# Patient Record
Sex: MALE | Race: WHITE | NOT HISPANIC OR LATINO | Employment: OTHER | URBAN - METROPOLITAN AREA
[De-identification: names, ages, dates, MRNs, and addresses within clinical notes are randomized per-mention and may not be internally consistent; named-entity substitution may affect disease eponyms.]

---

## 2017-01-13 RX ORDER — METOPROLOL TARTRATE 50 MG/1
TABLET ORAL
Qty: 180 TABLET | Refills: 1 | Status: SHIPPED | OUTPATIENT
Start: 2017-01-13 | End: 2017-07-10 | Stop reason: SDUPTHER

## 2017-01-18 NOTE — TELEPHONE ENCOUNTER
----- Message from Kathleen Pickett sent at 1/18/2017  9:45 AM CST -----  Contact: self [phone]  Type: Rx    Name of medication(s):  atorvastatin (LIPITOR) 20 MG tablet    Is this a refill? New rx? Refill     Who prescribed medication?    Pharmacy Name, Phone, & Location: [facility] on file 90 day supplies     Comments: please call and advise, Thanks !

## 2017-01-19 RX ORDER — ATORVASTATIN CALCIUM 20 MG/1
20 TABLET, FILM COATED ORAL DAILY
Qty: 90 TABLET | Refills: 3 | Status: SHIPPED | OUTPATIENT
Start: 2017-01-19 | End: 2017-12-07 | Stop reason: SDUPTHER

## 2017-01-30 RX ORDER — INSULIN DETEMIR 100 [IU]/ML
INJECTION, SOLUTION SUBCUTANEOUS
Qty: 60 ML | Refills: 1 | Status: SHIPPED | OUTPATIENT
Start: 2017-01-30 | End: 2017-08-04 | Stop reason: SDUPTHER

## 2017-01-30 RX ORDER — BLOOD SUGAR DIAGNOSTIC
STRIP MISCELLANEOUS
Qty: 600 STRIP | Refills: 3 | Status: SHIPPED | OUTPATIENT
Start: 2017-01-30 | End: 2017-04-24

## 2017-01-31 ENCOUNTER — TELEPHONE (OUTPATIENT)
Dept: ENDOCRINOLOGY | Facility: CLINIC | Age: 77
End: 2017-01-31

## 2017-01-31 NOTE — TELEPHONE ENCOUNTER
Spoke with the insurance company for PA for Levemir flex touch. They says that pt dont need PA until 90days cause pt just picked up Rx yesterday and they will notified before 90 days.

## 2017-02-20 RX ORDER — PEN NEEDLE, DIABETIC 31 GX5/16"
NEEDLE, DISPOSABLE MISCELLANEOUS
Qty: 200 EACH | Refills: 11 | Status: SHIPPED | OUTPATIENT
Start: 2017-02-20 | End: 2018-02-23 | Stop reason: SDUPTHER

## 2017-02-27 RX ORDER — TAMSULOSIN HYDROCHLORIDE 0.4 MG/1
CAPSULE ORAL
Qty: 90 CAPSULE | Refills: 0 | Status: SHIPPED | OUTPATIENT
Start: 2017-02-27 | End: 2017-05-26 | Stop reason: SDUPTHER

## 2017-03-02 ENCOUNTER — OFFICE VISIT (OUTPATIENT)
Dept: OPHTHALMOLOGY | Facility: CLINIC | Age: 77
End: 2017-03-02
Payer: MEDICARE

## 2017-03-02 ENCOUNTER — LAB VISIT (OUTPATIENT)
Dept: LAB | Facility: HOSPITAL | Age: 77
End: 2017-03-02
Attending: INTERNAL MEDICINE
Payer: MEDICARE

## 2017-03-02 DIAGNOSIS — N18.1 TYPE 2 DIABETES MELLITUS WITH STAGE 1 CHRONIC KIDNEY DISEASE, WITH LONG-TERM CURRENT USE OF INSULIN: Chronic | ICD-10-CM

## 2017-03-02 DIAGNOSIS — E11.40 TYPE 2 DIABETES MELLITUS WITH DIABETIC NEUROPATHY, WITHOUT LONG-TERM CURRENT USE OF INSULIN: ICD-10-CM

## 2017-03-02 DIAGNOSIS — E11.22 TYPE 2 DIABETES MELLITUS WITH STAGE 1 CHRONIC KIDNEY DISEASE, WITH LONG-TERM CURRENT USE OF INSULIN: Chronic | ICD-10-CM

## 2017-03-02 DIAGNOSIS — H35.033 HYPERTENSIVE RETINOPATHY OF BOTH EYES: ICD-10-CM

## 2017-03-02 DIAGNOSIS — I25.10 CORONARY ARTERY DISEASE INVOLVING NATIVE CORONARY ARTERY WITHOUT ANGINA PECTORIS, UNSPECIFIED WHETHER NATIVE OR TRANSPLANTED HEART: Chronic | ICD-10-CM

## 2017-03-02 DIAGNOSIS — E11.3212 DIABETIC MACULAR EDEMA OF LEFT EYE WITH MILD NONPROLIFERATIVE RETINOPATHY ASSOCIATED WITH TYPE 2 DIABETES MELLITUS: Primary | ICD-10-CM

## 2017-03-02 DIAGNOSIS — Z85.51 HISTORY OF BLADDER CANCER: Primary | ICD-10-CM

## 2017-03-02 DIAGNOSIS — Z79.4 TYPE 2 DIABETES MELLITUS WITH STAGE 1 CHRONIC KIDNEY DISEASE, WITH LONG-TERM CURRENT USE OF INSULIN: Chronic | ICD-10-CM

## 2017-03-02 DIAGNOSIS — I10 ESSENTIAL HYPERTENSION: ICD-10-CM

## 2017-03-02 LAB
ALBUMIN SERPL BCP-MCNC: 3.4 G/DL
ALP SERPL-CCNC: 34 U/L
ALT SERPL W/O P-5'-P-CCNC: 22 U/L
ANION GAP SERPL CALC-SCNC: 6 MMOL/L
AST SERPL-CCNC: 22 U/L
BASOPHILS # BLD AUTO: 0.06 K/UL
BASOPHILS NFR BLD: 0.9 %
BILIRUB SERPL-MCNC: 1.2 MG/DL
BUN SERPL-MCNC: 34 MG/DL
CALCIUM SERPL-MCNC: 9.7 MG/DL
CHLORIDE SERPL-SCNC: 107 MMOL/L
CHOLEST/HDLC SERPL: 3.1 {RATIO}
CO2 SERPL-SCNC: 26 MMOL/L
CREAT SERPL-MCNC: 2.1 MG/DL
DIFFERENTIAL METHOD: ABNORMAL
EOSINOPHIL # BLD AUTO: 0.3 K/UL
EOSINOPHIL NFR BLD: 4.9 %
ERYTHROCYTE [DISTWIDTH] IN BLOOD BY AUTOMATED COUNT: 13.1 %
EST. GFR  (AFRICAN AMERICAN): 34.1 ML/MIN/1.73 M^2
EST. GFR  (NON AFRICAN AMERICAN): 29.5 ML/MIN/1.73 M^2
GLUCOSE SERPL-MCNC: 182 MG/DL
HCT VFR BLD AUTO: 40.5 %
HDL/CHOLESTEROL RATIO: 32 %
HDLC SERPL-MCNC: 103 MG/DL
HDLC SERPL-MCNC: 33 MG/DL
HGB BLD-MCNC: 13.3 G/DL
LDLC SERPL CALC-MCNC: 49.6 MG/DL
LYMPHOCYTES # BLD AUTO: 1.4 K/UL
LYMPHOCYTES NFR BLD: 22.2 %
MCH RBC QN AUTO: 27.8 PG
MCHC RBC AUTO-ENTMCNC: 32.8 %
MCV RBC AUTO: 85 FL
MONOCYTES # BLD AUTO: 0.6 K/UL
MONOCYTES NFR BLD: 8.8 %
NEUTROPHILS # BLD AUTO: 4.1 K/UL
NEUTROPHILS NFR BLD: 63 %
NONHDLC SERPL-MCNC: 70 MG/DL
PLATELET # BLD AUTO: 140 K/UL
PMV BLD AUTO: 10.9 FL
POTASSIUM SERPL-SCNC: 5 MMOL/L
PROT SERPL-MCNC: 6.6 G/DL
RBC # BLD AUTO: 4.78 M/UL
SODIUM SERPL-SCNC: 139 MMOL/L
TRIGL SERPL-MCNC: 102 MG/DL
WBC # BLD AUTO: 6.5 K/UL

## 2017-03-02 PROCEDURE — 99499 UNLISTED E&M SERVICE: CPT | Mod: S$GLB,,, | Performed by: OPHTHALMOLOGY

## 2017-03-02 PROCEDURE — 92226 PR SPECIAL EYE EXAM, SUBSEQUENT: CPT | Mod: LT,S$GLB,, | Performed by: OPHTHALMOLOGY

## 2017-03-02 PROCEDURE — 99999 PR PBB SHADOW E&M-EST. PATIENT-LVL III: CPT | Mod: PBBFAC,,, | Performed by: OPHTHALMOLOGY

## 2017-03-02 PROCEDURE — 92014 COMPRE OPH EXAM EST PT 1/>: CPT | Mod: S$GLB,,, | Performed by: OPHTHALMOLOGY

## 2017-03-02 PROCEDURE — 92134 CPTRZ OPH DX IMG PST SGM RTA: CPT | Mod: S$GLB,,, | Performed by: OPHTHALMOLOGY

## 2017-03-02 NOTE — PROGRESS NOTES
OCT - ERM OU - no significant distortion  ME OS - central ME improved    Prior FA - Minimal late leakage of fovea MA OS  No leakage OD      A/P    1. Mild NPDR OU    2. Trace DME OS  Increased again  Resolved with observation in the past, will observe today    If worsens, then injection    3. PCIOL OU    4. Floaters OU    5. HTN Ret OU    6. CAD - s/p stents on Plavix    BS/BP/chol control      6 months OCT

## 2017-03-02 NOTE — MR AVS SNAPSHOT
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    -  Primary     Hypertensive retinopathy of both eyes                To Do List           Future Appointments        Provider Department Dept Phone    3/7/2017 1:30 PM Keith Teixeira MD [facility] - Cardiology [phone]    2017 2:00 PM HRA, NOM 3 [facility] - Internal Medicine [phone]    2017 3:30 PM Dolores Sutherland APRN,ANP-C [facility] - Endocrinology [phone]      Goals (5 Years of Data)              16    HEMOGLOBIN A1C < 7.5   7.8  7.7  7.2      Follow-Up and Disposition     Return in about 6 months (around 2017).      [facility]     [facility] On Call Nurse [facility] -  Assistance  Registered nurses in the [facility] On Call Center provide clinical advisement, health education, appointment booking, and other advisory services.  Call for this free service at [phone].             Medications           Message regarding Medications     Verify the changes and/or additions to your medication regime listed below are the same as discussed with your clinician today.  If any of these changes or additions are incorrect, please notify your healthcare provider.             Verify that the below list of medications is an accurate representation of the medications you are currently taking.  If none reported, the list may be blank. If incorrect, please contact your healthcare provider. Carry this list with you in case of emergency.           Current Medications  "    aspirin (ECOTRIN) 81 MG EC tablet Take 81 mg by mouth every evening.     atorvastatin (LIPITOR) 20 MG tablet Take 1 tablet (20 mg total) by mouth once daily.    BD INSULIN PEN NEEDLE UF SHORT 31 gauge x 5/16" Ndle USE UP TO 6 TIMES DAILY WITH MULTIPLE INSULIN INJECTIONS    calcitRIOL (ROCALTROL) 0.25 MCG Cap TAKE 1 CAPSULE BY MOUTH EVERY DAY    clopidogrel (PLAVIX) 75 mg tablet TAKE 1 TABLET BY MOUTH EVERY DAY    cyanocobalamin (VITAMIN B-12) 1,000 mcg/mL injection Inject 1 mL (1,000 mcg total) into the muscle every 14 (fourteen) days.    finasteride (PROSCAR) 5 mg tablet TAKE 1 TABLET BY MOUTH EVERY DAY    folic acid (FOLVITE) 1 MG tablet Take 1 mg by mouth once daily.     insulin aspart (NOVOLOG FLEXPEN) 100 unit/mL InPn pen INJECT 25 UNITS INTO THE SKIN THREE TIMES DAILY WITH MEALS. MAY USE 25 UNITS WITH NIGHTTIME SNACK.    LEVEMIR FLEXTOUCH 100 unit/mL (3 mL) InPn pen INJECT 28 UNITS EVERY MORNING AND 30 UNITS EVERY EVENING    metoprolol tartrate (LOPRESSOR) 50 MG tablet TAKE 1 TABLET BY MOUTH TWICE DAILY    nitroGLYCERIN (NITROSTAT) 0.4 MG SL tablet Place 1 tablet (0.4 mg total) under the tongue every 5 (five) minutes as needed.    omega-3 fatty acids-vitamin E (FISH OIL) 1,000 mg Cap Take by mouth 2 (two) times daily. 2 caps AM and 1 cap PM    ONE TOUCH ULTRA TEST Strp Pt test 3-4 times a day    ONETOUCH ULTRA TEST Strp TEST 4 TIMES DAILY    pantoprazole (PROTONIX) 40 MG tablet TAKE 1 TABLET BY MOUTH EVERY DAY    tamsulosin (FLOMAX) 0.4 mg Cp24 TAKE 1 CAPSULE BY MOUTH EVERY EVENING    valsartan-hydrochlorothiazide (DIOVAN-HCT) 320-25 mg per tablet Take 1 tablet by mouth once daily.    VITAMIN D2 50,000 unit capsule TAKE 1 CAPSULE BY MOUTH ONCE A WEEK           Clinical Reference Information           Allergies as of 3/2/2017     Penicillins    Coreg [Carvedilol]    Bactrim [Sulfamethoxazole-trimethoprim]      Immunizations Administered on Date of Encounter - 3/2/2017     None      Orders Placed During " Today's Visit      Normal Orders This Visit    Posterior Segment OCT Retina-Both eyes     Future Labs/Procedures Expected by Expires    Posterior Segment OCT Retina-Both eyes  As directed 3/2/2018      Language Assistance Services     ATTENTION: Language assistance services are available, free of charge. Please call [phone].      ATENCIÓN: Si daronla rosa m, tiene a harvey disposición servicios gratuitos de asistencia lingüística. Llame al [phone].     CHÚ Ý: N?u b?n nói Ti?ng Vi?t, có các d?ch v? h? tr? ngôn ng? mi?n phí dành cho b?n. G?i s? [phone].         [facility] complies with applicable Federal civil rights laws and does not discriminate on the basis of race, color, national origin, age, disability, or sex.

## 2017-03-03 LAB
ESTIMATED AVG GLUCOSE: 169 MG/DL
HBA1C MFR BLD HPLC: 7.5 %

## 2017-03-07 ENCOUNTER — OFFICE VISIT (OUTPATIENT)
Dept: CARDIOLOGY | Facility: CLINIC | Age: 77
End: 2017-03-07
Payer: MEDICARE

## 2017-03-07 VITALS
WEIGHT: 247.13 LBS | HEART RATE: 52 BPM | HEIGHT: 71 IN | BODY MASS INDEX: 34.6 KG/M2 | DIASTOLIC BLOOD PRESSURE: 71 MMHG | SYSTOLIC BLOOD PRESSURE: 164 MMHG

## 2017-03-07 DIAGNOSIS — G47.33 OSA ON CPAP: ICD-10-CM

## 2017-03-07 DIAGNOSIS — E66.09 NON MORBID OBESITY DUE TO EXCESS CALORIES: ICD-10-CM

## 2017-03-07 DIAGNOSIS — G47.33 OSA (OBSTRUCTIVE SLEEP APNEA): Primary | ICD-10-CM

## 2017-03-07 DIAGNOSIS — N18.30 CKD (CHRONIC KIDNEY DISEASE) STAGE 3, GFR 30-59 ML/MIN: Chronic | ICD-10-CM

## 2017-03-07 DIAGNOSIS — I25.10 CORONARY ARTERY DISEASE INVOLVING NATIVE CORONARY ARTERY WITHOUT ANGINA PECTORIS, UNSPECIFIED WHETHER NATIVE OR TRANSPLANTED HEART: Primary | Chronic | ICD-10-CM

## 2017-03-07 DIAGNOSIS — E78.5 HYPERLIPIDEMIA, UNSPECIFIED HYPERLIPIDEMIA TYPE: Chronic | ICD-10-CM

## 2017-03-07 DIAGNOSIS — E11.40 TYPE 2 DIABETES MELLITUS WITH DIABETIC NEUROPATHY, WITHOUT LONG-TERM CURRENT USE OF INSULIN: ICD-10-CM

## 2017-03-07 DIAGNOSIS — I10 ESSENTIAL HYPERTENSION: ICD-10-CM

## 2017-03-07 PROCEDURE — 3078F DIAST BP <80 MM HG: CPT | Mod: S$GLB,,, | Performed by: INTERNAL MEDICINE

## 2017-03-07 PROCEDURE — 1157F ADVNC CARE PLAN IN RCRD: CPT | Mod: S$GLB,,, | Performed by: INTERNAL MEDICINE

## 2017-03-07 PROCEDURE — 1160F RVW MEDS BY RX/DR IN RCRD: CPT | Mod: S$GLB,,, | Performed by: INTERNAL MEDICINE

## 2017-03-07 PROCEDURE — 3077F SYST BP >= 140 MM HG: CPT | Mod: S$GLB,,, | Performed by: INTERNAL MEDICINE

## 2017-03-07 PROCEDURE — 1159F MED LIST DOCD IN RCRD: CPT | Mod: S$GLB,,, | Performed by: INTERNAL MEDICINE

## 2017-03-07 PROCEDURE — 1126F AMNT PAIN NOTED NONE PRSNT: CPT | Mod: S$GLB,,, | Performed by: INTERNAL MEDICINE

## 2017-03-07 PROCEDURE — 99999 PR PBB SHADOW E&M-EST. PATIENT-LVL III: CPT | Mod: PBBFAC,,, | Performed by: INTERNAL MEDICINE

## 2017-03-07 PROCEDURE — 99499 UNLISTED E&M SERVICE: CPT | Mod: S$GLB,,, | Performed by: NURSE PRACTITIONER

## 2017-03-07 PROCEDURE — 99214 OFFICE O/P EST MOD 30 MIN: CPT | Mod: S$GLB,,, | Performed by: INTERNAL MEDICINE

## 2017-03-07 RX ORDER — VALSARTAN 160 MG/1
160 TABLET ORAL DAILY
Qty: 30 TABLET | Refills: 11 | Status: SHIPPED | OUTPATIENT
Start: 2017-03-07 | End: 2018-05-12 | Stop reason: SDUPTHER

## 2017-03-07 RX ORDER — AMLODIPINE BESYLATE 5 MG/1
5 TABLET ORAL DAILY
Qty: 30 TABLET | Refills: 11 | Status: SHIPPED | OUTPATIENT
Start: 2017-03-07 | End: 2017-10-16

## 2017-03-07 NOTE — MR AVS SNAPSHOT
[facility]  [address]  [facility] [address]  Phone: [phone]                  Kevon Perez   3/7/2017 1:30 PM   Office Visit    Description:  Male : 1940   Provider:  Keith Teixeira MD   Department:  [facility]           Reason for Visit     Coronary Artery Disease           Diagnoses this Visit        Comments    Coronary artery disease involving native coronary artery without angina pectoris, unspecified whether native or transplanted heart    -  Primary     Hyperlipidemia, unspecified hyperlipidemia type         Essential hypertension         Type 2 diabetes mellitus with diabetic neuropathy, without long-term current use of insulin         Non morbid obesity due to excess calories         GINGER on CPAP                To Do List           Future Appointments        Provider Department Dept Phone    2017 2:00 PM [facility], NOM 3 [facility] [phone]    2017 3:30 PM HERBER Womack,ANP-C [facility] [phone]      Goals (5 Years of Data)              3/2/17    9/2/16    4/4/16    HEMOGLOBIN A1C < 7.5   7.5  7.8  7.7      Follow-Up and Disposition     Return in about 6 months (around 2017).       These Medications        Disp Refills Start End    valsartan (DIOVAN) 160 MG tablet 30 tablet 11 3/7/2017     Take 1 tablet (160 mg total) by mouth once daily. - Oral    [facility] [employer] [facility] [address] [place] [address] [address] AT [facility] Ph #: [phone]       amlodipine (NORVASC) 5 MG tablet 30 tablet 11 3/7/2017     Take 1 tablet (5 mg total) by mouth once daily. - Oral    Pharm[facility]: [facility] [employer] [id number]  [facility] [place] [facility] [address] AT [facility] Ph #: [phone]         [facility]     [facility] -  Assistance  Registered nurses in the [facility] Center provide clinical advisement, health  "education, appointment booking, and other advisory services.  Call for this free service at [phone].             Medications           Message regarding Medications     Verify the changes and/or additions to your medication regime listed below are the same as discussed with your clinician today.  If any of these changes or additions are incorrect, please notify your healthcare provider.        START taking these NEW medications        Refills    valsartan (DIOVAN) 160 MG tablet 11    Sig: Take 1 tablet (160 mg total) by mouth once daily.    Class: Normal    Route: Oral    amlodipine (NORVASC) 5 MG tablet 11    Sig: Take 1 tablet (5 mg total) by mouth once daily.    Class: Normal    Route: Oral      STOP taking these medications     cyanocobalamin (VITAMIN B-12) 1,000 mcg/mL injection Inject 1 mL (1,000 mcg total) into the muscle every 14 (fourteen) days.    omega-3 fatty acids-vitamin E (FISH OIL) 1,000 mg Cap Take by mouth 2 (two) times daily. 2 caps AM and 1 cap PM    valsartan-hydrochlorothiazide (DIOVAN-HCT) 320-25 mg per tablet Take 1 tablet by mouth once daily.           Verify that the below list of medications is an accurate representation of the medications you are currently taking.  If none reported, the list may be blank. If incorrect, please contact your healthcare provider. Carry this list with you in case of emergency.           Current Medications     aspirin (ECOTRIN) 81 MG EC tablet Take 81 mg by mouth every evening.     atorvastatin (LIPITOR) 20 MG tablet Take 1 tablet (20 mg total) by mouth once daily.    BD INSULIN PEN NEEDLE UF SHORT 31 gauge x 5/16" Ndle USE UP TO 6 TIMES DAILY WITH MULTIPLE INSULIN INJECTIONS    calcitRIOL (ROCALTROL) 0.25 MCG Cap TAKE 1 CAPSULE BY MOUTH EVERY DAY    clopidogrel (PLAVIX) 75 mg tablet TAKE 1 TABLET BY MOUTH EVERY DAY    finasteride (PROSCAR) 5 mg tablet TAKE 1 TABLET BY MOUTH EVERY DAY    folic acid (FOLVITE) 1 MG tablet Take 1 mg by mouth once daily.  " "   insulin aspart (NOVOLOG FLEXPEN) 100 unit/mL InPn pen INJECT 25 UNITS INTO THE SKIN THREE TIMES DAILY WITH MEALS. MAY USE 25 UNITS WITH NIGHTTIME SNACK.    LEVEMIR FLEXTOUCH 100 unit/mL (3 mL) InPn pen INJECT 28 UNITS EVERY MORNING AND 30 UNITS EVERY EVENING    metoprolol tartrate (LOPRESSOR) 50 MG tablet TAKE 1 TABLET BY MOUTH TWICE DAILY    nitroGLYCERIN (NITROSTAT) 0.4 MG SL tablet Place 1 tablet (0.4 mg total) under the tongue every 5 (five) minutes as needed.    ONE TOUCH ULTRA TEST Strp Pt test 3-4 times a day    ONETOUCH ULTRA TEST Strp TEST 4 TIMES DAILY    pantoprazole (PROTONIX) 40 MG tablet TAKE 1 TABLET BY MOUTH EVERY DAY    tamsulosin (FLOMAX) 0.4 mg Cp24 TAKE 1 CAPSULE BY MOUTH EVERY EVENING    VITAMIN D2 50,000 unit capsule TAKE 1 CAPSULE BY MOUTH ONCE A WEEK    amlodipine (NORVASC) 5 MG tablet Take 1 tablet (5 mg total) by mouth once daily.    valsartan (DIOVAN) 160 MG tablet Take 1 tablet (160 mg total) by mouth once daily.           Clinical Reference Information           Your Vitals Were     BP Pulse Height Weight BMI    164/71 (BP Location: Left arm, Patient Position: Sitting, BP Method: Automatic) 52 5' 10.5" (1.791 m) 112.1 kg (247 lb 2.2 oz) 34.96 kg/m2      Blood Pressure          Most Recent Value    Right Arm BP - Sitting  163/70    Left Arm BP - Sitting  164/71    BP  (!)  164/71      Allergies as of 3/7/2017     Penicillins    Coreg [Carvedilol]    Bactrim [Sulfamethoxazole-trimethoprim]      Immunizations Administered on Date of Encounter - 3/7/2017     None      Orders Placed During Today's Visit     Future Labs/Procedures Expected by Expires    Basic metabolic panel  3/7/2017 5/6/2018    CPAP titration (Must have diagnosis of GINGER from previous sleep study.)  As directed 3/7/2018      Instructions    1. Stop Diovan  2. Start Valsartan 160mg by mouth once a day.  3. Start Amlodipine 5mg by mouth once a day  4. Keep a log of your blood pressure 2-3 times a week and email Dr. Teixeira " with results in 1 month  5. Please get blood drawn in 1 month after starting new medications.   6. Walk or swim for 30 minutes 5 times a week and follow a low salt heart healthy diet such as the Mediterranean diet.   7. Follow up in 6 months       Language Assistance Services     ATTENTION: Language assistance services are available, free of charge. Please call [phone].      ATENCIÓN: Si habla español, tiene a harvey disposición servicios gratuitos de asistencia lingüística. Llame al [phone].     CHÚ Ý: N?u b?n nói Ti?ng Vi?t, có các d?ch v? h? tr? ngôn ng? mi?n phí dành cho b?n. G?i s? [phone].         [facility] complies with applicable Federal civil rights laws and does not discriminate on the basis of race, color, national origin, age, disability, or sex.

## 2017-03-07 NOTE — PATIENT INSTRUCTIONS
1. Stop Diovan  2. Start Valsartan 160mg by mouth once a day.  3. Start Amlodipine 5mg by mouth once a day  4. Keep a log of your blood pressure 2-3 times a week and email Dr. Teixeira with results in 1 month  5. Please get blood drawn in 1 month after starting new medications.   6. Walk or swim for 30 minutes 5 times a week and follow a low salt heart healthy diet such as the Mediterranean diet.   7. Follow up in 6 months

## 2017-03-07 NOTE — PROGRESS NOTES
Mr. Perez is a patient of Dr. Teixeira and was last seen in [facility] Cardiology Visit 9/6/16.      Subjective:   Patient ID:  Kevon Perez is a 76 y.o. male who presents for follow-up of Coronary Artery Disease (6 month f/u )    HPI  76 year old caucasion male followed with CAD post STEMI/PCI 2009, hypertension, hyperlipidemia, type 2 diabetes and CRI III. He has increased MATTHEWS and more fatigue since his last visit. He has not been exercising for the past 4 months. HTN is not well controlled. BP goal <130/80. Pt reports a high salt diet that includes a significant amount of lunch meat and sausage. States that he eats sandwhiches regularly. Denies chest discomfort, SOB, MATTHEWS, syncope, pre-syncope, headache, and visual changes. He has a h/o GINGER and reports use of his CPAP waking rested. States that he has daytime sleepiness and naps regularly. He has not had his cPAP titrated since his initial study. HLD is stable on low intensity statin therapy. HDL is a little low at 33. LDL 49.6 in 3/217. Cardiac care is complicated by DM II. Last A1C 7.5. Not following a heart healthy diabetic diet. He is followed by Endocrinology for DM II. CKD stage 3 has worsened with a CRT 2.1 and eGFR 29.5. Currently taking a thiazide diuretic.     Echo 4/22/16  CONCLUSIONS     1 - Normal left ventricular systolic function (EF 55-60%).     2 - Normal right ventricular systolic function .     3 - Biatrial enlargement.     4 - Mildly elevated central venous pressure.     Past Medical History:   Diagnosis Date    Acute coronary syndrome 9/10/09    STEMI    Anticoagulant long-term use     plavix    Basal cell cancer     BCC (basal cell carcinoma of skin)     nose    Cancer of bladder January 2013    Cataract     Chronic kidney disease     Coronary artery disease     CPAP (continuous positive airway pressure) dependence     Diabetes mellitus     Diabetic retinopathy     GERD (gastroesophageal reflux disease)     High cholesterol      Hyperlipidemia     Hypertension     Non-proliferative diabetic retinopathy, mild, left eye 11/2013    Dr. Dougherty    GINGER (obstructive sleep apnea)     CPAP      Past Surgical History:   Procedure Laterality Date    BASAL CELL CARCINOMA EXCISION      nose     BLADDER SURGERY      CARDIAC CATHETERIZATION      CATARACT EXTRACTION      bilateral     COLONOSCOPY  3/26/15    CORONARY ANGIOPLASTY  9/10/09    CFX    CORONARY ANGIOPLASTY WITH STENT PLACEMENT      CYSTOSCOPY      hydrocel       Family History   Problem Relation Age of Onset    Hypertension Father     Heart disease Father     Diabetes Father     Diabetes Sister     Diabetes Brother     Cataracts Mother     Goiter Mother     Diabetes Paternal Uncle     Cancer Maternal Aunt     Kidney disease Neg Hx     Amblyopia Neg Hx     Blindness Neg Hx     Glaucoma Neg Hx     Macular degeneration Neg Hx     Retinal detachment Neg Hx     Strabismus Neg Hx     Stroke Neg Hx     Thyroid disease Neg Hx      Social History     Social History    Marital status:      Spouse name: N/A    Number of children: N/A    Years of education: N/A     Occupational History    retired      Social History Main Topics    Smoking status: Former Smoker     Packs/day: 1.00     Years: 40.00     Types: Cigarettes     Quit date: 7/2/1970    Smokeless tobacco: Former User    Alcohol use 0.6 oz/week     1 Standard drinks or equivalent per week      Comment: occ    Drug use: No    Sexual activity: Not Currently     Other Topics Concern    None     Social History Narrative       Review of Systems   Constitution: Positive for malaise/fatigue. Negative for decreased appetite, diaphoresis, weakness, weight gain and weight loss.   HENT: Negative for headaches.    Eyes: Negative for visual disturbance.   Cardiovascular: Positive for dyspnea on exertion. Negative for chest pain, claudication, irregular heartbeat, leg swelling, near-syncope, orthopnea,  "palpitations, paroxysmal nocturnal dyspnea and syncope.        Denies chest pressure   Respiratory: Negative for cough, hemoptysis, shortness of breath, sleep disturbances due to breathing and snoring.    Musculoskeletal: Negative for myalgias.   Gastrointestinal: Negative for bloating, abdominal pain, anorexia, change in bowel habit, constipation, diarrhea, nausea and vomiting.   Neurological: Negative for difficulty with concentration, disturbances in coordination, excessive daytime sleepiness, dizziness, light-headedness, loss of balance and numbness.   Psychiatric/Behavioral: The patient does not have insomnia.        Allergies and current medications updated and reviewed:  Review of patient's allergies indicates:   Allergen Reactions    Penicillins Other (See Comments)    Coreg [carvedilol]      Hypotension      Bactrim [sulfamethoxazole-trimethoprim] Rash     Current Outpatient Prescriptions   Medication Sig    aspirin (ECOTRIN) 81 MG EC tablet Take 81 mg by mouth every evening.     atorvastatin (LIPITOR) 20 MG tablet Take 1 tablet (20 mg total) by mouth once daily.    BD INSULIN PEN NEEDLE UF SHORT 31 gauge x 5/16" Ndle USE UP TO 6 TIMES DAILY WITH MULTIPLE INSULIN INJECTIONS    calcitRIOL (ROCALTROL) 0.25 MCG Cap TAKE 1 CAPSULE BY MOUTH EVERY DAY    clopidogrel (PLAVIX) 75 mg tablet TAKE 1 TABLET BY MOUTH EVERY DAY    finasteride (PROSCAR) 5 mg tablet TAKE 1 TABLET BY MOUTH EVERY DAY    folic acid (FOLVITE) 1 MG tablet Take 1 mg by mouth once daily.     insulin aspart (NOVOLOG FLEXPEN) 100 unit/mL InPn pen INJECT 25 UNITS INTO THE SKIN THREE TIMES DAILY WITH MEALS. MAY USE 25 UNITS WITH NIGHTTIME SNACK.    LEVEMIR FLEXTOUCH 100 unit/mL (3 mL) InPn pen INJECT 28 UNITS EVERY MORNING AND 30 UNITS EVERY EVENING (Patient taking differently: INJECT 35 UNITS EVERY MORNING AND 35 UNITS EVERY EVENING)    metoprolol tartrate (LOPRESSOR) 50 MG tablet TAKE 1 TABLET BY MOUTH TWICE DAILY    nitroGLYCERIN " "(NITROSTAT) 0.4 MG SL tablet Place 1 tablet (0.4 mg total) under the tongue every 5 (five) minutes as needed.    ONE TOUCH ULTRA TEST Strp Pt test 3-4 times a day    ONETOUCH ULTRA TEST Strp TEST 4 TIMES DAILY    pantoprazole (PROTONIX) 40 MG tablet TAKE 1 TABLET BY MOUTH EVERY DAY    tamsulosin (FLOMAX) 0.4 mg Cp24 TAKE 1 CAPSULE BY MOUTH EVERY EVENING    VITAMIN D2 50,000 unit capsule TAKE 1 CAPSULE BY MOUTH ONCE A WEEK    amlodipine (NORVASC) 5 MG tablet Take 1 tablet (5 mg total) by mouth once daily.    valsartan (DIOVAN) 160 MG tablet Take 1 tablet (160 mg total) by mouth once daily.     No current facility-administered medications for this visit.        Objective:     Right Arm BP - Sittin/70 (17 1312)  Left Arm BP - Sittin/71 (17 1312)    BP (!) 164/71 (BP Location: Left arm, Patient Position: Sitting, BP Method: Automatic)  Pulse (!) 52  Ht 5' 10.5" (1.791 m)  Wt 112.1 kg (247 lb 2.2 oz)  BMI 34.96 kg/m2    Physical Exam   Constitutional: He is oriented to person, place, and time. He appears well-developed and well-nourished. He is active and cooperative. No distress.   HENT:   Head: Normocephalic and atraumatic.   Eyes: Conjunctivae, EOM and lids are normal. No scleral icterus. Pupils are equal.   Neck: Neck supple. Normal carotid pulses, no hepatojugular reflux and no JVD present. Carotid bruit is not present.   Cardiovascular: Normal rate, regular rhythm, S1 normal, S2 normal and intact distal pulses.  PMI is not displaced.  Exam reveals no gallop and no friction rub.    No murmur heard.  Pulses:       Carotid pulses are 2+ on the right side, and 2+ on the left side.       Radial pulses are 2+ on the right side, and 2+ on the left side.        Dorsalis pedis pulses are 1+ on the right side, and 1+ on the left side.        Posterior tibial pulses are 1+ on the right side, and 1+ on the left side.   Pulmonary/Chest: Effort normal and breath sounds normal. No respiratory " distress. He has no decreased breath sounds. He has no wheezes. He has no rhonchi. He has no rales. He exhibits no tenderness.   Abdominal: Soft. Bowel sounds are normal. He exhibits distension (Large abdomen with normal bowel sounds, no fluid noted on percussion; likely adipose tissue). He exhibits no fluid wave, no abdominal bruit, no ascites and no pulsatile midline mass. There is no hepatosplenomegaly. There is no tenderness.   Musculoskeletal: He exhibits no edema.   Neurological: He is alert and oriented to person, place, and time. He displays a negative Romberg sign. Gait normal.   Skin: Skin is warm, dry and intact. No rash noted. He is not diaphoretic. Nails show no clubbing.   Psychiatric: He has a normal mood and affect. His speech is normal and behavior is normal. Judgment and thought content normal. Cognition and memory are normal.   Nursing note and vitals reviewed.      Chemistry        Component Value Date/Time     03/02/2017 0751    K 5.0 03/02/2017 0751     03/02/2017 0751    CO2 26 03/02/2017 0751    BUN 34 (H) 03/02/2017 0751    CREATININE 2.1 (H) 03/02/2017 0751     (H) 03/02/2017 0751        Component Value Date/Time    CALCIUM 9.7 03/02/2017 0751    ALKPHOS 34 (L) 03/02/2017 0751    AST 22 03/02/2017 0751    ALT 22 03/02/2017 0751    BILITOT 1.2 (H) 03/02/2017 0751              Recent Labs  Lab 12/09/14  0630 01/09/15  0440  04/04/16  1045  03/02/17  0751   WHITE BLOOD CELL COUNT 7.16 8.80  < > 6.98  < > 6.50   HEMOGLOBIN 13.6 L 14.8  < > 14.2  < > 13.3 L   HEMATOCRIT 40.6 42.7  < > 43.2  < > 40.5   MCV 82 82  < > 84  < > 85   PLATELETS 143 L 142 L  < > 177  < > 140 L    H 118 H  --  92  --   --    TSH  --   --   < > 3.253  --   --    CHOLESTEROL  --   --   < >  --   < > 103 L   HDL  --   --   < >  --   < > 33 L   LDL CHOLESTEROL  --   --   < >  --   < > 49.6 L   TRIGLYCERIDES  --   --   < >  --   < > 102   HDL/CHOLESTEROL RATIO  --   --   < >  --   < > 32.0   < > =  values in this interval not displayed.      Recent Labs  Lab 12/09/14  0630 01/09/15  0440   INR 1.0 1.0        Test(s) Reviewed  I have reviewed the following in detail:  [] Stress test   [] Angiography   [] Echocardiogram   [] Labs   [] Other:         Assessment/Plan:     Coronary artery disease involving native coronary artery without angina pectoris, unspecified whether native or transplanted heart  Comments:  Continue statin and ASA. No changes.     Hyperlipidemia, unspecified hyperlipidemia type  Comments:  LDL goal <70. LDL 49.6 on low intensity statin therapy. No changes needed.     Essential hypertension  Comments:  BP goal <130/80. BP running 140-160s systolic.   Orders:  -     amlodipine (NORVASC) 5 MG tablet; Take 1 tablet (5 mg total) by mouth once daily.  Dispense: 30 tablet; Refill: 11  -     Basic metabolic panel; Future; Expected date: 3/7/17    Type 2 diabetes mellitus with diabetic neuropathy, without long-term current use of insulin  Comments:  A1C 7.5 eating a high carbohydrate diet. Managed by Endocrinology.     Non morbid obesity due to excess calories  Comments:  Large amount of central adiposity increasing cardiovascular risk. Encouraged following low salt heart healthy diet. Given Mediterranean diet.     GINGER on CPAP  Comments:  Endorses daytime sleepiness and afternoon napping. CPAP not adjusted since initial setup.   Orders:  -     CPAP titration (Must have diagnosis of GINGER from previous sleep study.); Future    CKD (chronic kidney disease) stage 3, GFR 30-59 ml/min  Comments:  Worsening kidney function. Stop HCTZ. Decrease valsartan. Start Amlodipine. Recheck BMP in 1 month.     Other orders  -     valsartan (DIOVAN) 160 MG tablet; Take 1 tablet (160 mg total) by mouth once daily.  Dispense: 30 tablet; Refill: 11      Return in about 6 months (around 9/7/2017).

## 2017-03-13 RX ORDER — FINASTERIDE 5 MG/1
TABLET, FILM COATED ORAL
Qty: 90 TABLET | Refills: 0 | Status: SHIPPED | OUTPATIENT
Start: 2017-03-13 | End: 2017-06-12 | Stop reason: SDUPTHER

## 2017-03-28 ENCOUNTER — LAB VISIT (OUTPATIENT)
Dept: LAB | Facility: HOSPITAL | Age: 77
End: 2017-03-28
Attending: INTERNAL MEDICINE
Payer: MEDICARE

## 2017-03-28 DIAGNOSIS — I10 ESSENTIAL HYPERTENSION: ICD-10-CM

## 2017-03-28 LAB
ANION GAP SERPL CALC-SCNC: 13 MMOL/L
BUN SERPL-MCNC: 27 MG/DL
CALCIUM SERPL-MCNC: 9.5 MG/DL
CHLORIDE SERPL-SCNC: 109 MMOL/L
CO2 SERPL-SCNC: 20 MMOL/L
CREAT SERPL-MCNC: 2 MG/DL
EST. GFR  (AFRICAN AMERICAN): 36.1 ML/MIN/1.73 M^2
EST. GFR  (NON AFRICAN AMERICAN): 31.3 ML/MIN/1.73 M^2
GLUCOSE SERPL-MCNC: 143 MG/DL
POTASSIUM SERPL-SCNC: 4.5 MMOL/L
SODIUM SERPL-SCNC: 142 MMOL/L

## 2017-03-28 PROCEDURE — 36415 COLL VENOUS BLD VENIPUNCTURE: CPT | Mod: PO

## 2017-03-28 PROCEDURE — 80048 BASIC METABOLIC PNL TOTAL CA: CPT

## 2017-03-29 ENCOUNTER — PATIENT MESSAGE (OUTPATIENT)
Dept: CARDIOLOGY | Facility: CLINIC | Age: 77
End: 2017-03-29

## 2017-03-29 ENCOUNTER — HOSPITAL ENCOUNTER (OUTPATIENT)
Dept: SLEEP MEDICINE | Facility: HOSPITAL | Age: 77
Discharge: HOME | End: 2017-03-29
Attending: INTERNAL MEDICINE
Payer: MEDICARE

## 2017-03-29 DIAGNOSIS — G47.33 OSA ON CPAP: ICD-10-CM

## 2017-03-29 PROCEDURE — 95811 POLYSOM 6/>YRS CPAP 4/> PARM: CPT

## 2017-03-29 PROCEDURE — 95811 PR POLYSOMNOGRAPHY W/CPAP: ICD-10-PCS | Mod: 26,52,, | Performed by: INTERNAL MEDICINE

## 2017-03-29 PROCEDURE — 95811 POLYSOM 6/>YRS CPAP 4/> PARM: CPT | Mod: 26,52,, | Performed by: INTERNAL MEDICINE

## 2017-04-02 ENCOUNTER — PATIENT MESSAGE (OUTPATIENT)
Dept: CARDIOLOGY | Facility: CLINIC | Age: 77
End: 2017-04-02

## 2017-04-24 ENCOUNTER — OFFICE VISIT (OUTPATIENT)
Dept: ENDOCRINOLOGY | Facility: CLINIC | Age: 77
End: 2017-04-24
Payer: MEDICARE

## 2017-04-24 ENCOUNTER — OFFICE VISIT (OUTPATIENT)
Dept: INTERNAL MEDICINE | Facility: CLINIC | Age: 77
End: 2017-04-24
Payer: MEDICARE

## 2017-04-24 VITALS
WEIGHT: 248 LBS | HEART RATE: 68 BPM | DIASTOLIC BLOOD PRESSURE: 72 MMHG | BODY MASS INDEX: 34.72 KG/M2 | HEIGHT: 71 IN | SYSTOLIC BLOOD PRESSURE: 128 MMHG

## 2017-04-24 VITALS
SYSTOLIC BLOOD PRESSURE: 152 MMHG | DIASTOLIC BLOOD PRESSURE: 62 MMHG | HEART RATE: 69 BPM | HEIGHT: 71 IN | WEIGHT: 247 LBS | BODY MASS INDEX: 34.58 KG/M2

## 2017-04-24 DIAGNOSIS — I70.0 AORTIC ATHEROSCLEROSIS: ICD-10-CM

## 2017-04-24 DIAGNOSIS — E11.40 TYPE 2 DIABETES MELLITUS WITH DIABETIC NEUROPATHY, WITH LONG-TERM CURRENT USE OF INSULIN: ICD-10-CM

## 2017-04-24 DIAGNOSIS — E55.9 VITAMIN D DEFICIENCY DISEASE: ICD-10-CM

## 2017-04-24 DIAGNOSIS — E11.3213 TYPE 2 DIABETES MELLITUS WITH BOTH EYES AFFECTED BY MILD NONPROLIFERATIVE RETINOPATHY AND MACULAR EDEMA, WITH LONG-TERM CURRENT USE OF INSULIN: ICD-10-CM

## 2017-04-24 DIAGNOSIS — E11.3212 DIABETIC MACULAR EDEMA OF LEFT EYE WITH MILD NONPROLIFERATIVE RETINOPATHY ASSOCIATED WITH TYPE 2 DIABETES MELLITUS: ICD-10-CM

## 2017-04-24 DIAGNOSIS — N18.30 CKD (CHRONIC KIDNEY DISEASE) STAGE 3, GFR 30-59 ML/MIN: Chronic | ICD-10-CM

## 2017-04-24 DIAGNOSIS — Z85.51 PERSONAL HISTORY OF BLADDER CANCER: ICD-10-CM

## 2017-04-24 DIAGNOSIS — E78.2 MIXED HYPERLIPIDEMIA: Chronic | ICD-10-CM

## 2017-04-24 DIAGNOSIS — Z79.4 TYPE 2 DIABETES MELLITUS WITH STAGE 3 CHRONIC KIDNEY DISEASE, WITH LONG-TERM CURRENT USE OF INSULIN: Primary | Chronic | ICD-10-CM

## 2017-04-24 DIAGNOSIS — E11.42 TYPE 2 DIABETES MELLITUS WITH DIABETIC POLYNEUROPATHY, WITH LONG-TERM CURRENT USE OF INSULIN: ICD-10-CM

## 2017-04-24 DIAGNOSIS — N18.30 TYPE 2 DIABETES MELLITUS WITH STAGE 3 CHRONIC KIDNEY DISEASE, WITH LONG-TERM CURRENT USE OF INSULIN: Primary | Chronic | ICD-10-CM

## 2017-04-24 DIAGNOSIS — E78.5 HYPERLIPIDEMIA, UNSPECIFIED HYPERLIPIDEMIA TYPE: Chronic | ICD-10-CM

## 2017-04-24 DIAGNOSIS — E08.3213 MILD NONPROLIFERATIVE DIABETIC RETINOPATHY OF BOTH EYES WITH MACULAR EDEMA ASSOCIATED WITH DIABETES MELLITUS DUE TO UNDERLYING CONDITION: ICD-10-CM

## 2017-04-24 DIAGNOSIS — Z79.4 TYPE 2 DIABETES MELLITUS WITH BOTH EYES AFFECTED BY MILD NONPROLIFERATIVE RETINOPATHY AND MACULAR EDEMA, WITH LONG-TERM CURRENT USE OF INSULIN: ICD-10-CM

## 2017-04-24 DIAGNOSIS — H35.033 HYPERTENSIVE RETINOPATHY OF BOTH EYES: ICD-10-CM

## 2017-04-24 DIAGNOSIS — I25.10 CORONARY ARTERY DISEASE INVOLVING NATIVE CORONARY ARTERY OF NATIVE HEART WITHOUT ANGINA PECTORIS: Chronic | ICD-10-CM

## 2017-04-24 DIAGNOSIS — E66.9 OBESITY (BMI 30.0-34.9): ICD-10-CM

## 2017-04-24 DIAGNOSIS — G47.33 OSA ON CPAP: ICD-10-CM

## 2017-04-24 DIAGNOSIS — E11.22 TYPE 2 DIABETES MELLITUS WITH STAGE 3 CHRONIC KIDNEY DISEASE, WITH LONG-TERM CURRENT USE OF INSULIN: Primary | Chronic | ICD-10-CM

## 2017-04-24 DIAGNOSIS — E66.01 SEVERE OBESITY (BMI 35.0-35.9 WITH COMORBIDITY): ICD-10-CM

## 2017-04-24 DIAGNOSIS — E11.22 TYPE 2 DIABETES MELLITUS WITH DIABETIC CHRONIC KIDNEY DISEASE, UNSPECIFIED CKD STAGE, UNSPECIFIED LONG TERM INSULIN USE STATUS: Chronic | ICD-10-CM

## 2017-04-24 DIAGNOSIS — I10 ESSENTIAL HYPERTENSION: ICD-10-CM

## 2017-04-24 DIAGNOSIS — Z98.61 POST PTCA: ICD-10-CM

## 2017-04-24 DIAGNOSIS — Z79.4 TYPE 2 DIABETES MELLITUS WITH DIABETIC POLYNEUROPATHY, WITH LONG-TERM CURRENT USE OF INSULIN: ICD-10-CM

## 2017-04-24 DIAGNOSIS — I25.2 HISTORY OF MI (MYOCARDIAL INFARCTION): ICD-10-CM

## 2017-04-24 DIAGNOSIS — E11.42 DIABETIC POLYNEUROPATHY ASSOCIATED WITH TYPE 2 DIABETES MELLITUS: ICD-10-CM

## 2017-04-24 DIAGNOSIS — N25.81 HYPERPARATHYROIDISM, SECONDARY RENAL: ICD-10-CM

## 2017-04-24 DIAGNOSIS — E11.3213 BOTH EYES AFFECTED BY MILD NONPROLIFERATIVE DIABETIC RETINOPATHY WITH MACULAR EDEMA, ASSOCIATED WITH TYPE 2 DIABETES MELLITUS: ICD-10-CM

## 2017-04-24 DIAGNOSIS — N40.0 BENIGN NON-NODULAR PROSTATIC HYPERPLASIA, PRESENCE OF LOWER URINARY TRACT SYMPTOMS UNSPECIFIED: Chronic | ICD-10-CM

## 2017-04-24 DIAGNOSIS — D69.6 THROMBOCYTOPENIA: ICD-10-CM

## 2017-04-24 DIAGNOSIS — Z79.4 TYPE 2 DIABETES MELLITUS WITH DIABETIC NEUROPATHY, WITH LONG-TERM CURRENT USE OF INSULIN: ICD-10-CM

## 2017-04-24 DIAGNOSIS — Z00.00 ENCOUNTER FOR PREVENTIVE HEALTH EXAMINATION: Primary | ICD-10-CM

## 2017-04-24 DIAGNOSIS — Z23 IMMUNIZATION DUE: ICD-10-CM

## 2017-04-24 PROCEDURE — 99999 PR PBB SHADOW E&M-EST. PATIENT-LVL IV: CPT | Mod: PBBFAC,,, | Performed by: NURSE PRACTITIONER

## 2017-04-24 PROCEDURE — 3078F DIAST BP <80 MM HG: CPT | Mod: S$GLB,,, | Performed by: NURSE PRACTITIONER

## 2017-04-24 PROCEDURE — 3074F SYST BP LT 130 MM HG: CPT | Mod: S$GLB,,, | Performed by: NURSE PRACTITIONER

## 2017-04-24 PROCEDURE — G0439 PPPS, SUBSEQ VISIT: HCPCS | Mod: 25,S$GLB,, | Performed by: NURSE PRACTITIONER

## 2017-04-24 PROCEDURE — 99499 UNLISTED E&M SERVICE: CPT | Mod: S$GLB,,, | Performed by: NURSE PRACTITIONER

## 2017-04-24 PROCEDURE — 1160F RVW MEDS BY RX/DR IN RCRD: CPT | Mod: S$GLB,,, | Performed by: NURSE PRACTITIONER

## 2017-04-24 PROCEDURE — 90732 PPSV23 VACC 2 YRS+ SUBQ/IM: CPT | Mod: S$GLB,,, | Performed by: NURSE PRACTITIONER

## 2017-04-24 PROCEDURE — G0009 ADMIN PNEUMOCOCCAL VACCINE: HCPCS | Mod: S$GLB,,, | Performed by: NURSE PRACTITIONER

## 2017-04-24 PROCEDURE — 1159F MED LIST DOCD IN RCRD: CPT | Mod: S$GLB,,, | Performed by: NURSE PRACTITIONER

## 2017-04-24 PROCEDURE — 99214 OFFICE O/P EST MOD 30 MIN: CPT | Mod: S$GLB,,, | Performed by: NURSE PRACTITIONER

## 2017-04-24 PROCEDURE — 1126F AMNT PAIN NOTED NONE PRSNT: CPT | Mod: S$GLB,,, | Performed by: NURSE PRACTITIONER

## 2017-04-24 NOTE — MR AVS SNAPSHOT
[facility]  [address]  [facility] [address]  Phone: [phone]                  Kevon CUEVA Chris   2017 3:30 PM   Office Visit    Description:  Male : 1940   Provider:  HERBER Cedillo,ANP-C   Department:  [facility]           Reason for Visit     Diabetes Mellitus           Diagnoses this Visit        Comments    Type 2 diabetes mellitus with stage 3 chronic kidney disease, with long-term current use of insulin    -  Primary     CKD (chronic kidney disease) stage 3, GFR 30-59 ml/min         Type 2 diabetes mellitus with diabetic polyneuropathy, with long-term current use of insulin         Type 2 diabetes mellitus with both eyes affected by mild nonproliferative retinopathy and macular edema, with long-term current use of insulin         Both eyes affected by mild nonproliferative diabetic retinopathy with macular edema, associated with type 2 diabetes mellitus         Diabetic macular edema of left eye with mild nonproliferative retinopathy associated with type 2 diabetes mellitus         Coronary artery disease involving native coronary artery of native heart without angina pectoris         History of MI (myocardial infarction)         Post PTCA         Diabetic polyneuropathy associated with type 2 diabetes mellitus         GINGER on CPAP         Mixed hyperlipidemia         Obesity (BMI 30.0-34.9)                To Do List           Future Appointments        Provider Department Dept Phone    2017 10:00 AM [facility] [phone]    2017 9:40 AM Teodoro Hu MD [facility] - Nephrology [phone]    2017 1:30 PM Yumiko Haro MD [place] [facility] [phone]      Goals (5 Years of Data)              3/2/17    9/2/16    4/4/16    HEMOGLOBIN A1C < 7.5   7.5  7.8  7.7      Follow-Up and Disposition     Return in about 6 months (around 10/24/2017).      [facility]     [facility] Nurse Care Line - 24/7 Assistance  Unless otherwise directed by your provider, please contact [facility], our nurse care line that is available for 24/7 assistance.     Registered nurses in the [facility] Center provide: appointment scheduling, clinical advisement, health education, and other advisory services.  Call: [phone] (toll free)               Medications           Message regarding Medications     Verify the changes and/or additions to your medication regime listed below are the same as discussed with your clinician today.  If any of these changes or additions are incorrect, please notify your healthcare provider.             Verify that the below list of medications is an accurate representation of the medications you are currently taking.  If none reported, the list may be blank. If incorrect, please contact your healthcare provider. Carry this list with you in case of emergency.           Current Medications     amlodipine (NORVASC) 5 MG tablet Take 1 tablet (5 mg total) by mouth once daily.    aspirin (ECOTRIN) 81 MG EC tablet Take 81 mg by mouth every evening.     atorvastatin (LIPITOR) 20 MG tablet Take 1 tablet (20 mg total) by mouth once daily.    BD INSULIN PEN NEEDLE UF SHORT 31 gauge x 5/16" Ndle USE UP TO 6 TIMES DAILY WITH MULTIPLE INSULIN INJECTIONS    calcitRIOL (ROCALTROL) 0.25 MCG Cap TAKE 1 CAPSULE BY MOUTH EVERY DAY    clopidogrel (PLAVIX) 75 mg tablet TAKE 1 TABLET BY MOUTH EVERY DAY    finasteride (PROSCAR) 5 mg tablet TAKE 1 TABLET BY MOUTH EVERY DAY    folic acid (FOLVITE) 1 MG tablet Take 1 mg by mouth once daily.     insulin aspart (NOVOLOG FLEXPEN) 100 unit/mL InPn pen INJECT 25 UNITS INTO THE SKIN THREE TIMES DAILY WITH MEALS. MAY USE 25 UNITS WITH NIGHTTIME SNACK.    LEVEMIR FLEXTOUCH 100 unit/mL (3 mL) InPn pen INJECT 28 UNITS EVERY MORNING AND 30 UNITS EVERY EVENING    metoprolol tartrate (LOPRESSOR) 50 MG tablet TAKE 1 TABLET BY MOUTH TWICE DAILY    ONE " "TOUCH ULTRA TEST Strp Pt test 3-4 times a day    pantoprazole (PROTONIX) 40 MG tablet TAKE 1 TABLET BY MOUTH EVERY DAY    tamsulosin (FLOMAX) 0.4 mg Cp24 TAKE 1 CAPSULE BY MOUTH EVERY EVENING    valsartan (DIOVAN) 160 MG tablet Take 1 tablet (160 mg total) by mouth once daily.    VITAMIN D2 50,000 unit capsule TAKE 1 CAPSULE BY MOUTH ONCE A WEEK    nitroGLYCERIN (NITROSTAT) 0.4 MG SL tablet Place 1 tablet (0.4 mg total) under the tongue every 5 (five) minutes as needed.           Clinical Reference Information           Your Vitals Were     BP Pulse Height Weight BMI    152/62 69 5' 11" (1.803 m) 112 kg (247 lb) 34.45 kg/m2      Blood Pressure          Most Recent Value    BP  (!)  152/62      Allergies as of 4/24/2017     Penicillins    Coreg [Carvedilol]    Bactrim [Sulfamethoxazole-trimethoprim]      Immunizations Administered on Date of Encounter - 4/24/2017     Name Date Dose VIS Date Route    Pneumococcal Polysaccharide - 23 Valent 4/24/2017 0.5 mL 4/24/2015 Intramuscular      Orders Placed During Today's Visit     Future Labs/Procedures Expected by Expires    Hemoglobin A1c  4/24/2017 4/24/2018      Instructions     Tresiba once daily would replace Levemir twice daily       Language Assistance Services     ATTENTION: Language assistance services are available, free of charge. Please call [phone].      ATENCIÓN: Si habla español, tiene a harvey disposición servicios gratuitos de asistencia lingüística. Llame al [phone].     [facility] Ý: N?u b?n nói Ti?ng Vi?t, có các d?ch v? h? tr? ngôn ng? mi?n phí dành cho b?n. G?i s? [phone].         [facility] complies with applicable Federal civil rights laws and does not discriminate on the basis of race, color, national origin, age, disability, or sex.        "

## 2017-04-24 NOTE — PROGRESS NOTES
"CC: This 76 y.o. male presents for management of Diabetes Mellitus   complicated by CAD, neuropathy, retinopathy in L eye, and nephropathy along with the current chronic medical conditions including:  Patient Active Problem List   Diagnosis    CAD (coronary artery disease)    MI (myocardial infarction)    Hyperlipidemia    Type 2 diabetes mellitus with mild nonproliferative retinopathy and macular edema    Type 2 diabetes mellitus with neurologic complication    BPH (benign prostatic hypertrophy)    Post PTCA    Type 2 diabetes mellitus with diabetic chronic kidney disease    Nephritis and nephropathy, 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    Mild nonproliferative diabetic retinopathy of both eyes    Hypertensive retinopathy of both eyes    Lumbar radiculopathy    Personal history of bladder cancer    CKD (chronic kidney disease) stage 3, GFR 30-59 ml/min    GINGER on CPAP    Diabetic macular edema of left eye with mild nonproliferative retinopathy associated with type 2 diabetes mellitus    Essential hypertension    Aortic atherosclerosis    History of MI (myocardial infarction)    Hyperparathyroidism, secondary renal    Polyneuropathy, diabetic    Obesity (BMI 30.0-34.9)    Thrombocytopenia      Status of these conditions is pending review. He arrives alone today.     HPI: First seen as a referral from Dr. Sol for uncontrolled diabetes mellitus. He was diagnosed with T2DM in 1994 on routine bloodwork without symptoms. Previous oral meds include metformin and glyburide, Glucovance. Metformin was d/c in April 2006 r/t renal insufficiency and basal insulin started 4/06. Converted to MDI 6/07. DM education: DTN 4/28/06; Dietitian 5/17/06. Split Levemir to BID in 2011. He was seen in [facility] appt in June 2012. He underwent CGMS testing. In August 2015 changed diet dramatically, "approaching Vegan diet" but has since removed previous diet.   Last seen by me in 2/2016. He has resumed old dietary habits. " "    CURRENT DM MEDS: Levemir 30 units AM, 30 units PM  Flexpen; Novolog 16-20 units AC + correction scale.     Denies missing doses of insulin medication.     He is monitoring BG at home 3-4 x daily with One Touch Ultra  + hypoglycemia at home; occurs during day pre lunch, once a week. He feels s/s when BG <80, feels "weak, woozy, a little unsteady", tx with glucose tablets or mint; admits to overtreating. Carries peppermint on him at all times.     Kevon Perez did not bring meter or logs to clinic today.   FBS " a little better" 170-195, 201; pre lunch 90- 160; evening 170s    DIET/ MEAL PATTERN: 2 meals daily; skips breakfast; + occ snacking during day afternoon before dinner (fruit, peanut butter crackers, nuts); drinks diet coke, water; eats half out and half at home; he does not cook often  EXERCISE:  Walking in neighborhood 2 x week    STANDARDS OF CARE:  Eye exam: 3/17 Mild NPDR OU Dr. Tillman  Podiatry: 9/16 Dr. Reynolds                     ROS:   Gen: Appetite good, + weight gain since last appt; denies weakness. C/o easily fatigued, "no stamina"  Skin: Skin is intact and heals well, no rashes, pruritis, easy bruising, no hair changes, no intolerance to heat/cold.  Eyes: wears glasses, denies visual disturbances; cataracts removed 2014  Resp: wears CPAP usually at night - recently did sleep study; no SOB; c/o occ MATTHEWS ck working in yard, climb stairs; no cough  Cardiac: No palpitations, chest pain, denies syncope, weakness, + ankle edema x 2 months.  GI: No nausea or vomiting, diarrhea, constipation, or abdominal pain.  /GYN: c/o nocturia 1 x nightly, no urinary frequency, burning or pain.   PVD: denies cyanosis, pallor, or cold extremities.  MS/Neuro: + numbness/ tingling in BLE toes; FROM of joints without swelling or pain. Gait steady, speech clear, no tremor, coordination problem.  Psych: Denies drug/ETOH abuse, no hx. of eating disorders.  Other systems: negative.    Hemoglobin A1C   Date Value " Ref Range Status   03/02/2017 7.5 (H) 4.5 - 6.2 % Final     Comment:     According to ADA guidelines, hemoglobin A1C <7.0% represents  optimal control in non-pregnant diabetic patients.  Different  metrics may apply to specific populations.   Standards of Medical Care in Diabetes - 2016.  For the purpose of screening for the presence of diabetes:  <5.7%     Consistent with the absence of diabetes  5.7-6.4%  Consistent with increasing risk for diabetes   (prediabetes)  >or=6.5%  Consistent with diabetes  Currently no consensus exists for use of hemoglobin A1C  for diagnosis of diabetes for children.     09/02/2016 7.8 (H) 4.5 - 6.2 % Final     Comment:     According to ADA guidelines, hemoglobin A1C <7.0% represents  optimal control in non-pregnant diabetic patients.  Different  metrics may apply to specific populations.   Standards of Medical Care in Diabetes - 2016.  For the purpose of screening for the presence of diabetes:  <5.7%     Consistent with the absence of diabetes  5.7-6.4%  Consistent with increasing risk for diabetes   (prediabetes)  >or=6.5%  Consistent with diabetes  Currently no consensus exists for use of hemoglobin A1C  for diagnosis of diabetes for children.     04/04/2016 7.7 (H) 4.5 - 6.2 % Final     Lab Results   Component Value Date    TSH 3.253 04/04/2016     Lab Results   Component Value Date    MICALBCREAT 9.9 01/12/2015       Chemistry        Component Value Date/Time     03/28/2017 0956    K 4.5 03/28/2017 0956     03/28/2017 0956    CO2 20 (L) 03/28/2017 0956    BUN 27 (H) 03/28/2017 0956    CREATININE 2.0 (H) 03/28/2017 0956     (H) 03/28/2017 0956        Component Value Date/Time    CALCIUM 9.5 03/28/2017 0956    ALKPHOS 34 (L) 03/02/2017 0751    AST 22 03/02/2017 0751    ALT 22 03/02/2017 0751    BILITOT 1.2 (H) 03/02/2017 0751      GFR 31    Lab Results   Component Value Date    LDLCALC 49.6 (L) 03/02/2017     PE:  GENERAL: Well developed, well nourished.  PSYCH:  AAOx3, appropriate mood and affect, pleasant expression, conversant, appears relaxed, well groomed.   EYES: Conjunctiva, corneas clear, no lid lag, EOM intact.  NECK: Supple, trachea midline, FROM.  CHEST: Resp even and unlabored  CARDIAC: + BLE edema  VASCULAR: Same temperature peripherally to centrally, brisk capillary refill BUE.  NEURO: Gait steady  SKIN: Normal skin turgor. Skin warm and dry. No areas of breakdown, no acanthosis nigracans.  FEET: foot ware appropriate    ASSESSMENT and PLAN:  Kevon was seen today for diabetes mellitus.    Diagnoses and associated orders for this visit:    Type 2 diabetes mellitus with neurological, eye, CV, and renal manifestations - Discussed DM, progression of disease, long term complications. Reviewed A1c and BG goals for his age and PMH.    -Consider convert Levemir to Tresiba - printed Rx provided at last appt, he will evaluate cost.    - Continue Levemir every 12 hours and Novolog AC.  Reviewed insulins' onset, peak, duration, dosing, administration, site rotation.   -May have better prandial coverage if add GLP1 RA.   -May consider VGo in future if cost effective.    - Discussed lifestyle modifications- weight loss, diet changes and increase exercise to 3-4 x week.   -Reviewed hypoglycemia, s/s and appropriate tx options.  - Instructed to monitor BG ac/hs, bring logs/ meter to clinic visits.    - takes ASA, ARB, statin    Polyneuropathy in diabetes - stable, no issues today - f/u with Podiatry, Dr. Reynolds 9/16    Chronic renal failure, stage III -  GFR 31 followed by nephrology, avoiding metformin and SGLT2i, avoid hypoglycemia; caution with insulin stacking    CAD (coronary artery disease); MI (myocardial infarction); s/p PTCA - followed by cardiology; on Plavix, avoid hypoglycemia    Mild NPDR L eye with DM macular edema - followed by ophth -  Dr. Dougherty ; Dr. Tillman    Obesity Body mass index is 34.45 kg/(m^2). discussed DM diet, snacking, and continuing  exercise regularly.     Hypertension suboptimal reading today, PCP started Norvasc 3/7/2017, He will f/u with PCP regarding new BP Rx and ankle edema; reports BP at home 140-150s/ 55; continue meds as previously prescribed and monitor    Hyperlipidemia controlled, on statin and fish oil, LFTs WNL   Lab Results   Component Value Date    LDLCALC 49.6 (L) 03/02/2017     GINGER - wear CPAP nightly;  if not refer to sleep medicine for assistance.     Vitamin D deficiency - on supplement, need repeat level  Vit D, 25-Hydroxy   Date Value Ref Range Status   04/20/2016 11 (L) 30 - 96 ng/mL Final     Comment:     Vitamin D deficiency.........<10 ng/mL                              Vitamin D insufficiency......10-29 ng/mL       Vitamin D sufficiency........> or equal to 30 ng/mL  Vitamin D toxicity............>100 ng/mL             Orders Placed This Encounter   Procedures    Hemoglobin A1c     Standing Status:   Future     Standing Expiration Date:   4/24/2018        Return in about 6 months (around 10/24/2017).  Labs prior to appt at [facility] lab      5/1/17 - received SMBG flowsheet for review.

## 2017-04-24 NOTE — PROGRESS NOTES
"Kevon Perez presented for a  Medicare AWV and comprehensive Health Risk Assessment today. The following components were reviewed and updated:    · Medical history  · Family History  · Social history  · Allergies and Current Medications  · Health Risk Assessment  · Health Maintenance  · Care Team     ** See Completed Assessments for Annual Wellness Visit within the encounter summary.**       The following assessments were completed:  · Living Situation  · CAGE  · Depression Screening  · Timed Get Up and Go  · Whisper Test  · Cognitive Function Screening  ·   ·   ·   · Nutrition Screening  · ADL Screening  · PAQ Screening    Vitals:    04/24/17 1354   BP: 128/72   BP Location: Left arm   Patient Position: Sitting   BP Method: Manual   Pulse: 68   Weight: 112.5 kg (248 lb 0.3 oz)   Height: 5' 10.5" (1.791 m)     Body mass index is 35.08 kg/(m^2).  Physical Exam   Constitutional: He is oriented to person, place, and time.   Obese   HENT:   Head: Normocephalic.   Cardiovascular: Normal rate, regular rhythm and normal heart sounds.    No murmur heard.  Pulmonary/Chest: Effort normal and breath sounds normal. He has no wheezes. He has no rales.   Abdominal: Soft. Bowel sounds are normal. He exhibits mass.   Obese   Musculoskeletal: Normal range of motion. He exhibits edema.   1+ edema bilateral lower legs   Neurological: He is alert and oriented to person, place, and time. He exhibits normal muscle tone.   Skin: Skin is warm.   Psychiatric: He has a normal mood and affect.   Nursing note and vitals reviewed.        Diagnoses and health risks identified today and associated recommendations/orders:    1. Encounter for preventive health examination  Here for Health Risk Assessment. Follow up in one year.    2. Immunization due  - Pneumococcal Polysaccharide Vaccine (23 Valent) (SQ/IM)    3. Essential hypertension  Chronic, stable on current medications. Followed by PCP.    4. Coronary artery disease involving native " coronary artery of native heart without angina pectoris  Chronic, stable on current medications. Followed by Cardiology    5. History of MI (myocardial infarction)  Stable on current medications. Followed by Cardiology.    6. Aortic atherosclerosis  Chronic, stable on current medications. Noted on CT of abdomen/pelvis 1/09/15. Followed by PCP, Cardiology.    7. Type 2 diabetes mellitus with diabetic chronic kidney disease, unspecified CKD stage, unspecified long term insulin use status  Chronic, stable on current medications. Followed by Endocrinology.    8. CKD (chronic kidney disease) stage 3, GFR 30-59 ml/min  Chronic, stable. Followed by Nephrology.    9. Hyperparathyroidism, secondary renal  Chronic, stable on current medication. Followed by Nephrology.    10. Type 2 diabetes mellitus with diabetic neuropathy, with long-term current use of insulin  Chronic, stable on current medications. Followed by Endocrinology.    11. Diabetic polyneuropathy associated with type 2 diabetes mellitus  Chronic, stable on current medications. Followed by Endocrinology.    12. Type 2 diabetes mellitus with both eyes affected by mild nonproliferative retinopathy and macular edema, with long-term current use of insulin  Chronic, stable on current medications. Followed by Ophthalmology.    13. Mild nonproliferative diabetic retinopathy of both eyes with macular edema associated with diabetes mellitus due to underlying condition  Chronic, stable. Followed by Ophthalmology.    14. Hypertensive retinopathy of both eyes  Chronic, stable. Followed by Ophthalmology.    15. Hyperlipidemia, unspecified hyperlipidemia type  Chronic, stable on current medications. Followed by Cardiology, PCP.    16. Personal history of bladder cancer  Stable. Followed by Urology.    17. Severe obesity (BMI 35.0-35.9 with comorbidity)  Chronic, stable with no recent weight loss. Reinforced diet/exercise per PCP recommendations. Followed by PCP.    18. Benign  non-nodular prostatic hyperplasia, presence of lower urinary tract symptoms unspecified  Chronic, stable on current medications. Followed by Urology.    19. GINGER on CPAP  Chronic, stable with CPAP. Followed by Sleep Medicine.    20. Thrombocytopenia  Chronic, mild, stable. Followed by PCP.      Provided Kevon with a 5-10 year written screening schedule and personal prevention plan. Recommendations were developed using the USPSTF age appropriate recommendations. Education, counseling, and referrals were provided as needed. After Visit Summary printed and given to patient which includes a list of additional screenings\tests needed.    Return in about 3 months (around 7/24/2017).with PCP.    Taina Mason NP

## 2017-04-24 NOTE — MR AVS SNAPSHOT
[facility]  [address]  [facility] [address]  Phone: [phone]  Fax: [phone]                  Kevon Perez   2017 2:00 PM   Office Visit    Description:  Male : 1940   Provider:  RIO RAMSAY 3   Department:  [facility]           Reason for Visit     HRA 2           Diagnoses this Visit        Comments    Encounter for preventive health examination    -  Primary     Immunization due                To Do List           Future Appointments        Provider Department Dept Phone    2017 3:30 PM Dolores Arnold APRN,ANP-C [facility] - Endocrinology [phone]    2017 10:00 AM [facility] [phone]    2017 9:40 AM Teodoro Hu MD [facility] - Nephrology [phone]    2017 1:30 PM Yumiko Haro MD [facility] [phone]      Goals (5 Years of Data)              3/2/17    9/2/16    4/4/16    HEMOGLOBIN A1C < 7.5   7.5  7.8  7.7      [facility]     [facility] Nurse Care Line -  Assistance  Unless otherwise directed by your provider, please contact [facility], our nurse care line that is available for  assistance.     Registered nurses in the [facility] provide: appointment scheduling, clinical advisement, health education, and other advisory services.  Call: [phone] (toll free)               Medications           Message regarding Medications     Verify the changes and/or additions to your medication regime listed below are the same as discussed with your clinician today.  If any of these changes or additions are incorrect, please notify your healthcare provider.             Verify that the below list of medications is an accurate representation of the medications you are currently taking.  If none reported, the list may be blank. If incorrect, please contact your healthcare provider. Carry this list with you in case of  "emergency.           Current Medications     amlodipine (NORVASC) 5 MG tablet Take 1 tablet (5 mg total) by mouth once daily.    aspirin (ECOTRIN) 81 MG EC tablet Take 81 mg by mouth every evening.     atorvastatin (LIPITOR) 20 MG tablet Take 1 tablet (20 mg total) by mouth once daily.    BD INSULIN PEN NEEDLE UF SHORT 31 gauge x 5/16" Ndle USE UP TO 6 TIMES DAILY WITH MULTIPLE INSULIN INJECTIONS    calcitRIOL (ROCALTROL) 0.25 MCG Cap TAKE 1 CAPSULE BY MOUTH EVERY DAY    clopidogrel (PLAVIX) 75 mg tablet TAKE 1 TABLET BY MOUTH EVERY DAY    finasteride (PROSCAR) 5 mg tablet TAKE 1 TABLET BY MOUTH EVERY DAY    folic acid (FOLVITE) 1 MG tablet Take 1 mg by mouth once daily.     insulin aspart (NOVOLOG FLEXPEN) 100 unit/mL InPn pen INJECT 25 UNITS INTO THE SKIN THREE TIMES DAILY WITH MEALS. MAY USE 25 UNITS WITH NIGHTTIME SNACK.    LEVEMIR FLEXTOUCH 100 unit/mL (3 mL) InPn pen INJECT 28 UNITS EVERY MORNING AND 30 UNITS EVERY EVENING    metoprolol tartrate (LOPRESSOR) 50 MG tablet TAKE 1 TABLET BY MOUTH TWICE DAILY    nitroGLYCERIN (NITROSTAT) 0.4 MG SL tablet Place 1 tablet (0.4 mg total) under the tongue every 5 (five) minutes as needed.    ONE TOUCH ULTRA TEST Strp Pt test 3-4 times a day    ONETOUCH ULTRA TEST Strp TEST 4 TIMES DAILY    pantoprazole (PROTONIX) 40 MG tablet TAKE 1 TABLET BY MOUTH EVERY DAY    tamsulosin (FLOMAX) 0.4 mg Cp24 TAKE 1 CAPSULE BY MOUTH EVERY EVENING    valsartan (DIOVAN) 160 MG tablet Take 1 tablet (160 mg total) by mouth once daily.    VITAMIN D2 50,000 unit capsule TAKE 1 CAPSULE BY MOUTH ONCE A WEEK           Clinical Reference Information           Your Vitals Were     BP Pulse Height Weight BMI    128/72 (BP Location: Left arm, Patient Position: Sitting, BP Method: Manual) 68 5' 10.5" (1.791 m) 112.5 kg (248 lb 0.3 oz) 35.08 kg/m2      Blood Pressure          Most Recent Value    BP  128/72      Allergies as of 4/24/2017     Penicillins    Coreg [Carvedilol]    Bactrim " [Sulfamethoxazole-trimethoprim]      Immunizations Administered on Date of Encounter - 4/24/2017     Name Date Dose VIS Date Route    Pneumococcal Polysaccharide - 23 Valent  Incomplete 0.5 mL 4/24/2015 Intramuscular      Orders Placed During Today's Visit      Normal Orders This Visit    Pneumococcal Polysaccharide Vaccine (23 Valent) (SQ/IM)       Instructions      Counseling and Referral of Other Preventative  (Italic type indicates deductible and co-insurance are waived)    Patient Name: Kevon Perez  Today's Date: 4/24/2017      SERVICE LIMITATIONS RECOMMENDATION    Vaccines    · Pneumococcal (once after 65)    · Influenza (annually)    · Hepatitis B (if medium/high risk)    · Prevnar 13      Hepatitis B medium/high risk factors:       - End-stage renal disease       - Hemophiliacs who received Factor VII or         IX concentrates       - Clients of institutions for the mentally             retarded       - Persons who live in the same house as          a HepB carrier       - Homosexual men       - Illicit injectable drug abusers     Pneumococcal: Done, no repeat necessary     Influenza: Due fall 2017     Hepatitis B: N/A     Prevnar 13: Done, no repeat necessary    Prostate cancer screening (annually to age 75)     Prostate specific antigen (PSA) Shared decision making with Provider. Sometimes a co-pay may be required if the patient decides to have this test. The USPSTF no longer recommends prostate cancer screening routinely in medicine: per Urology    Colorectal cancer screening (to age 75)    · Fecal occult blood test (annual)  · Flexible sigmoidoscopy (5y)  · Screening colonoscopy (10y)  · Barium enema   Last done 2015, recommend to repeat every 5-10  years    Diabetes self-management training (no USPSTF recommendations)  Requires referral by treating physician for patient with diabetes or renal disease. 10 hours of initial DSMT sessions of no less than 30 minutes each in a continuous 12-month period.  2 hours of follow-up DSMT in subsequent years.  Followed by [facility]    Glaucoma screening (no USPSTF recommendation)  Diabetes mellitus, family history   , age 50 or over    American, age 65 or over  Done this year, repeat every year    Medical nutrition therapy for diabetes or renal disease (no recommended schedule)  Requires referral by treating physician for patient with diabetes or renal disease or kidney transplant within the past 3 years.  Can be provided in same year as diabetes self-management training (DSMT), and CMS recommends medical nutrition therapy take place after DSMT. Up to 3 hours for initial year and 2 hours in subsequent years.  N/A    Cardiovascular screening blood tests (every 5 years)  · Fasting lipid panel  Order as a panel if possible  Done this year, repeat every year    Diabetes screening tests (at least every 3 years, Medicare covers annually or at 6-month intervals for prediabetic patients)  · Fasting blood sugar (FBS) or glucose tolerance test (GTT)  Patient must be diagnosed with one of the following:       - Hypertension       - Dyslipidemia       - Obesity (BMI 30kg/m2)       - Previous elevated impaired FBS or GTT       ... or any two of the following:       - Overweight (BMI 25 but <30)       - Family history of diabetes       - Age 65 or older       - History of gestational diabetes or birth of baby weighing more than 9 pounds  Every 304 months    Abdominal aortic aneurysm screening (once)  · Sonogram   Limited to patients who meet one of the following criteria:       - Men who are 65-75 years old and have smoked more than 100 cigarette in their lifetime       - Anyone with a family history of abdominal aortic aneurysm       - Anyone recommended for screening by the USPSTF  N/A    HIV screening (annually for increased risk patients)  · HIV-1 and HIV-2 by EIA, or VLADISLAV, rapid antibody test or oral mucosa transudate  Patients must be at increased  risk for HIV infection per USPSTF guidelines or pregnant. Tests covered annually for patient at increased risk or as requested by the patient. Pregnant patients may receive up to 3 tests during pregnancy.  Risks discussed, screening is not recommended    Smoking cessation counseling (up to 8 sessions per year)  Patients must be asymptomatic of tobacco-related conditions to receive as a preventative service.  Non-smoker    Subsequent annual wellness visit  At least 12 months since last AWV  Return in one year     The following information is provided to all patients.  This information is to help you find resources for any of the problems found today that may be affecting your health:                Living healthy guide: www.[facility].[place].[facility]      Understanding Diabetes: www.diabetes.org      Eating healthy: www.cdc.gov/healthyweight      CDC home safety checklist: www.cdc.gov/steadi/patient.html      Agency on Aging: www.goea.[place].[facility]      Alcoholics anonymous (AA): www.aa.org      Physical Activity: www.sergo.nih.gov/rp7kesb      Tobacco use: www.quitwithusla.org          Language Assistance Services     ATTENTION: Language assistance services are available, free of charge. Please call [phone].      ATENCIÓN: Si habla rosa m, tiene a harvey disposición servicios gratuitos de asistencia lingüística. Llame al [phone].     CHÚ Ý: N?u b?n nói Ti?ng Vi?t, có các d?ch v? h? tr? ngôn ng? mi?n phí dành cho b?n. G?i s? [phone].         [facility] complies with applicable Federal civil rights laws and does not discriminate on the basis of race, color, national origin, age, disability, or sex.

## 2017-04-24 NOTE — PATIENT INSTRUCTIONS
Counseling and Referral of Other Preventative  (Italic type indicates deductible and co-insurance are waived)    Patient Name: Kevon Perez  Today's Date: 4/24/2017      SERVICE LIMITATIONS RECOMMENDATION    Vaccines    · Pneumococcal (once after 65)    · Influenza (annually)    · Hepatitis B (if medium/high risk)    · Prevnar 13      Hepatitis B medium/high risk factors:       - End-stage renal disease       - Hemophiliacs who received Factor VII or         IX concentrates       - Clients of institutions for the mentally             retarded       - Persons who live in the same house as          a HepB carrier       - Homosexual men       - Illicit injectable drug abusers     Pneumococcal: Done, no repeat necessary     Influenza: Due fall 2017     Hepatitis B: N/A     Prevnar 13: Done, no repeat necessary    Prostate cancer screening (annually to age 75)     Prostate specific antigen (PSA) Shared decision making with Provider. Sometimes a co-pay may be required if the patient decides to have this test. The USPSTF no longer recommends prostate cancer screening routinely in medicine: per Urology    Colorectal cancer screening (to age 75)    · Fecal occult blood test (annual)  · Flexible sigmoidoscopy (5y)  · Screening colonoscopy (10y)  · Barium enema   Last done 2015, recommend to repeat every 5-10  years    Diabetes self-management training (no USPSTF recommendations)  Requires referral by treating physician for patient with diabetes or renal disease. 10 hours of initial DSMT sessions of no less than 30 minutes each in a continuous 12-month period. 2 hours of follow-up DSMT in subsequent years.  Followed by [facility]    Glaucoma screening (no USPSTF recommendation)  Diabetes mellitus, family history   , age 50 or over    American, age 65 or over  Done this year, repeat every year    Medical nutrition therapy for diabetes or renal disease (no recommended schedule)  Requires  referral by treating physician for patient with diabetes or renal disease or kidney transplant within the past 3 years.  Can be provided in same year as diabetes self-management training (DSMT), and CMS recommends medical nutrition therapy take place after DSMT. Up to 3 hours for initial year and 2 hours in subsequent years.  N/A    Cardiovascular screening blood tests (every 5 years)  · Fasting lipid panel  Order as a panel if possible  Done this year, repeat every year    Diabetes screening tests (at least every 3 years, Medicare covers annually or at 6-month intervals for prediabetic patients)  · Fasting blood sugar (FBS) or glucose tolerance test (GTT)  Patient must be diagnosed with one of the following:       - Hypertension       - Dyslipidemia       - Obesity (BMI 30kg/m2)       - Previous elevated impaired FBS or GTT       ... or any two of the following:       - Overweight (BMI 25 but <30)       - Family history of diabetes       - Age 65 or older       - History of gestational diabetes or birth of baby weighing more than 9 pounds  Every 304 months    Abdominal aortic aneurysm screening (once)  · Sonogram   Limited to patients who meet one of the following criteria:       - Men who are 65-75 years old and have smoked more than 100 cigarette in their lifetime       - Anyone with a family history of abdominal aortic aneurysm       - Anyone recommended for screening by the USPSTF  N/A    HIV screening (annually for increased risk patients)  · HIV-1 and HIV-2 by EIA, or VLADISLAV, rapid antibody test or oral mucosa transudate  Patients must be at increased risk for HIV infection per USPSTF guidelines or pregnant. Tests covered annually for patient at increased risk or as requested by the patient. Pregnant patients may receive up to 3 tests during pregnancy.  Risks discussed, screening is not recommended    Smoking cessation counseling (up to 8 sessions per year)  Patients must be asymptomatic of tobacco-related  conditions to receive as a preventative service.  Non-smoker    Subsequent annual wellness visit  At least 12 months since last AWV  Return in one year     The following information is provided to all patients.  This information is to help you find resources for any of the problems found today that may be affecting your health:                Living healthy guide: www.[facility].[place].[facility]      Understanding Diabetes: www.diabetes.org      Eating healthy: www.cdc.gov/healthyweight      CDC home safety checklist: www.cdc.gov/steadi/patient.html      Agency on Aging: www.goea.[place].[facility]      Alcoholics anonymous (AA): www.aa.org      Physical Activity: www.sergo.nih.gov/wg6dbue      Tobacco use: www.quitwithusla.org

## 2017-04-25 PROBLEM — E55.9 VITAMIN D DEFICIENCY DISEASE: Status: ACTIVE | Noted: 2017-04-25

## 2017-04-27 ENCOUNTER — HOSPITAL ENCOUNTER (OUTPATIENT)
Dept: UROLOGY | Facility: HOSPITAL | Age: 77
Discharge: HOME | End: 2017-04-27
Attending: UROLOGY
Payer: MEDICARE

## 2017-04-27 VITALS
BODY MASS INDEX: 35.09 KG/M2 | SYSTOLIC BLOOD PRESSURE: 158 MMHG | HEIGHT: 70 IN | TEMPERATURE: 99 F | HEART RATE: 56 BPM | DIASTOLIC BLOOD PRESSURE: 69 MMHG | RESPIRATION RATE: 18 BRPM | WEIGHT: 245.13 LBS

## 2017-04-27 DIAGNOSIS — Z85.51 HISTORY OF BLADDER CANCER: ICD-10-CM

## 2017-04-27 PROCEDURE — 52000 CYSTOURETHROSCOPY: CPT

## 2017-04-27 PROCEDURE — 88112 CYTOPATH CELL ENHANCE TECH: CPT | Mod: 26,,, | Performed by: PATHOLOGY

## 2017-04-27 PROCEDURE — 88112 CYTOPATH CELL ENHANCE TECH: CPT | Performed by: PATHOLOGY

## 2017-04-27 PROCEDURE — 52000 CYSTOURETHROSCOPY: CPT | Mod: ,,, | Performed by: UROLOGY

## 2017-04-27 PROCEDURE — 25000003 PHARM REV CODE 250: Performed by: UROLOGY

## 2017-04-27 RX ORDER — CIPROFLOXACIN 500 MG/1
500 TABLET ORAL ONCE
Status: COMPLETED | OUTPATIENT
Start: 2017-04-27 | End: 2017-04-27

## 2017-04-27 RX ORDER — LIDOCAINE HYDROCHLORIDE 20 MG/ML
JELLY TOPICAL ONCE
Status: COMPLETED | OUTPATIENT
Start: 2017-04-27 | End: 2017-04-27

## 2017-04-27 RX ADMIN — LIDOCAINE HYDROCHLORIDE: 20 JELLY TOPICAL at 07:04

## 2017-04-27 RX ADMIN — CIPROFLOXACIN HYDROCHLORIDE 500 MG: 250 TABLET, FILM COATED ORAL at 08:04

## 2017-04-27 NOTE — H&P
Kevon Perez is a 73 y.o. man who is here for the evaluation of bladder cancer.  S/p TURBT for superficial bladder cancer in 1/2013 here for surveillance cysto.   Last cysto was 3 months ago.  No voiding problems reported.   Will proceed with cysto today.          Past Medical History    Diagnosis  Date      Hyperlipidemia        Hypertension        Coronary artery disease        Diabetes mellitus        High cholesterol        Basal cell cancer        Chronic kidney disease        Acute coronary syndrome  9/10/09        STEMI      GINGER (obstructive sleep apnea)        GERD (gastroesophageal reflux disease)        BCC (basal cell carcinoma of skin)        Cataract        Diabetic retinopathy        Cancer of bladder  January 2013      Non-proliferative diabetic retinopathy, mild, left eye  11/2013        Dr. Dougherty      CPAP (continuous positive airway pressure) dependence        Anticoagulant long-term use          plavix              Past Surgical History    Procedure  Laterality  Date      Cardiac catheterization          Coronary angioplasty    9/10/09        CFX      Basal cell carcinoma excision          Bladder surgery          Cystoscopy          Coronary angioplasty with stent placement                  History    Substance Use Topics      Smoking status:  Former Smoker -- 1.00 packs/day for 40 years        Types:  Cigarettes        Quit date:  07/02/1970      Smokeless tobacco:  Former User      Alcohol Use:  0.5 oz/week        1 drink(s) per week          Comment: occ             Family History    Problem  Relation  Age of Onset      Hypertension  Father        Heart disease  Father        Diabetes  Father        Diabetes  Sister        Diabetes  Brother        Kidney disease  Neg Hx        Amblyopia  Neg Hx        Blindness  Neg Hx        Glaucoma  Neg Hx        Macular degeneration  Neg Hx        Retinal detachment  Neg Hx        Strabismus  Neg Hx         Cancer  Neg Hx        Stroke  Neg Hx        Thyroid disease  Neg Hx        Cataracts  Mother        Goiter  Mother        Diabetes  Paternal Uncle         Allergy:        Allergies    Allergen  Reactions      Penicillins  Other (See Comments)      Coreg [Carvedilol]          Hypotension     Bactrim [Sulfamethoxazole-Trimethoprim]  Rash       [unfilled]  Review of Systems   General ROS: GENERAL: No weight gain or loss  SKIN: No rashes or lacerations  HEAD: No headaches  EYES: No exophthalmos, jaundice or blindness  EARS: No dizziness, tinnitus or hearing loss  NOSE: No changes in smell  MOUTH & THROAT: No dyskinetic movements or obvious goiter  CHEST: No shortness of breath, hyperventilation or cough  CARDIOVASCULAR: No tachycardia or chest pain  ABDOMEN: No nausea, vomiting, pain, constipation or diarrhea  URINARY: No frequency, dysuria or sexual dysfunction  ENDOCRINE: No polydipsia, polyuria  MUSCULOSKELETAL: No pain or stiffness of the joints  NEUROLOGIC: No weakness, sensory changes, seizures, confusion, memory loss, tremor or other abnormal movements  Physical Exam  There were no vitals filed for this visit.  General Appearance:  Alert, cooperative, no distress, appears stated age    Head:  Normocephalic, without obvious abnormality, atraumatic    Eyes:  PERRL, conjunctiva/corneas clear, EOM's intact, fundi benign, both eyes    Ears:  Normal TM's and external ear canals, both ears    Nose:  Nares normal, septum midline, mucosa normal, no drainage or sinus tenderness    Throat:  Lips, mucosa, and tongue normal; teeth and gums normal    Neck:  Supple, symmetrical, trachea midline, no adenopathy, thyroid: not enlarged, symmetric, no tenderness/mass/nodules, no carotid bruit or JVD    Back:  Symmetric, no curvature, ROM normal, no CVA tenderness    Lungs:  Clear to auscultation bilaterally, respirations unlabored    Chest Wall:  No tenderness or deformity    Heart:  Regular rate and rhythm, S1, S2 normal, no  murmur, rub or gallop    Abdomen:  Soft, non-tender, bowel sounds active all four quadrants, no masses, no organomegaly    Genitalia:  Normal male, normal testicles, normal epididymis, normal vas palpated.          Extremities:  Extremities normal, atraumatic, no cyanosis or edema    Pulses:  2+ and symmetric    Skin:  Skin color, texture, turgor normal, no rashes or lesions    Lymph nodes:  Cervical, supraclavicular, and axillary nodes normal    Neurologic:  Normal          LABS:        Lab Results    Component  Value  Date      PSA  1.42  6/13/2013      PSA  1.31  6/25/2012      PSA  1.5  4/28/2011                Results for orders placed in visit on 06/13/13    PROSTATE SPECIFIC ANTIGEN, DIAGNOSTIC    Result  Value  Range      PSA, SCREEN  1.42  0 - 4 ng/ml    Lab Results     Component  Value  Date       CREATININE  1.8*  5/20/2014       CREATININE  1.6*  3/26/2014       CREATININE  2.2*  3/18/2014        No results found for this or any previous visit.        Urine Culture, Routine    Date  Value  Range  Status    3/26/2014  No growth    Final       Assessment and Plan:  Bladder cancer  - Cystoscopy; Standing  - Cystoscopy

## 2017-04-27 NOTE — IP AVS SNAPSHOT
[facility]  [address] [address]  Phone: [phone]  Fax: [phone]                  Kevon Perez   2017 10:00 AM   Cystoscopy    Description:  Male : 1940   Provider:  MP ONTIVEROS   Department:  [facility]           Visit Information     Date & Time Provider Department    2017 10:00 AM HERMILA ONTIVEROSY [facility]      Recent Lab Values        2014                  9:30 AM  7:51 AM  3:41 PM  2:13 PM        Urine Culture - - No significant growth -        Color yellow Yellow Yellow Yellow        Specific Gravity 1.020 1.015 1.010 1.015        pH 5 6.0 5.0 6.0        Leukocytes trace - - -        Blood neg - - -        Nitrite neg Negative Negative Negative        Ketones neg 1+ (A) Negative Negative        Bilirubin neg - - -        Urobilinogen neg Negative Negative Negative        Protein trace - - -        Glucose 50 - - -                 Reason for Visit     Other           Diagnoses this Visit        Comments    History of bladder cancer                To Do List           Your Scheduled Appointments     2017 10:00 AM CDT   Cystoscopy with MP ONTIVEROS   [facility] ([facility])    [address] [place] [address] [phone]            May 05, 2017  9:40 AM CDT   Established Patient Visit with Teodoro Hu MD   [facility] - Nephrology ([facility] )    [address]  [place] [address] [phone]            May 17, 2017  1:30 PM CDT   New Patient with MD Jake Jones - [facility] ([facility])     [facility] [address] [phone]              Goals (5 Years of Data)              3/2/17    9/2/16    4/4/16    HEMOGLOBIN A1C < 7.5   7.5  7.8  7.7           Medications                ** Verify the list of medication(s)  "below is accurate and up to date. Carry this with you in case of emergency. If your medications have changed, please notify your healthcare provider.             Medication List      TAKE these medications        Additional Info                      amlodipine 5 MG tablet   Commonly known as:  NORVASC   Quantity:  30 tablet   Refills:  11   Dose:  5 mg    Instructions:  Take 1 tablet (5 mg total) by mouth once daily.     Begin Date    AM    Noon    PM    Bedtime       aspirin 81 MG EC tablet   Commonly known as:  ECOTRIN   Refills:  0   Dose:  81 mg    Instructions:  Take 81 mg by mouth every evening.     Begin Date    AM    Noon    PM    Bedtime       atorvastatin 20 MG tablet   Commonly known as:  LIPITOR   Quantity:  90 tablet   Refills:  3   Dose:  20 mg   Comments:  **Patient requests 90 days supply**    Instructions:  Take 1 tablet (20 mg total) by mouth once daily.     Begin Date    AM    Noon    PM    Bedtime       BD INSULIN PEN NEEDLE UF SHORT 31 gauge x 5/16" Ndle   Quantity:  200 each   Refills:  11   Generic drug:  pen needle, diabetic    Instructions:  USE UP TO 6 TIMES DAILY WITH MULTIPLE INSULIN INJECTIONS     Begin Date    AM    Noon    PM    Bedtime       calcitRIOL 0.25 MCG Cap   Commonly known as:  ROCALTROL   Quantity:  30 capsule   Refills:  5    Instructions:  TAKE 1 CAPSULE BY MOUTH EVERY DAY     Begin Date    AM    Noon    PM    Bedtime       clopidogrel 75 mg tablet   Commonly known as:  PLAVIX   Quantity:  90 tablet   Refills:  3    Instructions:  TAKE 1 TABLET BY MOUTH EVERY DAY     Begin Date    AM    Noon    PM    Bedtime       finasteride 5 mg tablet   Commonly known as:  PROSCAR   Quantity:  90 tablet   Refills:  0    Instructions:  TAKE 1 TABLET BY MOUTH EVERY DAY     Begin Date    AM    Noon    PM    Bedtime       folic acid 1 MG tablet   Commonly known as:  FOLVITE   Refills:  0   Dose:  1 mg    Instructions:  Take 1 mg by mouth once daily.     Begin Date    AM    Noon    PM    " Bedtime       insulin aspart 100 unit/mL Inpn pen   Commonly known as:  NOVOLOG FLEXPEN   Quantity:  30 mL   Refills:  6    Instructions:  INJECT 25 UNITS INTO THE SKIN THREE TIMES DAILY WITH MEALS. MAY USE 25 UNITS WITH NIGHTTIME SNACK.     Begin Date    AM    Noon    PM    Bedtime       LEVEMIR FLEXTOUCH 100 unit/mL (3 mL) Inpn pen   Quantity:  60 mL   Refills:  1   Generic drug:  insulin detemir    Instructions:  INJECT 28 UNITS EVERY MORNING AND 30 UNITS EVERY EVENING     Begin Date    AM    Noon    PM    Bedtime       metoprolol tartrate 50 MG tablet   Commonly known as:  LOPRESSOR   Quantity:  180 tablet   Refills:  1    Instructions:  TAKE 1 TABLET BY MOUTH TWICE DAILY     Begin Date    AM    Noon    PM    Bedtime       nitroGLYCERIN 0.4 MG SL tablet   Commonly known as:  NITROSTAT   Quantity:  25 tablet   Refills:  3   Dose:  0.4 mg    Instructions:  Place 1 tablet (0.4 mg total) under the tongue every 5 (five) minutes as needed.     Begin Date    AM    Noon    PM    Bedtime       ONETOUCH ULTRA TEST Strp   Refills:  0   Generic drug:  blood sugar diagnostic    Instructions:  Pt test 3-4 times a day     Begin Date    AM    Noon    PM    Bedtime       pantoprazole 40 MG tablet   Commonly known as:  PROTONIX   Quantity:  90 tablet   Refills:  3    Instructions:  TAKE 1 TABLET BY MOUTH EVERY DAY     Begin Date    AM    Noon    PM    Bedtime       tamsulosin 0.4 mg Cp24   Commonly known as:  FLOMAX   Quantity:  90 capsule   Refills:  0    Instructions:  TAKE 1 CAPSULE BY MOUTH EVERY EVENING     Begin Date    AM    Noon    PM    Bedtime       valsartan 160 MG tablet   Commonly known as:  DIOVAN   Quantity:  30 tablet   Refills:  11   Dose:  160 mg    Instructions:  Take 1 tablet (160 mg total) by mouth once daily.     Begin Date    AM    Noon    PM    Bedtime       VITAMIN D2 50,000 unit Cap   Quantity:  12 capsule   Refills:  11   Comments:  **Patient requests 90 days supply**   Generic drug:  ergocalciferol     "Instructions:  TAKE 1 CAPSULE BY MOUTH ONCE A WEEK     Begin Date    AM    Noon    PM    Bedtime               Your Vitals Were     BP Pulse Temp Resp Height Weight    179/70 62 99 °F (37.2 °C) (Oral) 18 5' 10" (1.778 m) 111.2 kg (245 lb 2.4 oz)    BMI                35.18 kg/m2          Allergies as of 4/27/2017     Penicillins    Coreg [Carvedilol]    Bactrim [Sulfamethoxazole-trimethoprim]      Immunizations Administered on Date of Encounter - 4/27/2017     None      Instructions    What to Expect After a Cystoscopy  For the next 24-48 hours, you may feel a mild burning when you urinate. This burning is normal and expected. Drink plenty of water to dilute the urine to help relieve the burning sensation. You may also see a small amount of blood in your urine after the procedure.    Unless you are already taking antibiotics, you may be given an antibiotic after the test to prevent infection.    Signs and Symptoms to Report  Call the [facility] at [phone] if you develop any of the following:  · Fever of 101 degrees or higher  · Chills or persistent bleeding  · Inability to urinate       Advance Directives     An advance directive is a document which, in the event you are no longer able to make decisions for yourself, tells your healthcare team what kind of treatment you do or do not want to receive, or who you would like to make those decisions for you.  If you do not currently have an advance directive, [facility] encourages you to create one.  For more information call:  [phone]), [phone] ([phone]),  or log on to [email or web address].        [facility]     [facility] Nurse Care Line - 24/7 Assistance  Unless otherwise directed by your provider, please contact [facility], our nurse care line that is available for 24/7 assistance.     Registered nurses in the [facility] provide: appointment scheduling, clinical advisement, health education, " and other advisory services.  Call: [phone] (toll free)          Language Assistance Services     ATTENTION: Language assistance services are available, free of charge. Please call [phone].      ATENCIÓN: Si mj mederos, tiene a harvey disposición servicios gratuitos de asistencia lingüística. Llame al [phone].     [facility] Ý: N?u b?n nói Ti?ng Vi?t, có các d?ch v? h? tr? ngôn ng? mi?n phí dành cho b?n. G?i s? [phone].         [facility] complies with applicable Federal civil rights laws and does not discriminate on the basis of race, color, national origin, age, disability, or sex.

## 2017-04-27 NOTE — PROCEDURES
Procedure Date:  04/27/2017      Procedure:  Male Diagnostic Cystourethroscopy    Pre-op diagnosis: personal hx of bladder cancer  Post-op diagnosis: normal cysto  Anesthesia: Local  Surgeon:  Martin Eisenberg MD    Findings:  Urethra:  Normal urethra.   Sphincter: competent.  Prostate: Estimated Length Prostatic Urethra: 3.5 cm with mild obstruction  Bladder neck: patent with no stricture  Bladder:  Normal bladder with previous TURBT site nicely healed on the left lateral wall near the left UO.  Normal ureteral orifices bilaterally.   Moderate trabeculation.     Description of Procedure:                                                         Informed Consent:                                                            - Risks, benefits and alternatives of procedure discussed with               patient and informed consent obtained.       Patient Position:   - Supine. --- Bladder ---   Prep and Drape:   - Patient prepped and draped in usual sterile fashion using povidone     iodine (Betadine).   Instruments:   - 16 Fr flexible cystoscope with 0 degree lens.   Procedure Details:   - Cystoscope passed under vision into bladder.   - Bladder and urethra examined in their entirety with findings as     above.     Conclusion:  1. Normal cysto  Urine cytology  RTC 1 year with surveillance cysto    Plan:  Patient was discharged home in a stable condition.  Medications: cipro  Follow up:  1 year

## 2017-04-27 NOTE — PATIENT INSTRUCTIONS
What to Expect After a Cystoscopy  For the next 24-48 hours, you may feel a mild burning when you urinate. This burning is normal and expected. Drink plenty of water to dilute the urine to help relieve the burning sensation. You may also see a small amount of blood in your urine after the procedure.    Unless you are already taking antibiotics, you may be given an antibiotic after the test to prevent infection.    Signs and Symptoms to Report  Call the [facility] at [phone] if you develop any of the following:  · Fever of 101 degrees or higher  · Chills or persistent bleeding  · Inability to urinate

## 2017-05-02 RX ORDER — PANTOPRAZOLE SODIUM 40 MG/1
TABLET, DELAYED RELEASE ORAL
Qty: 90 TABLET | Refills: 1 | Status: SHIPPED | OUTPATIENT
Start: 2017-05-02 | End: 2017-10-16

## 2017-05-05 ENCOUNTER — OFFICE VISIT (OUTPATIENT)
Dept: NEPHROLOGY | Facility: CLINIC | Age: 77
End: 2017-05-05
Payer: MEDICARE

## 2017-05-05 VITALS
BODY MASS INDEX: 35.15 KG/M2 | OXYGEN SATURATION: 95 % | HEART RATE: 80 BPM | DIASTOLIC BLOOD PRESSURE: 62 MMHG | WEIGHT: 245.56 LBS | HEIGHT: 70 IN | SYSTOLIC BLOOD PRESSURE: 160 MMHG

## 2017-05-05 DIAGNOSIS — Z79.4 CONTROLLED TYPE 2 DIABETES MELLITUS WITH STAGE 3 CHRONIC KIDNEY DISEASE, WITH LONG-TERM CURRENT USE OF INSULIN: Primary | ICD-10-CM

## 2017-05-05 DIAGNOSIS — N25.81 HYPERPARATHYROIDISM DUE TO RENAL INSUFFICIENCY: ICD-10-CM

## 2017-05-05 DIAGNOSIS — E11.22 CONTROLLED TYPE 2 DIABETES MELLITUS WITH STAGE 3 CHRONIC KIDNEY DISEASE, WITH LONG-TERM CURRENT USE OF INSULIN: Primary | ICD-10-CM

## 2017-05-05 DIAGNOSIS — I12.9 HYPERTENSIVE KIDNEY DISEASE WITH CHRONIC KIDNEY DISEASE, STAGE 1-4 OR UNSPECIFIED CHRONIC KIDNEY DISEASE: ICD-10-CM

## 2017-05-05 DIAGNOSIS — N18.30 CONTROLLED TYPE 2 DIABETES MELLITUS WITH STAGE 3 CHRONIC KIDNEY DISEASE, WITH LONG-TERM CURRENT USE OF INSULIN: Primary | ICD-10-CM

## 2017-05-05 PROCEDURE — 1160F RVW MEDS BY RX/DR IN RCRD: CPT | Mod: S$GLB,,, | Performed by: INTERNAL MEDICINE

## 2017-05-05 PROCEDURE — 99499 UNLISTED E&M SERVICE: CPT | Mod: S$GLB,,, | Performed by: INTERNAL MEDICINE

## 2017-05-05 PROCEDURE — 3078F DIAST BP <80 MM HG: CPT | Mod: S$GLB,,, | Performed by: INTERNAL MEDICINE

## 2017-05-05 PROCEDURE — 1126F AMNT PAIN NOTED NONE PRSNT: CPT | Mod: S$GLB,,, | Performed by: INTERNAL MEDICINE

## 2017-05-05 PROCEDURE — 99213 OFFICE O/P EST LOW 20 MIN: CPT | Mod: S$GLB,,, | Performed by: INTERNAL MEDICINE

## 2017-05-05 PROCEDURE — 1159F MED LIST DOCD IN RCRD: CPT | Mod: S$GLB,,, | Performed by: INTERNAL MEDICINE

## 2017-05-05 PROCEDURE — 99999 PR PBB SHADOW E&M-EST. PATIENT-LVL III: CPT | Mod: PBBFAC,,, | Performed by: INTERNAL MEDICINE

## 2017-05-05 PROCEDURE — 3077F SYST BP >= 140 MM HG: CPT | Mod: S$GLB,,, | Performed by: INTERNAL MEDICINE

## 2017-05-08 NOTE — PROGRESS NOTES
Subjective:       Patient ID: Kevon Perez is a 76 y.o. White male who presents for follow-up evaluation of Chronic Kidney Disease    HPI      Mr. Perez is a 76 year old man with past medical history of diabetes, hypertension presenting for follow up of chronic kidney disease. Patient reports mild lower extremity swelling (admits to sodium intake), othewise denies any complaints, denies any fever, chest pain, shortness of breath, abdominal pain, diarrhea, dysuria/hematuria. He reports blood pressure up to 140 systolic, blood sugars better controlled with dietary changes.  He reports more adequate daily fluid intake.      Review of Systems   Constitutional: Negative for appetite change, fatigue and fever.   Respiratory: Negative for cough and shortness of breath.    Cardiovascular: Negative for chest pain and leg swelling.   Gastrointestinal: Negative for abdominal pain, constipation, diarrhea, nausea and vomiting.   Genitourinary: Negative for dysuria, flank pain, frequency, hematuria and urgency.   Musculoskeletal: Negative for arthralgias, back pain and joint swelling.   Skin: Negative for rash.   Neurological: Negative for dizziness and light-headedness.   All other systems reviewed and are negative.      Objective:      Physical Exam   Constitutional: He appears well-developed and well-nourished.   Cardiovascular: Normal rate and regular rhythm.  Exam reveals no gallop and no friction rub.    No murmur heard.  Pulmonary/Chest: Effort normal and breath sounds normal. No respiratory distress. He has no wheezes. He has no rales.   Musculoskeletal: He exhibits edema (trace bilateral lower extremity).   Neurological: He is alert.   Skin: Skin is warm and dry. No rash noted.   Vitals reviewed.      Assessment:       1. Controlled type 2 diabetes mellitus with stage 3 chronic kidney disease, with long-term current use of insulin    2. Hypertensive kidney disease with chronic kidney disease, stage 1-4 or  unspecified chronic kidney disease    3. Hyperparathyroidism due to renal insufficiency        Plan:     Mr. Perez is a 76 year old man with past medical history of diabetes, hypertension presenting for follow up of chronic kidney disease stage III (hypertensive nephrosclerosis v. diabetic nephropathy). Creatinine elevated though within baseline range, will continue to trend.  Encouraged fluid intake, again stressed importance of glycemic/blood pressure control, along with weight loss/exercise, to prevent progression of kidney disease, patient voiced understanding.   - Hypertension: blood pressure at goal at home, will phone review BP diary   - Anemia: Hgb at goal, no indication for erythropoetin therapy, encouraged dietary iron  - Bone/mineral metabolism: patient with secondary hyperparathyroidism, will continue to monitor PTH, patient on calcitriol, encouraged daily VitD supplementation     Return to clinic 6-12 months pending renal panel in 8 weeks, then with renal panel, urinalysis/culture, urine protein/creatinine ratio, PTH/VitD prior to next visit

## 2017-05-10 PROBLEM — E66.01 SEVERE OBESITY (BMI 35.0-35.9 WITH COMORBIDITY): Status: ACTIVE | Noted: 2017-05-10

## 2017-05-17 ENCOUNTER — OFFICE VISIT (OUTPATIENT)
Dept: SLEEP MEDICINE | Facility: CLINIC | Age: 77
End: 2017-05-17
Payer: MEDICARE

## 2017-05-17 VITALS
DIASTOLIC BLOOD PRESSURE: 54 MMHG | SYSTOLIC BLOOD PRESSURE: 114 MMHG | HEIGHT: 70 IN | WEIGHT: 246.94 LBS | BODY MASS INDEX: 35.35 KG/M2 | HEART RATE: 58 BPM

## 2017-05-17 DIAGNOSIS — G47.33 OSA ON CPAP: Primary | ICD-10-CM

## 2017-05-17 PROCEDURE — 1160F RVW MEDS BY RX/DR IN RCRD: CPT | Mod: S$GLB,,, | Performed by: PSYCHIATRY & NEUROLOGY

## 2017-05-17 PROCEDURE — 1159F MED LIST DOCD IN RCRD: CPT | Mod: S$GLB,,, | Performed by: PSYCHIATRY & NEUROLOGY

## 2017-05-17 PROCEDURE — 3074F SYST BP LT 130 MM HG: CPT | Mod: S$GLB,,, | Performed by: PSYCHIATRY & NEUROLOGY

## 2017-05-17 PROCEDURE — 99499 UNLISTED E&M SERVICE: CPT | Mod: S$GLB,,, | Performed by: PSYCHIATRY & NEUROLOGY

## 2017-05-17 PROCEDURE — 99204 OFFICE O/P NEW MOD 45 MIN: CPT | Mod: S$GLB,,, | Performed by: PSYCHIATRY & NEUROLOGY

## 2017-05-17 PROCEDURE — 99999 PR PBB SHADOW E&M-EST. PATIENT-LVL III: CPT | Mod: PBBFAC,,, | Performed by: PSYCHIATRY & NEUROLOGY

## 2017-05-17 PROCEDURE — 1126F AMNT PAIN NOTED NONE PRSNT: CPT | Mod: S$GLB,,, | Performed by: PSYCHIATRY & NEUROLOGY

## 2017-05-17 PROCEDURE — 3078F DIAST BP <80 MM HG: CPT | Mod: S$GLB,,, | Performed by: PSYCHIATRY & NEUROLOGY

## 2017-05-17 PROCEDURE — 1157F ADVNC CARE PLAN IN RCRD: CPT | Mod: S$GLB,,, | Performed by: PSYCHIATRY & NEUROLOGY

## 2017-05-17 NOTE — PROGRESS NOTES
Kevon Perez  was seen at the request of  Keith Teixeira MD for sleep evaluation.    05/17/2017:  INITIAL HISTORY OF PRESENT ILLNESS:  Kevon Perez is a 76 y.o. male is here to be evaluated for a sleep disorder.       CHIEF COMPLAINT:      The patient's complaints include excessive daytime sleepiness, excessive daytime fatigue, snoring,  witnessed breathing pauses,  gasping for air in sleep and interrupted sleep since  .    On CPAP 10 cm since study in 2009 - recent re-titration - 11 cm  Still restless sleep - has been benefiting from CPAP use and be very compliant.  Lately feeling much more tired.   Can not work in the yard - SOB after 20 min of work    Seeing Dr. Morales - cardiology - and pulmonary work was done.  This was going on for the last 1-1.5 yrs    B12 shots, vit D, iron infusion were recently tried with some improvement in fatigue.     Gained 6 ls since titration.     His CPAP is set at 11 - showing 12 cm on download - but ramp is set 35 min  Old, needs replacement.    Bedroom is dark and quiet  Watching TV before bedtime may be affecting sleep continuity    Reports  dry mouth and sore throat  Denies nasal congestion   Denies  morning headaches  Reports  interrupted sleep  Reports frequent leg movements - h/o DM neuropathy - tingling; urge to move  Reports symptoms concerning for parasomnia    The ESS ([place] Sleepiness Score) taken on initial visit is 8 /24    The patient never had tonsillectomy, adenoidectomy or UPPP      SLEEP ROUTINE AND LIFESTYLE 05/17/2017 :    Occupation:    Bed partner:      Time to bed - wake up time on a workday : 12:30 to 7:30  Time to bed - wake up time on a day off: 12 to  7  Sleep onset latency: 5 MIN  Disruptions or awakenings: 2-3 times to shift  Time to fall back into sleep: right back    Perceived sleep quality: 3.5-4  Perceived total sleep time:  7  hours.  Daytime naps: dozing watching TV    Exercise routine: yes  Caffeine:       PREVIOUS SLEEP  STUDIES:     PSG/ SPLIT night study  In 2009 showed significant GINGER with the AHI of 114/hour and SaO2 minimum of 88 %. Effective control of respiratory events was achieved at   10 cm H2O.     CPAP titration in 2016 showed effective control of respiratory events at  11 cm H2O including supine REM sleep (best SaO2 still in mid-90s).  ECO 2D 4/16: CONCLUSIONS     1 - Normal left ventricular systolic function (EF 55-60%).     2 - Normal right ventricular systolic function .     3 - Biatrial enlargement.     4 - Mildly elevated central venous pressure.     PT: 4/16: Normal airflow. Airflow is improved after bronchodilator. Moderate restriction.    DME:       PAST MEDICAL HISTORY:    Active Ambulatory Problems     Diagnosis Date Noted    CAD (coronary artery disease) 07/02/2012    Hyperlipidemia 07/02/2012    Type 2 diabetes mellitus with mild nonproliferative retinopathy and macular edema 08/07/2012    Type 2 diabetes mellitus with neurologic complication 11/27/2012    BPH (benign prostatic hypertrophy) 12/17/2012    Post PTCA     Type 2 diabetes mellitus with diabetic chronic kidney disease 04/05/2013    Nephritis and nephropathy, 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 04/05/2013    Mild nonproliferative diabetic retinopathy of both eyes 05/01/2014    Hypertensive retinopathy of both eyes 05/01/2014    Lumbar radiculopathy 05/01/2014    Personal history of bladder cancer 06/19/2014    CKD (chronic kidney disease) stage 3, GFR 30-59 ml/min 01/09/2015    GINGER on CPAP 05/11/2015    Diabetic macular edema of left eye with mild nonproliferative retinopathy associated with type 2 diabetes mellitus 01/07/2016    Essential hypertension 04/24/2017    Aortic atherosclerosis 04/24/2017    History of MI (myocardial infarction) 04/24/2017    Hyperparathyroidism, secondary renal 04/24/2017    Polyneuropathy, diabetic 04/24/2017    Thrombocytopenia 04/24/2017    Vitamin D deficiency disease 04/25/2017    Severe  obesity (BMI 35.0-35.9 with comorbidity) 05/10/2017     Resolved Ambulatory Problems     Diagnosis Date Noted    Nuclear sclerosis 08/07/2012    Prostatitis, acute 11/05/2012    Gross hematuria 11/05/2012    Angina pectoris, preinfarctional 11/27/2012    Acute coronary syndrome 09/10/2009    Bladder cancer 01/31/2013    Muscular chest pain 02/11/2013    Acute renal failure 04/05/2013    Senile nuclear sclerosis 06/10/2014    Post-operative state 06/11/2014    Refractive error 07/30/2014    Cystoid macular edema, left eye 09/11/2014    Abdominal pain 01/09/2015    Epigastric pain 01/09/2015    Screening for colon cancer 03/26/2015     Past Medical History:   Diagnosis Date    Acute coronary syndrome 9/10/09    Anticoagulant long-term use     Basal cell cancer     BCC (basal cell carcinoma of skin)     Cancer of bladder January 2013    Cataract     Chronic kidney disease     Coronary artery disease     CPAP (continuous positive airway pressure) dependence     Diabetes mellitus     Diabetic retinopathy     GERD (gastroesophageal reflux disease)     High cholesterol     Hyperlipidemia     Hypertension     Non-proliferative diabetic retinopathy, mild, left eye 11/2013    GINGER (obstructive sleep apnea)     Renal manifestation of secondary diabetes mellitus                 PAST SURGICAL HISTORY:    Past Surgical History:   Procedure Laterality Date    BASAL CELL CARCINOMA EXCISION      nose     BLADDER SURGERY      bladder cancer    CARDIAC CATHETERIZATION      CATARACT EXTRACTION      bilateral     COLONOSCOPY  3/26/15    CORONARY ANGIOPLASTY  9/10/09    CFX    CORONARY ANGIOPLASTY WITH STENT PLACEMENT      CYSTOSCOPY      EYE SURGERY      hydrocel           FAMILY HISTORY:                Family History   Problem Relation Age of Onset    Hypertension Father     Heart disease Father      CHF    Diabetes Father     Diabetes Sister     Diabetes Brother     Cataracts Mother      "Goiter Mother     Heart disease Mother      CHF    Diabetes Paternal Uncle     Alcohol abuse Brother     No Known Problems Daughter     No Known Problems Son     No Known Problems Son     Cancer Maternal Aunt     Kidney disease Neg Hx     Amblyopia Neg Hx     Blindness Neg Hx     Glaucoma Neg Hx     Macular degeneration Neg Hx     Retinal detachment Neg Hx     Strabismus Neg Hx     Stroke Neg Hx     Thyroid disease Neg Hx        SOCIAL HISTORY:          Tobacco:   History   Smoking Status    Former Smoker    Packs/day: 1.00    Years: 40.00    Types: Cigarettes    Quit date: 7/2/1970   Smokeless Tobacco    Former User       alcohol use:    History   Alcohol Use    1.8 oz/week    1 Standard drinks or equivalent, 1 Cans of beer, 1 Shots of liquor per week                   ALLERGIES:    Review of patient's allergies indicates:   Allergen Reactions    Penicillins Other (See Comments)    Coreg [carvedilol]      Hypotension      Bactrim [sulfamethoxazole-trimethoprim] Rash       CURRENT MEDICATIONS:    Current Outpatient Prescriptions   Medication Sig Dispense Refill    amlodipine (NORVASC) 5 MG tablet Take 1 tablet (5 mg total) by mouth once daily. 30 tablet 11    aspirin (ECOTRIN) 81 MG EC tablet Take 81 mg by mouth every evening.       atorvastatin (LIPITOR) 20 MG tablet Take 1 tablet (20 mg total) by mouth once daily. 90 tablet 3    BD INSULIN PEN NEEDLE UF SHORT 31 gauge x 5/16" Ndle USE UP TO 6 TIMES DAILY WITH MULTIPLE INSULIN INJECTIONS 200 each 11    calcitRIOL (ROCALTROL) 0.25 MCG Cap TAKE 1 CAPSULE BY MOUTH EVERY DAY 30 capsule 5    clopidogrel (PLAVIX) 75 mg tablet TAKE 1 TABLET BY MOUTH EVERY DAY 90 tablet 3    finasteride (PROSCAR) 5 mg tablet TAKE 1 TABLET BY MOUTH EVERY DAY 90 tablet 0    folic acid (FOLVITE) 1 MG tablet Take 1 mg by mouth once daily.       insulin aspart (NOVOLOG FLEXPEN) 100 unit/mL InPn pen INJECT 25 UNITS INTO THE SKIN THREE TIMES DAILY WITH MEALS. " "MAY USE 25 UNITS WITH NIGHTTIME SNACK. 30 mL 6    LEVEMIR FLEXTOUCH 100 unit/mL (3 mL) InPn pen INJECT 28 UNITS EVERY MORNING AND 30 UNITS EVERY EVENING (Patient taking differently: INJECT 35 UNITS EVERY MORNING AND 35 UNITS EVERY EVENING) 60 mL 1    metoprolol tartrate (LOPRESSOR) 50 MG tablet TAKE 1 TABLET BY MOUTH TWICE DAILY 180 tablet 1    nitroGLYCERIN (NITROSTAT) 0.4 MG SL tablet Place 1 tablet (0.4 mg total) under the tongue every 5 (five) minutes as needed. 25 tablet 3    ONE TOUCH ULTRA TEST Strp Pt test 3-4 times a day      pantoprazole (PROTONIX) 40 MG tablet TAKE 1 TABLET BY MOUTH EVERY DAY 90 tablet 1    tamsulosin (FLOMAX) 0.4 mg Cp24 TAKE 1 CAPSULE BY MOUTH EVERY EVENING 90 capsule 0    valsartan (DIOVAN) 160 MG tablet Take 1 tablet (160 mg total) by mouth once daily. 30 tablet 11    VITAMIN D2 50,000 unit capsule TAKE 1 CAPSULE BY MOUTH ONCE A WEEK 12 capsule 11     No current facility-administered medications for this visit.                       REVIEW OF SYSTEMS:   Sleep related symptoms as per HPI    reports weight gain 10 lbs from 2009 to 2016.  Reports dyspnea  Reports palpitations  Denies acid reflux   Reports polyuria  Denies  mood diturbance  Denies  anemia  Denies  muscle pain  Denies  Gait imbalance    Otherwise, a balance of 10 systems reviewed is negative.    PHYSICAL EXAM:  BP (!) 114/54 (BP Location: Left arm, Patient Position: Sitting, BP Method: Automatic)  Pulse (!) 58  Ht 5' 10" (1.778 m)  Wt 112 kg (246 lb 14.6 oz)  BMI 35.43 kg/m2  GENERAL: Overweight body habitus, well groomed.  HEENT:   HEENT:  Conjunctivae are non-erythematous; Pupils equal, round, and reactive to light; Nose is symmetrical; Nasal mucosa is pink and moist; Septum is midline; Inferior turbinates are hypertrophied; Nasal airflow is diminished on the right; Posterior pharynx is pink; Modified Mallampati:III-IV; Posterior palate is low; Tonsils not visualized; Uvula is wide and elongated;Tongue is " "enlarged; Dentition is fair; No TMJ tenderness; Jaw opening and protrusion without click and without discomfort.  NECK: Supple. Neck circumference is  inches. No thyromegaly. No palpable nodes.     SKIN: On face and neck: No abrasions, no rashes, no lesions.  No subcutaneous nodules are palpable.  RESPIRATORY: Chest is clear to auscultation.  Normal chest expansion and non-labored breathing at rest.  CARDIOVASCULAR: Normal S1, S2.  No murmurs, gallops or rubs. No carotid bruits bilaterally.  No edema. No clubbing. No cyanosis.    NEURO: Oriented to time, place and person. Normal attention span and concentration. Gait normal.    PSYCH: Affect is full. Mood is normal  MUSCULOSKELETAL: Moves 4 extremities. Gait normal.         Using [facility]:       ASSESSMENT:    1. GINGER The patient symptomatically has  excessive daytime sleepiness, snoring,  witnessed breathing pauses, excessive daytime fatigue, gasping for air in sleep and interrupted sleep  with exam findings of "a crowded oral airway and elevated body mass index. The patient has medical co-morbidities of CAD, diabetes and hypertension,  which can be worsened by GINGER. This warrants treatment. CPAP 11 cm appeared sufficient on recent re-titration, yet SaO2 remains mid-low 90's and the patient is still SOB will small exertion. EF was NL on 2 D Echo a year ago. Mild reaction to bronchodilator on PFT in 4/2016. Very slow ramp on his current CPAP machine may play a role in residual fatigue - and SOB during the day.     2. Residual fatigue and SOB on exertion - residual GINGER/suboptimal SaO2/deconditioning/nutritional deficiencies may be contributing.          PLAN:    Will order a new APAP 13-18 cm H2O (in attempt to increase SaO2 to higher 90%'s) with 6 cm ramp for 15 min.  Given still low -mid 90% SaO2 on his recent study, if he still feels tired on APAP or can not tolerate higher CPAP settings- may bneed to upgrade to BPAP.    Weight loss - exercise, diet to increase " overall conditioning,    More than 25 minutes of this 45 minutes visit was spent in counseling: during our discussion today, we talked about the etiology of  GINGER as well as the potential ramifications of untreated sleep apnea, which could include daytime sleepiness, hypertension, heart disease and/or stroke.  We discussed potential treatment options, which could include weight loss, body positioning, continuous positive airway pressure (CPAP), or referral for surgical consideration. Meanwhile, he  is urged to avoid supine sleep, weight gain and alcoholic beverages since all of these can worsen GINGER.     Precautions: The patient was advised to abstain from driving should he feel sleepy or drowsy.    Follow up: MD/NP  6 weeks    Thank you for allowing me the opportunity to participate in the care of your patient.    This visit summary will be sent to referring provider via inbasket

## 2017-05-17 NOTE — LETTER
May 17, 2017      Keith Teixeira MD  [address]  [facility] [address]           [facility]  [address] [address]  Phone: [phone]  Fax: [phone]          Patient: Kevon Perez   MR Number: [id number]   YOB: 1940   Date of Visit: 5/17/2017       Dear Dr. Keith Teixeira:    Thank you for referring Kevon Perez to me for evaluation. Attached you will find relevant portions of my assessment and plan of care.    If you have questions, please do not hesitate to call me. I look forward to following Kevon Perez along with you.    Sincerely,    Yumiko Haro MD    Enclosure  CC:  No Recipients    If you would like to receive this communication electronically, please contact [email or web address] or [phone] to request more information on [facility] access.    For providers and/or their staff who would like to refer a patient to [facility], please contact us through our one-stop-shop provider referral line, [facility], at [phone].    If you feel you have received this communication in error or would no longer like to receive these types of communications, please e-mail [email or web address]

## 2017-05-24 RX ORDER — CALCITRIOL 0.25 UG/1
CAPSULE ORAL
Qty: 30 CAPSULE | Refills: 11 | Status: SHIPPED | OUTPATIENT
Start: 2017-05-24 | End: 2018-05-22 | Stop reason: SDUPTHER

## 2017-05-26 RX ORDER — TAMSULOSIN HYDROCHLORIDE 0.4 MG/1
CAPSULE ORAL
Qty: 90 CAPSULE | Refills: 0 | Status: SHIPPED | OUTPATIENT
Start: 2017-05-26 | End: 2017-08-29 | Stop reason: SDUPTHER

## 2017-06-09 RX ORDER — CLOPIDOGREL BISULFATE 75 MG/1
TABLET ORAL
Qty: 90 TABLET | Refills: 1 | Status: SHIPPED | OUTPATIENT
Start: 2017-06-09 | End: 2017-10-16 | Stop reason: SDUPTHER

## 2017-06-12 RX ORDER — FINASTERIDE 5 MG/1
TABLET, FILM COATED ORAL
Qty: 90 TABLET | Refills: 0 | Status: SHIPPED | OUTPATIENT
Start: 2017-06-12 | End: 2017-09-13 | Stop reason: SDUPTHER

## 2017-06-30 ENCOUNTER — LAB VISIT (OUTPATIENT)
Dept: LAB | Facility: HOSPITAL | Age: 77
End: 2017-06-30
Attending: INTERNAL MEDICINE
Payer: MEDICARE

## 2017-06-30 DIAGNOSIS — N18.30 CONTROLLED TYPE 2 DIABETES MELLITUS WITH STAGE 3 CHRONIC KIDNEY DISEASE, WITH LONG-TERM CURRENT USE OF INSULIN: ICD-10-CM

## 2017-06-30 DIAGNOSIS — Z79.4 CONTROLLED TYPE 2 DIABETES MELLITUS WITH STAGE 3 CHRONIC KIDNEY DISEASE, WITH LONG-TERM CURRENT USE OF INSULIN: ICD-10-CM

## 2017-06-30 DIAGNOSIS — E11.22 CONTROLLED TYPE 2 DIABETES MELLITUS WITH STAGE 3 CHRONIC KIDNEY DISEASE, WITH LONG-TERM CURRENT USE OF INSULIN: ICD-10-CM

## 2017-06-30 LAB
25(OH)D3+25(OH)D2 SERPL-MCNC: 17 NG/ML
ALBUMIN SERPL BCP-MCNC: 3.3 G/DL
ANION GAP SERPL CALC-SCNC: 6 MMOL/L
BUN SERPL-MCNC: 23 MG/DL
CALCIUM SERPL-MCNC: 9 MG/DL
CHLORIDE SERPL-SCNC: 110 MMOL/L
CO2 SERPL-SCNC: 25 MMOL/L
CREAT SERPL-MCNC: 1.9 MG/DL
EST. GFR  (AFRICAN AMERICAN): 38.5 ML/MIN/1.73 M^2
EST. GFR  (NON AFRICAN AMERICAN): 33.3 ML/MIN/1.73 M^2
GLUCOSE SERPL-MCNC: 139 MG/DL
PHOSPHATE SERPL-MCNC: 2.6 MG/DL
POTASSIUM SERPL-SCNC: 4.4 MMOL/L
PTH-INTACT SERPL-MCNC: 99 PG/ML
SODIUM SERPL-SCNC: 141 MMOL/L

## 2017-06-30 PROCEDURE — 36415 COLL VENOUS BLD VENIPUNCTURE: CPT | Mod: PO

## 2017-06-30 PROCEDURE — 82306 VITAMIN D 25 HYDROXY: CPT

## 2017-06-30 PROCEDURE — 83970 ASSAY OF PARATHORMONE: CPT

## 2017-06-30 PROCEDURE — 80069 RENAL FUNCTION PANEL: CPT

## 2017-07-12 ENCOUNTER — OFFICE VISIT (OUTPATIENT)
Dept: SLEEP MEDICINE | Facility: CLINIC | Age: 77
End: 2017-07-12
Payer: MEDICARE

## 2017-07-12 VITALS
SYSTOLIC BLOOD PRESSURE: 152 MMHG | DIASTOLIC BLOOD PRESSURE: 57 MMHG | BODY MASS INDEX: 35.56 KG/M2 | WEIGHT: 247.81 LBS | HEART RATE: 48 BPM

## 2017-07-12 DIAGNOSIS — G47.33 OSA (OBSTRUCTIVE SLEEP APNEA): Primary | ICD-10-CM

## 2017-07-12 PROCEDURE — 99999 PR PBB SHADOW E&M-EST. PATIENT-LVL III: CPT | Mod: PBBFAC,,, | Performed by: PSYCHIATRY & NEUROLOGY

## 2017-07-12 PROCEDURE — 1159F MED LIST DOCD IN RCRD: CPT | Mod: S$GLB,,, | Performed by: PSYCHIATRY & NEUROLOGY

## 2017-07-12 PROCEDURE — 99499 UNLISTED E&M SERVICE: CPT | Mod: S$GLB,,, | Performed by: PSYCHIATRY & NEUROLOGY

## 2017-07-12 PROCEDURE — 99214 OFFICE O/P EST MOD 30 MIN: CPT | Mod: S$GLB,,, | Performed by: PSYCHIATRY & NEUROLOGY

## 2017-07-12 RX ORDER — METOPROLOL TARTRATE 50 MG/1
TABLET ORAL
Qty: 180 TABLET | Refills: 0 | Status: SHIPPED | OUTPATIENT
Start: 2017-07-12 | End: 2017-10-11 | Stop reason: SDUPTHER

## 2017-07-12 NOTE — PATIENT INSTRUCTIONS
For dry mouth      Increase humidity to 4-5  I'll get a heated hose and a chin strap for you  ------------------------------  Biotene products - gum gel, mouth spray, mouth wash, toothpaste  --------------------------------------------------    Nasal mask +chin strap

## 2017-07-12 NOTE — PROGRESS NOTES
Kevon Perez  was seen at the request of  No ref. provider found for sleep evaluation.    05/17/2017:  INITIAL HISTORY OF PRESENT ILLNESS:  Kevon Perez is a 76 y.o. male is here to be evaluated for a sleep disorder.       CHIEF COMPLAINT:      The patient's complaints include excessive daytime sleepiness, excessive daytime fatigue, snoring,  witnessed breathing pauses,  gasping for air in sleep and interrupted sleep since  .    On CPAP 10 cm since study in 2009 - recent re-titration - 11 cm  Still restless sleep - has been benefiting from CPAP use and be very compliant.  Lately feeling much more tired.   Can not work in the yard - SOB after 20 min of work    Seeing Dr. Morales - cardiology - and pulmonary work was done.  This was going on for the last 1-1.5 yrs    B12 shots, vit D, iron infusion were recently tried with some improvement in fatigue.     Gained 6 ls since titration.     His CPAP is set at 11 - showing 12 cm on download - but ramp is set 35 min  Old, needs replacement.    Bedroom is dark and quiet  Watching TV before bedtime may be affecting sleep continuity    Reports  dry mouth and sore throat  Denies nasal congestion   Denies  morning headaches  Reports  interrupted sleep  Reports frequent leg movements - h/o DM neuropathy - tingling; urge to move  Reports symptoms concerning for parasomnia    The ESS ([place] Sleepiness Score) taken on initial visit is 8 /24    The patient never had tonsillectomy, adenoidectomy or UPPP     INTERVAL HISTORY:    07/12/2017:  The patient has not presented any new complaints since the previous visit. The patient reports improved sleep continuity and daytime sleepiness on PAP. ESS today is 7/24.  Denies break through snoring. + dry mouth at 3/5.   Also rep[orts dry mouth during the day.     Device DreamStation Auto CPAP Setup date 5/24/2017 Primary Care Physician N/A Therapy mode AutoCPAP Home phone N/A Sleep doctor Yumiko Haro Pressure 13.0 - 18.0  Address N/A Clinician Crystal Paris Rx status Downloaded From Device Sleep lab [facility] Mask N/A. 90% pressure - 13 cm H2O.    Using FFM - would like to switch back to a nasal mask.    Therapy Event Summary (Last 14 Days)     Hide      Compliance Summary  Apnea Indices  Ventilator Statistics    Days with Device Usage:  14 days  Average AHI:  1.4  Average Breath Rate:  15.3 bpm    Percentage of Days >=4 Hours:  100.0%  Average OA Index:  0.1  Average % Patient Triggered Breaths:  N/A    Average Usage (Days Used):  6 hrs. 58 mins. 27 secs.  Average CA Index:  0.1  Average Tidal Volume:  429.6 ml    Average Usage (All Days):  6 hrs. 58 mins. 27 secs.    Average Minute Vent:  N/A        Large Leak  Periodic Breathing     Average Time in Large Leak:  8 mins. 17 secs.  Average % of Night in PB:  0.8%     Average % of Night in Large Leak:  2.0%                    SLEEP ROUTINE AND LIFESTYLE 07/12/2017 :    Occupation:    Bed partner:      Time to bed - wake up time on a workday : 12:30 to 7:30  Time to bed - wake up time on a day off: 12 to  7  Sleep onset latency: 5 MIN  Disruptions or awakenings: 2-3 times to shift  Time to fall back into sleep: right back    Perceived sleep quality: 3.5-4  Perceived total sleep time:  7  hours.  Daytime naps: dozing watching TV    Exercise routine: yes  Caffeine:       PREVIOUS SLEEP STUDIES:     PSG/ SPLIT night study  In 2009 showed significant GINGER with the AHI of 114/hour and SaO2 minimum of 88 %. Effective control of respiratory events was achieved at   10 cm H2O.     CPAP titration in 2016 showed effective control of respiratory events at  11 cm H2O including supine REM sleep (best SaO2 still in mid-90s).  ECO 2D 4/16: CONCLUSIONS     1 - Normal left ventricular systolic function (EF 55-60%).     2 - Normal right ventricular systolic function .     3 - Biatrial enlargement.     4 - Mildly elevated central venous pressure.     PT: 4/16: Normal airflow.  Airflow is improved after bronchodilator. Moderate restriction.    DME:       PAST MEDICAL HISTORY:    Active Ambulatory Problems     Diagnosis Date Noted    CAD (coronary artery disease) 07/02/2012    Hyperlipidemia 07/02/2012    Type 2 diabetes mellitus with mild nonproliferative retinopathy and macular edema 08/07/2012    Type 2 diabetes mellitus with neurologic complication 11/27/2012    BPH (benign prostatic hypertrophy) 12/17/2012    Post PTCA     Type 2 diabetes mellitus with diabetic chronic kidney disease 04/05/2013    Nephritis and nephropathy, 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 04/05/2013    Mild nonproliferative diabetic retinopathy of both eyes 05/01/2014    Hypertensive retinopathy of both eyes 05/01/2014    Lumbar radiculopathy 05/01/2014    Personal history of bladder cancer 06/19/2014    CKD (chronic kidney disease) stage 3, GFR 30-59 ml/min 01/09/2015    GINGER on CPAP 05/11/2015    Diabetic macular edema of left eye with mild nonproliferative retinopathy associated with type 2 diabetes mellitus 01/07/2016    Essential hypertension 04/24/2017    Aortic atherosclerosis 04/24/2017    History of MI (myocardial infarction) 04/24/2017    Hyperparathyroidism, secondary renal 04/24/2017    Polyneuropathy, diabetic 04/24/2017    Thrombocytopenia 04/24/2017    Vitamin D deficiency disease 04/25/2017    Severe obesity (BMI 35.0-35.9 with comorbidity) 05/10/2017     Resolved Ambulatory Problems     Diagnosis Date Noted    Nuclear sclerosis 08/07/2012    Prostatitis, acute 11/05/2012    Gross hematuria 11/05/2012    Angina pectoris, preinfarctional 11/27/2012    Acute coronary syndrome 09/10/2009    Bladder cancer 01/31/2013    Muscular chest pain 02/11/2013    Acute renal failure 04/05/2013    Senile nuclear sclerosis 06/10/2014    Post-operative state 06/11/2014    Refractive error 07/30/2014    Cystoid macular edema, left eye 09/11/2014    Abdominal pain 01/09/2015     Epigastric pain 01/09/2015    Screening for colon cancer 03/26/2015     Past Medical History:   Diagnosis Date    Acute coronary syndrome 9/10/09    Anticoagulant long-term use     Basal cell cancer     BCC (basal cell carcinoma of skin)     Cancer of bladder January 2013    Cataract     Chronic kidney disease     Coronary artery disease     CPAP (continuous positive airway pressure) dependence     Diabetes mellitus     Diabetic retinopathy     GERD (gastroesophageal reflux disease)     High cholesterol     Hyperlipidemia     Hypertension     Non-proliferative diabetic retinopathy, mild, left eye 11/2013    GINGER (obstructive sleep apnea)     Renal manifestation of secondary diabetes mellitus                 PAST SURGICAL HISTORY:    Past Surgical History:   Procedure Laterality Date    BASAL CELL CARCINOMA EXCISION      nose     BLADDER SURGERY      bladder cancer    CARDIAC CATHETERIZATION      CATARACT EXTRACTION      bilateral     COLONOSCOPY  3/26/15    CORONARY ANGIOPLASTY  9/10/09    CFX    CORONARY ANGIOPLASTY WITH STENT PLACEMENT      CYSTOSCOPY      EYE SURGERY      hydrocel           FAMILY HISTORY:                Family History   Problem Relation Age of Onset    Hypertension Father     Heart disease Father      CHF    Diabetes Father     Diabetes Sister     Diabetes Brother     Cataracts Mother     Goiter Mother     Heart disease Mother      CHF    Diabetes Paternal Uncle     Alcohol abuse Brother     No Known Problems Daughter     No Known Problems Son     No Known Problems Son     Cancer Maternal Aunt     Kidney disease Neg Hx     Amblyopia Neg Hx     Blindness Neg Hx     Glaucoma Neg Hx     Macular degeneration Neg Hx     Retinal detachment Neg Hx     Strabismus Neg Hx     Stroke Neg Hx     Thyroid disease Neg Hx        SOCIAL HISTORY:          Tobacco:   History   Smoking Status    Former Smoker    Packs/day: 1.00    Years: 40.00    Types:  "Cigarettes    Quit date: 7/2/1970   Smokeless Tobacco    Former User       alcohol use:    History   Alcohol Use    1.8 oz/week    1 Standard drinks or equivalent, 1 Cans of beer, 1 Shots of liquor per week                   ALLERGIES:    Review of patient's allergies indicates:   Allergen Reactions    Penicillins Other (See Comments)    Coreg [carvedilol]      Hypotension      Bactrim [sulfamethoxazole-trimethoprim] Rash       CURRENT MEDICATIONS:    Current Outpatient Prescriptions   Medication Sig Dispense Refill    amlodipine (NORVASC) 5 MG tablet Take 1 tablet (5 mg total) by mouth once daily. 30 tablet 11    aspirin (ECOTRIN) 81 MG EC tablet Take 81 mg by mouth every evening.       atorvastatin (LIPITOR) 20 MG tablet Take 1 tablet (20 mg total) by mouth once daily. 90 tablet 3    BD INSULIN PEN NEEDLE UF SHORT 31 gauge x 5/16" Ndle USE UP TO 6 TIMES DAILY WITH MULTIPLE INSULIN INJECTIONS 200 each 11    calcitRIOL (ROCALTROL) 0.25 MCG Cap TAKE 1 CAPSULE BY MOUTH EVERY DAY 30 capsule 11    clopidogrel (PLAVIX) 75 mg tablet TAKE 1 TABLET BY MOUTH EVERY DAY 90 tablet 3    clopidogrel (PLAVIX) 75 mg tablet TAKE 1 TABLET BY MOUTH EVERY DAY 90 tablet 1    finasteride (PROSCAR) 5 mg tablet TAKE 1 TABLET BY MOUTH EVERY DAY 90 tablet 0    folic acid (FOLVITE) 1 MG tablet Take 1 mg by mouth once daily.       insulin aspart (NOVOLOG FLEXPEN) 100 unit/mL InPn pen INJECT 25 UNITS INTO THE SKIN THREE TIMES DAILY WITH MEALS. MAY USE 25 UNITS WITH NIGHTTIME SNACK. 30 mL 6    LEVEMIR FLEXTOUCH 100 unit/mL (3 mL) InPn pen INJECT 28 UNITS EVERY MORNING AND 30 UNITS EVERY EVENING (Patient taking differently: INJECT 35 UNITS EVERY MORNING AND 35 UNITS EVERY EVENING) 60 mL 1    metoprolol tartrate (LOPRESSOR) 50 MG tablet TAKE 1 TABLET BY MOUTH TWICE DAILY 180 tablet 0    nitroGLYCERIN (NITROSTAT) 0.4 MG SL tablet Place 1 tablet (0.4 mg total) under the tongue every 5 (five) minutes as needed. 25 tablet 3    ONE " TOUCH ULTRA TEST Strp Pt test 3-4 times a day      pantoprazole (PROTONIX) 40 MG tablet TAKE 1 TABLET BY MOUTH EVERY DAY 90 tablet 1    tamsulosin (FLOMAX) 0.4 mg Cp24 TAKE 1 CAPSULE BY MOUTH EVERY EVENING 90 capsule 0    valsartan (DIOVAN) 160 MG tablet Take 1 tablet (160 mg total) by mouth once daily. 30 tablet 11    VITAMIN D2 50,000 unit capsule TAKE 1 CAPSULE BY MOUTH ONCE A WEEK 12 capsule 11     No current facility-administered medications for this visit.                       REVIEW OF SYSTEMS:   Sleep related symptoms as per HPI    reports weight gain 10 lbs from 2009 to 2016.  Reports dyspnea  Reports palpitations  Denies acid reflux   Reports polyuria  Denies  mood diturbance  Denies  anemia  Denies  muscle pain  Denies  Gait imbalance    Otherwise, a balance of 10 systems reviewed is negative.    PHYSICAL EXAM:  BP (!) 152/57 (BP Location: Left arm, Patient Position: Sitting, BP Method: Automatic)   Pulse (!) 48   Wt 112.4 kg (247 lb 12.8 oz)   BMI 35.56 kg/m²   GENERAL: Overweight body habitus, well groomed.  HEENT:   HEENT:  Conjunctivae are non-erythematous; Pupils equal, round, and reactive to light; Nose is symmetrical; Nasal mucosa is pink and moist; Septum is midline; Inferior turbinates are hypertrophied; Nasal airflow is diminished on the right; Posterior pharynx is pink; Modified Mallampati:III-IV; Posterior palate is low; Tonsils not visualized; Uvula is wide and elongated;Tongue is enlarged; Dentition is fair; No TMJ tenderness; Jaw opening and protrusion without click and without discomfort.  NECK: Supple. Neck circumference is  inches. No thyromegaly. No palpable nodes.     SKIN: On face and neck: No abrasions, no rashes, no lesions.  No subcutaneous nodules are palpable.  RESPIRATORY: Chest is clear to auscultation.  Normal chest expansion and non-labored breathing at rest.  CARDIOVASCULAR: Normal S1, S2.  No murmurs, gallops or rubs. No carotid bruits bilaterally.  No edema. No  "clubbing. No cyanosis.    NEURO: Oriented to time, place and person. Normal attention span and concentration. Gait normal.    PSYCH: Affect is full. Mood is normal  MUSCULOSKELETAL: Moves 4 extremities. Gait normal.         Using [facility]:       ASSESSMENT:    1. GINGER The patient symptomatically has  excessive daytime sleepiness, snoring,  witnessed breathing pauses, excessive daytime fatigue, gasping for air in sleep and interrupted sleep  with exam findings of "a crowded oral airway and elevated body mass index. The patient has medical co-morbidities of CAD, diabetes and hypertension,  which can be worsened by GINGER. This warrants treatment. CPAP 11 cm appeared sufficient on recent re-titration, yet SaO2 remains mid-low 90's and the patient is still SOB will small exertion. EF was NL on 2 D Echo a year ago. Mild reaction to bronchodilator on PFT in 4/2016. Very slow ramp on his current CPAP machine may play a role in residual fatigue - and SOB during the day. Doing well with a new APAP 13-18 cm H2O. Met Medicare compliance at 100%. Benefiting from CPAP use in terms of sleep continuity and daytime sleepiness.             PLAN:    Continue APAP 13-18 cm H2O (in attempt to increase SaO2 to higher 90%'s) with 6 cm ramp for 15 min.      For dry mouth      Increase humidity to 4-5  I'll get a heated hose and a chin strap for you  ------------------------------  Biotene products - gum gel, mouth spray, mouth wash, toothpaste  --------------------------------------------------    Nasal mask +chin strap      More than 25 minutes of this 45 minutes visit was spent in counseling: during our discussion today, we talked about the etiology of  GINGER as well as the potential ramifications of untreated sleep apnea, which could include daytime sleepiness, hypertension, heart disease and/or stroke.  We discussed potential treatment options, which could include weight loss, body positioning, continuous positive airway pressure (CPAP), " or referral for surgical consideration. Meanwhile, he  is urged to avoid supine sleep, weight gain and alcoholic beverages since all of these can worsen GINGER.     Precautions: The patient was advised to abstain from driving should he feel sleepy or drowsy.    Follow up: MD/NP  6 weeks    Thank you for allowing me the opportunity to participate in the care of your patient.    This visit summary will be sent to referring provider via inbasket

## 2017-08-21 ENCOUNTER — TELEPHONE (OUTPATIENT)
Dept: INTERNAL MEDICINE | Facility: CLINIC | Age: 77
End: 2017-08-21

## 2017-08-21 DIAGNOSIS — N18.30 TYPE 2 DIABETES MELLITUS WITH STAGE 3 CHRONIC KIDNEY DISEASE, WITH LONG-TERM CURRENT USE OF INSULIN: Chronic | ICD-10-CM

## 2017-08-21 DIAGNOSIS — Z00.00 ANNUAL PHYSICAL EXAM: Primary | ICD-10-CM

## 2017-08-21 DIAGNOSIS — I10 ESSENTIAL HYPERTENSION: ICD-10-CM

## 2017-08-21 DIAGNOSIS — E11.22 TYPE 2 DIABETES MELLITUS WITH STAGE 3 CHRONIC KIDNEY DISEASE, WITH LONG-TERM CURRENT USE OF INSULIN: Chronic | ICD-10-CM

## 2017-08-21 DIAGNOSIS — Z79.4 TYPE 2 DIABETES MELLITUS WITH STAGE 3 CHRONIC KIDNEY DISEASE, WITH LONG-TERM CURRENT USE OF INSULIN: Chronic | ICD-10-CM

## 2017-08-21 DIAGNOSIS — E78.2 MIXED HYPERLIPIDEMIA: Chronic | ICD-10-CM

## 2017-08-21 DIAGNOSIS — E11.42 TYPE 2 DIABETES MELLITUS WITH DIABETIC POLYNEUROPATHY, WITH LONG-TERM CURRENT USE OF INSULIN: ICD-10-CM

## 2017-08-21 DIAGNOSIS — Z79.4 TYPE 2 DIABETES MELLITUS WITH DIABETIC POLYNEUROPATHY, WITH LONG-TERM CURRENT USE OF INSULIN: ICD-10-CM

## 2017-08-21 DIAGNOSIS — I25.10 CORONARY ARTERY DISEASE INVOLVING NATIVE CORONARY ARTERY OF NATIVE HEART WITHOUT ANGINA PECTORIS: Chronic | ICD-10-CM

## 2017-08-21 DIAGNOSIS — G47.33 OSA ON CPAP: ICD-10-CM

## 2017-08-21 NOTE — TELEPHONE ENCOUNTER
----- Message from Miranda Jama sent at 8/21/2017  3:41 PM CDT -----  Lab Orders Needed    I have scheduled the above patients annual physical. Lab orders need to be placed nad scheduled.    Date of Annual Physical: 08/28/2017    Thank You

## 2017-08-22 ENCOUNTER — LAB VISIT (OUTPATIENT)
Dept: LAB | Facility: HOSPITAL | Age: 77
End: 2017-08-22
Attending: INTERNAL MEDICINE
Payer: MEDICARE

## 2017-08-22 DIAGNOSIS — N18.30 TYPE 2 DIABETES MELLITUS WITH STAGE 3 CHRONIC KIDNEY DISEASE, WITH LONG-TERM CURRENT USE OF INSULIN: Chronic | ICD-10-CM

## 2017-08-22 DIAGNOSIS — E11.42 TYPE 2 DIABETES MELLITUS WITH DIABETIC POLYNEUROPATHY, WITH LONG-TERM CURRENT USE OF INSULIN: ICD-10-CM

## 2017-08-22 DIAGNOSIS — E78.2 MIXED HYPERLIPIDEMIA: Chronic | ICD-10-CM

## 2017-08-22 DIAGNOSIS — Z79.4 TYPE 2 DIABETES MELLITUS WITH DIABETIC POLYNEUROPATHY, WITH LONG-TERM CURRENT USE OF INSULIN: ICD-10-CM

## 2017-08-22 DIAGNOSIS — I10 ESSENTIAL HYPERTENSION: ICD-10-CM

## 2017-08-22 DIAGNOSIS — Z79.4 TYPE 2 DIABETES MELLITUS WITH STAGE 3 CHRONIC KIDNEY DISEASE, WITH LONG-TERM CURRENT USE OF INSULIN: Chronic | ICD-10-CM

## 2017-08-22 DIAGNOSIS — G47.33 OSA ON CPAP: ICD-10-CM

## 2017-08-22 DIAGNOSIS — E11.22 TYPE 2 DIABETES MELLITUS WITH STAGE 3 CHRONIC KIDNEY DISEASE, WITH LONG-TERM CURRENT USE OF INSULIN: Chronic | ICD-10-CM

## 2017-08-22 DIAGNOSIS — Z00.00 ANNUAL PHYSICAL EXAM: ICD-10-CM

## 2017-08-22 DIAGNOSIS — I25.10 CORONARY ARTERY DISEASE INVOLVING NATIVE CORONARY ARTERY OF NATIVE HEART WITHOUT ANGINA PECTORIS: Chronic | ICD-10-CM

## 2017-08-22 LAB
ALBUMIN SERPL BCP-MCNC: 3.4 G/DL
ALP SERPL-CCNC: 37 U/L
ALT SERPL W/O P-5'-P-CCNC: 20 U/L
ANION GAP SERPL CALC-SCNC: 10 MMOL/L
AST SERPL-CCNC: 16 U/L
BASOPHILS # BLD AUTO: 0.06 K/UL
BASOPHILS NFR BLD: 0.8 %
BILIRUB SERPL-MCNC: 1.1 MG/DL
BUN SERPL-MCNC: 28 MG/DL
CALCIUM SERPL-MCNC: 9.7 MG/DL
CHLORIDE SERPL-SCNC: 107 MMOL/L
CHOLEST/HDLC SERPL: 2.7 {RATIO}
CO2 SERPL-SCNC: 24 MMOL/L
CREAT SERPL-MCNC: 2.1 MG/DL
DIFFERENTIAL METHOD: ABNORMAL
EOSINOPHIL # BLD AUTO: 0.4 K/UL
EOSINOPHIL NFR BLD: 4.8 %
ERYTHROCYTE [DISTWIDTH] IN BLOOD BY AUTOMATED COUNT: 13.8 %
EST. GFR  (AFRICAN AMERICAN): 33.8 ML/MIN/1.73 M^2
EST. GFR  (NON AFRICAN AMERICAN): 29.3 ML/MIN/1.73 M^2
ESTIMATED AVG GLUCOSE: 157 MG/DL
GLUCOSE SERPL-MCNC: 216 MG/DL
HBA1C MFR BLD HPLC: 7.1 %
HCT VFR BLD AUTO: 39.9 %
HDL/CHOLESTEROL RATIO: 37 %
HDLC SERPL-MCNC: 100 MG/DL
HDLC SERPL-MCNC: 37 MG/DL
HGB BLD-MCNC: 13.4 G/DL
LDLC SERPL CALC-MCNC: 42 MG/DL
LYMPHOCYTES # BLD AUTO: 1.7 K/UL
LYMPHOCYTES NFR BLD: 22.9 %
MCH RBC QN AUTO: 27.5 PG
MCHC RBC AUTO-ENTMCNC: 33.6 G/DL
MCV RBC AUTO: 82 FL
MONOCYTES # BLD AUTO: 0.7 K/UL
MONOCYTES NFR BLD: 9.8 %
NEUTROPHILS # BLD AUTO: 4.4 K/UL
NEUTROPHILS NFR BLD: 61.1 %
NONHDLC SERPL-MCNC: 63 MG/DL
PLATELET # BLD AUTO: 163 K/UL
PMV BLD AUTO: 10.9 FL
POTASSIUM SERPL-SCNC: 4.6 MMOL/L
PROT SERPL-MCNC: 6.8 G/DL
RBC # BLD AUTO: 4.88 M/UL
SODIUM SERPL-SCNC: 141 MMOL/L
T4 FREE SERPL-MCNC: 0.95 NG/DL
TRIGL SERPL-MCNC: 105 MG/DL
TSH SERPL DL<=0.005 MIU/L-ACNC: 4.17 UIU/ML
WBC # BLD AUTO: 7.24 K/UL

## 2017-08-22 PROCEDURE — 84439 ASSAY OF FREE THYROXINE: CPT

## 2017-08-22 PROCEDURE — 83036 HEMOGLOBIN GLYCOSYLATED A1C: CPT

## 2017-08-22 PROCEDURE — 85025 COMPLETE CBC W/AUTO DIFF WBC: CPT

## 2017-08-22 PROCEDURE — 80061 LIPID PANEL: CPT

## 2017-08-22 PROCEDURE — 36415 COLL VENOUS BLD VENIPUNCTURE: CPT | Mod: PO

## 2017-08-22 PROCEDURE — 80053 COMPREHEN METABOLIC PANEL: CPT

## 2017-08-22 PROCEDURE — 84443 ASSAY THYROID STIM HORMONE: CPT

## 2017-08-28 ENCOUNTER — OFFICE VISIT (OUTPATIENT)
Dept: INTERNAL MEDICINE | Facility: CLINIC | Age: 77
End: 2017-08-28
Payer: MEDICARE

## 2017-08-28 ENCOUNTER — PATIENT MESSAGE (OUTPATIENT)
Dept: INTERNAL MEDICINE | Facility: CLINIC | Age: 77
End: 2017-08-28

## 2017-08-28 ENCOUNTER — LAB VISIT (OUTPATIENT)
Dept: LAB | Facility: HOSPITAL | Age: 77
End: 2017-08-28
Attending: INTERNAL MEDICINE
Payer: MEDICARE

## 2017-08-28 VITALS
DIASTOLIC BLOOD PRESSURE: 72 MMHG | SYSTOLIC BLOOD PRESSURE: 150 MMHG | HEIGHT: 70 IN | WEIGHT: 242.31 LBS | HEART RATE: 65 BPM | BODY MASS INDEX: 34.69 KG/M2

## 2017-08-28 DIAGNOSIS — E11.22 TYPE 2 DIABETES MELLITUS WITH STAGE 3 CHRONIC KIDNEY DISEASE, WITH LONG-TERM CURRENT USE OF INSULIN: ICD-10-CM

## 2017-08-28 DIAGNOSIS — G47.33 OSA ON CPAP: ICD-10-CM

## 2017-08-28 DIAGNOSIS — Z79.4 TYPE 2 DIABETES MELLITUS WITH STAGE 3 CHRONIC KIDNEY DISEASE, WITH LONG-TERM CURRENT USE OF INSULIN: ICD-10-CM

## 2017-08-28 DIAGNOSIS — R79.89 TSH ELEVATION: ICD-10-CM

## 2017-08-28 DIAGNOSIS — N18.30 TYPE 2 DIABETES MELLITUS WITH STAGE 3 CHRONIC KIDNEY DISEASE, WITH LONG-TERM CURRENT USE OF INSULIN: ICD-10-CM

## 2017-08-28 DIAGNOSIS — I10 ESSENTIAL HYPERTENSION: ICD-10-CM

## 2017-08-28 DIAGNOSIS — E11.42 TYPE 2 DIABETES MELLITUS WITH DIABETIC POLYNEUROPATHY, WITH LONG-TERM CURRENT USE OF INSULIN: ICD-10-CM

## 2017-08-28 DIAGNOSIS — E53.8 B12 DEFICIENCY: ICD-10-CM

## 2017-08-28 DIAGNOSIS — Z85.51 PERSONAL HISTORY OF BLADDER CANCER: ICD-10-CM

## 2017-08-28 DIAGNOSIS — Z00.00 ANNUAL PHYSICAL EXAM: Primary | ICD-10-CM

## 2017-08-28 DIAGNOSIS — Z79.4 TYPE 2 DIABETES MELLITUS WITH DIABETIC POLYNEUROPATHY, WITH LONG-TERM CURRENT USE OF INSULIN: ICD-10-CM

## 2017-08-28 DIAGNOSIS — E78.5 HYPERLIPIDEMIA, UNSPECIFIED HYPERLIPIDEMIA TYPE: ICD-10-CM

## 2017-08-28 DIAGNOSIS — I25.10 CORONARY ARTERY DISEASE INVOLVING NATIVE CORONARY ARTERY OF NATIVE HEART WITHOUT ANGINA PECTORIS: ICD-10-CM

## 2017-08-28 LAB
TSH SERPL DL<=0.005 MIU/L-ACNC: 3.1 UIU/ML
VIT B12 SERPL-MCNC: 204 PG/ML

## 2017-08-28 PROCEDURE — 99397 PER PM REEVAL EST PAT 65+ YR: CPT | Mod: S$GLB,,, | Performed by: INTERNAL MEDICINE

## 2017-08-28 PROCEDURE — 84443 ASSAY THYROID STIM HORMONE: CPT

## 2017-08-28 PROCEDURE — 83921 ORGANIC ACID SINGLE QUANT: CPT

## 2017-08-28 PROCEDURE — 82607 VITAMIN B-12: CPT

## 2017-08-28 PROCEDURE — 99999 PR PBB SHADOW E&M-EST. PATIENT-LVL III: CPT | Mod: PBBFAC,,, | Performed by: INTERNAL MEDICINE

## 2017-08-28 PROCEDURE — 36415 COLL VENOUS BLD VENIPUNCTURE: CPT | Mod: PO

## 2017-08-28 PROCEDURE — 99499 UNLISTED E&M SERVICE: CPT | Mod: S$GLB,,, | Performed by: INTERNAL MEDICINE

## 2017-08-28 NOTE — PROGRESS NOTES
REASON FOR VISIT:  This is a 77-year-old male who comes in for an annual routine   check.  He continues to follow up with multiple specialists and within the   year, he has seen Nephrology, Endocrinology, Cardiology, Podiatry, and   Ophthalmology.      His main ongoing issue has been dyspnea on exertion, although compared to a year   ago, he feels it has gotten a little bit better in the sense that he is able to   do a bit more activity without getting short of breath.  When he works outside,   he might be able to do yard work for 5 minutes, rest for 10 minutes, and then   continue.  There is no difficulty breathing when resting or sleeping.    In March when he met with Cardiology, the hydrochlorothiazide part of   valsartan/HCT was discontinued and amlodipine 10 mg a day was added.  He has   noticed a slight degree of edema involving his feet.    He recently met with Sleep Apnea, where his CPAP settings were adjusted and he   is in the process of getting a new mask.    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  Hypertension.  Hyperlipidemia.  Coronary artery disease with previous STEMI and angioplasty in .  Anemia of chronic disease.  B12 deficiency.  BPH.  Peripheral neuropathy.  Bladder tumor.    SOCIAL HISTORY:  Tobacco use, none.  Alcohol use, maybe a glass of wine twice a   week.  Exercise has been limited.    FAMILY HISTORY:  Father is , diabetes and heart disease.  Mother is   , cardiovascular disease.  Two sisters alive, one with diabetes.  One   brother is alive with diabetes.  One brother is .    SCREENING:  Colonoscopy in 2015 was normal.  Also, a cystoscopy in 2017 was negative.    MEDICATIONS:  Amlodipine 5 mg.  Ecotrin 81 mg.  Atorvastatin 20 mg.  Calcitriol 0.25 mcg.  Plavix 75 mg.  Finasteride 5 mg.  Folic acid 1 mg.  NovoLog 25 units with each meal.  Levemir 30 to 35 units twice a day.  Metoprolol tartrate 50 mg  twice a day.  Pantoprazole 40 mg a day.  Tamsulosin 0.4 mg a day.  Valsartan 160 mg a day.  Vitamin D 50,000 units once a week, which he needs to get back on.    REVIEW OF SYMPTOMS:  No chest pain, palpitations, or abdominal pain.  Bowel   function tends to be loose.  As far as urination, he does have urgency.    Currently, no regurgitation, heartburn, or reflux.  No arthralgias.    RECENT LABS:  Hematocrit 39.9.  Chemistry normal except glucose 116, creatinine   2.1.  Hemoglobin 7.1, improved.  Cholesterol 100 with LDL 42.  TSH 4.1 with free   T4 of 0.95.    PHYSICAL EXAMINATION:  VITAL SIGNS:  Weight 242 pounds, stable.  Pulse rate 60.  Blood pressure by me   150/72.  HEENT:  Tympanic membranes normal.  Nasal mucosa is clear.  Oropharynx, no   abnormal findings.  NECK:  No thyromegaly.  No masses.  LUNGS:  Clear breath sounds, good effort.  HEART:  Regular rate and rhythm.  No murmurs or gallops.  ABDOMEN:  Active bowel sounds, soft, nontender.  No hepatosplenomegaly or   abdominal masses.  PULSES:  2+ carotid, 1+ pedal pulses.  EXTREMITIES:  Trace edema.  MUSCULOSKELETAL:  No major joint deformities.  GENITALIA:  No scrotal masses.  No hernias.  RECTAL:  Stool is brown, heme negative.  Prostate is mildly enlarged.    IMPRESSION:  1.  General exam.  2.  Type 2 diabetes with chronic kidney disease.  3.  Hypertension.  4.  Hyperlipidemia.  5.  Coronary artery disease.  6.  Anemia of chronic disease.  7.  BPH.  8.  History of bladder tumor.  9.  History of B12 deficiency.    PLAN:  Recommend stopping pantoprazole, as he has had no symptoms.  In its   place, he can use Zantac twice a day as needed.  Today we will repeat a TSH,   B12, and methylmalonic acid.  Attempt to get back into physical activity for   conditioning and hopefully weight loss.  He will have a follow-up with   Cardiology.  He was seen in March and was recommended to come back in six   months' time.  Phone review to follow up.    /sc [id number]  review      ALEJANDRA/DONNA  dd: 08/28/2017 10:19:33 (CDT)  td: 08/28/2017 22:51:17 (CDT)  Doc ID   #[id number]  Job ID #[id number]    CC:

## 2017-08-29 RX ORDER — TAMSULOSIN HYDROCHLORIDE 0.4 MG/1
CAPSULE ORAL
Qty: 90 CAPSULE | Refills: 0 | Status: SHIPPED | OUTPATIENT
Start: 2017-08-29 | End: 2017-11-25 | Stop reason: SDUPTHER

## 2017-08-31 ENCOUNTER — PATIENT MESSAGE (OUTPATIENT)
Dept: INTERNAL MEDICINE | Facility: CLINIC | Age: 77
End: 2017-08-31

## 2017-08-31 ENCOUNTER — DOCUMENTATION ONLY (OUTPATIENT)
Dept: INTERNAL MEDICINE | Facility: CLINIC | Age: 77
End: 2017-08-31

## 2017-08-31 LAB — METHYLMALONATE SERPL-SCNC: 0.47 UMOL/L

## 2017-08-31 RX ORDER — CYANOCOBALAMIN 1000 UG/ML
1000 INJECTION, SOLUTION INTRAMUSCULAR; SUBCUTANEOUS
Qty: 10 ML | Refills: 5 | Status: SHIPPED | OUTPATIENT
Start: 2017-08-31 | End: 2018-05-31

## 2017-09-07 RX ORDER — INSULIN ASPART 100 [IU]/ML
INJECTION, SOLUTION INTRAVENOUS; SUBCUTANEOUS
Qty: 30 ML | Refills: 6 | Status: SHIPPED | OUTPATIENT
Start: 2017-09-07 | End: 2018-10-01 | Stop reason: SDUPTHER

## 2017-09-13 RX ORDER — FINASTERIDE 5 MG/1
TABLET, FILM COATED ORAL
Qty: 90 TABLET | Refills: 0 | Status: SHIPPED | OUTPATIENT
Start: 2017-09-13 | End: 2017-12-10 | Stop reason: SDUPTHER

## 2017-09-14 ENCOUNTER — OFFICE VISIT (OUTPATIENT)
Dept: OPHTHALMOLOGY | Facility: CLINIC | Age: 77
End: 2017-09-14
Payer: MEDICARE

## 2017-09-14 DIAGNOSIS — Z79.4 CONTROLLED TYPE 2 DIABETES MELLITUS WITH BOTH EYES AFFECTED BY MILD NONPROLIFERATIVE RETINOPATHY AND MACULAR EDEMA, WITH LONG-TERM CURRENT USE OF INSULIN: Primary | ICD-10-CM

## 2017-09-14 DIAGNOSIS — E11.3213 CONTROLLED TYPE 2 DIABETES MELLITUS WITH BOTH EYES AFFECTED BY MILD NONPROLIFERATIVE RETINOPATHY AND MACULAR EDEMA, WITH LONG-TERM CURRENT USE OF INSULIN: Primary | ICD-10-CM

## 2017-09-14 DIAGNOSIS — H35.033 HYPERTENSIVE RETINOPATHY OF BOTH EYES: ICD-10-CM

## 2017-09-14 PROCEDURE — 92226 PR SPECIAL EYE EXAM, SUBSEQUENT: CPT | Mod: 50,S$GLB,, | Performed by: OPHTHALMOLOGY

## 2017-09-14 PROCEDURE — 92134 CPTRZ OPH DX IMG PST SGM RTA: CPT | Mod: S$GLB,,, | Performed by: OPHTHALMOLOGY

## 2017-09-14 PROCEDURE — 99499 UNLISTED E&M SERVICE: CPT | Mod: S$GLB,,, | Performed by: OPHTHALMOLOGY

## 2017-09-14 PROCEDURE — 99999 PR PBB SHADOW E&M-EST. PATIENT-LVL III: CPT | Mod: PBBFAC,,, | Performed by: OPHTHALMOLOGY

## 2017-09-14 PROCEDURE — 92014 COMPRE OPH EXAM EST PT 1/>: CPT | Mod: S$GLB,,, | Performed by: OPHTHALMOLOGY

## 2017-09-14 NOTE — PROGRESS NOTES
HPI     Eye Problem    Additional comments: 6 mos check           Comments   DLS 3/2/17- Not much change since last visit but he is having a little   more difficulty focusing at a distance- letter seem fuzzy around the edges      OCT - ERM OU - no significant distortion  ME OS - central ME increased    Prior FA - Minimal late leakage of fovea MA OS  No leakage OD      A/P    1. Mild NPDR OU  Controlled T2 on insulin    2. Trace DME OS  Increased again  Resolved with observation in the past, will observe today    If worsens, then injection    3. PCIOL OU    4. Floaters OU    5. HTN Ret OU    6. CAD - s/p stents on Plavix    BS/BP/chol control      3 months OCT/FA OS

## 2017-10-12 RX ORDER — METOPROLOL TARTRATE 50 MG/1
TABLET ORAL
Qty: 180 TABLET | Refills: 0 | Status: SHIPPED | OUTPATIENT
Start: 2017-10-12 | End: 2017-10-16 | Stop reason: ALTCHOICE

## 2017-10-12 RX ORDER — ATORVASTATIN CALCIUM 20 MG/1
TABLET, FILM COATED ORAL
Qty: 90 TABLET | Refills: 0 | Status: SHIPPED | OUTPATIENT
Start: 2017-10-12 | End: 2017-10-16 | Stop reason: SDUPTHER

## 2017-10-16 ENCOUNTER — OFFICE VISIT (OUTPATIENT)
Dept: CARDIOLOGY | Facility: CLINIC | Age: 77
End: 2017-10-16
Payer: MEDICARE

## 2017-10-16 VITALS
BODY MASS INDEX: 33.21 KG/M2 | HEIGHT: 71 IN | SYSTOLIC BLOOD PRESSURE: 134 MMHG | WEIGHT: 237.19 LBS | DIASTOLIC BLOOD PRESSURE: 65 MMHG | HEART RATE: 113 BPM

## 2017-10-16 DIAGNOSIS — I25.10 CORONARY ARTERY DISEASE INVOLVING NATIVE CORONARY ARTERY OF NATIVE HEART WITHOUT ANGINA PECTORIS: Primary | Chronic | ICD-10-CM

## 2017-10-16 DIAGNOSIS — E11.42 DIABETIC POLYNEUROPATHY ASSOCIATED WITH TYPE 2 DIABETES MELLITUS: ICD-10-CM

## 2017-10-16 DIAGNOSIS — I10 ESSENTIAL HYPERTENSION: ICD-10-CM

## 2017-10-16 DIAGNOSIS — Z98.61 POST PTCA: ICD-10-CM

## 2017-10-16 DIAGNOSIS — Z79.4 CONTROLLED TYPE 2 DIABETES MELLITUS WITH BOTH EYES AFFECTED BY MILD NONPROLIFERATIVE RETINOPATHY AND MACULAR EDEMA, WITH LONG-TERM CURRENT USE OF INSULIN: ICD-10-CM

## 2017-10-16 DIAGNOSIS — E78.00 PURE HYPERCHOLESTEROLEMIA: Chronic | ICD-10-CM

## 2017-10-16 DIAGNOSIS — E11.3213 CONTROLLED TYPE 2 DIABETES MELLITUS WITH BOTH EYES AFFECTED BY MILD NONPROLIFERATIVE RETINOPATHY AND MACULAR EDEMA, WITH LONG-TERM CURRENT USE OF INSULIN: ICD-10-CM

## 2017-10-16 DIAGNOSIS — I25.2 HISTORY OF MI (MYOCARDIAL INFARCTION): ICD-10-CM

## 2017-10-16 PROCEDURE — 99999 PR PBB SHADOW E&M-EST. PATIENT-LVL III: CPT | Mod: PBBFAC,,, | Performed by: INTERNAL MEDICINE

## 2017-10-16 PROCEDURE — 99214 OFFICE O/P EST MOD 30 MIN: CPT | Mod: S$GLB,,, | Performed by: INTERNAL MEDICINE

## 2017-10-16 PROCEDURE — 99499 UNLISTED E&M SERVICE: CPT | Mod: S$GLB,,, | Performed by: INTERNAL MEDICINE

## 2017-10-16 RX ORDER — CARVEDILOL 12.5 MG/1
12.5 TABLET ORAL 2 TIMES DAILY WITH MEALS
Qty: 60 TABLET | Refills: 11 | Status: SHIPPED | OUTPATIENT
Start: 2017-10-16 | End: 2018-10-31 | Stop reason: SDUPTHER

## 2017-10-16 NOTE — PATIENT INSTRUCTIONS
BP goal < 130/80  email via [employer] of call if BP < 110/60   exercise 3-4 times a week for 30-40 minutes

## 2017-10-16 NOTE — PROGRESS NOTES
Subjective:    Patient ID:  Kevon Perez is a 77 y.o. male who presents for follow-up of Coronary Artery Disease      HPI   77 year old male followed with CAD post STEMI/PCI 2009, hypertension, hyperlipidemia,GINGER, type 2 diabetes and CRI III. He continues to report fatigue but  and MATTHEWS but is not exercising. He has no chest pain and is using CPAP.. He was precribed amlodipine but stopped it due to ankle edema. He resumed valsartan with  HCTZ. His creatinine has increased since on HCTZ. His home BP is usually under 130 systolic.    Lab Results   Component Value Date     08/22/2017    K 4.6 08/22/2017     08/22/2017    CO2 24 08/22/2017    BUN 28 (H) 08/22/2017    CREATININE 2.1 (H) 08/22/2017     (H) 08/22/2017    HGBA1C 7.1 (H) 08/22/2017    MG 2.0 04/04/2016    AST 16 08/22/2017    ALT 20 08/22/2017    ALBUMIN 3.4 (L) 08/22/2017    PROT 6.8 08/22/2017    BILITOT 1.1 (H) 08/22/2017    WBC 7.24 08/22/2017    HGB 13.4 (L) 08/22/2017    HCT 39.9 (L) 08/22/2017    MCV 82 08/22/2017     08/22/2017    INR 1.0 01/09/2015    PSA 1.42 06/13/2013    TSH 3.099 08/28/2017         Lab Results   Component Value Date    CHOL 100 (L) 08/22/2017    HDL 37 (L) 08/22/2017    TRIG 105 08/22/2017       Lab Results   Component Value Date    LDLCALC 42.0 (L) 08/22/2017       Past Medical History:   Diagnosis Date    Acute coronary syndrome 9/10/09    STEMI    Anticoagulant long-term use     plavix    Basal cell cancer     BCC (basal cell carcinoma of skin)     nose    Cancer of bladder January 2013    Cataract     Chronic kidney disease     Coronary artery disease     CPAP (continuous positive airway pressure) dependence     Diabetes mellitus     Diabetic retinopathy     GERD (gastroesophageal reflux disease)     High cholesterol     Hyperlipidemia     Hypertension     Non-proliferative diabetic retinopathy, mild, left eye 11/2013    Dr. Dougherty    GINGER (obstructive sleep apnea)     CPAP   "   Renal manifestation of secondary diabetes mellitus        Current Outpatient Prescriptions:     aspirin (ECOTRIN) 81 MG EC tablet, Take 81 mg by mouth every evening. , Disp: , Rfl:     atorvastatin (LIPITOR) 20 MG tablet, Take 1 tablet (20 mg total) by mouth once daily., Disp: 90 tablet, Rfl: 3    BD INSULIN PEN NEEDLE UF SHORT 31 gauge x 5/16" Ndle, USE UP TO 6 TIMES DAILY WITH MULTIPLE INSULIN INJECTIONS, Disp: 200 each, Rfl: 11    calcitRIOL (ROCALTROL) 0.25 MCG Cap, TAKE 1 CAPSULE BY MOUTH EVERY DAY, Disp: 30 capsule, Rfl: 11    clopidogrel (PLAVIX) 75 mg tablet, TAKE 1 TABLET BY MOUTH EVERY DAY, Disp: 90 tablet, Rfl: 3    cyanocobalamin 1,000 mcg/mL injection, Inject 1 mL (1,000 mcg total) into the muscle every 30 days., Disp: 10 mL, Rfl: 5    finasteride (PROSCAR) 5 mg tablet, TAKE 1 TABLET BY MOUTH EVERY DAY, Disp: 90 tablet, Rfl: 0    folic acid (FOLVITE) 1 MG tablet, Take 1 mg by mouth once daily. , Disp: , Rfl:     insulin detemir (LEVEMIR FLEXTOUCH) 100 unit/mL (3 mL) SubQ InPn pen, Inject 30 Units into the skin 2 (two) times daily. (Patient taking differently: Inject 35 Units into the skin 2 (two) times daily. ), Disp: 60 mL, Rfl: 6    nitroGLYCERIN (NITROSTAT) 0.4 MG SL tablet, Place 1 tablet (0.4 mg total) under the tongue every 5 (five) minutes as needed., Disp: 25 tablet, Rfl: 3    NOVOLOG FLEXPEN 100 unit/mL InPn pen, ADMINISTER 25 UNITS UNDER THE SKIN THREE TIMES DAILY WITH MEALS. MAY USE 25 UNITS WITH NIGHTTIME SNACK, Disp: 30 mL, Rfl: 6    ONE TOUCH ULTRA TEST Strp, Pt test 3-4 times a day, Disp: , Rfl:     tamsulosin (FLOMAX) 0.4 mg Cp24, TAKE 1 CAPSULE BY MOUTH EVERY EVENING, Disp: 90 capsule, Rfl: 0    valsartan (DIOVAN) 160 MG tablet, Take 1 tablet (160 mg total) by mouth once daily., Disp: 30 tablet, Rfl: 11    carvedilol (COREG) 12.5 MG tablet, Take 1 tablet (12.5 mg total) by mouth 2 (two) times daily with meals., Disp: 60 tablet, Rfl: 11        Review of Systems " "  Constitution: Positive for malaise/fatigue. Negative for decreased appetite, diaphoresis, fever, weakness, weight gain and weight loss.   HENT: Negative for congestion, ear discharge, ear pain and nosebleeds.    Eyes: Negative for blurred vision, double vision and visual disturbance.   Cardiovascular: Positive for dyspnea on exertion. Negative for chest pain, claudication, cyanosis, irregular heartbeat, leg swelling, near-syncope, orthopnea, palpitations, paroxysmal nocturnal dyspnea and syncope.   Respiratory: Negative for cough, hemoptysis, shortness of breath, sleep disturbances due to breathing, snoring, sputum production and wheezing.    Endocrine: Negative for polydipsia, polyphagia and polyuria.   Hematologic/Lymphatic: Negative for adenopathy and bleeding problem. Does not bruise/bleed easily.   Skin: Negative for color change, nail changes, poor wound healing and rash.   Musculoskeletal: Negative for muscle cramps and muscle weakness.   Gastrointestinal: Negative for abdominal pain, anorexia, change in bowel habit, hematochezia, nausea and vomiting.   Genitourinary: Negative for dysuria, frequency and hematuria.   Neurological: Negative for brief paralysis, difficulty with concentration, excessive daytime sleepiness, dizziness, focal weakness, headaches, light-headedness, seizures and vertigo.   Psychiatric/Behavioral: Negative for altered mental status and depression.   Allergic/Immunologic: Negative for persistent infections.        Objective:/65 (BP Location: Left arm, Patient Position: Sitting, BP Method: X-Large (Automatic))   Pulse (!) 113   Ht 5' 10.5" (1.791 m)   Wt 107.6 kg (237 lb 3.4 oz)   BMI 33.56 kg/m²           Physical Exam   Constitutional: He is oriented to person, place, and time. He appears well-developed and well-nourished.   obese   HENT:   Head: Normocephalic.   Right Ear: External ear normal.   Left Ear: External ear normal.   Nose: Nose normal.   Inspection of lips, " teeth and gums normal   Eyes: EOM are normal. Pupils are equal, round, and reactive to light. No scleral icterus.   Neck: Normal range of motion. Neck supple. No JVD present. No tracheal deviation present. No thyromegaly present.   Cardiovascular: Normal rate, regular rhythm and intact distal pulses.  Exam reveals no gallop and no friction rub.    No murmur heard.  Pulses:       Dorsalis pedis pulses are 2+ on the right side, and 2+ on the left side.        Posterior tibial pulses are 2+ on the right side, and 2+ on the left side.   Pulmonary/Chest: Effort normal and breath sounds normal.   Abdominal: Bowel sounds are normal. He exhibits no distension. There is no hepatosplenomegaly. There is no tenderness. There is no guarding.   Musculoskeletal: Normal range of motion. He exhibits no edema or tenderness.   Lymphadenopathy:   Palpation of neck and groin lymph nodes normal   Neurological: He is alert and oriented to person, place, and time. No cranial nerve deficit. He exhibits normal muscle tone. Coordination normal.   Skin: Skin is dry.   Palpation of skin normal   Psychiatric: His behavior is normal. Judgment and thought content normal.         Assessment:       1. Coronary artery disease involving native coronary artery of native heart without angina pectoris    2. History of MI (myocardial infarction)    3. Post PTCA    4. Pure hypercholesterolemia    5. 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, with long-term current use of insulin    6. Diabetic polyneuropathy associated with type 2 diabetes mellitus    7. Essential hypertension         Plan:       Kevon was seen today for coronary artery disease.    Diagnoses and all orders for this visit:    Coronary artery disease involving native coronary artery of native heart without angina pectoris  -     carvedilol (COREG) 12.5 MG tablet; Take 1 tablet (12.5 mg total) by mouth 2 (two) times  daily with meals.  -     Lipid panel; Future; Expected date: 04/17/2018    History of MI (myocardial infarction)  -     carvedilol (COREG) 12.5 MG tablet; Take 1 tablet (12.5 mg total) by mouth 2 (two) times daily with meals.    Post PTCA    Pure hypercholesterolemia  -     Lipid panel; Future; Expected date: 04/17/2018    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, with long-term current use of insulin    Diabetic polyneuropathy associated with type 2 diabetes mellitus  -     carvedilol (COREG) 12.5 MG tablet; Take 1 tablet (12.5 mg total) by mouth 2 (two) times daily with meals.    Essential hypertension  -     carvedilol (COREG) 12.5 MG tablet; Take 1 tablet (12.5 mg total) by mouth 2 (two) times daily with meals.  -     Comprehensive metabolic panel; Future; Expected date: 04/17/2018

## 2017-10-16 NOTE — LETTER
October 16, 2017      Cipriano Sol MD  [address]  [place] [address]           [facility] Cardiology  [address]  [place] [address]  Phone: [phone]          Patient: Kevon Perez   MR Number: [id number]   YOB: 1940   Date of Visit: 10/16/2017       Dear Dr. Cipriano Sol:    Thank you for referring Kevon Perez to me for evaluation. Attached you will find relevant portions of my assessment and plan of care.    If you have questions, please do not hesitate to call me. I look forward to following Kevon Perez along with you.    Sincerely,    Keith Teixeira MD    Enclosure  CC:  No Recipients    If you would like to receive this communication electronically, please contact [email or web address] or [phone] to request more information on [employer] Link access.    For providers and/or their staff who would like to refer a patient to [facility], please contact us through our one-stop-shop provider referral line, [facility] [place], at [phone].    If you feel you have received this communication in error or would no longer like to receive these types of communications, please e-mail [email or web address]

## 2017-10-26 RX ORDER — PANTOPRAZOLE SODIUM 40 MG/1
TABLET, DELAYED RELEASE ORAL
Qty: 90 TABLET | Refills: 1 | Status: SHIPPED | OUTPATIENT
Start: 2017-10-26 | End: 2017-12-14

## 2017-10-30 RX ORDER — CYANOCOBALAMIN 1000 UG/ML
INJECTION, SOLUTION INTRAMUSCULAR; SUBCUTANEOUS
Qty: 10 ML | Refills: 3 | Status: SHIPPED | OUTPATIENT
Start: 2017-10-30 | End: 2018-05-11 | Stop reason: SDUPTHER

## 2017-11-27 RX ORDER — TAMSULOSIN HYDROCHLORIDE 0.4 MG/1
CAPSULE ORAL
Qty: 90 CAPSULE | Refills: 0 | Status: SHIPPED | OUTPATIENT
Start: 2017-11-27 | End: 2018-02-23 | Stop reason: SDUPTHER

## 2017-12-05 RX ORDER — CLOPIDOGREL BISULFATE 75 MG/1
TABLET ORAL
Qty: 90 TABLET | Refills: 1 | Status: SHIPPED | OUTPATIENT
Start: 2017-12-05 | End: 2018-05-11 | Stop reason: SDUPTHER

## 2017-12-07 ENCOUNTER — PATIENT MESSAGE (OUTPATIENT)
Dept: INTERNAL MEDICINE | Facility: CLINIC | Age: 77
End: 2017-12-07

## 2017-12-07 RX ORDER — ATORVASTATIN CALCIUM 20 MG/1
20 TABLET, FILM COATED ORAL DAILY
Qty: 90 TABLET | Refills: 3 | Status: SHIPPED | OUTPATIENT
Start: 2017-12-07 | End: 2018-10-01 | Stop reason: SDUPTHER

## 2017-12-07 NOTE — TELEPHONE ENCOUNTER
Need authorization for refill of atorvastatin. Please call Marcie at [facility].   Thanks.   Darian Perez

## 2017-12-11 RX ORDER — FINASTERIDE 5 MG/1
TABLET, FILM COATED ORAL
Qty: 90 TABLET | Refills: 0 | Status: SHIPPED | OUTPATIENT
Start: 2017-12-11 | End: 2018-03-09 | Stop reason: SDUPTHER

## 2017-12-14 ENCOUNTER — OFFICE VISIT (OUTPATIENT)
Dept: OPHTHALMOLOGY | Facility: CLINIC | Age: 77
End: 2017-12-14
Payer: MEDICARE

## 2017-12-14 VITALS — DIASTOLIC BLOOD PRESSURE: 58 MMHG | HEART RATE: 54 BPM | SYSTOLIC BLOOD PRESSURE: 168 MMHG

## 2017-12-14 DIAGNOSIS — E11.3213 CONTROLLED TYPE 2 DIABETES MELLITUS WITH BOTH EYES AFFECTED BY MILD NONPROLIFERATIVE RETINOPATHY AND MACULAR EDEMA, WITH LONG-TERM CURRENT USE OF INSULIN: Primary | ICD-10-CM

## 2017-12-14 DIAGNOSIS — Z79.4 CONTROLLED TYPE 2 DIABETES MELLITUS WITH BOTH EYES AFFECTED BY MILD NONPROLIFERATIVE RETINOPATHY AND MACULAR EDEMA, WITH LONG-TERM CURRENT USE OF INSULIN: Primary | ICD-10-CM

## 2017-12-14 DIAGNOSIS — H35.033 HYPERTENSIVE RETINOPATHY OF BOTH EYES: ICD-10-CM

## 2017-12-14 PROCEDURE — 92134 CPTRZ OPH DX IMG PST SGM RTA: CPT | Mod: S$GLB,,, | Performed by: OPHTHALMOLOGY

## 2017-12-14 PROCEDURE — 99999 PR PBB SHADOW E&M-EST. PATIENT-LVL III: CPT | Mod: PBBFAC,,, | Performed by: OPHTHALMOLOGY

## 2017-12-14 PROCEDURE — 92235 FLUORESCEIN ANGRPH MLTIFRAME: CPT | Mod: S$GLB,,, | Performed by: OPHTHALMOLOGY

## 2017-12-14 PROCEDURE — 92226 PR SPECIAL EYE EXAM, SUBSEQUENT: CPT | Mod: 50,S$GLB,, | Performed by: OPHTHALMOLOGY

## 2017-12-14 PROCEDURE — 99499 UNLISTED E&M SERVICE: CPT | Mod: S$GLB,,, | Performed by: OPHTHALMOLOGY

## 2017-12-14 PROCEDURE — 92014 COMPRE OPH EXAM EST PT 1/>: CPT | Mod: S$GLB,,, | Performed by: OPHTHALMOLOGY

## 2017-12-14 NOTE — PATIENT INSTRUCTIONS
Fluorescein Angiography     A fluorescein angiogram of the retina   Fluorescein angiography is an eye test. It is done to look at the back of your eye, including:  · The blood vessels in your eye  · The layer of tissue at the back of your eye (the retina)  · The center of your retina (the macula)  · The optic nerve  This test can diagnose diseases found in these areas. It can also diagnose other conditions that affect these areas. To do this test, a dye called fluorescein is shot (injected) into your arm. The dye goes into your bloodstream and up into the blood vessels in your eyes. A special camera is then used to take images (angiograms) of your eyes.  Getting ready for your test  Tell your healthcare provider if you:  · Are pregnant or think you may be pregnant  · Are breastfeeding  · Have a history of severe allergic reactions, including to X-ray dye or other medicines  · Have kidney problems  Tell your provider about any medicines you are taking. You may need to stop taking all or some of these before the test. This includes:  · All prescription medicines  · Over-the-counter medicines such as aspirin or ibuprofen  · Street drugs  · Herbs, vitamins, and other supplements  You should arrange for an adult family member or friend to drive you home after your test. Your vision will be blurry for up to 12 hours.  Follow any other instructions from your healthcare provider.  During your test  · You are given eye drops to enlarge (dilate) your pupils.  · You then sit in front of a special camera. You place your chin on the chin rest and look into the camera.  · Images are taken of your eyes, one eye at a time.  · Fluorescein dye is then injected into your arm. The lights in the room are turned off. You may have mild nausea. You may have a warm feeling in your arm or upper body. Tell your provider if your skin feels itchy or if you are having trouble breathing. If so, you could be having an allergic reaction to the  dye.  · More pictures of your eyes are taken over 15 to 30 minutes. The camera shines a bright light into your eyes. Try to keep your head still and your eyes open.  · When enough images have been taken, the test is over.  After your test  Your vision will be blurry for up to 4 to 12 hours. This is because of your dilated pupils. Your eye will be more sensitive to light for up to 12 hours. You may want to wear sunglasses during this time. Do not drive if your vision is very blurry. You may also find it uncomfortable to read. Your skin may look yellow for a few hours. This is from the dye. Your urine will be bright yellow or orange for 24 to 48 hours after the test.     Risks and possible complications  All procedures have some risks. Possible risks of fluorescein angiography include:  · Upset stomach (nausea) and vomiting  · Leaking dye around the injection site that causes pain and swelling  · Metallic taste in your mouth  · Infection at injection site  · Allergic reaction to the dye  · Dry mouth or too much saliva  · Faster heart rate  · Sweating  · Lower back pain   Date Last Reviewed: 5/30/2015  © 9951-5193 [employer]. [address] [place] [address], [place] [address]. All rights reserved. This information is not intended as a substitute for professional medical care. Always follow your healthcare professional's instructions.

## 2017-12-14 NOTE — PROGRESS NOTES
HPI     3 mo OCT / FA OS  DLS-09/14/2017 Dr. Tillman    Pt sts no change since last visit with va denies pain   (-)Flashes (+)Floaters normal   (-)Photophobia  (-)Glare    Systane PRn     LBS- 230 today   Hemoglobin A1C       Date                     Value               Ref Range             Status                08/22/2017               7.1 (H)             4.0 - 5.6 %           Final                 03/02/2017               7.5 (H)             4.5 - 6.2 %           Final                 09/02/2016               7.8 (H)             4.5 - 6.2 %           Final             ----------      HPI     Eye Problem    Additional comments: 6 mos check           Comments   DLS 3/2/17- Not much change since last visit but he is having a little   more difficulty focusing at a distance- letter seem fuzzy around the edges      OCT - ERM OU - no significant distortion  ME OS - central ME increased    FA - Minimal late leakage of fovea MA OS  No leakage OD      A/P    1. Mild NPDR OU  Controlled T2 on insulin    2. Trace DME OS  Increased again  Resolved with observation in the past, will observe today    Low grade central fluid OS - may need pharmacotherapy if worsens  Will hold on initiation, as Va is good today    3. PCIOL OU    4. Floaters OU    5. HTN Ret OU    6. CAD - s/p stents on Plavix    BS/BP/chol control      3 month OCT

## 2018-01-05 RX ORDER — ATORVASTATIN CALCIUM 20 MG/1
TABLET, FILM COATED ORAL
Qty: 90 TABLET | Refills: 1 | Status: SHIPPED | OUTPATIENT
Start: 2018-01-05 | End: 2018-05-11 | Stop reason: SDUPTHER

## 2018-02-23 RX ORDER — PEN NEEDLE, DIABETIC 31 GX5/16"
NEEDLE, DISPOSABLE MISCELLANEOUS
Qty: 200 EACH | Refills: 11 | Status: SHIPPED | OUTPATIENT
Start: 2018-02-23 | End: 2019-06-07 | Stop reason: SDUPTHER

## 2018-02-23 RX ORDER — TAMSULOSIN HYDROCHLORIDE 0.4 MG/1
CAPSULE ORAL
Qty: 90 CAPSULE | Refills: 0 | Status: SHIPPED | OUTPATIENT
Start: 2018-02-23 | End: 2018-05-29 | Stop reason: SDUPTHER

## 2018-03-10 RX ORDER — FINASTERIDE 5 MG/1
TABLET, FILM COATED ORAL
Qty: 90 TABLET | Refills: 0 | Status: SHIPPED | OUTPATIENT
Start: 2018-03-10 | End: 2018-06-19 | Stop reason: SDUPTHER

## 2018-03-21 ENCOUNTER — TELEPHONE (OUTPATIENT)
Dept: INTERNAL MEDICINE | Facility: CLINIC | Age: 78
End: 2018-03-21

## 2018-03-21 DIAGNOSIS — I25.10 CORONARY ARTERY DISEASE INVOLVING NATIVE CORONARY ARTERY OF NATIVE HEART WITHOUT ANGINA PECTORIS: Chronic | ICD-10-CM

## 2018-03-21 DIAGNOSIS — N18.30 TYPE 2 DIABETES MELLITUS WITH STAGE 3 CHRONIC KIDNEY DISEASE, WITH LONG-TERM CURRENT USE OF INSULIN: Primary | Chronic | ICD-10-CM

## 2018-03-21 DIAGNOSIS — E78.00 PURE HYPERCHOLESTEROLEMIA: Chronic | ICD-10-CM

## 2018-03-21 DIAGNOSIS — I10 ESSENTIAL HYPERTENSION: ICD-10-CM

## 2018-03-21 DIAGNOSIS — Z79.4 TYPE 2 DIABETES MELLITUS WITH STAGE 3 CHRONIC KIDNEY DISEASE, WITH LONG-TERM CURRENT USE OF INSULIN: Primary | Chronic | ICD-10-CM

## 2018-03-21 DIAGNOSIS — E11.22 TYPE 2 DIABETES MELLITUS WITH STAGE 3 CHRONIC KIDNEY DISEASE, WITH LONG-TERM CURRENT USE OF INSULIN: Primary | Chronic | ICD-10-CM

## 2018-03-21 DIAGNOSIS — E53.8 B12 DEFICIENCY: ICD-10-CM

## 2018-03-21 NOTE — TELEPHONE ENCOUNTER
----- Message from Luz Avendano sent at 3/20/2018  1:55 PM CDT -----  Contact: PT Portal Request  Appointment Request From: Kevon Perez    With Provider: Other - (see comments)    Would Accept With:Only the person I've selected    Preferred Date Range: From 3/22/2018 To 3pm /22/2018    Preferred Times: Any    Reason for visit: Request an Appt    Comments:  Lab blood draw a1c missed 2/22/2018. Will be at [facility] other appt.

## 2018-03-22 ENCOUNTER — OFFICE VISIT (OUTPATIENT)
Dept: OPHTHALMOLOGY | Facility: CLINIC | Age: 78
End: 2018-03-22
Payer: MEDICARE

## 2018-03-22 ENCOUNTER — LAB VISIT (OUTPATIENT)
Dept: LAB | Facility: HOSPITAL | Age: 78
End: 2018-03-22
Attending: INTERNAL MEDICINE
Payer: MEDICARE

## 2018-03-22 DIAGNOSIS — E78.00 PURE HYPERCHOLESTEROLEMIA: Chronic | ICD-10-CM

## 2018-03-22 DIAGNOSIS — I25.10 CORONARY ARTERY DISEASE INVOLVING NATIVE CORONARY ARTERY OF NATIVE HEART WITHOUT ANGINA PECTORIS: Chronic | ICD-10-CM

## 2018-03-22 DIAGNOSIS — N18.30 TYPE 2 DIABETES MELLITUS WITH STAGE 3 CHRONIC KIDNEY DISEASE, WITH LONG-TERM CURRENT USE OF INSULIN: Chronic | ICD-10-CM

## 2018-03-22 DIAGNOSIS — E11.22 TYPE 2 DIABETES MELLITUS WITH STAGE 3 CHRONIC KIDNEY DISEASE, WITH LONG-TERM CURRENT USE OF INSULIN: Chronic | ICD-10-CM

## 2018-03-22 DIAGNOSIS — E53.8 B12 DEFICIENCY: ICD-10-CM

## 2018-03-22 DIAGNOSIS — Z79.4 TYPE 2 DIABETES MELLITUS WITH STAGE 3 CHRONIC KIDNEY DISEASE, WITH LONG-TERM CURRENT USE OF INSULIN: Chronic | ICD-10-CM

## 2018-03-22 DIAGNOSIS — Z79.4 CONTROLLED TYPE 2 DIABETES MELLITUS WITH BOTH EYES AFFECTED BY MILD NONPROLIFERATIVE RETINOPATHY AND MACULAR EDEMA, WITH LONG-TERM CURRENT USE OF INSULIN: Primary | ICD-10-CM

## 2018-03-22 DIAGNOSIS — E11.3213 CONTROLLED TYPE 2 DIABETES MELLITUS WITH BOTH EYES AFFECTED BY MILD NONPROLIFERATIVE RETINOPATHY AND MACULAR EDEMA, WITH LONG-TERM CURRENT USE OF INSULIN: Primary | ICD-10-CM

## 2018-03-22 DIAGNOSIS — H35.033 HYPERTENSIVE RETINOPATHY OF BOTH EYES: ICD-10-CM

## 2018-03-22 DIAGNOSIS — I10 ESSENTIAL HYPERTENSION: ICD-10-CM

## 2018-03-22 LAB
ALBUMIN SERPL BCP-MCNC: 3.3 G/DL
ALP SERPL-CCNC: 44 U/L
ALT SERPL W/O P-5'-P-CCNC: 18 U/L
ANION GAP SERPL CALC-SCNC: 8 MMOL/L
AST SERPL-CCNC: 17 U/L
BASOPHILS # BLD AUTO: 0.08 K/UL
BASOPHILS NFR BLD: 1.1 %
BILIRUB SERPL-MCNC: 1 MG/DL
BUN SERPL-MCNC: 24 MG/DL
CALCIUM SERPL-MCNC: 9.2 MG/DL
CHLORIDE SERPL-SCNC: 109 MMOL/L
CHOLEST SERPL-MCNC: 107 MG/DL
CHOLEST/HDLC SERPL: 2.9 {RATIO}
CO2 SERPL-SCNC: 25 MMOL/L
CREAT SERPL-MCNC: 2 MG/DL
DIFFERENTIAL METHOD: ABNORMAL
EOSINOPHIL # BLD AUTO: 0.3 K/UL
EOSINOPHIL NFR BLD: 3.9 %
ERYTHROCYTE [DISTWIDTH] IN BLOOD BY AUTOMATED COUNT: 15.1 %
EST. GFR  (AFRICAN AMERICAN): 35.9 ML/MIN/1.73 M^2
EST. GFR  (NON AFRICAN AMERICAN): 31 ML/MIN/1.73 M^2
ESTIMATED AVG GLUCOSE: 137 MG/DL
GLUCOSE SERPL-MCNC: 167 MG/DL
HBA1C MFR BLD HPLC: 6.4 %
HCT VFR BLD AUTO: 34.6 %
HDLC SERPL-MCNC: 37 MG/DL
HDLC SERPL: 34.6 %
HGB BLD-MCNC: 10.8 G/DL
IMM GRANULOCYTES # BLD AUTO: 0.03 K/UL
IMM GRANULOCYTES NFR BLD AUTO: 0.4 %
LDLC SERPL CALC-MCNC: 56.2 MG/DL
LYMPHOCYTES # BLD AUTO: 1.6 K/UL
LYMPHOCYTES NFR BLD: 22.9 %
MCH RBC QN AUTO: 26.9 PG
MCHC RBC AUTO-ENTMCNC: 31.2 G/DL
MCV RBC AUTO: 86 FL
MONOCYTES # BLD AUTO: 0.5 K/UL
MONOCYTES NFR BLD: 7.3 %
NEUTROPHILS # BLD AUTO: 4.6 K/UL
NEUTROPHILS NFR BLD: 64.4 %
NONHDLC SERPL-MCNC: 70 MG/DL
NRBC BLD-RTO: 0 /100 WBC
PLATELET # BLD AUTO: 174 K/UL
PMV BLD AUTO: 11.2 FL
POTASSIUM SERPL-SCNC: 4.6 MMOL/L
PROT SERPL-MCNC: 6.3 G/DL
RBC # BLD AUTO: 4.02 M/UL
SODIUM SERPL-SCNC: 142 MMOL/L
TRIGL SERPL-MCNC: 69 MG/DL
VIT B12 SERPL-MCNC: 208 PG/ML
WBC # BLD AUTO: 7.17 K/UL

## 2018-03-22 PROCEDURE — 99999 PR PBB SHADOW E&M-EST. PATIENT-LVL II: CPT | Mod: PBBFAC,,, | Performed by: OPHTHALMOLOGY

## 2018-03-22 PROCEDURE — 92014 COMPRE OPH EXAM EST PT 1/>: CPT | Mod: 25,S$GLB,, | Performed by: OPHTHALMOLOGY

## 2018-03-22 PROCEDURE — 80061 LIPID PANEL: CPT

## 2018-03-22 PROCEDURE — 85025 COMPLETE CBC W/AUTO DIFF WBC: CPT

## 2018-03-22 PROCEDURE — 67028 INJECTION EYE DRUG: CPT | Mod: LT,S$GLB,, | Performed by: OPHTHALMOLOGY

## 2018-03-22 PROCEDURE — 83036 HEMOGLOBIN GLYCOSYLATED A1C: CPT

## 2018-03-22 PROCEDURE — 82607 VITAMIN B-12: CPT

## 2018-03-22 PROCEDURE — 80053 COMPREHEN METABOLIC PANEL: CPT

## 2018-03-22 PROCEDURE — 36415 COLL VENOUS BLD VENIPUNCTURE: CPT | Mod: PO

## 2018-03-22 PROCEDURE — 99499 UNLISTED E&M SERVICE: CPT | Mod: S$GLB,,, | Performed by: OPHTHALMOLOGY

## 2018-03-22 PROCEDURE — 92134 CPTRZ OPH DX IMG PST SGM RTA: CPT | Mod: S$GLB,,, | Performed by: OPHTHALMOLOGY

## 2018-03-22 RX ADMIN — Medication 1.25 MG: at 10:03

## 2018-03-22 NOTE — PROGRESS NOTES
HPI     Eye Problem    Additional comments: 3 month check           Comments   DLS 12/14/17- Vision the same as last visit. Patient states BS could be   better controlled    [id number] this am      OCT - ERM OU - no significant distortion  ME OS - central ME increased    Prior FA - Minimal late leakage of fovea MA OS  No leakage OD      A/P    1. Mild NPDR OU  Controlled T2 on insulin    2. DME OS  Increased again    Now Sx OS    Avastin OS today    Get approval for Ozurdex    3. PCIOL OU    4. Floaters OU    5. HTN Ret OU    6. CAD - s/p stents on Plavix    BS/BP/chol control      1 month OCT      Risks, benefits, and alternatives to treatment discussed in detail with the patient.  The patient voiced understanding and wished to proceed with the procedure    Injection Procedure Note:  Diagnosis: DME OS    Topical Proparacaine and Betadine.  Inject Avastin OS at 6:00 @ 3.5-4mm posterior to limbus  Post Operative Dx: Same  Complications: None  Follow up as above.

## 2018-03-22 NOTE — PATIENT INSTRUCTIONS

## 2018-04-09 ENCOUNTER — TELEPHONE (OUTPATIENT)
Dept: SLEEP MEDICINE | Facility: CLINIC | Age: 78
End: 2018-04-09

## 2018-04-09 NOTE — TELEPHONE ENCOUNTER
----- Message from Marleni Smith sent at 4/9/2018 11:58 AM CDT -----  Contact: [employer]  Appointment Request From: Kevon Perez    With Provider: Yumiko Haro MD [[facility]]    Would Accept With:Only the person I've selected    Preferred Date Range: From 4/9/2018 To 5/30/2018    Preferred Times: Any    Reason for visit: Request an Appt    Comments:  Follow up visit

## 2018-04-11 ENCOUNTER — PATIENT MESSAGE (OUTPATIENT)
Dept: PODIATRY | Facility: CLINIC | Age: 78
End: 2018-04-11

## 2018-04-20 RX ORDER — INSULIN ASPART 100 [IU]/ML
INJECTION, SOLUTION INTRAVENOUS; SUBCUTANEOUS
Qty: 30 ML | Refills: 5 | Status: SHIPPED | OUTPATIENT
Start: 2018-04-20 | End: 2018-05-11 | Stop reason: SDUPTHER

## 2018-04-26 ENCOUNTER — PROCEDURE VISIT (OUTPATIENT)
Dept: OPHTHALMOLOGY | Facility: CLINIC | Age: 78
End: 2018-04-26
Payer: MEDICARE

## 2018-04-26 VITALS — DIASTOLIC BLOOD PRESSURE: 76 MMHG | SYSTOLIC BLOOD PRESSURE: 164 MMHG | HEART RATE: 62 BPM

## 2018-04-26 DIAGNOSIS — E11.3213 CONTROLLED TYPE 2 DIABETES MELLITUS WITH BOTH EYES AFFECTED BY MILD NONPROLIFERATIVE RETINOPATHY AND MACULAR EDEMA, WITH LONG-TERM CURRENT USE OF INSULIN: Primary | ICD-10-CM

## 2018-04-26 DIAGNOSIS — Z79.4 CONTROLLED TYPE 2 DIABETES MELLITUS WITH BOTH EYES AFFECTED BY MILD NONPROLIFERATIVE RETINOPATHY AND MACULAR EDEMA, WITH LONG-TERM CURRENT USE OF INSULIN: Primary | ICD-10-CM

## 2018-04-26 DIAGNOSIS — H35.033 HYPERTENSIVE RETINOPATHY OF BOTH EYES: ICD-10-CM

## 2018-04-26 PROCEDURE — 99499 UNLISTED E&M SERVICE: CPT | Mod: S$GLB,,, | Performed by: OPHTHALMOLOGY

## 2018-04-26 PROCEDURE — 99499 UNLISTED E&M SERVICE: CPT | Mod: S$PBB,,, | Performed by: OPHTHALMOLOGY

## 2018-04-26 PROCEDURE — 67028 INJECTION EYE DRUG: CPT | Mod: LT,S$GLB,, | Performed by: OPHTHALMOLOGY

## 2018-04-26 PROCEDURE — 92134 CPTRZ OPH DX IMG PST SGM RTA: CPT | Mod: S$GLB,,, | Performed by: OPHTHALMOLOGY

## 2018-04-26 RX ADMIN — Medication 1.25 MG: at 10:04

## 2018-04-26 NOTE — PATIENT INSTRUCTIONS

## 2018-04-27 ENCOUNTER — OFFICE VISIT (OUTPATIENT)
Dept: NEPHROLOGY | Facility: CLINIC | Age: 78
End: 2018-04-27
Payer: MEDICARE

## 2018-04-27 VITALS
OXYGEN SATURATION: 97 % | HEART RATE: 78 BPM | WEIGHT: 240.94 LBS | SYSTOLIC BLOOD PRESSURE: 160 MMHG | HEIGHT: 70 IN | DIASTOLIC BLOOD PRESSURE: 80 MMHG | BODY MASS INDEX: 34.49 KG/M2

## 2018-04-27 DIAGNOSIS — Z79.4 CONTROLLED TYPE 2 DIABETES MELLITUS WITH STAGE 3 CHRONIC KIDNEY DISEASE, WITH LONG-TERM CURRENT USE OF INSULIN: Primary | ICD-10-CM

## 2018-04-27 DIAGNOSIS — N25.81 HYPERPARATHYROIDISM DUE TO RENAL INSUFFICIENCY: ICD-10-CM

## 2018-04-27 DIAGNOSIS — I12.9 HYPERTENSIVE KIDNEY DISEASE WITH CHRONIC KIDNEY DISEASE, STAGE 1-4 OR UNSPECIFIED CHRONIC KIDNEY DISEASE: ICD-10-CM

## 2018-04-27 DIAGNOSIS — N18.30 CONTROLLED TYPE 2 DIABETES MELLITUS WITH STAGE 3 CHRONIC KIDNEY DISEASE, WITH LONG-TERM CURRENT USE OF INSULIN: Primary | ICD-10-CM

## 2018-04-27 DIAGNOSIS — E11.22 CONTROLLED TYPE 2 DIABETES MELLITUS WITH STAGE 3 CHRONIC KIDNEY DISEASE, WITH LONG-TERM CURRENT USE OF INSULIN: Primary | ICD-10-CM

## 2018-04-27 PROCEDURE — 3077F SYST BP >= 140 MM HG: CPT | Mod: CPTII,S$GLB,, | Performed by: INTERNAL MEDICINE

## 2018-04-27 PROCEDURE — 99213 OFFICE O/P EST LOW 20 MIN: CPT | Mod: S$GLB,,, | Performed by: INTERNAL MEDICINE

## 2018-04-27 PROCEDURE — 99499 UNLISTED E&M SERVICE: CPT | Mod: S$GLB,,, | Performed by: INTERNAL MEDICINE

## 2018-04-27 PROCEDURE — 99999 PR PBB SHADOW E&M-EST. PATIENT-LVL III: CPT | Mod: PBBFAC,,, | Performed by: INTERNAL MEDICINE

## 2018-04-27 PROCEDURE — 3079F DIAST BP 80-89 MM HG: CPT | Mod: CPTII,S$GLB,, | Performed by: INTERNAL MEDICINE

## 2018-04-27 RX ORDER — HYDROCHLOROTHIAZIDE 12.5 MG/1
12.5 TABLET ORAL DAILY
Qty: 30 TABLET | Refills: 11 | Status: SHIPPED | OUTPATIENT
Start: 2018-04-27 | End: 2019-04-11 | Stop reason: SDUPTHER

## 2018-05-01 NOTE — PROGRESS NOTES
Subjective:       Patient ID: Kevon Perez is a 77 y.o. White male who presents for follow-up evaluation of Chronic Kidney Disease    HPI      Mr. Perez is a 77 year old man with past medical history of diabetes, hypertension presenting for follow up of chronic kidney disease. Patient denies any complaints, denies any fever, chest pain, shortness of breath, abdominal pain, diarrhea, dysuria/hematuria. He reports blood pressure up to 140 systolic, blood sugars better controlled with dietary changes.  He reports more adequate daily fluid intake.      Review of Systems   Constitutional: Negative for appetite change, fatigue and fever.   Respiratory: Negative for cough and shortness of breath.    Cardiovascular: Negative for chest pain and leg swelling.   Gastrointestinal: Negative for abdominal pain, constipation, diarrhea, nausea and vomiting.   Genitourinary: Negative for dysuria, flank pain, frequency, hematuria and urgency.   Musculoskeletal: Negative for arthralgias, back pain and joint swelling.   Skin: Negative for rash.   Neurological: Negative for dizziness and light-headedness.   All other systems reviewed and are negative.      Objective:      Physical Exam   Constitutional: He appears well-developed and well-nourished.   Cardiovascular: Normal rate and regular rhythm.  Exam reveals no gallop and no friction rub.    No murmur heard.  Pulmonary/Chest: Effort normal and breath sounds normal. No respiratory distress. He has no wheezes. He has no rales.   Musculoskeletal: He exhibits edema (trace bilateral lower extremity).   Neurological: He is alert.   Skin: Skin is warm and dry. No rash noted.   Vitals reviewed.      Assessment:       1. Controlled type 2 diabetes mellitus with stage 3 chronic kidney disease, with long-term current use of insulin    2. Hypertensive kidney disease with chronic kidney disease, stage 1-4 or unspecified chronic kidney disease    3. Hyperparathyroidism due to renal  insufficiency        Plan:     Mr. Perez is a 77 year old man with past medical history of diabetes, hypertension presenting for follow up of chronic kidney disease stage III (hypertensive nephrosclerosis v. diabetic nephropathy). Creatinine within baseline range, will continue to trend.  Encouraged fluid intake, again stressed importance of glycemic/blood pressure control, along with weight loss/exercise, to prevent progression of kidney disease, patient voiced understanding.   - Hypertension: blood pressure at goal at home, will phone review BP diary   - Anemia: Hgb at goal, no indication for erythropoetin therapy, encouraged dietary iron  - Bone/mineral metabolism: patient with secondary hyperparathyroidism, will continue to monitor PTH, patient on calcitriol, encouraged daily VitD supplementation     Return to clinic 6-12 months pending renal panel in 5-6 months, then with renal panel, urinalysis/culture, urine protein/creatinine ratio, PTH/VitD prior to next visit

## 2018-05-04 ENCOUNTER — OFFICE VISIT (OUTPATIENT)
Dept: PODIATRY | Facility: CLINIC | Age: 78
End: 2018-05-04
Payer: MEDICARE

## 2018-05-04 VITALS
SYSTOLIC BLOOD PRESSURE: 173 MMHG | HEIGHT: 70 IN | DIASTOLIC BLOOD PRESSURE: 64 MMHG | RESPIRATION RATE: 18 BRPM | HEART RATE: 56 BPM | BODY MASS INDEX: 34.22 KG/M2 | WEIGHT: 239 LBS

## 2018-05-04 DIAGNOSIS — E11.49 TYPE II DIABETES MELLITUS WITH NEUROLOGICAL MANIFESTATIONS: Primary | ICD-10-CM

## 2018-05-04 DIAGNOSIS — L85.3 XEROSIS CUTIS: ICD-10-CM

## 2018-05-04 PROCEDURE — 99999 PR PBB SHADOW E&M-EST. PATIENT-LVL III: CPT | Mod: PBBFAC,,, | Performed by: PODIATRIST

## 2018-05-04 PROCEDURE — 99213 OFFICE O/P EST LOW 20 MIN: CPT | Mod: S$GLB,,, | Performed by: PODIATRIST

## 2018-05-04 PROCEDURE — 99499 UNLISTED E&M SERVICE: CPT | Mod: S$PBB,,, | Performed by: PODIATRIST

## 2018-05-04 PROCEDURE — 3077F SYST BP >= 140 MM HG: CPT | Mod: CPTII,S$GLB,, | Performed by: PODIATRIST

## 2018-05-04 PROCEDURE — 3078F DIAST BP <80 MM HG: CPT | Mod: CPTII,S$GLB,, | Performed by: PODIATRIST

## 2018-05-04 NOTE — PROGRESS NOTES
Subjective:      Patient ID: Kevon Perez is a 77 y.o. male.    Chief Complaint: PCP (Cipriano Sol MD 10/30/18); Diabetic Foot Exam; Peripheral Neuropathy; Nail Problem; and Nail Care    Kevon is a 77 y.o. male who presents to the clinic upon referral from Dr. Mary Ellen carmona. provider found  for evaluation and treatment of diabetic feet. Kevon has a past medical history of Acute coronary syndrome (9/10/09); Anticoagulant long-term use; Basal cell cancer; BCC (basal cell carcinoma of skin); Cancer of bladder (January 2013); Cataract; Chronic kidney disease; Coronary artery disease; CPAP (continuous positive airway pressure) dependence; Diabetes mellitus; Diabetic retinopathy; GERD (gastroesophageal reflux disease); High cholesterol; Hyperlipidemia; Hypertension; GINGER (obstructive sleep apnea); and Renal manifestation of secondary diabetes mellitus. Patient relates no major problem with feet. Only complaints today consist of yearly DM foot examination  .    PCP: Cipriano Sol MD    Date Last Seen by PCP:   Chief Complaint   Patient presents with    PCP     Cipriano Sol MD 10/30/18    Diabetic Foot Exam    Peripheral Neuropathy    Nail Problem    Nail Care         Current shoe gear: Casual shoes    Hemoglobin A1C   Date Value Ref Range Status   03/22/2018 6.4 (H) 4.0 - 5.6 % Final     Comment:     According to ADA guidelines, hemoglobin A1c <7.0% represents  optimal control in non-pregnant diabetic patients. Different  metrics may apply to specific patient populations.   Standards of Medical Care in Diabetes-2016.  For the purpose of screening for the presence of diabetes:  <5.7%     Consistent with the absence of diabetes  5.7-6.4%  Consistent with increasing risk for diabetes   (prediabetes)  >or=6.5%  Consistent with diabetes  Currently, no consensus exists for use of hemoglobin A1c  for diagnosis of diabetes for children.  This Hemoglobin A1c assay has significant interference with fetal    hemoglobin   (HbF). The results are invalid for patients with abnormal amounts of   HbF,   including those with known Hereditary Persistence   of Fetal Hemoglobin. Heterozygous hemoglobin variants (HbAS, HbAC,   HbAD, HbAE, HbA2) do not significantly interfere with this assay;   however, presence of multiple variants in a sample may impact the %   interference.     08/22/2017 7.1 (H) 4.0 - 5.6 % Final     Comment:     According to ADA guidelines, hemoglobin A1c <7.0% represents  optimal control in non-pregnant diabetic patients. Different  metrics may apply to specific patient populations.   Standards of Medical Care in Diabetes-2016.  For the purpose of screening for the presence of diabetes:  <5.7%     Consistent with the absence of diabetes  5.7-6.4%  Consistent with increasing risk for diabetes   (prediabetes)  >or=6.5%  Consistent with diabetes  Currently, no consensus exists for use of hemoglobin A1c  for diagnosis of diabetes for children.  This Hemoglobin A1c assay has significant interference with fetal   hemoglobin   (HbF). The results are invalid for patients with abnormal amounts of   HbF,   including those with known Hereditary Persistence   of Fetal Hemoglobin. Heterozygous hemoglobin variants (HbAS, HbAC,   HbAD, HbAE, HbA2) do not significantly interfere with this assay;   however, presence of multiple variants in a sample may impact the %   interference.     03/02/2017 7.5 (H) 4.5 - 6.2 % Final     Comment:     According to ADA guidelines, hemoglobin A1C <7.0% represents  optimal control in non-pregnant diabetic patients.  Different  metrics may apply to specific populations.   Standards of Medical Care in Diabetes - 2016.  For the purpose of screening for the presence of diabetes:  <5.7%     Consistent with the absence of diabetes  5.7-6.4%  Consistent with increasing risk for diabetes   (prediabetes)  >or=6.5%  Consistent with diabetes  Currently no consensus exists for use of hemoglobin  A1C  for diagnosis of diabetes for children.             Review of Systems   Constitution: Negative for chills, decreased appetite and fever.   Cardiovascular: Negative for leg swelling.   Skin: Positive for dry skin.   Musculoskeletal: Negative for arthritis, joint pain, joint swelling and myalgias.   Gastrointestinal: Negative for nausea and vomiting.   Neurological: Negative for loss of balance, numbness and paresthesias.           Objective:      Physical Exam   Constitutional: He is oriented to person, place, and time. He appears well-developed and well-nourished.   Cardiovascular: Intact distal pulses.    Pulses:       Dorsalis pedis pulses are 2+ on the right side, and 2+ on the left side.        Posterior tibial pulses are 2+ on the right side, and 2+ on the left side.   dorsalis pedis and posterior tibial pulses are palpable bilaterally. Capillary refill time is within normal limits. + pedal hair growth          Musculoskeletal: Normal range of motion. He exhibits no edema or tenderness.   Adequate joint range of motion without pain, limitation, nor crepitation Bilateral feet and ankle joints. Muscle strength is 5/5 in all groups bilaterally.      semi reducible IPJ digital contracture 5 b/l      Feet:   Right Foot:   Protective Sensation: 5 sites tested. 4 sites sensed.   Left Foot:   Protective Sensation: 5 sites tested. 4 sites sensed.   Neurological: He is alert and oriented to person, place, and time. He has normal strength. No sensory deficit.   Hertford-Vanessa 5.07 monofilament is intact bilateral feet.      Skin: Skin is warm, dry and intact. No lesion and no rash noted. No erythema.   Nails x 4 are elongated by  2-3mm's, thickened by 1-2 mm's, dystrophic, and are yellow in  coloration . Xerosis Bilaterally. No open lesions noted.     Psychiatric: He has a normal mood and affect. His behavior is normal.   Vitals reviewed.            Assessment:       Encounter Diagnoses   Name Primary?    Type II  diabetes mellitus with neurological manifestations Yes    Xerosis cutis          Plan:       Kevon was seen today for pcp, diabetic foot exam, peripheral neuropathy, nail problem and nail care.    Diagnoses and all orders for this visit:    Type II diabetes mellitus with neurological manifestations  -     DIABETIC SHOES FOR HOME USE    Xerosis cutis      I counseled the patient on his conditions, their implications and medical management.      - Shoe inspection. Diabetic Foot Education. Patient reminded of the importance of good nutrition and blood sugar control to help prevent podiatric complications of diabetes. Patient instructed on proper foot hygeine. We discussed wearing proper shoe gear, daily foot inspections, never walking without protective shoe gear, caution putting sharp instruments to feet     - Discussed DM foot care:  Wear comfortable, proper fitting shoes. Wash feet daily. Dry well. After drying, apply moisturizer to feet (no lotion to webspaces). Inspect feet daily for skin breaks, blisters, swelling, or redness. Wear cotton socks (preferably white)  Change socks every day. Do NOT walk barefoot. Do NOT use heating pads or warm/hot water soaks     - Discussed importance of daily moisturizer to the feet such as Gold bonds diabetic foot cream    - Patient is low risk for developing lower extremity issues secondary to diabetes    - RTC in  1 year for a diabetic foot exam  or sooner if problems

## 2018-05-09 ENCOUNTER — OFFICE VISIT (OUTPATIENT)
Dept: SLEEP MEDICINE | Facility: CLINIC | Age: 78
End: 2018-05-09
Payer: MEDICARE

## 2018-05-09 VITALS
HEART RATE: 58 BPM | HEIGHT: 71 IN | SYSTOLIC BLOOD PRESSURE: 135 MMHG | DIASTOLIC BLOOD PRESSURE: 55 MMHG | BODY MASS INDEX: 33.18 KG/M2 | WEIGHT: 237 LBS

## 2018-05-09 DIAGNOSIS — G47.33 OSA (OBSTRUCTIVE SLEEP APNEA): Primary | ICD-10-CM

## 2018-05-09 PROCEDURE — 3078F DIAST BP <80 MM HG: CPT | Mod: CPTII,S$GLB,, | Performed by: PSYCHIATRY & NEUROLOGY

## 2018-05-09 PROCEDURE — 3075F SYST BP GE 130 - 139MM HG: CPT | Mod: CPTII,S$GLB,, | Performed by: PSYCHIATRY & NEUROLOGY

## 2018-05-09 PROCEDURE — 99999 PR PBB SHADOW E&M-EST. PATIENT-LVL IV: CPT | Mod: PBBFAC,,, | Performed by: PSYCHIATRY & NEUROLOGY

## 2018-05-09 PROCEDURE — 99499 UNLISTED E&M SERVICE: CPT | Mod: S$PBB,,, | Performed by: PSYCHIATRY & NEUROLOGY

## 2018-05-09 PROCEDURE — 99214 OFFICE O/P EST MOD 30 MIN: CPT | Mod: S$GLB,,, | Performed by: PSYCHIATRY & NEUROLOGY

## 2018-05-09 NOTE — Clinical Note
Heated tubing was added tot he order Vomiting started this am, vomiting bile only per mom, no fever.

## 2018-05-09 NOTE — PROGRESS NOTES
Kevon Perez  was seen at the request of  No ref. provider found for sleep evaluation.    05/17/2017:  INITIAL HISTORY OF PRESENT ILLNESS:  Kevon Perez is a 77 y.o. male is here to be evaluated for a sleep disorder.       CHIEF COMPLAINT:      The patient's complaints include excessive daytime sleepiness, excessive daytime fatigue, snoring,  witnessed breathing pauses,  gasping for air in sleep and interrupted sleep since  .    On CPAP 10 cm since study in 2009 - recent re-titration - 11 cm  Still restless sleep - has been benefiting from CPAP use and be very compliant.  Lately feeling much more tired.   Can not work in the yard - SOB after 20 min of work    Seeing Dr. Morales - cardiology - and pulmonary work was done.  This was going on for the last 1-1.5 yrs    B12 shots, vit D, iron infusion were recently tried with some improvement in fatigue.     Gained 6 ls since titration.     His CPAP is set at 11 - showing 12 cm on download - but ramp is set 35 min  Old, needs replacement.    Bedroom is dark and quiet  Watching TV before bedtime may be affecting sleep continuity    Reports  dry mouth and sore throat  Denies nasal congestion   Denies  morning headaches  Reports  interrupted sleep  Reports frequent leg movements - h/o DM neuropathy - tingling; urge to move  Reports symptoms concerning for parasomnia    The ESS (Lamar Sleepiness Score) taken on initial visit is 8 /24    The patient never had tonsillectomy, adenoidectomy or UPPP     INTERVAL HISTORY:    07/12/2017:  The patient has not presented any new complaints since the previous visit. The patient reports improved sleep continuity and daytime sleepiness on PAP. ESS today is 7/24.  Denies break through snoring. + dry mouth at 3/5.   Also rep[orts dry mouth during the day.     Device DreamStation Auto CPAP Setup date 5/24/2017 Primary Care Physician N/A Therapy mode AutoCPAP Home phone N/A Sleep doctor Yumiko Haro Pressure 13.0 - 18.0  Address N/A Clinician Crystal Paris Rx status Downloaded From Device Sleep lab [facility] Mask N/A. 90% pressure - 13 cm H2O.    Using FFM - would like to switch back to a nasal mask.    Therapy Event Summary (Last 14 Days)     Hide      Compliance Summary  Apnea Indices  Ventilator Statistics    Days with Device Usage:  14 days  Average AHI:  1.4  Average Breath Rate:  15.3 bpm    Percentage of Days >=4 Hours:  100.0%  Average OA Index:  0.1  Average % Patient Triggered Breaths:  N/A    Average Usage (Days Used):  6 hrs. 58 mins. 27 secs.  Average CA Index:  0.1  Average Tidal Volume:  429.6 ml    Average Usage (All Days):  6 hrs. 58 mins. 27 secs.    Average Minute Vent:  N/A        Large Leak  Periodic Breathing     Average Time in Large Leak:  8 mins. 17 secs.  Average % of Night in PB:  0.8%     Average % of Night in Large Leak:  2.0%                05/09/2018: The patient reports improved sleep continuity and daytime sleepiness on PAP. ESS today is 6/24.  Denies break through snoring. ++++ dry mouth. APAP 13-18; 90% 90%    Therapy Event Summary Date Range: 4/8/2018 - 5/7/2018     Hide      Compliance Summary  Apnea Indices  Ventilator Statistics    Days with Device Usage:  30 days  Average AHI:  0.7  Average Breath Rate:  15.9 bpm    Percentage of Days >=4 Hours:  100.0%  Average OA Index:  0.0  Average % Patient Triggered Breaths:  N/A    Average Usage (Days Used):  7 hrs. 33 mins. 36 secs.  Average CA Index:  0.0  Average Tidal Volume:  423.2 ml    Average Usage (All Days):  7 hrs. 33 mins. 36 secs.    Average Minute Vent:  N/A        Large Leak  Periodic Breathing     Average Time in Large Leak:  24 mins. 56 secs.  Average % of Night in PB:  0.1%     Average % of Night in Large Leak:  5.5%                  SLEEP ROUTINE AND LIFESTYLE 05/09/2018 :    Occupation:    Bed partner:      Time to bed - wake up time on a workday : 12:30 to 7:30  Time to bed - wake up time on a day off: 12 to   7  Sleep onset latency: 5 MIN  Disruptions or awakenings: 2-3 times to shift  Time to fall back into sleep: right back    Perceived sleep quality: 3.5-4  Perceived total sleep time:  7  hours.  Daytime naps: dozing watching TV    Exercise routine: yes  Caffeine:       PREVIOUS SLEEP STUDIES:     PSG/ SPLIT night study  In 2009 showed significant GINGER with the AHI of 114/hour and SaO2 minimum of 88 %. Effective control of respiratory events was achieved at   10 cm H2O.     CPAP titration in 2016 showed effective control of respiratory events at  11 cm H2O including supine REM sleep (best SaO2 still in mid-90s).  ECO 2D 4/16: CONCLUSIONS     1 - Normal left ventricular systolic function (EF 55-60%).     2 - Normal right ventricular systolic function .     3 - Biatrial enlargement.     4 - Mildly elevated central venous pressure.     PT: 4/16: Normal airflow. Airflow is improved after bronchodilator. Moderate restriction.    DME:       PAST MEDICAL HISTORY:    Active Ambulatory Problems     Diagnosis Date Noted    CAD (coronary artery disease) 07/02/2012    Hyperlipidemia 07/02/2012    Type 2 diabetes mellitus with neurologic complication 11/27/2012    BPH (benign prostatic hypertrophy) 12/17/2012    Post PTCA     Type 2 diabetes mellitus with diabetic chronic kidney disease 04/05/2013    Nephritis and nephropathy, 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 04/05/2013    Hypertensive retinopathy of both eyes 05/01/2014    Lumbar radiculopathy 05/01/2014    Personal history of bladder cancer 06/19/2014    CKD (chronic kidney disease) stage 3, GFR 30-59 ml/min 01/09/2015    GINGER on CPAP 05/11/2015    Essential hypertension 04/24/2017    Aortic atherosclerosis 04/24/2017    History of MI (myocardial infarction) 04/24/2017    Hyperparathyroidism, secondary renal 04/24/2017    Polyneuropathy, diabetic 04/24/2017    Thrombocytopenia 04/24/2017    Vitamin D deficiency disease 04/25/2017    Severe obesity (BMI  35.0-35.9 with comorbidity) 05/10/2017    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, with long-term current use of insulin 09/14/2017     Resolved Ambulatory Problems     Diagnosis Date Noted    Nuclear sclerosis 08/07/2012    Prostatitis, acute 11/05/2012    Gross hematuria 11/05/2012    Angina pectoris, preinfarctional 11/27/2012    Acute coronary syndrome 09/10/2009    Bladder cancer 01/31/2013    Muscular chest pain 02/11/2013    Acute renal failure 04/05/2013    Senile nuclear sclerosis 06/10/2014    Post-operative state 06/11/2014    Refractive error 07/30/2014    Cystoid macular edema, left eye 09/11/2014    Abdominal pain 01/09/2015    Epigastric pain 01/09/2015    Screening for colon cancer 03/26/2015     Past Medical History:   Diagnosis Date    Acute coronary syndrome 9/10/09    Anticoagulant long-term use     Basal cell cancer     BCC (basal cell carcinoma of skin)     Cancer of bladder January 2013    Cataract     Chronic kidney disease     Coronary artery disease     CPAP (continuous positive airway pressure) dependence     Diabetes mellitus     Diabetic retinopathy     GERD (gastroesophageal reflux disease)     High cholesterol     Hyperlipidemia     Hypertension     GINGER (obstructive sleep apnea)     Renal manifestation of secondary diabetes mellitus                 PAST SURGICAL HISTORY:    Past Surgical History:   Procedure Laterality Date    BASAL CELL CARCINOMA EXCISION      nose     BLADDER SURGERY      bladder cancer    CARDIAC CATHETERIZATION      CATARACT EXTRACTION      bilateral     COLONOSCOPY  3/26/15    CORONARY ANGIOPLASTY  9/10/09    CFX    CORONARY ANGIOPLASTY WITH STENT PLACEMENT      CYSTOSCOPY      EYE SURGERY      hydrocel           FAMILY HISTORY:                Family History   Problem Relation Age of Onset    Hypertension Father     Heart disease Father          "CHF    Diabetes Father     Diabetes Sister     Cataracts Mother     Goiter Mother     Heart disease Mother         CHF    Diabetes Paternal Uncle     Alcohol abuse Brother     No Known Problems Daughter     No Known Problems Son     No Known Problems Son     Cancer Maternal Aunt     Kidney disease Neg Hx     Amblyopia Neg Hx     Blindness Neg Hx     Glaucoma Neg Hx     Macular degeneration Neg Hx     Retinal detachment Neg Hx     Strabismus Neg Hx     Stroke Neg Hx     Thyroid disease Neg Hx        SOCIAL HISTORY:          Tobacco:   History   Smoking Status    Former Smoker    Packs/day: 1.00    Years: 40.00    Types: Cigarettes    Quit date: 7/2/1970   Smokeless Tobacco    Former User       alcohol use:    History   Alcohol Use    1.8 oz/week    1 Cans of beer, 1 Shots of liquor, 1 Standard drinks or equivalent per week                   ALLERGIES:    Review of patient's allergies indicates:   Allergen Reactions    Penicillins Other (See Comments)    Coreg [carvedilol]      Hypotension      Bactrim [sulfamethoxazole-trimethoprim] Rash       CURRENT MEDICATIONS:    Current Outpatient Prescriptions   Medication Sig Dispense Refill    aspirin (ECOTRIN) 81 MG EC tablet Take 81 mg by mouth every evening.       atorvastatin (LIPITOR) 20 MG tablet Take 1 tablet (20 mg total) by mouth once daily. 90 tablet 3    atorvastatin (LIPITOR) 20 MG tablet TAKE 1 TABLET BY MOUTH EVERY DAY 90 tablet 1    BD INSULIN PEN NEEDLE UF SHORT 31 gauge x 5/16" Ndle USE UP TO 6 TIMES DAILY WITH MULTIPLE INSULIN INJECTIONS 200 each 11    calcitRIOL (ROCALTROL) 0.25 MCG Cap TAKE 1 CAPSULE BY MOUTH EVERY DAY 30 capsule 11    carvedilol (COREG) 12.5 MG tablet Take 1 tablet (12.5 mg total) by mouth 2 (two) times daily with meals. 60 tablet 11    clopidogrel (PLAVIX) 75 mg tablet TAKE 1 TABLET BY MOUTH EVERY DAY 90 tablet 3    clopidogrel (PLAVIX) 75 mg tablet TAKE 1 TABLET BY MOUTH EVERY DAY 90 tablet 1    " "finasteride (PROSCAR) 5 mg tablet TAKE 1 TABLET BY MOUTH EVERY DAY 90 tablet 0    folic acid (FOLVITE) 1 MG tablet Take 1 mg by mouth once daily.       hydroCHLOROthiazide (HYDRODIURIL) 12.5 MG Tab Take 1 tablet (12.5 mg total) by mouth once daily. 30 tablet 11    insulin detemir (LEVEMIR FLEXTOUCH) 100 unit/mL (3 mL) SubQ InPn pen Inject 30 Units into the skin 2 (two) times daily. (Patient taking differently: Inject 35 Units into the skin 2 (two) times daily. ) 60 mL 6    nitroGLYCERIN (NITROSTAT) 0.4 MG SL tablet Place 1 tablet (0.4 mg total) under the tongue every 5 (five) minutes as needed. 25 tablet 3    NOVOLOG FLEXPEN 100 unit/mL InPn pen ADMINISTER 25 UNITS UNDER THE SKIN THREE TIMES DAILY WITH MEALS. MAY USE 25 UNITS WITH NIGHTTIME SNACK 30 mL 6    NOVOLOG FLEXPEN U-100 INSULIN 100 unit/mL InPn pen ADMINISTER 25 UNITS UNDER THE SKIN THREE TIMES DAILY WITH MEALS. MAY USE 25 UNITS WITH NIGHTTIME SNACK 30 mL 5    ONE TOUCH ULTRA TEST Strp Pt test 3-4 times a day      tamsulosin (FLOMAX) 0.4 mg Cp24 TAKE 1 CAPSULE BY MOUTH EVERY EVENING 90 capsule 0    valsartan (DIOVAN) 160 MG tablet Take 1 tablet (160 mg total) by mouth once daily. 30 tablet 11    cyanocobalamin 1,000 mcg/mL injection Inject 1 mL (1,000 mcg total) into the muscle every 30 days. 10 mL 5    cyanocobalamin 1,000 mcg/mL injection INJECT 1 ML INTO THE MUSCLE EVERY 14 DAYS 10 mL 3     No current facility-administered medications for this visit.                       REVIEW OF SYSTEMS:   Sleep related symptoms as per HPI    reports weight gain 10 lbs from 2009 to 2016.  Reports dyspnea  Reports palpitations  Denies acid reflux   Reports polyuria  Denies  mood diturbance  Denies  anemia  Denies  muscle pain  Denies  Gait imbalance    Otherwise, a balance of 10 systems reviewed is negative.    PHYSICAL EXAM:  BP (!) 135/55 (BP Location: Left arm, Patient Position: Sitting, BP Method: Large (Automatic))   Pulse (!) 58   Ht 5' 11" (1.803 m) " "  Wt 107.5 kg (237 lb)   BMI 33.05 kg/m²   GENERAL: Overweight body habitus, well groomed.  HEENT:   HEENT:  Conjunctivae are non-erythematous; Pupils equal, round, and reactive to light; Nose is symmetrical; Nasal mucosa is pink and moist; Septum is midline; Inferior turbinates are hypertrophied; Nasal airflow is diminished on the right; Posterior pharynx is pink; Modified Mallampati:III-IV; Posterior palate is low; Tonsils not visualized; Uvula is wide and elongated;Tongue is enlarged; Dentition is fair; No TMJ tenderness; Jaw opening and protrusion without click and without discomfort.  NECK: Supple. Neck circumference is  inches. No thyromegaly. No palpable nodes.     SKIN: On face and neck: No abrasions, no rashes, no lesions.  No subcutaneous nodules are palpable.  RESPIRATORY: Chest is clear to auscultation.  Normal chest expansion and non-labored breathing at rest.  CARDIOVASCULAR: Normal S1, S2.  No murmurs, gallops or rubs. No carotid bruits bilaterally.  No edema. No clubbing. No cyanosis.    NEURO: Oriented to time, place and person. Normal attention span and concentration. Gait normal.    PSYCH: Affect is full. Mood is normal  MUSCULOSKELETAL: Moves 4 extremities. Gait normal.         Using [facility]:       ASSESSMENT:    1. GINGER The patient symptomatically has  excessive daytime sleepiness, snoring,  witnessed breathing pauses, excessive daytime fatigue, gasping for air in sleep and interrupted sleep  with exam findings of "a crowded oral airway and elevated body mass index. The patient has medical co-morbidities of CAD, diabetes and hypertension,  which can be worsened by GINGER. This warrants treatment. CPAP 11 cm appeared sufficient on recent re-titration, yet SaO2 remains mid-low 90's and the patient is still SOB will small exertion. EF was NL on 2 D Echo a year ago. Mild reaction to bronchodilator on PFT in 4/2016. Very slow ramp on his current CPAP machine may play a role in residual fatigue - and " SOB during the day. Doing well with a new APAP 13-18 cm H2O. Met Medicare compliance at 100%. Benefiting from CPAP use in terms of sleep continuity and daytime sleepiness. 90% 17 cm H2O            PLAN:    Continue APAP 13-18 cm H2O (in attempt to increase SaO2 to higher 90%'s) with 6 cm ramp for 15 min.      For dry mouth      Increase humidity to 4-5  I'll get a heated hose and a chin strap for you  ------------------------------  Biotene products - gum gel, mouth spray, mouth wash, toothpaste  --------------------------------------------------    Nasal mask +chin strap      More than 25 minutes of this 45 minutes visit was spent in counseling: during our discussion today, we talked about the etiology of  GINGER as well as the potential ramifications of untreated sleep apnea, which could include daytime sleepiness, hypertension, heart disease and/or stroke.  We discussed potential treatment options, which could include weight loss, body positioning, continuous positive airway pressure (CPAP), or referral for surgical consideration. Meanwhile, he  is urged to avoid supine sleep, weight gain and alcoholic beverages since all of these can worsen GINGER.     Precautions: The patient was advised to abstain from driving should he feel sleepy or drowsy.    Follow up: MD/NP  6 weeks    Thank you for allowing me the opportunity to participate in the care of your patient.    This visit summary will be sent to referring provider via [employer]

## 2018-05-09 NOTE — PATIENT INSTRUCTIONS
Continue APAP 13-18.5 cm H2O (in attempt to increase SaO2 to higher 90%'s) with 6 cm ramp for 15 min.      For dry mouth      Increase humidity to 4-5  I'll get a heated hose and a chin strap for you  ------------------------------  Biotene products - gum gel, mouth spray, mouth wash, toothpaste  --------------------------------------------------    Nasal mask +chin strap

## 2018-05-11 ENCOUNTER — LAB VISIT (OUTPATIENT)
Dept: LAB | Facility: HOSPITAL | Age: 78
End: 2018-05-11
Attending: INTERNAL MEDICINE
Payer: MEDICARE

## 2018-05-11 ENCOUNTER — OFFICE VISIT (OUTPATIENT)
Dept: CARDIOLOGY | Facility: CLINIC | Age: 78
End: 2018-05-11
Payer: MEDICARE

## 2018-05-11 VITALS
HEART RATE: 52 BPM | WEIGHT: 240.94 LBS | DIASTOLIC BLOOD PRESSURE: 71 MMHG | BODY MASS INDEX: 33.73 KG/M2 | OXYGEN SATURATION: 98 % | SYSTOLIC BLOOD PRESSURE: 164 MMHG | HEIGHT: 71 IN

## 2018-05-11 DIAGNOSIS — E11.42 DIABETIC POLYNEUROPATHY ASSOCIATED WITH TYPE 2 DIABETES MELLITUS: ICD-10-CM

## 2018-05-11 DIAGNOSIS — I25.2 HISTORY OF MI (MYOCARDIAL INFARCTION): ICD-10-CM

## 2018-05-11 DIAGNOSIS — Z98.61 POST PTCA: ICD-10-CM

## 2018-05-11 DIAGNOSIS — D50.9 IRON DEFICIENCY ANEMIA, UNSPECIFIED IRON DEFICIENCY ANEMIA TYPE: ICD-10-CM

## 2018-05-11 DIAGNOSIS — I25.10 CORONARY ARTERY DISEASE INVOLVING NATIVE CORONARY ARTERY OF NATIVE HEART WITHOUT ANGINA PECTORIS: ICD-10-CM

## 2018-05-11 DIAGNOSIS — I10 ESSENTIAL HYPERTENSION: ICD-10-CM

## 2018-05-11 DIAGNOSIS — Z79.4 CONTROLLED TYPE 2 DIABETES MELLITUS WITH BOTH EYES AFFECTED BY MILD NONPROLIFERATIVE RETINOPATHY AND MACULAR EDEMA, WITH LONG-TERM CURRENT USE OF INSULIN: ICD-10-CM

## 2018-05-11 DIAGNOSIS — R53.83 FATIGUE, UNSPECIFIED TYPE: ICD-10-CM

## 2018-05-11 DIAGNOSIS — E11.3213 CONTROLLED TYPE 2 DIABETES MELLITUS WITH BOTH EYES AFFECTED BY MILD NONPROLIFERATIVE RETINOPATHY AND MACULAR EDEMA, WITH LONG-TERM CURRENT USE OF INSULIN: ICD-10-CM

## 2018-05-11 DIAGNOSIS — E78.00 PURE HYPERCHOLESTEROLEMIA: ICD-10-CM

## 2018-05-11 DIAGNOSIS — R06.09 DOE (DYSPNEA ON EXERTION): Primary | ICD-10-CM

## 2018-05-11 DIAGNOSIS — G47.33 OSA (OBSTRUCTIVE SLEEP APNEA): ICD-10-CM

## 2018-05-11 LAB
IRON SERPL-MCNC: 87 UG/DL
SATURATED IRON: 21 %
TOTAL IRON BINDING CAPACITY: 406 UG/DL
TRANSFERRIN SERPL-MCNC: 274 MG/DL

## 2018-05-11 PROCEDURE — 99999 PR PBB SHADOW E&M-EST. PATIENT-LVL III: CPT | Mod: PBBFAC,,, | Performed by: INTERNAL MEDICINE

## 2018-05-11 PROCEDURE — 99214 OFFICE O/P EST MOD 30 MIN: CPT | Mod: S$GLB,,, | Performed by: INTERNAL MEDICINE

## 2018-05-11 PROCEDURE — 3077F SYST BP >= 140 MM HG: CPT | Mod: CPTII,S$GLB,, | Performed by: INTERNAL MEDICINE

## 2018-05-11 PROCEDURE — 83540 ASSAY OF IRON: CPT

## 2018-05-11 PROCEDURE — 3078F DIAST BP <80 MM HG: CPT | Mod: CPTII,S$GLB,, | Performed by: INTERNAL MEDICINE

## 2018-05-11 PROCEDURE — 99499 UNLISTED E&M SERVICE: CPT | Mod: S$PBB,,, | Performed by: INTERNAL MEDICINE

## 2018-05-11 PROCEDURE — 36415 COLL VENOUS BLD VENIPUNCTURE: CPT

## 2018-05-11 NOTE — PROGRESS NOTES
Subjective:    Patient ID:  Kevon Perez is a 77 y.o. male who presents for follow-up of Coronary artery disease involving native coronary artery of  and Shortness of Breath (x 18 months, worsening)      HPI     77 year old male followed with CAD post STEMI/PCI 2009, hypertension, hyperlipidemia,GINGER, type 2 diabetes and CRI III. He continues wiht fatigue and MATTHEWS but largely unchanged sine last visit.  He denies exertional chest  Pain but is not exercising. His Hgb is noted to decrease 13 to 10. He has a history of iron deficeint anemia in the past that required iron infusions. Colonoscopy 2015 was normal. He has GINGER and using CPAP. He mentioned a loss in dexterity if his right hand and mild tremor in his left. Gait is uneffected. CONCLUSIONS     1 - Normal left ventricular systolic function (EF 55-60%).     2 - Normal right ventricular systolic function .     3 - Biatrial enlargement.     4 - Mildly elevated central venous pressure.             This document has been electronically    SIGNED BY: Elia Olivier MD On: 04/22/2016 15:10  Lab Results   Component Value Date     03/22/2018    K 4.6 03/22/2018     03/22/2018    CO2 25 03/22/2018    BUN 24 (H) 03/22/2018    CREATININE 2.0 (H) 03/22/2018     (H) 03/22/2018    HGBA1C 6.4 (H) 03/22/2018    MG 2.0 04/04/2016    AST 17 03/22/2018    ALT 18 03/22/2018    ALBUMIN 3.3 (L) 03/22/2018    PROT 6.3 03/22/2018    BILITOT 1.0 03/22/2018    WBC 7.17 03/22/2018    HGB 10.8 (L) 03/22/2018    HCT 34.6 (L) 03/22/2018    MCV 86 03/22/2018     03/22/2018    INR 1.0 01/09/2015    PSA 1.42 06/13/2013    TSH 3.099 08/28/2017         Lab Results   Component Value Date    CHOL 107 (L) 03/22/2018    HDL 37 (L) 03/22/2018    TRIG 69 03/22/2018       Lab Results   Component Value Date    LDLCALC 56.2 (L) 03/22/2018       Past Medical History:   Diagnosis Date    Acute coronary syndrome 9/10/09    STEMI    Anticoagulant long-term use     plavix    Basal  "cell cancer     BCC (basal cell carcinoma of skin)     nose    Cancer of bladder January 2013    Cataract     Chronic kidney disease     Coronary artery disease     CPAP (continuous positive airway pressure) dependence     Diabetes mellitus     Diabetic retinopathy     GERD (gastroesophageal reflux disease)     High cholesterol     Hyperlipidemia     Hypertension     Iron deficiency anemia 5/11/2018    GINGER (obstructive sleep apnea)     CPAP     Renal manifestation of secondary diabetes mellitus        Current Outpatient Prescriptions:     aspirin (ECOTRIN) 81 MG EC tablet, Take 81 mg by mouth every evening. , Disp: , Rfl:     atorvastatin (LIPITOR) 20 MG tablet, Take 1 tablet (20 mg total) by mouth once daily., Disp: 90 tablet, Rfl: 3    BD INSULIN PEN NEEDLE UF SHORT 31 gauge x 5/16" Ndle, USE UP TO 6 TIMES DAILY WITH MULTIPLE INSULIN INJECTIONS, Disp: 200 each, Rfl: 11    calcitRIOL (ROCALTROL) 0.25 MCG Cap, TAKE 1 CAPSULE BY MOUTH EVERY DAY, Disp: 30 capsule, Rfl: 11    carvedilol (COREG) 12.5 MG tablet, Take 1 tablet (12.5 mg total) by mouth 2 (two) times daily with meals., Disp: 60 tablet, Rfl: 11    clopidogrel (PLAVIX) 75 mg tablet, TAKE 1 TABLET BY MOUTH EVERY DAY, Disp: 90 tablet, Rfl: 3    cyanocobalamin 1,000 mcg/mL injection, Inject 1 mL (1,000 mcg total) into the muscle every 30 days., Disp: 10 mL, Rfl: 5    finasteride (PROSCAR) 5 mg tablet, TAKE 1 TABLET BY MOUTH EVERY DAY, Disp: 90 tablet, Rfl: 0    folic acid (FOLVITE) 1 MG tablet, Take 1 mg by mouth once daily. , Disp: , Rfl:     hydroCHLOROthiazide (HYDRODIURIL) 12.5 MG Tab, Take 1 tablet (12.5 mg total) by mouth once daily., Disp: 30 tablet, Rfl: 11    insulin detemir (LEVEMIR FLEXTOUCH) 100 unit/mL (3 mL) SubQ InPn pen, Inject 30 Units into the skin 2 (two) times daily. (Patient taking differently: Inject into the skin. 30 units am 35 units pm), Disp: 60 mL, Rfl: 6    nitroGLYCERIN (NITROSTAT) 0.4 MG SL tablet, Place 1 " tablet (0.4 mg total) under the tongue every 5 (five) minutes as needed., Disp: 25 tablet, Rfl: 3    NOVOLOG FLEXPEN 100 unit/mL InPn pen, ADMINISTER 25 UNITS UNDER THE SKIN THREE TIMES DAILY WITH MEALS. MAY USE 25 UNITS WITH NIGHTTIME SNACK, Disp: 30 mL, Rfl: 6    ONE TOUCH ULTRA TEST Strp, Pt test 3-4 times a day, Disp: , Rfl:     tamsulosin (FLOMAX) 0.4 mg Cp24, TAKE 1 CAPSULE BY MOUTH EVERY EVENING, Disp: 90 capsule, Rfl: 0    valsartan (DIOVAN) 160 MG tablet, Take 1 tablet (160 mg total) by mouth once daily., Disp: 30 tablet, Rfl: 11        Review of Systems   Constitution: Positive for malaise/fatigue. Negative for decreased appetite, diaphoresis, fever, weakness, weight gain and weight loss.   HENT: Negative for congestion, ear discharge, ear pain and nosebleeds.    Eyes: Negative for blurred vision, double vision and visual disturbance.   Cardiovascular: Positive for dyspnea on exertion. Negative for chest pain, claudication, cyanosis, irregular heartbeat, leg swelling, near-syncope, orthopnea, palpitations, paroxysmal nocturnal dyspnea and syncope.   Respiratory: Positive for shortness of breath. Negative for cough, hemoptysis, sleep disturbances due to breathing, snoring, sputum production and wheezing.    Endocrine: Negative for polydipsia, polyphagia and polyuria.   Hematologic/Lymphatic: Negative for adenopathy and bleeding problem. Does not bruise/bleed easily.   Skin: Negative for color change, nail changes, poor wound healing and rash.   Musculoskeletal: Negative for muscle cramps and muscle weakness.   Gastrointestinal: Negative for abdominal pain, anorexia, change in bowel habit, hematochezia, nausea and vomiting.   Genitourinary: Negative for dysuria, frequency and hematuria.   Neurological: Positive for tremors. Negative for brief paralysis, difficulty with concentration, excessive daytime sleepiness, dizziness, focal weakness, headaches, light-headedness, seizures and vertigo.  "  Psychiatric/Behavioral: Negative for altered mental status and depression.   Allergic/Immunologic: Negative for persistent infections.        Objective:BP (!) 164/71 (BP Location: Left arm, Patient Position: Sitting, BP Method: X-Large (Automatic))   Pulse (!) 52   Ht 5' 10.5" (1.791 m)   Wt 109.3 kg (240 lb 15.4 oz)   SpO2 98%   BMI 34.09 kg/m²           Physical Exam   Constitutional: He is oriented to person, place, and time. He appears well-developed and well-nourished.   obese   HENT:   Head: Normocephalic.   Right Ear: External ear normal.   Left Ear: External ear normal.   Nose: Nose normal.   Inspection of lips, teeth and gums normal   Eyes: EOM are normal. Pupils are equal, round, and reactive to light. No scleral icterus.   Neck: Normal range of motion. Neck supple. No JVD present. No tracheal deviation present. No thyromegaly present.   Cardiovascular: Normal rate, regular rhythm and intact distal pulses.  Exam reveals no gallop and no friction rub.    No murmur heard.  Pulses:       Dorsalis pedis pulses are 2+ on the right side, and 0 on the left side.        Posterior tibial pulses are 2+ on the right side, and 2+ on the left side.   Pulmonary/Chest: Effort normal and breath sounds normal.   Abdominal: Bowel sounds are normal. He exhibits no distension. There is no hepatosplenomegaly. There is no tenderness. There is no guarding.   Musculoskeletal: Normal range of motion. He exhibits no edema or tenderness.   Lymphadenopathy:   Palpation of neck and groin lymph nodes normal   Neurological: He is alert and oriented to person, place, and time. No cranial nerve deficit. He exhibits normal muscle tone. Coordination normal.   Skin: Skin is dry.   Palpation of skin normal   Psychiatric: His behavior is normal. Judgment and thought content normal.         Assessment:       1. MATTHEWS (dyspnea on exertion)    2. Fatigue, unspecified type    3. Coronary artery disease involving native coronary artery of " native heart without angina pectoris    4. 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, with long-term current use of insulin    5. Diabetic polyneuropathy associated with type 2 diabetes mellitus    6. Essential hypertension    7. Pure hypercholesterolemia    8. GINGER (obstructive sleep apnea)    9. History of MI (myocardial infarction)    10. Post PTCA    11. Iron deficiency anemia, unspecified iron deficiency anemia type         Plan:       Kevon was seen today for coronary artery disease involving native coronary artery of  and shortness of breath.    Diagnoses and all orders for this visit:    MATTHEWS (dyspnea on exertion)  -     Cardiac PET Scan Stress; Future    Fatigue, unspecified type  -     Cardiac PET Scan Stress; Future    Coronary artery disease involving native coronary artery of native heart without angina pectoris  -     Cardiac PET Scan Stress; Future    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, with long-term current use of insulin    Diabetic polyneuropathy associated with type 2 diabetes mellitus    Essential hypertension    Pure hypercholesterolemia    GINGER (obstructive sleep apnea)    History of MI (myocardial infarction)    Post PTCA    Iron deficiency anemia, unspecified iron deficiency anemia type  -     Iron and TIBC; Future; Expected date: 05/11/2018

## 2018-05-11 NOTE — PATIENT INSTRUCTIONS
"150 minutes a week of moderate exercise  mediterranean diet  Avoid "white"  First morning Blood pressure goal <130/80  "

## 2018-05-14 RX ORDER — VALSARTAN 160 MG/1
TABLET ORAL
Qty: 90 TABLET | Refills: 1 | Status: SHIPPED | OUTPATIENT
Start: 2018-05-14 | End: 2018-07-27 | Stop reason: ALTCHOICE

## 2018-05-21 DIAGNOSIS — N18.30 CHRONIC KIDNEY DISEASE, STAGE III (MODERATE): Primary | ICD-10-CM

## 2018-05-22 RX ORDER — CALCITRIOL 0.25 UG/1
CAPSULE ORAL
Qty: 30 CAPSULE | Refills: 11 | Status: SHIPPED | OUTPATIENT
Start: 2018-05-22 | End: 2019-05-29 | Stop reason: SDUPTHER

## 2018-05-22 RX ORDER — CALCITRIOL 0.25 UG/1
0.25 CAPSULE ORAL DAILY
Qty: 30 CAPSULE | Refills: 11 | OUTPATIENT
Start: 2018-05-22

## 2018-05-23 RX ORDER — TAMSULOSIN HYDROCHLORIDE 0.4 MG/1
CAPSULE ORAL
Qty: 90 CAPSULE | Refills: 0 | Status: CANCELLED | OUTPATIENT
Start: 2018-05-23

## 2018-05-29 DIAGNOSIS — N40.0 BENIGN PROSTATIC HYPERPLASIA, UNSPECIFIED WHETHER LOWER URINARY TRACT SYMPTOMS PRESENT: ICD-10-CM

## 2018-05-29 DIAGNOSIS — Z85.51 PERSONAL HISTORY OF BLADDER CANCER: Primary | ICD-10-CM

## 2018-05-29 RX ORDER — TAMSULOSIN HYDROCHLORIDE 0.4 MG/1
1 CAPSULE ORAL NIGHTLY
Qty: 90 CAPSULE | Refills: 0 | Status: SHIPPED | OUTPATIENT
Start: 2018-05-29 | End: 2018-08-24 | Stop reason: SDUPTHER

## 2018-05-29 NOTE — TELEPHONE ENCOUNTER
Personal history of bladder cancer  -     Cystoscopy; Future    Benign prostatic hyperplasia, unspecified whether lower urinary tract symptoms present  -     tamsulosin (FLOMAX) 0.4 mg Cp24; Take 1 capsule (0.4 mg total) by mouth every evening. Needs yearly cystoscope for further refills  Dispense: 90 capsule; Refill: 0

## 2018-05-31 ENCOUNTER — PROCEDURE VISIT (OUTPATIENT)
Dept: OPHTHALMOLOGY | Facility: CLINIC | Age: 78
End: 2018-05-31
Payer: MEDICARE

## 2018-05-31 VITALS — DIASTOLIC BLOOD PRESSURE: 58 MMHG | HEART RATE: 57 BPM | SYSTOLIC BLOOD PRESSURE: 149 MMHG

## 2018-05-31 DIAGNOSIS — E11.3213 CONTROLLED TYPE 2 DIABETES MELLITUS WITH BOTH EYES AFFECTED BY MILD NONPROLIFERATIVE RETINOPATHY AND MACULAR EDEMA, WITH LONG-TERM CURRENT USE OF INSULIN: Primary | ICD-10-CM

## 2018-05-31 DIAGNOSIS — Z79.4 CONTROLLED TYPE 2 DIABETES MELLITUS WITH BOTH EYES AFFECTED BY MILD NONPROLIFERATIVE RETINOPATHY AND MACULAR EDEMA, WITH LONG-TERM CURRENT USE OF INSULIN: Primary | ICD-10-CM

## 2018-05-31 DIAGNOSIS — H35.033 HYPERTENSIVE RETINOPATHY OF BOTH EYES: ICD-10-CM

## 2018-05-31 PROCEDURE — 92134 CPTRZ OPH DX IMG PST SGM RTA: CPT | Mod: S$GLB,,, | Performed by: OPHTHALMOLOGY

## 2018-05-31 PROCEDURE — 99499 UNLISTED E&M SERVICE: CPT | Mod: S$PBB,,, | Performed by: OPHTHALMOLOGY

## 2018-05-31 PROCEDURE — 67028 INJECTION EYE DRUG: CPT | Mod: LT,S$GLB,, | Performed by: OPHTHALMOLOGY

## 2018-05-31 PROCEDURE — 99499 UNLISTED E&M SERVICE: CPT | Mod: S$GLB,,, | Performed by: OPHTHALMOLOGY

## 2018-05-31 RX ADMIN — Medication 1.25 MG: at 10:05

## 2018-05-31 NOTE — PATIENT INSTRUCTIONS

## 2018-05-31 NOTE — PROGRESS NOTES
HPI     1 mo / OCT / Avastin   DLS- 04/26/2018 Dr. Tillman     Pt sts no change in va since last visit, denies pain     (-)Flashes (+)Floaters no more than normal   (-)Photophobia  (-)Glare    Systane PRN       OCT - ERM OU - no significant distortion  ME OS - central ME improving    Prior FA - Minimal late leakage of fovea MA OS  No leakage OD      A/P    1. Mild NPDR OU  Controlled T2 on insulin    2. DME OS  Increased again    Now Sx OS  S/p Avastin OS x 2  Some improving    Avastin OS today    Get approval for Ozurdex    3. PCIOL OU    4. Floaters OU    5. HTN Ret OU    6. CAD - s/p stents on Plavix    BS/BP/chol control      1 month OCT      Risks, benefits, and alternatives to treatment discussed in detail with the patient.  The patient voiced understanding and wished to proceed with the procedure    Injection Procedure Note:  Diagnosis: DME OS    Topical Proparacaine and Betadine.  Inject Avastin OS at 6:00 @ 3.5-4mm posterior to limbus  Post Operative Dx: Same  Complications: None  Follow up as above.

## 2018-06-02 RX ORDER — CLOPIDOGREL BISULFATE 75 MG/1
TABLET ORAL
Qty: 90 TABLET | Refills: 0 | Status: SHIPPED | OUTPATIENT
Start: 2018-06-02 | End: 2019-05-13 | Stop reason: SDUPTHER

## 2018-06-06 ENCOUNTER — CLINICAL SUPPORT (OUTPATIENT)
Dept: CARDIOLOGY | Facility: CLINIC | Age: 78
End: 2018-06-06
Attending: INTERNAL MEDICINE
Payer: MEDICARE

## 2018-06-06 DIAGNOSIS — R53.83 FATIGUE, UNSPECIFIED TYPE: ICD-10-CM

## 2018-06-06 DIAGNOSIS — R06.09 DOE (DYSPNEA ON EXERTION): ICD-10-CM

## 2018-06-06 DIAGNOSIS — I25.10 CORONARY ARTERY DISEASE INVOLVING NATIVE CORONARY ARTERY OF NATIVE HEART WITHOUT ANGINA PECTORIS: ICD-10-CM

## 2018-06-06 LAB — DIASTOLIC DYSFUNCTION: NO

## 2018-06-06 PROCEDURE — A9555 RB82 RUBIDIUM: HCPCS | Mod: S$GLB,,, | Performed by: INTERNAL MEDICINE

## 2018-06-06 PROCEDURE — 78492 MYOCRD IMG PET MLT RST&STRS: CPT | Mod: S$GLB,,, | Performed by: INTERNAL MEDICINE

## 2018-06-06 PROCEDURE — 93015 CV STRESS TEST SUPVJ I&R: CPT | Mod: S$GLB,,, | Performed by: INTERNAL MEDICINE

## 2018-06-07 ENCOUNTER — TELEPHONE (OUTPATIENT)
Dept: CARDIOLOGY | Facility: CLINIC | Age: 78
End: 2018-06-07

## 2018-06-19 RX ORDER — FINASTERIDE 5 MG/1
TABLET, FILM COATED ORAL
Qty: 90 TABLET | Refills: 0 | Status: SHIPPED | OUTPATIENT
Start: 2018-06-19 | End: 2018-08-20 | Stop reason: SDUPTHER

## 2018-06-25 ENCOUNTER — PATIENT MESSAGE (OUTPATIENT)
Dept: ENDOCRINOLOGY | Facility: CLINIC | Age: 78
End: 2018-06-25

## 2018-06-25 DIAGNOSIS — E11.22 TYPE 2 DIABETES MELLITUS WITH DIABETIC CHRONIC KIDNEY DISEASE, UNSPECIFIED CKD STAGE, UNSPECIFIED WHETHER LONG TERM INSULIN USE: Primary | ICD-10-CM

## 2018-06-28 ENCOUNTER — PROCEDURE VISIT (OUTPATIENT)
Dept: OPHTHALMOLOGY | Facility: CLINIC | Age: 78
End: 2018-06-28
Payer: MEDICARE

## 2018-06-28 VITALS — SYSTOLIC BLOOD PRESSURE: 171 MMHG | HEART RATE: 60 BPM | DIASTOLIC BLOOD PRESSURE: 69 MMHG

## 2018-06-28 DIAGNOSIS — E11.3213 CONTROLLED TYPE 2 DIABETES MELLITUS WITH BOTH EYES AFFECTED BY MILD NONPROLIFERATIVE RETINOPATHY AND MACULAR EDEMA, WITH LONG-TERM CURRENT USE OF INSULIN: Primary | ICD-10-CM

## 2018-06-28 DIAGNOSIS — H35.033 HYPERTENSIVE RETINOPATHY OF BOTH EYES: ICD-10-CM

## 2018-06-28 DIAGNOSIS — Z79.4 CONTROLLED TYPE 2 DIABETES MELLITUS WITH BOTH EYES AFFECTED BY MILD NONPROLIFERATIVE RETINOPATHY AND MACULAR EDEMA, WITH LONG-TERM CURRENT USE OF INSULIN: Primary | ICD-10-CM

## 2018-06-28 PROCEDURE — 92134 CPTRZ OPH DX IMG PST SGM RTA: CPT | Mod: S$GLB,,, | Performed by: OPHTHALMOLOGY

## 2018-06-28 PROCEDURE — 92014 COMPRE OPH EXAM EST PT 1/>: CPT | Mod: 25,S$GLB,, | Performed by: OPHTHALMOLOGY

## 2018-06-28 PROCEDURE — 99499 UNLISTED E&M SERVICE: CPT | Mod: S$PBB,,, | Performed by: OPHTHALMOLOGY

## 2018-06-28 PROCEDURE — 67028 INJECTION EYE DRUG: CPT | Mod: LT,S$GLB,, | Performed by: OPHTHALMOLOGY

## 2018-06-28 NOTE — PATIENT INSTRUCTIONS
POST INTRAVITREAL INJECTION PATIENT INSTRUCTIONS   Below are some guidelines for what to expect after your treatment and additional care instructions.   * you may experience mild discomfort in your eye after the treatment. Your eye usually feels better within 24 to 48 hours after the procedure.   * you have been given drops that numb the surface of your eye.   DO NOT rub or touch your eye, DO NOT wear contacts until numbness goes away.   * you may experience small spots that appear in your field of vision, these are usually caused by an air bubble or from the medication. It taked a few hours or days for these to go away.   * use of sunglasses will help reduce light sensitivity and glare.   * DO NOT swim or put sink water ( tap water ) or swin for at least 24 hours after the injection   * If you have a gritty, burning, irritating or stinging feeling in the injected eye. This may be a result of the antiseptic used. Use artifical tears or eye lubricant ( from over- the- counter from your local pharmacy ). If you have some at home already please check the expiration date, so not to use anything contaminated in your eye. A cool pack over your eye brow above the injected eye may also relieve discomfort.   Call us right away or go to the Emergency Department if you have a dramatic decrease in vision or extreme pain in the eye.   [facility] [phone]

## 2018-06-28 NOTE — PROGRESS NOTES
HPI     1 month check/OCT/avastin/ozurdex  DLS- 05/31/2018 Dr. Tillman     Pt sts no change in va since last visit, denies pain.   Os still feels like film over eye and has been going on for a while.    (-)Flashes (+)Floaters no more than normal   (-)Photophobia  (-)Glare    Systane PRN     HPI     1 mo / OCT / Avastin   DLS- 04/26/2018 Dr. Tillman     Pt sts no change in va since last visit, denies pain     (-)Flashes (+)Floaters no more than normal   (-)Photophobia  (-)Glare    Systane PRN       OCT - ERM OU - no significant distortion  ME OS - central ME improving    Prior FA - Minimal late leakage of fovea MA OS  No leakage OD      A/P    1. Mild NPDR OU  Controlled T2 on insulin    2. DME OS  Increased again    Now Sx OS  S/p Avastin OS x 3  Some improving    Ozurdex OS today      3. PCIOL OU    4. Floaters OU    5. HTN Ret OU    6. CAD - s/p stents on Plavix    BS/BP/chol control      2 month OCT      Risks, benefits, and alternatives to treatment discussed in detail with the patient.  The patient voiced understanding and wished to proceed with the procedure    Injection Procedure Note:  Diagnosis: DME OS    Topical Proparacaine and Betadine. subconj lido  Inject Ozurdex OS at 6:00 @ 3.5-4mm posterior to limbus  Post Operative Dx: Same  Complications: None  Follow up as above.

## 2018-07-03 ENCOUNTER — TELEPHONE (OUTPATIENT)
Dept: ENDOCRINOLOGY | Facility: CLINIC | Age: 78
End: 2018-07-03

## 2018-07-03 ENCOUNTER — PATIENT MESSAGE (OUTPATIENT)
Dept: ENDOCRINOLOGY | Facility: CLINIC | Age: 78
End: 2018-07-03

## 2018-07-03 DIAGNOSIS — N18.3 TYPE 2 DIABETES MELLITUS WITH STAGE 3 CHRONIC KIDNEY DISEASE, UNSPECIFIED WHETHER LONG TERM INSULIN USE: Primary | ICD-10-CM

## 2018-07-03 DIAGNOSIS — E11.22 TYPE 2 DIABETES MELLITUS WITH STAGE 3 CHRONIC KIDNEY DISEASE, UNSPECIFIED WHETHER LONG TERM INSULIN USE: Primary | ICD-10-CM

## 2018-07-03 RX ORDER — ATORVASTATIN CALCIUM 20 MG/1
TABLET, FILM COATED ORAL
Qty: 90 TABLET | Refills: 2 | Status: SHIPPED | OUTPATIENT
Start: 2018-07-03 | End: 2019-04-01 | Stop reason: SDUPTHER

## 2018-07-03 NOTE — TELEPHONE ENCOUNTER
----- Message from Ann Marie Guerrero sent at 7/2/2018  9:37 AM CDT -----  Contact: self  Patient need to speak with nurse.   Pt states need refill on One Touch test strips.   Pt states [employer] insurance requesting doctor authorization. Nisha [phone].  Please call pt at [phone] for more info

## 2018-07-03 NOTE — TELEPHONE ENCOUNTER
----- Message from Lenora Angulo sent at 7/3/2018 11:29 AM CDT -----  Contact: Pt   [phone]  Needs Advice    Reason for call:    In regards to an new prescription on medication   Communication Preference:  Additional Information:

## 2018-07-06 DIAGNOSIS — N18.3 TYPE 2 DIABETES MELLITUS WITH STAGE 3 CHRONIC KIDNEY DISEASE, UNSPECIFIED WHETHER LONG TERM INSULIN USE: ICD-10-CM

## 2018-07-06 DIAGNOSIS — E11.22 TYPE 2 DIABETES MELLITUS WITH STAGE 3 CHRONIC KIDNEY DISEASE, UNSPECIFIED WHETHER LONG TERM INSULIN USE: ICD-10-CM

## 2018-07-06 RX ORDER — LANCETS
EACH MISCELLANEOUS
Qty: 150 EACH | Refills: 3 | Status: SHIPPED | OUTPATIENT
Start: 2018-07-06 | End: 2018-07-06 | Stop reason: SDUPTHER

## 2018-07-06 RX ORDER — INSULIN PUMP SYRINGE, 3 ML
EACH MISCELLANEOUS
Qty: 1 EACH | Refills: 0 | Status: SHIPPED | OUTPATIENT
Start: 2018-07-06 | End: 2019-07-03

## 2018-07-08 RX ORDER — GLUCOSAM/CHON-MSM1/C/MANG/BOSW 500-416.6
TABLET ORAL
Qty: 300 EACH | Refills: 3 | Status: SHIPPED | OUTPATIENT
Start: 2018-07-08

## 2018-07-26 ENCOUNTER — PATIENT MESSAGE (OUTPATIENT)
Dept: CARDIOLOGY | Facility: CLINIC | Age: 78
End: 2018-07-26

## 2018-07-27 DIAGNOSIS — I10 ESSENTIAL HYPERTENSION: Primary | ICD-10-CM

## 2018-07-27 RX ORDER — IRBESARTAN 300 MG/1
300 TABLET ORAL NIGHTLY
Qty: 90 TABLET | Refills: 3 | Status: SHIPPED | OUTPATIENT
Start: 2018-07-27 | End: 2019-07-15 | Stop reason: SDUPTHER

## 2018-08-09 ENCOUNTER — TELEPHONE (OUTPATIENT)
Dept: INTERNAL MEDICINE | Facility: CLINIC | Age: 78
End: 2018-08-09

## 2018-08-09 NOTE — TELEPHONE ENCOUNTER
----- Message from Morgan Arroyo sent at 8/9/2018 11:57 AM CDT -----  Contact: Patient   8/30/18 Annual Physical need lab orders placed and linked    Thank you

## 2018-08-20 RX ORDER — FINASTERIDE 5 MG/1
TABLET, FILM COATED ORAL
Qty: 90 TABLET | Refills: 0 | Status: SHIPPED | OUTPATIENT
Start: 2018-08-20 | End: 2018-12-17 | Stop reason: SDUPTHER

## 2018-08-23 ENCOUNTER — PES CALL (OUTPATIENT)
Dept: ADMINISTRATIVE | Facility: CLINIC | Age: 78
End: 2018-08-23

## 2018-08-24 DIAGNOSIS — N40.0 BENIGN PROSTATIC HYPERPLASIA, UNSPECIFIED WHETHER LOWER URINARY TRACT SYMPTOMS PRESENT: ICD-10-CM

## 2018-08-24 RX ORDER — TAMSULOSIN HYDROCHLORIDE 0.4 MG/1
CAPSULE ORAL
Qty: 90 CAPSULE | Refills: 0 | Status: SHIPPED | OUTPATIENT
Start: 2018-08-24 | End: 2018-11-23 | Stop reason: SDUPTHER

## 2018-08-29 RX ORDER — CLOPIDOGREL BISULFATE 75 MG/1
TABLET ORAL
Qty: 90 TABLET | Refills: 1 | Status: SHIPPED | OUTPATIENT
Start: 2018-08-29 | End: 2018-10-01 | Stop reason: SDUPTHER

## 2018-08-30 ENCOUNTER — OFFICE VISIT (OUTPATIENT)
Dept: INTERNAL MEDICINE | Facility: CLINIC | Age: 78
End: 2018-08-30
Payer: MEDICARE

## 2018-08-30 ENCOUNTER — LAB VISIT (OUTPATIENT)
Dept: LAB | Facility: HOSPITAL | Age: 78
End: 2018-08-30
Attending: INTERNAL MEDICINE
Payer: MEDICARE

## 2018-08-30 VITALS
OXYGEN SATURATION: 97 % | WEIGHT: 238 LBS | HEART RATE: 57 BPM | DIASTOLIC BLOOD PRESSURE: 70 MMHG | BODY MASS INDEX: 34.07 KG/M2 | HEIGHT: 70 IN | SYSTOLIC BLOOD PRESSURE: 134 MMHG

## 2018-08-30 DIAGNOSIS — G47.33 OSA ON CPAP: ICD-10-CM

## 2018-08-30 DIAGNOSIS — I25.10 CORONARY ARTERY DISEASE INVOLVING NATIVE CORONARY ARTERY OF NATIVE HEART WITHOUT ANGINA PECTORIS: ICD-10-CM

## 2018-08-30 DIAGNOSIS — Z79.4 TYPE 2 DIABETES MELLITUS WITH STAGE 3 CHRONIC KIDNEY DISEASE, WITH LONG-TERM CURRENT USE OF INSULIN: ICD-10-CM

## 2018-08-30 DIAGNOSIS — D64.9 ANEMIA, UNSPECIFIED TYPE: ICD-10-CM

## 2018-08-30 DIAGNOSIS — E53.8 B12 DEFICIENCY: ICD-10-CM

## 2018-08-30 DIAGNOSIS — Z00.00 ANNUAL PHYSICAL EXAM: ICD-10-CM

## 2018-08-30 DIAGNOSIS — R06.09 DOE (DYSPNEA ON EXERTION): ICD-10-CM

## 2018-08-30 DIAGNOSIS — Z00.00 ANNUAL PHYSICAL EXAM: Primary | ICD-10-CM

## 2018-08-30 DIAGNOSIS — N18.30 TYPE 2 DIABETES MELLITUS WITH STAGE 3 CHRONIC KIDNEY DISEASE, WITH LONG-TERM CURRENT USE OF INSULIN: ICD-10-CM

## 2018-08-30 DIAGNOSIS — I10 ESSENTIAL HYPERTENSION: ICD-10-CM

## 2018-08-30 DIAGNOSIS — E78.00 PURE HYPERCHOLESTEROLEMIA: ICD-10-CM

## 2018-08-30 DIAGNOSIS — E11.22 TYPE 2 DIABETES MELLITUS WITH STAGE 3 CHRONIC KIDNEY DISEASE, WITH LONG-TERM CURRENT USE OF INSULIN: ICD-10-CM

## 2018-08-30 LAB
ALBUMIN SERPL BCP-MCNC: 3.6 G/DL
ALBUMIN SERPL BCP-MCNC: 3.6 G/DL
ALBUMIN/CREAT UR: 196.9 UG/MG
ALP SERPL-CCNC: 38 U/L
ALT SERPL W/O P-5'-P-CCNC: 17 U/L
ANION GAP SERPL CALC-SCNC: 8 MMOL/L
AST SERPL-CCNC: 16 U/L
BACTERIA #/AREA URNS AUTO: ABNORMAL /HPF
BASOPHILS # BLD AUTO: 0.07 K/UL
BASOPHILS NFR BLD: 0.9 %
BILIRUB DIRECT SERPL-MCNC: 0.5 MG/DL
BILIRUB SERPL-MCNC: 1.3 MG/DL
BILIRUB UR QL STRIP: NEGATIVE
BNP SERPL-MCNC: 93 PG/ML
BUN SERPL-MCNC: 36 MG/DL
CALCIUM SERPL-MCNC: 9.7 MG/DL
CHLORIDE SERPL-SCNC: 109 MMOL/L
CLARITY UR REFRACT.AUTO: CLEAR
CO2 SERPL-SCNC: 24 MMOL/L
COLOR UR AUTO: YELLOW
CREAT SERPL-MCNC: 2.2 MG/DL
CREAT UR-MCNC: 129 MG/DL
DIFFERENTIAL METHOD: ABNORMAL
EOSINOPHIL # BLD AUTO: 0.3 K/UL
EOSINOPHIL NFR BLD: 4.1 %
ERYTHROCYTE [DISTWIDTH] IN BLOOD BY AUTOMATED COUNT: 13 %
EST. GFR  (AFRICAN AMERICAN): 31.8 ML/MIN/1.73 M^2
EST. GFR  (NON AFRICAN AMERICAN): 27.5 ML/MIN/1.73 M^2
ESTIMATED AVG GLUCOSE: 146 MG/DL
FERRITIN SERPL-MCNC: 30 NG/ML
GLUCOSE SERPL-MCNC: 124 MG/DL
GLUCOSE UR QL STRIP: NEGATIVE
HBA1C MFR BLD HPLC: 6.7 %
HCT VFR BLD AUTO: 37.5 %
HGB BLD-MCNC: 12.4 G/DL
HGB UR QL STRIP: NEGATIVE
HYALINE CASTS UR QL AUTO: 3 /LPF
IMM GRANULOCYTES # BLD AUTO: 0.03 K/UL
IMM GRANULOCYTES NFR BLD AUTO: 0.4 %
IRON SERPL-MCNC: 75 UG/DL
KETONES UR QL STRIP: NEGATIVE
LEUKOCYTE ESTERASE UR QL STRIP: NEGATIVE
LYMPHOCYTES # BLD AUTO: 1.6 K/UL
LYMPHOCYTES NFR BLD: 20.5 %
MCH RBC QN AUTO: 28.8 PG
MCHC RBC AUTO-ENTMCNC: 33.1 G/DL
MCV RBC AUTO: 87 FL
MICROALBUMIN UR DL<=1MG/L-MCNC: 254 UG/ML
MICROSCOPIC COMMENT: ABNORMAL
MONOCYTES # BLD AUTO: 0.6 K/UL
MONOCYTES NFR BLD: 7.8 %
NEUTROPHILS # BLD AUTO: 5.2 K/UL
NEUTROPHILS NFR BLD: 66.3 %
NITRITE UR QL STRIP: NEGATIVE
NRBC BLD-RTO: 0 /100 WBC
PH UR STRIP: 5 [PH] (ref 5–8)
PHOSPHATE SERPL-MCNC: 3.1 MG/DL
PLATELET # BLD AUTO: 152 K/UL
PMV BLD AUTO: 10.8 FL
POTASSIUM SERPL-SCNC: 4.4 MMOL/L
PROT SERPL-MCNC: 7 G/DL
PROT UR QL STRIP: ABNORMAL
RBC # BLD AUTO: 4.31 M/UL
RBC #/AREA URNS AUTO: 0 /HPF (ref 0–4)
SATURATED IRON: 20 %
SODIUM SERPL-SCNC: 141 MMOL/L
SP GR UR STRIP: 1.01 (ref 1–1.03)
SQUAMOUS #/AREA URNS AUTO: 0 /HPF
TOTAL IRON BINDING CAPACITY: 379 UG/DL
TRANSFERRIN SERPL-MCNC: 256 MG/DL
URN SPEC COLLECT METH UR: ABNORMAL
UROBILINOGEN UR STRIP-ACNC: NEGATIVE EU/DL
VIT B12 SERPL-MCNC: 199 PG/ML
WBC # BLD AUTO: 7.85 K/UL
WBC #/AREA URNS AUTO: 1 /HPF (ref 0–5)

## 2018-08-30 PROCEDURE — 80069 RENAL FUNCTION PANEL: CPT

## 2018-08-30 PROCEDURE — 82607 VITAMIN B-12: CPT

## 2018-08-30 PROCEDURE — 83540 ASSAY OF IRON: CPT

## 2018-08-30 PROCEDURE — 3078F DIAST BP <80 MM HG: CPT | Mod: CPTII,S$GLB,, | Performed by: INTERNAL MEDICINE

## 2018-08-30 PROCEDURE — 36415 COLL VENOUS BLD VENIPUNCTURE: CPT | Mod: PO

## 2018-08-30 PROCEDURE — 96372 THER/PROPH/DIAG INJ SC/IM: CPT | Mod: S$GLB,,, | Performed by: INTERNAL MEDICINE

## 2018-08-30 PROCEDURE — 81001 URINALYSIS AUTO W/SCOPE: CPT

## 2018-08-30 PROCEDURE — 82247 BILIRUBIN TOTAL: CPT

## 2018-08-30 PROCEDURE — 83036 HEMOGLOBIN GLYCOSYLATED A1C: CPT

## 2018-08-30 PROCEDURE — 99999 PR PBB SHADOW E&M-EST. PATIENT-LVL IV: CPT | Mod: PBBFAC,,, | Performed by: INTERNAL MEDICINE

## 2018-08-30 PROCEDURE — 83880 ASSAY OF NATRIURETIC PEPTIDE: CPT

## 2018-08-30 PROCEDURE — 3075F SYST BP GE 130 - 139MM HG: CPT | Mod: CPTII,S$GLB,, | Performed by: INTERNAL MEDICINE

## 2018-08-30 PROCEDURE — 99499 UNLISTED E&M SERVICE: CPT | Mod: HCNC,S$GLB,, | Performed by: INTERNAL MEDICINE

## 2018-08-30 PROCEDURE — 82728 ASSAY OF FERRITIN: CPT

## 2018-08-30 PROCEDURE — 84155 ASSAY OF PROTEIN SERUM: CPT

## 2018-08-30 PROCEDURE — 85025 COMPLETE CBC W/AUTO DIFF WBC: CPT

## 2018-08-30 PROCEDURE — 99397 PER PM REEVAL EST PAT 65+ YR: CPT | Mod: 25,S$GLB,, | Performed by: INTERNAL MEDICINE

## 2018-08-30 PROCEDURE — 82043 UR ALBUMIN QUANTITATIVE: CPT

## 2018-08-30 RX ORDER — CYANOCOBALAMIN 1000 UG/ML
1000 INJECTION, SOLUTION INTRAMUSCULAR; SUBCUTANEOUS ONCE
Qty: 1 ML | Refills: 0 | Status: SHIPPED | OUTPATIENT
Start: 2018-08-30 | End: 2018-08-30

## 2018-08-30 RX ADMIN — CYANOCOBALAMIN 1000 MCG: 1000 INJECTION, SOLUTION INTRAMUSCULAR; SUBCUTANEOUS at 03:08

## 2018-08-30 NOTE — PROGRESS NOTES
Answers for HPI/ROS submitted by the patient on 2018   Shortness of breath  Chronicity: chronic  Onset: more than 1 year ago  Frequency: constantly  Progression since onset: gradually worsening  Episode duration: 10 minutes  abdominal pain: No  chest pain: No  claudication: Yes  coryza: No  ear pain: No  fever: No  headaches: No  hemoptysis: No  leg swelling: Yes  neck pain: No  orthopnea: No  PND: No  rash: No  rhinorrhea: No  sore throat: No  sputum production: No  swollen glands: No  syncope: No  vomiting: No  wheezing: No  Aggravating factors: occupational exposure, exercise, any activity  Improvement on treatment: no relief  Risk factors for DVT/PE: prolonged immobilization  Treatments tried: nothing  asthma: No  allergies: Yes  COPD: No  pneumonia: No  aspirin allergies: No  CAD: Yes  DVT: No  heart failure: Yes  PE: No  recent surgery: No  bronchiolitis: No  chronic lung disease: No    PAST MEDICAL HISTORY:  Type 2 diabetes with chronic kidney disease stage III to IV, peripheral neuropathy,  Hypertension.  Hyperlipidemia.  Coronary artery disease with previous STEMI and angioplasty in .  Obstructive sleep apnea with use of APAP  Anemia of chronic disease.  History of iron deficiency anemia  B12 deficiency.  BPH.  Gastroesophageal reflux disease  Bladder tumor, status post resection    SOCIAL HISTORY:  Tobacco use, none.  Alcohol use, maybe a glass of wine twice a   week.  Exercise has been limited.    FAMILY HISTORY:  Father is , diabetes and heart disease.  Mother is   , cardiovascular disease.  Two sisters alive, one with diabetes.  One   brother is alive with diabetes.  One brother is .    SCREENING:  Colonoscopy in 2015 was normal.  Also, a cystoscopy in 2017 was negative.    MEDICATIONS:  Ecotrin 81 mg.  Atorvastatin 20 mg.  Calcitriol 0.25 mcg.  Plavix 75 mg.  Finasteride 5 mg.  Folic acid 1 mg.  NovoLog 25 units with each meal.  Levemir 30  units twice a  day.  Carvedilol 12.5 mg twice a day.  Pantoprazole 40 mg a day.  Tamsulosin 0.4 mg a day.  avapro 320mg a day.  Hydrochlorothiazide 12.5 mg a day          CHART REVIEW:  Of she is  Followed up with Cardiology, Sleep Medicine, NephrologyIn May 2018    Nuclear medicine PET scan noted below  CONCLUSIONS: ABNORMAL MYOCARDIAL PERFUSION PET STRESS TEST  1. There is a moderate sized moderate intensity fixed defect consistent with non-transmural infarct in the base to apical inferolateral wall in the usual distribution of the Left Circumflex Artery. This defect comprises 15 % of the left ventricular   myocardium.   2. There is abnormal wall motion at rest showing akinesis of the inferolateral wall of the left ventricle. There is abnormal wall motion at stress showing akinesis of the inferolateral wall of the left ventricle.   3. LV function is normal at rest and stress.  (normal is >= 51%)  4. The ventricular volumes are normal at rest and stress.   5. The extracardiac distribution of radioactivity is normal.   6. There was no previous study available to compare.        REASON FOR VISIT:  This is a 78-year-old male who is coming in for annual   routine visit.    It should be noted that his main ongoing problem as noted before is easy dyspnea   on exertion.  Any little activity he gets, he just cannot get a good breath in   and out.  There has been no chest pain.    He met with Cardiology in May.  He had a PET scan that did show akinesis,   inferior lateral wall.  Ejection fraction to be 45%,.  No acute ischemic issues   noted.    He continues to follow up with Sleep Medicine and is still on CPAP for sleep   apnea, which he finds has worked well for him.    It was noted that he had labs back in March for some reason.  There was no   followup visit.  He had hemoglobin of 34.6, B12 was low after meeting with   Cardiology in May, otherwise iron levels were normal.  Iron supplement, Fergon,   was started twice a day.  He has  had B12 injections from last year, but he   remembers taking just four doses.    One thing noted on his medication review is that he is not on the medicine,   amlodipine, which he was on before.    CURRENT MEDICATIONS:  Aspirin 81 mg.  Atorvastatin 20 mg.  Calcitriol 0.25 mcg.  Carvedilol 12.5 mg twice a day.  Plavix 75 mg.  Finasteride 5 mg.  Folic acid 1 mg.  HCTZ 12.5 mg.  Levemir 30 units twice a day.  Avapro 300 mg.  NovoLog 25 units with each meal.  Tamsulosin 0.4 mg.    REVIEW OF SYSTEMS:  He endorses no pains in the chest or palpitations.  No   abdominal pain.  He has constipation since having been on Fergon, but no   difficulty urinating, flow is good.  No nocturia.  Currently, no arthralgias or   headaches.    PHYSICAL EXAMINATION:  VITAL SIGNS:  His weight today is 238 pounds, pulse 60 and blood pressure that I   got both arms is 204/72.  HEENT:  Tympanic membranes normal.  Nasal mucosa is clear.  Oropharynx, no   abnormal findings.  NECK:  No thyromegaly.  No masses.  LUNGS:  Clear breath sounds.  HEART:  Regular rate and rhythm.  No murmurs.  ABDOMEN:  Active bowel sounds, soft, nontender.  No hepatosplenomegaly or   abdominal masses.  PULSES:  2+ carotid, 2+ pedal pulses.  EXTREMITIES:  No edema.  RECTAL:  Stool is brown, heme negative.  Prostate minimally enlarged.    IMPRESSION:  1.  General examination.  2.  Chronic dyspnea on exertion.  3.  Hypertension.  4.  Type 2 diabetes with chronic kidney disease, stage IV.  5.  Hyperlipidemia.  6.  Coronary artery disease with previous myocardial infarction.  7.  Anemia with history of B12 deficiency and iron deficiency and probably   anemia of chronic disease.  8.  Benign prostatic hypertrophy.  9.  Obstructive sleep apnea.    PLAN:  We will need to have repeat labs today.  We will add BNP, get a urine   microalbumin.  May need to restart amlodipine.  Also arrange for 2D echo with   Doppler to see if there is any significant diastolic dysfunction.  After  lab   test today, arrange for B12 injection.            /ls [id number] blank(s)        JAM/HN  dd: 08/30/2018 14:54:01 (CDT)  td: 08/31/2018 08:40:18 (CDT)  Doc ID   #[id number]  Job ID #[id number]    CC:

## 2018-08-31 RX ORDER — CYANOCOBALAMIN 1000 UG/ML
1000 INJECTION, SOLUTION INTRAMUSCULAR; SUBCUTANEOUS ONCE
Status: COMPLETED | OUTPATIENT
Start: 2018-08-30 | End: 2018-08-30

## 2018-09-01 ENCOUNTER — DOCUMENTATION ONLY (OUTPATIENT)
Dept: INTERNAL MEDICINE | Facility: CLINIC | Age: 78
End: 2018-09-01

## 2018-09-01 ENCOUNTER — PATIENT MESSAGE (OUTPATIENT)
Dept: INTERNAL MEDICINE | Facility: CLINIC | Age: 78
End: 2018-09-01

## 2018-09-01 DIAGNOSIS — D51.9 ANEMIA DUE TO VITAMIN B12 DEFICIENCY, UNSPECIFIED B12 DEFICIENCY TYPE: Primary | ICD-10-CM

## 2018-09-01 RX ORDER — CYANOCOBALAMIN 1000 UG/ML
INJECTION, SOLUTION INTRAMUSCULAR; SUBCUTANEOUS
Qty: 10 ML | Refills: 0 | OUTPATIENT
Start: 2018-09-01

## 2018-09-01 RX ORDER — CYANOCOBALAMIN 1000 UG/ML
100 INJECTION, SOLUTION INTRAMUSCULAR; SUBCUTANEOUS
Status: DISCONTINUED | OUTPATIENT
Start: 2018-09-28 | End: 2022-01-01

## 2018-09-01 RX ORDER — AMLODIPINE BESYLATE 5 MG/1
5 TABLET ORAL DAILY
Qty: 30 TABLET | Refills: 11 | Status: SHIPPED | OUTPATIENT
Start: 2018-09-01 | End: 2019-08-27 | Stop reason: SDUPTHER

## 2018-09-01 NOTE — PROGRESS NOTES
SUBJECTIVE:  This is a 78-year-old male.  This is a phone note and review note,   regarding his lab testing.  1.  Diabetes.  Hemoglobin A1c 6.7, glucose 124, stable.  2.  Regarding anemia, the hemoglobin is now 12.4, which is improved.  Iron   levels were normal, but they were also normal back in May, B12 was low at 199.    He will put a hold on Fergon and we are going to initiate him on B12 injections   once a month.  3.  Chronic kidney disease, stage IV, creatinine was 2.2, proper hydration was   discussed.  4.  Regarding blood pressure, we will start amlodipine at 5 mg daily.  He has   gotten readings of systolic 140/70 at home.  In review of the chart, the period   that he might have been prescribed the medicine back in March 2017, but was then   taken off the medication in October 2017.  We will recheck when he comes back   in a month for the B12 injections.  5.  Regarding nuclear medicine PET scan, it did reveal akinesis at the inferior   lateral wall, I do not see any prior cardiac studies to determine if this is a   new situation.  He had an inferior lateral myocardial infarction in 2009 and the   left circumflex was 80% stenotic along with a stent.  This was explained   akinesis at this area.  The nuclear medicine PET scan did not show any active   ischemia.  He has an upcoming 2D echo with Doppler.      JAM/HN  dd: 09/01/2018 11:01:47 (CDT)  td: 09/01/2018 19:00:34 (CDT)  Doc ID   #[id number]  Job ID #[id number]    CC:

## 2018-09-01 NOTE — TELEPHONE ENCOUNTER
Make arrangements to do B12 injections 1000 mcg IM once a month      He received an injection this past Thursday August 30th

## 2018-09-06 ENCOUNTER — PROCEDURE VISIT (OUTPATIENT)
Dept: OPHTHALMOLOGY | Facility: CLINIC | Age: 78
End: 2018-09-06
Payer: MEDICARE

## 2018-09-06 VITALS — DIASTOLIC BLOOD PRESSURE: 64 MMHG | SYSTOLIC BLOOD PRESSURE: 144 MMHG | HEART RATE: 59 BPM

## 2018-09-06 DIAGNOSIS — E11.3213 CONTROLLED TYPE 2 DIABETES MELLITUS WITH BOTH EYES AFFECTED BY MILD NONPROLIFERATIVE RETINOPATHY AND MACULAR EDEMA, WITH LONG-TERM CURRENT USE OF INSULIN: Primary | ICD-10-CM

## 2018-09-06 DIAGNOSIS — Z79.4 CONTROLLED TYPE 2 DIABETES MELLITUS WITH BOTH EYES AFFECTED BY MILD NONPROLIFERATIVE RETINOPATHY AND MACULAR EDEMA, WITH LONG-TERM CURRENT USE OF INSULIN: Primary | ICD-10-CM

## 2018-09-06 DIAGNOSIS — H35.033 HYPERTENSIVE RETINOPATHY OF BOTH EYES: ICD-10-CM

## 2018-09-06 PROCEDURE — 92226 PR SPECIAL EYE EXAM, SUBSEQUENT: CPT | Mod: 50,S$PBB,, | Performed by: OPHTHALMOLOGY

## 2018-09-06 PROCEDURE — 92134 CPTRZ OPH DX IMG PST SGM RTA: CPT | Mod: PBBFAC,PO | Performed by: OPHTHALMOLOGY

## 2018-09-06 PROCEDURE — 92226 PR SPECIAL EYE EXAM, SUBSEQUENT: CPT | Mod: 50,PBBFAC,PO | Performed by: OPHTHALMOLOGY

## 2018-09-06 PROCEDURE — 92014 COMPRE OPH EXAM EST PT 1/>: CPT | Mod: S$PBB,,, | Performed by: OPHTHALMOLOGY

## 2018-09-06 NOTE — PROGRESS NOTES
HPI     Eye Problem      Additional comments: 2 mos check              Comments     DLS 6/28/18- Slight discharge OS when he wakes up in the mornings        OCT - ERM OU - no significant distortion  ME OS - central ME resolved    Prior FA - Minimal late leakage of fovea MA OS  No leakage OD      A/P    1. Mild NPDR OU  Controlled T2 on insulin    2. DME OS  Increased again    Now Sx OS  S/p Avastin OS x 3, ozurdex OS x 1 6/28/18  Stable - watch today      3. PCIOL OU    4. Floaters OU    5. HTN Ret OU    6. CAD - s/p stents on Plavix    BS/BP/chol control      2 month OCT

## 2018-09-10 ENCOUNTER — CLINICAL SUPPORT (OUTPATIENT)
Dept: CARDIOLOGY | Facility: CLINIC | Age: 78
End: 2018-09-10
Attending: INTERNAL MEDICINE
Payer: MEDICARE

## 2018-09-10 DIAGNOSIS — I25.10 CORONARY ARTERY DISEASE INVOLVING NATIVE CORONARY ARTERY OF NATIVE HEART WITHOUT ANGINA PECTORIS: ICD-10-CM

## 2018-09-10 DIAGNOSIS — I10 ESSENTIAL HYPERTENSION: ICD-10-CM

## 2018-09-10 DIAGNOSIS — R06.09 DOE (DYSPNEA ON EXERTION): ICD-10-CM

## 2018-09-10 LAB
DIASTOLIC DYSFUNCTION: NO
ESTIMATED PA SYSTOLIC PRESSURE: 13.89
RETIRED EF AND QEF - SEE NOTES: 55 (ref 55–65)

## 2018-09-10 PROCEDURE — 93306 TTE W/DOPPLER COMPLETE: CPT | Mod: 26,S$PBB,, | Performed by: INTERNAL MEDICINE

## 2018-09-21 ENCOUNTER — PATIENT MESSAGE (OUTPATIENT)
Dept: INTERNAL MEDICINE | Facility: CLINIC | Age: 78
End: 2018-09-21

## 2018-09-21 DIAGNOSIS — N18.3 TYPE 2 DIABETES MELLITUS WITH STAGE 3 CHRONIC KIDNEY DISEASE, UNSPECIFIED WHETHER LONG TERM INSULIN USE: ICD-10-CM

## 2018-09-21 DIAGNOSIS — E11.22 TYPE 2 DIABETES MELLITUS WITH STAGE 3 CHRONIC KIDNEY DISEASE, UNSPECIFIED WHETHER LONG TERM INSULIN USE: ICD-10-CM

## 2018-09-21 RX ORDER — CALCIUM CITRATE/VITAMIN D3 200MG-6.25
TABLET ORAL
Qty: 350 STRIP | Refills: 3 | Status: SHIPPED | OUTPATIENT
Start: 2018-09-21 | End: 2020-08-19 | Stop reason: SDUPTHER

## 2018-10-01 ENCOUNTER — OFFICE VISIT (OUTPATIENT)
Dept: ENDOCRINOLOGY | Facility: CLINIC | Age: 78
End: 2018-10-01
Payer: MEDICARE

## 2018-10-01 ENCOUNTER — TELEPHONE (OUTPATIENT)
Dept: INTERNAL MEDICINE | Facility: CLINIC | Age: 78
End: 2018-10-01

## 2018-10-01 ENCOUNTER — PATIENT MESSAGE (OUTPATIENT)
Dept: ENDOCRINOLOGY | Facility: CLINIC | Age: 78
End: 2018-10-01

## 2018-10-01 VITALS
WEIGHT: 242.81 LBS | BODY MASS INDEX: 34.76 KG/M2 | HEART RATE: 74 BPM | SYSTOLIC BLOOD PRESSURE: 156 MMHG | DIASTOLIC BLOOD PRESSURE: 74 MMHG | HEIGHT: 70 IN | RESPIRATION RATE: 18 BRPM

## 2018-10-01 DIAGNOSIS — I25.10 CORONARY ARTERY DISEASE INVOLVING NATIVE CORONARY ARTERY OF NATIVE HEART WITHOUT ANGINA PECTORIS: Chronic | ICD-10-CM

## 2018-10-01 DIAGNOSIS — E11.42 DIABETIC POLYNEUROPATHY ASSOCIATED WITH TYPE 2 DIABETES MELLITUS: ICD-10-CM

## 2018-10-01 DIAGNOSIS — Z79.4 TYPE 2 DIABETES MELLITUS WITH STAGE 3 CHRONIC KIDNEY DISEASE, WITH LONG-TERM CURRENT USE OF INSULIN: Chronic | ICD-10-CM

## 2018-10-01 DIAGNOSIS — Z79.4 CONTROLLED TYPE 2 DIABETES MELLITUS WITH BOTH EYES AFFECTED BY MILD NONPROLIFERATIVE RETINOPATHY AND MACULAR EDEMA, WITH LONG-TERM CURRENT USE OF INSULIN: ICD-10-CM

## 2018-10-01 DIAGNOSIS — I25.2 HISTORY OF MI (MYOCARDIAL INFARCTION): ICD-10-CM

## 2018-10-01 DIAGNOSIS — Z79.4 TYPE 2 DIABETES MELLITUS WITH DIABETIC POLYNEUROPATHY, WITH LONG-TERM CURRENT USE OF INSULIN: Primary | ICD-10-CM

## 2018-10-01 DIAGNOSIS — I10 ESSENTIAL HYPERTENSION: ICD-10-CM

## 2018-10-01 DIAGNOSIS — Z85.51 PERSONAL HISTORY OF BLADDER CANCER: ICD-10-CM

## 2018-10-01 DIAGNOSIS — G47.33 OSA (OBSTRUCTIVE SLEEP APNEA): ICD-10-CM

## 2018-10-01 DIAGNOSIS — E11.42 TYPE 2 DIABETES MELLITUS WITH DIABETIC POLYNEUROPATHY, WITH LONG-TERM CURRENT USE OF INSULIN: Primary | ICD-10-CM

## 2018-10-01 DIAGNOSIS — N18.30 TYPE 2 DIABETES MELLITUS WITH STAGE 3 CHRONIC KIDNEY DISEASE, WITH LONG-TERM CURRENT USE OF INSULIN: Chronic | ICD-10-CM

## 2018-10-01 DIAGNOSIS — Z98.61 POST PTCA: ICD-10-CM

## 2018-10-01 DIAGNOSIS — E11.22 TYPE 2 DIABETES MELLITUS WITH STAGE 3 CHRONIC KIDNEY DISEASE, WITH LONG-TERM CURRENT USE OF INSULIN: Chronic | ICD-10-CM

## 2018-10-01 DIAGNOSIS — E11.3213 CONTROLLED TYPE 2 DIABETES MELLITUS WITH BOTH EYES AFFECTED BY MILD NONPROLIFERATIVE RETINOPATHY AND MACULAR EDEMA, WITH LONG-TERM CURRENT USE OF INSULIN: ICD-10-CM

## 2018-10-01 DIAGNOSIS — E78.00 PURE HYPERCHOLESTEROLEMIA: Chronic | ICD-10-CM

## 2018-10-01 PROCEDURE — 99999 PR PBB SHADOW E&M-EST. PATIENT-LVL V: CPT | Mod: PBBFAC,,, | Performed by: NURSE PRACTITIONER

## 2018-10-01 PROCEDURE — 99215 OFFICE O/P EST HI 40 MIN: CPT | Mod: PBBFAC | Performed by: NURSE PRACTITIONER

## 2018-10-01 PROCEDURE — 99214 OFFICE O/P EST MOD 30 MIN: CPT | Mod: S$PBB,,, | Performed by: NURSE PRACTITIONER

## 2018-10-01 RX ORDER — INSULIN ASPART 100 [IU]/ML
25 INJECTION, SOLUTION INTRAVENOUS; SUBCUTANEOUS 4 TIMES DAILY
Qty: 6 BOX | Refills: 3 | Status: SHIPPED | OUTPATIENT
Start: 2018-10-01 | End: 2019-05-13

## 2018-10-01 RX ORDER — INSULIN DEGLUDEC 200 U/ML
70 INJECTION, SOLUTION SUBCUTANEOUS DAILY
Qty: 5 SYRINGE | Refills: 3 | Status: SHIPPED | OUTPATIENT
Start: 2018-10-01 | End: 2018-10-05 | Stop reason: CLARIF

## 2018-10-01 RX ORDER — AMOXICILLIN 500 MG
CAPSULE ORAL DAILY
Status: ON HOLD | COMMUNITY
End: 2022-01-01

## 2018-10-01 NOTE — TELEPHONE ENCOUNTER
----- Message from Bárbara Jett sent at 10/1/2018 12:33 PM CDT -----  Contact: call pt at [phone]  Calling is to set up a nurse visit for a b 12 shot,

## 2018-10-01 NOTE — PROGRESS NOTES
CC: This 78 y.o. 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    Hypertensive retinopathy of both eyes    Lumbar radiculopathy    Personal history of bladder cancer    CKD (chronic kidney disease) stage 3, GFR 30-59 ml/min    GINGER (obstructive sleep apnea)    Essential hypertension    Aortic atherosclerosis    History of MI (myocardial infarction)    Hyperparathyroidism, secondary renal    Diabetic polyneuropathy associated with type 2 diabetes mellitus    Thrombocytopenia    Vitamin D deficiency disease    Severe obesity (BMI 35.0-35.9 with comorbidity)    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, with long-term current use of insulin    MATTHEWS (dyspnea on exertion)    Fatigue    Pure hypercholesterolemia    Iron deficiency anemia      Status of these conditions is pending review. He arrives alone today.     HPI: First seen as a referral from Dr. Sol for uncontrolled diabetes mellitus. He was diagnosed with T2DM in 1994 on routine bloodwork without symptoms. Previous oral meds include metformin and glyburide, Glucovance. Metformin was d/c in April 2006 r/t renal insufficiency and basal insulin started 4/06. Converted to MDI 6/07. DM education: DTN 4/28/06; Dietitian 5/17/06. Split Levemir to BID in 2011. He was seen in [facility] appt in June 2012. He underwent CGMS testing.  Last seen by me in 4/2017.  "    CURRENT DM MEDS: Levemir 30-32 units AM, 30 units PM  Flexpen; Novolog 25 units AC + PM snack + correction scale.     Denies missing doses of insulin medication.     He is monitoring BG at home 4 x daily with One Touch Ultra  Brought logs to clinic since Feb 2018 to present.   Past 2 months revealed :  FBS: 135-271  Pre lunch   Pre dinner   Bedtime     + hypoglycemia at home; occurs during day pre lunch mostly, 1-2 x week. He feels s/s when BG <80, feels "weak, woozy, a little unsteady", tx with glucose tablets or mint; admits to overtreating. Carries peppermint on him at all times.     DIET/ MEAL PATTERN: 2 meals daily; skips breakfast; + occ snacking during day afternoon before dinner (peanut butter crackers, chips, candy); drinks diet coke, water; eats half out and half at home; he does not cook often  EXERCISE:  no    STANDARDS OF CARE:  Eye exam: 9/18 Mild NPDR OU Dr. Tillman  Podiatry: 5/18 Dr. Reynolds                     ROS:   Gen: Appetite good, weight stable since last appt; denies weakness. C/o easily fatigued  Skin: Skin is intact and heals well, no rashes, pruritis, easy bruising, no hair changes, no intolerance to heat/cold.  Eyes: wears glasses, denies visual disturbances; cataracts removed 2014  Resp: wears CPAP usually at night; no SOB; c/o MATTHEWS; no cough  Cardiac: No palpitations, chest pain, denies syncope, weakness  GI: No nausea or vomiting, diarrhea, constipation, or abdominal pain.  /GYN: c/o nocturia 1 x nightly, no urinary frequency, burning or pain.   PVD: denies cyanosis, pallor, or cold extremities.  MS/Neuro: + numbness/ tingling in BLE toes; FROM of joints without swelling or pain. Gait steady, speech clear, no tremor, coordination problem.  Psych: Denies drug/ETOH abuse, no hx. of eating disorders.  Other systems: negative.    Hemoglobin A1C   Date Value Ref Range Status   08/30/2018 6.7 (H) 4.0 - 5.6 % Final     Comment:     ADA Screening Guidelines:  5.7-6.4%  " Consistent with prediabetes  >or=6.5%  Consistent with diabetes  High levels of fetal hemoglobin interfere with the HbA1C  assay. Heterozygous hemoglobin variants (HbS, HgC, etc)do  not significantly interfere with this assay.   However, presence of multiple variants may affect accuracy.     03/22/2018 6.4 (H) 4.0 - 5.6 % Final     Comment:     According to ADA guidelines, hemoglobin A1c <7.0% represents  optimal control in non-pregnant diabetic patients. Different  metrics may apply to specific patient populations.   Standards of Medical Care in Diabetes-2016.  For the purpose of screening for the presence of diabetes:  <5.7%     Consistent with the absence of diabetes  5.7-6.4%  Consistent with increasing risk for diabetes   (prediabetes)  >or=6.5%  Consistent with diabetes  Currently, no consensus exists for use of hemoglobin A1c  for diagnosis of diabetes for children.  This Hemoglobin A1c assay has significant interference with fetal   hemoglobin   (HbF). The results are invalid for patients with abnormal amounts of   HbF,   including those with known Hereditary Persistence   of Fetal Hemoglobin. Heterozygous hemoglobin variants (HbAS, HbAC,   HbAD, HbAE, HbA2) do not significantly interfere with this assay;   however, presence of multiple variants in a sample may impact the %   interference.     08/22/2017 7.1 (H) 4.0 - 5.6 % Final     Comment:     According to ADA guidelines, hemoglobin A1c <7.0% represents  optimal control in non-pregnant diabetic patients. Different  metrics may apply to specific patient populations.   Standards of Medical Care in Diabetes-2016.  For the purpose of screening for the presence of diabetes:  <5.7%     Consistent with the absence of diabetes  5.7-6.4%  Consistent with increasing risk for diabetes   (prediabetes)  >or=6.5%  Consistent with diabetes  Currently, no consensus exists for use of hemoglobin A1c  for diagnosis of diabetes for children.  This Hemoglobin A1c assay has  significant interference with fetal   hemoglobin   (HbF). The results are invalid for patients with abnormal amounts of   HbF,   including those with known Hereditary Persistence   of Fetal Hemoglobin. Heterozygous hemoglobin variants (HbAS, HbAC,   HbAD, HbAE, HbA2) do not significantly interfere with this assay;   however, presence of multiple variants in a sample may impact the %   interference.       Lab Results   Component Value Date    TSH 3.123 08/30/2018     Lab Results   Component Value Date    MICALBCREAT 196.9 (H) 08/30/2018       Chemistry        Component Value Date/Time     08/30/2018 1514    K 4.4 08/30/2018 1514     08/30/2018 1514    CO2 24 08/30/2018 1514    BUN 36 (H) 08/30/2018 1514    CREATININE 2.2 (H) 08/30/2018 1514     (H) 08/30/2018 1514        Component Value Date/Time    CALCIUM 9.7 08/30/2018 1514    ALKPHOS 38 (L) 08/30/2018 1514    AST 16 08/30/2018 1514    ALT 17 08/30/2018 1514    BILITOT 1.3 (H) 08/30/2018 1514      GFR 27    Lab Results   Component Value Date    LDLCALC 56.2 (L) 03/22/2018     PE:  GENERAL: Well developed, well nourished.  PSYCH: AAOx3, appropriate mood and affect, pleasant expression, conversant, appears relaxed, well groomed.   EYES: Conjunctiva, corneas clear, no lid lag, EOM intact.  NECK: Supple, trachea midline, FROM.  CHEST: Resp even and unlabored  CARDIAC: + BLE edema  VASCULAR: Same temperature peripherally to centrally, brisk capillary refill BUE.  NEURO: Gait steady  SKIN: Normal skin turgor. Skin warm and dry. No areas of breakdown, no acanthosis nigracans.  FEET: foot ware appropriate    ASSESSMENT and PLAN:  Kevon was seen today for diabetes mellitus.    Diagnoses and associated orders for this visit:    Type 2 diabetes mellitus with neurological, eye, CV, and renal manifestations - Discussed DM, progression of disease, long term complications. Reviewed A1c and BG goals for his age and PMH.    -Convert Levemir to Tresiba 62 units  QD - eRx sent.    - Continue Novolog AC + PM snack.    - Reviewed insulins' onset, peak, duration, dosing, administration, site rotation.   --May consider VGo in future if cost effective.    - Discussed lifestyle modifications- weight loss, diet changes and increase exercise to 3-4 x week.   -Reviewed hypoglycemia, s/s and appropriate tx options.  - Instructed to monitor BG 4 or more times daily, bring logs/ meter to clinic visits.  - would greatly benefit from personal CGM given hypoglycemia on high dose MDI, discussed Dexcom G6 - brochure provided today. He is interested when converted by Medicare.       - takes ASA, ARB, statin    Polyneuropathy in diabetes - stable, no issues today - f/u with Podiatry, Dr. 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 Tillman    Obesity Body mass index is 34.84 kg/m². discussed DM diet, snacking, and continuing exercise regularly.     Hypertension suboptimal reading today, PCP started Norvasc 3/7/2017, reports monitoring BP 4x week at home 140-150s/ 55; continue meds as previously prescribed and monitor    Hyperlipidemia controlled, on statin and fish oil, LFTs WNL   Lab Results   Component Value Date    LDLCALC 56.2 (L) 03/22/2018     GINGER - wear CPAP nightly;  if not refer to sleep medicine for assistance    Vitamin D deficiency - on supplement, need repeat level  Vit D, 25-Hydroxy   Date Value Ref Range Status   06/30/2017 17 (L) 30 - 96 ng/mL Final     Comment:     Vitamin D deficiency.........<10 ng/mL                              Vitamin D insufficiency......10-29 ng/mL       Vitamin D sufficiency........> or equal to 30 ng/mL  Vitamin D toxicity............>100  ng/mL             Orders Placed This Encounter   Procedures    Hemoglobin A1c     Standing Status:   Future     Standing Expiration Date:   10/1/2019        Follow-up in about 6 months (around 4/1/2019).    Labs prior to appt at [facility]

## 2018-10-02 ENCOUNTER — CLINICAL SUPPORT (OUTPATIENT)
Dept: INTERNAL MEDICINE | Facility: CLINIC | Age: 78
End: 2018-10-02
Payer: MEDICARE

## 2018-10-02 ENCOUNTER — LAB VISIT (OUTPATIENT)
Dept: LAB | Facility: HOSPITAL | Age: 78
End: 2018-10-02
Attending: INTERNAL MEDICINE
Payer: MEDICARE

## 2018-10-02 DIAGNOSIS — E11.42 TYPE 2 DIABETES MELLITUS WITH DIABETIC POLYNEUROPATHY, WITH LONG-TERM CURRENT USE OF INSULIN: ICD-10-CM

## 2018-10-02 DIAGNOSIS — Z79.4 TYPE 2 DIABETES MELLITUS WITH DIABETIC POLYNEUROPATHY, WITH LONG-TERM CURRENT USE OF INSULIN: ICD-10-CM

## 2018-10-02 PROCEDURE — 83036 HEMOGLOBIN GLYCOSYLATED A1C: CPT

## 2018-10-02 PROCEDURE — 36415 COLL VENOUS BLD VENIPUNCTURE: CPT | Mod: PO

## 2018-10-02 PROCEDURE — 96372 THER/PROPH/DIAG INJ SC/IM: CPT | Mod: PBBFAC,PO

## 2018-10-02 RX ADMIN — CYANOCOBALAMIN 100 MCG: 1000 INJECTION, SOLUTION INTRAMUSCULAR at 09:10

## 2018-10-03 LAB
ESTIMATED AVG GLUCOSE: 148 MG/DL
HBA1C MFR BLD HPLC: 6.8 %

## 2018-10-05 RX ORDER — INSULIN DETEMIR 100 [IU]/ML
INJECTION, SOLUTION SUBCUTANEOUS
Qty: 60 ML | Refills: 0 | Status: SHIPPED | OUTPATIENT
Start: 2018-10-05 | End: 2019-02-14

## 2018-10-31 DIAGNOSIS — E11.42 DIABETIC POLYNEUROPATHY ASSOCIATED WITH TYPE 2 DIABETES MELLITUS: ICD-10-CM

## 2018-10-31 DIAGNOSIS — I25.10 CORONARY ARTERY DISEASE INVOLVING NATIVE CORONARY ARTERY OF NATIVE HEART WITHOUT ANGINA PECTORIS: Chronic | ICD-10-CM

## 2018-10-31 DIAGNOSIS — I10 ESSENTIAL HYPERTENSION: ICD-10-CM

## 2018-10-31 DIAGNOSIS — I25.2 HISTORY OF MI (MYOCARDIAL INFARCTION): ICD-10-CM

## 2018-10-31 RX ORDER — CARVEDILOL 12.5 MG/1
TABLET ORAL
Qty: 180 TABLET | Refills: 3 | Status: SHIPPED | OUTPATIENT
Start: 2018-10-31 | End: 2019-10-23 | Stop reason: SDUPTHER

## 2018-11-06 ENCOUNTER — CLINICAL SUPPORT (OUTPATIENT)
Dept: INTERNAL MEDICINE | Facility: CLINIC | Age: 78
End: 2018-11-06
Payer: MEDICARE

## 2018-11-06 PROCEDURE — 96372 THER/PROPH/DIAG INJ SC/IM: CPT | Mod: S$GLB,,, | Performed by: INTERNAL MEDICINE

## 2018-11-06 RX ADMIN — CYANOCOBALAMIN 100 MCG: 1000 INJECTION, SOLUTION INTRAMUSCULAR at 03:11

## 2018-11-08 ENCOUNTER — OFFICE VISIT (OUTPATIENT)
Dept: OPHTHALMOLOGY | Facility: CLINIC | Age: 78
End: 2018-11-08
Payer: MEDICARE

## 2018-11-08 DIAGNOSIS — E11.3213 CONTROLLED TYPE 2 DIABETES MELLITUS WITH BOTH EYES AFFECTED BY MILD NONPROLIFERATIVE RETINOPATHY AND MACULAR EDEMA, WITH LONG-TERM CURRENT USE OF INSULIN: Primary | ICD-10-CM

## 2018-11-08 DIAGNOSIS — H35.033 HYPERTENSIVE RETINOPATHY OF BOTH EYES: ICD-10-CM

## 2018-11-08 DIAGNOSIS — Z79.4 CONTROLLED TYPE 2 DIABETES MELLITUS WITH BOTH EYES AFFECTED BY MILD NONPROLIFERATIVE RETINOPATHY AND MACULAR EDEMA, WITH LONG-TERM CURRENT USE OF INSULIN: Primary | ICD-10-CM

## 2018-11-08 PROCEDURE — 92134 CPTRZ OPH DX IMG PST SGM RTA: CPT | Mod: S$GLB,,, | Performed by: OPHTHALMOLOGY

## 2018-11-08 PROCEDURE — 67028 INJECTION EYE DRUG: CPT | Mod: LT,S$GLB,, | Performed by: OPHTHALMOLOGY

## 2018-11-08 PROCEDURE — 92014 COMPRE OPH EXAM EST PT 1/>: CPT | Mod: 25,S$GLB,, | Performed by: OPHTHALMOLOGY

## 2018-11-08 PROCEDURE — 99999 PR PBB SHADOW E&M-EST. PATIENT-LVL II: CPT | Mod: PBBFAC,,, | Performed by: OPHTHALMOLOGY

## 2018-11-08 NOTE — PATIENT INSTRUCTIONS

## 2018-11-08 NOTE — PROGRESS NOTES
HPI     Diabetic Eye Exam      Additional comments: 2 mon chk              Comments       OCT - ERM OU - no significant distortion  ME OS - central ME resolved    Prior FA - Minimal late leakage of fovea MA OS  No leakage OD      A/P    1. Mild NPDR OU  Controlled T2 on insulin    2. DME OS  Increased again    S/p Avastin OS x 3, ozurdex OS x 1 6/28/18    Ozurdex OS today    3. PCIOL OU    4. Floaters OU    5. HTN Ret OU    6. CAD - s/p stents on Plavix    BS/BP/chol control      14 weeks OCT    Risks, benefits, and alternatives to treatment discussed in detail with the patient.  The patient voiced understanding and wished to proceed with the procedure    Injection Procedure Note:  Diagnosis: DME OS    Patient Identified and Time Out complete  Topical Proparacaine and Betadine. Subconj Lido OS  Inject Ozurdex OS at 6:00 @ 3.5-4mm posterior to limbus  Post Operative Dx: Same  Complications: None  Follow up as above.

## 2018-11-23 DIAGNOSIS — N40.0 BENIGN PROSTATIC HYPERPLASIA, UNSPECIFIED WHETHER LOWER URINARY TRACT SYMPTOMS PRESENT: ICD-10-CM

## 2018-11-27 RX ORDER — TAMSULOSIN HYDROCHLORIDE 0.4 MG/1
CAPSULE ORAL
Qty: 90 CAPSULE | Refills: 0 | Status: SHIPPED | OUTPATIENT
Start: 2018-11-27 | End: 2019-02-22 | Stop reason: SDUPTHER

## 2018-11-28 DIAGNOSIS — N18.3 TYPE 2 DIABETES MELLITUS WITH STAGE 3 CHRONIC KIDNEY DISEASE, UNSPECIFIED WHETHER LONG TERM INSULIN USE: ICD-10-CM

## 2018-11-28 DIAGNOSIS — E11.22 TYPE 2 DIABETES MELLITUS WITH STAGE 3 CHRONIC KIDNEY DISEASE, UNSPECIFIED WHETHER LONG TERM INSULIN USE: ICD-10-CM

## 2018-11-29 RX ORDER — CALCIUM CITRATE/VITAMIN D3 200MG-6.25
TABLET ORAL
Qty: 50 STRIP | Refills: 0 | OUTPATIENT
Start: 2018-11-29

## 2018-11-29 RX ORDER — CALCIUM CITRATE/VITAMIN D3 200MG-6.25
TABLET ORAL
Qty: 400 STRIP | Refills: 4 | Status: SHIPPED | OUTPATIENT
Start: 2018-11-29 | End: 2019-12-11 | Stop reason: SDUPTHER

## 2018-12-06 ENCOUNTER — CLINICAL SUPPORT (OUTPATIENT)
Dept: INTERNAL MEDICINE | Facility: CLINIC | Age: 78
End: 2018-12-06
Payer: MEDICARE

## 2018-12-06 PROCEDURE — 96372 THER/PROPH/DIAG INJ SC/IM: CPT | Mod: HCNC,S$GLB,, | Performed by: INTERNAL MEDICINE

## 2018-12-06 RX ADMIN — CYANOCOBALAMIN 100 MCG: 1000 INJECTION, SOLUTION INTRAMUSCULAR at 11:12

## 2018-12-17 RX ORDER — FINASTERIDE 5 MG/1
TABLET, FILM COATED ORAL
Qty: 90 TABLET | Refills: 0 | Status: SHIPPED | OUTPATIENT
Start: 2018-12-17 | End: 2019-03-15 | Stop reason: SDUPTHER

## 2019-01-08 ENCOUNTER — TELEPHONE (OUTPATIENT)
Dept: NEPHROLOGY | Facility: CLINIC | Age: 79
End: 2019-01-08

## 2019-01-08 DIAGNOSIS — N18.30 ANEMIA OF CHRONIC RENAL FAILURE, STAGE 3 (MODERATE): ICD-10-CM

## 2019-01-08 DIAGNOSIS — D63.1 ANEMIA OF CHRONIC RENAL FAILURE, STAGE 3 (MODERATE): ICD-10-CM

## 2019-01-08 DIAGNOSIS — N18.30 CHRONIC KIDNEY DISEASE, STAGE III (MODERATE): Primary | ICD-10-CM

## 2019-01-09 ENCOUNTER — CLINICAL SUPPORT (OUTPATIENT)
Dept: INTERNAL MEDICINE | Facility: CLINIC | Age: 79
End: 2019-01-09
Payer: MEDICARE

## 2019-01-09 PROCEDURE — 96372 THER/PROPH/DIAG INJ SC/IM: CPT | Mod: HCNC,S$GLB,, | Performed by: INTERNAL MEDICINE

## 2019-01-09 PROCEDURE — 96372 PR INJECTION,THERAP/PROPH/DIAG2ST, IM OR SUBCUT: ICD-10-PCS | Mod: HCNC,S$GLB,, | Performed by: INTERNAL MEDICINE

## 2019-01-09 RX ADMIN — CYANOCOBALAMIN 100 MCG: 1000 INJECTION, SOLUTION INTRAMUSCULAR at 11:01

## 2019-02-05 ENCOUNTER — CLINICAL SUPPORT (OUTPATIENT)
Dept: INTERNAL MEDICINE | Facility: CLINIC | Age: 79
End: 2019-02-05
Payer: MEDICARE

## 2019-02-05 PROCEDURE — 90662 IIV NO PRSV INCREASED AG IM: CPT | Mod: HCNC,S$GLB,, | Performed by: INTERNAL MEDICINE

## 2019-02-05 PROCEDURE — 96372 THER/PROPH/DIAG INJ SC/IM: CPT | Mod: HCNC,S$GLB,, | Performed by: INTERNAL MEDICINE

## 2019-02-05 PROCEDURE — 90662 FLU VACCINE - HIGH DOSE (65+) PRESERVATIVE FREE IM: ICD-10-PCS | Mod: HCNC,S$GLB,, | Performed by: INTERNAL MEDICINE

## 2019-02-05 PROCEDURE — 96372 PR INJECTION,THERAP/PROPH/DIAG2ST, IM OR SUBCUT: ICD-10-PCS | Mod: HCNC,S$GLB,, | Performed by: INTERNAL MEDICINE

## 2019-02-05 PROCEDURE — G0008 FLU VACCINE - HIGH DOSE (65+) PRESERVATIVE FREE IM: ICD-10-PCS | Mod: 59,HCNC,S$GLB, | Performed by: INTERNAL MEDICINE

## 2019-02-05 PROCEDURE — G0008 ADMIN INFLUENZA VIRUS VAC: HCPCS | Mod: 59,HCNC,S$GLB, | Performed by: INTERNAL MEDICINE

## 2019-02-05 RX ADMIN — CYANOCOBALAMIN 100 MCG: 1000 INJECTION, SOLUTION INTRAMUSCULAR at 02:02

## 2019-02-14 ENCOUNTER — PROCEDURE VISIT (OUTPATIENT)
Dept: OPHTHALMOLOGY | Facility: CLINIC | Age: 79
End: 2019-02-14
Payer: MEDICARE

## 2019-02-14 VITALS — DIASTOLIC BLOOD PRESSURE: 61 MMHG | SYSTOLIC BLOOD PRESSURE: 154 MMHG | HEART RATE: 50 BPM

## 2019-02-14 DIAGNOSIS — E11.3213 CONTROLLED TYPE 2 DIABETES MELLITUS WITH BOTH EYES AFFECTED BY MILD NONPROLIFERATIVE RETINOPATHY AND MACULAR EDEMA, WITH LONG-TERM CURRENT USE OF INSULIN: Primary | ICD-10-CM

## 2019-02-14 DIAGNOSIS — H35.033 HYPERTENSIVE RETINOPATHY OF BOTH EYES: ICD-10-CM

## 2019-02-14 DIAGNOSIS — Z79.4 CONTROLLED TYPE 2 DIABETES MELLITUS WITH BOTH EYES AFFECTED BY MILD NONPROLIFERATIVE RETINOPATHY AND MACULAR EDEMA, WITH LONG-TERM CURRENT USE OF INSULIN: Primary | ICD-10-CM

## 2019-02-14 PROCEDURE — 67028 PR INJECT INTRAVITREAL PHARMCOLOGIC: ICD-10-PCS | Mod: HCNC,LT,S$GLB, | Performed by: OPHTHALMOLOGY

## 2019-02-14 PROCEDURE — 92014 PR EYE EXAM, EST PATIENT,COMPREHESV: ICD-10-PCS | Mod: 25,HCNC,S$GLB, | Performed by: OPHTHALMOLOGY

## 2019-02-14 PROCEDURE — 92134 POSTERIOR SEGMENT OCT RETINA (OCULAR COHERENCE TOMOGRAPHY)-BOTH EYES: ICD-10-PCS | Mod: HCNC,S$GLB,, | Performed by: OPHTHALMOLOGY

## 2019-02-14 PROCEDURE — 92014 COMPRE OPH EXAM EST PT 1/>: CPT | Mod: 25,HCNC,S$GLB, | Performed by: OPHTHALMOLOGY

## 2019-02-14 PROCEDURE — 99499 UNLISTED E&M SERVICE: CPT | Mod: HCNC,S$GLB,, | Performed by: OPHTHALMOLOGY

## 2019-02-14 PROCEDURE — 67028 INJECTION EYE DRUG: CPT | Mod: HCNC,LT,S$GLB, | Performed by: OPHTHALMOLOGY

## 2019-02-14 PROCEDURE — 92134 CPTRZ OPH DX IMG PST SGM RTA: CPT | Mod: HCNC,S$GLB,, | Performed by: OPHTHALMOLOGY

## 2019-02-14 PROCEDURE — 99499 RISK ADDL DX/OHS AUDIT: ICD-10-PCS | Mod: HCNC,S$GLB,, | Performed by: OPHTHALMOLOGY

## 2019-02-14 RX ORDER — INSULIN DEGLUDEC 200 U/ML
48 INJECTION, SOLUTION SUBCUTANEOUS DAILY
Refills: 3 | COMMUNITY
Start: 2019-02-08 | End: 2019-12-20 | Stop reason: SDUPTHER

## 2019-02-14 NOTE — PATIENT INSTRUCTIONS

## 2019-02-14 NOTE — PROGRESS NOTES
HPI     14 wk / OCT / Ozurdex  DLS-11/18/2018 Dr. Tillman       Pt sts when looking at a letter or something at a distance certain spots of the letter is missingAlso having problems/difficulty opening eye lids all the way worsening   since last visit and occasional mucous OS uses gtts and it helps.  Denies pain   (-)Flashes (+)Floaters  (+)Photophobia  (+)Glare    Systane PRN    HPI     Diabetic Eye Exam      Additional comments: 2 mon chk              Comments       OCT - ERM OU - no significant distortion  ME OS - central ME resolved    Prior FA - Minimal late leakage of fovea MA OS  No leakage OD      A/P    1. Mild NPDR OU  Controlled T2 on insulin    2. DME OS  Increased again    S/p Avastin OS x 3, ozurdex OS x 2 6/28/18 2/19 - slight recurrence at 14 weeks, keep at 12    Ozurdex OS today    Get approval for Iluvien    3. PCIOL OU    4. Floaters OU    5. HTN Ret OU    6. CAD - s/p stents on Plavix    BS/BP/chol control      12 weeks OCT    Risks, benefits, and alternatives to treatment discussed in detail with the patient.  The patient voiced understanding and wished to proceed with the procedure    Injection Procedure Note:  Diagnosis: DME OS    Patient Identified and Time Out complete  Topical Proparacaine and Betadine. Subconj Lido OS  Inject Ozurdex OS at 6:00 @ 3.5-4mm posterior to limbus  Post Operative Dx: Same  Complications: None  Follow up as above.

## 2019-02-22 DIAGNOSIS — N40.0 BENIGN PROSTATIC HYPERPLASIA, UNSPECIFIED WHETHER LOWER URINARY TRACT SYMPTOMS PRESENT: ICD-10-CM

## 2019-02-22 RX ORDER — TAMSULOSIN HYDROCHLORIDE 0.4 MG/1
CAPSULE ORAL
Qty: 90 CAPSULE | Refills: 0 | Status: SHIPPED | OUTPATIENT
Start: 2019-02-22 | End: 2019-05-21 | Stop reason: SDUPTHER

## 2019-02-25 RX ORDER — CLOPIDOGREL BISULFATE 75 MG/1
TABLET ORAL
Qty: 90 TABLET | Refills: 1 | Status: SHIPPED | OUTPATIENT
Start: 2019-02-25 | End: 2020-02-03

## 2019-03-11 ENCOUNTER — CLINICAL SUPPORT (OUTPATIENT)
Dept: INTERNAL MEDICINE | Facility: CLINIC | Age: 79
End: 2019-03-11
Payer: MEDICARE

## 2019-03-11 PROCEDURE — 96372 PR INJECTION,THERAP/PROPH/DIAG2ST, IM OR SUBCUT: ICD-10-PCS | Mod: HCNC,S$GLB,, | Performed by: INTERNAL MEDICINE

## 2019-03-11 PROCEDURE — 96372 THER/PROPH/DIAG INJ SC/IM: CPT | Mod: HCNC,S$GLB,, | Performed by: INTERNAL MEDICINE

## 2019-03-11 RX ADMIN — CYANOCOBALAMIN 100 MCG: 1000 INJECTION, SOLUTION INTRAMUSCULAR at 10:03

## 2019-03-15 RX ORDER — FINASTERIDE 5 MG/1
TABLET, FILM COATED ORAL
Qty: 90 TABLET | Refills: 0 | Status: SHIPPED | OUTPATIENT
Start: 2019-03-15 | End: 2019-06-12 | Stop reason: SDUPTHER

## 2019-04-04 ENCOUNTER — TELEPHONE (OUTPATIENT)
Dept: INTERNAL MEDICINE | Facility: CLINIC | Age: 79
End: 2019-04-04

## 2019-04-04 RX ORDER — ATORVASTATIN CALCIUM 20 MG/1
TABLET, FILM COATED ORAL
Qty: 90 TABLET | Refills: 1 | Status: SHIPPED | OUTPATIENT
Start: 2019-04-04 | End: 2019-09-25 | Stop reason: SDUPTHER

## 2019-04-05 ENCOUNTER — OFFICE VISIT (OUTPATIENT)
Dept: NEPHROLOGY | Facility: CLINIC | Age: 79
End: 2019-04-05
Payer: MEDICARE

## 2019-04-05 VITALS
DIASTOLIC BLOOD PRESSURE: 62 MMHG | OXYGEN SATURATION: 98 % | HEIGHT: 70 IN | SYSTOLIC BLOOD PRESSURE: 128 MMHG | WEIGHT: 239.44 LBS | BODY MASS INDEX: 34.28 KG/M2 | HEART RATE: 56 BPM

## 2019-04-05 DIAGNOSIS — E11.22 CONTROLLED TYPE 2 DIABETES MELLITUS WITH STAGE 3 CHRONIC KIDNEY DISEASE, WITH LONG-TERM CURRENT USE OF INSULIN: Primary | ICD-10-CM

## 2019-04-05 DIAGNOSIS — Z79.4 CONTROLLED TYPE 2 DIABETES MELLITUS WITH STAGE 3 CHRONIC KIDNEY DISEASE, WITH LONG-TERM CURRENT USE OF INSULIN: Primary | ICD-10-CM

## 2019-04-05 DIAGNOSIS — D63.1 ANEMIA OF CHRONIC RENAL FAILURE, STAGE 3 (MODERATE): ICD-10-CM

## 2019-04-05 DIAGNOSIS — I12.9 HYPERTENSIVE KIDNEY DISEASE WITH CHRONIC KIDNEY DISEASE, STAGE 1-4 OR UNSPECIFIED CHRONIC KIDNEY DISEASE: ICD-10-CM

## 2019-04-05 DIAGNOSIS — N18.30 CONTROLLED TYPE 2 DIABETES MELLITUS WITH STAGE 3 CHRONIC KIDNEY DISEASE, WITH LONG-TERM CURRENT USE OF INSULIN: Primary | ICD-10-CM

## 2019-04-05 DIAGNOSIS — N18.30 ANEMIA OF CHRONIC RENAL FAILURE, STAGE 3 (MODERATE): ICD-10-CM

## 2019-04-05 PROCEDURE — 99999 PR PBB SHADOW E&M-EST. PATIENT-LVL IV: ICD-10-PCS | Mod: PBBFAC,HCNC,, | Performed by: INTERNAL MEDICINE

## 2019-04-05 PROCEDURE — 3078F DIAST BP <80 MM HG: CPT | Mod: HCNC,CPTII,S$GLB, | Performed by: INTERNAL MEDICINE

## 2019-04-05 PROCEDURE — 99999 PR PBB SHADOW E&M-EST. PATIENT-LVL IV: CPT | Mod: PBBFAC,HCNC,, | Performed by: INTERNAL MEDICINE

## 2019-04-05 PROCEDURE — 1101F PR PT FALLS ASSESS DOC 0-1 FALLS W/OUT INJ PAST YR: ICD-10-PCS | Mod: HCNC,CPTII,S$GLB, | Performed by: INTERNAL MEDICINE

## 2019-04-05 PROCEDURE — 3078F PR MOST RECENT DIASTOLIC BLOOD PRESSURE < 80 MM HG: ICD-10-PCS | Mod: HCNC,CPTII,S$GLB, | Performed by: INTERNAL MEDICINE

## 2019-04-05 PROCEDURE — 1101F PT FALLS ASSESS-DOCD LE1/YR: CPT | Mod: HCNC,CPTII,S$GLB, | Performed by: INTERNAL MEDICINE

## 2019-04-05 PROCEDURE — 99213 OFFICE O/P EST LOW 20 MIN: CPT | Mod: HCNC,S$GLB,, | Performed by: INTERNAL MEDICINE

## 2019-04-05 PROCEDURE — 99213 PR OFFICE/OUTPT VISIT, EST, LEVL III, 20-29 MIN: ICD-10-PCS | Mod: HCNC,S$GLB,, | Performed by: INTERNAL MEDICINE

## 2019-04-05 PROCEDURE — 3074F SYST BP LT 130 MM HG: CPT | Mod: HCNC,CPTII,S$GLB, | Performed by: INTERNAL MEDICINE

## 2019-04-05 PROCEDURE — 3074F PR MOST RECENT SYSTOLIC BLOOD PRESSURE < 130 MM HG: ICD-10-PCS | Mod: HCNC,CPTII,S$GLB, | Performed by: INTERNAL MEDICINE

## 2019-04-11 ENCOUNTER — CLINICAL SUPPORT (OUTPATIENT)
Dept: INTERNAL MEDICINE | Facility: CLINIC | Age: 79
End: 2019-04-11
Payer: MEDICARE

## 2019-04-11 PROCEDURE — 96372 PR INJECTION,THERAP/PROPH/DIAG2ST, IM OR SUBCUT: ICD-10-PCS | Mod: HCNC,S$GLB,, | Performed by: INTERNAL MEDICINE

## 2019-04-11 PROCEDURE — 96372 THER/PROPH/DIAG INJ SC/IM: CPT | Mod: HCNC,S$GLB,, | Performed by: INTERNAL MEDICINE

## 2019-04-11 RX ADMIN — CYANOCOBALAMIN 100 MCG: 1000 INJECTION, SOLUTION INTRAMUSCULAR at 11:04

## 2019-04-12 RX ORDER — HYDROCHLOROTHIAZIDE 12.5 MG/1
TABLET ORAL
Qty: 30 TABLET | Refills: 11 | Status: SHIPPED | OUTPATIENT
Start: 2019-04-12 | End: 2019-05-10 | Stop reason: SDUPTHER

## 2019-04-16 NOTE — PROGRESS NOTES
Subjective:       Patient ID: Kevon Perez is a 78 y.o. White male who presents for follow-up evaluation of Chronic Kidney Disease    HPI      Mr. Perez is a 78 year old man with past medical history of diabetes, hypertension presenting for follow up of chronic kidney disease.  Patient denies any complaints, denies any fever, chest pain, shortness of breath, abdominal pain, diarrhea, dysuria/hematuria. He reports blood pressure up to 140 systolic, blood sugars better controlled with dietary changes.  He reports more adequate daily fluid intake.      Review of Systems   Constitutional: Negative for appetite change, fatigue and fever.   Respiratory: Negative for cough and shortness of breath.    Cardiovascular: Negative for chest pain and leg swelling.   Gastrointestinal: Negative for abdominal pain, constipation, diarrhea, nausea and vomiting.   Genitourinary: Negative for dysuria, flank pain, frequency, hematuria and urgency.   Musculoskeletal: Negative for arthralgias, back pain and joint swelling.   Skin: Negative for rash.   Neurological: Negative for dizziness and light-headedness.   All other systems reviewed and are negative.      Objective:      Physical Exam   Constitutional: He appears well-developed and well-nourished.   Cardiovascular: Normal rate and regular rhythm. Exam reveals no gallop and no friction rub.   No murmur heard.  Pulmonary/Chest: Effort normal and breath sounds normal. No respiratory distress. He has no wheezes. He has no rales.   Musculoskeletal: He exhibits edema (trace bilateral lower extremity).   Neurological: He is alert.   Skin: Skin is warm and dry. No rash noted.   Vitals reviewed.      Assessment:       1. Controlled type 2 diabetes mellitus with stage 3 chronic kidney disease, with long-term current use of insulin    2. Hypertensive kidney disease with chronic kidney disease, stage 1-4 or unspecified chronic kidney disease    3. Anemia of chronic renal failure, stage 3  (moderate)        Plan:     Mr. Perez is a 78 year old man with past medical history of diabetes, hypertension presenting for follow up of chronic kidney disease stage IIIb (hypertensive nephrosclerosis v. diabetic nephropathy). Creatinine previously within baseline range, will continue to trend.  Encouraged fluid intake, again stressed importance of glycemic/blood pressure control, along with weight loss/exercise, to prevent progression of kidney disease, patient voiced understanding.   - Hypertension: blood pressure at goal at home, will phone review BP diary   - Anemia: Hgb at goal, no indication for erythropoetin therapy, encouraged dietary iron  - Bone/mineral metabolism: patient with secondary hyperparathyroidism, will continue to monitor PTH, patient on calcitriol, encouraged daily VitD supplementation     Return to clinic 6-12 months pending renal panel next month, then with renal panel, urinalysis/culture, urine protein/creatinine ratio, PTH/VitD prior to next visit

## 2019-05-06 ENCOUNTER — LAB VISIT (OUTPATIENT)
Dept: LAB | Facility: HOSPITAL | Age: 79
End: 2019-05-06
Attending: INTERNAL MEDICINE
Payer: MEDICARE

## 2019-05-06 DIAGNOSIS — Z79.4 CONTROLLED TYPE 2 DIABETES MELLITUS WITH STAGE 3 CHRONIC KIDNEY DISEASE, WITH LONG-TERM CURRENT USE OF INSULIN: ICD-10-CM

## 2019-05-06 DIAGNOSIS — E11.22 CONTROLLED TYPE 2 DIABETES MELLITUS WITH STAGE 3 CHRONIC KIDNEY DISEASE, WITH LONG-TERM CURRENT USE OF INSULIN: ICD-10-CM

## 2019-05-06 DIAGNOSIS — N18.30 CONTROLLED TYPE 2 DIABETES MELLITUS WITH STAGE 3 CHRONIC KIDNEY DISEASE, WITH LONG-TERM CURRENT USE OF INSULIN: ICD-10-CM

## 2019-05-06 LAB
BILIRUB UR QL STRIP: NEGATIVE
CLARITY UR REFRACT.AUTO: CLEAR
COLOR UR AUTO: YELLOW
CREAT UR-MCNC: 126 MG/DL (ref 23–375)
GLUCOSE UR QL STRIP: NEGATIVE
HGB UR QL STRIP: NEGATIVE
KETONES UR QL STRIP: NEGATIVE
LEUKOCYTE ESTERASE UR QL STRIP: NEGATIVE
NITRITE UR QL STRIP: NEGATIVE
PH UR STRIP: 5 [PH] (ref 5–8)
PROT UR QL STRIP: NEGATIVE
PROT UR-MCNC: 23 MG/DL (ref 0–15)
PROT/CREAT UR: 0.18 MG/G{CREAT} (ref 0–0.2)
SP GR UR STRIP: 1.01 (ref 1–1.03)
URN SPEC COLLECT METH UR: NORMAL

## 2019-05-06 PROCEDURE — 82570 ASSAY OF URINE CREATININE: CPT | Mod: HCNC

## 2019-05-06 PROCEDURE — 81003 URINALYSIS AUTO W/O SCOPE: CPT | Mod: HCNC

## 2019-05-09 ENCOUNTER — PROCEDURE VISIT (OUTPATIENT)
Dept: OPHTHALMOLOGY | Facility: CLINIC | Age: 79
End: 2019-05-09
Payer: MEDICARE

## 2019-05-09 VITALS — SYSTOLIC BLOOD PRESSURE: 155 MMHG | HEART RATE: 59 BPM | DIASTOLIC BLOOD PRESSURE: 60 MMHG

## 2019-05-09 DIAGNOSIS — E11.3213 CONTROLLED TYPE 2 DIABETES MELLITUS WITH BOTH EYES AFFECTED BY MILD NONPROLIFERATIVE RETINOPATHY AND MACULAR EDEMA, WITH LONG-TERM CURRENT USE OF INSULIN: Primary | ICD-10-CM

## 2019-05-09 DIAGNOSIS — Z79.4 CONTROLLED TYPE 2 DIABETES MELLITUS WITH BOTH EYES AFFECTED BY MILD NONPROLIFERATIVE RETINOPATHY AND MACULAR EDEMA, WITH LONG-TERM CURRENT USE OF INSULIN: Primary | ICD-10-CM

## 2019-05-09 DIAGNOSIS — H35.033 HYPERTENSIVE RETINOPATHY OF BOTH EYES: ICD-10-CM

## 2019-05-09 PROCEDURE — 67028 PR INJECT INTRAVITREAL PHARMCOLOGIC: ICD-10-PCS | Mod: HCNC,LT,S$GLB, | Performed by: OPHTHALMOLOGY

## 2019-05-09 PROCEDURE — 92134 POSTERIOR SEGMENT OCT RETINA (OCULAR COHERENCE TOMOGRAPHY)-BOTH EYES: ICD-10-PCS | Mod: HCNC,S$GLB,, | Performed by: OPHTHALMOLOGY

## 2019-05-09 PROCEDURE — 92134 CPTRZ OPH DX IMG PST SGM RTA: CPT | Mod: HCNC,S$GLB,, | Performed by: OPHTHALMOLOGY

## 2019-05-09 PROCEDURE — 92014 PR EYE EXAM, EST PATIENT,COMPREHESV: ICD-10-PCS | Mod: 25,HCNC,S$GLB, | Performed by: OPHTHALMOLOGY

## 2019-05-09 PROCEDURE — 67028 INJECTION EYE DRUG: CPT | Mod: HCNC,LT,S$GLB, | Performed by: OPHTHALMOLOGY

## 2019-05-09 PROCEDURE — 92014 COMPRE OPH EXAM EST PT 1/>: CPT | Mod: 25,HCNC,S$GLB, | Performed by: OPHTHALMOLOGY

## 2019-05-09 NOTE — PATIENT INSTRUCTIONS

## 2019-05-09 NOTE — PROGRESS NOTES
Subjective:      Patient ID: Kevon Perez is a 78 y.o. male.    Chief Complaint:  Diabetes    History of Present Illness  Kevon Perez presents today for follow up of T2DM. Previous patient of NELSON Arnold NP. Last seen 10/1/18. This is his first visit with me.     With regards to the diabetes:    Diagnosed: 1994  Known complications:  DKA-  RN + L eye  PN +  Nephropathy +    Current regimen:  Tresiba 60 AM - Does not miss any doses.   Novolog 15-25 units AC - Does not miss any doses.     Other medications tried:  Metformin - renal insufficiency  Glyburide  Glucovance    Glucose Monitor:  4 times a day testing  Log reviewed: yes, logs provided and scanned in chart.             Diet/Exercise:  Eats 3 meals a day.   Snacks: occ chips and cookies, peanuts         Drinks: diet soda   Exercise - tries to stay active     Hypoglycemia:   Yes, did not need assistance. Starts to feel symptomatic around 60. A few isolated lows in the morning and before lunch.  Knows how to correct with 15 grams of carbs- juice, coke, or a peppermint.      Education - last visit: none    Diabetes Management Status  Statin: Taking  ACE/ARB: Taking  Screening or Prevention Patient's value Goal Complete/Controlled?   HgA1C Testing and Control   Lab Results   Component Value Date    HGBA1C 6.1 (H) 05/06/2019      Annually/Less than 8% Yes   Lipid profile : 05/06/2019 Annually Yes   LDL control Lab Results   Component Value Date    LDLCALC 54.0 (L) 05/06/2019    Annually/Less than 100 mg/dl  Yes   Nephropathy screening Lab Results   Component Value Date    LABMICR 254.0 08/30/2018     Lab Results   Component Value Date    PROTEINUA Negative 05/06/2019    Annually Yes   Blood pressure BP Readings from Last 1 Encounters:   05/13/19 122/64    Less than 140/90 Yes   Dilated retinal exam : 05/09/2019 Annually Yes   Foot exam   : 09/02/2016 Annually No     With regards to the vitamin d deficiency:   Ref. Range 5/6/2019 13:33   Vit D, 25-Hydroxy  Latest Ref Range: 30 - 96 ng/mL 12 (L)     Currently not taking anything.     With regards to hyperparathyroidism:   Ref. Range 3/22/2018 08:17 8/30/2018 15:14 8/30/2018 15:14 5/6/2019 13:33   PTH Latest Ref Range: 9.0 - 77.0 pg/mL    70.0   Calcium Latest Ref Range: 8.7 - 10.5 mg/dL 9.2 9.7     Albumin Latest Ref Range: 3.5 - 5.2 g/dL 3.3 (L) 3.6 3.6      Calcitriol 0.25mcg daily  Follows with nephrology.     Review of Systems   Constitutional: Negative for unexpected weight change.   Eyes: Negative for visual disturbance.   Respiratory: Negative for shortness of breath.    Cardiovascular: Negative for chest pain.   Gastrointestinal: Negative for abdominal pain.   Endocrine: Negative for cold intolerance, heat intolerance, polydipsia, polyphagia and polyuria.   Musculoskeletal: Negative for arthralgias.   Skin: Negative for wound.   Neurological: Negative for headaches.   Hematological: Does not bruise/bleed easily.   Psychiatric/Behavioral: Negative for sleep disturbance.     Objective:   Physical Exam   Neck: No thyromegaly present.   Cardiovascular: Normal rate.   No edema present   Pulmonary/Chest: Effort normal.   Abdominal: Soft.   Vitals reviewed.  Appropriate footwear, Foot exam deferred, done 5/4/18 by podiatry.  Injection sites are ok. No lipo hypertropthy or atrophy.    Body mass index is 35.21 kg/m².    Lab Review:   Lab Results   Component Value Date    HGBA1C 6.1 (H) 05/06/2019     Lab Results   Component Value Date    CHOL 108 (L) 05/06/2019    HDL 34 (L) 05/06/2019    LDLCALC 54.0 (L) 05/06/2019    TRIG 100 05/06/2019    CHOLHDL 31.5 05/06/2019     Lab Results   Component Value Date     08/30/2018    K 4.4 08/30/2018     08/30/2018    CO2 24 08/30/2018     (H) 08/30/2018    BUN 36 (H) 08/30/2018    CREATININE 2.2 (H) 08/30/2018    CALCIUM 9.7 08/30/2018    PROT 7.0 08/30/2018    ALBUMIN 3.6 08/30/2018    ALBUMIN 3.6 08/30/2018    BILITOT 1.3 (H) 08/30/2018    ALKPHOS 38 (L)  08/30/2018    AST 16 08/30/2018    ALT 17 08/30/2018    ANIONGAP 8 08/30/2018    ESTGFRAFRICA 31.8 (A) 08/30/2018    EGFRNONAA 27.5 (A) 08/30/2018    TSH 3.123 08/30/2018     Assessment and Plan     1. Type 2 diabetes mellitus with stage 3 chronic kidney disease, with long-term current use of insulin  Hemoglobin A1c    Comprehensive metabolic panel    insulin aspart U-100 (NOVOLOG FLEXPEN U-100 INSULIN) 100 unit/mL (3 mL) InPn pen   2. Vitamin D deficiency disease  Vitamin D   3. Hyperparathyroidism, secondary renal  PTH, intact   4. Severe obesity (BMI 35.0-35.9 with comorbidity)     5. GINGER (obstructive sleep apnea)     6. CKD (chronic kidney disease) stage 3, GFR 30-59 ml/min     7. Essential hypertension     8. Pure hypercholesterolemia     9. Hypertensive retinopathy of both eyes     10. Diabetic polyneuropathy associated with type 2 diabetes mellitus     11. Type 2 diabetes mellitus with diabetic polyneuropathy, with long-term current use of insulin  insulin aspart U-100 (NOVOLOG FLEXPEN U-100 INSULIN) 100 unit/mL (3 mL) InPn pen     Type 2 diabetes mellitus with diabetic chronic kidney disease  -- Labs prior  -- Decrease: Tresiba 48units daily and Novolog 15units plus correction scale 180/25 before meals.  (decrease to match closer to weight based dose and to decrease the number of hypoglycemic episodes)  -- Reviewed goals of therapy are to get the best control we can without hypoglycemia  -- Reviewed patient's current insulin regimen. Clarified proper insulin dose and timing in relation to meals, etc. Insulin injection sites and proper rotation instructed.    -- Advised frequent self blood glucose monitoring.  Patient encouraged to document glucose results and bring them to every clinic visit    -- Hypoglycemia precautions discussed. Instructed on precautions before driving.    -- Call for Bg repeatedly < 90 or > 180.   -- Close adherence to lifestyle changes recommended.   -- Periodic follow ups for eye  evaluations, foot care and dental care suggested. UTD    Vitamin D deficiency disease  -- Check Vit D prior to next appt  -- Start: 2000iu Vitamin D3    Hyperparathyroidism, secondary renal  -- Labs prior  -- Likely secondary due to CKD  -- Continue following with nephrology      GINGER (obstructive sleep apnea)  -- Continue using CPAP    CKD (chronic kidney disease) stage 3, GFR 30-59 ml/min  -- Continue following with nephrology    Essential hypertension  -- on ARB  -- Controlled  -- Blood pressure goals discussed with patient    Pure hypercholesterolemia  -- Controlled   -- On statin per ADA recommendations    Hypertensive retinopathy of both eyes  -- Optimize glucose control    Diabetic polyneuropathy associated with type 2 diabetes mellitus  -- Optimize glucose control    Follow up in about 6 months (around 11/13/2019).  Labs prior

## 2019-05-09 NOTE — PROGRESS NOTES
HPI     12 wk / OCT / Ozurdex approved for Iluvien  DLS-11/18/2018 Dr. Tillman     Pt sts doing okay no change since last visit   Denies pain just mucous in OS clear and mainly in am   (-)Flashes (+)Floaters no change  (+)Photophobia  (+)Glare    Systane PRN    HPI     14 wk / OCT / Ozurdex  DLS-11/18/2018 Dr. Tillman       Pt sts when looking at a letter or something at a distance certain spots of the letter is missingAlso having problems/difficulty opening eye lids all the way worsening   since last visit and occasional mucous OS uses gtts and it helps.  Denies pain   (-)Flashes (+)Floaters  (+)Photophobia  (+)Glare    Systane PRN    HPI     Diabetic Eye Exam      Additional comments: 2 mon chk              Comments       OCT - ERM OU - no significant distortion  ME OS - central ME resolved    Prior FA - Minimal late leakage of fovea MA OS  No leakage OD      A/P    1. Mild NPDR OU  Controlled T2 on insulin    2. DME OS  Increased again    S/p Avastin OS x 3, ozurdex OS x 3 2/19 2/19 - slight recurrence at 14 weeks, keep at 12    Ozurdex OS today    Iluvien in 2 weeks    3. PCIOL OU    4. Floaters OU    5. HTN Ret OU    6. CAD - s/p stents on Plavix    BS/BP/chol control      2 week iluvien OS only    Risks, benefits, and alternatives to treatment discussed in detail with the patient.  The patient voiced understanding and wished to proceed with the procedure    Injection Procedure Note:  Diagnosis: DME OS    Patient Identified and Time Out complete  Topical Proparacaine and Betadine. Subconj Lido OS  Inject Ozurdex OS at 6:00 @ 3.5-4mm posterior to limbus  Post Operative Dx: Same  Complications: None  Follow up as above.

## 2019-05-10 ENCOUNTER — TELEPHONE (OUTPATIENT)
Dept: INTERNAL MEDICINE | Facility: CLINIC | Age: 79
End: 2019-05-10

## 2019-05-10 RX ORDER — HYDROCHLOROTHIAZIDE 12.5 MG/1
TABLET ORAL
Qty: 30 TABLET | Refills: 11 | Status: SHIPPED | OUTPATIENT
Start: 2019-05-10 | End: 2020-04-08 | Stop reason: SDUPTHER

## 2019-05-10 NOTE — TELEPHONE ENCOUNTER
----- Message from Miranda Jama sent at 5/10/2019  1:09 PM CDT -----  Contact: Patient [phone]  2nd Request    Patient is requesting an appt for a B12 injection and would like to have it done today at 2pm.    Please call and advise.    Thank You

## 2019-05-13 ENCOUNTER — OFFICE VISIT (OUTPATIENT)
Dept: ENDOCRINOLOGY | Facility: CLINIC | Age: 79
End: 2019-05-13
Payer: MEDICARE

## 2019-05-13 VITALS
BODY MASS INDEX: 35.13 KG/M2 | HEART RATE: 66 BPM | HEIGHT: 70 IN | SYSTOLIC BLOOD PRESSURE: 122 MMHG | DIASTOLIC BLOOD PRESSURE: 64 MMHG | WEIGHT: 245.38 LBS

## 2019-05-13 DIAGNOSIS — N18.30 TYPE 2 DIABETES MELLITUS WITH STAGE 3 CHRONIC KIDNEY DISEASE, WITH LONG-TERM CURRENT USE OF INSULIN: Primary | Chronic | ICD-10-CM

## 2019-05-13 DIAGNOSIS — E55.9 VITAMIN D DEFICIENCY DISEASE: ICD-10-CM

## 2019-05-13 DIAGNOSIS — Z79.4 TYPE 2 DIABETES MELLITUS WITH DIABETIC POLYNEUROPATHY, WITH LONG-TERM CURRENT USE OF INSULIN: ICD-10-CM

## 2019-05-13 DIAGNOSIS — E11.22 TYPE 2 DIABETES MELLITUS WITH STAGE 3 CHRONIC KIDNEY DISEASE, WITH LONG-TERM CURRENT USE OF INSULIN: Primary | Chronic | ICD-10-CM

## 2019-05-13 DIAGNOSIS — E66.01 SEVERE OBESITY (BMI 35.0-35.9 WITH COMORBIDITY): ICD-10-CM

## 2019-05-13 DIAGNOSIS — N25.81 HYPERPARATHYROIDISM, SECONDARY RENAL: ICD-10-CM

## 2019-05-13 DIAGNOSIS — Z79.4 TYPE 2 DIABETES MELLITUS WITH STAGE 3 CHRONIC KIDNEY DISEASE, WITH LONG-TERM CURRENT USE OF INSULIN: Primary | Chronic | ICD-10-CM

## 2019-05-13 DIAGNOSIS — N18.30 CKD (CHRONIC KIDNEY DISEASE) STAGE 3, GFR 30-59 ML/MIN: Chronic | ICD-10-CM

## 2019-05-13 DIAGNOSIS — I10 ESSENTIAL HYPERTENSION: ICD-10-CM

## 2019-05-13 DIAGNOSIS — E78.00 PURE HYPERCHOLESTEROLEMIA: ICD-10-CM

## 2019-05-13 DIAGNOSIS — E11.42 TYPE 2 DIABETES MELLITUS WITH DIABETIC POLYNEUROPATHY, WITH LONG-TERM CURRENT USE OF INSULIN: ICD-10-CM

## 2019-05-13 DIAGNOSIS — E11.42 DIABETIC POLYNEUROPATHY ASSOCIATED WITH TYPE 2 DIABETES MELLITUS: ICD-10-CM

## 2019-05-13 DIAGNOSIS — H35.033 HYPERTENSIVE RETINOPATHY OF BOTH EYES: ICD-10-CM

## 2019-05-13 DIAGNOSIS — G47.33 OSA (OBSTRUCTIVE SLEEP APNEA): ICD-10-CM

## 2019-05-13 PROCEDURE — 3074F PR MOST RECENT SYSTOLIC BLOOD PRESSURE < 130 MM HG: ICD-10-PCS | Mod: HCNC,CPTII,S$GLB, | Performed by: NURSE PRACTITIONER

## 2019-05-13 PROCEDURE — 99214 OFFICE O/P EST MOD 30 MIN: CPT | Mod: HCNC,S$GLB,, | Performed by: NURSE PRACTITIONER

## 2019-05-13 PROCEDURE — 99999 PR PBB SHADOW E&M-EST. PATIENT-LVL III: ICD-10-PCS | Mod: PBBFAC,HCNC,, | Performed by: NURSE PRACTITIONER

## 2019-05-13 PROCEDURE — 99499 UNLISTED E&M SERVICE: CPT | Mod: HCNC,S$GLB,, | Performed by: NURSE PRACTITIONER

## 2019-05-13 PROCEDURE — 99999 PR PBB SHADOW E&M-EST. PATIENT-LVL III: CPT | Mod: PBBFAC,HCNC,, | Performed by: NURSE PRACTITIONER

## 2019-05-13 PROCEDURE — 3078F DIAST BP <80 MM HG: CPT | Mod: HCNC,CPTII,S$GLB, | Performed by: NURSE PRACTITIONER

## 2019-05-13 PROCEDURE — 3078F PR MOST RECENT DIASTOLIC BLOOD PRESSURE < 80 MM HG: ICD-10-PCS | Mod: HCNC,CPTII,S$GLB, | Performed by: NURSE PRACTITIONER

## 2019-05-13 PROCEDURE — 1101F PT FALLS ASSESS-DOCD LE1/YR: CPT | Mod: HCNC,CPTII,S$GLB, | Performed by: NURSE PRACTITIONER

## 2019-05-13 PROCEDURE — 3074F SYST BP LT 130 MM HG: CPT | Mod: HCNC,CPTII,S$GLB, | Performed by: NURSE PRACTITIONER

## 2019-05-13 PROCEDURE — 99499 RISK ADDL DX/OHS AUDIT: ICD-10-PCS | Mod: HCNC,S$GLB,, | Performed by: NURSE PRACTITIONER

## 2019-05-13 PROCEDURE — 1101F PR PT FALLS ASSESS DOC 0-1 FALLS W/OUT INJ PAST YR: ICD-10-PCS | Mod: HCNC,CPTII,S$GLB, | Performed by: NURSE PRACTITIONER

## 2019-05-13 PROCEDURE — 99214 PR OFFICE/OUTPT VISIT, EST, LEVL IV, 30-39 MIN: ICD-10-PCS | Mod: HCNC,S$GLB,, | Performed by: NURSE PRACTITIONER

## 2019-05-13 RX ORDER — INSULIN ASPART 100 [IU]/ML
15 INJECTION, SOLUTION INTRAVENOUS; SUBCUTANEOUS 4 TIMES DAILY
Qty: 6 BOX | Refills: 3 | Status: SHIPPED | OUTPATIENT
Start: 2019-05-13 | End: 2020-10-08 | Stop reason: SDUPTHER

## 2019-05-13 NOTE — ASSESSMENT & PLAN NOTE
-- Labs prior  -- Decrease: Tresiba 48units daily and Novolog 15units plus correction scale 180/25 before meals.  (decrease to match closer to weight based dose and to decrease the number of hypoglycemic episodes)  -- Reviewed goals of therapy are to get the best control we can without hypoglycemia  -- Reviewed patient's current insulin regimen. Clarified proper insulin dose and timing in relation to meals, etc. Insulin injection sites and proper rotation instructed.    -- Advised frequent self blood glucose monitoring.  Patient encouraged to document glucose results and bring them to every clinic visit    -- Hypoglycemia precautions discussed. Instructed on precautions before driving.    -- Call for Bg repeatedly < 90 or > 180.   -- Close adherence to lifestyle changes recommended.   -- Periodic follow ups for eye evaluations, foot care and dental care suggested. [facility]

## 2019-05-14 ENCOUNTER — TELEPHONE (OUTPATIENT)
Dept: INTERNAL MEDICINE | Facility: CLINIC | Age: 79
End: 2019-05-14

## 2019-05-14 DIAGNOSIS — R79.89 LOW VITAMIN B12 LEVEL: Primary | ICD-10-CM

## 2019-05-14 RX ORDER — CYANOCOBALAMIN 1000 UG/ML
1000 INJECTION, SOLUTION INTRAMUSCULAR; SUBCUTANEOUS
Status: SHIPPED | OUTPATIENT
Start: 2019-05-14 | End: 2020-05-08

## 2019-05-14 NOTE — TELEPHONE ENCOUNTER
----- Message from Carolyn Villareal sent at 5/14/2019  8:26 AM CDT -----  Contact: Patient [phone]  Patient would like to come in today for B-12 injection at 2:00pm.  Please call back to confirm.    Please call and advise  Thank you

## 2019-05-21 DIAGNOSIS — N40.0 BENIGN PROSTATIC HYPERPLASIA, UNSPECIFIED WHETHER LOWER URINARY TRACT SYMPTOMS PRESENT: ICD-10-CM

## 2019-05-22 RX ORDER — TAMSULOSIN HYDROCHLORIDE 0.4 MG/1
CAPSULE ORAL
Qty: 90 CAPSULE | Refills: 0 | Status: SHIPPED | OUTPATIENT
Start: 2019-05-22 | End: 2019-08-23 | Stop reason: SDUPTHER

## 2019-05-23 ENCOUNTER — PROCEDURE VISIT (OUTPATIENT)
Dept: OPHTHALMOLOGY | Facility: CLINIC | Age: 79
End: 2019-05-23
Payer: MEDICARE

## 2019-05-23 VITALS — SYSTOLIC BLOOD PRESSURE: 140 MMHG | DIASTOLIC BLOOD PRESSURE: 62 MMHG | HEART RATE: 62 BPM

## 2019-05-23 DIAGNOSIS — E11.3213 CONTROLLED TYPE 2 DIABETES MELLITUS WITH BOTH EYES AFFECTED BY MILD NONPROLIFERATIVE RETINOPATHY AND MACULAR EDEMA, WITH LONG-TERM CURRENT USE OF INSULIN: Primary | ICD-10-CM

## 2019-05-23 DIAGNOSIS — Z79.4 CONTROLLED TYPE 2 DIABETES MELLITUS WITH BOTH EYES AFFECTED BY MILD NONPROLIFERATIVE RETINOPATHY AND MACULAR EDEMA, WITH LONG-TERM CURRENT USE OF INSULIN: Primary | ICD-10-CM

## 2019-05-23 PROCEDURE — 67028 INJECTION EYE DRUG: CPT | Mod: HCNC,LT,S$GLB, | Performed by: OPHTHALMOLOGY

## 2019-05-23 PROCEDURE — 99499 NO LOS: ICD-10-PCS | Mod: HCNC,S$GLB,, | Performed by: OPHTHALMOLOGY

## 2019-05-23 PROCEDURE — 67028 PR INJECT INTRAVITREAL PHARMCOLOGIC: ICD-10-PCS | Mod: HCNC,LT,S$GLB, | Performed by: OPHTHALMOLOGY

## 2019-05-23 PROCEDURE — 99499 UNLISTED E&M SERVICE: CPT | Mod: HCNC,S$GLB,, | Performed by: OPHTHALMOLOGY

## 2019-05-23 NOTE — PROGRESS NOTES
HPI     2 week // iluvien OS only  DLS-05/09/2019 Dr. Tillman     Pt sts doing okay no change since last visit   Denies pain     (-)Flashes (+)Floaters no change  (+)Photophobia  (+)Glare    Systane PRN      Prior OCT - ERM OU - no significant distortion  ME OS - central ME resolved    Prior FA - Minimal late leakage of fovea MA OS  No leakage OD      A/P    1. Mild NPDR OU  Controlled T2 on insulin    2. DME OS  Increased again    S/p Avastin OS x 3, ozurdex OS x 4 5/19 2/19 - slight recurrence at 14 weeks, keep at 12    Iluvien OS today    3. PCIOL OU    4. Floaters OU    5. HTN Ret OU    6. CAD - s/p stents on Plavix    BS/BP/chol control      8 weeks OCT    Risks, benefits, and alternatives to treatment discussed in detail with the patient.  The patient voiced understanding and wished to proceed with the procedure    Injection Procedure Note:  Diagnosis: DME OS    Patient Identified and Time Out complete  Topical Proparacaine and Betadine. Subconj Lido OS  Inject Iluvien OS at 6:00 @ 3.5-4mm posterior to limbus  Post Operative Dx: Same  Complications: None  Follow up as above.

## 2019-05-23 NOTE — PATIENT INSTRUCTIONS

## 2019-05-29 RX ORDER — CALCITRIOL 0.25 UG/1
CAPSULE ORAL
Qty: 30 CAPSULE | Refills: 11 | Status: SHIPPED | OUTPATIENT
Start: 2019-05-29 | End: 2020-05-08

## 2019-06-07 RX ORDER — PEN NEEDLE, DIABETIC 31 GX5/16"
NEEDLE, DISPOSABLE MISCELLANEOUS
Qty: 200 EACH | Refills: 0 | Status: SHIPPED | OUTPATIENT
Start: 2019-06-07 | End: 2019-06-12 | Stop reason: SDUPTHER

## 2019-06-10 ENCOUNTER — TELEPHONE (OUTPATIENT)
Dept: ENDOCRINOLOGY | Facility: CLINIC | Age: 79
End: 2019-06-10

## 2019-06-10 NOTE — TELEPHONE ENCOUNTER
----- Message from Vandana Burr sent at 6/10/2019 10:39 AM CDT -----  Contact: Patient      Reason for call: need authorization for BD pens. Called them into the pharmacy last week and haven't been able to pick them up.        Communication Preference: [phone]    Additional Information:

## 2019-06-10 NOTE — TELEPHONE ENCOUNTER
Called pt and notified that called pharmacy and they have his Rx ready for needles today.     Pt verbalized understand.

## 2019-06-11 ENCOUNTER — CLINICAL SUPPORT (OUTPATIENT)
Dept: INTERNAL MEDICINE | Facility: CLINIC | Age: 79
End: 2019-06-11
Payer: MEDICARE

## 2019-06-11 PROCEDURE — 96372 THER/PROPH/DIAG INJ SC/IM: CPT | Mod: HCNC,S$GLB,, | Performed by: INTERNAL MEDICINE

## 2019-06-11 PROCEDURE — 96372 PR INJECTION,THERAP/PROPH/DIAG2ST, IM OR SUBCUT: ICD-10-PCS | Mod: HCNC,S$GLB,, | Performed by: INTERNAL MEDICINE

## 2019-06-11 RX ADMIN — CYANOCOBALAMIN 100 MCG: 1000 INJECTION, SOLUTION INTRAMUSCULAR at 03:06

## 2019-06-12 RX ORDER — PEN NEEDLE, DIABETIC 31 GX5/16"
NEEDLE, DISPOSABLE MISCELLANEOUS
Qty: 200 EACH | Refills: 0 | Status: SHIPPED | OUTPATIENT
Start: 2019-06-12 | End: 2019-09-17 | Stop reason: SDUPTHER

## 2019-06-12 NOTE — TELEPHONE ENCOUNTER
"----- Message from Lisandra Dolan MA sent at 6/11/2019  4:31 PM CDT -----  Contact: Self [phone]      ----- Message -----  From: Nicky Otoole  Sent: 6/11/2019   4:30 PM  To: [facility] Staff    .Medication question / problems.  PT is requesting a 90 supply of BD ULTRA-FINE SHORT PEN NEEDLE 31 gauge x 5/16" Ndle with refills. He gets a price break with 90 day prescriptions. He did  the prescription for 30 days.  ..  [id number] [place]  [address] AT [facility] [address]  [address] [place] [address]  Phone: [phone] Fax: [phone]          "

## 2019-06-13 RX ORDER — FINASTERIDE 5 MG/1
TABLET, FILM COATED ORAL
Qty: 90 TABLET | Refills: 0 | Status: SHIPPED | OUTPATIENT
Start: 2019-06-13 | End: 2019-10-15 | Stop reason: SDUPTHER

## 2019-07-15 DIAGNOSIS — I10 ESSENTIAL HYPERTENSION: ICD-10-CM

## 2019-07-15 RX ORDER — IRBESARTAN 300 MG/1
TABLET ORAL
Qty: 90 TABLET | Refills: 0 | Status: SHIPPED | OUTPATIENT
Start: 2019-07-15 | End: 2019-10-13 | Stop reason: SDUPTHER

## 2019-07-25 ENCOUNTER — OFFICE VISIT (OUTPATIENT)
Dept: OPHTHALMOLOGY | Facility: CLINIC | Age: 79
End: 2019-07-25
Payer: MEDICARE

## 2019-07-25 VITALS — DIASTOLIC BLOOD PRESSURE: 59 MMHG | SYSTOLIC BLOOD PRESSURE: 157 MMHG | HEART RATE: 51 BPM

## 2019-07-25 DIAGNOSIS — H35.033 HYPERTENSIVE RETINOPATHY OF BOTH EYES: ICD-10-CM

## 2019-07-25 DIAGNOSIS — Z79.4 CONTROLLED TYPE 2 DIABETES MELLITUS WITH BOTH EYES AFFECTED BY MILD NONPROLIFERATIVE RETINOPATHY AND MACULAR EDEMA, WITH LONG-TERM CURRENT USE OF INSULIN: Primary | ICD-10-CM

## 2019-07-25 DIAGNOSIS — E11.3213 CONTROLLED TYPE 2 DIABETES MELLITUS WITH BOTH EYES AFFECTED BY MILD NONPROLIFERATIVE RETINOPATHY AND MACULAR EDEMA, WITH LONG-TERM CURRENT USE OF INSULIN: Primary | ICD-10-CM

## 2019-07-25 PROCEDURE — 92226 PR SPECIAL EYE EXAM, SUBSEQUENT: CPT | Mod: 50,HCNC,S$GLB, | Performed by: OPHTHALMOLOGY

## 2019-07-25 PROCEDURE — 92134 CPTRZ OPH DX IMG PST SGM RTA: CPT | Mod: HCNC,S$GLB,, | Performed by: OPHTHALMOLOGY

## 2019-07-25 PROCEDURE — 92134 POSTERIOR SEGMENT OCT RETINA (OCULAR COHERENCE TOMOGRAPHY)-BOTH EYES: ICD-10-PCS | Mod: HCNC,S$GLB,, | Performed by: OPHTHALMOLOGY

## 2019-07-25 PROCEDURE — 92014 COMPRE OPH EXAM EST PT 1/>: CPT | Mod: HCNC,S$GLB,, | Performed by: OPHTHALMOLOGY

## 2019-07-25 PROCEDURE — 99999 PR PBB SHADOW E&M-EST. PATIENT-LVL III: CPT | Mod: PBBFAC,HCNC,, | Performed by: OPHTHALMOLOGY

## 2019-07-25 PROCEDURE — 99999 PR PBB SHADOW E&M-EST. PATIENT-LVL III: ICD-10-PCS | Mod: PBBFAC,HCNC,, | Performed by: OPHTHALMOLOGY

## 2019-07-25 PROCEDURE — 92014 PR EYE EXAM, EST PATIENT,COMPREHESV: ICD-10-PCS | Mod: HCNC,S$GLB,, | Performed by: OPHTHALMOLOGY

## 2019-07-25 PROCEDURE — 92226 PR SPECIAL EYE EXAM, SUBSEQUENT: ICD-10-PCS | Mod: 50,HCNC,S$GLB, | Performed by: OPHTHALMOLOGY

## 2019-07-25 NOTE — PROGRESS NOTES
HPI     Diabetic Eye Exam      Additional comments: 2 mon chk          Comments         Systane PRN    Prior OCT - ERM OU - no significant distortion  ME OS - central ME resolved    Prior FA - Minimal late leakage of fovea MA OS  No leakage OD      A/P    1. Mild NPDR OU  Controlled T2 on insulin    2. DME OS  Increased again    S/p Avastin OS x 3, ozurdex OS x 4 5/19  S/p Iluvien OS 5/19 2/19 - slight recurrence at 14 weeks, keep at 12    Observe today    3. PCIOL OU    4. Floaters OU    5. HTN Ret OU    6. CAD - s/p stents on Plavix    BS/BP/chol control      12 weeks OCT

## 2019-08-23 DIAGNOSIS — N40.0 BENIGN PROSTATIC HYPERPLASIA, UNSPECIFIED WHETHER LOWER URINARY TRACT SYMPTOMS PRESENT: ICD-10-CM

## 2019-08-23 RX ORDER — CLOPIDOGREL BISULFATE 75 MG/1
TABLET ORAL
Qty: 90 TABLET | Refills: 1 | Status: SHIPPED | OUTPATIENT
Start: 2019-08-23 | End: 2019-08-28 | Stop reason: SDUPTHER

## 2019-08-23 RX ORDER — TAMSULOSIN HYDROCHLORIDE 0.4 MG/1
CAPSULE ORAL
Qty: 90 CAPSULE | Refills: 0 | Status: SHIPPED | OUTPATIENT
Start: 2019-08-23 | End: 2019-10-15 | Stop reason: SDUPTHER

## 2019-08-26 ENCOUNTER — TELEPHONE (OUTPATIENT)
Dept: INTERNAL MEDICINE | Facility: CLINIC | Age: 79
End: 2019-08-26

## 2019-08-26 ENCOUNTER — LAB VISIT (OUTPATIENT)
Dept: LAB | Facility: HOSPITAL | Age: 79
End: 2019-08-26
Attending: INTERNAL MEDICINE
Payer: MEDICARE

## 2019-08-26 DIAGNOSIS — D63.1 ANEMIA OF CHRONIC RENAL FAILURE, STAGE 3 (MODERATE): ICD-10-CM

## 2019-08-26 DIAGNOSIS — N18.30 ANEMIA OF CHRONIC RENAL FAILURE, STAGE 3 (MODERATE): ICD-10-CM

## 2019-08-26 DIAGNOSIS — R73.9 ELEVATED BLOOD SUGAR LEVEL: ICD-10-CM

## 2019-08-26 DIAGNOSIS — N25.81 HYPERPARATHYROIDISM, SECONDARY RENAL: ICD-10-CM

## 2019-08-26 DIAGNOSIS — I10 ESSENTIAL HYPERTENSION: Primary | ICD-10-CM

## 2019-08-26 DIAGNOSIS — I10 ESSENTIAL HYPERTENSION: ICD-10-CM

## 2019-08-26 DIAGNOSIS — N18.30 CHRONIC KIDNEY DISEASE, STAGE III (MODERATE): ICD-10-CM

## 2019-08-26 PROBLEM — E11.59 HYPERTENSION ASSOCIATED WITH DIABETES: Status: ACTIVE | Noted: 2017-04-24

## 2019-08-26 PROBLEM — R06.09 DOE (DYSPNEA ON EXERTION): Status: RESOLVED | Noted: 2018-05-11 | Resolved: 2019-08-26

## 2019-08-26 PROBLEM — E66.9 OBESITY, DIABETES, AND HYPERTENSION SYNDROME: Status: ACTIVE | Noted: 2019-08-26

## 2019-08-26 PROBLEM — I15.2 HYPERTENSION ASSOCIATED WITH DIABETES: Status: ACTIVE | Noted: 2017-04-24

## 2019-08-26 PROBLEM — E78.00 PURE HYPERCHOLESTEROLEMIA: Status: RESOLVED | Noted: 2018-05-11 | Resolved: 2019-08-26

## 2019-08-26 PROBLEM — E11.59 OBESITY, DIABETES, AND HYPERTENSION SYNDROME: Status: ACTIVE | Noted: 2019-08-26

## 2019-08-26 PROBLEM — R53.83 FATIGUE: Status: RESOLVED | Noted: 2018-05-11 | Resolved: 2019-08-26

## 2019-08-26 PROBLEM — E11.69 OBESITY, DIABETES, AND HYPERTENSION SYNDROME: Status: ACTIVE | Noted: 2019-08-26

## 2019-08-26 PROBLEM — D69.6 THROMBOCYTOPENIA: Status: RESOLVED | Noted: 2017-04-24 | Resolved: 2019-08-26

## 2019-08-26 PROBLEM — I15.2 OBESITY, DIABETES, AND HYPERTENSION SYNDROME: Status: ACTIVE | Noted: 2019-08-26

## 2019-08-26 LAB
25(OH)D3+25(OH)D2 SERPL-MCNC: 18 NG/ML (ref 30–96)
ALBUMIN SERPL BCP-MCNC: 3.5 G/DL (ref 3.5–5.2)
ANION GAP SERPL CALC-SCNC: 10 MMOL/L (ref 8–16)
BASOPHILS # BLD AUTO: 0.06 K/UL (ref 0–0.2)
BASOPHILS NFR BLD: 0.9 % (ref 0–1.9)
BUN SERPL-MCNC: 40 MG/DL (ref 8–23)
CALCIUM SERPL-MCNC: 9.5 MG/DL (ref 8.7–10.5)
CHLORIDE SERPL-SCNC: 115 MMOL/L (ref 95–110)
CHOLEST SERPL-MCNC: 107 MG/DL (ref 120–199)
CHOLEST/HDLC SERPL: 2.8 {RATIO} (ref 2–5)
CO2 SERPL-SCNC: 17 MMOL/L (ref 23–29)
CREAT SERPL-MCNC: 2.4 MG/DL (ref 0.5–1.4)
DIFFERENTIAL METHOD: ABNORMAL
EOSINOPHIL # BLD AUTO: 0.2 K/UL (ref 0–0.5)
EOSINOPHIL NFR BLD: 3.4 % (ref 0–8)
ERYTHROCYTE [DISTWIDTH] IN BLOOD BY AUTOMATED COUNT: 13.4 % (ref 11.5–14.5)
EST. GFR  (AFRICAN AMERICAN): 28.4 ML/MIN/1.73 M^2
EST. GFR  (NON AFRICAN AMERICAN): 24.6 ML/MIN/1.73 M^2
ESTIMATED AVG GLUCOSE: 134 MG/DL (ref 68–131)
FERRITIN SERPL-MCNC: 15 NG/ML (ref 20–300)
GLUCOSE SERPL-MCNC: 184 MG/DL (ref 70–110)
HBA1C MFR BLD HPLC: 6.3 % (ref 4–5.6)
HCT VFR BLD AUTO: 37.1 % (ref 40–54)
HDLC SERPL-MCNC: 38 MG/DL (ref 40–75)
HDLC SERPL: 35.5 % (ref 20–50)
HGB BLD-MCNC: 12 G/DL (ref 14–18)
IMM GRANULOCYTES # BLD AUTO: 0.02 K/UL (ref 0–0.04)
IMM GRANULOCYTES NFR BLD AUTO: 0.3 % (ref 0–0.5)
IRON SERPL-MCNC: 73 UG/DL (ref 45–160)
LDLC SERPL CALC-MCNC: 44.6 MG/DL (ref 63–159)
LYMPHOCYTES # BLD AUTO: 1.8 K/UL (ref 1–4.8)
LYMPHOCYTES NFR BLD: 26.4 % (ref 18–48)
MCH RBC QN AUTO: 28.7 PG (ref 27–31)
MCHC RBC AUTO-ENTMCNC: 32.3 G/DL (ref 32–36)
MCV RBC AUTO: 89 FL (ref 82–98)
MONOCYTES # BLD AUTO: 0.6 K/UL (ref 0.3–1)
MONOCYTES NFR BLD: 9.1 % (ref 4–15)
NEUTROPHILS # BLD AUTO: 4 K/UL (ref 1.8–7.7)
NEUTROPHILS NFR BLD: 59.9 % (ref 38–73)
NONHDLC SERPL-MCNC: 69 MG/DL
NRBC BLD-RTO: 0 /100 WBC
PHOSPHATE SERPL-MCNC: 4.6 MG/DL (ref 2.7–4.5)
PLATELET # BLD AUTO: 151 K/UL (ref 150–350)
PMV BLD AUTO: 10.9 FL (ref 9.2–12.9)
POTASSIUM SERPL-SCNC: 4.3 MMOL/L (ref 3.5–5.1)
PTH-INTACT SERPL-MCNC: 75 PG/ML (ref 9–77)
RBC # BLD AUTO: 4.18 M/UL (ref 4.6–6.2)
SATURATED IRON: 18 % (ref 20–50)
SODIUM SERPL-SCNC: 142 MMOL/L (ref 136–145)
T4 FREE SERPL-MCNC: 0.79 NG/DL (ref 0.71–1.51)
TOTAL IRON BINDING CAPACITY: 397 UG/DL (ref 250–450)
TRANSFERRIN SERPL-MCNC: 268 MG/DL (ref 200–375)
TRIGL SERPL-MCNC: 122 MG/DL (ref 30–150)
TSH SERPL DL<=0.005 MIU/L-ACNC: 4.3 UIU/ML (ref 0.4–4)
WBC # BLD AUTO: 6.67 K/UL (ref 3.9–12.7)

## 2019-08-26 PROCEDURE — 83540 ASSAY OF IRON: CPT | Mod: HCNC

## 2019-08-26 PROCEDURE — 84443 ASSAY THYROID STIM HORMONE: CPT | Mod: HCNC

## 2019-08-26 PROCEDURE — 83970 ASSAY OF PARATHORMONE: CPT | Mod: HCNC

## 2019-08-26 PROCEDURE — 85025 COMPLETE CBC W/AUTO DIFF WBC: CPT | Mod: HCNC

## 2019-08-26 PROCEDURE — 80061 LIPID PANEL: CPT | Mod: HCNC

## 2019-08-26 PROCEDURE — 36415 COLL VENOUS BLD VENIPUNCTURE: CPT | Mod: HCNC,PO

## 2019-08-26 PROCEDURE — 80069 RENAL FUNCTION PANEL: CPT | Mod: HCNC

## 2019-08-26 PROCEDURE — 82728 ASSAY OF FERRITIN: CPT | Mod: HCNC

## 2019-08-26 PROCEDURE — 82306 VITAMIN D 25 HYDROXY: CPT | Mod: HCNC

## 2019-08-26 PROCEDURE — 84439 ASSAY OF FREE THYROXINE: CPT | Mod: HCNC

## 2019-08-26 PROCEDURE — 83036 HEMOGLOBIN GLYCOSYLATED A1C: CPT | Mod: HCNC

## 2019-08-27 ENCOUNTER — TELEPHONE (OUTPATIENT)
Dept: INTERNAL MEDICINE | Facility: CLINIC | Age: 79
End: 2019-08-27

## 2019-08-27 RX ORDER — AMLODIPINE BESYLATE 5 MG/1
TABLET ORAL
Qty: 30 TABLET | Refills: 5 | Status: SHIPPED | OUTPATIENT
Start: 2019-08-27 | End: 2020-03-03

## 2019-08-28 ENCOUNTER — OFFICE VISIT (OUTPATIENT)
Dept: INTERNAL MEDICINE | Facility: CLINIC | Age: 79
End: 2019-08-28
Payer: MEDICARE

## 2019-08-28 VITALS
RESPIRATION RATE: 15 BRPM | SYSTOLIC BLOOD PRESSURE: 120 MMHG | HEIGHT: 70 IN | DIASTOLIC BLOOD PRESSURE: 72 MMHG | WEIGHT: 242.5 LBS | BODY MASS INDEX: 34.72 KG/M2 | HEART RATE: 72 BPM

## 2019-08-28 DIAGNOSIS — E78.5 HYPERLIPIDEMIA ASSOCIATED WITH TYPE 2 DIABETES MELLITUS: ICD-10-CM

## 2019-08-28 DIAGNOSIS — E11.8 DIABETIC FOOT: ICD-10-CM

## 2019-08-28 DIAGNOSIS — D50.9 IRON DEFICIENCY ANEMIA, UNSPECIFIED IRON DEFICIENCY ANEMIA TYPE: ICD-10-CM

## 2019-08-28 DIAGNOSIS — I70.0 AORTIC ATHEROSCLEROSIS: ICD-10-CM

## 2019-08-28 DIAGNOSIS — E66.9 OBESITY (BMI 30.0-34.9): ICD-10-CM

## 2019-08-28 DIAGNOSIS — N18.4 CKD STAGE 4 DUE TO TYPE 2 DIABETES MELLITUS: ICD-10-CM

## 2019-08-28 DIAGNOSIS — E11.69 OBESITY, DIABETES, AND HYPERTENSION SYNDROME: ICD-10-CM

## 2019-08-28 DIAGNOSIS — E55.9 VITAMIN D DEFICIENCY DISEASE: ICD-10-CM

## 2019-08-28 DIAGNOSIS — E11.22 CKD STAGE 4 DUE TO TYPE 2 DIABETES MELLITUS: ICD-10-CM

## 2019-08-28 DIAGNOSIS — Z98.61 POST PTCA: ICD-10-CM

## 2019-08-28 DIAGNOSIS — I15.2 HYPERTENSION ASSOCIATED WITH DIABETES: ICD-10-CM

## 2019-08-28 DIAGNOSIS — Z79.4 CONTROLLED TYPE 2 DIABETES MELLITUS WITH BOTH EYES AFFECTED BY MILD NONPROLIFERATIVE RETINOPATHY AND MACULAR EDEMA, WITH LONG-TERM CURRENT USE OF INSULIN: ICD-10-CM

## 2019-08-28 DIAGNOSIS — N18.30 TYPE 2 DIABETES MELLITUS WITH STAGE 3 CHRONIC KIDNEY DISEASE, WITHOUT LONG-TERM CURRENT USE OF INSULIN: Chronic | ICD-10-CM

## 2019-08-28 DIAGNOSIS — E11.22 TYPE 2 DIABETES MELLITUS WITH STAGE 3 CHRONIC KIDNEY DISEASE, WITHOUT LONG-TERM CURRENT USE OF INSULIN: Chronic | ICD-10-CM

## 2019-08-28 DIAGNOSIS — G47.33 OSA ON CPAP: ICD-10-CM

## 2019-08-28 DIAGNOSIS — E66.9 OBESITY, DIABETES, AND HYPERTENSION SYNDROME: ICD-10-CM

## 2019-08-28 DIAGNOSIS — E11.69 HYPERLIPIDEMIA ASSOCIATED WITH TYPE 2 DIABETES MELLITUS: ICD-10-CM

## 2019-08-28 DIAGNOSIS — M54.16 LUMBAR RADICULOPATHY: ICD-10-CM

## 2019-08-28 DIAGNOSIS — H35.033 HYPERTENSIVE RETINOPATHY OF BOTH EYES: ICD-10-CM

## 2019-08-28 DIAGNOSIS — E11.40 TYPE 2 DIABETES MELLITUS WITH DIABETIC NEUROPATHY, WITHOUT LONG-TERM CURRENT USE OF INSULIN: ICD-10-CM

## 2019-08-28 DIAGNOSIS — I15.2 OBESITY, DIABETES, AND HYPERTENSION SYNDROME: ICD-10-CM

## 2019-08-28 DIAGNOSIS — N05.9 NEPHRITIS AND NEPHROPATHY, WITH PATHOLOGICAL LESION IN KIDNEY: ICD-10-CM

## 2019-08-28 DIAGNOSIS — E11.59 OBESITY, DIABETES, AND HYPERTENSION SYNDROME: ICD-10-CM

## 2019-08-28 DIAGNOSIS — Z85.51 PERSONAL HISTORY OF BLADDER CANCER: ICD-10-CM

## 2019-08-28 DIAGNOSIS — E11.3213 CONTROLLED TYPE 2 DIABETES MELLITUS WITH BOTH EYES AFFECTED BY MILD NONPROLIFERATIVE RETINOPATHY AND MACULAR EDEMA, WITH LONG-TERM CURRENT USE OF INSULIN: ICD-10-CM

## 2019-08-28 DIAGNOSIS — H91.93 DECREASED HEARING OF BOTH EARS: ICD-10-CM

## 2019-08-28 DIAGNOSIS — Z00.00 ENCOUNTER FOR PREVENTIVE HEALTH EXAMINATION: Primary | ICD-10-CM

## 2019-08-28 DIAGNOSIS — N40.0 BENIGN PROSTATIC HYPERPLASIA, UNSPECIFIED WHETHER LOWER URINARY TRACT SYMPTOMS PRESENT: Chronic | ICD-10-CM

## 2019-08-28 DIAGNOSIS — E11.42 DIABETIC POLYNEUROPATHY ASSOCIATED WITH TYPE 2 DIABETES MELLITUS: ICD-10-CM

## 2019-08-28 DIAGNOSIS — I25.10 CORONARY ARTERY DISEASE INVOLVING NATIVE CORONARY ARTERY OF NATIVE HEART WITHOUT ANGINA PECTORIS: Chronic | ICD-10-CM

## 2019-08-28 DIAGNOSIS — I25.2 HISTORY OF MI (MYOCARDIAL INFARCTION): ICD-10-CM

## 2019-08-28 DIAGNOSIS — E11.59 HYPERTENSION ASSOCIATED WITH DIABETES: ICD-10-CM

## 2019-08-28 DIAGNOSIS — N25.81 HYPERPARATHYROIDISM, SECONDARY RENAL: ICD-10-CM

## 2019-08-28 PROBLEM — E66.01 SEVERE OBESITY (BMI 35.0-35.9 WITH COMORBIDITY): Status: RESOLVED | Noted: 2017-05-10 | Resolved: 2019-08-28

## 2019-08-28 PROCEDURE — G0439 PR MEDICARE ANNUAL WELLNESS SUBSEQUENT VISIT: ICD-10-PCS | Mod: HCNC,S$GLB,, | Performed by: NURSE PRACTITIONER

## 2019-08-28 PROCEDURE — 99999 PR PBB SHADOW E&M-EST. PATIENT-LVL V: CPT | Mod: PBBFAC,HCNC,, | Performed by: NURSE PRACTITIONER

## 2019-08-28 PROCEDURE — 99499 UNLISTED E&M SERVICE: CPT | Mod: HCNC,S$GLB,, | Performed by: NURSE PRACTITIONER

## 2019-08-28 PROCEDURE — G0439 PPPS, SUBSEQ VISIT: HCPCS | Mod: HCNC,S$GLB,, | Performed by: NURSE PRACTITIONER

## 2019-08-28 PROCEDURE — 3078F PR MOST RECENT DIASTOLIC BLOOD PRESSURE < 80 MM HG: ICD-10-PCS | Mod: HCNC,CPTII,S$GLB, | Performed by: NURSE PRACTITIONER

## 2019-08-28 PROCEDURE — 99499 RISK ADDL DX/OHS AUDIT: ICD-10-PCS | Mod: HCNC,S$GLB,, | Performed by: NURSE PRACTITIONER

## 2019-08-28 PROCEDURE — 99999 PR PBB SHADOW E&M-EST. PATIENT-LVL V: ICD-10-PCS | Mod: PBBFAC,HCNC,, | Performed by: NURSE PRACTITIONER

## 2019-08-28 PROCEDURE — 3074F PR MOST RECENT SYSTOLIC BLOOD PRESSURE < 130 MM HG: ICD-10-PCS | Mod: HCNC,CPTII,S$GLB, | Performed by: NURSE PRACTITIONER

## 2019-08-28 PROCEDURE — 3078F DIAST BP <80 MM HG: CPT | Mod: HCNC,CPTII,S$GLB, | Performed by: NURSE PRACTITIONER

## 2019-08-28 PROCEDURE — 3074F SYST BP LT 130 MM HG: CPT | Mod: HCNC,CPTII,S$GLB, | Performed by: NURSE PRACTITIONER

## 2019-08-28 RX ORDER — CHOLECALCIFEROL (VITAMIN D3) 25 MCG
1000 TABLET ORAL DAILY
Status: ON HOLD | COMMUNITY
End: 2022-01-01

## 2019-08-28 NOTE — PROGRESS NOTES
"I offered to discuss end of life issues, including information on how to make advance directives that the patient could use to name someone who would make medical decisions on their behalf if they became too ill to make themselves.    ___Patient declined  _X_Patient  Was provided information to read.  Kevon Perez presented for a  Medicare AWV and comprehensive Health Risk Assessment today. The following components were reviewed and updated:    · Medical history  · Family History  · Social history  · Allergies and Current Medications  · Health Risk Assessment  · Health Maintenance  · Care Team     ** See Completed Assessments for Annual Wellness Visit within the encounter summary.**       The following assessments were completed:  · Living Situation  · CAGE  · Depression Screening  · Timed Get Up and Go  · Whisper Test  · Cognitive Function Screening  · Nutrition Screening  · ADL Screening  · PAQ Screening    Vitals:    08/28/19 0828   BP: 120/72   BP Location: Right arm   Patient Position: Sitting   BP Method: Large (Manual)   Pulse: 72   Resp: 15   Weight: 110 kg (242 lb 8.1 oz)   Height: 5' 10" (1.778 m)     Body mass index is 34.8 kg/m².  Physical Exam   Constitutional: He is oriented to person, place, and time. He appears well-developed and well-nourished.   HENT:   Head: Normocephalic and atraumatic.   Right Ear: External ear normal.   Left Ear: External ear normal.   Cardiovascular: Normal rate, regular rhythm and normal heart sounds.   No murmur heard.  Pulmonary/Chest: Effort normal and breath sounds normal. No respiratory distress. He has no wheezes. He has no rales.   Abdominal: Soft. Bowel sounds are normal. He exhibits no distension. There is no tenderness.   obese   Musculoskeletal: Normal range of motion. He exhibits edema. He exhibits no tenderness or deformity.   Neurological: He is alert and oriented to person, place, and time. No cranial nerve deficit.   Skin: Skin is warm and dry. "   Psychiatric: He has a normal mood and affect. His behavior is normal.   Nursing note and vitals reviewed.        Diagnoses and health risks identified today and associated recommendations/orders:    1. 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 Type 2 diabetes mellitus with diabetic neuropathy, without long-term current use of insulin  Stable- followed by podiatry  - Ambulatory referral to Podiatry    3. Personal history of bladder cancer  Stable- followed by urology,PCP  - Ambulatory Referral to Urology    4. Lumbar radiculopathy  Stable- followed by PCP    5. Hypertensive retinopathy of both eyes  Stable- followed by opth    6. Nephritis and nephropathy, 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  Stable- followed by PCP    7. Post PTCA  Stable- followed by PCP    8. GINGER on CPAP  Stable- followed by PCP    9. Hypertension associated with diabetes  Stable- followed by PCP    10. Aortic atherosclerosis  Stable- followed by PCP    11. History of MI (myocardial infarction)  Stable- followed by PCP    12. Hyperparathyroidism, secondary renal  Stable- followed by PCP, nephrology    13. Diabetic polyneuropathy associated with type 2 diabetes mellitus  Stable- followed by PCP    14. Vitamin D deficiency disease  Stable- followed by PCP    15. Iron deficiency anemia, unspecified iron deficiency anemia type  Stable- followed by PCP    16. Benign prostatic hyperplasia, unspecified whether lower urinary tract symptoms present  Stable- followed by PCP, urology  - Ambulatory Referral to Urology    17. Coronary artery disease involving native coronary artery of native heart without angina pectoris  Stable- followed by PCP    18. Type 2 diabetes mellitus with stage 3 chronic kidney disease, without long-term current use of insulin  Stable- followed by PCP, nephrology    19. Hyperlipidemia  associated with type 2 diabetes mellitus  Stable- followed by PCP    20. Obesity, diabetes, and hypertension syndrome  Stable- followed by PCP    21. Diabetic foot  Stable- followed by Podiatry  - Ambulatory referral to Podiatry    22. Decreased hearing of both ears  Stable- followed by PCP  - Ambulatory Referral to Audiology    23. CKD stage 4 due to type 2 diabetes mellitus  Stable- followed by PCP, nephrology    24. Obesity (BMI 30.0-34.9)  Chronic. Followed by PCP. Centers for Disease Control and Prevention (CDC)  weight recommendations for current BMI & ideal BMI range discussed with patient.  Recommended  Low Diabetic, fat diet. Encouraged to start  regular exercise regimen- states neuropathy to feet hinders ability to exercise- suggested sit & Be fit TV program, water aerobics, walking . States knowledgeable on DM diet.    25. Encounter for preventive health examination  Assessments completed. Preventative health recommendations reviewed.         Provided Kevon with a 5-10 year written screening schedule and personal prevention plan. Recommendations were developed using the USPSTF age appropriate recommendations. Education, counseling, and referrals were provided as needed. After Visit Summary printed and given to patient which includes a list of additional screenings\tests needed. Fall prevention handouts. Home blood glucose reading 139. Uses CPAP- denies somulence with driving. Pt requesting F/U with DR. Eisenberg- referral given. Overdue on annual Diabetic foot exam & needs RX for shoe & inserts- podiatry referral given.    Follow up in about 1 year (around 8/28/2020) for HRA.    Khushi Brooke NP

## 2019-08-28 NOTE — PATIENT INSTRUCTIONS
Counseling and Referral of Other Preventative  (Italic type indicates deductible and co-insurance are waived)    Patient Name: Kevon Perez  Today's Date: 8/28/2019    Health Maintenance       Date Due Completion Date    Foot Exam Referral   9/2/2016 (Done)        TETANUS VACCINE In the future for injury   ---    Shingles Vaccine (1 of 2)  handout given   ---    Influenza Vaccine (1) 09/01/2019 2/5/2019    Hemoglobin A1c 02/26/2020 8/26/2019    Eye Exam 07/25/2020 7/25/2019    Aspirin/Antiplatelet Therapy 08/23/2020 8/23/2019    Lipid Panel    colonoscopy 08/26/2020    3/26/2020   8/26/2019    3/26/2015        Orders Placed This Encounter   Procedures    Ambulatory referral to Podiatry    Ambulatory Referral to Audiology    Ambulatory Referral to Urology     The following information is provided to all patients.  This information is to help you find resources for any of the problems found today that may be affecting your health:                Living healthy guide: www.[facility].[place].[facility]      Understanding Diabetes: www.diabetes.org      Eating healthy: www.cdc.gov/healthyweight      [facility] home safety checklist: www.cdc.gov/steadi/patient.html      Agency on Aging: www.goea.[place].[facility]      Alcoholics anonymous (AA): www.aa.org      Physical Activity: www.sergo.nih.gov/uk3xmch      Tobacco use: www.quitwithusla.org     Exercises to Prevent Falls  Certain types of exercises may help make you less likely to fall. Try the ones below. Or do other exercises that your health care provider suggests. Depending on your health, you may need to start slowly. Don't let that stop you. Even small amounts of exercise can help you. Be sure to talk to your health care provider before starting any exercise program.       Improve balance  Many types of exercise can help improve balance. Leo chi and yoga are good examples. Here's another one to try. You can do it anytime and almost anywhere.  · Stand next to a counter or  "solid support.  · Push yourself up onto your tiptoes.  · Hold for 5 seconds. If you start to lose your balance, hold on to the counter.  · Rest and repeat 5 times. Work up to holding for 20 to 30 seconds, if you can. Increase flexibility  Being more flexible makes it easier for you to move around safely. Try exercises like the seated hamstring stretch.  · Sit in a chair and put one foot on a stool.  · Straighten your leg and reach with both hands down either side of your leg. Reach as far down your leg as you can.  · Hold for about 20 seconds.  · Go back to the starting position. Then repeat 5 times. Switch legs. Build strength  "Resistance" exercises help build strength. You can do them without equipment. Or you can use weights, elastic bands, or special machines. One such exercise is called the biceps curl. You can hold a 1-pound weight or even a can of soup. Do this exercise at least 3 times a week. Strive for every day.  · Sit up straight in a chair.  · Keep your elbow close to your body and your wrist straight.  · Bend your arm, moving your hand up to your shoulder. Then slowly lower your arm.  · Repeat 5 times. Switch to the other arm.   Build your staying power  Aerobic exercises make your heart and lungs stronger so you can keep moving longer. Walking and swimming are two of the best types of exercises you can do. Using a stationary bike is great, too. Find an aerobic exercise that you enjoy. Start slowly and build up. Even 5 minutes is helpful. Aim for a goal of 30 minutes, at least 3 times a week. You don't have to do 30 minutes in 1 session. Break it up and walk a little throughout the day.  More helpful tips  · Start easy. Slowly work up to doing more.  · Talk with your health care provider about the best exercises for you.  · Call senior centers or health clubs about exercise programs.  · If needed, have a family member watch you walk every so often to check your stability.  · Exercise with a friend. " Choose an activity you both enjoy.  · Consider marisel chi or yoga to strengthen your balance.  · Try exercises that you can do anytime, anywhere. Here are 2 examples. Have someone with you when you first try these:  ¨ Practice walking by placing 1 foot right in front of the other.  ¨ Stand up and sit down 10 times. Repeat this throughout the day.   Date Last Reviewed: 6/13/2015  © 5967-6684 [employer]. [address] [place] [address], [place] [address]. All rights reserved. This information is not intended as a substitute for professional medical care. Always follow your healthcare professional's instructions.        Diabetes: Activity Tips    Being more active can help you manage your diabetes. The tips on this sheet can help you get the most from your exercise. They can also help you stay safe.  Staying Active  Its important for adults to spend less time sitting and being inactive. This is especially true if you have type 2 diabetes. When you are sitting for long periods of time, get up for short sessions of light activity every 30 minutes.  You should aim for at least 150 minutes a week of exercise or physical activity. Dont let more than 2 days go by without being active.  Benefit from briskness  Brisk activity gets your heart beating faster. This can help you increase your fitness, lose extra weight, and manage your blood sugar level. Try brisk walking. Or, if you have foot or leg problems, you can try swimming or bike riding. You can break up your exercise into chunks throughout the day. Work up to at least 30 minutes of steady, brisk exercise on most days.  Warm up and cool down  Warming up and cooling down reduce your risk of injury. They also help limit muscle soreness. Do a mild version of your activity for 5 minutes before and after your routine. You can also learn stretches that will help keep your muscles loose. Your healthcare provider may show you good ways to warm up and stretch.  Do the  talk-sing test  The talk-sing test is a simple way to tell how hard youre exercising. If you can talk while exercising, youre in a safe range. If youre out of breath, slow down. If you can carry a tune, its time to  the pace. Walk up a hill. Increase the resistance on your stationary bike. Or swim faster.  What about eating?  You may be told to plan your exercise for 1 to 2 hours after a meal. In most cases, you dont need to eat while being active. If you take insulin or medicine that can cause low blood sugar, test your blood sugar before exercising. And carry a fast-acting sugar that will raise your blood sugar level quickly. This includes glucose tablets or hard candy. Use it if you feel low blood sugar symptoms.  Safety tips  These tips can help you stay safe as you become fit:  · Exercise with a friend or carry a cell phone if you have one.  · Carry or wear identification, such as a necklace or bracelet, that says you have diabetes.  · Use the proper footwear and safety equipment for your activity.  · Drink water before, during, and after exercise.  · Dress properly for the weather.  · Dont exercise in very hot or very cold weather.  · Dont exercise if you are sick.  · If you are instructed to do so, test your blood sugar before and after you exercise. Have a small carbohydrate snack if your blood sugar is low before you start exercising.   When to stop exercising and call your healthcare provider  Stop exercising and call your healthcare provider right away if you notice any of the following:  · Pain, pressure, tightness, or heaviness in the chest  · Pain or heaviness in the neck, shoulders, back, arms, legs, or feet  · Unusual shortness of breath  · Dizziness or lightheadedness  · Unusually rapid or slow pulse  · Increased joint or muscle pain  · Nausea or vomiting  Date Last Reviewed: 5/1/2016  © 5166-3356 [employer]. [address] [place] [address], [place] [address]. All rights  reserved. This information is not intended as a substitute for professional medical care. Always follow your healthcare professional's instructions.

## 2019-08-28 NOTE — Clinical Note
Primary Care Providers:Cipriano Sol MD, MD (General)Your patient was seen today for a HRA visit. Gap(s) in care (HEDIS gaps) have been identified during this visit that require additional testing and possible follow up.Orders Placed This Encounter    Ambulatory referral to Podiatry        Referral Priority:Routine        Referral Type:Consultation        Referral Reason:Specialty Services Required        Requested Specialty:Podiatry        Number of Visits Requested:1    Ambulatory Referral to Audiology        Referral Priority:Routine        Referral Type:Audiology Exam        Referral Reason:Specialty Services Required        Requested Specialty:Audiology        Number of Visits Requested:1    Ambulatory Referral to Urology        Referral Priority:Routine        Referral Type:Consultation        Referral Reason:Specialty Services Required        Requested Specialty:Urology        Number of Visits Requested:1These orders were placed using [facility] appr

## 2019-09-02 DIAGNOSIS — E11.42 TYPE 2 DIABETES MELLITUS WITH DIABETIC POLYNEUROPATHY, WITH LONG-TERM CURRENT USE OF INSULIN: ICD-10-CM

## 2019-09-02 DIAGNOSIS — Z79.4 TYPE 2 DIABETES MELLITUS WITH DIABETIC POLYNEUROPATHY, WITH LONG-TERM CURRENT USE OF INSULIN: ICD-10-CM

## 2019-09-02 NOTE — PROGRESS NOTES
PAST MEDICAL HISTORY:  Type 2 diabetes with chronic kidney disease stage III to IV, peripheral neuropathy,  Hypertension.  Hyperlipidemia.  Coronary artery disease with previous STEMI and angioplasty in 2009.  Obstructive sleep apnea with use of APAP  Anemia of chronic disease.  History of iron deficiency anemia  B12 deficiency.  BPH.  Gastroesophageal reflux disease  Bladder tumor, status post resection     SOCIAL HISTORY:  Tobacco use, none.  Alcohol use, maybe a glass of wine twice a week.  Exercise has been limited.     FAMILY HISTORY:  Father is , diabetes and heart disease.  Mother is , cardiovascular disease.  Two sisters alive, one with diabetes.  One brother is alive with diabetes.  One brother is .     SCREENING:  Colonoscopy in 2015 was normal.       MEDICATIONS:  Ecotrin 81 mg.  Atorvastatin 20 mg.  Calcitriol 0.25 mcg.  Plavix 75 mg.  Finasteride 5 mg.  Folic acid 1 mg.  NovoLog  12-20 with each meal.  Tresiba 46 units.  Carvedilol 12.5 mg twice a day..  Tamsulosin 0.4 mg a day.  avapro 320mg a day.  Hydrochlorothiazide 12.5 mg a day          REASON FOR VISIT:  This is an 79-year-old male who is here for an annual routine   visit.    He is regularly followed by Endocrinology, Ophthalmology, Podiatry and once   usually see Cardiology and Nephrology.    No acute problems.  He does have dyspnea on exertion, which is chronic, but no   shortness of breath at rest.  On activity, he is not much.  He reports no chest   pain or palpitations.    He still uses CPAP at night, but will complain when waking up in the morning,   his mouth is dry and sometimes his tongue will stuck to his back top of his   palate.  It has been a while [facility] has [facility]; however, he   still feels that the CPAP works well for him.    RECENT STUDIES:  Hemoglobin A1c was 6.3.  Total cholesterol 107 with HDL 38, LDL   44.  CBC, hemoglobin and hematocrit 12 and 37.  TSH was 4.3 with free T4  0.71.    Renal function panel showed creatinine to be a little bit higher at 2.4, CO2 of   17.  His glucose was 184.  Vitamin D low at 18, although it was improved.  Iron   levels, iron was 73, but his ferritin level was at 15, which is getting down   from before.    REVIEW OF SYMPTOMS:  There is no abdominal pain.  Bowel function is regular, but   tends to be loose.  No difficulty urinating.  Urine flow is fine.  Nocturia x1.    No chronic arthralgia.  Past couple of days, he has had back pain, but it is   getting better.  No chronic headaches.    PHYSICAL EXAMINATION:  VITAL SIGNS:  Weight is 238 pounds, pulse 64, blood pressure 138/82.  HEENT:  Tympanic membranes normal.  Nasal mucosa is clear.  Oropharynx, no   abnormal findings.  NECK:  No thyromegaly.  No masses.  LUNGS:  Clear breath sounds, good effort.  HEART:  Regular rate and rhythm.  ABDOMEN:  Active bowel sounds, soft, nontender.  No hepatosplenomegaly or   abdominal masses.  PULSES:  2+ carotid pulses.  2+ pedal pulses.  EXTREMITIES:  Trace edema.  LYMPH GLAND:  No palpable adenopathy.  RECTAL:  Stool is brown, loose, heme-negative.  Prostate minimally enlarged.    IMPRESSION:  1. General exam.  2. Type 2 diabetes with chronic kidney disease stage IV, and peripheral   neuropathy.  3. Hypertension.  4. Hyperlipidemia.  5. Coronary artery disease.  6. Obstructive sleep apnea with use of CPAP.  7. Anemia of chronic disease.  8. BPH.  9. History of bladder tumor.    PLAN:  Recommend starting Fergon once a day.  Today while he was here, I would   like to repeat a basic metabolic profile to reassess the creatinine and CO2.    Proper hydration discussed and then phone review to follow up and follow up on   labs, particularly that involves renal function, thyroid, and hemoglobin A1c.        JAM/HN  dd: 09/03/2019 10:13:52 (CDT)  td: 09/03/2019 23:07:03 (CDT)  Doc ID   #[id number]  Job ID #[id number]    CC:

## 2019-09-03 ENCOUNTER — OFFICE VISIT (OUTPATIENT)
Dept: INTERNAL MEDICINE | Facility: CLINIC | Age: 79
End: 2019-09-03
Payer: MEDICARE

## 2019-09-03 VITALS
OXYGEN SATURATION: 97 % | WEIGHT: 238 LBS | HEART RATE: 63 BPM | HEIGHT: 70 IN | DIASTOLIC BLOOD PRESSURE: 82 MMHG | SYSTOLIC BLOOD PRESSURE: 138 MMHG | BODY MASS INDEX: 34.07 KG/M2

## 2019-09-03 DIAGNOSIS — N18.4 ANEMIA DUE TO STAGE 4 CHRONIC KIDNEY DISEASE: ICD-10-CM

## 2019-09-03 DIAGNOSIS — G47.33 OSA ON CPAP: ICD-10-CM

## 2019-09-03 DIAGNOSIS — Z00.00 ANNUAL PHYSICAL EXAM: Primary | ICD-10-CM

## 2019-09-03 DIAGNOSIS — R79.0 LOW FERRITIN: ICD-10-CM

## 2019-09-03 DIAGNOSIS — R79.89 LOW VITAMIN D LEVEL: ICD-10-CM

## 2019-09-03 DIAGNOSIS — E11.40 TYPE 2 DIABETES MELLITUS WITH DIABETIC NEUROPATHY, WITHOUT LONG-TERM CURRENT USE OF INSULIN: ICD-10-CM

## 2019-09-03 DIAGNOSIS — N18.30 TYPE 2 DIABETES MELLITUS WITH STAGE 3 CHRONIC KIDNEY DISEASE, WITHOUT LONG-TERM CURRENT USE OF INSULIN: ICD-10-CM

## 2019-09-03 DIAGNOSIS — I25.10 CORONARY ARTERY DISEASE INVOLVING NATIVE CORONARY ARTERY OF NATIVE HEART WITHOUT ANGINA PECTORIS: ICD-10-CM

## 2019-09-03 DIAGNOSIS — I10 ESSENTIAL HYPERTENSION: ICD-10-CM

## 2019-09-03 DIAGNOSIS — D63.1 ANEMIA DUE TO STAGE 4 CHRONIC KIDNEY DISEASE: ICD-10-CM

## 2019-09-03 DIAGNOSIS — E11.22 TYPE 2 DIABETES MELLITUS WITH STAGE 3 CHRONIC KIDNEY DISEASE, WITHOUT LONG-TERM CURRENT USE OF INSULIN: ICD-10-CM

## 2019-09-03 PROCEDURE — 99499 RISK ADDL DX/OHS AUDIT: ICD-10-PCS | Mod: HCNC,S$GLB,, | Performed by: INTERNAL MEDICINE

## 2019-09-03 PROCEDURE — 3079F DIAST BP 80-89 MM HG: CPT | Mod: HCNC,CPTII,S$GLB, | Performed by: INTERNAL MEDICINE

## 2019-09-03 PROCEDURE — 99397 PR PREVENTIVE VISIT,EST,65 & OVER: ICD-10-PCS | Mod: HCNC,S$GLB,, | Performed by: INTERNAL MEDICINE

## 2019-09-03 PROCEDURE — 99999 PR PBB SHADOW E&M-EST. PATIENT-LVL IV: CPT | Mod: PBBFAC,HCNC,, | Performed by: INTERNAL MEDICINE

## 2019-09-03 PROCEDURE — 99397 PER PM REEVAL EST PAT 65+ YR: CPT | Mod: HCNC,S$GLB,, | Performed by: INTERNAL MEDICINE

## 2019-09-03 PROCEDURE — 3075F PR MOST RECENT SYSTOLIC BLOOD PRESS GE 130-139MM HG: ICD-10-PCS | Mod: HCNC,CPTII,S$GLB, | Performed by: INTERNAL MEDICINE

## 2019-09-03 PROCEDURE — 99999 PR PBB SHADOW E&M-EST. PATIENT-LVL IV: ICD-10-PCS | Mod: PBBFAC,HCNC,, | Performed by: INTERNAL MEDICINE

## 2019-09-03 PROCEDURE — 3075F SYST BP GE 130 - 139MM HG: CPT | Mod: HCNC,CPTII,S$GLB, | Performed by: INTERNAL MEDICINE

## 2019-09-03 PROCEDURE — 99499 UNLISTED E&M SERVICE: CPT | Mod: HCNC,S$GLB,, | Performed by: INTERNAL MEDICINE

## 2019-09-03 PROCEDURE — 3079F PR MOST RECENT DIASTOLIC BLOOD PRESSURE 80-89 MM HG: ICD-10-PCS | Mod: HCNC,CPTII,S$GLB, | Performed by: INTERNAL MEDICINE

## 2019-09-03 RX ORDER — INSULIN DEGLUDEC 200 U/ML
INJECTION, SOLUTION SUBCUTANEOUS
Qty: 15 SYRINGE | Refills: 11 | Status: SHIPPED | OUTPATIENT
Start: 2019-09-03 | End: 2021-01-01 | Stop reason: SDUPTHER

## 2019-09-11 RX ORDER — FINASTERIDE 5 MG/1
TABLET, FILM COATED ORAL
Qty: 90 TABLET | Refills: 0 | OUTPATIENT
Start: 2019-09-11

## 2019-09-17 ENCOUNTER — DOCUMENTATION ONLY (OUTPATIENT)
Dept: AUDIOLOGY | Facility: CLINIC | Age: 79
End: 2019-09-17

## 2019-09-17 RX ORDER — PEN NEEDLE, DIABETIC 31 GX5/16"
NEEDLE, DISPOSABLE MISCELLANEOUS
Qty: 200 EACH | Refills: 0 | Status: SHIPPED | OUTPATIENT
Start: 2019-09-17 | End: 2019-11-06 | Stop reason: SDUPTHER

## 2019-09-17 NOTE — PROGRESS NOTES
Returned Mr. Perez's message re: later appointment on 10-.  Provided him with the number to [facility] to reschedule his appointment for a later time.

## 2019-10-02 RX ORDER — ATORVASTATIN CALCIUM 20 MG/1
TABLET, FILM COATED ORAL
Qty: 90 TABLET | Refills: 1 | Status: SHIPPED | OUTPATIENT
Start: 2019-10-02 | End: 2020-07-07 | Stop reason: SDUPTHER

## 2019-10-02 RX ORDER — ATORVASTATIN CALCIUM 20 MG/1
20 TABLET, FILM COATED ORAL DAILY
Qty: 90 TABLET | Refills: 1 | Status: SHIPPED | OUTPATIENT
Start: 2019-10-02 | End: 2019-12-20 | Stop reason: SDUPTHER

## 2019-10-02 NOTE — TELEPHONE ENCOUNTER
----- Message from Ana Maria Barrera sent at 10/2/2019  3:48 PM CDT -----  Contact: pt [phone]  Patient is calling for an RX refill or new RX.  Is this a refill or new RX:  New   RX name and strength: atorvastatin (LIPITOR) 20 MG tablet  Directions (copy/paste from chart):  TAKE 1 TABLET BY MOUTH EVERY DAY  Is this a 30 day or 90 day RX:  90  Local pharmacy or mail order pharmacy:  Local   Pharmacy name and phone # (copy/paste from chart):   [id number] [place], [place] - [address] AT [facility] & [address] [phone] (Phone)  [phone] (Fax)  Comments:

## 2019-10-08 ENCOUNTER — IMMUNIZATION (OUTPATIENT)
Dept: PHARMACY | Facility: CLINIC | Age: 79
End: 2019-10-08
Payer: MEDICARE

## 2019-10-09 ENCOUNTER — TELEPHONE (OUTPATIENT)
Dept: NEPHROLOGY | Facility: CLINIC | Age: 79
End: 2019-10-09

## 2019-10-09 NOTE — TELEPHONE ENCOUNTER
----- Message from Teodoro Hu MD sent at 10/8/2019  6:08 PM CDT -----  Please make sure has EP appt before the end of the year.  Thank you.     Done

## 2019-10-10 ENCOUNTER — IMMUNIZATION (OUTPATIENT)
Dept: PHARMACY | Facility: CLINIC | Age: 79
End: 2019-10-10
Payer: MEDICARE

## 2019-10-13 DIAGNOSIS — I10 ESSENTIAL HYPERTENSION: ICD-10-CM

## 2019-10-14 RX ORDER — IRBESARTAN 300 MG/1
TABLET ORAL
Qty: 90 TABLET | Refills: 0 | Status: SHIPPED | OUTPATIENT
Start: 2019-10-14 | End: 2020-01-03

## 2019-10-15 ENCOUNTER — OFFICE VISIT (OUTPATIENT)
Dept: UROLOGY | Facility: CLINIC | Age: 79
End: 2019-10-15
Payer: MEDICARE

## 2019-10-15 VITALS
DIASTOLIC BLOOD PRESSURE: 66 MMHG | HEIGHT: 71 IN | WEIGHT: 238.13 LBS | BODY MASS INDEX: 33.34 KG/M2 | HEART RATE: 68 BPM | SYSTOLIC BLOOD PRESSURE: 150 MMHG

## 2019-10-15 DIAGNOSIS — N40.0 BENIGN PROSTATIC HYPERPLASIA, UNSPECIFIED WHETHER LOWER URINARY TRACT SYMPTOMS PRESENT: Chronic | ICD-10-CM

## 2019-10-15 DIAGNOSIS — Z85.51 PERSONAL HISTORY OF BLADDER CANCER: Primary | ICD-10-CM

## 2019-10-15 PROCEDURE — 99204 OFFICE O/P NEW MOD 45 MIN: CPT | Mod: HCNC,S$GLB,, | Performed by: UROLOGY

## 2019-10-15 PROCEDURE — 1101F PR PT FALLS ASSESS DOC 0-1 FALLS W/OUT INJ PAST YR: ICD-10-PCS | Mod: HCNC,CPTII,S$GLB, | Performed by: UROLOGY

## 2019-10-15 PROCEDURE — 99204 PR OFFICE/OUTPT VISIT, NEW, LEVL IV, 45-59 MIN: ICD-10-PCS | Mod: HCNC,S$GLB,, | Performed by: UROLOGY

## 2019-10-15 PROCEDURE — 88112 CYTOPATH CELL ENHANCE TECH: CPT | Mod: 26,HCNC,, | Performed by: PATHOLOGY

## 2019-10-15 PROCEDURE — 88112 CYTOPATH CELL ENHANCE TECH: CPT | Mod: HCNC | Performed by: PATHOLOGY

## 2019-10-15 PROCEDURE — 88112 CYTOLOGY SPECIMEN-URINE: ICD-10-PCS | Mod: 26,HCNC,, | Performed by: PATHOLOGY

## 2019-10-15 PROCEDURE — 3077F SYST BP >= 140 MM HG: CPT | Mod: HCNC,CPTII,S$GLB, | Performed by: UROLOGY

## 2019-10-15 PROCEDURE — 99999 PR PBB SHADOW E&M-EST. PATIENT-LVL IV: CPT | Mod: PBBFAC,HCNC,, | Performed by: UROLOGY

## 2019-10-15 PROCEDURE — 3078F PR MOST RECENT DIASTOLIC BLOOD PRESSURE < 80 MM HG: ICD-10-PCS | Mod: HCNC,CPTII,S$GLB, | Performed by: UROLOGY

## 2019-10-15 PROCEDURE — 1101F PT FALLS ASSESS-DOCD LE1/YR: CPT | Mod: HCNC,CPTII,S$GLB, | Performed by: UROLOGY

## 2019-10-15 PROCEDURE — 3078F DIAST BP <80 MM HG: CPT | Mod: HCNC,CPTII,S$GLB, | Performed by: UROLOGY

## 2019-10-15 PROCEDURE — 99999 PR PBB SHADOW E&M-EST. PATIENT-LVL IV: ICD-10-PCS | Mod: PBBFAC,HCNC,, | Performed by: UROLOGY

## 2019-10-15 PROCEDURE — 3077F PR MOST RECENT SYSTOLIC BLOOD PRESSURE >= 140 MM HG: ICD-10-PCS | Mod: HCNC,CPTII,S$GLB, | Performed by: UROLOGY

## 2019-10-15 RX ORDER — DOXYCYCLINE HYCLATE 100 MG
100 TABLET ORAL ONCE
Status: CANCELLED | OUTPATIENT
Start: 2019-10-15 | End: 2019-10-15

## 2019-10-15 RX ORDER — FINASTERIDE 5 MG/1
5 TABLET, FILM COATED ORAL DAILY
Qty: 90 TABLET | Refills: 3 | Status: SHIPPED | OUTPATIENT
Start: 2019-10-15 | End: 2020-09-29

## 2019-10-15 RX ORDER — LIDOCAINE HYDROCHLORIDE 20 MG/ML
JELLY TOPICAL ONCE
Status: CANCELLED | OUTPATIENT
Start: 2019-10-15 | End: 2019-10-15

## 2019-10-15 RX ORDER — TAMSULOSIN HYDROCHLORIDE 0.4 MG/1
CAPSULE ORAL
Qty: 90 CAPSULE | Refills: 3 | Status: SHIPPED | OUTPATIENT
Start: 2019-10-15 | End: 2020-10-30 | Stop reason: SDUPTHER

## 2019-10-15 NOTE — PROGRESS NOTES
CC: personal hx of bladder cancer, BPH    Kevon Perez is a 79 y.o. man who is here for the evaluation of No chief complaint on file.    A new pt referred by his PCP, Cipriano Sol MD   Hx of bladder cancer.  S/p TURBT for superficial bladder cancer in 2013.   Last surveillance cysto 2017.  No voiding problems reported.     He has been on flomax and finasteride and has done well.  His AUS symptoms scores today: , he is delighted.    Denies flank pain, dysuria, hematuria.      Former smoker.  Retired from working at the [employer].    Past Medical History:   Diagnosis Date    Acute coronary syndrome 9/10/09    STEMI    Anticoagulant long-term use     plavix    Basal cell cancer     BCC (basal cell carcinoma of skin)     nose    Cancer of bladder 2013    Cataract     Chronic kidney disease     Coronary artery disease     CPAP (continuous positive airway pressure) dependence     Diabetes mellitus     Diabetic retinopathy     GERD (gastroesophageal reflux disease)     High cholesterol     Hyperlipidemia     Hypertension     Iron deficiency anemia 2018    GINGER (obstructive sleep apnea)     CPAP     Renal manifestation of secondary diabetes mellitus      Past Surgical History:   Procedure Laterality Date    BASAL CELL CARCINOMA EXCISION      nose     BLADDER SURGERY      bladder cancer    CARDIAC CATHETERIZATION      CATARACT EXTRACTION      bilateral     COLONOSCOPY  3/26/15    CORONARY ANGIOPLASTY  9/10/09    CFX    CORONARY ANGIOPLASTY WITH STENT PLACEMENT      CYSTOSCOPY      EYE SURGERY      hydrocel       Social History     Tobacco Use    Smoking status: Former Smoker     Packs/day: 1.00     Years: 40.00     Pack years: 40.00     Types: Cigarettes     Last attempt to quit: 1970     Years since quittin.3    Smokeless tobacco: Former User   Substance Use Topics    Alcohol use: Yes     Alcohol/week: 3.0 standard drinks     Types: 1 Cans  "of beer, 1 Shots of liquor, 1 Standard drinks or equivalent per week     Frequency: 2-4 times a month     Drinks per session: Patient refused     Binge frequency: Never    Drug use: No     Family History   Problem Relation Age of Onset    Hypertension Father     Heart disease Father         CHF    Diabetes Father     Diabetes Sister     Cataracts Mother     Goiter Mother     Heart disease Mother         CHF    Diabetes Paternal Uncle     Alcohol abuse Brother     No Known Problems Daughter     No Known Problems Son     No Known Problems Son     Cancer Maternal Aunt     Kidney disease Neg Hx     Amblyopia Neg Hx     Blindness Neg Hx     Glaucoma Neg Hx     Macular degeneration Neg Hx     Retinal detachment Neg Hx     Strabismus Neg Hx     Stroke Neg Hx     Thyroid disease Neg Hx      Allergy:  Review of patient's allergies indicates:   Allergen Reactions    Penicillins Other (See Comments)    Bactrim [sulfamethoxazole-trimethoprim] Rash     Outpatient Encounter Medications as of 10/15/2019   Medication Sig Dispense Refill    amLODIPine (NORVASC) 5 MG tablet TAKE 1 TABLET(5 MG) BY MOUTH EVERY DAY 30 tablet 5    aspirin (ECOTRIN) 81 MG EC tablet Take 81 mg by mouth every evening.       atorvastatin (LIPITOR) 20 MG tablet TAKE 1 TABLET BY MOUTH EVERY DAY 90 tablet 1    atorvastatin (LIPITOR) 20 MG tablet Take 1 tablet (20 mg total) by mouth once daily. 90 tablet 1    BD ULTRA-FINE SHORT PEN NEEDLE 31 gauge x 5/16" Ndle USE UP TO 6 TIMES DAILY WITH MULTIPLE INSULIN INJECTIONS 200 each 0    calcitRIOL (ROCALTROL) 0.25 MCG Cap TAKE 1 CAPSULE BY MOUTH EVERY DAY 30 capsule 11    carvedilol (COREG) 12.5 MG tablet TAKE 1 TABLET(12.5 MG) BY MOUTH TWICE DAILY WITH MEALS 180 tablet 3    clopidogrel (PLAVIX) 75 mg tablet TAKE 1 TABLET BY MOUTH EVERY DAY 90 tablet 1    finasteride (PROSCAR) 5 mg tablet Take 1 tablet (5 mg total) by mouth once daily. 90 tablet 3    fish oil-omega-3 fatty acids " 300-1,000 mg capsule Take by mouth once daily.      folic acid (FOLVITE) 1 MG tablet Take 1 mg by mouth once daily.       hydroCHLOROthiazide (HYDRODIURIL) 12.5 MG Tab TAKE 1 TABLET(12.5 MG) BY MOUTH EVERY DAY 30 tablet 11    insulin aspart U-100 (NOVOLOG FLEXPEN U-100 INSULIN) 100 unit/mL (3 mL) InPn pen Inject 15 Units into the skin 4 (four) times daily. Before meals plus correction scale. Max TDD 75 6 Box 3    irbesartan (AVAPRO) 300 MG tablet TAKE 1 TABLET(300 MG) BY MOUTH EVERY EVENING 90 tablet 0    nitroGLYCERIN (NITROSTAT) 0.4 MG SL tablet Place 1 tablet (0.4 mg total) under the tongue every 5 (five) minutes as needed. 25 tablet 3    tamsulosin (FLOMAX) 0.4 mg Cap TAKE 1 CAPSULE(0.4 MG) BY MOUTH EVERY EVENING 90 capsule 3    TRESIBA FLEXTOUCH U-200 200 unit/mL (3 mL) InPn Inject 48 Units into the skin once daily.  3    TRESIBA FLEXTOUCH U-200 200 unit/mL (3 mL) InPn INJECT 70 UNITS UNDER THE SKIN ONCE DAILY. 15 Syringe 11    TRUE METRIX GLUCOSE TEST STRIP Strp USE TO CHECK BLOOD SUGAR FOUR TIMES DAILY 350 strip 3    TRUE METRIX GLUCOSE TEST STRIP Strp USE TO CHECK BLOOD SUGAR FOUR TIMES DAILY 400 strip 4    TRUEPLUS LANCETS 30 gauge Misc USE TO CHECK BLOOD SUGAR FOUR TIMES DAILY 300 each 3    vitamin D (VITAMIN D3) 1000 units Tab Take 1,000 Units by mouth once daily.      [DISCONTINUED] finasteride (PROSCAR) 5 mg tablet TAKE 1 TABLET BY MOUTH EVERY DAY 90 tablet 0    [DISCONTINUED] tamsulosin (FLOMAX) 0.4 mg Cap TAKE 1 CAPSULE(0.4 MG) BY MOUTH EVERY EVENING 90 capsule 0    blood-glucose meter kit To check BG 4 times daily, to use with insurance preferred meter 1 each 0     Facility-Administered Encounter Medications as of 10/15/2019   Medication Dose Route Frequency Provider Last Rate Last Dose    cyanocobalamin injection 1,000 mcg  1,000 mcg Intramuscular Q30 Days Cipriano Sol MD        cyanocobalamin injection 100 mcg  100 mcg Intramuscular Q30 Days Cipriano Sol MD   100 mcg at  06/11/19 1547     Review of Systems   ROS  Physical Exam     Vitals:    10/15/19 0846   BP: (!) 150/66   Pulse: 68     Physical Exam   Constitutional: He is oriented to person, place, and time. He appears well-developed and well-nourished. No distress.   HENT:   Head: Normocephalic and atraumatic.   Right Ear: External ear normal.   Left Ear: External ear normal.   Nose: Nose normal.   Mouth/Throat: Oropharynx is clear and moist.   Eyes: Conjunctivae are normal. Pupils are equal, round, and reactive to light.   Neck: Normal range of motion. Neck supple. No JVD present. No tracheal deviation present. No thyromegaly present.   Cardiovascular: Normal rate, regular rhythm, normal heart sounds and intact distal pulses.  Exam reveals no gallop and no friction rub.    No murmur heard.  Pulmonary/Chest: Effort normal and breath sounds normal. No respiratory distress. He has no wheezes. He exhibits no tenderness.   Abdominal: Soft. Bowel sounds are normal. He exhibits no distension and no mass. There is no tenderness. There is no rebound and no guarding.   Genitourinary: Rectum normal, prostate normal and penis normal. No penile tenderness.   Musculoskeletal: Normal range of motion. He exhibits no edema, tenderness or deformity.   Lymphadenopathy:     He has no cervical adenopathy.   Neurological: He is alert and oriented to person, place, and time.   Skin: Skin is warm and dry. He is not diaphoretic.     Psychiatric: He has a normal mood and affect. His behavior is normal. Thought content normal.     Genitalia:  Scrotum: no rash or lesion  Normal symmetric epididymis without masses  Normal vas palpated  Normal size, symmetric testicles with no masses   Normal urethral meatus with no discharge  Normal circumcised penis with no lesion   Rectal:  Normal perineum and anus upon inspection.  Normal tone, no masses or tenderness;     LABS:  Lab Results   Component Value Date    PSA 1.42 06/13/2013    PSA 1.31 06/25/2012    PSA 1.5  04/28/2011    PSA 1.5 04/07/2011    PSA 1.6 08/20/2010    PSA 1.8 05/20/2010    PSA 2.7 04/19/2010    PSA 1.8 07/21/2009    PSA 1.6 07/07/2009    PSA 1.8 03/31/2009    PSADIAG 1.6 07/30/2014     Results for orders placed or performed in visit on 07/30/14   Prostate Specific Antigen, Diagnostic   Result Value Ref Range    PSA DIAGNOSTIC 1.6 0.00 - 4.00 ng/mL   Results for orders placed or performed in visit on 06/13/13   Prostate Specific Antigen, Diagnostic   Result Value Ref Range    PSA, SCREEN 1.42 0 - 4 ng/ml     Lab Results   Component Value Date    CREATININE 2.3 (H) 09/03/2019    CREATININE 2.4 (H) 08/26/2019    CREATININE 2.2 (H) 08/30/2018     No results found for this or any previous visit.  Urine Culture, Routine   Date Value Ref Range Status   05/04/2015 No significant growth  Final     Hemoglobin A1C   Date Value Ref Range Status   08/26/2019 6.3 (H) 4.0 - 5.6 % Final     Comment:     ADA Screening Guidelines:  5.7-6.4%  Consistent with prediabetes  >or=6.5%  Consistent with diabetes  High levels of fetal hemoglobin interfere with the HbA1C  assay. Heterozygous hemoglobin variants (HbS, HgC, etc)do  not significantly interfere with this assay.   However, presence of multiple variants may affect accuracy.     05/06/2019 6.1 (H) 4.0 - 5.6 % Final     Comment:     ADA Screening Guidelines:  5.7-6.4%  Consistent with prediabetes  >or=6.5%  Consistent with diabetes  High levels of fetal hemoglobin interfere with the HbA1C  assay. Heterozygous hemoglobin variants (HbS, HgC, etc)do  not significantly interfere with this assay.   However, presence of multiple variants may affect accuracy.         UA clear    Assessment and Plan:  Diagnoses and all orders for this visit:    Personal history of bladder cancer  -     Cytology, urine  -     Cystoscopy; Future    Benign prostatic hyperplasia, unspecified whether lower urinary tract symptoms present  -     finasteride (PROSCAR) 5 mg tablet; Take 1 tablet (5 mg total)  by mouth once daily.  -     tamsulosin (FLOMAX) 0.4 mg Cap; TAKE 1 CAPSULE(0.4 MG) BY MOUTH EVERY EVENING    Other orders  -     lidocaine HCl 2% urojet  -     doxycycline tablet 100 mg      Follow-up:  Follow up for Cysto.

## 2019-10-15 NOTE — LETTER
October 15, 2019      Khushi Brooke, NP  [address] [facility] [address]           [facility] - Urology 4th Floor  [address]  [place] [address]  Phone: [phone]          Patient: Kevon Perez   MR Number: [id number]   YOB: 1940   Date of Visit: 10/15/2019       Dear Khushi Brooke:    Thank you for referring Kevon Perez to me for evaluation. Attached you will find relevant portions of my assessment and plan of care.    If you have questions, please do not hesitate to call me. I look forward to following Kevon Perez along with you.    Sincerely,    Martin Eisenberg MD    Enclosure  CC:  No Recipients    If you would like to receive this communication electronically, please contact [email or web address] or [phone] to request more information on [employer] ([employer]) Link access.    For providers and/or their staff who would like to refer a patient to [facility], please contact us through our one-stop-shop provider referral line, [facility] , at [phone].    If you feel you have received this communication in error or would no longer like to receive these types of communications, please e-mail [email or web address]

## 2019-10-21 ENCOUNTER — OFFICE VISIT (OUTPATIENT)
Dept: PODIATRY | Facility: CLINIC | Age: 79
End: 2019-10-21
Payer: MEDICARE

## 2019-10-21 ENCOUNTER — CLINICAL SUPPORT (OUTPATIENT)
Dept: AUDIOLOGY | Facility: CLINIC | Age: 79
End: 2019-10-21
Payer: MEDICARE

## 2019-10-21 ENCOUNTER — TELEPHONE (OUTPATIENT)
Dept: PODIATRY | Facility: CLINIC | Age: 79
End: 2019-10-21

## 2019-10-21 VITALS
DIASTOLIC BLOOD PRESSURE: 62 MMHG | HEART RATE: 48 BPM | BODY MASS INDEX: 34.07 KG/M2 | HEIGHT: 70 IN | SYSTOLIC BLOOD PRESSURE: 171 MMHG | WEIGHT: 238 LBS | RESPIRATION RATE: 18 BRPM

## 2019-10-21 DIAGNOSIS — B35.1 ONYCHOMYCOSIS DUE TO DERMATOPHYTE: ICD-10-CM

## 2019-10-21 DIAGNOSIS — H93.293 ABNORMAL AUDITORY PERCEPTION OF BOTH EARS: ICD-10-CM

## 2019-10-21 DIAGNOSIS — H90.3 BILATERAL HIGH FREQUENCY SENSORINEURAL HEARING LOSS: Primary | ICD-10-CM

## 2019-10-21 DIAGNOSIS — E11.49 TYPE II DIABETES MELLITUS WITH NEUROLOGICAL MANIFESTATIONS: Primary | ICD-10-CM

## 2019-10-21 PROCEDURE — 92557 COMPREHENSIVE HEARING TEST: CPT | Mod: HCNC,S$GLB,, | Performed by: AUDIOLOGIST

## 2019-10-21 PROCEDURE — 1101F PT FALLS ASSESS-DOCD LE1/YR: CPT | Mod: HCNC,CPTII,S$GLB, | Performed by: PODIATRIST

## 2019-10-21 PROCEDURE — 3078F PR MOST RECENT DIASTOLIC BLOOD PRESSURE < 80 MM HG: ICD-10-PCS | Mod: HCNC,CPTII,S$GLB, | Performed by: PODIATRIST

## 2019-10-21 PROCEDURE — 11721 DEBRIDE NAIL 6 OR MORE: CPT | Mod: Q9,HCNC,S$GLB, | Performed by: PODIATRIST

## 2019-10-21 PROCEDURE — 1101F PR PT FALLS ASSESS DOC 0-1 FALLS W/OUT INJ PAST YR: ICD-10-PCS | Mod: HCNC,CPTII,S$GLB, | Performed by: PODIATRIST

## 2019-10-21 PROCEDURE — 92567 PR TYMPA2METRY: ICD-10-PCS | Mod: HCNC,S$GLB,, | Performed by: AUDIOLOGIST

## 2019-10-21 PROCEDURE — 3077F PR MOST RECENT SYSTOLIC BLOOD PRESSURE >= 140 MM HG: ICD-10-PCS | Mod: HCNC,CPTII,S$GLB, | Performed by: PODIATRIST

## 2019-10-21 PROCEDURE — 3077F SYST BP >= 140 MM HG: CPT | Mod: HCNC,CPTII,S$GLB, | Performed by: PODIATRIST

## 2019-10-21 PROCEDURE — 99213 OFFICE O/P EST LOW 20 MIN: CPT | Mod: 25,HCNC,S$GLB, | Performed by: PODIATRIST

## 2019-10-21 PROCEDURE — 11721 PR DEBRIDEMENT OF NAILS, 6 OR MORE: ICD-10-PCS | Mod: Q9,HCNC,S$GLB, | Performed by: PODIATRIST

## 2019-10-21 PROCEDURE — 99213 PR OFFICE/OUTPT VISIT, EST, LEVL III, 20-29 MIN: ICD-10-PCS | Mod: 25,HCNC,S$GLB, | Performed by: PODIATRIST

## 2019-10-21 PROCEDURE — 92557 PR COMPREHENSIVE HEARING TEST: ICD-10-PCS | Mod: HCNC,S$GLB,, | Performed by: AUDIOLOGIST

## 2019-10-21 PROCEDURE — 99999 PR PBB SHADOW E&M-EST. PATIENT-LVL IV: CPT | Mod: PBBFAC,HCNC,, | Performed by: PODIATRIST

## 2019-10-21 PROCEDURE — 99999 PR PBB SHADOW E&M-EST. PATIENT-LVL IV: ICD-10-PCS | Mod: PBBFAC,HCNC,, | Performed by: PODIATRIST

## 2019-10-21 PROCEDURE — 92567 TYMPANOMETRY: CPT | Mod: HCNC,S$GLB,, | Performed by: AUDIOLOGIST

## 2019-10-21 PROCEDURE — 3078F DIAST BP <80 MM HG: CPT | Mod: HCNC,CPTII,S$GLB, | Performed by: PODIATRIST

## 2019-10-21 NOTE — Clinical Note
Hi Ms. Brooke,Please see the Epic encounter note for results and recommendations from today's audiological evaluation.  Please let me know if I can provide any additional information.Kind regards,Lucas Cruz, WIN-A

## 2019-10-21 NOTE — TELEPHONE ENCOUNTER
----- Message from Elaine Napier sent at 10/21/2019  3:45 PM CDT -----  Contact: Self   Pt would like to know if a prescription for orthotics can be mailed to him.     [phone]

## 2019-10-21 NOTE — PROGRESS NOTES
Kevon ePrez was seen in the clinic today for an audiological evaluation.  Mr. Perez reported a possible hearing loss for both ears.    Audiological testing revealed a mild to moderate high frequency sensorineural hearing loss for the right ear and a mild to moderately-severe mid to high frequency sensorineural hearing loss for the left ear.  A speech reception threshold was obtained at 30 dBHL for the right ear and at 25 dBHL for the left ear.  Speech discrimination was 92% for the right ear and 96% for the left ear.      Tympanometry testing revealed a Type A tympanogram for the right ear and a Type A tympanogram for the left ear.    Recommendations:  1. Otologic evaluation  2. Annual audiological evaluation  3. Hearing protection when in noise   4. Hearing aid consultation (patient not interested at this time)

## 2019-10-21 NOTE — LETTER
October 21, 2019      Khushi Brooke, NP  [address] [facility] [address]           [facility] - Podiatry  [address]  [place] [address]  Phone: [phone]          Patient: Kevon Perez   MR Number: [id number]   YOB: 1940   Date of Visit: 10/21/2019       Dear Khushi Brooke:    Thank you for referring Kevon Perez to me for evaluation. Attached you will find relevant portions of my assessment and plan of care.    If you have questions, please do not hesitate to call me. I look forward to following Kevon Perez along with you.    Sincerely,    Kathy Pratt, KASEY    Enclosure  CC:  No Recipients    If you would like to receive this communication electronically, please contact [email or web address] or [phone] to request more information on [employer] Link access.    For providers and/or their staff who would like to refer a patient to [facility], please contact us through our one-stop-shop provider referral line, [facility], at [phone].    If you feel you have received this communication in error or would no longer like to receive these types of communications, please e-mail [email or web address]

## 2019-10-23 DIAGNOSIS — I25.2 HISTORY OF MI (MYOCARDIAL INFARCTION): ICD-10-CM

## 2019-10-23 DIAGNOSIS — I10 ESSENTIAL HYPERTENSION: ICD-10-CM

## 2019-10-23 DIAGNOSIS — I25.10 CORONARY ARTERY DISEASE INVOLVING NATIVE CORONARY ARTERY OF NATIVE HEART WITHOUT ANGINA PECTORIS: Chronic | ICD-10-CM

## 2019-10-23 DIAGNOSIS — E11.42 DIABETIC POLYNEUROPATHY ASSOCIATED WITH TYPE 2 DIABETES MELLITUS: ICD-10-CM

## 2019-10-23 RX ORDER — CARVEDILOL 12.5 MG/1
TABLET ORAL
Qty: 180 TABLET | Refills: 0 | Status: SHIPPED | OUTPATIENT
Start: 2019-10-23 | End: 2020-02-04 | Stop reason: SDUPTHER

## 2019-10-31 ENCOUNTER — OFFICE VISIT (OUTPATIENT)
Dept: OPHTHALMOLOGY | Facility: CLINIC | Age: 79
End: 2019-10-31
Payer: MEDICARE

## 2019-10-31 VITALS — DIASTOLIC BLOOD PRESSURE: 60 MMHG | HEART RATE: 63 BPM | SYSTOLIC BLOOD PRESSURE: 143 MMHG

## 2019-10-31 DIAGNOSIS — H35.033 HYPERTENSIVE RETINOPATHY OF BOTH EYES: ICD-10-CM

## 2019-10-31 DIAGNOSIS — E11.3213 CONTROLLED TYPE 2 DIABETES MELLITUS WITH BOTH EYES AFFECTED BY MILD NONPROLIFERATIVE RETINOPATHY AND MACULAR EDEMA, WITH LONG-TERM CURRENT USE OF INSULIN: Primary | ICD-10-CM

## 2019-10-31 DIAGNOSIS — Z79.4 CONTROLLED TYPE 2 DIABETES MELLITUS WITH BOTH EYES AFFECTED BY MILD NONPROLIFERATIVE RETINOPATHY AND MACULAR EDEMA, WITH LONG-TERM CURRENT USE OF INSULIN: Primary | ICD-10-CM

## 2019-10-31 PROCEDURE — 92226 PR SPECIAL EYE EXAM, SUBSEQUENT: CPT | Mod: 50,HCNC,S$GLB, | Performed by: OPHTHALMOLOGY

## 2019-10-31 PROCEDURE — 92134 POSTERIOR SEGMENT OCT RETINA (OCULAR COHERENCE TOMOGRAPHY)-BOTH EYES: ICD-10-PCS | Mod: HCNC,S$GLB,, | Performed by: OPHTHALMOLOGY

## 2019-10-31 PROCEDURE — 92014 PR EYE EXAM, EST PATIENT,COMPREHESV: ICD-10-PCS | Mod: HCNC,S$GLB,, | Performed by: OPHTHALMOLOGY

## 2019-10-31 PROCEDURE — 92014 COMPRE OPH EXAM EST PT 1/>: CPT | Mod: HCNC,S$GLB,, | Performed by: OPHTHALMOLOGY

## 2019-10-31 PROCEDURE — 92134 CPTRZ OPH DX IMG PST SGM RTA: CPT | Mod: HCNC,S$GLB,, | Performed by: OPHTHALMOLOGY

## 2019-10-31 PROCEDURE — 99999 PR PBB SHADOW E&M-EST. PATIENT-LVL III: CPT | Mod: PBBFAC,HCNC,, | Performed by: OPHTHALMOLOGY

## 2019-10-31 PROCEDURE — 99999 PR PBB SHADOW E&M-EST. PATIENT-LVL III: ICD-10-PCS | Mod: PBBFAC,HCNC,, | Performed by: OPHTHALMOLOGY

## 2019-10-31 PROCEDURE — 92226 PR SPECIAL EYE EXAM, SUBSEQUENT: ICD-10-PCS | Mod: 50,HCNC,S$GLB, | Performed by: OPHTHALMOLOGY

## 2019-10-31 NOTE — PROGRESS NOTES
HPI     DlS: 07/25/2019 Dr. Tillman    Patient states floaters are stable. He states it feels like his eyes need   to be washed out. He states when he wakes up he has some discharge in the   left eye.     Systane PRN OU        Prior OCT - ERM OU - no significant distortion  ME OS - central ME resolved    Prior FA - Minimal late leakage of fovea MA OS  No leakage OD      A/P    1. Mild NPDR OU  Controlled T2 on insulin    2. DME OS  Increased again    S/p Avastin OS x 3, ozurdex OS x 4 5/19  S/p Iluvien OS 5/19 2/19 - slight recurrence at 14 weeks, keep at 12    Observe today    3. PCIOL OU    4. Floaters OU    5. HTN Ret OU    6. CAD - s/p stents on Plavix    BS/BP/chol control      6 months OCT

## 2019-11-04 ENCOUNTER — PATIENT MESSAGE (OUTPATIENT)
Dept: INTERNAL MEDICINE | Facility: CLINIC | Age: 79
End: 2019-11-04

## 2019-11-04 DIAGNOSIS — E11.22 TYPE 2 DIABETES MELLITUS WITH STAGE 3 CHRONIC KIDNEY DISEASE, WITHOUT LONG-TERM CURRENT USE OF INSULIN: Primary | ICD-10-CM

## 2019-11-04 DIAGNOSIS — E11.42 DIABETIC POLYNEUROPATHY ASSOCIATED WITH TYPE 2 DIABETES MELLITUS: ICD-10-CM

## 2019-11-04 DIAGNOSIS — G47.33 OSA ON CPAP: ICD-10-CM

## 2019-11-04 DIAGNOSIS — I25.2 HISTORY OF MI (MYOCARDIAL INFARCTION): ICD-10-CM

## 2019-11-04 DIAGNOSIS — N18.4 ANEMIA DUE TO STAGE 4 CHRONIC KIDNEY DISEASE: ICD-10-CM

## 2019-11-04 DIAGNOSIS — I10 ESSENTIAL HYPERTENSION: ICD-10-CM

## 2019-11-04 DIAGNOSIS — I25.10 CORONARY ARTERY DISEASE INVOLVING NATIVE CORONARY ARTERY OF NATIVE HEART WITHOUT ANGINA PECTORIS: ICD-10-CM

## 2019-11-04 DIAGNOSIS — N18.30 TYPE 2 DIABETES MELLITUS WITH STAGE 3 CHRONIC KIDNEY DISEASE, WITHOUT LONG-TERM CURRENT USE OF INSULIN: Primary | ICD-10-CM

## 2019-11-04 DIAGNOSIS — D63.1 ANEMIA DUE TO STAGE 4 CHRONIC KIDNEY DISEASE: ICD-10-CM

## 2019-11-04 DIAGNOSIS — I25.10 CORONARY ARTERY DISEASE INVOLVING NATIVE CORONARY ARTERY OF NATIVE HEART WITHOUT ANGINA PECTORIS: Chronic | ICD-10-CM

## 2019-11-06 RX ORDER — CARVEDILOL 12.5 MG/1
TABLET ORAL
Qty: 180 TABLET | Refills: 3 | OUTPATIENT
Start: 2019-11-06

## 2019-11-06 RX ORDER — PEN NEEDLE, DIABETIC 31 GX5/16"
NEEDLE, DISPOSABLE MISCELLANEOUS
Qty: 200 EACH | Refills: 0 | Status: SHIPPED | OUTPATIENT
Start: 2019-11-06 | End: 2019-12-31

## 2019-11-21 ENCOUNTER — HOSPITAL ENCOUNTER (OUTPATIENT)
Dept: UROLOGY | Facility: HOSPITAL | Age: 79
Discharge: HOME | End: 2019-11-21
Attending: UROLOGY
Payer: MEDICARE

## 2019-11-21 VITALS
HEART RATE: 62 BPM | DIASTOLIC BLOOD PRESSURE: 72 MMHG | TEMPERATURE: 99 F | RESPIRATION RATE: 17 BRPM | HEIGHT: 70 IN | WEIGHT: 238.13 LBS | BODY MASS INDEX: 34.09 KG/M2 | SYSTOLIC BLOOD PRESSURE: 164 MMHG

## 2019-11-21 DIAGNOSIS — Z85.51 PERSONAL HISTORY OF BLADDER CANCER: ICD-10-CM

## 2019-11-21 PROCEDURE — 52000 PR CYSTOURETHROSCOPY: ICD-10-PCS | Mod: HCNC,,, | Performed by: UROLOGY

## 2019-11-21 PROCEDURE — 52000 CYSTOURETHROSCOPY: CPT | Mod: HCNC

## 2019-11-21 PROCEDURE — 52000 CYSTOURETHROSCOPY: CPT | Mod: HCNC,,, | Performed by: UROLOGY

## 2019-11-21 RX ORDER — DOXYCYCLINE HYCLATE 100 MG
100 TABLET ORAL ONCE
Status: COMPLETED | OUTPATIENT
Start: 2019-11-21 | End: 2019-11-21

## 2019-11-21 RX ORDER — LIDOCAINE HYDROCHLORIDE 20 MG/ML
JELLY TOPICAL ONCE
Status: CANCELLED | OUTPATIENT
Start: 2019-11-21 | End: 2019-11-21

## 2019-11-21 RX ORDER — LIDOCAINE HYDROCHLORIDE 20 MG/ML
JELLY TOPICAL ONCE
Status: COMPLETED | OUTPATIENT
Start: 2019-11-21 | End: 2019-11-21

## 2019-11-21 RX ORDER — DOXYCYCLINE HYCLATE 100 MG
100 TABLET ORAL ONCE
Status: CANCELLED | OUTPATIENT
Start: 2019-11-21 | End: 2019-11-21

## 2019-11-21 RX ADMIN — Medication 100 MG: at 08:11

## 2019-11-21 RX ADMIN — LIDOCAINE HYDROCHLORIDE: 20 JELLY TOPICAL at 08:11

## 2019-11-21 NOTE — PROCEDURES
Procedure Date:  11/21/2019      Procedure:  Male Diagnostic Cystourethroscopy    Pre-op diagnosis: personal hx of bladder cancer  Post-op diagnosis: same  Anesthesia: Local  Surgeon:  Martin Eisenberg MD    Findings:  Urethra:  Normal urethra.   Sphincter: competent.  Prostate: Estimated Length Prostatic Urethra: 4 cm with minimla obstruction  Bladder neck: patent with no stricture  Bladder:  Normal bladder. No recurrent tumors or lesions. No stones.  Normal ureteral orifices bilaterally.   Moderate trabeculation.     Description of Procedure:                                                         Informed Consent:                                                            - Risks, benefits and alternatives of procedure discussed with               patient and informed consent obtained.       Patient Position:   - Supine. --- Bladder ---   Prep and Drape:   - Patient prepped and draped in usual sterile fashion using povidone     iodine (Betadine).   Instruments:   - 16 Fr flexible cystoscope with 0 degree lens.   Procedure Details:   - Cystoscope passed under vision into bladder.   - Bladder and urethra examined in their entirety with findings as     above.     Conclusion:  1. Normal cysto  Normal urine cytology.  Last TURBT 2013 for superficial cancer.  Will see him in 1 year with cysto    Plan:  Patient was discharged home in a stable condition.  Medications: doxy  Follow up:  Surveillance cysto

## 2019-11-21 NOTE — PATIENT INSTRUCTIONS
What to Expect After a Cystoscopy  For the next 24-48 hours, you may feel a mild burning when you urinate. This burning is normal and expected. Drink plenty of water to dilute the urine to help relieve the burning sensation. You may also see a small amount of blood in your urine after the procedure.    Unless you are already taking antibiotics, you may be given an antibiotic after the test to prevent infection.    Signs and Symptoms to Report  Call the [facility] at [phone] if you develop any of the following:  · Fever of 101 degrees or higher  · Chills or persistent bleeding  · Inability to urinate

## 2019-11-21 NOTE — H&P
CC: personal hx of bladder cancer, BPH     Kevon Perez is a 79 y.o. man who is here for the evaluation of No chief complaint on file.     A new pt referred by his PCP, Cipriano Sol MD   Hx of bladder cancer.  S/p TURBT for superficial bladder cancer in 2013.   Last surveillance cysto 2017.  No voiding problems reported.      He has been on flomax and finasteride and has done well.  His AUS symptoms scores today: , he is delighted.     Denies flank pain, dysuria, hematuria.       Former smoker.  Retired from working at the [employer].          Past Medical History:   Diagnosis Date    Acute coronary syndrome 9/10/09     STEMI    Anticoagulant long-term use       plavix    Basal cell cancer      BCC (basal cell carcinoma of skin)       nose    Cancer of bladder 2013    Cataract      Chronic kidney disease      Coronary artery disease      CPAP (continuous positive airway pressure) dependence      Diabetes mellitus      Diabetic retinopathy      GERD (gastroesophageal reflux disease)      High cholesterol      Hyperlipidemia      Hypertension      Iron deficiency anemia 2018    GINGER (obstructive sleep apnea)       CPAP     Renal manifestation of secondary diabetes mellitus              Past Surgical History:   Procedure Laterality Date    BASAL CELL CARCINOMA EXCISION         nose     BLADDER SURGERY         bladder cancer    CARDIAC CATHETERIZATION        CATARACT EXTRACTION         bilateral     COLONOSCOPY   3/26/15    CORONARY ANGIOPLASTY   9/10/09     CFX    CORONARY ANGIOPLASTY WITH STENT PLACEMENT        CYSTOSCOPY        EYE SURGERY        hydrocel          Social History            Tobacco Use    Smoking status: Former Smoker       Packs/day: 1.00       Years: 40.00       Pack years: 40.00       Types: Cigarettes       Last attempt to quit: 1970       Years since quittin.3    Smokeless tobacco: Former User   Substance Use  "Topics    Alcohol use: Yes       Alcohol/week: 3.0 standard drinks       Types: 1 Cans of beer, 1 Shots of liquor, 1 Standard drinks or equivalent per week       Frequency: 2-4 times a month       Drinks per session: Patient refused       Binge frequency: Never    Drug use: No            Family History   Problem Relation Age of Onset    Hypertension Father      Heart disease Father           CHF    Diabetes Father      Diabetes Sister      Cataracts Mother      Goiter Mother      Heart disease Mother           CHF    Diabetes Paternal Uncle      Alcohol abuse Brother      No Known Problems Daughter      No Known Problems Son      No Known Problems Son      Cancer Maternal Aunt      Kidney disease Neg Hx      Amblyopia Neg Hx      Blindness Neg Hx      Glaucoma Neg Hx      Macular degeneration Neg Hx      Retinal detachment Neg Hx      Strabismus Neg Hx      Stroke Neg Hx      Thyroid disease Neg Hx        Allergy:       Review of patient's allergies indicates:   Allergen Reactions    Penicillins Other (See Comments)    Bactrim [sulfamethoxazole-trimethoprim] Rash      Encounter Medications          Outpatient Encounter Medications as of 10/15/2019   Medication Sig Dispense Refill    amLODIPine (NORVASC) 5 MG tablet TAKE 1 TABLET(5 MG) BY MOUTH EVERY DAY 30 tablet 5    aspirin (ECOTRIN) 81 MG EC tablet Take 81 mg by mouth every evening.         atorvastatin (LIPITOR) 20 MG tablet TAKE 1 TABLET BY MOUTH EVERY DAY 90 tablet 1    atorvastatin (LIPITOR) 20 MG tablet Take 1 tablet (20 mg total) by mouth once daily. 90 tablet 1    BD ULTRA-FINE SHORT PEN NEEDLE 31 gauge x 5/16" Ndle USE UP TO 6 TIMES DAILY WITH MULTIPLE INSULIN INJECTIONS 200 each 0    calcitRIOL (ROCALTROL) 0.25 MCG Cap TAKE 1 CAPSULE BY MOUTH EVERY DAY 30 capsule 11    carvedilol (COREG) 12.5 MG tablet TAKE 1 TABLET(12.5 MG) BY MOUTH TWICE DAILY WITH MEALS 180 tablet 3    clopidogrel (PLAVIX) 75 mg tablet TAKE 1 TABLET " BY MOUTH EVERY DAY 90 tablet 1    finasteride (PROSCAR) 5 mg tablet Take 1 tablet (5 mg total) by mouth once daily. 90 tablet 3    fish oil-omega-3 fatty acids 300-1,000 mg capsule Take by mouth once daily.        folic acid (FOLVITE) 1 MG tablet Take 1 mg by mouth once daily.         hydroCHLOROthiazide (HYDRODIURIL) 12.5 MG Tab TAKE 1 TABLET(12.5 MG) BY MOUTH EVERY DAY 30 tablet 11    insulin aspart U-100 (NOVOLOG FLEXPEN U-100 INSULIN) 100 unit/mL (3 mL) InPn pen Inject 15 Units into the skin 4 (four) times daily. Before meals plus correction scale. Max TDD 75 6 Box 3    irbesartan (AVAPRO) 300 MG tablet TAKE 1 TABLET(300 MG) BY MOUTH EVERY EVENING 90 tablet 0    nitroGLYCERIN (NITROSTAT) 0.4 MG SL tablet Place 1 tablet (0.4 mg total) under the tongue every 5 (five) minutes as needed. 25 tablet 3    tamsulosin (FLOMAX) 0.4 mg Cap TAKE 1 CAPSULE(0.4 MG) BY MOUTH EVERY EVENING 90 capsule 3    TRESIBA FLEXTOUCH U-200 200 unit/mL (3 mL) InPn Inject 48 Units into the skin once daily.   3    TRESIBA FLEXTOUCH U-200 200 unit/mL (3 mL) InPn INJECT 70 UNITS UNDER THE SKIN ONCE DAILY. 15 Syringe 11    TRUE METRIX GLUCOSE TEST STRIP Strp USE TO CHECK BLOOD SUGAR FOUR TIMES DAILY 350 strip 3    TRUE METRIX GLUCOSE TEST STRIP Strp USE TO CHECK BLOOD SUGAR FOUR TIMES DAILY 400 strip 4    TRUEPLUS LANCETS 30 gauge Misc USE TO CHECK BLOOD SUGAR FOUR TIMES DAILY 300 each 3    vitamin D (VITAMIN D3) 1000 units Tab Take 1,000 Units by mouth once daily.        [DISCONTINUED] finasteride (PROSCAR) 5 mg tablet TAKE 1 TABLET BY MOUTH EVERY DAY 90 tablet 0    [DISCONTINUED] tamsulosin (FLOMAX) 0.4 mg Cap TAKE 1 CAPSULE(0.4 MG) BY MOUTH EVERY EVENING 90 capsule 0    blood-glucose meter kit To check BG 4 times daily, to use with insurance preferred meter 1 each 0                Facility-Administered Encounter Medications as of 10/15/2019   Medication Dose Route Frequency Provider Last Rate Last Dose    cyanocobalamin  injection 1,000 mcg  1,000 mcg Intramuscular Q30 Days Cipriano Sol MD        cyanocobalamin injection 100 mcg  100 mcg Intramuscular Q30 Days Cipriano Sol MD   100 mcg at 06/11/19 1547         Review of Systems   ROS  Physical Exam         Vitals:     10/15/19 0846   BP: (!) 150/66   Pulse: 68      Physical Exam   Constitutional: He is oriented to person, place, and time. He appears well-developed and well-nourished. No distress.   HENT:   Head: Normocephalic and atraumatic.   Right Ear: External ear normal.   Left Ear: External ear normal.   Nose: Nose normal.   Mouth/Throat: Oropharynx is clear and moist.   Eyes: Conjunctivae are normal. Pupils are equal, round, and reactive to light.   Neck: Normal range of motion. Neck supple. No JVD present. No tracheal deviation present. No thyromegaly present.   Cardiovascular: Normal rate, regular rhythm, normal heart sounds and intact distal pulses.  Exam reveals no gallop and no friction rub.    No murmur heard.  Pulmonary/Chest: Effort normal and breath sounds normal. No respiratory distress. He has no wheezes. He exhibits no tenderness.   Abdominal: Soft. Bowel sounds are normal. He exhibits no distension and no mass. There is no tenderness. There is no rebound and no guarding.   Genitourinary: Rectum normal, prostate normal and penis normal. No penile tenderness.   Musculoskeletal: Normal range of motion. He exhibits no edema, tenderness or deformity.   Lymphadenopathy:     He has no cervical adenopathy.   Neurological: He is alert and oriented to person, place, and time.   Skin: Skin is warm and dry. He is not diaphoretic.     Psychiatric: He has a normal mood and affect. His behavior is normal. Thought content normal.      Genitalia:  Scrotum: no rash or lesion  Normal symmetric epididymis without masses  Normal vas palpated  Normal size, symmetric testicles with no masses   Normal urethral meatus with no discharge  Normal circumcised penis with no lesion    Rectal:  Normal perineum and anus upon inspection.  Normal tone, no masses or tenderness;      LABS:        Lab Results   Component Value Date     PSA 1.42 06/13/2013     PSA 1.31 06/25/2012     PSA 1.5 04/28/2011     PSA 1.5 04/07/2011     PSA 1.6 08/20/2010     PSA 1.8 05/20/2010     PSA 2.7 04/19/2010     PSA 1.8 07/21/2009     PSA 1.6 07/07/2009     PSA 1.8 03/31/2009     PSADIAG 1.6 07/30/2014            Results for orders placed or performed in visit on 07/30/14   Prostate Specific Antigen, Diagnostic   Result Value Ref Range     PSA DIAGNOSTIC 1.6 0.00 - 4.00 ng/mL   Results for orders placed or performed in visit on 06/13/13   Prostate Specific Antigen, Diagnostic   Result Value Ref Range     PSA, SCREEN 1.42 0 - 4 ng/ml            Lab Results   Component Value Date     CREATININE 2.3 (H) 09/03/2019     CREATININE 2.4 (H) 08/26/2019     CREATININE 2.2 (H) 08/30/2018      No results found for this or any previous visit.        Urine Culture, Routine   Date Value Ref Range Status   05/04/2015 No significant growth   Final              Hemoglobin A1C   Date Value Ref Range Status   08/26/2019 6.3 (H) 4.0 - 5.6 % Final       Comment:       ADA Screening Guidelines:  5.7-6.4%  Consistent with prediabetes  >or=6.5%  Consistent with diabetes  High levels of fetal hemoglobin interfere with the HbA1C  assay. Heterozygous hemoglobin variants (HbS, HgC, etc)do  not significantly interfere with this assay.   However, presence of multiple variants may affect accuracy.      05/06/2019 6.1 (H) 4.0 - 5.6 % Final       Comment:       ADA Screening Guidelines:  5.7-6.4%  Consistent with prediabetes  >or=6.5%  Consistent with diabetes  High levels of fetal hemoglobin interfere with the HbA1C  assay. Heterozygous hemoglobin variants (HbS, HgC, etc)do  not significantly interfere with this assay.   However, presence of multiple variants may affect accuracy.            UA clear     Assessment and Plan:  Diagnoses and all orders  for this visit:     Personal history of bladder cancer  -     Cytology, urine  -     Cystoscopy; Future     Benign prostatic hyperplasia, unspecified whether lower urinary tract symptoms present  -     finasteride (PROSCAR) 5 mg tablet; Take 1 tablet (5 mg total) by mouth once daily.  -     tamsulosin (FLOMAX) 0.4 mg Cap; TAKE 1 CAPSULE(0.4 MG) BY MOUTH EVERY EVENING     Other orders  -     lidocaine HCl 2% urojet  -     doxycycline tablet 100 mg        Follow-up:  Follow up for Cysto.

## 2019-11-22 ENCOUNTER — CLINICAL SUPPORT (OUTPATIENT)
Dept: INTERNAL MEDICINE | Facility: CLINIC | Age: 79
End: 2019-11-22
Payer: MEDICARE

## 2019-11-22 ENCOUNTER — TELEPHONE (OUTPATIENT)
Dept: PODIATRY | Facility: CLINIC | Age: 79
End: 2019-11-22

## 2019-11-22 DIAGNOSIS — E11.49 TYPE II DIABETES MELLITUS WITH NEUROLOGICAL MANIFESTATIONS: Primary | ICD-10-CM

## 2019-11-22 PROCEDURE — 96372 PR INJECTION,THERAP/PROPH/DIAG2ST, IM OR SUBCUT: ICD-10-PCS | Mod: HCNC,S$GLB,, | Performed by: INTERNAL MEDICINE

## 2019-11-22 PROCEDURE — 96372 THER/PROPH/DIAG INJ SC/IM: CPT | Mod: HCNC,S$GLB,, | Performed by: INTERNAL MEDICINE

## 2019-11-22 RX ADMIN — CYANOCOBALAMIN 1000 MCG: 1000 INJECTION, SOLUTION INTRAMUSCULAR; SUBCUTANEOUS at 02:11

## 2019-11-22 NOTE — TELEPHONE ENCOUNTER
----- Message from Elinor Hunter sent at 11/22/2019 11:10 AM CST -----  Contact: pt   Please call pt at [phone]    Patient is requesting a new orthotics RX    Patient stated that he has been calling and got no repsonse    Thank you

## 2019-11-22 NOTE — TELEPHONE ENCOUNTER
----- Message from Elinor Hunter sent at 11/22/2019 11:10 AM CST -----  Contact: pt   Please call pt at [phone]    Patient is requesting a new orthotics RX    Patient stated that he has been calling and got no repsonse    Thank you

## 2019-11-22 NOTE — TELEPHONE ENCOUNTER
----- Message from Elinor Hunter sent at 11/22/2019 11:10 AM CST -----  Contact: pt   Please call pt at [phone]    Patient is requesting a new orthotics RX    Patient stated that he has been calling and got no repsonse    Thank you

## 2019-11-25 ENCOUNTER — TELEPHONE (OUTPATIENT)
Dept: PODIATRY | Facility: CLINIC | Age: 79
End: 2019-11-25

## 2019-11-25 NOTE — TELEPHONE ENCOUNTER
Spoke with patient, Rx for shoes and insert and DPM not faxed to [facility] per pt request.  [facility] notified.

## 2019-11-26 ENCOUNTER — TELEPHONE (OUTPATIENT)
Dept: PODIATRY | Facility: CLINIC | Age: 79
End: 2019-11-26

## 2019-11-26 NOTE — TELEPHONE ENCOUNTER
Call from [facility] relating patient does not have qualifying secondary diagnosis to have diabetic shoes & inserts covered by insurance- No corns, callous, bunions, hammertoes or amputations.  Call placed to patient, message left on recorder explaining the situation and requirements for insurance to cover cost of footwear.

## 2019-12-11 DIAGNOSIS — N18.3 TYPE 2 DIABETES MELLITUS WITH STAGE 3 CHRONIC KIDNEY DISEASE, UNSPECIFIED WHETHER LONG TERM INSULIN USE: ICD-10-CM

## 2019-12-11 DIAGNOSIS — E11.22 TYPE 2 DIABETES MELLITUS WITH STAGE 3 CHRONIC KIDNEY DISEASE, UNSPECIFIED WHETHER LONG TERM INSULIN USE: ICD-10-CM

## 2019-12-12 DIAGNOSIS — E11.22 TYPE 2 DIABETES MELLITUS WITH STAGE 3 CHRONIC KIDNEY DISEASE, UNSPECIFIED WHETHER LONG TERM INSULIN USE: ICD-10-CM

## 2019-12-12 DIAGNOSIS — N18.3 TYPE 2 DIABETES MELLITUS WITH STAGE 3 CHRONIC KIDNEY DISEASE, UNSPECIFIED WHETHER LONG TERM INSULIN USE: ICD-10-CM

## 2019-12-12 RX ORDER — CALCIUM CITRATE/VITAMIN D3 200MG-6.25
TABLET ORAL
Qty: 400 STRIP | Refills: 0 | Status: SHIPPED | OUTPATIENT
Start: 2019-12-12 | End: 2019-12-12 | Stop reason: SDUPTHER

## 2019-12-13 RX ORDER — CALCIUM CITRATE/VITAMIN D3 200MG-6.25
TABLET ORAL
Qty: 350 STRIP | Refills: 0 | Status: SHIPPED | OUTPATIENT
Start: 2019-12-13 | End: 2020-08-19 | Stop reason: SDUPTHER

## 2019-12-19 NOTE — PROGRESS NOTES
Subjective:      Patient ID: Kevon Perez is a 79 y.o. male.    Chief Complaint:  Diabetes    History of Present Illness  Kevon Perez presents today for follow up of T2DM.     With regards to the diabetes:    Diagnosed: 1994  Known complications:  DKA-  RN + L eye  PN +  Nephropathy +    Current regimen:  Tresiba 48 AM - Does not miss any doses.   Novolog 15 units AC - Does not miss any doses.     Other medications tried:  Metformin - renal insufficiency  Glyburide  Glucovance    Glucose Monitor:  4 times a day testing  Log reviewed: yes, logs provided,  Overall <200- occ 200 after eating chips           Diet/Exercise:  Eats 3 meals a day.   Snacks: occ chips and cookies, peanuts         Drinks: diet soda   Exercise - tries to stay active     Hypoglycemia:   Denies  Knows how to correct with 15 grams of carbs- juice, coke, or a peppermint.      Education - last visit: none    Diabetes Management Status  Statin: Taking  ACE/ARB: Taking  Screening or Prevention Patient's value Goal Complete/Controlled?   HgA1C Testing and Control   Lab Results   Component Value Date    HGBA1C 6.3 (H) 08/26/2019      Annually/Less than 8% Yes   Lipid profile : 08/26/2019 Annually Yes   LDL control Lab Results   Component Value Date    LDLCALC 44.6 (L) 08/26/2019    Annually/Less than 100 mg/dl  Yes   Nephropathy screening Lab Results   Component Value Date    LABMICR 254.0 08/30/2018     Lab Results   Component Value Date    PROTEINUA Negative 05/06/2019    Annually Yes   Blood pressure BP Readings from Last 1 Encounters:   12/27/19 128/60    Less than 140/90 Yes   Dilated retinal exam : 10/31/2019 Annually Yes   Foot exam   : 10/21/2019 Annually No     With regards to the vitamin d deficiency:   Ref. Range 5/6/2019 13:33   Vit D, 25-Hydroxy Latest Ref Range: 30 - 96 ng/mL 12 (L)     Currently not taking anything.     With regards to hyperparathyroidism:   Ref. Range 3/22/2018 08:17 8/30/2018 15:14 8/30/2018 15:14 5/6/2019  13:33   PTH Latest Ref Range: 9.0 - 77.0 pg/mL    70.0   Calcium Latest Ref Range: 8.7 - 10.5 mg/dL 9.2 9.7     Albumin Latest Ref Range: 3.5 - 5.2 g/dL 3.3 (L) 3.6 3.6       Ref. Range 9/3/2019 10:45   Calcium Latest Ref Range: 8.7 - 10.5 mg/dL 10.0     Calcitriol 0.25mcg daily  Follows with nephrology.     Review of Systems   Constitutional: Negative for unexpected weight change.   Eyes: Negative for visual disturbance.   Respiratory: Negative for shortness of breath.    Cardiovascular: Negative for chest pain.   Gastrointestinal: Negative for abdominal pain.   Endocrine: Negative for cold intolerance, heat intolerance, polydipsia, polyphagia and polyuria.   Musculoskeletal: Negative for arthralgias.   Skin: Negative for wound.   Neurological: Negative for headaches.   Hematological: Does not bruise/bleed easily.   Psychiatric/Behavioral: Negative for sleep disturbance.     Objective:   Physical Exam   Neck: No thyromegaly present.   Cardiovascular: Normal rate.   No edema present   Pulmonary/Chest: Effort normal.   Abdominal: Soft.   Vitals reviewed.  Appropriate footwear, Foot exam deferred, done 5/4/18 by podiatry.  Injection sites are ok. No lipo hypertropthy or atrophy.    Body mass index is 34.02 kg/m².    Lab Review:   Lab Results   Component Value Date    HGBA1C 6.3 (H) 08/26/2019     Lab Results   Component Value Date    CHOL 107 (L) 08/26/2019    HDL 38 (L) 08/26/2019    LDLCALC 44.6 (L) 08/26/2019    TRIG 122 08/26/2019    CHOLHDL 35.5 08/26/2019     Lab Results   Component Value Date     09/03/2019    K 4.5 09/03/2019     (H) 09/03/2019    CO2 21 (L) 09/03/2019     09/03/2019    BUN 35 (H) 09/03/2019    CREATININE 2.3 (H) 09/03/2019    CALCIUM 10.0 09/03/2019    PROT 7.0 08/30/2018    ALBUMIN 3.5 08/26/2019    BILITOT 1.3 (H) 08/30/2018    ALKPHOS 38 (L) 08/30/2018    AST 16 08/30/2018    ALT 17 08/30/2018    ANIONGAP 8 09/03/2019    ESTGFRAFRICA 29.9 (A) 09/03/2019    EGFRNONAA 25.9  (A) 09/03/2019    TSH 4.305 (H) 08/26/2019     Assessment and Plan     1. Type 2 diabetes mellitus with stage 3 chronic kidney disease, without long-term current use of insulin  Hemoglobin A1c    Hemoglobin A1c    CANCELED: Comprehensive metabolic panel   2. Vitamin D deficiency disease  Vitamin D   3. Hyperparathyroidism, secondary renal  PTH, intact   4. GINGER on CPAP     5. CKD stage 4 due to type 2 diabetes mellitus     6. Hypertension associated with diabetes     7. Hyperlipidemia associated with type 2 diabetes mellitus     8. 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, with long-term current use of insulin     9. Obesity (BMI 30.0-34.9)     10. Diabetic polyneuropathy associated with type 2 diabetes mellitus       Type 2 diabetes mellitus with diabetic chronic kidney disease  -- above labs today  -- Continue current insulin doses at this time. May adjust after lab results today.   -- Reviewed goals of therapy are to get the best control we can without hypoglycemia  -- Reviewed patient's current insulin regimen. Clarified proper insulin dose and timing in relation to meals, etc. Insulin injection sites and proper rotation instructed.    -- Advised frequent self blood glucose monitoring.  Patient encouraged to document glucose results and bring them to every clinic visit    -- Hypoglycemia precautions discussed. Instructed on precautions before driving.    -- Call for Bg repeatedly < 90 or > 180.   -- Close adherence to lifestyle changes recommended.   -- Periodic follow ups for eye evaluations, foot care and dental care suggested. UTD    Vitamin D deficiency disease  -- Check Vit D today  -- Continue current vitamin d3    Hyperparathyroidism, secondary renal  -- Labs today  -- Likely secondary due to CKD  -- Continue following with nephrology      GINGER on CPAP  -- Continue using CPAP    CKD stage 4 due to type 2 diabetes mellitus  -- Continue  following with nephrology    Hypertension associated with diabetes  -- on ARB  -- Controlled  -- Blood pressure goals discussed with patient    Hyperlipidemia associated with type 2 diabetes mellitus  -- Controlled   -- On statin per ADA recommendations    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  Labs today   A1c only prior to next appointment with me

## 2019-12-20 ENCOUNTER — OFFICE VISIT (OUTPATIENT)
Dept: NEPHROLOGY | Facility: CLINIC | Age: 79
End: 2019-12-20
Payer: MEDICARE

## 2019-12-20 VITALS
WEIGHT: 245.81 LBS | HEART RATE: 86 BPM | OXYGEN SATURATION: 97 % | BODY MASS INDEX: 35.19 KG/M2 | SYSTOLIC BLOOD PRESSURE: 140 MMHG | HEIGHT: 70 IN | DIASTOLIC BLOOD PRESSURE: 70 MMHG

## 2019-12-20 DIAGNOSIS — E11.22 CONTROLLED TYPE 2 DIABETES MELLITUS WITH STAGE 3 CHRONIC KIDNEY DISEASE, WITH LONG-TERM CURRENT USE OF INSULIN: Primary | ICD-10-CM

## 2019-12-20 DIAGNOSIS — I12.9 HYPERTENSIVE KIDNEY DISEASE WITH CHRONIC KIDNEY DISEASE, STAGE 1-4 OR UNSPECIFIED CHRONIC KIDNEY DISEASE: ICD-10-CM

## 2019-12-20 DIAGNOSIS — N18.30 ANEMIA OF CHRONIC RENAL FAILURE, STAGE 3 (MODERATE): ICD-10-CM

## 2019-12-20 DIAGNOSIS — N18.30 CONTROLLED TYPE 2 DIABETES MELLITUS WITH STAGE 3 CHRONIC KIDNEY DISEASE, WITH LONG-TERM CURRENT USE OF INSULIN: Primary | ICD-10-CM

## 2019-12-20 DIAGNOSIS — Z79.4 CONTROLLED TYPE 2 DIABETES MELLITUS WITH STAGE 3 CHRONIC KIDNEY DISEASE, WITH LONG-TERM CURRENT USE OF INSULIN: Primary | ICD-10-CM

## 2019-12-20 DIAGNOSIS — N18.30 CHRONIC KIDNEY DISEASE, STAGE III (MODERATE): ICD-10-CM

## 2019-12-20 DIAGNOSIS — D63.1 ANEMIA OF CHRONIC RENAL FAILURE, STAGE 3 (MODERATE): ICD-10-CM

## 2019-12-20 PROCEDURE — 99499 UNLISTED E&M SERVICE: CPT | Mod: HCNC,S$GLB,, | Performed by: INTERNAL MEDICINE

## 2019-12-20 PROCEDURE — 99499 RISK ADDL DX/OHS AUDIT: ICD-10-PCS | Mod: HCNC,S$GLB,, | Performed by: INTERNAL MEDICINE

## 2019-12-20 PROCEDURE — 1159F MED LIST DOCD IN RCRD: CPT | Mod: HCNC,S$GLB,, | Performed by: INTERNAL MEDICINE

## 2019-12-20 PROCEDURE — 3077F PR MOST RECENT SYSTOLIC BLOOD PRESSURE >= 140 MM HG: ICD-10-PCS | Mod: HCNC,CPTII,S$GLB, | Performed by: INTERNAL MEDICINE

## 2019-12-20 PROCEDURE — 3078F DIAST BP <80 MM HG: CPT | Mod: HCNC,CPTII,S$GLB, | Performed by: INTERNAL MEDICINE

## 2019-12-20 PROCEDURE — 1126F AMNT PAIN NOTED NONE PRSNT: CPT | Mod: HCNC,S$GLB,, | Performed by: INTERNAL MEDICINE

## 2019-12-20 PROCEDURE — 3077F SYST BP >= 140 MM HG: CPT | Mod: HCNC,CPTII,S$GLB, | Performed by: INTERNAL MEDICINE

## 2019-12-20 PROCEDURE — 1159F PR MEDICATION LIST DOCUMENTED IN MEDICAL RECORD: ICD-10-PCS | Mod: HCNC,S$GLB,, | Performed by: INTERNAL MEDICINE

## 2019-12-20 PROCEDURE — 99999 PR PBB SHADOW E&M-EST. PATIENT-LVL III: CPT | Mod: PBBFAC,HCNC,, | Performed by: INTERNAL MEDICINE

## 2019-12-20 PROCEDURE — 1101F PR PT FALLS ASSESS DOC 0-1 FALLS W/OUT INJ PAST YR: ICD-10-PCS | Mod: HCNC,CPTII,S$GLB, | Performed by: INTERNAL MEDICINE

## 2019-12-20 PROCEDURE — 3078F PR MOST RECENT DIASTOLIC BLOOD PRESSURE < 80 MM HG: ICD-10-PCS | Mod: HCNC,CPTII,S$GLB, | Performed by: INTERNAL MEDICINE

## 2019-12-20 PROCEDURE — 99214 OFFICE O/P EST MOD 30 MIN: CPT | Mod: HCNC,S$GLB,, | Performed by: INTERNAL MEDICINE

## 2019-12-20 PROCEDURE — 1101F PT FALLS ASSESS-DOCD LE1/YR: CPT | Mod: HCNC,CPTII,S$GLB, | Performed by: INTERNAL MEDICINE

## 2019-12-20 PROCEDURE — 1126F PR PAIN SEVERITY QUANTIFIED, NO PAIN PRESENT: ICD-10-PCS | Mod: HCNC,S$GLB,, | Performed by: INTERNAL MEDICINE

## 2019-12-20 PROCEDURE — 99999 PR PBB SHADOW E&M-EST. PATIENT-LVL III: ICD-10-PCS | Mod: PBBFAC,HCNC,, | Performed by: INTERNAL MEDICINE

## 2019-12-20 PROCEDURE — 99214 PR OFFICE/OUTPT VISIT, EST, LEVL IV, 30-39 MIN: ICD-10-PCS | Mod: HCNC,S$GLB,, | Performed by: INTERNAL MEDICINE

## 2019-12-27 ENCOUNTER — OFFICE VISIT (OUTPATIENT)
Dept: ENDOCRINOLOGY | Facility: CLINIC | Age: 79
End: 2019-12-27
Payer: MEDICARE

## 2019-12-27 ENCOUNTER — LAB VISIT (OUTPATIENT)
Dept: LAB | Facility: HOSPITAL | Age: 79
End: 2019-12-27
Payer: MEDICARE

## 2019-12-27 ENCOUNTER — IMMUNIZATION (OUTPATIENT)
Dept: PHARMACY | Facility: CLINIC | Age: 79
End: 2019-12-27
Payer: MEDICARE

## 2019-12-27 VITALS
BODY MASS INDEX: 34.15 KG/M2 | HEART RATE: 69 BPM | HEIGHT: 71 IN | WEIGHT: 243.94 LBS | DIASTOLIC BLOOD PRESSURE: 60 MMHG | SYSTOLIC BLOOD PRESSURE: 128 MMHG

## 2019-12-27 DIAGNOSIS — E11.22 CKD STAGE 4 DUE TO TYPE 2 DIABETES MELLITUS: ICD-10-CM

## 2019-12-27 DIAGNOSIS — E11.69 HYPERLIPIDEMIA ASSOCIATED WITH TYPE 2 DIABETES MELLITUS: ICD-10-CM

## 2019-12-27 DIAGNOSIS — N18.4 CKD STAGE 4 DUE TO TYPE 2 DIABETES MELLITUS: ICD-10-CM

## 2019-12-27 DIAGNOSIS — N25.81 HYPERPARATHYROIDISM, SECONDARY RENAL: ICD-10-CM

## 2019-12-27 DIAGNOSIS — G47.33 OSA ON CPAP: ICD-10-CM

## 2019-12-27 DIAGNOSIS — I15.2 HYPERTENSION ASSOCIATED WITH DIABETES: ICD-10-CM

## 2019-12-27 DIAGNOSIS — E55.9 VITAMIN D DEFICIENCY DISEASE: ICD-10-CM

## 2019-12-27 DIAGNOSIS — E78.5 HYPERLIPIDEMIA ASSOCIATED WITH TYPE 2 DIABETES MELLITUS: ICD-10-CM

## 2019-12-27 DIAGNOSIS — E11.22 TYPE 2 DIABETES MELLITUS WITH STAGE 3 CHRONIC KIDNEY DISEASE, WITHOUT LONG-TERM CURRENT USE OF INSULIN: Chronic | ICD-10-CM

## 2019-12-27 DIAGNOSIS — N18.30 TYPE 2 DIABETES MELLITUS WITH STAGE 3 CHRONIC KIDNEY DISEASE, WITHOUT LONG-TERM CURRENT USE OF INSULIN: Primary | Chronic | ICD-10-CM

## 2019-12-27 DIAGNOSIS — N18.30 TYPE 2 DIABETES MELLITUS WITH STAGE 3 CHRONIC KIDNEY DISEASE, WITHOUT LONG-TERM CURRENT USE OF INSULIN: Chronic | ICD-10-CM

## 2019-12-27 DIAGNOSIS — E66.9 OBESITY (BMI 30.0-34.9): ICD-10-CM

## 2019-12-27 DIAGNOSIS — Z79.4 CONTROLLED TYPE 2 DIABETES MELLITUS WITH BOTH EYES AFFECTED BY MILD NONPROLIFERATIVE RETINOPATHY AND MACULAR EDEMA, WITH LONG-TERM CURRENT USE OF INSULIN: ICD-10-CM

## 2019-12-27 DIAGNOSIS — E11.3213 CONTROLLED TYPE 2 DIABETES MELLITUS WITH BOTH EYES AFFECTED BY MILD NONPROLIFERATIVE RETINOPATHY AND MACULAR EDEMA, WITH LONG-TERM CURRENT USE OF INSULIN: ICD-10-CM

## 2019-12-27 DIAGNOSIS — E11.22 TYPE 2 DIABETES MELLITUS WITH STAGE 3 CHRONIC KIDNEY DISEASE, WITHOUT LONG-TERM CURRENT USE OF INSULIN: Primary | Chronic | ICD-10-CM

## 2019-12-27 DIAGNOSIS — E11.42 DIABETIC POLYNEUROPATHY ASSOCIATED WITH TYPE 2 DIABETES MELLITUS: ICD-10-CM

## 2019-12-27 DIAGNOSIS — E11.59 HYPERTENSION ASSOCIATED WITH DIABETES: ICD-10-CM

## 2019-12-27 LAB
ESTIMATED AVG GLUCOSE: 146 MG/DL (ref 68–131)
HBA1C MFR BLD HPLC: 6.7 % (ref 4–5.6)

## 2019-12-27 PROCEDURE — 83036 HEMOGLOBIN GLYCOSYLATED A1C: CPT | Mod: HCNC

## 2019-12-27 PROCEDURE — 99999 PR PBB SHADOW E&M-EST. PATIENT-LVL III: ICD-10-PCS | Mod: PBBFAC,HCNC,, | Performed by: NURSE PRACTITIONER

## 2019-12-27 PROCEDURE — 99214 PR OFFICE/OUTPT VISIT, EST, LEVL IV, 30-39 MIN: ICD-10-PCS | Mod: HCNC,S$GLB,, | Performed by: NURSE PRACTITIONER

## 2019-12-27 PROCEDURE — 1126F PR PAIN SEVERITY QUANTIFIED, NO PAIN PRESENT: ICD-10-PCS | Mod: HCNC,S$GLB,, | Performed by: NURSE PRACTITIONER

## 2019-12-27 PROCEDURE — 99214 OFFICE O/P EST MOD 30 MIN: CPT | Mod: HCNC,S$GLB,, | Performed by: NURSE PRACTITIONER

## 2019-12-27 PROCEDURE — 3078F PR MOST RECENT DIASTOLIC BLOOD PRESSURE < 80 MM HG: ICD-10-PCS | Mod: HCNC,CPTII,S$GLB, | Performed by: NURSE PRACTITIONER

## 2019-12-27 PROCEDURE — 3074F SYST BP LT 130 MM HG: CPT | Mod: HCNC,CPTII,S$GLB, | Performed by: NURSE PRACTITIONER

## 2019-12-27 PROCEDURE — 99499 UNLISTED E&M SERVICE: CPT | Mod: HCNC,S$GLB,, | Performed by: NURSE PRACTITIONER

## 2019-12-27 PROCEDURE — 1126F AMNT PAIN NOTED NONE PRSNT: CPT | Mod: HCNC,S$GLB,, | Performed by: NURSE PRACTITIONER

## 2019-12-27 PROCEDURE — 3078F DIAST BP <80 MM HG: CPT | Mod: HCNC,CPTII,S$GLB, | Performed by: NURSE PRACTITIONER

## 2019-12-27 PROCEDURE — 99499 RISK ADDL DX/OHS AUDIT: ICD-10-PCS | Mod: HCNC,S$GLB,, | Performed by: NURSE PRACTITIONER

## 2019-12-27 PROCEDURE — 99999 PR PBB SHADOW E&M-EST. PATIENT-LVL III: CPT | Mod: PBBFAC,HCNC,, | Performed by: NURSE PRACTITIONER

## 2019-12-27 PROCEDURE — 1101F PT FALLS ASSESS-DOCD LE1/YR: CPT | Mod: HCNC,CPTII,S$GLB, | Performed by: NURSE PRACTITIONER

## 2019-12-27 PROCEDURE — 3074F PR MOST RECENT SYSTOLIC BLOOD PRESSURE < 130 MM HG: ICD-10-PCS | Mod: HCNC,CPTII,S$GLB, | Performed by: NURSE PRACTITIONER

## 2019-12-27 PROCEDURE — 36415 COLL VENOUS BLD VENIPUNCTURE: CPT | Mod: HCNC

## 2019-12-27 PROCEDURE — 1101F PR PT FALLS ASSESS DOC 0-1 FALLS W/OUT INJ PAST YR: ICD-10-PCS | Mod: HCNC,CPTII,S$GLB, | Performed by: NURSE PRACTITIONER

## 2019-12-27 PROCEDURE — 1159F MED LIST DOCD IN RCRD: CPT | Mod: HCNC,S$GLB,, | Performed by: NURSE PRACTITIONER

## 2019-12-27 PROCEDURE — 1159F PR MEDICATION LIST DOCUMENTED IN MEDICAL RECORD: ICD-10-PCS | Mod: HCNC,S$GLB,, | Performed by: NURSE PRACTITIONER

## 2019-12-31 RX ORDER — PEN NEEDLE, DIABETIC 31 GX5/16"
NEEDLE, DISPOSABLE MISCELLANEOUS
Qty: 200 EACH | Refills: 0 | Status: SHIPPED | OUTPATIENT
Start: 2019-12-31 | End: 2020-02-20

## 2019-12-31 NOTE — PROGRESS NOTES
Subjective:       Patient ID: Kevon Perez is a 79 y.o. White male who presents for follow-up evaluation of Chronic Kidney Disease    HPI      Mr. Perez is an 79 year old man with past medical history of diabetes, hypertension presenting for follow up of chronic kidney disease.  Patient denies any complaints, denies any fever, chest pain, shortness of breath, abdominal pain, diarrhea, dysuria/hematuria. He reports blood pressure up to 140 systolic, blood sugars better controlled with dietary changes.  He reports more adequate daily fluid intake.      Review of Systems   Constitutional: Negative for appetite change, fatigue and fever.   Respiratory: Negative for cough and shortness of breath.    Cardiovascular: Negative for chest pain and leg swelling.   Gastrointestinal: Negative for abdominal pain, constipation, diarrhea, nausea and vomiting.   Genitourinary: Negative for dysuria, flank pain, frequency, hematuria and urgency.   Musculoskeletal: Negative for arthralgias, back pain and joint swelling.   Skin: Negative for rash.   Neurological: Negative for dizziness and light-headedness.   All other systems reviewed and are negative.      Objective:      Physical Exam   Constitutional: He appears well-developed and well-nourished.   Cardiovascular: Normal rate and regular rhythm. Exam reveals no gallop and no friction rub.   No murmur heard.  Pulmonary/Chest: Effort normal and breath sounds normal. No respiratory distress. He has no wheezes. He has no rales.   Musculoskeletal: He exhibits edema (trace bilateral lower extremity).   Neurological: He is alert.   Skin: Skin is warm and dry. No rash noted.   Vitals reviewed.      Assessment:       1. Controlled type 2 diabetes mellitus with stage 3 chronic kidney disease, with long-term current use of insulin    2. Chronic kidney disease, stage III (moderate)    3. Hypertensive kidney disease with chronic kidney disease, stage 1-4 or unspecified chronic kidney  disease    4. Anemia of chronic renal failure, stage 3 (moderate)        Plan:     Mr. Perez is an 79 year old man with past medical history of diabetes, hypertension presenting for follow up of chronic kidney disease stage IIIb (hypertensive nephrosclerosis v. diabetic nephropathy). Creatinine previously within baseline range, slightly elevated though stable, will continue to trend.  Encouraged fluid intake, again stressed importance of glycemic/blood pressure control, along with weight loss/exercise, to prevent progression of kidney disease, patient voiced understanding.   - Hypertension: blood pressure at a, will phone review BP diary   - Anemia: Hgb at goal, no indication for erythropoetin therapy, encouraged dietary iron  - Bone/mineral metabolism: patient with secondary hyperparathyroidism, will continue to monitor PTH, patient on calcitriol, encouraged daily VitD supplementation     Return to clinic 6 months pending renal panel next month, then with renal panel, urinalysis/culture, urine protein/creatinine ratio, PTH/VitD prior to next visit

## 2020-01-01 DIAGNOSIS — R79.89 LOW VITAMIN B12 LEVEL: Primary | ICD-10-CM

## 2020-01-01 RX ORDER — CYANOCOBALAMIN 1000 UG/ML
1000 INJECTION, SOLUTION INTRAMUSCULAR; SUBCUTANEOUS
Status: SHIPPED | OUTPATIENT
Start: 2020-01-02 | End: 2020-12-27

## 2020-01-03 DIAGNOSIS — I10 ESSENTIAL HYPERTENSION: ICD-10-CM

## 2020-01-03 RX ORDER — IRBESARTAN 300 MG/1
TABLET ORAL
Qty: 90 TABLET | Refills: 0 | Status: SHIPPED | OUTPATIENT
Start: 2020-01-03 | End: 2020-04-02

## 2020-01-07 ENCOUNTER — OFFICE VISIT (OUTPATIENT)
Dept: INTERNAL MEDICINE | Facility: CLINIC | Age: 80
End: 2020-01-07
Payer: MEDICARE

## 2020-01-07 DIAGNOSIS — R79.89 LOW VITAMIN B12 LEVEL: Primary | ICD-10-CM

## 2020-01-07 PROCEDURE — 99999 PR PBB SHADOW E&M-EST. PATIENT-LVL I: CPT | Mod: PBBFAC,HCNC,, | Performed by: INTERNAL MEDICINE

## 2020-01-07 PROCEDURE — 96372 PR INJECTION,THERAP/PROPH/DIAG2ST, IM OR SUBCUT: ICD-10-PCS | Mod: HCNC,S$GLB,, | Performed by: INTERNAL MEDICINE

## 2020-01-07 PROCEDURE — 99499 NO LOS: ICD-10-PCS | Mod: HCNC,S$GLB,, | Performed by: INTERNAL MEDICINE

## 2020-01-07 PROCEDURE — 96372 THER/PROPH/DIAG INJ SC/IM: CPT | Mod: HCNC,S$GLB,, | Performed by: INTERNAL MEDICINE

## 2020-01-07 PROCEDURE — 99999 PR PBB SHADOW E&M-EST. PATIENT-LVL I: ICD-10-PCS | Mod: PBBFAC,HCNC,, | Performed by: INTERNAL MEDICINE

## 2020-01-07 PROCEDURE — 99499 UNLISTED E&M SERVICE: CPT | Mod: HCNC,S$GLB,, | Performed by: INTERNAL MEDICINE

## 2020-01-07 RX ADMIN — CYANOCOBALAMIN 1000 MCG: 1000 INJECTION, SOLUTION INTRAMUSCULAR; SUBCUTANEOUS at 02:01

## 2020-01-07 NOTE — PROGRESS NOTES
Administered B-12 infection to the right deltoid, tolerated well, no c/o pain noted. Was a nurse visit.

## 2020-01-24 ENCOUNTER — LAB VISIT (OUTPATIENT)
Dept: LAB | Facility: HOSPITAL | Age: 80
End: 2020-01-24
Attending: INTERNAL MEDICINE
Payer: MEDICARE

## 2020-01-24 DIAGNOSIS — N18.30 CHRONIC KIDNEY DISEASE, STAGE III (MODERATE): ICD-10-CM

## 2020-01-24 LAB
ALBUMIN SERPL BCP-MCNC: 3.7 G/DL (ref 3.5–5.2)
ANION GAP SERPL CALC-SCNC: 5 MMOL/L (ref 8–16)
BUN SERPL-MCNC: 39 MG/DL (ref 8–23)
CALCIUM SERPL-MCNC: 10.2 MG/DL (ref 8.7–10.5)
CHLORIDE SERPL-SCNC: 111 MMOL/L (ref 95–110)
CO2 SERPL-SCNC: 27 MMOL/L (ref 23–29)
CREAT SERPL-MCNC: 2.4 MG/DL (ref 0.5–1.4)
EST. GFR  (AFRICAN AMERICAN): 28.4 ML/MIN/1.73 M^2
EST. GFR  (NON AFRICAN AMERICAN): 24.6 ML/MIN/1.73 M^2
GLUCOSE SERPL-MCNC: 143 MG/DL (ref 70–110)
PHOSPHATE SERPL-MCNC: 3.9 MG/DL (ref 2.7–4.5)
POTASSIUM SERPL-SCNC: 6 MMOL/L (ref 3.5–5.1)
SODIUM SERPL-SCNC: 143 MMOL/L (ref 136–145)

## 2020-01-24 PROCEDURE — 36415 COLL VENOUS BLD VENIPUNCTURE: CPT | Mod: HCNC

## 2020-01-24 PROCEDURE — 80069 RENAL FUNCTION PANEL: CPT | Mod: HCNC

## 2020-02-03 RX ORDER — CLOPIDOGREL BISULFATE 75 MG/1
TABLET ORAL
Qty: 90 TABLET | Refills: 1 | Status: SHIPPED | OUTPATIENT
Start: 2020-02-03 | End: 2020-08-03

## 2020-02-04 DIAGNOSIS — I10 ESSENTIAL HYPERTENSION: ICD-10-CM

## 2020-02-04 DIAGNOSIS — I25.2 HISTORY OF MI (MYOCARDIAL INFARCTION): ICD-10-CM

## 2020-02-04 DIAGNOSIS — I25.10 CORONARY ARTERY DISEASE INVOLVING NATIVE CORONARY ARTERY OF NATIVE HEART WITHOUT ANGINA PECTORIS: Chronic | ICD-10-CM

## 2020-02-04 DIAGNOSIS — E11.42 DIABETIC POLYNEUROPATHY ASSOCIATED WITH TYPE 2 DIABETES MELLITUS: ICD-10-CM

## 2020-02-04 RX ORDER — CARVEDILOL 12.5 MG/1
TABLET ORAL
Qty: 180 TABLET | Refills: 0 | Status: SHIPPED | OUTPATIENT
Start: 2020-02-04 | End: 2020-05-04

## 2020-02-20 RX ORDER — PEN NEEDLE, DIABETIC 31 GX5/16"
NEEDLE, DISPOSABLE MISCELLANEOUS
Qty: 200 EACH | Refills: 11 | Status: SHIPPED | OUTPATIENT
Start: 2020-02-20 | End: 2020-08-03

## 2020-02-23 DIAGNOSIS — E11.22 TYPE 2 DIABETES MELLITUS WITH STAGE 3 CHRONIC KIDNEY DISEASE, UNSPECIFIED WHETHER LONG TERM INSULIN USE: ICD-10-CM

## 2020-02-23 DIAGNOSIS — N18.3 TYPE 2 DIABETES MELLITUS WITH STAGE 3 CHRONIC KIDNEY DISEASE, UNSPECIFIED WHETHER LONG TERM INSULIN USE: ICD-10-CM

## 2020-02-24 RX ORDER — CALCIUM CITRATE/VITAMIN D3 200MG-6.25
TABLET ORAL
Qty: 300 STRIP | Refills: 11 | Status: SHIPPED | OUTPATIENT
Start: 2020-02-24 | End: 2021-04-30 | Stop reason: SDUPTHER

## 2020-02-27 ENCOUNTER — PATIENT OUTREACH (OUTPATIENT)
Dept: ADMINISTRATIVE | Facility: HOSPITAL | Age: 80
End: 2020-02-27

## 2020-03-03 RX ORDER — AMLODIPINE BESYLATE 5 MG/1
TABLET ORAL
Qty: 30 TABLET | Refills: 5 | Status: SHIPPED | OUTPATIENT
Start: 2020-03-03 | End: 2020-09-08 | Stop reason: SDUPTHER

## 2020-03-09 ENCOUNTER — LAB VISIT (OUTPATIENT)
Dept: LAB | Facility: HOSPITAL | Age: 80
End: 2020-03-09
Attending: INTERNAL MEDICINE
Payer: MEDICARE

## 2020-03-09 DIAGNOSIS — I10 ESSENTIAL HYPERTENSION: ICD-10-CM

## 2020-03-09 DIAGNOSIS — E11.22 TYPE 2 DIABETES MELLITUS WITH STAGE 3 CHRONIC KIDNEY DISEASE, WITHOUT LONG-TERM CURRENT USE OF INSULIN: ICD-10-CM

## 2020-03-09 DIAGNOSIS — N18.4 ANEMIA DUE TO STAGE 4 CHRONIC KIDNEY DISEASE: ICD-10-CM

## 2020-03-09 DIAGNOSIS — I25.10 CORONARY ARTERY DISEASE INVOLVING NATIVE CORONARY ARTERY OF NATIVE HEART WITHOUT ANGINA PECTORIS: ICD-10-CM

## 2020-03-09 DIAGNOSIS — D63.1 ANEMIA DUE TO STAGE 4 CHRONIC KIDNEY DISEASE: ICD-10-CM

## 2020-03-09 DIAGNOSIS — G47.33 OSA ON CPAP: ICD-10-CM

## 2020-03-09 DIAGNOSIS — N18.30 TYPE 2 DIABETES MELLITUS WITH STAGE 3 CHRONIC KIDNEY DISEASE, WITHOUT LONG-TERM CURRENT USE OF INSULIN: ICD-10-CM

## 2020-03-09 LAB
ALBUMIN SERPL BCP-MCNC: 3.4 G/DL (ref 3.5–5.2)
ALP SERPL-CCNC: 40 U/L (ref 55–135)
ALT SERPL W/O P-5'-P-CCNC: 15 U/L (ref 10–44)
ANION GAP SERPL CALC-SCNC: 8 MMOL/L (ref 8–16)
AST SERPL-CCNC: 14 U/L (ref 10–40)
BASOPHILS # BLD AUTO: 0.08 K/UL (ref 0–0.2)
BASOPHILS NFR BLD: 1.1 % (ref 0–1.9)
BILIRUB SERPL-MCNC: 0.8 MG/DL (ref 0.1–1)
BUN SERPL-MCNC: 31 MG/DL (ref 8–23)
CALCIUM SERPL-MCNC: 9.9 MG/DL (ref 8.7–10.5)
CHLORIDE SERPL-SCNC: 109 MMOL/L (ref 95–110)
CHOLEST SERPL-MCNC: 98 MG/DL (ref 120–199)
CHOLEST/HDLC SERPL: 2.6 {RATIO} (ref 2–5)
CO2 SERPL-SCNC: 25 MMOL/L (ref 23–29)
CREAT SERPL-MCNC: 2.2 MG/DL (ref 0.5–1.4)
DIFFERENTIAL METHOD: ABNORMAL
EOSINOPHIL # BLD AUTO: 0.3 K/UL (ref 0–0.5)
EOSINOPHIL NFR BLD: 4.3 % (ref 0–8)
ERYTHROCYTE [DISTWIDTH] IN BLOOD BY AUTOMATED COUNT: 12.7 % (ref 11.5–14.5)
EST. GFR  (AFRICAN AMERICAN): 31.5 ML/MIN/1.73 M^2
EST. GFR  (NON AFRICAN AMERICAN): 27.3 ML/MIN/1.73 M^2
ESTIMATED AVG GLUCOSE: 160 MG/DL (ref 68–131)
GLUCOSE SERPL-MCNC: 131 MG/DL (ref 70–110)
HBA1C MFR BLD HPLC: 7.2 % (ref 4–5.6)
HCT VFR BLD AUTO: 38.2 % (ref 40–54)
HDLC SERPL-MCNC: 37 MG/DL (ref 40–75)
HDLC SERPL: 37.8 % (ref 20–50)
HGB BLD-MCNC: 11.5 G/DL (ref 14–18)
IMM GRANULOCYTES # BLD AUTO: 0.02 K/UL (ref 0–0.04)
IMM GRANULOCYTES NFR BLD AUTO: 0.3 % (ref 0–0.5)
LDLC SERPL CALC-MCNC: 49.2 MG/DL (ref 63–159)
LYMPHOCYTES # BLD AUTO: 1.6 K/UL (ref 1–4.8)
LYMPHOCYTES NFR BLD: 22.6 % (ref 18–48)
MCH RBC QN AUTO: 28 PG (ref 27–31)
MCHC RBC AUTO-ENTMCNC: 30.1 G/DL (ref 32–36)
MCV RBC AUTO: 93 FL (ref 82–98)
MONOCYTES # BLD AUTO: 0.6 K/UL (ref 0.3–1)
MONOCYTES NFR BLD: 8.7 % (ref 4–15)
NEUTROPHILS # BLD AUTO: 4.4 K/UL (ref 1.8–7.7)
NEUTROPHILS NFR BLD: 63 % (ref 38–73)
NONHDLC SERPL-MCNC: 61 MG/DL
NRBC BLD-RTO: 0 /100 WBC
PLATELET # BLD AUTO: 160 K/UL (ref 150–350)
PMV BLD AUTO: 11 FL (ref 9.2–12.9)
POTASSIUM SERPL-SCNC: 5.2 MMOL/L (ref 3.5–5.1)
PROT SERPL-MCNC: 6.7 G/DL (ref 6–8.4)
RBC # BLD AUTO: 4.1 M/UL (ref 4.6–6.2)
SODIUM SERPL-SCNC: 142 MMOL/L (ref 136–145)
T4 FREE SERPL-MCNC: 0.89 NG/DL (ref 0.71–1.51)
TRIGL SERPL-MCNC: 59 MG/DL (ref 30–150)
TSH SERPL DL<=0.005 MIU/L-ACNC: 4.55 UIU/ML (ref 0.4–4)
WBC # BLD AUTO: 7 K/UL (ref 3.9–12.7)

## 2020-03-09 PROCEDURE — 83036 HEMOGLOBIN GLYCOSYLATED A1C: CPT | Mod: HCNC

## 2020-03-09 PROCEDURE — 84439 ASSAY OF FREE THYROXINE: CPT | Mod: HCNC

## 2020-03-09 PROCEDURE — 36415 COLL VENOUS BLD VENIPUNCTURE: CPT | Mod: HCNC

## 2020-03-09 PROCEDURE — 80061 LIPID PANEL: CPT | Mod: HCNC

## 2020-03-09 PROCEDURE — 85025 COMPLETE CBC W/AUTO DIFF WBC: CPT | Mod: HCNC

## 2020-03-09 PROCEDURE — 84443 ASSAY THYROID STIM HORMONE: CPT | Mod: HCNC

## 2020-03-09 PROCEDURE — 80053 COMPREHEN METABOLIC PANEL: CPT | Mod: HCNC

## 2020-04-02 DIAGNOSIS — I10 ESSENTIAL HYPERTENSION: ICD-10-CM

## 2020-04-02 RX ORDER — IRBESARTAN 300 MG/1
TABLET ORAL
Qty: 90 TABLET | Refills: 0 | Status: SHIPPED | OUTPATIENT
Start: 2020-04-02 | End: 2020-07-01

## 2020-04-06 DIAGNOSIS — I10 HYPERTENSION, UNSPECIFIED TYPE: Primary | ICD-10-CM

## 2020-04-07 NOTE — TELEPHONE ENCOUNTER
----- Message from Cathleen Mcgee sent at 4/7/2020 10:09 AM CDT -----  Contact: pt  OUT OF MED      PT CALLED YESTERDAY      PHARMACY SENT 2 FAXES AND CALLED LAST WEEK    REFILL       If possible  90 day Supply     hydroCHLOROthiazide (HYDRODIURIL) 12.5 MG Tab    [id number] - [place], [place] - [address] AT [facility] [address]    Thanks

## 2020-04-08 DIAGNOSIS — N18.4 CHRONIC KIDNEY DISEASE, STAGE IV (SEVERE): Primary | ICD-10-CM

## 2020-04-08 RX ORDER — HYDROCHLOROTHIAZIDE 12.5 MG/1
TABLET ORAL
Qty: 30 TABLET | Refills: 11 | Status: SHIPPED | OUTPATIENT
Start: 2020-04-08 | End: 2021-03-30

## 2020-04-08 RX ORDER — HYDROCHLOROTHIAZIDE 12.5 MG/1
12.5 TABLET ORAL DAILY
Qty: 90 TABLET | Refills: 3 | OUTPATIENT
Start: 2020-04-08 | End: 2020-07-07

## 2020-04-08 RX ORDER — HYDROCHLOROTHIAZIDE 12.5 MG/1
TABLET ORAL
Qty: 30 TABLET | Refills: 11 | OUTPATIENT
Start: 2020-04-08

## 2020-04-08 NOTE — TELEPHONE ENCOUNTER
----- Message from Marilu Antunez MA sent at 4/8/2020  1:11 PM CDT -----  Contact: Pt [phone]      ----- Message -----  From: Lydia Edmonds  Sent: 4/8/2020   1:05 PM CDT  To: Fritz LAMBERT Staff    Pt states that this is his 3rd attempt at getting a refill for     hydroCHLOROthiazide (HYDRODIURIL) 12.5 MG Tab    Please send to the pharmacy on file, he states he is out      rx sent to Dr. Hu to fill

## 2020-05-01 ENCOUNTER — PATIENT MESSAGE (OUTPATIENT)
Dept: ENDOCRINOLOGY | Facility: CLINIC | Age: 80
End: 2020-05-01

## 2020-05-02 DIAGNOSIS — I10 ESSENTIAL HYPERTENSION: ICD-10-CM

## 2020-05-02 DIAGNOSIS — E11.42 DIABETIC POLYNEUROPATHY ASSOCIATED WITH TYPE 2 DIABETES MELLITUS: ICD-10-CM

## 2020-05-02 DIAGNOSIS — I25.2 HISTORY OF MI (MYOCARDIAL INFARCTION): ICD-10-CM

## 2020-05-02 DIAGNOSIS — I25.10 CORONARY ARTERY DISEASE INVOLVING NATIVE CORONARY ARTERY OF NATIVE HEART WITHOUT ANGINA PECTORIS: Chronic | ICD-10-CM

## 2020-05-04 RX ORDER — CARVEDILOL 12.5 MG/1
TABLET ORAL
Qty: 180 TABLET | Refills: 0 | Status: SHIPPED | OUTPATIENT
Start: 2020-05-04 | End: 2020-08-03

## 2020-05-08 RX ORDER — CALCITRIOL 0.25 UG/1
CAPSULE ORAL
Qty: 30 CAPSULE | Refills: 11 | Status: SHIPPED | OUTPATIENT
Start: 2020-05-08 | End: 2021-04-22

## 2020-05-19 ENCOUNTER — TELEPHONE (OUTPATIENT)
Dept: PODIATRY | Facility: CLINIC | Age: 80
End: 2020-05-19

## 2020-06-09 ENCOUNTER — PATIENT OUTREACH (OUTPATIENT)
Dept: INTERNAL MEDICINE | Facility: CLINIC | Age: 80
End: 2020-06-09

## 2020-06-17 ENCOUNTER — PATIENT OUTREACH (OUTPATIENT)
Dept: ADMINISTRATIVE | Facility: OTHER | Age: 80
End: 2020-06-17

## 2020-06-18 ENCOUNTER — OFFICE VISIT (OUTPATIENT)
Dept: PODIATRY | Facility: CLINIC | Age: 80
End: 2020-06-18
Payer: MEDICARE

## 2020-06-18 VITALS
DIASTOLIC BLOOD PRESSURE: 59 MMHG | BODY MASS INDEX: 34.13 KG/M2 | HEART RATE: 69 BPM | SYSTOLIC BLOOD PRESSURE: 158 MMHG | WEIGHT: 243.81 LBS | HEIGHT: 71 IN

## 2020-06-18 DIAGNOSIS — B35.1 ONYCHOMYCOSIS DUE TO DERMATOPHYTE: ICD-10-CM

## 2020-06-18 DIAGNOSIS — E11.49 TYPE II DIABETES MELLITUS WITH NEUROLOGICAL MANIFESTATIONS: Primary | ICD-10-CM

## 2020-06-18 PROCEDURE — 1126F AMNT PAIN NOTED NONE PRSNT: CPT | Mod: HCNC,S$GLB,, | Performed by: PODIATRIST

## 2020-06-18 PROCEDURE — 11721 DEBRIDE NAIL 6 OR MORE: CPT | Mod: Q9,HCNC,S$GLB, | Performed by: PODIATRIST

## 2020-06-18 PROCEDURE — 99213 PR OFFICE/OUTPT VISIT, EST, LEVL III, 20-29 MIN: ICD-10-PCS | Mod: 25,HCNC,S$GLB, | Performed by: PODIATRIST

## 2020-06-18 PROCEDURE — 1159F MED LIST DOCD IN RCRD: CPT | Mod: HCNC,S$GLB,, | Performed by: PODIATRIST

## 2020-06-18 PROCEDURE — 99999 PR PBB SHADOW E&M-EST. PATIENT-LVL IV: CPT | Mod: PBBFAC,HCNC,, | Performed by: PODIATRIST

## 2020-06-18 PROCEDURE — 3051F PR MOST RECENT HEMOGLOBIN A1C LEVEL 7.0 - < 8.0%: ICD-10-PCS | Mod: HCNC,CPTII,S$GLB, | Performed by: PODIATRIST

## 2020-06-18 PROCEDURE — 3077F PR MOST RECENT SYSTOLIC BLOOD PRESSURE >= 140 MM HG: ICD-10-PCS | Mod: HCNC,CPTII,S$GLB, | Performed by: PODIATRIST

## 2020-06-18 PROCEDURE — 1101F PT FALLS ASSESS-DOCD LE1/YR: CPT | Mod: HCNC,CPTII,S$GLB, | Performed by: PODIATRIST

## 2020-06-18 PROCEDURE — 11721 PR DEBRIDEMENT OF NAILS, 6 OR MORE: ICD-10-PCS | Mod: Q9,HCNC,S$GLB, | Performed by: PODIATRIST

## 2020-06-18 PROCEDURE — 3078F PR MOST RECENT DIASTOLIC BLOOD PRESSURE < 80 MM HG: ICD-10-PCS | Mod: HCNC,CPTII,S$GLB, | Performed by: PODIATRIST

## 2020-06-18 PROCEDURE — 3078F DIAST BP <80 MM HG: CPT | Mod: HCNC,CPTII,S$GLB, | Performed by: PODIATRIST

## 2020-06-18 PROCEDURE — 3051F HG A1C>EQUAL 7.0%<8.0%: CPT | Mod: HCNC,CPTII,S$GLB, | Performed by: PODIATRIST

## 2020-06-18 PROCEDURE — 1159F PR MEDICATION LIST DOCUMENTED IN MEDICAL RECORD: ICD-10-PCS | Mod: HCNC,S$GLB,, | Performed by: PODIATRIST

## 2020-06-18 PROCEDURE — 1126F PR PAIN SEVERITY QUANTIFIED, NO PAIN PRESENT: ICD-10-PCS | Mod: HCNC,S$GLB,, | Performed by: PODIATRIST

## 2020-06-18 PROCEDURE — 99213 OFFICE O/P EST LOW 20 MIN: CPT | Mod: 25,HCNC,S$GLB, | Performed by: PODIATRIST

## 2020-06-18 PROCEDURE — 3077F SYST BP >= 140 MM HG: CPT | Mod: HCNC,CPTII,S$GLB, | Performed by: PODIATRIST

## 2020-06-18 PROCEDURE — 1101F PR PT FALLS ASSESS DOC 0-1 FALLS W/OUT INJ PAST YR: ICD-10-PCS | Mod: HCNC,CPTII,S$GLB, | Performed by: PODIATRIST

## 2020-06-18 PROCEDURE — 99999 PR PBB SHADOW E&M-EST. PATIENT-LVL IV: ICD-10-PCS | Mod: PBBFAC,HCNC,, | Performed by: PODIATRIST

## 2020-06-18 NOTE — PROGRESS NOTES
Subjective:      Patient ID: Kevon Perez is a 79 y.o. male.    Chief Complaint: Diabetes Mellitus (PT SEEN IM Cipriano Sol ON 01/07/20) and Diabetic Foot Exam    Kevon is a 79 y.o. male who presents to the clinic for evaluation and treatment of high risk feet. Kevon has a past medical history of Acute coronary syndrome (9/10/09), Anticoagulant long-term use, Basal cell cancer, BCC (basal cell carcinoma of skin), Cancer of bladder (January 2013), Cataract, Chronic kidney disease, Colon polyp, Coronary artery disease, CPAP (continuous positive airway pressure) dependence, Diabetes mellitus, Diabetic retinopathy, GERD (gastroesophageal reflux disease), High cholesterol, Hyperlipidemia, Hypertension, Iron deficiency anemia (5/11/2018), GINGER (obstructive sleep apnea), and Renal manifestation of secondary diabetes mellitus. The patient's chief complaint is long, thick toenails, yearly dm foot exam. Has noted an increase in burning and numbness to feet   This patient has documented high risk feet requiring routine maintenance secondary to diabetes mellitis and those secondary complications of diabetes, as mentioned..    PCP: Cipriano Sol MD    Date Last Seen by PCP:   Chief Complaint   Patient presents with    Diabetes Mellitus     PT SEEN IM Cipriano Sol ON 01/07/20    Diabetic Foot Exam         Hemoglobin A1C   Date Value Ref Range Status   03/09/2020 7.2 (H) 4.0 - 5.6 % Final     Comment:     ADA Screening Guidelines:  5.7-6.4%  Consistent with prediabetes  >or=6.5%  Consistent with diabetes  High levels of fetal hemoglobin interfere with the HbA1C  assay. Heterozygous hemoglobin variants (HbS, HgC, etc)do  not significantly interfere with this assay.   However, presence of multiple variants may affect accuracy.     12/27/2019 6.7 (H) 4.0 - 5.6 % Final     Comment:     ADA Screening Guidelines:  5.7-6.4%  Consistent with prediabetes  >or=6.5%  Consistent with diabetes  High levels of fetal  "hemoglobin interfere with the HbA1C  assay. Heterozygous hemoglobin variants (HbS, HgC, etc)do  not significantly interfere with this assay.   However, presence of multiple variants may affect accuracy.     08/26/2019 6.3 (H) 4.0 - 5.6 % Final     Comment:     ADA Screening Guidelines:  5.7-6.4%  Consistent with prediabetes  >or=6.5%  Consistent with diabetes  High levels of fetal hemoglobin interfere with the HbA1C  assay. Heterozygous hemoglobin variants (HbS, HgC, etc)do  not significantly interfere with this assay.   However, presence of multiple variants may affect accuracy.         Review of Systems   Constitution: Negative for chills, decreased appetite and fever.   Cardiovascular: Negative for leg swelling.   Skin: Positive for dry skin and nail changes.   Musculoskeletal: Negative for arthritis, joint pain, joint swelling and myalgias.   Gastrointestinal: Negative for nausea and vomiting.   Neurological: Positive for numbness (increased recently ) and paresthesias. Negative for loss of balance.           Objective:       Vitals:    06/18/20 1101   BP: (!) 158/59   Pulse: 69   Weight: 110.6 kg (243 lb 13.3 oz)   Height: 5' 11" (1.803 m)   PainSc: 0-No pain        Physical Exam  Vitals signs reviewed.   Constitutional:       Appearance: He is well-developed.   Cardiovascular:      Pulses:           Dorsalis pedis pulses are 2+ on the right side and 2+ on the left side.        Posterior tibial pulses are 1+ on the right side and 1+ on the left side.      Comments:  Toes are cool to touch. Feet are warm proximally.There is decreased digital hair. Skin is atrophic, slightly hyperpigmented, and mildly edematous      Musculoskeletal: Normal range of motion.         General: No tenderness.      Comments: Adequate joint range of motion without pain, limitation, nor crepitation Bilateral feet and ankle joints. Muscle strength is 5/5 in all groups bilaterally.         Feet:      Right foot:      Protective Sensation: " 5 sites tested. 2 sites sensed.      Left foot:      Protective Sensation: 5 sites tested. 2 sites sensed.   Skin:     General: Skin is warm and dry.      Findings: No ecchymosis, erythema or lesion.      Comments: Nails x 10  are elongated by  2-6mm's, thickened by 3-4 mm's, dystrophic, and are whitish in  coloration . Xerosis Bilaterally. No open lesions noted.       Neurological:      Mental Status: He is alert and oriented to person, place, and time.      Comments: [place] 5.07 monofilamant testing is diminished Momo feet. Sharp/dull sensation diminished Bilaterally. Light touch absent Bilaterally.       Psychiatric:         Behavior: Behavior normal.               Assessment:       Encounter Diagnoses   Name Primary?    Type II diabetes mellitus with neurological manifestations Yes    Onychomycosis due to dermatophyte          Plan:       Kevon was seen today for diabetes mellitus and diabetic foot exam.    Diagnoses and all orders for this visit:    Type II diabetes mellitus with neurological manifestations    Onychomycosis due to dermatophyte      I counseled the patient on his conditions, their implications and medical management.        - Shoe inspection. Diabetic Foot Education. Patient reminded of the importance of good nutrition and blood sugar control to help prevent podiatric complications of diabetes. Patient instructed on proper foot hygeine. We discussed wearing proper shoe gear, daily foot inspections, never walking without protective shoe gear, never putting sharp instruments to feet, routine podiatric nail visits every  months.      - With patient's permission, nails were aggressively reduced and debrided x 10 to their soft tissue attachment mechanically and with electric , removing all offending nail and debris. Patient relates relief following the procedure. He will continue to monitor the areas daily, inspect his feet, wear protective shoe gear when ambulatory, moisturizer  to maintain skin integrity and follow in this office in approximately 6m months, sooner p.r.n.

## 2020-06-25 ENCOUNTER — PATIENT MESSAGE (OUTPATIENT)
Dept: INTERNAL MEDICINE | Facility: CLINIC | Age: 80
End: 2020-06-25

## 2020-06-25 ENCOUNTER — HOSPITAL ENCOUNTER (OUTPATIENT)
Dept: RADIOLOGY | Facility: HOSPITAL | Age: 80
Discharge: HOME | End: 2020-06-25
Attending: INTERNAL MEDICINE
Payer: MEDICARE

## 2020-06-25 ENCOUNTER — OFFICE VISIT (OUTPATIENT)
Dept: OPHTHALMOLOGY | Facility: CLINIC | Age: 80
End: 2020-06-25
Payer: MEDICARE

## 2020-06-25 ENCOUNTER — OFFICE VISIT (OUTPATIENT)
Dept: INTERNAL MEDICINE | Facility: CLINIC | Age: 80
End: 2020-06-25
Payer: MEDICARE

## 2020-06-25 VITALS
HEIGHT: 70 IN | DIASTOLIC BLOOD PRESSURE: 82 MMHG | OXYGEN SATURATION: 97 % | BODY MASS INDEX: 33.36 KG/M2 | SYSTOLIC BLOOD PRESSURE: 124 MMHG | HEART RATE: 67 BPM | WEIGHT: 233 LBS

## 2020-06-25 DIAGNOSIS — I25.10 CORONARY ARTERY DISEASE INVOLVING NATIVE CORONARY ARTERY OF NATIVE HEART WITHOUT ANGINA PECTORIS: ICD-10-CM

## 2020-06-25 DIAGNOSIS — R06.09 DOE (DYSPNEA ON EXERTION): ICD-10-CM

## 2020-06-25 DIAGNOSIS — I10 ESSENTIAL HYPERTENSION: ICD-10-CM

## 2020-06-25 DIAGNOSIS — N18.4 ANEMIA DUE TO STAGE 4 CHRONIC KIDNEY DISEASE: ICD-10-CM

## 2020-06-25 DIAGNOSIS — G47.33 OSA ON CPAP: ICD-10-CM

## 2020-06-25 DIAGNOSIS — Z86.010 ENCOUNTER FOR COLONOSCOPY DUE TO HISTORY OF ADENOMATOUS COLONIC POLYPS: ICD-10-CM

## 2020-06-25 DIAGNOSIS — Z12.11 SCREEN FOR COLON CANCER: Primary | ICD-10-CM

## 2020-06-25 DIAGNOSIS — Z12.11 ENCOUNTER FOR COLONOSCOPY DUE TO HISTORY OF ADENOMATOUS COLONIC POLYPS: ICD-10-CM

## 2020-06-25 DIAGNOSIS — E11.3213 CONTROLLED TYPE 2 DIABETES MELLITUS WITH BOTH EYES AFFECTED BY MILD NONPROLIFERATIVE RETINOPATHY AND MACULAR EDEMA, WITH LONG-TERM CURRENT USE OF INSULIN: Primary | ICD-10-CM

## 2020-06-25 DIAGNOSIS — J31.0 CHRONIC RHINITIS: ICD-10-CM

## 2020-06-25 DIAGNOSIS — E11.40 TYPE 2 DIABETES MELLITUS WITH DIABETIC NEUROPATHY, WITHOUT LONG-TERM CURRENT USE OF INSULIN: ICD-10-CM

## 2020-06-25 DIAGNOSIS — H35.033 HYPERTENSIVE RETINOPATHY OF BOTH EYES: ICD-10-CM

## 2020-06-25 DIAGNOSIS — Z79.4 CONTROLLED TYPE 2 DIABETES MELLITUS WITH BOTH EYES AFFECTED BY MILD NONPROLIFERATIVE RETINOPATHY AND MACULAR EDEMA, WITH LONG-TERM CURRENT USE OF INSULIN: Primary | ICD-10-CM

## 2020-06-25 DIAGNOSIS — E11.22 TYPE 2 DIABETES MELLITUS WITH STAGE 3 CHRONIC KIDNEY DISEASE, WITHOUT LONG-TERM CURRENT USE OF INSULIN: Primary | ICD-10-CM

## 2020-06-25 DIAGNOSIS — D63.1 ANEMIA DUE TO STAGE 4 CHRONIC KIDNEY DISEASE: ICD-10-CM

## 2020-06-25 DIAGNOSIS — N18.30 TYPE 2 DIABETES MELLITUS WITH STAGE 3 CHRONIC KIDNEY DISEASE, WITHOUT LONG-TERM CURRENT USE OF INSULIN: Primary | ICD-10-CM

## 2020-06-25 PROCEDURE — 92134 CPTRZ OPH DX IMG PST SGM RTA: CPT | Mod: HCNC,S$GLB,, | Performed by: OPHTHALMOLOGY

## 2020-06-25 PROCEDURE — 96372 PR INJECTION,THERAP/PROPH/DIAG2ST, IM OR SUBCUT: ICD-10-PCS | Mod: HCNC,S$GLB,, | Performed by: INTERNAL MEDICINE

## 2020-06-25 PROCEDURE — 3079F DIAST BP 80-89 MM HG: CPT | Mod: HCNC,CPTII,S$GLB, | Performed by: INTERNAL MEDICINE

## 2020-06-25 PROCEDURE — 99999 PR PBB SHADOW E&M-EST. PATIENT-LVL IV: CPT | Mod: PBBFAC,HCNC,, | Performed by: OPHTHALMOLOGY

## 2020-06-25 PROCEDURE — 3074F PR MOST RECENT SYSTOLIC BLOOD PRESSURE < 130 MM HG: ICD-10-PCS | Mod: HCNC,CPTII,S$GLB, | Performed by: INTERNAL MEDICINE

## 2020-06-25 PROCEDURE — 3051F HG A1C>EQUAL 7.0%<8.0%: CPT | Mod: HCNC,CPTII,S$GLB, | Performed by: INTERNAL MEDICINE

## 2020-06-25 PROCEDURE — 71046 XR CHEST PA AND LATERAL: ICD-10-PCS | Mod: 26,HCNC,, | Performed by: RADIOLOGY

## 2020-06-25 PROCEDURE — 99499 RISK ADDL DX/OHS AUDIT: ICD-10-PCS | Mod: HCNC,S$GLB,, | Performed by: INTERNAL MEDICINE

## 2020-06-25 PROCEDURE — 1126F PR PAIN SEVERITY QUANTIFIED, NO PAIN PRESENT: ICD-10-PCS | Mod: HCNC,S$GLB,, | Performed by: INTERNAL MEDICINE

## 2020-06-25 PROCEDURE — 71046 X-RAY EXAM CHEST 2 VIEWS: CPT | Mod: TC,HCNC

## 2020-06-25 PROCEDURE — 3079F PR MOST RECENT DIASTOLIC BLOOD PRESSURE 80-89 MM HG: ICD-10-PCS | Mod: HCNC,CPTII,S$GLB, | Performed by: INTERNAL MEDICINE

## 2020-06-25 PROCEDURE — 92202 OPSCPY EXTND ON/MAC DRAW: CPT | Mod: HCNC,S$GLB,, | Performed by: OPHTHALMOLOGY

## 2020-06-25 PROCEDURE — 1159F PR MEDICATION LIST DOCUMENTED IN MEDICAL RECORD: ICD-10-PCS | Mod: HCNC,S$GLB,, | Performed by: INTERNAL MEDICINE

## 2020-06-25 PROCEDURE — 99214 OFFICE O/P EST MOD 30 MIN: CPT | Mod: HCNC,25,S$GLB, | Performed by: INTERNAL MEDICINE

## 2020-06-25 PROCEDURE — 99499 UNLISTED E&M SERVICE: CPT | Mod: HCNC,S$GLB,, | Performed by: INTERNAL MEDICINE

## 2020-06-25 PROCEDURE — 1159F MED LIST DOCD IN RCRD: CPT | Mod: HCNC,S$GLB,, | Performed by: INTERNAL MEDICINE

## 2020-06-25 PROCEDURE — 99214 PR OFFICE/OUTPT VISIT, EST, LEVL IV, 30-39 MIN: ICD-10-PCS | Mod: HCNC,25,S$GLB, | Performed by: INTERNAL MEDICINE

## 2020-06-25 PROCEDURE — 99999 PR PBB SHADOW E&M-EST. PATIENT-LVL IV: ICD-10-PCS | Mod: PBBFAC,HCNC,, | Performed by: INTERNAL MEDICINE

## 2020-06-25 PROCEDURE — 92134 POSTERIOR SEGMENT OCT RETINA (OCULAR COHERENCE TOMOGRAPHY)-BOTH EYES: ICD-10-PCS | Mod: HCNC,S$GLB,, | Performed by: OPHTHALMOLOGY

## 2020-06-25 PROCEDURE — 99999 PR PBB SHADOW E&M-EST. PATIENT-LVL IV: CPT | Mod: PBBFAC,HCNC,, | Performed by: INTERNAL MEDICINE

## 2020-06-25 PROCEDURE — 3074F SYST BP LT 130 MM HG: CPT | Mod: HCNC,CPTII,S$GLB, | Performed by: INTERNAL MEDICINE

## 2020-06-25 PROCEDURE — 1101F PT FALLS ASSESS-DOCD LE1/YR: CPT | Mod: HCNC,CPTII,S$GLB, | Performed by: INTERNAL MEDICINE

## 2020-06-25 PROCEDURE — 92014 COMPRE OPH EXAM EST PT 1/>: CPT | Mod: HCNC,S$GLB,, | Performed by: OPHTHALMOLOGY

## 2020-06-25 PROCEDURE — 92014 PR EYE EXAM, EST PATIENT,COMPREHESV: ICD-10-PCS | Mod: HCNC,S$GLB,, | Performed by: OPHTHALMOLOGY

## 2020-06-25 PROCEDURE — 71046 X-RAY EXAM CHEST 2 VIEWS: CPT | Mod: 26,HCNC,, | Performed by: RADIOLOGY

## 2020-06-25 PROCEDURE — 1101F PR PT FALLS ASSESS DOC 0-1 FALLS W/OUT INJ PAST YR: ICD-10-PCS | Mod: HCNC,CPTII,S$GLB, | Performed by: INTERNAL MEDICINE

## 2020-06-25 PROCEDURE — 99999 PR PBB SHADOW E&M-EST. PATIENT-LVL IV: ICD-10-PCS | Mod: PBBFAC,HCNC,, | Performed by: OPHTHALMOLOGY

## 2020-06-25 PROCEDURE — 96372 THER/PROPH/DIAG INJ SC/IM: CPT | Mod: HCNC,S$GLB,, | Performed by: INTERNAL MEDICINE

## 2020-06-25 PROCEDURE — 3051F PR MOST RECENT HEMOGLOBIN A1C LEVEL 7.0 - < 8.0%: ICD-10-PCS | Mod: HCNC,CPTII,S$GLB, | Performed by: INTERNAL MEDICINE

## 2020-06-25 PROCEDURE — 92202 PR OPHTHALMOSCOPY, EXT, W/DRAW OPTIC NERVE/MACULA, I&R, UNI/BI: ICD-10-PCS | Mod: HCNC,S$GLB,, | Performed by: OPHTHALMOLOGY

## 2020-06-25 PROCEDURE — 1126F AMNT PAIN NOTED NONE PRSNT: CPT | Mod: HCNC,S$GLB,, | Performed by: INTERNAL MEDICINE

## 2020-06-25 RX ORDER — CYANOCOBALAMIN 1000 UG/ML
1000 INJECTION, SOLUTION INTRAMUSCULAR; SUBCUTANEOUS ONCE
Status: COMPLETED | OUTPATIENT
Start: 2020-06-25 | End: 2020-06-25

## 2020-06-25 RX ORDER — AZELASTINE 1 MG/ML
2 SPRAY, METERED NASAL 2 TIMES DAILY
Qty: 30 ML | Refills: 0 | Status: SHIPPED | OUTPATIENT
Start: 2020-06-25 | End: 2021-01-01

## 2020-06-25 RX ADMIN — CYANOCOBALAMIN 1000 MCG: 1000 INJECTION, SOLUTION INTRAMUSCULAR; SUBCUTANEOUS at 09:06

## 2020-06-25 NOTE — PROGRESS NOTES
HPI     Eye Problem      Additional comments: NPDR OU              Comments     6 month check  DLS: 10/31/2019 Dr. Tillman    Patient states his vision has been stable since his last visit. Denies   flashes, floaters, diplopia, photophobia, glare, HA's, or pain.    Systane PRN OU      HPI     DlS: 07/25/2019 Dr. Tillman    Patient states floaters are stable. He states it feels like his eyes need   to be washed out. He states when he wakes up he has some discharge in the   left eye.     Systane PRN OU        Prior OCT - ERM OU - no significant distortion  ME OS - central ME resolved    Prior FA - Minimal late leakage of fovea MA OS  No leakage OD      A/P    1. Mild NPDR OU  Controlled T2 on insulin    2. DME OS  Increased again    S/p Avastin OS x 3, ozurdex OS x 4 5/19  S/p Iluvien OS 5/19 2/19 - slight recurrence at 14 weeks, keep at 12    IOP stable  Observe today    3. PCIOL OU    4. Floaters OU    5. HTN Ret OU    6. CAD - s/p stents on Plavix    BS/BP/chol control      6 months OCT

## 2020-06-25 NOTE — PROGRESS NOTES
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  Hyperlipidemia.  Coronary artery disease with previous STEMI and angioplasty in 2009.  Obstructive sleep apnea with use of APAP  Anemia of chronic disease.  History of iron deficiency anemia  B12 deficiency.  BPH.  Gastroesophageal reflux disease  Bladder tumor, status post resection     SOCIAL HISTORY:  Tobacco use, none.  Alcohol use, maybe a glass of wine twice a week.  Exercise has been limited.     MEDICATIONS:  Amlodipine 5 mg  Ecotrin 81 mg.  Atorvastatin 20 mg.  Calcitriol 0.25 mcg.  Plavix 75 mg.  Finasteride 5 mg.  Folic acid 1 mg.  NovoLog  12-20 with each meal.  Tresiba 30 units.  Carvedilol 12.5 mg twice a day..  Tamsulosin 0.4 mg a day.  avapro 320mg a day.  Hydrochlorothiazide 12.5 mg a day    Answers for HPI/ROS submitted by the patient on 6/22/2020   activity change: No  unexpected weight change: No  neck pain: No  hearing loss: Yes  rhinorrhea: Yes  trouble swallowing: No  eye discharge: Yes  visual disturbance: No  chest tightness: No  wheezing: Yes  chest pain: No  palpitations: No  blood in stool: No  constipation: Yes  vomiting: No  diarrhea: No  polydipsia: No  polyuria: No  difficulty urinating: No  urgency: Yes  hematuria: No  joint swelling: No  arthralgias: Yes  headaches: No  weakness: Yes  confusion: No  dysphoric mood: No          79-year-old male      Follow-up visit  Had an appointment in March but was postponed due to the pandemic  Had labs at that time in which creatinine was 2.2 stable, total cholesterol 98 stable  Hemoglobin A1c 7.2 which was little higher, TSH slightly elevated but free T4 normal      He still has issues of dyspnea on exertion and diminished stamina which has been a chronic problem in which he feels he can walk no more than a block at a time as well as not able to catch a deep breath  Occasionally my have a sharp chest pain that is very  transient but no other type of chest discomfort as no palpitations    Last visit with Cardiology was 2018 in which he had both a PET scan which was negative for ischemia but did show basal akinesis and a 2D echo later showed normal ejection fraction      Also had cystoscoped by urology November 2019 which was negative for tumor was recommend repeat 1 year    Last appointment with endocrinology was December 2019  And from his last visit new anything appears to be different at the Tresiba is at a lower dose in that he is also on the medication amlodipine      Review of symptoms  Negative for abdominal pain  Tendency toward constipation with a bowel function every other day  No difficulty urinating and has no nocturia  Numbness involving the toes in recently seen Podiatry  He has been noticing for couple months nasal congestion, blowing nose , sneezing,   Examination  Weight 233 lb  Pulse 68  Blood pressure 138/62 by me  Neck no thyromegaly no masses  Chest clear breath sounds  Heart regular rate and rhythm  Abdominal exam nontender soft no hepatosplenomegaly abdominal masses 2+ carotid pulses no bruits, 2+ pedal pulses  Extremities trace edema      Also has been 4 months since he had his last B12 injection      Impression  Type 2 diabetes with chronic kidney disease stage 4 in peripheral neuropathy  Hypertension  Hyperlipidemia  Coronary artery disease  Obstructive sleep apnea with the use of a CPAP  Chronic anemia with history of B12 deficiency and iron deficiency  BPH  History of bladder tumor  Dyspnea on exertion    Plan  Today repeat basic metabolic profile, CBC, TSH, hemoglobin A1c will get a BNP and chest x-ray  To arrange follow-up visits with Endocrinology and Cardiology  Trial of Astelin nasal spray and saline irrigation discussed  B12 injection today

## 2020-07-07 RX ORDER — ATORVASTATIN CALCIUM 20 MG/1
20 TABLET, FILM COATED ORAL DAILY
Qty: 90 TABLET | Refills: 1 | Status: CANCELLED | OUTPATIENT
Start: 2020-07-07

## 2020-08-18 ENCOUNTER — TELEPHONE (OUTPATIENT)
Dept: ENDOSCOPY | Facility: HOSPITAL | Age: 80
End: 2020-08-18

## 2020-08-18 ENCOUNTER — PES CALL (OUTPATIENT)
Dept: ADMINISTRATIVE | Facility: CLINIC | Age: 80
End: 2020-08-18

## 2020-08-18 NOTE — TELEPHONE ENCOUNTER
Pt needs to be scheduled for a colonoscopy.  Pt is currently taking Plavix.  Per endoscopy protocol it should be held x 5 days prior.  Ok to hold?  Please advise.  Thanks

## 2020-08-19 ENCOUNTER — OFFICE VISIT (OUTPATIENT)
Dept: CARDIOLOGY | Facility: CLINIC | Age: 80
End: 2020-08-19
Payer: MEDICARE

## 2020-08-19 ENCOUNTER — HOSPITAL ENCOUNTER (OUTPATIENT)
Dept: PULMONOLOGY | Facility: CLINIC | Age: 80
Discharge: HOME | End: 2020-08-19
Payer: MEDICARE

## 2020-08-19 VITALS
BODY MASS INDEX: 33.43 KG/M2 | SYSTOLIC BLOOD PRESSURE: 172 MMHG | HEART RATE: 59 BPM | DIASTOLIC BLOOD PRESSURE: 71 MMHG | HEIGHT: 71 IN | WEIGHT: 238.75 LBS

## 2020-08-19 VITALS — WEIGHT: 238 LBS | BODY MASS INDEX: 34.07 KG/M2 | HEIGHT: 70 IN

## 2020-08-19 DIAGNOSIS — E66.9 OBESITY, DIABETES, AND HYPERTENSION SYNDROME: ICD-10-CM

## 2020-08-19 DIAGNOSIS — I25.10 CORONARY ARTERY DISEASE INVOLVING NATIVE CORONARY ARTERY WITHOUT ANGINA PECTORIS, UNSPECIFIED WHETHER NATIVE OR TRANSPLANTED HEART: ICD-10-CM

## 2020-08-19 DIAGNOSIS — R06.09 DOE (DYSPNEA ON EXERTION): ICD-10-CM

## 2020-08-19 DIAGNOSIS — R06.09 DOE (DYSPNEA ON EXERTION): Primary | ICD-10-CM

## 2020-08-19 DIAGNOSIS — G47.33 OSA ON CPAP: ICD-10-CM

## 2020-08-19 DIAGNOSIS — N18.30 CKD (CHRONIC KIDNEY DISEASE) STAGE 3, GFR 30-59 ML/MIN: ICD-10-CM

## 2020-08-19 DIAGNOSIS — I25.10 CORONARY ARTERY DISEASE INVOLVING NATIVE CORONARY ARTERY OF NATIVE HEART WITHOUT ANGINA PECTORIS: ICD-10-CM

## 2020-08-19 DIAGNOSIS — I15.2 OBESITY, DIABETES, AND HYPERTENSION SYNDROME: ICD-10-CM

## 2020-08-19 DIAGNOSIS — I10 ESSENTIAL HYPERTENSION: ICD-10-CM

## 2020-08-19 DIAGNOSIS — E11.69 OBESITY, DIABETES, AND HYPERTENSION SYNDROME: ICD-10-CM

## 2020-08-19 DIAGNOSIS — E78.5 HYPERLIPIDEMIA, UNSPECIFIED HYPERLIPIDEMIA TYPE: ICD-10-CM

## 2020-08-19 DIAGNOSIS — E11.59 OBESITY, DIABETES, AND HYPERTENSION SYNDROME: ICD-10-CM

## 2020-08-19 PROCEDURE — 94618 PULMONARY STRESS TESTING: ICD-10-PCS | Mod: HCNC,S$GLB,, | Performed by: INTERNAL MEDICINE

## 2020-08-19 PROCEDURE — 99214 PR OFFICE/OUTPT VISIT, EST, LEVL IV, 30-39 MIN: ICD-10-PCS | Mod: HCNC,S$GLB,, | Performed by: INTERNAL MEDICINE

## 2020-08-19 PROCEDURE — 99214 OFFICE O/P EST MOD 30 MIN: CPT | Mod: HCNC,S$GLB,, | Performed by: INTERNAL MEDICINE

## 2020-08-19 PROCEDURE — 3078F PR MOST RECENT DIASTOLIC BLOOD PRESSURE < 80 MM HG: ICD-10-PCS | Mod: HCNC,CPTII,S$GLB, | Performed by: INTERNAL MEDICINE

## 2020-08-19 PROCEDURE — 99999 PR PBB SHADOW E&M-EST. PATIENT-LVL V: ICD-10-PCS | Mod: PBBFAC,HCNC,, | Performed by: INTERNAL MEDICINE

## 2020-08-19 PROCEDURE — 99999 PR PBB SHADOW E&M-EST. PATIENT-LVL V: CPT | Mod: PBBFAC,HCNC,, | Performed by: INTERNAL MEDICINE

## 2020-08-19 PROCEDURE — 3078F DIAST BP <80 MM HG: CPT | Mod: HCNC,CPTII,S$GLB, | Performed by: INTERNAL MEDICINE

## 2020-08-19 PROCEDURE — 3077F SYST BP >= 140 MM HG: CPT | Mod: HCNC,CPTII,S$GLB, | Performed by: INTERNAL MEDICINE

## 2020-08-19 PROCEDURE — 94618 PULMONARY STRESS TESTING: CPT | Mod: HCNC,S$GLB,, | Performed by: INTERNAL MEDICINE

## 2020-08-19 PROCEDURE — 1101F PT FALLS ASSESS-DOCD LE1/YR: CPT | Mod: HCNC,CPTII,S$GLB, | Performed by: INTERNAL MEDICINE

## 2020-08-19 PROCEDURE — 99499 RISK ADDL DX/OHS AUDIT: ICD-10-PCS | Mod: HCNC,S$GLB,, | Performed by: INTERNAL MEDICINE

## 2020-08-19 PROCEDURE — 1159F PR MEDICATION LIST DOCUMENTED IN MEDICAL RECORD: ICD-10-PCS | Mod: HCNC,S$GLB,, | Performed by: INTERNAL MEDICINE

## 2020-08-19 PROCEDURE — 1126F AMNT PAIN NOTED NONE PRSNT: CPT | Mod: HCNC,S$GLB,, | Performed by: INTERNAL MEDICINE

## 2020-08-19 PROCEDURE — 1126F PR PAIN SEVERITY QUANTIFIED, NO PAIN PRESENT: ICD-10-PCS | Mod: HCNC,S$GLB,, | Performed by: INTERNAL MEDICINE

## 2020-08-19 PROCEDURE — 99499 UNLISTED E&M SERVICE: CPT | Mod: HCNC,S$GLB,, | Performed by: INTERNAL MEDICINE

## 2020-08-19 PROCEDURE — 3077F PR MOST RECENT SYSTOLIC BLOOD PRESSURE >= 140 MM HG: ICD-10-PCS | Mod: HCNC,CPTII,S$GLB, | Performed by: INTERNAL MEDICINE

## 2020-08-19 PROCEDURE — 1101F PR PT FALLS ASSESS DOC 0-1 FALLS W/OUT INJ PAST YR: ICD-10-PCS | Mod: HCNC,CPTII,S$GLB, | Performed by: INTERNAL MEDICINE

## 2020-08-19 PROCEDURE — 1159F MED LIST DOCD IN RCRD: CPT | Mod: HCNC,S$GLB,, | Performed by: INTERNAL MEDICINE

## 2020-08-19 RX ORDER — NITROGLYCERIN 0.4 MG/1
0.4 TABLET SUBLINGUAL EVERY 5 MIN PRN
Qty: 25 TABLET | Refills: 3 | Status: SHIPPED | OUTPATIENT
Start: 2020-08-19 | End: 2021-01-01

## 2020-08-19 NOTE — PROGRESS NOTES
Subjective:    Patient ID:  Kevon Perez is a 80 y.o. male who presents for follow-up of Coronary artery disease involving native coronary artery of       HPI     80 year old male followed with CAD post STEMI/PCI 2009, hypertension, hyperlipidemia,GINGER, type 2 diabetes and CRI III. Since his last visit 5/12/18 he has had progressive MATTHEWS but no chest pain. He gets dyspneic waling 1/2 block. He uses an elliptical at home but only < 10 minutes twice a day limited by dyspnea and leg weakness. His home BPs are well controlled ranging 125-144 systolic          CONCLUSIONS     1 - Preserved left ventricular systolic function (EF 55-60%) with basal wall motion abnormalities.     2 - Normal right ventricular systolic function .     3 - Normal left ventricular diastolic function.             This document has been electronically    SIGNED BY: Elia Olivier MD On: 09/10/2018 12:08      CONCLUSIONS: ABNORMAL MYOCARDIAL PERFUSION PET STRESS TEST   1. There is a moderate sized moderate intensity fixed defect consistent with non-transmural infarct in the base to apical inferolateral wall in the usual distribution of the Left Circumflex Artery. This defect comprises 15 % of the left ventricular   myocardium.   2. There is abnormal wall motion at rest showing akinesis of the inferolateral wall of the left ventricle. There is abnormal wall motion at stress showing akinesis of the inferolateral wall of the left ventricle.   3. LV function is normal at rest and stress.  (normal is >= 51%)   4. The ventricular volumes are normal at rest and stress.   5. The extracardiac distribution of radioactivity is normal.   6. There was no previous study available to compare.           This document has been electronically    SIGNED BY: Roxi Avendaño MD On: 06/06/2018 14:55  Lab Results   Component Value Date     06/25/2020    K 4.4 06/25/2020     (H) 06/25/2020    CO2 24 06/25/2020    BUN 37 (H) 06/25/2020    CREATININE 2.1 (H)  "06/25/2020    GLU 65 (L) 06/25/2020    HGBA1C 6.5 (H) 06/25/2020    MG 2.0 04/04/2016    AST 14 03/09/2020    ALT 15 03/09/2020    ALBUMIN 3.4 (L) 03/09/2020    PROT 6.7 03/09/2020    BILITOT 0.8 03/09/2020    WBC 9.39 06/25/2020    HGB 12.2 (L) 06/25/2020    HCT 39.0 (L) 06/25/2020    MCV 91 06/25/2020     06/25/2020    INR 1.0 01/09/2015    PSA 1.42 06/13/2013    TSH 3.822 06/25/2020         Lab Results   Component Value Date    CHOL 98 (L) 03/09/2020    HDL 37 (L) 03/09/2020    TRIG 59 03/09/2020       Lab Results   Component Value Date    LDLCALC 49.2 (L) 03/09/2020       Past Medical History:   Diagnosis Date    Acute coronary syndrome 9/10/09    STEMI    Anticoagulant long-term use     plavix    Basal cell cancer     BCC (basal cell carcinoma of skin)     nose    Cancer of bladder January 2013    Cataract     Chronic kidney disease     Colon polyp     Coronary artery disease     CPAP (continuous positive airway pressure) dependence     Diabetes mellitus     Diabetic retinopathy     GERD (gastroesophageal reflux disease)     High cholesterol     Hyperlipidemia     Hypertension     Iron deficiency anemia 5/11/2018    GINGER (obstructive sleep apnea)     CPAP     Renal manifestation of secondary diabetes mellitus        Current Outpatient Medications:     amLODIPine (NORVASC) 5 MG tablet, TAKE 1 TABLET(5 MG) BY MOUTH EVERY DAY, Disp: 30 tablet, Rfl: 5    aspirin (ECOTRIN) 81 MG EC tablet, Take 81 mg by mouth every evening. , Disp: , Rfl:     atorvastatin (LIPITOR) 20 MG tablet, Take 1 tablet (20 mg total) by mouth once daily., Disp: 90 tablet, Rfl: 1    azelastine (ASTELIN) 137 mcg (0.1 %) nasal spray, 2 sprays (274 mcg total) by Nasal route 2 (two) times daily., Disp: 30 mL, Rfl: 0    BD ULTRA-FINE SHORT PEN NEEDLE 31 gauge x 5/16" Ndle, USE UP TO 6 TIMES DAILY, Disp: 200 each, Rfl: 11    calcitRIOL (ROCALTROL) 0.25 MCG Cap, TAKE 1 CAPSULE BY MOUTH EVERY DAY, Disp: 30 capsule, Rfl: " 11    carvediloL (COREG) 12.5 MG tablet, TAKE 1 TABLET(12.5 MG) BY MOUTH TWICE DAILY WITH MEALS, Disp: 180 tablet, Rfl: 0    clopidogreL (PLAVIX) 75 mg tablet, TAKE 1 TABLET BY MOUTH EVERY DAY, Disp: 90 tablet, Rfl: 1    finasteride (PROSCAR) 5 mg tablet, Take 1 tablet (5 mg total) by mouth once daily., Disp: 90 tablet, Rfl: 3    fish oil-omega-3 fatty acids 300-1,000 mg capsule, Take by mouth once daily., Disp: , Rfl:     folic acid (FOLVITE) 1 MG tablet, Take 1 mg by mouth once daily. , Disp: , Rfl:     hydroCHLOROthiazide (HYDRODIURIL) 12.5 MG Tab, TAKE 1 TABLET(12.5 MG) BY MOUTH EVERY DAY, Disp: 30 tablet, Rfl: 11    insulin aspart U-100 (NOVOLOG FLEXPEN U-100 INSULIN) 100 unit/mL (3 mL) InPn pen, Inject 15 Units into the skin 4 (four) times daily. Before meals plus correction scale. Max TDD 75, Disp: 6 Box, Rfl: 3    irbesartan (AVAPRO) 300 MG tablet, TAKE 1 TABLET(300 MG) BY MOUTH EVERY EVENING, Disp: 90 tablet, Rfl: 0    nitroGLYCERIN (NITROSTAT) 0.4 MG SL tablet, Place 1 tablet (0.4 mg total) under the tongue every 5 (five) minutes as needed., Disp: 25 tablet, Rfl: 3    tamsulosin (FLOMAX) 0.4 mg Cap, TAKE 1 CAPSULE(0.4 MG) BY MOUTH EVERY EVENING, Disp: 90 capsule, Rfl: 3    TRESIBA FLEXTOUCH U-200 200 unit/mL (3 mL) InPn, INJECT 70 UNITS UNDER THE SKIN ONCE DAILY. (Patient taking differently: 30 Units. ), Disp: 15 Syringe, Rfl: 11    TRUE METRIX GLUCOSE TEST STRIP Strp, USE TO CHECK BLOOD SUGAR FOUR TIMES DAILY, Disp: 300 strip, Rfl: 11    TRUEPLUS LANCETS 30 gauge Misc, USE TO CHECK BLOOD SUGAR FOUR TIMES DAILY, Disp: 300 each, Rfl: 3    vitamin D (VITAMIN D3) 1000 units Tab, Take 1,000 Units by mouth once daily., Disp: , Rfl:     blood-glucose meter kit, To check BG 4 times daily, to use with insurance preferred meter, Disp: 1 each, Rfl: 0    Current Facility-Administered Medications:     cyanocobalamin injection 1,000 mcg, 1,000 mcg, Intramuscular, Q30 Days, Cipriano Sol MD, 1,000  "mcg at 01/07/20 1419    cyanocobalamin injection 100 mcg, 100 mcg, Intramuscular, Q30 Days, Cipriano Sol MD, 100 mcg at 06/11/19 1547          Review of Systems   Constitution: Positive for malaise/fatigue. Negative for decreased appetite, diaphoresis, fever, weight gain and weight loss.   HENT: Negative for congestion, ear discharge, ear pain and nosebleeds.    Eyes: Negative for blurred vision, double vision and visual disturbance.   Cardiovascular: Positive for dyspnea on exertion. Negative for chest pain, claudication, cyanosis, irregular heartbeat, leg swelling, near-syncope, orthopnea, palpitations, paroxysmal nocturnal dyspnea and syncope.   Respiratory: Negative for cough, hemoptysis, shortness of breath, sleep disturbances due to breathing, snoring, sputum production and wheezing.    Endocrine: Negative for polydipsia, polyphagia and polyuria.   Hematologic/Lymphatic: Negative for adenopathy and bleeding problem. Does not bruise/bleed easily.   Skin: Negative for color change, nail changes, poor wound healing and rash.   Musculoskeletal: Negative for muscle cramps and muscle weakness.   Gastrointestinal: Negative for abdominal pain, anorexia, change in bowel habit, hematochezia, nausea and vomiting.   Genitourinary: Negative for dysuria, frequency and hematuria.   Neurological: Negative for brief paralysis, difficulty with concentration, excessive daytime sleepiness, dizziness, focal weakness, headaches, light-headedness, seizures, vertigo and weakness.   Psychiatric/Behavioral: Negative for altered mental status and depression.   Allergic/Immunologic: Negative for persistent infections.        Objective:BP (!) 172/71 (BP Location: Right arm, Patient Position: Sitting, BP Method: X-Large (Automatic))   Pulse (!) 59   Ht 5' 10.5" (1.791 m)   Wt 108.3 kg (238 lb 12.1 oz)   BMI 33.77 kg/m²             Physical Exam   Constitutional: He is oriented to person, place, and time. He appears well-developed " and well-nourished.   obese   HENT:   Head: Normocephalic.   Right Ear: External ear normal.   Left Ear: External ear normal.   Nose: Nose normal.   Inspection of lips, teeth and gums normal   Eyes: Pupils are equal, round, and reactive to light. EOM are normal. No scleral icterus.   Neck: Normal range of motion. Neck supple. No JVD present. No tracheal deviation present. No thyromegaly present.   Cardiovascular: Normal rate, regular rhythm and intact distal pulses. Exam reveals no gallop and no friction rub.   No murmur heard.  Pulmonary/Chest: Effort normal and breath sounds normal.   Abdominal: Bowel sounds are normal. He exhibits no distension. There is no hepatosplenomegaly. There is no abdominal tenderness. There is no guarding.   Musculoskeletal: Normal range of motion.         General: No tenderness or edema.   Lymphadenopathy:   Palpation of neck and groin lymph nodes normal   Neurological: He is alert and oriented to person, place, and time. No cranial nerve deficit. He exhibits normal muscle tone. Coordination normal.   Skin: Skin is dry.   Palpation of skin normal   Psychiatric: His behavior is normal. Judgment and thought content normal.         Assessment:       1. MATTHEWS (dyspnea on exertion)    2. Coronary artery disease involving native coronary artery of native heart without angina pectoris    3. Essential hypertension    4. Obesity, diabetes, and hypertension syndrome    5. Hyperlipidemia, unspecified hyperlipidemia type    6. GINGER on CPAP    7. CKD (chronic kidney disease) stage 3, GFR 30-59 ml/min    8. Coronary artery disease involving native coronary artery without angina pectoris, unspecified whether native or transplanted heart         Plan:       Kevon was seen today for coronary artery disease involving native coronary artery of .    Diagnoses and all orders for this visit:    MATTHEWS (dyspnea on exertion)  -     Six Minute Walk Test to qualify for Home Oxygen; Future    Coronary artery disease  involving native coronary artery of native heart without angina pectoris    Essential hypertension  -     Basic metabolic panel; Future; Expected date: 02/18/2021    Obesity, diabetes, and hypertension syndrome    Hyperlipidemia, unspecified hyperlipidemia type    GINGER on CPAP    CKD (chronic kidney disease) stage 3, GFR 30-59 ml/min  -     Basic metabolic panel; Future; Expected date: 02/18/2021    Coronary artery disease involving native coronary artery without angina pectoris, unspecified whether native or transplanted heart  -     nitroGLYCERIN (NITROSTAT) 0.4 MG SL tablet; Place 1 tablet (0.4 mg total) under the tongue every 5 (five) minutes as needed.

## 2020-08-20 NOTE — PROCEDURES
Kevon Perez is a 80 y.o.  male patient, who presents for a 6 minute walk test ordered by MD Lluvia.  The diagnosis is Qualify for Oxygen; Dyspnea on Exertion.  The patient's BMI is 34.1 kg/m2.  Predicted distance (lower limit of normal) is 233.56 meters.      Test Results:    The test was completed without stopping.  The total time walked was 360 seconds.  During walking, the patient reported:  Dyspnea; Leg/hip pain.  The patient used no assistive devices during testing.     08/19/2020---------Distance: 365.76 meters (1200 feet)     O2 Sat % Supplemental Oxygen Heart Rate Blood Pressure Colten Scale   Pre-exercise  (Resting) 97 % Room Air 56 bpm 160/72 mmHg 0.5   During Exercise 95 % Room Air 80 bpm 186/81 mmHg 5-6   Post-exercise  (Recovery) 98 % Room Air  57 bpm 163/71 mmHg      Recovery Time: 227 seconds    Performing nurse/tech: Jagjit KERN      PREVIOUS STUDY:   The patient has not had a previous study.      CLINICAL INTERPRETATION:  Six minute walk distance is 365.76 meters (1200 feet) with heavy dyspnea.  During exercise, there was no significant desaturation while breathing room air.  Both blood pressure and heart rate increased significantly with walking.  Bradycardia and Hypertension were present prior to exercise.  The patient reported non-pulmonary symptoms during exercise.  No previous study performed.  Based upon age and body mass index, exercise capacity is normal.

## 2020-08-25 ENCOUNTER — TELEPHONE (OUTPATIENT)
Dept: ENDOSCOPY | Facility: HOSPITAL | Age: 80
End: 2020-08-25

## 2020-08-25 DIAGNOSIS — Z01.818 PRE-OP TESTING: ICD-10-CM

## 2020-08-25 DIAGNOSIS — Z12.11 SPECIAL SCREENING FOR MALIGNANT NEOPLASMS, COLON: Primary | ICD-10-CM

## 2020-08-25 RX ORDER — POLYETHYLENE GLYCOL 3350, SODIUM SULFATE ANHYDROUS, SODIUM BICARBONATE, SODIUM CHLORIDE, POTASSIUM CHLORIDE 236; 22.74; 6.74; 5.86; 2.97 G/4L; G/4L; G/4L; G/4L; G/4L
4 POWDER, FOR SOLUTION ORAL ONCE
Qty: 4000 ML | Refills: 0 | Status: SHIPPED | OUTPATIENT
Start: 2020-08-25 | End: 2020-08-25

## 2020-08-25 NOTE — TELEPHONE ENCOUNTER
Cipriano Sol MD  to Me         8/18/20 2:00 PM  Note     Approved to hold Plavix 5 days prior                  8/18/20 1:38 PM  You routed this conversation to Cipriano Sol MD    Me         8/18/20 1:38 PM  Note     Pt needs to be scheduled for a colonoscopy.  Pt is currently taking Plavix.  Per endoscopy protocol it should be held x 5 days prior.  Ok to hold?  Please advise.  Thanks

## 2020-09-08 RX ORDER — AMLODIPINE BESYLATE 5 MG/1
5 TABLET ORAL DAILY
Qty: 90 TABLET | Refills: 1 | Status: SHIPPED | OUTPATIENT
Start: 2020-09-08 | End: 2021-02-26

## 2020-09-29 ENCOUNTER — PATIENT MESSAGE (OUTPATIENT)
Dept: OTHER | Facility: OTHER | Age: 80
End: 2020-09-29

## 2020-10-08 DIAGNOSIS — Z79.4 TYPE 2 DIABETES MELLITUS WITH DIABETIC POLYNEUROPATHY, WITH LONG-TERM CURRENT USE OF INSULIN: ICD-10-CM

## 2020-10-08 DIAGNOSIS — E11.22 TYPE 2 DIABETES MELLITUS WITH STAGE 3 CHRONIC KIDNEY DISEASE, WITH LONG-TERM CURRENT USE OF INSULIN: Chronic | ICD-10-CM

## 2020-10-08 DIAGNOSIS — Z79.4 TYPE 2 DIABETES MELLITUS WITH STAGE 3 CHRONIC KIDNEY DISEASE, WITH LONG-TERM CURRENT USE OF INSULIN: Chronic | ICD-10-CM

## 2020-10-08 DIAGNOSIS — E11.42 TYPE 2 DIABETES MELLITUS WITH DIABETIC POLYNEUROPATHY, WITH LONG-TERM CURRENT USE OF INSULIN: ICD-10-CM

## 2020-10-08 DIAGNOSIS — N18.30 TYPE 2 DIABETES MELLITUS WITH STAGE 3 CHRONIC KIDNEY DISEASE, WITH LONG-TERM CURRENT USE OF INSULIN: Chronic | ICD-10-CM

## 2020-10-08 RX ORDER — INSULIN ASPART 100 [IU]/ML
15 INJECTION, SOLUTION INTRAVENOUS; SUBCUTANEOUS 4 TIMES DAILY
Qty: 6 BOX | Refills: 3 | Status: SHIPPED | OUTPATIENT
Start: 2020-10-08 | End: 2021-01-01

## 2020-10-08 NOTE — TELEPHONE ENCOUNTER
----- Message from Fernanda Kirby RN sent at 10/8/2020 11:41 AM CDT -----  Contact: [phone]    ----- Message -----  From: Nadiya Miranda  Sent: 10/8/2020  10:54 AM CDT  To: Allen Herrera says he needs refills:  NOVALOG  .     Perscription has .   OUT OF MEDICATION.     HAS NONE FOR THIS A.M.         Pharmacy:   [facility].    Tel:   [phone]   /  Would     like to have a 30-day supply.    Pls call ref. This.  Used to see MsSilva Zulaydarwin.    Seeing dr. Villa on    Pls call pt. Asap.

## 2020-10-11 ENCOUNTER — LAB VISIT (OUTPATIENT)
Dept: URGENT CARE | Facility: CLINIC | Age: 80
End: 2020-10-11
Payer: MEDICARE

## 2020-10-11 DIAGNOSIS — Z01.818 PRE-OP TESTING: ICD-10-CM

## 2020-10-11 PROCEDURE — U0003 INFECTIOUS AGENT DETECTION BY NUCLEIC ACID (DNA OR RNA); SEVERE ACUTE RESPIRATORY SYNDROME CORONAVIRUS 2 (SARS-COV-2) (CORONAVIRUS DISEASE [COVID-19]), AMPLIFIED PROBE TECHNIQUE, MAKING USE OF HIGH THROUGHPUT TECHNOLOGIES AS DESCRIBED BY CMS-2020-01-R: HCPCS | Mod: HCNC

## 2020-10-12 LAB — SARS-COV-2 RNA RESP QL NAA+PROBE: NOT DETECTED

## 2020-10-14 DIAGNOSIS — Z12.11 SPECIAL SCREENING FOR MALIGNANT NEOPLASMS, COLON: Primary | ICD-10-CM

## 2020-10-14 DIAGNOSIS — Z01.818 PRE-OP TESTING: ICD-10-CM

## 2020-10-14 PROBLEM — Z86.010 HISTORY OF COLON POLYPS: Status: ACTIVE | Noted: 2020-10-14

## 2020-10-14 RX ORDER — POLYETHYLENE GLYCOL 3350, SODIUM SULFATE ANHYDROUS, SODIUM BICARBONATE, SODIUM CHLORIDE, POTASSIUM CHLORIDE 236; 22.74; 6.74; 5.86; 2.97 G/4L; G/4L; G/4L; G/4L; G/4L
4 POWDER, FOR SOLUTION ORAL ONCE
Qty: 4000 ML | Refills: 0 | Status: SHIPPED | OUTPATIENT
Start: 2020-10-14 | End: 2020-10-14

## 2020-10-26 ENCOUNTER — PES CALL (OUTPATIENT)
Dept: ADMINISTRATIVE | Facility: CLINIC | Age: 80
End: 2020-10-26

## 2020-10-29 DIAGNOSIS — I25.10 CORONARY ARTERY DISEASE INVOLVING NATIVE CORONARY ARTERY OF NATIVE HEART WITHOUT ANGINA PECTORIS: Chronic | ICD-10-CM

## 2020-10-29 DIAGNOSIS — I10 ESSENTIAL HYPERTENSION: ICD-10-CM

## 2020-10-29 DIAGNOSIS — E11.42 DIABETIC POLYNEUROPATHY ASSOCIATED WITH TYPE 2 DIABETES MELLITUS: ICD-10-CM

## 2020-10-29 DIAGNOSIS — I25.2 HISTORY OF MI (MYOCARDIAL INFARCTION): ICD-10-CM

## 2020-10-29 RX ORDER — CARVEDILOL 12.5 MG/1
TABLET ORAL
Qty: 180 TABLET | Refills: 3 | Status: SHIPPED | OUTPATIENT
Start: 2020-10-29 | End: 2021-01-01

## 2020-10-30 DIAGNOSIS — N40.0 BENIGN PROSTATIC HYPERPLASIA, UNSPECIFIED WHETHER LOWER URINARY TRACT SYMPTOMS PRESENT: Chronic | ICD-10-CM

## 2020-10-30 RX ORDER — TAMSULOSIN HYDROCHLORIDE 0.4 MG/1
CAPSULE ORAL
Qty: 90 CAPSULE | Refills: 0 | Status: SHIPPED | OUTPATIENT
Start: 2020-10-30 | End: 2021-01-29 | Stop reason: SDUPTHER

## 2020-11-09 ENCOUNTER — PATIENT MESSAGE (OUTPATIENT)
Dept: ENDOCRINOLOGY | Facility: CLINIC | Age: 80
End: 2020-11-09

## 2020-11-16 ENCOUNTER — PATIENT OUTREACH (OUTPATIENT)
Dept: ADMINISTRATIVE | Facility: OTHER | Age: 80
End: 2020-11-16

## 2020-11-17 ENCOUNTER — TELEPHONE (OUTPATIENT)
Dept: ENDOSCOPY | Facility: HOSPITAL | Age: 80
End: 2020-11-17

## 2020-11-17 ENCOUNTER — OFFICE VISIT (OUTPATIENT)
Dept: ENDOCRINOLOGY | Facility: CLINIC | Age: 80
End: 2020-11-17
Payer: MEDICARE

## 2020-11-17 VITALS
HEIGHT: 70 IN | DIASTOLIC BLOOD PRESSURE: 60 MMHG | WEIGHT: 237.44 LBS | RESPIRATION RATE: 18 BRPM | OXYGEN SATURATION: 98 % | BODY MASS INDEX: 33.99 KG/M2 | HEART RATE: 45 BPM | SYSTOLIC BLOOD PRESSURE: 140 MMHG

## 2020-11-17 DIAGNOSIS — N18.4 CKD STAGE 4 DUE TO TYPE 2 DIABETES MELLITUS: ICD-10-CM

## 2020-11-17 DIAGNOSIS — E11.22 TYPE 2 DIABETES MELLITUS WITH STAGE 3B CHRONIC KIDNEY DISEASE, WITHOUT LONG-TERM CURRENT USE OF INSULIN: Chronic | ICD-10-CM

## 2020-11-17 DIAGNOSIS — E11.22 CKD STAGE 4 DUE TO TYPE 2 DIABETES MELLITUS: ICD-10-CM

## 2020-11-17 DIAGNOSIS — N18.32 TYPE 2 DIABETES MELLITUS WITH STAGE 3B CHRONIC KIDNEY DISEASE, WITHOUT LONG-TERM CURRENT USE OF INSULIN: Chronic | ICD-10-CM

## 2020-11-17 PROCEDURE — 3288F FALL RISK ASSESSMENT DOCD: CPT | Mod: HCNC,CPTII,S$GLB, | Performed by: INTERNAL MEDICINE

## 2020-11-17 PROCEDURE — 1101F PR PT FALLS ASSESS DOC 0-1 FALLS W/OUT INJ PAST YR: ICD-10-PCS | Mod: HCNC,CPTII,S$GLB, | Performed by: INTERNAL MEDICINE

## 2020-11-17 PROCEDURE — 1159F PR MEDICATION LIST DOCUMENTED IN MEDICAL RECORD: ICD-10-PCS | Mod: HCNC,S$GLB,, | Performed by: INTERNAL MEDICINE

## 2020-11-17 PROCEDURE — 99214 PR OFFICE/OUTPT VISIT, EST, LEVL IV, 30-39 MIN: ICD-10-PCS | Mod: HCNC,S$GLB,, | Performed by: INTERNAL MEDICINE

## 2020-11-17 PROCEDURE — 3288F PR FALLS RISK ASSESSMENT DOCUMENTED: ICD-10-PCS | Mod: HCNC,CPTII,S$GLB, | Performed by: INTERNAL MEDICINE

## 2020-11-17 PROCEDURE — 99999 PR PBB SHADOW E&M-EST. PATIENT-LVL V: CPT | Mod: PBBFAC,HCNC,, | Performed by: INTERNAL MEDICINE

## 2020-11-17 PROCEDURE — 3078F PR MOST RECENT DIASTOLIC BLOOD PRESSURE < 80 MM HG: ICD-10-PCS | Mod: HCNC,CPTII,S$GLB, | Performed by: INTERNAL MEDICINE

## 2020-11-17 PROCEDURE — 1126F PR PAIN SEVERITY QUANTIFIED, NO PAIN PRESENT: ICD-10-PCS | Mod: HCNC,S$GLB,, | Performed by: INTERNAL MEDICINE

## 2020-11-17 PROCEDURE — 99214 OFFICE O/P EST MOD 30 MIN: CPT | Mod: HCNC,S$GLB,, | Performed by: INTERNAL MEDICINE

## 2020-11-17 PROCEDURE — 3077F PR MOST RECENT SYSTOLIC BLOOD PRESSURE >= 140 MM HG: ICD-10-PCS | Mod: HCNC,CPTII,S$GLB, | Performed by: INTERNAL MEDICINE

## 2020-11-17 PROCEDURE — 3078F DIAST BP <80 MM HG: CPT | Mod: HCNC,CPTII,S$GLB, | Performed by: INTERNAL MEDICINE

## 2020-11-17 PROCEDURE — 1126F AMNT PAIN NOTED NONE PRSNT: CPT | Mod: HCNC,S$GLB,, | Performed by: INTERNAL MEDICINE

## 2020-11-17 PROCEDURE — 99999 PR PBB SHADOW E&M-EST. PATIENT-LVL V: ICD-10-PCS | Mod: PBBFAC,HCNC,, | Performed by: INTERNAL MEDICINE

## 2020-11-17 PROCEDURE — 3077F SYST BP >= 140 MM HG: CPT | Mod: HCNC,CPTII,S$GLB, | Performed by: INTERNAL MEDICINE

## 2020-11-17 PROCEDURE — 1101F PT FALLS ASSESS-DOCD LE1/YR: CPT | Mod: HCNC,CPTII,S$GLB, | Performed by: INTERNAL MEDICINE

## 2020-11-17 PROCEDURE — 1159F MED LIST DOCD IN RCRD: CPT | Mod: HCNC,S$GLB,, | Performed by: INTERNAL MEDICINE

## 2020-11-17 RX ORDER — CLINDAMYCIN HYDROCHLORIDE 300 MG/1
CAPSULE ORAL
Status: ON HOLD | COMMUNITY
Start: 2020-08-11 | End: 2020-12-04 | Stop reason: HOSPADM

## 2020-11-17 RX ORDER — HYDROCODONE BITARTRATE AND ACETAMINOPHEN 7.5; 325 MG/1; MG/1
TABLET ORAL
COMMUNITY
Start: 2020-08-20 | End: 2021-02-23

## 2020-11-17 RX ORDER — POLYETHYLENE GLYCOL 3350, SODIUM CHLORIDE, SODIUM BICARBONATE, POTASSIUM CHLORIDE 420; 11.2; 5.72; 1.48 G/4L; G/4L; G/4L; G/4L
POWDER, FOR SOLUTION ORAL
COMMUNITY
Start: 2020-10-15 | End: 2021-02-23

## 2020-11-17 NOTE — PATIENT INSTRUCTIONS
Instructions:    Take tresiba 30 units in the morning   Use novolog 12 units before meals     Add sliding scale   150 - 200 + 2 units   201 - 250 + 4 units   251 - 300 + 6 units     Do not take meal time insulin (NOVOLOG) if you skip breakfast.     For treatment of low blood sugars:   If your blood sugar is less than 70 but higher than 60, and you are not having any symptoms, please make sure you check your blood sugar again in 10 - 15 minutes, avoid tasks such as driving. If the repeat value is still in the same range, please drink a 1/4 cup of orange juice or 1 - 2 glucose tablets.     If your blood sugar reading is under 60, whether you are symptomatic or not, please treat. You can do this with 3 oral glucose tablets, juice, milk, other snacks, or a meal. Make sure you check 15 minutes after you treat.

## 2020-11-17 NOTE — PROGRESS NOTES
Care Everywhere: updated  Immunization: updated  Health Maintenance: updated  Media Review:   Legacy Review:   Order placed:   Upcoming appts:colonoscopy 12/4

## 2020-11-17 NOTE — PROGRESS NOTES
"Subjective:      Patient ID: Kevon Perez is a 80 y.o. male.    Chief Complaint:  Diabetes      History of Present Illness  Patient here for a follow up visit for T2DM that was diagnosed twenty six years ago.  Today denies recent illness. Denies new medications or new medical problems.   Has shortness of breath for "quite awhile." denies chest pain or pressure.   Does not exercise.   Has an elliptical machine that he uses occasionally.     Denies new changes to weight.   Appetite is good. Reports intermittent constipation.     Current regimen:  Tresiba 30 units in the morning - Does not miss any doses.   Novolog 15 units (uses a different formula)  Checks reading and takes 10% of the reading, so if blood sugar is 180 he takes 18 units  Highest amount of insulin he gives himself is 20 - 22 units   This is an approximation and sometimes he takes less     Denies any problems with injections or sites of injections   Reports occasional low blood sugars. Symptoms of hypoglycemia include: blurred vision and weakness.     Checks blood pressure at home and usually runs lower. Reports higher reading in MD office.     Review of Systems   Constitutional: Negative for chills and fever.   HENT: Positive for congestion (  with nightly CPAP use). Negative for sinus pressure.    Eyes: Negative for visual disturbance.   Respiratory: Negative for chest tightness and shortness of breath.    Cardiovascular: Negative for chest pain and palpitations.   Gastrointestinal: Negative for abdominal distention, diarrhea, nausea and vomiting.   Genitourinary: Negative for dysuria and flank pain.   Musculoskeletal: Negative for back pain.   Skin: Negative for rash.   Neurological: Negative for weakness.   Hematological: Does not bruise/bleed easily.   Psychiatric/Behavioral: Negative for sleep disturbance.       Objective:   Physical Exam  HENT:      Nose: Congestion present.       Vitals:    11/17/20 1313   BP: (!) 140/60   Pulse: (!) 45 " "  Resp: 18   SpO2: 98%   Weight: 107.7 kg (237 lb 7 oz)   Height: 5' 10" (1.778 m)       BP Readings from Last 3 Encounters:   11/17/20 (!) 140/60   08/19/20 (!) 172/71   06/25/20 124/82     Wt Readings from Last 1 Encounters:   11/17/20 1313 107.7 kg (237 lb 7 oz)         Body mass index is 34.07 kg/m².    Lab Review:   Lab Results   Component Value Date    HGBA1C 6.4 (H) 11/17/2020     Lab Results   Component Value Date    CHOL 98 (L) 03/09/2020    HDL 37 (L) 03/09/2020    LDLCALC 49.2 (L) 03/09/2020    TRIG 59 03/09/2020    CHOLHDL 37.8 03/09/2020     Lab Results   Component Value Date     11/17/2020    K 5.2 (H) 11/17/2020     (H) 11/17/2020    CO2 22 (L) 11/17/2020     (H) 11/17/2020    BUN 44 (H) 11/17/2020    CREATININE 2.2 (H) 11/17/2020    CALCIUM 9.2 11/17/2020    PROT 6.9 11/17/2020    ALBUMIN 3.6 11/17/2020    BILITOT 0.9 11/17/2020    ALKPHOS 40 (L) 11/17/2020    AST 13 11/17/2020    ALT 15 11/17/2020    ANIONGAP 8 11/17/2020    ESTGFRAFRICA 31.3 (A) 11/17/2020    EGFRNONAA 27.1 (A) 11/17/2020    TSH 3.822 06/25/2020         Assessment and Plan     Type 2 diabetes mellitus with diabetic chronic kidney disease   Very well controlled with review of blood sugar log  Complicated by CKD and high risk for hypoglycemia     Reviewed formula he uses.   I asked him to please take less meal time insulin, start with 12 units before meals and use a sliding scale.   I reminded him to take less insulin for a smaller meal and to skip the dose of novolog if he does not eat breakfast     May benefit from sensor   As he is monitoring blood glucose readings 4 times a day.  He requires intensive monitoring and needs >100 strips per month related to fluctuations with blood glucose reading, A1c trends, and activity level.      CKD stage 4 due to type 2 diabetes mellitus  At high risk for hypoglycemia   Encouraged to continue monitoring regularly  For treatment of low blood sugars:   If your blood sugar is " less than 70 but higher than 60, and you are not having any symptoms, please make sure you check your blood sugar again in 10 - 15 minutes, avoid tasks such as driving. If the repeat value is still in the same range, please drink a 1/4 cup of orange juice or 1 - 2 glucose tablets.     If your blood sugar reading is under 60, whether you are symptomatic or not, please treat. You can do this with 3 oral glucose tablets, juice, milk, other snacks, or a meal. Make sure you check 15 minutes after you treat.

## 2020-11-18 ENCOUNTER — IMMUNIZATION (OUTPATIENT)
Dept: PHARMACY | Facility: CLINIC | Age: 80
End: 2020-11-18
Payer: MEDICARE

## 2020-11-19 NOTE — ASSESSMENT & PLAN NOTE
At high risk for hypoglycemia   Encouraged to continue monitoring regularly  For treatment of low blood sugars:   If your blood sugar is less than 70 but higher than 60, and you are not having any symptoms, please make sure you check your blood sugar again in 10 - 15 minutes, avoid tasks such as driving. If the repeat value is still in the same range, please drink a 1/4 cup of orange juice or 1 - 2 glucose tablets.     If your blood sugar reading is under 60, whether you are symptomatic or not, please treat. You can do this with 3 oral glucose tablets, juice, milk, other snacks, or a meal. Make sure you check 15 minutes after you treat.

## 2020-11-19 NOTE — ASSESSMENT & PLAN NOTE
Very well controlled with review of blood sugar log  Complicated by CKD and high risk for hypoglycemia     Reviewed formula he uses.   I asked him to please take less meal time insulin, start with 12 units before meals and use a sliding scale.   I reminded him to take less insulin for a smaller meal and to skip the dose of novolog if he does not eat breakfast     May benefit from sensor   As he is monitoring blood glucose readings 4 times a day.  He requires intensive monitoring and needs >100 strips per month related to fluctuations with blood glucose reading, A1c trends, and activity level.

## 2020-11-27 ENCOUNTER — PES CALL (OUTPATIENT)
Dept: ADMINISTRATIVE | Facility: CLINIC | Age: 80
End: 2020-11-27

## 2020-12-01 ENCOUNTER — LAB VISIT (OUTPATIENT)
Dept: INTERNAL MEDICINE | Facility: CLINIC | Age: 80
End: 2020-12-01
Payer: MEDICARE

## 2020-12-01 DIAGNOSIS — Z01.818 PRE-OP TESTING: ICD-10-CM

## 2020-12-01 PROCEDURE — U0003 INFECTIOUS AGENT DETECTION BY NUCLEIC ACID (DNA OR RNA); SEVERE ACUTE RESPIRATORY SYNDROME CORONAVIRUS 2 (SARS-COV-2) (CORONAVIRUS DISEASE [COVID-19]), AMPLIFIED PROBE TECHNIQUE, MAKING USE OF HIGH THROUGHPUT TECHNOLOGIES AS DESCRIBED BY CMS-2020-01-R: HCPCS | Mod: HCNC

## 2020-12-02 LAB — SARS-COV-2 RNA RESP QL NAA+PROBE: NOT DETECTED

## 2020-12-04 ENCOUNTER — HOSPITAL ENCOUNTER (OUTPATIENT)
Facility: HOSPITAL | Age: 80
Discharge: HOME | End: 2020-12-04
Attending: INTERNAL MEDICINE | Admitting: INTERNAL MEDICINE
Payer: MEDICARE

## 2020-12-04 ENCOUNTER — ANESTHESIA (OUTPATIENT)
Dept: ENDOSCOPY | Facility: HOSPITAL | Age: 80
End: 2020-12-04
Payer: MEDICARE

## 2020-12-04 ENCOUNTER — ANESTHESIA EVENT (OUTPATIENT)
Dept: ENDOSCOPY | Facility: HOSPITAL | Age: 80
End: 2020-12-04
Payer: MEDICARE

## 2020-12-04 VITALS
BODY MASS INDEX: 32.93 KG/M2 | WEIGHT: 230 LBS | HEIGHT: 70 IN | DIASTOLIC BLOOD PRESSURE: 72 MMHG | SYSTOLIC BLOOD PRESSURE: 161 MMHG | HEART RATE: 62 BPM | RESPIRATION RATE: 28 BRPM | OXYGEN SATURATION: 98 % | TEMPERATURE: 98 F

## 2020-12-04 DIAGNOSIS — Z86.010 HISTORY OF COLON POLYPS: ICD-10-CM

## 2020-12-04 LAB — POCT GLUCOSE: 106 MG/DL (ref 70–110)

## 2020-12-04 PROCEDURE — D9220A PRA ANESTHESIA: ICD-10-PCS | Mod: PT,HCNC,CRNA, | Performed by: NURSE ANESTHETIST, CERTIFIED REGISTERED

## 2020-12-04 PROCEDURE — 25000003 PHARM REV CODE 250: Mod: HCNC | Performed by: INTERNAL MEDICINE

## 2020-12-04 PROCEDURE — 37000009 HC ANESTHESIA EA ADD 15 MINS: Mod: HCNC | Performed by: INTERNAL MEDICINE

## 2020-12-04 PROCEDURE — 27201012 HC FORCEPS, HOT/COLD, DISP: Mod: HCNC | Performed by: INTERNAL MEDICINE

## 2020-12-04 PROCEDURE — 25000003 PHARM REV CODE 250: Mod: HCNC | Performed by: NURSE ANESTHETIST, CERTIFIED REGISTERED

## 2020-12-04 PROCEDURE — 63600175 PHARM REV CODE 636 W HCPCS: Mod: HCNC | Performed by: NURSE ANESTHETIST, CERTIFIED REGISTERED

## 2020-12-04 PROCEDURE — 45385 COLONOSCOPY W/LESION REMOVAL: CPT | Mod: HCNC | Performed by: INTERNAL MEDICINE

## 2020-12-04 PROCEDURE — 82962 GLUCOSE BLOOD TEST: CPT | Mod: HCNC | Performed by: INTERNAL MEDICINE

## 2020-12-04 PROCEDURE — 45380 COLONOSCOPY AND BIOPSY: CPT | Mod: 59,HCNC,, | Performed by: INTERNAL MEDICINE

## 2020-12-04 PROCEDURE — 45380 COLONOSCOPY AND BIOPSY: CPT | Mod: HCNC | Performed by: INTERNAL MEDICINE

## 2020-12-04 PROCEDURE — 88305 TISSUE EXAM BY PATHOLOGIST: CPT | Mod: HCNC | Performed by: PATHOLOGY

## 2020-12-04 PROCEDURE — 27201089 HC SNARE, DISP (ANY): Mod: HCNC | Performed by: INTERNAL MEDICINE

## 2020-12-04 PROCEDURE — 37000008 HC ANESTHESIA 1ST 15 MINUTES: Mod: HCNC | Performed by: INTERNAL MEDICINE

## 2020-12-04 PROCEDURE — 45385 PR COLONOSCOPY,REMV LESN,SNARE: ICD-10-PCS | Mod: PT,HCNC,GC, | Performed by: INTERNAL MEDICINE

## 2020-12-04 PROCEDURE — 88305 TISSUE EXAM BY PATHOLOGIST: CPT | Mod: 26,HCNC,, | Performed by: PATHOLOGY

## 2020-12-04 PROCEDURE — D9220A PRA ANESTHESIA: Mod: PT,HCNC,CRNA, | Performed by: NURSE ANESTHETIST, CERTIFIED REGISTERED

## 2020-12-04 PROCEDURE — 88305 TISSUE EXAM BY PATHOLOGIST: ICD-10-PCS | Mod: 26,HCNC,, | Performed by: PATHOLOGY

## 2020-12-04 PROCEDURE — D9220A PRA ANESTHESIA: Mod: PT,HCNC,ANES, | Performed by: ANESTHESIOLOGY

## 2020-12-04 PROCEDURE — D9220A PRA ANESTHESIA: ICD-10-PCS | Mod: PT,HCNC,ANES, | Performed by: ANESTHESIOLOGY

## 2020-12-04 PROCEDURE — 45385 COLONOSCOPY W/LESION REMOVAL: CPT | Mod: PT,HCNC,GC, | Performed by: INTERNAL MEDICINE

## 2020-12-04 PROCEDURE — 45380 PR COLONOSCOPY,BIOPSY: ICD-10-PCS | Mod: 59,HCNC,, | Performed by: INTERNAL MEDICINE

## 2020-12-04 RX ORDER — ONDANSETRON 2 MG/ML
4 INJECTION INTRAMUSCULAR; INTRAVENOUS ONCE AS NEEDED
Status: DISCONTINUED | OUTPATIENT
Start: 2020-12-04 | End: 2020-12-04 | Stop reason: HOSPADM

## 2020-12-04 RX ORDER — LIDOCAINE HCL/PF 100 MG/5ML
SYRINGE (ML) INTRAVENOUS
Status: DISCONTINUED | OUTPATIENT
Start: 2020-12-04 | End: 2020-12-04

## 2020-12-04 RX ORDER — SODIUM CHLORIDE 0.9 % (FLUSH) 0.9 %
10 SYRINGE (ML) INJECTION
Status: DISCONTINUED | OUTPATIENT
Start: 2020-12-04 | End: 2020-12-04 | Stop reason: HOSPADM

## 2020-12-04 RX ORDER — PROPOFOL 10 MG/ML
VIAL (ML) INTRAVENOUS
Status: DISCONTINUED | OUTPATIENT
Start: 2020-12-04 | End: 2020-12-04

## 2020-12-04 RX ORDER — SODIUM CHLORIDE 9 MG/ML
INJECTION, SOLUTION INTRAVENOUS CONTINUOUS
Status: DISCONTINUED | OUTPATIENT
Start: 2020-12-04 | End: 2020-12-04 | Stop reason: HOSPADM

## 2020-12-04 RX ORDER — PROPOFOL 10 MG/ML
VIAL (ML) INTRAVENOUS CONTINUOUS PRN
Status: DISCONTINUED | OUTPATIENT
Start: 2020-12-04 | End: 2020-12-04

## 2020-12-04 RX ORDER — PHENYLEPHRINE HYDROCHLORIDE 10 MG/ML
INJECTION INTRAVENOUS
Status: DISCONTINUED | OUTPATIENT
Start: 2020-12-04 | End: 2020-12-04

## 2020-12-04 RX ADMIN — PHENYLEPHRINE HYDROCHLORIDE 100 MCG: 10 INJECTION INTRAVENOUS at 09:12

## 2020-12-04 RX ADMIN — PROPOFOL 150 MCG/KG/MIN: 10 INJECTION, EMULSION INTRAVENOUS at 08:12

## 2020-12-04 RX ADMIN — Medication 100 MG: at 08:12

## 2020-12-04 RX ADMIN — SODIUM CHLORIDE, SODIUM GLUCONATE, SODIUM ACETATE, POTASSIUM CHLORIDE, MAGNESIUM CHLORIDE, SODIUM PHOSPHATE, DIBASIC, AND POTASSIUM PHOSPHATE: .53; .5; .37; .037; .03; .012; .00082 INJECTION, SOLUTION INTRAVENOUS at 08:12

## 2020-12-04 RX ADMIN — SODIUM CHLORIDE 10 ML/HR: 0.9 INJECTION, SOLUTION INTRAVENOUS at 08:12

## 2020-12-04 RX ADMIN — PHENYLEPHRINE HYDROCHLORIDE 100 MCG: 10 INJECTION INTRAVENOUS at 08:12

## 2020-12-04 RX ADMIN — PROPOFOL 50 MG: 10 INJECTION, EMULSION INTRAVENOUS at 08:12

## 2020-12-04 NOTE — ANESTHESIA POSTPROCEDURE EVALUATION
Anesthesia Post Evaluation    Patient: Kevon Perez    Procedure(s) Performed: Procedure(s) (LRB):  COLONOSCOPY (N/A)    Final Anesthesia Type: general    Patient location during evaluation: PACU  Patient participation: Yes- Able to Participate  Level of consciousness: awake and alert and oriented  Post-procedure vital signs: reviewed and stable  Pain management: adequate  Airway patency: patent    PONV status at discharge: No PONV  Anesthetic complications: no      Cardiovascular status: blood pressure returned to baseline and hemodynamically stable  Respiratory status: unassisted, spontaneous ventilation and room air  Hydration status: euvolemic  Follow-up not needed.          Vitals Value Taken Time   /72 12/04/20 0949   Temp 36.7 °C (98.1 °F) 12/04/20 0916   Pulse 52 12/04/20 0947   Resp 28 12/04/20 0930   SpO2 99 % 12/04/20 0947   Vitals shown include unvalidated device data.      No case tracking events are documented in the log.      Pain/Bijal Score: Bijal Score: 9 (12/4/2020  9:20 AM)

## 2020-12-04 NOTE — H&P
Short Stay Endoscopy History and Physical    PCP - Cipriano Sol MD  Referring Physician - Patel Enciso MD  [address] [place] [address]    Procedure - Colonoscopy  ASA - per anesthesia  Mallampati - per anesthesia  History of Anesthesia problems - no  Family history Anesthesia problems -  no   Plan of anesthesia - General    HPI  80 y.o. male  Reason for procedure: previous polyps (2012 one 6 mm polyp), colonoscopy in 2015 was normal.       ROS:  Constitutional: No fevers, chills, No weight loss  CV: No chest pain  Pulm: No cough, No shortness of breath  GI: see HPI    Medical History:  has a past medical history of Acute coronary syndrome (9/10/09), Anticoagulant long-term use, Basal cell cancer, BCC (basal cell carcinoma of skin), Cancer of bladder (January 2013), Cataract, Chronic kidney disease, Colon polyp, Coronary artery disease, CPAP (continuous positive airway pressure) dependence, Diabetes mellitus, Diabetic retinopathy, Encounter for blood transfusion, GERD (gastroesophageal reflux disease), High cholesterol, Hyperlipidemia, Hypertension, Iron deficiency anemia (5/11/2018), GINGER (obstructive sleep apnea), Renal manifestation of secondary diabetes mellitus, and SOB (shortness of breath).    Surgical History:  has a past surgical history that includes Cardiac catheterization; Coronary angioplasty (9/10/09); Excision basal cell carcinoma; Bladder surgery; Cystoscopy; Coronary angioplasty with stent; Cataract extraction; hydrocel; Colonoscopy (3/26/15); and Eye surgery.    Family History: family history includes Alcohol abuse in his brother; Cancer in his maternal aunt; Cataracts in his mother; Diabetes in his father, paternal uncle, and sister; Goiter in his mother; Heart disease in his father and mother; Hypertension in his father; No Known Problems in his daughter, son, and son..    Social History:  reports that he quit smoking about 50 years ago. His smoking use included cigarettes.  He has a 40.00 pack-year smoking history. He has quit using smokeless tobacco. He reports current alcohol use of about 3.0 standard drinks of alcohol per week. He reports that he does not use drugs.    Review of patient's allergies indicates:   Allergen Reactions    Penicillins Other (See Comments)     Muscle stiffness    Bactrim [sulfamethoxazole-trimethoprim] Rash       Medications:   Facility-Administered Medications Prior to Admission   Medication Dose Route Frequency Provider Last Rate Last Dose    cyanocobalamin injection 1,000 mcg  1,000 mcg Intramuscular Q30 Days Cipriano Sol MD   1,000 mcg at 01/07/20 1419    cyanocobalamin injection 100 mcg  100 mcg Intramuscular Q30 Days Cipriano Sol MD   100 mcg at 06/11/19 1547     Medications Prior to Admission   Medication Sig Dispense Refill Last Dose    amLODIPine (NORVASC) 5 MG tablet Take 1 tablet (5 mg total) by mouth once daily. 90 tablet 1 12/4/2020 at 0500    aspirin (ECOTRIN) 81 MG EC tablet Take 81 mg by mouth every evening.    Past Week at Unknown time    atorvastatin (LIPITOR) 20 MG tablet Take 1 tablet (20 mg total) by mouth once daily. 90 tablet 1 12/3/2020 at Unknown time    azelastine (ASTELIN) 137 mcg (0.1 %) nasal spray 2 sprays (274 mcg total) by Nasal route 2 (two) times daily. 30 mL 0 Past Month at Unknown time    calcitRIOL (ROCALTROL) 0.25 MCG Cap TAKE 1 CAPSULE BY MOUTH EVERY DAY 30 capsule 11 12/4/2020 at 0500    carvediloL (COREG) 12.5 MG tablet Take one twice daily 180 tablet 3 12/4/2020 at 0500    finasteride (PROSCAR) 5 mg tablet TAKE 1 TABLET(5 MG) BY MOUTH EVERY DAY 90 tablet 3 12/4/2020 at 0500    fish oil-omega-3 fatty acids 300-1,000 mg capsule Take by mouth once daily.   Past Week at Unknown time    folic acid (FOLVITE) 1 MG tablet Take 1 mg by mouth once daily.    12/3/2020 at Unknown time    hydroCHLOROthiazide (HYDRODIURIL) 12.5 MG Tab TAKE 1 TABLET(12.5 MG) BY MOUTH EVERY DAY 30 tablet 11 12/3/2020 at  "Unknown time    insulin aspart U-100 (NOVOLOG FLEXPEN U-100 INSULIN) 100 unit/mL (3 mL) InPn pen Inject 15 Units into the skin 4 (four) times daily. Before meals plus correction scale. Max TDD 75 6 Box 3 Past Week at Unknown time    irbesartan (AVAPRO) 300 MG tablet Take 1 tablet (300 mg total) by mouth every evening. 90 tablet 3 12/3/2020 at Unknown time    tamsulosin (FLOMAX) 0.4 mg Cap TAKE 1 CAPSULE(0.4 MG) BY MOUTH EVERY EVENING 90 capsule 0 12/3/2020 at Unknown time    TRESIBA FLEXTOUCH U-200 200 unit/mL (3 mL) InPn INJECT 70 UNITS UNDER THE SKIN ONCE DAILY. (Patient taking differently: 30 Units. ) 15 Syringe 11 Past Week at Unknown time    vitamin D (VITAMIN D3) 1000 units Tab Take 1,000 Units by mouth once daily.   12/4/2020 at 0500    BD ULTRA-FINE SHORT PEN NEEDLE 31 gauge x 5/16" Ndle USE UP TO 6 TIMES DAILY 200 each 11     blood-glucose meter kit To check BG 4 times daily, to use with insurance preferred meter 1 each 0     clindamycin (CLEOCIN) 300 MG capsule        clopidogreL (PLAVIX) 75 mg tablet TAKE 1 TABLET BY MOUTH EVERY DAY 90 tablet 1 11/27/2020    HYDROcodone-acetaminophen (NORCO) 7.5-325 mg per tablet TK 1 T PO Q 4 TO 6 H PRN P   More than a month at Unknown time    nitroGLYCERIN (NITROSTAT) 0.4 MG SL tablet Place 1 tablet (0.4 mg total) under the tongue every 5 (five) minutes as needed. 25 tablet 3     peg-electrolyte soln (NULYTELY WITH FLAVOR PACKS) 420 gram SolR TK 4000 ML PO ONCE       TRUE METRIX GLUCOSE TEST STRIP Strp USE TO CHECK BLOOD SUGAR FOUR TIMES DAILY 300 strip 11     TRUEPLUS LANCETS 30 gauge Misc USE TO CHECK BLOOD SUGAR FOUR TIMES DAILY 300 each 3        Physical Exam:    Vital Signs:   Vitals:    12/04/20 0801   BP: (!) 144/66   Pulse:    Resp:    Temp:        General Appearance: Well appearing in no acute distress  Abdomen: Soft, non tender, non distended with normal bowel sounds, no masses    Labs:  Lab Results   Component Value Date    WBC 9.39 06/25/2020 "    HGB 12.2 (L) 06/25/2020    HCT 39.0 (L) 06/25/2020     06/25/2020    CHOL 98 (L) 03/09/2020    TRIG 59 03/09/2020    HDL 37 (L) 03/09/2020    ALT 15 11/17/2020    AST 13 11/17/2020     11/17/2020    K 5.2 (H) 11/17/2020     (H) 11/17/2020    CREATININE 2.2 (H) 11/17/2020    BUN 44 (H) 11/17/2020    CO2 22 (L) 11/17/2020    TSH 3.822 06/25/2020    PSA 1.42 06/13/2013    INR 1.0 01/09/2015    GLUF 111 (H) 02/01/2010    HGBA1C 6.4 (H) 11/17/2020       I have explained the risks and benefits of this endoscopic procedure to the patient including but not limited to bleeding, inflammation, infection, perforation, and death.      Chaparro Cool MD

## 2020-12-04 NOTE — DISCHARGE INSTRUCTIONS
Colonoscopy     A camera attached to a flexible tube with a viewing lens is used to take video pictures.     Colonoscopy is a test to view the inside of your lower digestive tract (colon and rectum). Sometimes it can show the last part of the small intestine (ileum). During the test, small pieces of tissue may be removed for testing. This is called a biopsy. Small growths, such as polyps, may also be removed.   Why is colonoscopy done?  The test is done to help look for colon cancer. And it can help find the source of abdominal pain, bleeding, and changes in bowel habits. It may be needed once a year, depending on factors such as your:  · Age  · Health history  · Family health history  · Symptoms  · Results from any prior colonoscopy  Risks and possible complications  These include:  · Bleeding               · A puncture or tear in the colon   · Risks of anesthesia  · A cancer lesion not being seen  Getting ready   To prepare for the test:  · Talk with your healthcare provider about the risks of the test (see below). Also ask your healthcare provider about alternatives to the test.  · Tell your healthcare provider about any medicines you take. Also tell him or her about any health conditions you may have.  · Make sure your rectum and colon are empty for the test. Follow the diet and bowel prep instructions exactly. If you dont, the test may need to be rescheduled.  · Plan for a friend or family member to drive you home after the test.     Colonoscopy provides an inside view of the entire colon.     You may discuss the results with your doctor right away or at a future visit.  During the test   The test is usually done in the hospital on an outpatient basis. This means you go home the same day. The procedure takes about 30 minutes. During that time:  · You are given relaxing (sedating) medicine through an IV line. You may be drowsy, or fully asleep.  · The healthcare provider will first give you a physical exam to  check for anal and rectal problems.  · Then the anus is lubricated and the scope inserted.  · If you are awake, you may have a feeling similar to needing to have a bowel movement. You may also feel pressure as air is pumped into the colon. Its OK to pass gas during the procedure.  · Biopsy, polyp removal, or other treatments may be done during the test.  After the test   You may have gas right after the test. It can help to try to pass it to help prevent later bloating. Your healthcare provider may discuss the results with you right away. Or you may need to schedule a follow-up visit to talk about the results. After the test, you can go back to your normal eating and other activities. You may be tired from the sedation and need to rest for a few hours.  Date Last Reviewed: 11/1/2016 © 2000-2017 [employer]. [address] [place] [address], [place] [address]. All rights reserved. This information is not intended as a substitute for professional medical care. Always follow your healthcare professional's instructions.

## 2020-12-04 NOTE — TRANSFER OF CARE
"Anesthesia Transfer of Care Note    Patient: Kevon Perez    Procedure(s) Performed: Procedure(s) (LRB):  COLONOSCOPY (N/A)    Patient location: [facility]    Anesthesia Type: general    Transport from OR: Transported from OR on room air with adequate spontaneous ventilation    Post pain: adequate analgesia    Post assessment: no apparent anesthetic complications    Post vital signs: stable    Level of consciousness: awake and alert    Nausea/Vomiting: no nausea/vomiting    Complications: none    Transfer of care protocol was followed      Last vitals:   Visit Vitals  BP (!) 153/55   Pulse (!) 50   Temp 36.7 °C (98.1 °F)   Resp (!) 28   Ht 5' 10" (1.778 m)   Wt 104.3 kg (230 lb)   SpO2 99%   BMI 33.00 kg/m²     "

## 2020-12-04 NOTE — [employer] PATIENT INSTRUCTIONS
Discharge Summary/Instructions after an Endoscopic Procedure  Patient Name: Kevon Perez  Patient MRN: [id number]  Patient YOB: 1940 Friday, December 4, 2020  Keshav Rajan MD  RESTRICTIONS:  During your procedure today, you received medications for sedation.  These   medications may affect your judgment, balance and coordination.  Therefore,   for 24 hours, you have the following restrictions:   - DO NOT drive a car, operate machinery, make legal/financial decisions,   sign important papers or drink alcohol.    ACTIVITY:  Today: no heavy lifting, straining or running due to procedural   sedation/anesthesia.  The following day: return to full activity including work.  DIET:  Eat and drink normally unless instructed otherwise.     TREATMENT FOR COMMON SIDE EFFECTS:  - Mild abdominal pain, nausea, belching, bloating or excessive gas:  rest,   eat lightly and use a heating pad.  - Sore Throat: treat with throat lozenges and/or gargle with warm salt   water.  - Because air was used during the procedure, expelling large amounts of air   from your rectum or belching is normal.  - If a bowel prep was taken, you may not have a bowel movement for 1-3 days.    This is normal.  SYMPTOMS TO WATCH FOR AND REPORT TO YOUR PHYSICIAN:  1. Abdominal pain or bloating, other than gas cramps.  2. Chest pain.  3. Back pain.  4. Signs of infection such as: chills or fever occurring within 24 hours   after the procedure.  5. Rectal bleeding, which would show as bright red, maroon, or black stools.   (A tablespoon of blood from the rectum is not serious, especially if   hemorrhoids are present.)  6. Vomiting.  7. Weakness or dizziness.  GO DIRECTLY TO THE NEAREST EMERGENCY ROOM IF YOU HAVE ANY OF THE FOLLOWING:      Difficulty breathing              Chills and/or fever over 101 F   Persistent vomiting and/or vomiting blood   Severe abdominal pain   Severe chest pain   Black, tarry stools   Bleeding- more than one  tablespoon   Any other symptom or condition that you feel may need urgent attention  Your doctor recommends these additional instructions:  If any biopsies were taken, your doctors clinic will contact you in 1 to 2   weeks with any results.  - Discharge patient to home.   - Patient has a contact number available for emergencies.  The signs and   symptoms of potential delayed complications were discussed with the   patient.  Return to normal activities tomorrow.  Written discharge   instructions were provided to the patient.   - Resume previous diet.   - Resume Plavix (clopidogrel) at prior dose tomorrow.  Refer to managing   physician for further adjustment of therapy.   - Await pathology results.   - Repeat colonoscopy in 3 years for surveillance based on pathology results.     - Return to referring physician as previously scheduled.   - The findings and recommendations were discussed with the patient.  For questions, problems or results please call your physician - Keshav Rajan MD at Work:  [phone].  [facility], EMERGENCY ROOM PHONE NUMBER: [phone]  IF A COMPLICATION OR EMERGENCY SITUATION ARISES AND YOU ARE UNABLE TO REACH   YOUR PHYSICIAN - GO DIRECTLY TO THE EMERGENCY ROOM.  Keshav Rajan MD  12/4/2020 9:17:35 AM  This report has been verified and signed electronically.  [employer]

## 2020-12-11 ENCOUNTER — PATIENT MESSAGE (OUTPATIENT)
Dept: OTHER | Facility: OTHER | Age: 80
End: 2020-12-11

## 2020-12-11 ENCOUNTER — PATIENT MESSAGE (OUTPATIENT)
Dept: GASTROENTEROLOGY | Facility: CLINIC | Age: 80
End: 2020-12-11

## 2020-12-11 LAB
FINAL PATHOLOGIC DIAGNOSIS: NORMAL
GROSS: NORMAL
Lab: NORMAL

## 2020-12-20 NOTE — PROGRESS NOTES
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  Hyperlipidemia.  Coronary artery disease with previous STEMI and angioplasty in 2009.  Obstructive sleep apnea with use of APAP  Anemia of chronic disease.  History of iron deficiency anemia  B12 deficiency.  BPH.  Gastroesophageal reflux disease  Bladder tumor, status post resection     SOCIAL HISTORY:  Tobacco use, none.  Alcohol use, maybe a glass of wine twice a week.  Exercise has been limited.       FAMILY HISTORY:  Father is , diabetes and heart disease.  Mother is , cardiovascular disease.  Two sisters alive, one with diabetes.  One brother is alive with diabetes.  One brother is .     SCREENING:  Colonoscopy 2019, 8 polyps, adenomatous       MEDICATIONS:  Amlodipine 5 mg  Ecotrin 81 mg.  Atorvastatin 20 mg.  Calcitriol 0.25 mcg.  Plavix 75 mg.  Finasteride 5 mg.  Folic acid 1 mg.  NovoLog  12-20 with each meal.  Tresiba 30 units.  Carvedilol 12.5 mg twice a day..  Tamsulosin 0.4 mg a day.  avapro 320mg a day.  Hydrochlorothiazide 12.5 mg a day          80-year-old male  Annual visit    Has chronic dyspnea on exertion.  Was seen by cardiology in August.  Underwent a 6 min walk test in which there was no desaturation.  Breathing at rest is fine  Continues to use CPAP at night which has been helpful    Recent colonoscopy 2020 revealed a polyps which was adenomatous was told to repeat in 3 years time    Recently evaluated by Endocrinology.  Hemoglobin A1c 6.4 and remains on the current insulin regimen    Should be having upcoming appointment with nephrology    Last visit with Urology was 2019 which cystoscope was negative and prior cystoscope in  and  was normal    Review of symptoms  Negative for chest pain, palpitations, abdominal pain, indigestion or heartburn  Regular bowel function  No difficulty urinating but has urgency  Will have  some arthralgias a weakness involving the knees and buttocks  No chronic headaches      Examination  Weight 242 lb  Pulse 56  Blood pressure 160/62  HEENT exam no abnormal findings  Neck no thyromegaly no masses  Chest clear breath sounds  Heart regular rate and rhythm with 2/6 systolic murmur from the base radiates to carotids in apex  Abdominal exam bowel sounds soft nontender no hepatosplenomegaly abdominal masses  Pulses 2+ carotid pulses no bruits 1+ dorsalis pedis pulses  Extremities trace edema  Musculoskeletal bilateral knee crepitation, no pain with abduction at the hip joint  Rectal stool is brown prostate feels normal no nodules    Impression  General exam  Type 2 diabetes with stage 4 chronic kidney disease and peripheral neuropathy   Hypertension  Hyperlipidemia  Coronary artery disease  Obstructive sleep apnea uses CPAP  Chronic dyspnea on exertion  Cardiac/aortic murmur  History of B12 and iron deficiency  BPH  History of bladder tumor  History of adenomatous colon polyp    Plan  CBC chemistry hemoglobin A1c lipid TSH  2D echo with Doppler  Order for Tdap

## 2020-12-21 ENCOUNTER — LAB VISIT (OUTPATIENT)
Dept: LAB | Facility: HOSPITAL | Age: 80
End: 2020-12-21
Attending: INTERNAL MEDICINE
Payer: MEDICARE

## 2020-12-21 ENCOUNTER — TELEPHONE (OUTPATIENT)
Dept: INTERNAL MEDICINE | Facility: CLINIC | Age: 80
End: 2020-12-21

## 2020-12-21 ENCOUNTER — OFFICE VISIT (OUTPATIENT)
Dept: INTERNAL MEDICINE | Facility: CLINIC | Age: 80
End: 2020-12-21
Payer: MEDICARE

## 2020-12-21 VITALS
HEART RATE: 52 BPM | BODY MASS INDEX: 34.72 KG/M2 | DIASTOLIC BLOOD PRESSURE: 60 MMHG | SYSTOLIC BLOOD PRESSURE: 130 MMHG | OXYGEN SATURATION: 100 % | WEIGHT: 242.5 LBS | HEIGHT: 70 IN

## 2020-12-21 DIAGNOSIS — R06.09 DOE (DYSPNEA ON EXERTION): ICD-10-CM

## 2020-12-21 DIAGNOSIS — N18.4 ANEMIA DUE TO STAGE 4 CHRONIC KIDNEY DISEASE: ICD-10-CM

## 2020-12-21 DIAGNOSIS — I25.10 CORONARY ARTERY DISEASE INVOLVING NATIVE CORONARY ARTERY OF NATIVE HEART WITHOUT ANGINA PECTORIS: ICD-10-CM

## 2020-12-21 DIAGNOSIS — N40.0 BENIGN PROSTATIC HYPERPLASIA WITHOUT LOWER URINARY TRACT SYMPTOMS: ICD-10-CM

## 2020-12-21 DIAGNOSIS — E11.69 HYPERLIPIDEMIA ASSOCIATED WITH TYPE 2 DIABETES MELLITUS: ICD-10-CM

## 2020-12-21 DIAGNOSIS — I35.8 AORTIC HEART MURMUR: ICD-10-CM

## 2020-12-21 DIAGNOSIS — Z00.00 ANNUAL PHYSICAL EXAM: ICD-10-CM

## 2020-12-21 DIAGNOSIS — I10 ESSENTIAL HYPERTENSION: ICD-10-CM

## 2020-12-21 DIAGNOSIS — E78.5 HYPERLIPIDEMIA ASSOCIATED WITH TYPE 2 DIABETES MELLITUS: ICD-10-CM

## 2020-12-21 DIAGNOSIS — D63.1 ANEMIA DUE TO STAGE 4 CHRONIC KIDNEY DISEASE: ICD-10-CM

## 2020-12-21 DIAGNOSIS — N18.4 TYPE 2 DIABETES MELLITUS WITH STAGE 4 CHRONIC KIDNEY DISEASE, WITH LONG-TERM CURRENT USE OF INSULIN: ICD-10-CM

## 2020-12-21 DIAGNOSIS — E11.22 TYPE 2 DIABETES MELLITUS WITH STAGE 4 CHRONIC KIDNEY DISEASE, WITH LONG-TERM CURRENT USE OF INSULIN: ICD-10-CM

## 2020-12-21 DIAGNOSIS — G47.33 OSA ON CPAP: ICD-10-CM

## 2020-12-21 DIAGNOSIS — Z86.010 HISTORY OF ADENOMATOUS POLYP OF COLON: ICD-10-CM

## 2020-12-21 DIAGNOSIS — Z79.4 TYPE 2 DIABETES MELLITUS WITH STAGE 4 CHRONIC KIDNEY DISEASE, WITH LONG-TERM CURRENT USE OF INSULIN: ICD-10-CM

## 2020-12-21 DIAGNOSIS — Z00.00 ANNUAL PHYSICAL EXAM: Primary | ICD-10-CM

## 2020-12-21 LAB
ALBUMIN SERPL BCP-MCNC: 3.4 G/DL (ref 3.5–5.2)
ANION GAP SERPL CALC-SCNC: 5 MMOL/L (ref 8–16)
BASOPHILS # BLD AUTO: 0.07 K/UL (ref 0–0.2)
BASOPHILS NFR BLD: 1.1 % (ref 0–1.9)
BUN SERPL-MCNC: 37 MG/DL (ref 8–23)
CALCIUM SERPL-MCNC: 10 MG/DL (ref 8.7–10.5)
CHLORIDE SERPL-SCNC: 113 MMOL/L (ref 95–110)
CHOLEST SERPL-MCNC: 97 MG/DL (ref 120–199)
CHOLEST/HDLC SERPL: 3 {RATIO} (ref 2–5)
CO2 SERPL-SCNC: 26 MMOL/L (ref 23–29)
CREAT SERPL-MCNC: 2.1 MG/DL (ref 0.5–1.4)
DIFFERENTIAL METHOD: ABNORMAL
EOSINOPHIL # BLD AUTO: 0.3 K/UL (ref 0–0.5)
EOSINOPHIL NFR BLD: 4.9 % (ref 0–8)
ERYTHROCYTE [DISTWIDTH] IN BLOOD BY AUTOMATED COUNT: 13.7 % (ref 11.5–14.5)
EST. GFR  (AFRICAN AMERICAN): 33.1 ML/MIN/1.73 M^2
EST. GFR  (NON AFRICAN AMERICAN): 28.7 ML/MIN/1.73 M^2
ESTIMATED AVG GLUCOSE: 140 MG/DL (ref 68–131)
FERRITIN SERPL-MCNC: 4 NG/ML (ref 20–300)
GLUCOSE SERPL-MCNC: 143 MG/DL (ref 70–110)
HBA1C MFR BLD HPLC: 6.5 % (ref 4–5.6)
HCT VFR BLD AUTO: 33.4 % (ref 40–54)
HDLC SERPL-MCNC: 32 MG/DL (ref 40–75)
HDLC SERPL: 33 % (ref 20–50)
HGB BLD-MCNC: 9.9 G/DL (ref 14–18)
IMM GRANULOCYTES # BLD AUTO: 0.01 K/UL (ref 0–0.04)
IMM GRANULOCYTES NFR BLD AUTO: 0.2 % (ref 0–0.5)
IRON SERPL-MCNC: 30 UG/DL (ref 45–160)
LDLC SERPL CALC-MCNC: 50 MG/DL (ref 63–159)
LYMPHOCYTES # BLD AUTO: 1.3 K/UL (ref 1–4.8)
LYMPHOCYTES NFR BLD: 20.6 % (ref 18–48)
MCH RBC QN AUTO: 24.7 PG (ref 27–31)
MCHC RBC AUTO-ENTMCNC: 29.6 G/DL (ref 32–36)
MCV RBC AUTO: 83 FL (ref 82–98)
MONOCYTES # BLD AUTO: 0.5 K/UL (ref 0.3–1)
MONOCYTES NFR BLD: 7.4 % (ref 4–15)
NEUTROPHILS # BLD AUTO: 4.2 K/UL (ref 1.8–7.7)
NEUTROPHILS NFR BLD: 65.8 % (ref 38–73)
NONHDLC SERPL-MCNC: 65 MG/DL
NRBC BLD-RTO: 0 /100 WBC
PHOSPHATE SERPL-MCNC: 3.7 MG/DL (ref 2.7–4.5)
PLATELET # BLD AUTO: 167 K/UL (ref 150–350)
PMV BLD AUTO: 11.7 FL (ref 9.2–12.9)
POTASSIUM SERPL-SCNC: 5.3 MMOL/L (ref 3.5–5.1)
RBC # BLD AUTO: 4.01 M/UL (ref 4.6–6.2)
SATURATED IRON: 7 % (ref 20–50)
SODIUM SERPL-SCNC: 144 MMOL/L (ref 136–145)
TOTAL IRON BINDING CAPACITY: 454 UG/DL (ref 250–450)
TRANSFERRIN SERPL-MCNC: 307 MG/DL (ref 200–375)
TRIGL SERPL-MCNC: 75 MG/DL (ref 30–150)
VIT B12 SERPL-MCNC: 258 PG/ML (ref 210–950)
WBC # BLD AUTO: 6.32 K/UL (ref 3.9–12.7)

## 2020-12-21 PROCEDURE — 80069 RENAL FUNCTION PANEL: CPT | Mod: HCNC

## 2020-12-21 PROCEDURE — 85025 COMPLETE CBC W/AUTO DIFF WBC: CPT | Mod: HCNC

## 2020-12-21 PROCEDURE — 83036 HEMOGLOBIN GLYCOSYLATED A1C: CPT | Mod: HCNC

## 2020-12-21 PROCEDURE — 82728 ASSAY OF FERRITIN: CPT | Mod: HCNC

## 2020-12-21 PROCEDURE — 1126F PR PAIN SEVERITY QUANTIFIED, NO PAIN PRESENT: ICD-10-PCS | Mod: HCNC,S$GLB,, | Performed by: INTERNAL MEDICINE

## 2020-12-21 PROCEDURE — 96372 THER/PROPH/DIAG INJ SC/IM: CPT | Mod: HCNC,S$GLB,, | Performed by: INTERNAL MEDICINE

## 2020-12-21 PROCEDURE — 1126F AMNT PAIN NOTED NONE PRSNT: CPT | Mod: HCNC,S$GLB,, | Performed by: INTERNAL MEDICINE

## 2020-12-21 PROCEDURE — 99999 PR PBB SHADOW E&M-EST. PATIENT-LVL V: CPT | Mod: PBBFAC,HCNC,, | Performed by: INTERNAL MEDICINE

## 2020-12-21 PROCEDURE — 3078F PR MOST RECENT DIASTOLIC BLOOD PRESSURE < 80 MM HG: ICD-10-PCS | Mod: HCNC,CPTII,S$GLB, | Performed by: INTERNAL MEDICINE

## 2020-12-21 PROCEDURE — 3075F PR MOST RECENT SYSTOLIC BLOOD PRESS GE 130-139MM HG: ICD-10-PCS | Mod: HCNC,CPTII,S$GLB, | Performed by: INTERNAL MEDICINE

## 2020-12-21 PROCEDURE — 96372 PR INJECTION,THERAP/PROPH/DIAG2ST, IM OR SUBCUT: ICD-10-PCS | Mod: HCNC,S$GLB,, | Performed by: INTERNAL MEDICINE

## 2020-12-21 PROCEDURE — 3288F PR FALLS RISK ASSESSMENT DOCUMENTED: ICD-10-PCS | Mod: HCNC,CPTII,S$GLB, | Performed by: INTERNAL MEDICINE

## 2020-12-21 PROCEDURE — 83540 ASSAY OF IRON: CPT | Mod: HCNC

## 2020-12-21 PROCEDURE — 99397 PR PREVENTIVE VISIT,EST,65 & OVER: ICD-10-PCS | Mod: HCNC,25,S$GLB, | Performed by: INTERNAL MEDICINE

## 2020-12-21 PROCEDURE — 80061 LIPID PANEL: CPT | Mod: HCNC

## 2020-12-21 PROCEDURE — 1101F PR PT FALLS ASSESS DOC 0-1 FALLS W/OUT INJ PAST YR: ICD-10-PCS | Mod: HCNC,CPTII,S$GLB, | Performed by: INTERNAL MEDICINE

## 2020-12-21 PROCEDURE — 82607 VITAMIN B-12: CPT | Mod: GA,HCNC

## 2020-12-21 PROCEDURE — 99999 PR PBB SHADOW E&M-EST. PATIENT-LVL V: ICD-10-PCS | Mod: PBBFAC,HCNC,, | Performed by: INTERNAL MEDICINE

## 2020-12-21 PROCEDURE — 99397 PER PM REEVAL EST PAT 65+ YR: CPT | Mod: HCNC,25,S$GLB, | Performed by: INTERNAL MEDICINE

## 2020-12-21 PROCEDURE — 3078F DIAST BP <80 MM HG: CPT | Mod: HCNC,CPTII,S$GLB, | Performed by: INTERNAL MEDICINE

## 2020-12-21 PROCEDURE — 1101F PT FALLS ASSESS-DOCD LE1/YR: CPT | Mod: HCNC,CPTII,S$GLB, | Performed by: INTERNAL MEDICINE

## 2020-12-21 PROCEDURE — 3075F SYST BP GE 130 - 139MM HG: CPT | Mod: HCNC,CPTII,S$GLB, | Performed by: INTERNAL MEDICINE

## 2020-12-21 PROCEDURE — 3288F FALL RISK ASSESSMENT DOCD: CPT | Mod: HCNC,CPTII,S$GLB, | Performed by: INTERNAL MEDICINE

## 2020-12-21 PROCEDURE — 36415 COLL VENOUS BLD VENIPUNCTURE: CPT | Mod: HCNC

## 2020-12-21 RX ADMIN — CYANOCOBALAMIN 100 MCG: 1000 INJECTION, SOLUTION INTRAMUSCULAR at 10:12

## 2020-12-21 NOTE — TELEPHONE ENCOUNTER
----- Message from Catina Rivera sent at 12/21/2020 10:47 AM CST -----  Regarding: b12  Please schedule a pt for nurse visit for a B12 injection in 1 month.    Thank you

## 2020-12-22 ENCOUNTER — PATIENT MESSAGE (OUTPATIENT)
Dept: INTERNAL MEDICINE | Facility: CLINIC | Age: 80
End: 2020-12-22

## 2020-12-22 DIAGNOSIS — D50.9 IRON DEFICIENCY ANEMIA, UNSPECIFIED IRON DEFICIENCY ANEMIA TYPE: Primary | ICD-10-CM

## 2020-12-22 NOTE — PROGRESS NOTES
Main finding on the test results that the degree of anemia as a bit more compared to previous readings and that there is definite iron deficiency    I am not certain if you would taking the iron supplement Fergon or Feosol, but would recommend that you take one of the other, twice a day.  Will contact you regarding this in would recommend to make a follow-up appointment with gastroenterology to determine if there is any source of bleeding, although you you had a colonoscopy in 2019    Will be contacting you regarding this

## 2020-12-22 NOTE — PROGRESS NOTES
results was discussed with the patient     previously he was on iron supplementation but this cause problems with constipation, he was taking Fergon     recommend start feosol  Once a day to twice a day and will set up appointment with gastroenterology determine if any further interventions needed     recently had a previous colonoscopy December 2020 and had an upper endoscopy 2015

## 2021-01-01 ENCOUNTER — PATIENT MESSAGE (OUTPATIENT)
Dept: ENDOCRINOLOGY | Facility: CLINIC | Age: 81
End: 2021-01-01
Payer: MEDICARE

## 2021-01-01 ENCOUNTER — PES CALL (OUTPATIENT)
Dept: ADMINISTRATIVE | Facility: CLINIC | Age: 81
End: 2021-01-01

## 2021-01-01 ENCOUNTER — PATIENT OUTREACH (OUTPATIENT)
Dept: ADMINISTRATIVE | Facility: OTHER | Age: 81
End: 2021-01-01
Payer: MEDICARE

## 2021-01-01 ENCOUNTER — PATIENT MESSAGE (OUTPATIENT)
Dept: INTERNAL MEDICINE | Facility: CLINIC | Age: 81
End: 2021-01-01

## 2021-01-01 ENCOUNTER — LAB VISIT (OUTPATIENT)
Dept: LAB | Facility: HOSPITAL | Age: 81
End: 2021-01-01
Attending: INTERNAL MEDICINE
Payer: MEDICARE

## 2021-01-01 ENCOUNTER — PATIENT MESSAGE (OUTPATIENT)
Dept: OPHTHALMOLOGY | Facility: CLINIC | Age: 81
End: 2021-01-01

## 2021-01-01 ENCOUNTER — OFFICE VISIT (OUTPATIENT)
Dept: NEPHROLOGY | Facility: CLINIC | Age: 81
End: 2021-01-01
Payer: MEDICARE

## 2021-01-01 ENCOUNTER — OFFICE VISIT (OUTPATIENT)
Dept: OPHTHALMOLOGY | Facility: CLINIC | Age: 81
End: 2021-01-01
Payer: MEDICARE

## 2021-01-01 ENCOUNTER — OFFICE VISIT (OUTPATIENT)
Dept: INTERNAL MEDICINE | Facility: CLINIC | Age: 81
End: 2021-01-01
Payer: MEDICARE

## 2021-01-01 ENCOUNTER — IMMUNIZATION (OUTPATIENT)
Dept: INTERNAL MEDICINE | Facility: CLINIC | Age: 81
End: 2021-01-01
Payer: MEDICARE

## 2021-01-01 ENCOUNTER — LAB VISIT (OUTPATIENT)
Dept: LAB | Facility: HOSPITAL | Age: 81
End: 2021-01-01
Attending: NURSE PRACTITIONER
Payer: MEDICARE

## 2021-01-01 ENCOUNTER — PATIENT MESSAGE (OUTPATIENT)
Dept: CARDIOLOGY | Facility: CLINIC | Age: 81
End: 2021-01-01
Payer: MEDICARE

## 2021-01-01 ENCOUNTER — PATIENT OUTREACH (OUTPATIENT)
Dept: ADMINISTRATIVE | Facility: OTHER | Age: 81
End: 2021-01-01

## 2021-01-01 ENCOUNTER — PATIENT MESSAGE (OUTPATIENT)
Dept: NEPHROLOGY | Facility: CLINIC | Age: 81
End: 2021-01-01

## 2021-01-01 ENCOUNTER — PATIENT MESSAGE (OUTPATIENT)
Dept: ENDOCRINOLOGY | Facility: CLINIC | Age: 81
End: 2021-01-01

## 2021-01-01 ENCOUNTER — CLINICAL SUPPORT (OUTPATIENT)
Dept: INTERNAL MEDICINE | Facility: CLINIC | Age: 81
End: 2021-01-01
Payer: MEDICARE

## 2021-01-01 ENCOUNTER — PROCEDURE VISIT (OUTPATIENT)
Dept: UROLOGY | Facility: CLINIC | Age: 81
End: 2021-01-01
Payer: MEDICARE

## 2021-01-01 VITALS
DIASTOLIC BLOOD PRESSURE: 67 MMHG | SYSTOLIC BLOOD PRESSURE: 158 MMHG | TEMPERATURE: 98 F | HEART RATE: 69 BPM | BODY MASS INDEX: 33.25 KG/M2 | HEIGHT: 71 IN | RESPIRATION RATE: 16 BRPM | WEIGHT: 237.5 LBS

## 2021-01-01 VITALS
SYSTOLIC BLOOD PRESSURE: 134 MMHG | OXYGEN SATURATION: 98 % | HEIGHT: 71 IN | WEIGHT: 238 LBS | BODY MASS INDEX: 33.32 KG/M2 | HEART RATE: 80 BPM | DIASTOLIC BLOOD PRESSURE: 88 MMHG

## 2021-01-01 VITALS
WEIGHT: 225.31 LBS | DIASTOLIC BLOOD PRESSURE: 60 MMHG | HEIGHT: 70 IN | SYSTOLIC BLOOD PRESSURE: 140 MMHG | BODY MASS INDEX: 32.26 KG/M2

## 2021-01-01 VITALS
HEIGHT: 70 IN | DIASTOLIC BLOOD PRESSURE: 82 MMHG | BODY MASS INDEX: 33.07 KG/M2 | HEART RATE: 68 BPM | OXYGEN SATURATION: 99 % | WEIGHT: 231 LBS | SYSTOLIC BLOOD PRESSURE: 130 MMHG

## 2021-01-01 DIAGNOSIS — N18.4 TYPE 2 DIABETES MELLITUS WITH STAGE 4 CHRONIC KIDNEY DISEASE, WITH LONG-TERM CURRENT USE OF INSULIN: Primary | ICD-10-CM

## 2021-01-01 DIAGNOSIS — H35.033 HYPERTENSIVE RETINOPATHY OF BOTH EYES: ICD-10-CM

## 2021-01-01 DIAGNOSIS — Z79.4 TYPE 2 DIABETES MELLITUS WITH DIABETIC POLYNEUROPATHY, WITH LONG-TERM CURRENT USE OF INSULIN: ICD-10-CM

## 2021-01-01 DIAGNOSIS — I10 ESSENTIAL HYPERTENSION: ICD-10-CM

## 2021-01-01 DIAGNOSIS — E11.22 TYPE 2 DIABETES MELLITUS WITH STAGE 4 CHRONIC KIDNEY DISEASE, WITH LONG-TERM CURRENT USE OF INSULIN: ICD-10-CM

## 2021-01-01 DIAGNOSIS — E11.42 DIABETIC POLYNEUROPATHY ASSOCIATED WITH TYPE 2 DIABETES MELLITUS: ICD-10-CM

## 2021-01-01 DIAGNOSIS — R19.7 DIARRHEA, UNSPECIFIED TYPE: ICD-10-CM

## 2021-01-01 DIAGNOSIS — I10 PRIMARY HYPERTENSION: Primary | ICD-10-CM

## 2021-01-01 DIAGNOSIS — G47.33 OSA ON CPAP: ICD-10-CM

## 2021-01-01 DIAGNOSIS — E11.69 HYPERLIPIDEMIA ASSOCIATED WITH TYPE 2 DIABETES MELLITUS: ICD-10-CM

## 2021-01-01 DIAGNOSIS — Z79.4 TYPE 2 DIABETES MELLITUS WITH STAGE 4 CHRONIC KIDNEY DISEASE, WITH LONG-TERM CURRENT USE OF INSULIN: ICD-10-CM

## 2021-01-01 DIAGNOSIS — E11.3213 CONTROLLED TYPE 2 DIABETES MELLITUS WITH BOTH EYES AFFECTED BY MILD NONPROLIFERATIVE RETINOPATHY AND MACULAR EDEMA, WITH LONG-TERM CURRENT USE OF INSULIN: Primary | ICD-10-CM

## 2021-01-01 DIAGNOSIS — Z79.4 CONTROLLED TYPE 2 DIABETES MELLITUS WITH STAGE 4 CHRONIC KIDNEY DISEASE, WITH LONG-TERM CURRENT USE OF INSULIN: ICD-10-CM

## 2021-01-01 DIAGNOSIS — R79.89 LOW VITAMIN B12 LEVEL: ICD-10-CM

## 2021-01-01 DIAGNOSIS — E11.42 TYPE 2 DIABETES MELLITUS WITH DIABETIC POLYNEUROPATHY, WITH LONG-TERM CURRENT USE OF INSULIN: ICD-10-CM

## 2021-01-01 DIAGNOSIS — Z79.4 CONTROLLED TYPE 2 DIABETES MELLITUS WITH BOTH EYES AFFECTED BY MILD NONPROLIFERATIVE RETINOPATHY AND MACULAR EDEMA, WITH LONG-TERM CURRENT USE OF INSULIN: Primary | ICD-10-CM

## 2021-01-01 DIAGNOSIS — J32.9 SINUSITIS, UNSPECIFIED CHRONICITY, UNSPECIFIED LOCATION: ICD-10-CM

## 2021-01-01 DIAGNOSIS — E78.5 HYPERLIPIDEMIA ASSOCIATED WITH TYPE 2 DIABETES MELLITUS: ICD-10-CM

## 2021-01-01 DIAGNOSIS — I35.0 NONRHEUMATIC AORTIC VALVE STENOSIS: ICD-10-CM

## 2021-01-01 DIAGNOSIS — D63.1 ANEMIA OF CHRONIC RENAL FAILURE, STAGE 4 (SEVERE): ICD-10-CM

## 2021-01-01 DIAGNOSIS — Z79.4 TYPE 2 DIABETES MELLITUS WITH DIABETIC POLYNEUROPATHY, WITH LONG-TERM CURRENT USE OF INSULIN: Primary | ICD-10-CM

## 2021-01-01 DIAGNOSIS — I25.2 HISTORY OF MI (MYOCARDIAL INFARCTION): ICD-10-CM

## 2021-01-01 DIAGNOSIS — Z79.4 CONTROLLED TYPE 2 DIABETES MELLITUS WITH STAGE 4 CHRONIC KIDNEY DISEASE, WITH LONG-TERM CURRENT USE OF INSULIN: Primary | ICD-10-CM

## 2021-01-01 DIAGNOSIS — N18.4 ANEMIA OF CHRONIC RENAL FAILURE, STAGE 4 (SEVERE): ICD-10-CM

## 2021-01-01 DIAGNOSIS — N18.4 CONTROLLED TYPE 2 DIABETES MELLITUS WITH STAGE 4 CHRONIC KIDNEY DISEASE, WITH LONG-TERM CURRENT USE OF INSULIN: Primary | ICD-10-CM

## 2021-01-01 DIAGNOSIS — I12.9 HYPERTENSIVE KIDNEY DISEASE WITH CHRONIC KIDNEY DISEASE, STAGE 1-4 OR UNSPECIFIED CHRONIC KIDNEY DISEASE: ICD-10-CM

## 2021-01-01 DIAGNOSIS — N18.4 CONTROLLED TYPE 2 DIABETES MELLITUS WITH STAGE 4 CHRONIC KIDNEY DISEASE, WITH LONG-TERM CURRENT USE OF INSULIN: ICD-10-CM

## 2021-01-01 DIAGNOSIS — N18.4 TYPE 2 DIABETES MELLITUS WITH STAGE 4 CHRONIC KIDNEY DISEASE, WITH LONG-TERM CURRENT USE OF INSULIN: ICD-10-CM

## 2021-01-01 DIAGNOSIS — J31.0 CHRONIC RHINITIS: ICD-10-CM

## 2021-01-01 DIAGNOSIS — Z00.00 ANNUAL PHYSICAL EXAM: Primary | ICD-10-CM

## 2021-01-01 DIAGNOSIS — E11.22 CONTROLLED TYPE 2 DIABETES MELLITUS WITH STAGE 4 CHRONIC KIDNEY DISEASE, WITH LONG-TERM CURRENT USE OF INSULIN: Primary | ICD-10-CM

## 2021-01-01 DIAGNOSIS — D50.9 IRON DEFICIENCY ANEMIA, UNSPECIFIED IRON DEFICIENCY ANEMIA TYPE: ICD-10-CM

## 2021-01-01 DIAGNOSIS — E11.22 TYPE 2 DIABETES MELLITUS WITH STAGE 4 CHRONIC KIDNEY DISEASE, WITH LONG-TERM CURRENT USE OF INSULIN: Primary | ICD-10-CM

## 2021-01-01 DIAGNOSIS — E87.5 HYPERKALEMIA: Primary | ICD-10-CM

## 2021-01-01 DIAGNOSIS — I25.10 CORONARY ARTERY DISEASE INVOLVING NATIVE CORONARY ARTERY OF NATIVE HEART WITHOUT ANGINA PECTORIS: ICD-10-CM

## 2021-01-01 DIAGNOSIS — I25.10 CORONARY ARTERY DISEASE INVOLVING NATIVE CORONARY ARTERY WITHOUT ANGINA PECTORIS, UNSPECIFIED WHETHER NATIVE OR TRANSPLANTED HEART: ICD-10-CM

## 2021-01-01 DIAGNOSIS — Z85.51 PERSONAL HISTORY OF BLADDER CANCER: Primary | ICD-10-CM

## 2021-01-01 DIAGNOSIS — Z00.00 ANNUAL PHYSICAL EXAM: ICD-10-CM

## 2021-01-01 DIAGNOSIS — E11.42 TYPE 2 DIABETES MELLITUS WITH DIABETIC POLYNEUROPATHY, WITH LONG-TERM CURRENT USE OF INSULIN: Primary | ICD-10-CM

## 2021-01-01 DIAGNOSIS — E87.5 HYPERKALEMIA: ICD-10-CM

## 2021-01-01 DIAGNOSIS — I25.10 CORONARY ARTERY DISEASE INVOLVING NATIVE CORONARY ARTERY OF NATIVE HEART WITHOUT ANGINA PECTORIS: Chronic | ICD-10-CM

## 2021-01-01 DIAGNOSIS — E11.22 CONTROLLED TYPE 2 DIABETES MELLITUS WITH STAGE 4 CHRONIC KIDNEY DISEASE, WITH LONG-TERM CURRENT USE OF INSULIN: ICD-10-CM

## 2021-01-01 DIAGNOSIS — N40.0 BENIGN PROSTATIC HYPERPLASIA, UNSPECIFIED WHETHER LOWER URINARY TRACT SYMPTOMS PRESENT: Chronic | ICD-10-CM

## 2021-01-01 DIAGNOSIS — Z23 NEED FOR VACCINATION: Primary | ICD-10-CM

## 2021-01-01 DIAGNOSIS — Z79.4 TYPE 2 DIABETES MELLITUS WITH STAGE 4 CHRONIC KIDNEY DISEASE, WITH LONG-TERM CURRENT USE OF INSULIN: Primary | ICD-10-CM

## 2021-01-01 LAB
ALBUMIN SERPL BCP-MCNC: 3.5 G/DL (ref 3.5–5.2)
ALBUMIN SERPL BCP-MCNC: 3.5 G/DL (ref 3.5–5.2)
ALBUMIN SERPL BCP-MCNC: 3.6 G/DL (ref 3.5–5.2)
ALP SERPL-CCNC: 35 U/L (ref 55–135)
ALP SERPL-CCNC: 40 U/L (ref 55–135)
ALT SERPL W/O P-5'-P-CCNC: 16 U/L (ref 10–44)
ALT SERPL W/O P-5'-P-CCNC: 17 U/L (ref 10–44)
ANION GAP SERPL CALC-SCNC: 12 MMOL/L (ref 8–16)
ANION GAP SERPL CALC-SCNC: 6 MMOL/L (ref 8–16)
ANION GAP SERPL CALC-SCNC: 7 MMOL/L (ref 8–16)
ANION GAP SERPL CALC-SCNC: 9 MMOL/L (ref 8–16)
AST SERPL-CCNC: 14 U/L (ref 10–40)
AST SERPL-CCNC: 15 U/L (ref 10–40)
BACTERIA #/AREA URNS AUTO: ABNORMAL /HPF
BASOPHILS # BLD AUTO: 0.06 K/UL (ref 0–0.2)
BASOPHILS # BLD AUTO: 0.06 K/UL (ref 0–0.2)
BASOPHILS NFR BLD: 0.9 % (ref 0–1.9)
BASOPHILS NFR BLD: 0.9 % (ref 0–1.9)
BILIRUB SERPL-MCNC: 0.9 MG/DL (ref 0.1–1)
BILIRUB SERPL-MCNC: 1.3 MG/DL (ref 0.1–1)
BILIRUB UR QL STRIP: NEGATIVE
BNP SERPL-MCNC: 166 PG/ML (ref 0–99)
BUN SERPL-MCNC: 30 MG/DL (ref 8–23)
BUN SERPL-MCNC: 32 MG/DL (ref 8–23)
BUN SERPL-MCNC: 34 MG/DL (ref 8–23)
BUN SERPL-MCNC: 38 MG/DL (ref 8–23)
C DIFF GDH STL QL: NEGATIVE
C DIFF TOX A+B STL QL IA: NEGATIVE
CALCIUM SERPL-MCNC: 9.4 MG/DL (ref 8.7–10.5)
CALCIUM SERPL-MCNC: 9.5 MG/DL (ref 8.7–10.5)
CALCIUM SERPL-MCNC: 9.6 MG/DL (ref 8.7–10.5)
CALCIUM SERPL-MCNC: 9.7 MG/DL (ref 8.7–10.5)
CHLORIDE SERPL-SCNC: 110 MMOL/L (ref 95–110)
CHLORIDE SERPL-SCNC: 111 MMOL/L (ref 95–110)
CHLORIDE SERPL-SCNC: 113 MMOL/L (ref 95–110)
CHLORIDE SERPL-SCNC: 116 MMOL/L (ref 95–110)
CHOLEST SERPL-MCNC: 90 MG/DL (ref 120–199)
CHOLEST SERPL-MCNC: 94 MG/DL (ref 120–199)
CHOLEST SERPL-MCNC: 98 MG/DL (ref 120–199)
CHOLEST/HDLC SERPL: 2.6 {RATIO} (ref 2–5)
CHOLEST/HDLC SERPL: 2.7 {RATIO} (ref 2–5)
CHOLEST/HDLC SERPL: 2.8 {RATIO} (ref 2–5)
CLARITY UR REFRACT.AUTO: CLEAR
CO2 SERPL-SCNC: 15 MMOL/L (ref 23–29)
CO2 SERPL-SCNC: 19 MMOL/L (ref 23–29)
CO2 SERPL-SCNC: 21 MMOL/L (ref 23–29)
CO2 SERPL-SCNC: 23 MMOL/L (ref 23–29)
COLOR UR AUTO: YELLOW
CREAT SERPL-MCNC: 1.6 MG/DL (ref 0.5–1.4)
CREAT SERPL-MCNC: 1.7 MG/DL (ref 0.5–1.4)
CREAT SERPL-MCNC: 1.9 MG/DL (ref 0.5–1.4)
CREAT SERPL-MCNC: 2 MG/DL (ref 0.5–1.4)
CREAT UR-MCNC: 121 MG/DL (ref 23–375)
DIFFERENTIAL METHOD: ABNORMAL
DIFFERENTIAL METHOD: ABNORMAL
ELASTASE 1, FECAL: 436 MCG/G
EOSINOPHIL # BLD AUTO: 0.3 K/UL (ref 0–0.5)
EOSINOPHIL # BLD AUTO: 0.4 K/UL (ref 0–0.5)
EOSINOPHIL NFR BLD: 4.8 % (ref 0–8)
EOSINOPHIL NFR BLD: 5.5 % (ref 0–8)
ERYTHROCYTE [DISTWIDTH] IN BLOOD BY AUTOMATED COUNT: 12.9 % (ref 11.5–14.5)
ERYTHROCYTE [DISTWIDTH] IN BLOOD BY AUTOMATED COUNT: 13.2 % (ref 11.5–14.5)
EST. GFR  (AFRICAN AMERICAN): 34.9 ML/MIN/1.73 M^2
EST. GFR  (AFRICAN AMERICAN): 37.4 ML/MIN/1.73 M^2
EST. GFR  (AFRICAN AMERICAN): 42.5 ML/MIN/1.73 M^2
EST. GFR  (AFRICAN AMERICAN): 45.7 ML/MIN/1.73 M^2
EST. GFR  (NON AFRICAN AMERICAN): 30.2 ML/MIN/1.73 M^2
EST. GFR  (NON AFRICAN AMERICAN): 32.3 ML/MIN/1.73 M^2
EST. GFR  (NON AFRICAN AMERICAN): 36.7 ML/MIN/1.73 M^2
EST. GFR  (NON AFRICAN AMERICAN): 39.5 ML/MIN/1.73 M^2
ESTIMATED AVG GLUCOSE: 137 MG/DL (ref 68–131)
ESTIMATED AVG GLUCOSE: 137 MG/DL (ref 68–131)
ESTIMATED AVG GLUCOSE: 143 MG/DL (ref 68–131)
FERRITIN SERPL-MCNC: 218 NG/ML (ref 20–300)
GLIADIN PEPTIDE IGA SER-ACNC: 3 UNITS
GLIADIN PEPTIDE IGG SER-ACNC: 4 UNITS
GLUCOSE SERPL-MCNC: 109 MG/DL (ref 70–110)
GLUCOSE SERPL-MCNC: 118 MG/DL (ref 70–110)
GLUCOSE SERPL-MCNC: 122 MG/DL (ref 70–110)
GLUCOSE SERPL-MCNC: 130 MG/DL (ref 70–110)
GLUCOSE UR QL STRIP: NEGATIVE
HBA1C MFR BLD: 6.4 % (ref 4–5.6)
HBA1C MFR BLD: 6.4 % (ref 4–5.6)
HBA1C MFR BLD: 6.6 % (ref 4–5.6)
HCT VFR BLD AUTO: 37.1 % (ref 40–54)
HCT VFR BLD AUTO: 37.7 % (ref 40–54)
HDLC SERPL-MCNC: 33 MG/DL (ref 40–75)
HDLC SERPL-MCNC: 35 MG/DL (ref 40–75)
HDLC SERPL-MCNC: 36 MG/DL (ref 40–75)
HDLC SERPL: 35.7 % (ref 20–50)
HDLC SERPL: 36.7 % (ref 20–50)
HDLC SERPL: 38.3 % (ref 20–50)
HGB BLD-MCNC: 11.7 G/DL (ref 14–18)
HGB BLD-MCNC: 12.1 G/DL (ref 14–18)
HGB UR QL STRIP: NEGATIVE
HYALINE CASTS UR QL AUTO: 7 /LPF
IGA SERPL-MCNC: 84 MG/DL (ref 70–400)
IMM GRANULOCYTES # BLD AUTO: 0.01 K/UL (ref 0–0.04)
IMM GRANULOCYTES # BLD AUTO: 0.02 K/UL (ref 0–0.04)
IMM GRANULOCYTES NFR BLD AUTO: 0.2 % (ref 0–0.5)
IMM GRANULOCYTES NFR BLD AUTO: 0.3 % (ref 0–0.5)
IRON SERPL-MCNC: 88 UG/DL (ref 45–160)
IRON SERPL-MCNC: 91 UG/DL (ref 45–160)
KETONES UR QL STRIP: NEGATIVE
LDLC SERPL CALC-MCNC: 42.4 MG/DL (ref 63–159)
LDLC SERPL CALC-MCNC: 45 MG/DL (ref 63–159)
LDLC SERPL CALC-MCNC: 50.6 MG/DL (ref 63–159)
LEUKOCYTE ESTERASE UR QL STRIP: NEGATIVE
LYMPHOCYTES # BLD AUTO: 1.1 K/UL (ref 1–4.8)
LYMPHOCYTES # BLD AUTO: 1.1 K/UL (ref 1–4.8)
LYMPHOCYTES NFR BLD: 16.5 % (ref 18–48)
LYMPHOCYTES NFR BLD: 16.8 % (ref 18–48)
MAGNESIUM SERPL-MCNC: 1.6 MG/DL (ref 1.6–2.6)
MCH RBC QN AUTO: 28.9 PG (ref 27–31)
MCH RBC QN AUTO: 29.3 PG (ref 27–31)
MCHC RBC AUTO-ENTMCNC: 31 G/DL (ref 32–36)
MCHC RBC AUTO-ENTMCNC: 32.6 G/DL (ref 32–36)
MCV RBC AUTO: 90 FL (ref 82–98)
MCV RBC AUTO: 93 FL (ref 82–98)
MICROSCOPIC COMMENT: ABNORMAL
MONOCYTES # BLD AUTO: 0.5 K/UL (ref 0.3–1)
MONOCYTES # BLD AUTO: 0.6 K/UL (ref 0.3–1)
MONOCYTES NFR BLD: 7.5 % (ref 4–15)
MONOCYTES NFR BLD: 8 % (ref 4–15)
NEUTROPHILS # BLD AUTO: 4.6 K/UL (ref 1.8–7.7)
NEUTROPHILS # BLD AUTO: 4.7 K/UL (ref 1.8–7.7)
NEUTROPHILS NFR BLD: 68.8 % (ref 38–73)
NEUTROPHILS NFR BLD: 69.8 % (ref 38–73)
NITRITE UR QL STRIP: NEGATIVE
NONHDLC SERPL-MCNC: 57 MG/DL
NONHDLC SERPL-MCNC: 58 MG/DL
NONHDLC SERPL-MCNC: 63 MG/DL
NRBC BLD-RTO: 0 /100 WBC
NRBC BLD-RTO: 0 /100 WBC
PH UR STRIP: 5 [PH] (ref 5–8)
PHOSPHATE SERPL-MCNC: 3.2 MG/DL (ref 2.7–4.5)
PLATELET # BLD AUTO: 140 K/UL (ref 150–450)
PLATELET # BLD AUTO: 143 K/UL (ref 150–450)
PMV BLD AUTO: 11.3 FL (ref 9.2–12.9)
PMV BLD AUTO: 11.4 FL (ref 9.2–12.9)
POTASSIUM SERPL-SCNC: 4.2 MMOL/L (ref 3.5–5.1)
POTASSIUM SERPL-SCNC: 4.9 MMOL/L (ref 3.5–5.1)
POTASSIUM SERPL-SCNC: 5 MMOL/L (ref 3.5–5.1)
POTASSIUM SERPL-SCNC: 5.2 MMOL/L (ref 3.5–5.1)
PROT SERPL-MCNC: 6.5 G/DL (ref 6–8.4)
PROT SERPL-MCNC: 6.5 G/DL (ref 6–8.4)
PROT UR QL STRIP: ABNORMAL
PROT UR-MCNC: 50 MG/DL (ref 0–15)
PROT/CREAT UR: 0.41 MG/G{CREAT} (ref 0–0.2)
RBC # BLD AUTO: 4.05 M/UL (ref 4.6–6.2)
RBC # BLD AUTO: 4.13 M/UL (ref 4.6–6.2)
RBC #/AREA URNS AUTO: 2 /HPF (ref 0–4)
SATURATED IRON: 29 % (ref 20–50)
SATURATED IRON: 31 % (ref 20–50)
SODIUM SERPL-SCNC: 138 MMOL/L (ref 136–145)
SODIUM SERPL-SCNC: 140 MMOL/L (ref 136–145)
SODIUM SERPL-SCNC: 141 MMOL/L (ref 136–145)
SODIUM SERPL-SCNC: 143 MMOL/L (ref 136–145)
SP GR UR STRIP: 1.01 (ref 1–1.03)
T4 FREE SERPL-MCNC: 0.85 NG/DL (ref 0.71–1.51)
TOTAL IRON BINDING CAPACITY: 290 UG/DL (ref 250–450)
TOTAL IRON BINDING CAPACITY: 302 UG/DL (ref 250–450)
TRANSFERRIN SERPL-MCNC: 196 MG/DL (ref 200–375)
TRANSFERRIN SERPL-MCNC: 204 MG/DL (ref 200–375)
TRIGL SERPL-MCNC: 60 MG/DL (ref 30–150)
TRIGL SERPL-MCNC: 62 MG/DL (ref 30–150)
TRIGL SERPL-MCNC: 78 MG/DL (ref 30–150)
TSH SERPL DL<=0.005 MIU/L-ACNC: 6.1 UIU/ML (ref 0.4–4)
TTG IGA SER-ACNC: 6 UNITS
TTG IGG SER-ACNC: 3 UNITS
URN SPEC COLLECT METH UR: ABNORMAL
VIT B12 SERPL-MCNC: 280 PG/ML (ref 210–950)
VIT B12 SERPL-MCNC: 289 PG/ML (ref 210–950)
WBC # BLD AUTO: 6.65 K/UL (ref 3.9–12.7)
WBC # BLD AUTO: 6.85 K/UL (ref 3.9–12.7)
WBC #/AREA URNS AUTO: 6 /HPF (ref 0–5)

## 2021-01-01 PROCEDURE — 3075F SYST BP GE 130 - 139MM HG: CPT | Mod: HCNC,CPTII,S$GLB, | Performed by: INTERNAL MEDICINE

## 2021-01-01 PROCEDURE — 83540 ASSAY OF IRON: CPT | Mod: HCNC | Performed by: INTERNAL MEDICINE

## 2021-01-01 PROCEDURE — 96372 THER/PROPH/DIAG INJ SC/IM: CPT | Mod: HCNC,S$GLB,, | Performed by: INTERNAL MEDICINE

## 2021-01-01 PROCEDURE — 52000 CYSTOSCOPY: ICD-10-PCS | Mod: HCNC,S$GLB,, | Performed by: UROLOGY

## 2021-01-01 PROCEDURE — 1101F PT FALLS ASSESS-DOCD LE1/YR: CPT | Mod: HCNC,CPTII,S$GLB, | Performed by: INTERNAL MEDICINE

## 2021-01-01 PROCEDURE — 99214 PR OFFICE/OUTPT VISIT, EST, LEVL IV, 30-39 MIN: ICD-10-PCS | Mod: HCNC,25,S$GLB, | Performed by: INTERNAL MEDICINE

## 2021-01-01 PROCEDURE — 99999 PR PBB SHADOW E&M-EST. PATIENT-LVL III: ICD-10-PCS | Mod: PBBFAC,HCNC,, | Performed by: OPHTHALMOLOGY

## 2021-01-01 PROCEDURE — G0008 FLU VACCINE - QUADRIVALENT - ADJUVANTED: ICD-10-PCS | Mod: HCNC,S$GLB,, | Performed by: INTERNAL MEDICINE

## 2021-01-01 PROCEDURE — 92012 PR EYE EXAM, EST PATIENT,INTERMED: ICD-10-PCS | Mod: HCNC,S$GLB,, | Performed by: OPHTHALMOLOGY

## 2021-01-01 PROCEDURE — 1126F AMNT PAIN NOTED NONE PRSNT: CPT | Mod: HCNC,S$GLB,, | Performed by: INTERNAL MEDICINE

## 2021-01-01 PROCEDURE — 1126F PR PAIN SEVERITY QUANTIFIED, NO PAIN PRESENT: ICD-10-PCS | Mod: HCNC,CPTII,S$GLB, | Performed by: OPHTHALMOLOGY

## 2021-01-01 PROCEDURE — 3288F FALL RISK ASSESSMENT DOCD: CPT | Mod: HCNC,CPTII,S$GLB, | Performed by: INTERNAL MEDICINE

## 2021-01-01 PROCEDURE — 92014 COMPRE OPH EXAM EST PT 1/>: CPT | Mod: HCNC,S$GLB,, | Performed by: OPHTHALMOLOGY

## 2021-01-01 PROCEDURE — 91300 COVID-19, MRNA, LNP-S, PF, 30 MCG/0.3 ML DOSE VACCINE: CPT | Mod: HCNC,PBBFAC | Performed by: INTERNAL MEDICINE

## 2021-01-01 PROCEDURE — 3078F PR MOST RECENT DIASTOLIC BLOOD PRESSURE < 80 MM HG: ICD-10-PCS | Mod: HCNC,CPTII,S$GLB, | Performed by: INTERNAL MEDICINE

## 2021-01-01 PROCEDURE — 84443 ASSAY THYROID STIM HORMONE: CPT | Mod: HCNC | Performed by: INTERNAL MEDICINE

## 2021-01-01 PROCEDURE — 84439 ASSAY OF FREE THYROXINE: CPT | Mod: HCNC | Performed by: INTERNAL MEDICINE

## 2021-01-01 PROCEDURE — 83880 ASSAY OF NATRIURETIC PEPTIDE: CPT | Mod: HCNC | Performed by: INTERNAL MEDICINE

## 2021-01-01 PROCEDURE — 90694 FLU VACCINE - QUADRIVALENT - ADJUVANTED: ICD-10-PCS | Mod: HCNC,S$GLB,, | Performed by: INTERNAL MEDICINE

## 2021-01-01 PROCEDURE — 80053 COMPREHEN METABOLIC PANEL: CPT | Mod: HCNC | Performed by: NURSE PRACTITIONER

## 2021-01-01 PROCEDURE — 3077F SYST BP >= 140 MM HG: CPT | Mod: HCNC,CPTII,S$GLB, | Performed by: INTERNAL MEDICINE

## 2021-01-01 PROCEDURE — 99397 PER PM REEVAL EST PAT 65+ YR: CPT | Mod: HCNC,S$GLB,, | Performed by: INTERNAL MEDICINE

## 2021-01-01 PROCEDURE — 3075F PR MOST RECENT SYSTOLIC BLOOD PRESS GE 130-139MM HG: ICD-10-PCS | Mod: HCNC,CPTII,S$GLB, | Performed by: INTERNAL MEDICINE

## 2021-01-01 PROCEDURE — 1126F AMNT PAIN NOTED NONE PRSNT: CPT | Mod: HCNC,CPTII,S$GLB, | Performed by: INTERNAL MEDICINE

## 2021-01-01 PROCEDURE — 3288F PR FALLS RISK ASSESSMENT DOCUMENTED: ICD-10-PCS | Mod: HCNC,CPTII,S$GLB, | Performed by: INTERNAL MEDICINE

## 2021-01-01 PROCEDURE — 92134 POSTERIOR SEGMENT OCT RETINA (OCULAR COHERENCE TOMOGRAPHY)-BOTH EYES: ICD-10-PCS | Mod: HCNC,S$GLB,, | Performed by: OPHTHALMOLOGY

## 2021-01-01 PROCEDURE — 99999 PR PBB SHADOW E&M-EST. PATIENT-LVL V: CPT | Mod: PBBFAC,HCNC,, | Performed by: INTERNAL MEDICINE

## 2021-01-01 PROCEDURE — 99499 RISK ADDL DX/OHS AUDIT: ICD-10-PCS | Mod: HCNC,S$GLB,, | Performed by: INTERNAL MEDICINE

## 2021-01-01 PROCEDURE — 99499 RISK ADDL DX/OHS AUDIT: ICD-10-PCS | Mod: HCNC,S$GLB,, | Performed by: OPHTHALMOLOGY

## 2021-01-01 PROCEDURE — 3079F DIAST BP 80-89 MM HG: CPT | Mod: HCNC,CPTII,S$GLB, | Performed by: INTERNAL MEDICINE

## 2021-01-01 PROCEDURE — 1159F MED LIST DOCD IN RCRD: CPT | Mod: HCNC,CPTII,S$GLB, | Performed by: OPHTHALMOLOGY

## 2021-01-01 PROCEDURE — 1126F AMNT PAIN NOTED NONE PRSNT: CPT | Mod: HCNC,CPTII,S$GLB, | Performed by: OPHTHALMOLOGY

## 2021-01-01 PROCEDURE — 80061 LIPID PANEL: CPT | Mod: HCNC | Performed by: NURSE PRACTITIONER

## 2021-01-01 PROCEDURE — 83036 HEMOGLOBIN GLYCOSYLATED A1C: CPT | Mod: HCNC | Performed by: INTERNAL MEDICINE

## 2021-01-01 PROCEDURE — 1160F PR REVIEW ALL MEDS BY PRESCRIBER/CLIN PHARMACIST DOCUMENTED: ICD-10-PCS | Mod: HCNC,CPTII,S$GLB, | Performed by: OPHTHALMOLOGY

## 2021-01-01 PROCEDURE — 99499 UNLISTED E&M SERVICE: CPT | Mod: HCNC,S$GLB,, | Performed by: OPHTHALMOLOGY

## 2021-01-01 PROCEDURE — 92012 INTRM OPH EXAM EST PATIENT: CPT | Mod: HCNC,S$GLB,, | Performed by: OPHTHALMOLOGY

## 2021-01-01 PROCEDURE — 99999 PR PBB SHADOW E&M-EST. PATIENT-LVL III: CPT | Mod: PBBFAC,HCNC,, | Performed by: OPHTHALMOLOGY

## 2021-01-01 PROCEDURE — 1101F PR PT FALLS ASSESS DOC 0-1 FALLS W/OUT INJ PAST YR: ICD-10-PCS | Mod: HCNC,CPTII,S$GLB, | Performed by: INTERNAL MEDICINE

## 2021-01-01 PROCEDURE — 82570 ASSAY OF URINE CREATININE: CPT | Mod: HCNC | Performed by: INTERNAL MEDICINE

## 2021-01-01 PROCEDURE — 83516 IMMUNOASSAY NONANTIBODY: CPT | Mod: 59,HCNC | Performed by: INTERNAL MEDICINE

## 2021-01-01 PROCEDURE — 3079F PR MOST RECENT DIASTOLIC BLOOD PRESSURE 80-89 MM HG: ICD-10-PCS | Mod: HCNC,CPTII,S$GLB, | Performed by: INTERNAL MEDICINE

## 2021-01-01 PROCEDURE — 1159F PR MEDICATION LIST DOCUMENTED IN MEDICAL RECORD: ICD-10-PCS | Mod: HCNC,CPTII,S$GLB, | Performed by: INTERNAL MEDICINE

## 2021-01-01 PROCEDURE — 99499 UNLISTED E&M SERVICE: CPT | Mod: HCNC,S$GLB,, | Performed by: INTERNAL MEDICINE

## 2021-01-01 PROCEDURE — 1160F PR REVIEW ALL MEDS BY PRESCRIBER/CLIN PHARMACIST DOCUMENTED: ICD-10-PCS | Mod: HCNC,CPTII,S$GLB, | Performed by: INTERNAL MEDICINE

## 2021-01-01 PROCEDURE — 99213 PR OFFICE/OUTPT VISIT, EST, LEVL III, 20-29 MIN: ICD-10-PCS | Mod: HCNC,S$GLB,, | Performed by: INTERNAL MEDICINE

## 2021-01-01 PROCEDURE — 1101F PT FALLS ASSESS-DOCD LE1/YR: CPT | Mod: HCNC,CPTII,S$GLB, | Performed by: OPHTHALMOLOGY

## 2021-01-01 PROCEDURE — 99214 OFFICE O/P EST MOD 30 MIN: CPT | Mod: HCNC,25,S$GLB, | Performed by: INTERNAL MEDICINE

## 2021-01-01 PROCEDURE — 80048 BASIC METABOLIC PNL TOTAL CA: CPT | Mod: HCNC | Performed by: NURSE PRACTITIONER

## 2021-01-01 PROCEDURE — 80069 RENAL FUNCTION PANEL: CPT | Mod: HCNC | Performed by: INTERNAL MEDICINE

## 2021-01-01 PROCEDURE — G0008 ADMIN INFLUENZA VIRUS VAC: HCPCS | Mod: HCNC,S$GLB,, | Performed by: INTERNAL MEDICINE

## 2021-01-01 PROCEDURE — 99999 PR PBB SHADOW E&M-EST. PATIENT-LVL III: CPT | Mod: PBBFAC,HCNC,, | Performed by: INTERNAL MEDICINE

## 2021-01-01 PROCEDURE — 3078F DIAST BP <80 MM HG: CPT | Mod: HCNC,CPTII,S$GLB, | Performed by: INTERNAL MEDICINE

## 2021-01-01 PROCEDURE — 0003A COVID-19, MRNA, LNP-S, PF, 30 MCG/0.3 ML DOSE VACCINE: CPT | Mod: HCNC,PBBFAC | Performed by: INTERNAL MEDICINE

## 2021-01-01 PROCEDURE — 1159F MED LIST DOCD IN RCRD: CPT | Mod: HCNC,CPTII,S$GLB, | Performed by: INTERNAL MEDICINE

## 2021-01-01 PROCEDURE — 90694 VACC AIIV4 NO PRSRV 0.5ML IM: CPT | Mod: HCNC,S$GLB,, | Performed by: INTERNAL MEDICINE

## 2021-01-01 PROCEDURE — 1160F RVW MEDS BY RX/DR IN RCRD: CPT | Mod: HCNC,CPTII,S$GLB, | Performed by: INTERNAL MEDICINE

## 2021-01-01 PROCEDURE — 1159F PR MEDICATION LIST DOCUMENTED IN MEDICAL RECORD: ICD-10-PCS | Mod: HCNC,CPTII,S$GLB, | Performed by: OPHTHALMOLOGY

## 2021-01-01 PROCEDURE — 99397 PR PREVENTIVE VISIT,EST,65 & OVER: ICD-10-PCS | Mod: HCNC,S$GLB,, | Performed by: INTERNAL MEDICINE

## 2021-01-01 PROCEDURE — 83036 HEMOGLOBIN GLYCOSYLATED A1C: CPT | Mod: HCNC | Performed by: NURSE PRACTITIONER

## 2021-01-01 PROCEDURE — 82607 VITAMIN B-12: CPT | Mod: HCNC | Performed by: INTERNAL MEDICINE

## 2021-01-01 PROCEDURE — 3077F PR MOST RECENT SYSTOLIC BLOOD PRESSURE >= 140 MM HG: ICD-10-PCS | Mod: HCNC,CPTII,S$GLB, | Performed by: INTERNAL MEDICINE

## 2021-01-01 PROCEDURE — 81001 URINALYSIS AUTO W/SCOPE: CPT | Mod: HCNC | Performed by: INTERNAL MEDICINE

## 2021-01-01 PROCEDURE — 3288F FALL RISK ASSESSMENT DOCD: CPT | Mod: HCNC,CPTII,S$GLB, | Performed by: OPHTHALMOLOGY

## 2021-01-01 PROCEDURE — 96372 PR INJECTION,THERAP/PROPH/DIAG2ST, IM OR SUBCUT: ICD-10-PCS | Mod: HCNC,S$GLB,, | Performed by: INTERNAL MEDICINE

## 2021-01-01 PROCEDURE — 99999 PR PBB SHADOW E&M-EST. PATIENT-LVL III: ICD-10-PCS | Mod: PBBFAC,HCNC,, | Performed by: INTERNAL MEDICINE

## 2021-01-01 PROCEDURE — 36415 COLL VENOUS BLD VENIPUNCTURE: CPT | Mod: HCNC | Performed by: INTERNAL MEDICINE

## 2021-01-01 PROCEDURE — 82728 ASSAY OF FERRITIN: CPT | Mod: HCNC | Performed by: INTERNAL MEDICINE

## 2021-01-01 PROCEDURE — 3288F PR FALLS RISK ASSESSMENT DOCUMENTED: ICD-10-PCS | Mod: HCNC,CPTII,S$GLB, | Performed by: OPHTHALMOLOGY

## 2021-01-01 PROCEDURE — 1126F PR PAIN SEVERITY QUANTIFIED, NO PAIN PRESENT: ICD-10-PCS | Mod: HCNC,CPTII,S$GLB, | Performed by: INTERNAL MEDICINE

## 2021-01-01 PROCEDURE — 99999 PR PBB SHADOW E&M-EST. PATIENT-LVL II: CPT | Mod: PBBFAC,HCNC,, | Performed by: OPHTHALMOLOGY

## 2021-01-01 PROCEDURE — 92134 CPTRZ OPH DX IMG PST SGM RTA: CPT | Mod: HCNC,S$GLB,, | Performed by: OPHTHALMOLOGY

## 2021-01-01 PROCEDURE — 80053 COMPREHEN METABOLIC PANEL: CPT | Mod: HCNC | Performed by: INTERNAL MEDICINE

## 2021-01-01 PROCEDURE — 80061 LIPID PANEL: CPT | Mod: HCNC | Performed by: INTERNAL MEDICINE

## 2021-01-01 PROCEDURE — 85025 COMPLETE CBC W/AUTO DIFF WBC: CPT | Mod: HCNC | Performed by: INTERNAL MEDICINE

## 2021-01-01 PROCEDURE — 36415 COLL VENOUS BLD VENIPUNCTURE: CPT | Mod: HCNC,PO | Performed by: NURSE PRACTITIONER

## 2021-01-01 PROCEDURE — 92201 OPSCPY EXTND RTA DRAW UNI/BI: CPT | Mod: HCNC,S$GLB,, | Performed by: OPHTHALMOLOGY

## 2021-01-01 PROCEDURE — 52000 CYSTOURETHROSCOPY: CPT | Mod: HCNC,S$GLB,, | Performed by: UROLOGY

## 2021-01-01 PROCEDURE — 87324 CLOSTRIDIUM AG IA: CPT | Mod: HCNC | Performed by: INTERNAL MEDICINE

## 2021-01-01 PROCEDURE — 1159F PR MEDICATION LIST DOCUMENTED IN MEDICAL RECORD: ICD-10-PCS | Mod: HCNC,S$GLB,, | Performed by: INTERNAL MEDICINE

## 2021-01-01 PROCEDURE — 92201 PR OPHTHALMOSCOPY, EXT, W/RET DRAW/SCLERAL DEPR, I&R, UNI/BI: ICD-10-PCS | Mod: HCNC,S$GLB,, | Performed by: OPHTHALMOLOGY

## 2021-01-01 PROCEDURE — 83735 ASSAY OF MAGNESIUM: CPT | Mod: HCNC | Performed by: INTERNAL MEDICINE

## 2021-01-01 PROCEDURE — 87449 NOS EACH ORGANISM AG IA: CPT | Mod: HCNC | Performed by: INTERNAL MEDICINE

## 2021-01-01 PROCEDURE — 92014 PR EYE EXAM, EST PATIENT,COMPREHESV: ICD-10-PCS | Mod: HCNC,S$GLB,, | Performed by: OPHTHALMOLOGY

## 2021-01-01 PROCEDURE — 99213 OFFICE O/P EST LOW 20 MIN: CPT | Mod: HCNC,S$GLB,, | Performed by: INTERNAL MEDICINE

## 2021-01-01 PROCEDURE — 1159F MED LIST DOCD IN RCRD: CPT | Mod: HCNC,S$GLB,, | Performed by: INTERNAL MEDICINE

## 2021-01-01 PROCEDURE — 99999 PR PBB SHADOW E&M-EST. PATIENT-LVL II: ICD-10-PCS | Mod: PBBFAC,HCNC,, | Performed by: OPHTHALMOLOGY

## 2021-01-01 PROCEDURE — 1101F PR PT FALLS ASSESS DOC 0-1 FALLS W/OUT INJ PAST YR: ICD-10-PCS | Mod: HCNC,CPTII,S$GLB, | Performed by: OPHTHALMOLOGY

## 2021-01-01 PROCEDURE — 1160F RVW MEDS BY RX/DR IN RCRD: CPT | Mod: HCNC,CPTII,S$GLB, | Performed by: OPHTHALMOLOGY

## 2021-01-01 PROCEDURE — 99999 PR PBB SHADOW E&M-EST. PATIENT-LVL V: ICD-10-PCS | Mod: PBBFAC,HCNC,, | Performed by: INTERNAL MEDICINE

## 2021-01-01 PROCEDURE — 1126F PR PAIN SEVERITY QUANTIFIED, NO PAIN PRESENT: ICD-10-PCS | Mod: HCNC,S$GLB,, | Performed by: INTERNAL MEDICINE

## 2021-01-01 PROCEDURE — 82656 EL-1 FECAL QUAL/SEMIQ: CPT | Mod: HCNC | Performed by: INTERNAL MEDICINE

## 2021-01-01 RX ORDER — CARVEDILOL 12.5 MG/1
TABLET ORAL
Qty: 180 TABLET | Refills: 3 | Status: SHIPPED | OUTPATIENT
Start: 2021-01-01 | End: 2022-01-01

## 2021-01-01 RX ORDER — NITROGLYCERIN 0.4 MG/1
TABLET SUBLINGUAL
Qty: 25 TABLET | Refills: 3 | Status: SHIPPED | OUTPATIENT
Start: 2021-01-01

## 2021-01-01 RX ORDER — LIDOCAINE HYDROCHLORIDE 20 MG/ML
JELLY TOPICAL
Status: COMPLETED | OUTPATIENT
Start: 2021-01-01 | End: 2021-01-01

## 2021-01-01 RX ORDER — CYANOCOBALAMIN 1000 UG/ML
1000 INJECTION, SOLUTION INTRAMUSCULAR; SUBCUTANEOUS
Status: DISCONTINUED | OUTPATIENT
Start: 2021-01-01 | End: 2022-01-01

## 2021-01-01 RX ORDER — FERRIC CARBOXYMALTOSE 50 MG/ML
INJECTION, SOLUTION INTRAVENOUS
COMMUNITY
Start: 2021-02-12 | End: 2021-01-01

## 2021-01-01 RX ORDER — INSULIN DEGLUDEC 200 U/ML
30 INJECTION, SOLUTION SUBCUTANEOUS DAILY
Qty: 2 PEN | Refills: 3 | Status: SHIPPED | OUTPATIENT
Start: 2021-01-01 | End: 2021-01-01 | Stop reason: SDUPTHER

## 2021-01-01 RX ORDER — FLUTICASONE PROPIONATE 50 MCG
2 SPRAY, SUSPENSION (ML) NASAL DAILY
Qty: 16 G | Refills: 1 | Status: ON HOLD | OUTPATIENT
Start: 2021-01-01 | End: 2022-01-01

## 2021-01-01 RX ORDER — TAMSULOSIN HYDROCHLORIDE 0.4 MG/1
CAPSULE ORAL
Qty: 90 CAPSULE | Refills: 3 | Status: SHIPPED | OUTPATIENT
Start: 2021-01-01

## 2021-01-01 RX ORDER — ATORVASTATIN CALCIUM 20 MG/1
TABLET, FILM COATED ORAL
Qty: 90 TABLET | Refills: 1 | Status: SHIPPED | OUTPATIENT
Start: 2021-01-01

## 2021-01-01 RX ORDER — CALCITRIOL 0.25 UG/1
0.25 CAPSULE ORAL DAILY
Qty: 30 CAPSULE | Refills: 11 | OUTPATIENT
Start: 2021-01-01

## 2021-01-01 RX ORDER — INSULIN DEGLUDEC 200 U/ML
30 INJECTION, SOLUTION SUBCUTANEOUS DAILY
Qty: 2 PEN | Refills: 3 | Status: SHIPPED | OUTPATIENT
Start: 2021-01-01 | End: 2021-01-01

## 2021-01-01 RX ORDER — DOXYCYCLINE HYCLATE 100 MG
100 TABLET ORAL
Status: DISCONTINUED | OUTPATIENT
Start: 2021-01-01 | End: 2021-01-01

## 2021-01-01 RX ORDER — AZITHROMYCIN 250 MG/1
TABLET, FILM COATED ORAL
Qty: 6 TABLET | Refills: 0 | Status: SHIPPED | OUTPATIENT
Start: 2021-01-01 | End: 2021-01-01

## 2021-01-01 RX ORDER — FINASTERIDE 5 MG/1
5 TABLET, FILM COATED ORAL DAILY
Qty: 90 TABLET | Refills: 3 | Status: SHIPPED | OUTPATIENT
Start: 2021-01-01

## 2021-01-01 RX ORDER — FUROSEMIDE 20 MG/1
20 TABLET ORAL DAILY
Qty: 7 TABLET | Refills: 0 | Status: SHIPPED | OUTPATIENT
Start: 2021-01-01 | End: 2021-01-01

## 2021-01-01 RX ADMIN — LIDOCAINE HYDROCHLORIDE: 20 JELLY TOPICAL at 08:07

## 2021-01-01 RX ADMIN — CYANOCOBALAMIN 100 MCG: 1000 INJECTION, SOLUTION INTRAMUSCULAR at 10:06

## 2021-01-01 RX ADMIN — Medication 100 MG: at 09:07

## 2021-01-06 ENCOUNTER — HOSPITAL ENCOUNTER (OUTPATIENT)
Dept: CARDIOLOGY | Facility: HOSPITAL | Age: 81
Discharge: HOME | End: 2021-01-06
Attending: INTERNAL MEDICINE
Payer: MEDICARE

## 2021-01-06 ENCOUNTER — PATIENT OUTREACH (OUTPATIENT)
Dept: ADMINISTRATIVE | Facility: OTHER | Age: 81
End: 2021-01-06

## 2021-01-06 VITALS
HEART RATE: 55 BPM | BODY MASS INDEX: 32.78 KG/M2 | WEIGHT: 242 LBS | SYSTOLIC BLOOD PRESSURE: 115 MMHG | HEIGHT: 72 IN | DIASTOLIC BLOOD PRESSURE: 70 MMHG

## 2021-01-06 LAB
ASCENDING AORTA: 3.57 CM
AV INDEX (PROSTH): 0.45
AV MEAN GRADIENT: 9 MMHG
AV PEAK GRADIENT: 16 MMHG
AV VALVE AREA: 1.53 CM2
AV VELOCITY RATIO: 0.47
BSA FOR ECHO PROCEDURE: 2.36 M2
CV ECHO LV RWT: 0.38 CM
DOP CALC AO PEAK VEL: 2.03 M/S
DOP CALC AO VTI: 55.03 CM
DOP CALC LVOT AREA: 3.4 CM2
DOP CALC LVOT DIAMETER: 2.09 CM
DOP CALC LVOT PEAK VEL: 0.96 M/S
DOP CALC LVOT STROKE VOLUME: 84.46 CM3
DOP CALCLVOT PEAK VEL VTI: 24.63 CM
ECHO LV POSTERIOR WALL: 1 CM (ref 0.6–1.1)
FRACTIONAL SHORTENING: 29 % (ref 28–44)
INTERVENTRICULAR SEPTUM: 1.12 CM (ref 0.6–1.1)
LA MAJOR: 5.35 CM
LA MINOR: 4.61 CM
LA WIDTH: 4.24 CM
LEFT ATRIUM SIZE: 4.69 CM
LEFT ATRIUM VOLUME INDEX MOD: 37.2 ML/M2
LEFT ATRIUM VOLUME INDEX: 36.2 ML/M2
LEFT ATRIUM VOLUME MOD: 85.94 CM3
LEFT ATRIUM VOLUME: 83.71 CM3
LEFT INTERNAL DIMENSION IN SYSTOLE: 3.78 CM (ref 2.1–4)
LEFT VENTRICLE DIASTOLIC VOLUME INDEX: 58.68 ML/M2
LEFT VENTRICLE DIASTOLIC VOLUME: 135.57 ML
LEFT VENTRICLE MASS INDEX: 94 G/M2
LEFT VENTRICLE SYSTOLIC VOLUME INDEX: 26.5 ML/M2
LEFT VENTRICLE SYSTOLIC VOLUME: 61.13 ML
LEFT VENTRICULAR INTERNAL DIMENSION IN DIASTOLE: 5.3 CM (ref 3.5–6)
LEFT VENTRICULAR MASS: 216.62 G
MV A" WAVE DURATION": 8.75 MSEC
PISA TR MAX VEL: 2.59 M/S
PULM VEIN S/D RATIO: 1.18
PV PEAK D VEL: 0.65 M/S
PV PEAK S VEL: 0.77 M/S
RA MAJOR: 4.28 CM
RA PRESSURE: 3 MMHG
RA WIDTH: 3.87 CM
RIGHT VENTRICULAR END-DIASTOLIC DIMENSION: 4.09 CM
RV TISSUE DOPPLER FREE WALL SYSTOLIC VELOCITY 1 (APICAL 4 CHAMBER VIEW): 15.67 CM/S
SINUS: 3.29 CM
STJ: 3.24 CM
TDI LATERAL: 0.12 M/S
TDI SEPTAL: 0.07 M/S
TDI: 0.1 M/S
TR MAX PG: 27 MMHG
TRICUSPID ANNULAR PLANE SYSTOLIC EXCURSION: 1.78 CM
TV REST PULMONARY ARTERY PRESSURE: 30 MMHG

## 2021-01-06 PROCEDURE — 93306 TTE W/DOPPLER COMPLETE: CPT | Mod: HCNC

## 2021-01-06 PROCEDURE — 93306 TTE W/DOPPLER COMPLETE: CPT | Mod: 26,HCNC,, | Performed by: INTERNAL MEDICINE

## 2021-01-06 PROCEDURE — 93306 ECHO (CUPID ONLY): ICD-10-PCS | Mod: 26,HCNC,, | Performed by: INTERNAL MEDICINE

## 2021-01-07 ENCOUNTER — OFFICE VISIT (OUTPATIENT)
Dept: OPHTHALMOLOGY | Facility: CLINIC | Age: 81
End: 2021-01-07
Payer: MEDICARE

## 2021-01-07 DIAGNOSIS — Z79.4 CONTROLLED TYPE 2 DIABETES MELLITUS WITH BOTH EYES AFFECTED BY MILD NONPROLIFERATIVE RETINOPATHY AND MACULAR EDEMA, WITH LONG-TERM CURRENT USE OF INSULIN: Primary | ICD-10-CM

## 2021-01-07 DIAGNOSIS — E11.3213 CONTROLLED TYPE 2 DIABETES MELLITUS WITH BOTH EYES AFFECTED BY MILD NONPROLIFERATIVE RETINOPATHY AND MACULAR EDEMA, WITH LONG-TERM CURRENT USE OF INSULIN: Primary | ICD-10-CM

## 2021-01-07 DIAGNOSIS — H35.033 HYPERTENSIVE RETINOPATHY OF BOTH EYES: ICD-10-CM

## 2021-01-07 PROCEDURE — 92134 CPTRZ OPH DX IMG PST SGM RTA: CPT | Mod: HCNC,S$GLB,, | Performed by: OPHTHALMOLOGY

## 2021-01-07 PROCEDURE — 1126F PR PAIN SEVERITY QUANTIFIED, NO PAIN PRESENT: ICD-10-PCS | Mod: HCNC,S$GLB,, | Performed by: OPHTHALMOLOGY

## 2021-01-07 PROCEDURE — 92202 OPSCPY EXTND ON/MAC DRAW: CPT | Mod: HCNC,S$GLB,, | Performed by: OPHTHALMOLOGY

## 2021-01-07 PROCEDURE — 99999 PR PBB SHADOW E&M-EST. PATIENT-LVL IV: CPT | Mod: PBBFAC,HCNC,, | Performed by: OPHTHALMOLOGY

## 2021-01-07 PROCEDURE — 92134 POSTERIOR SEGMENT OCT RETINA (OCULAR COHERENCE TOMOGRAPHY)-BOTH EYES: ICD-10-PCS | Mod: HCNC,S$GLB,, | Performed by: OPHTHALMOLOGY

## 2021-01-07 PROCEDURE — 92202 PR OPHTHALMOSCOPY, EXT, W/DRAW OPTIC NERVE/MACULA, I&R, UNI/BI: ICD-10-PCS | Mod: HCNC,S$GLB,, | Performed by: OPHTHALMOLOGY

## 2021-01-07 PROCEDURE — 92014 COMPRE OPH EXAM EST PT 1/>: CPT | Mod: HCNC,S$GLB,, | Performed by: OPHTHALMOLOGY

## 2021-01-07 PROCEDURE — 1126F AMNT PAIN NOTED NONE PRSNT: CPT | Mod: HCNC,S$GLB,, | Performed by: OPHTHALMOLOGY

## 2021-01-07 PROCEDURE — 3288F FALL RISK ASSESSMENT DOCD: CPT | Mod: HCNC,CPTII,S$GLB, | Performed by: OPHTHALMOLOGY

## 2021-01-07 PROCEDURE — 99999 PR PBB SHADOW E&M-EST. PATIENT-LVL IV: ICD-10-PCS | Mod: PBBFAC,HCNC,, | Performed by: OPHTHALMOLOGY

## 2021-01-07 PROCEDURE — 1101F PT FALLS ASSESS-DOCD LE1/YR: CPT | Mod: HCNC,CPTII,S$GLB, | Performed by: OPHTHALMOLOGY

## 2021-01-07 PROCEDURE — 1101F PR PT FALLS ASSESS DOC 0-1 FALLS W/OUT INJ PAST YR: ICD-10-PCS | Mod: HCNC,CPTII,S$GLB, | Performed by: OPHTHALMOLOGY

## 2021-01-07 PROCEDURE — 3288F PR FALLS RISK ASSESSMENT DOCUMENTED: ICD-10-PCS | Mod: HCNC,CPTII,S$GLB, | Performed by: OPHTHALMOLOGY

## 2021-01-07 PROCEDURE — 92014 PR EYE EXAM, EST PATIENT,COMPREHESV: ICD-10-PCS | Mod: HCNC,S$GLB,, | Performed by: OPHTHALMOLOGY

## 2021-01-08 ENCOUNTER — PATIENT MESSAGE (OUTPATIENT)
Dept: INTERNAL MEDICINE | Facility: CLINIC | Age: 81
End: 2021-01-08

## 2021-01-08 NOTE — PROGRESS NOTES
The 2D echo report was fine and that the marker for cardiac function known as ejection fraction was normal    There was a mild-to-moderate degree of stenosis or tightness of the aortic valve, this does not  regard to medications, would be recommended to follow this on a yearly basis so as to make sure there is no significant progression    Will set up return visit 6 months

## 2021-01-11 ENCOUNTER — OFFICE VISIT (OUTPATIENT)
Dept: GASTROENTEROLOGY | Facility: CLINIC | Age: 81
End: 2021-01-11
Payer: MEDICARE

## 2021-01-11 ENCOUNTER — TELEPHONE (OUTPATIENT)
Dept: GASTROENTEROLOGY | Facility: CLINIC | Age: 81
End: 2021-01-11

## 2021-01-11 VITALS — WEIGHT: 243.19 LBS | HEIGHT: 72 IN | BODY MASS INDEX: 32.94 KG/M2

## 2021-01-11 DIAGNOSIS — D50.9 IRON DEFICIENCY ANEMIA, UNSPECIFIED IRON DEFICIENCY ANEMIA TYPE: Primary | ICD-10-CM

## 2021-01-11 DIAGNOSIS — K31.A0 GASTRITIS WITH INTESTINAL METAPLASIA OF STOMACH: ICD-10-CM

## 2021-01-11 DIAGNOSIS — K29.70 GASTRITIS WITH INTESTINAL METAPLASIA OF STOMACH: ICD-10-CM

## 2021-01-11 PROCEDURE — 3288F PR FALLS RISK ASSESSMENT DOCUMENTED: ICD-10-PCS | Mod: CPTII,S$GLB,, | Performed by: INTERNAL MEDICINE

## 2021-01-11 PROCEDURE — 99999 PR PBB SHADOW E&M-EST. PATIENT-LVL V: ICD-10-PCS | Mod: PBBFAC,HCNC,, | Performed by: INTERNAL MEDICINE

## 2021-01-11 PROCEDURE — 1126F AMNT PAIN NOTED NONE PRSNT: CPT | Mod: S$GLB,,, | Performed by: INTERNAL MEDICINE

## 2021-01-11 PROCEDURE — 99205 OFFICE O/P NEW HI 60 MIN: CPT | Mod: S$GLB,,, | Performed by: INTERNAL MEDICINE

## 2021-01-11 PROCEDURE — 1101F PT FALLS ASSESS-DOCD LE1/YR: CPT | Mod: CPTII,S$GLB,, | Performed by: INTERNAL MEDICINE

## 2021-01-11 PROCEDURE — 99205 PR OFFICE/OUTPT VISIT, NEW, LEVL V, 60-74 MIN: ICD-10-PCS | Mod: S$GLB,,, | Performed by: INTERNAL MEDICINE

## 2021-01-11 PROCEDURE — 1126F PR PAIN SEVERITY QUANTIFIED, NO PAIN PRESENT: ICD-10-PCS | Mod: S$GLB,,, | Performed by: INTERNAL MEDICINE

## 2021-01-11 PROCEDURE — 1101F PR PT FALLS ASSESS DOC 0-1 FALLS W/OUT INJ PAST YR: ICD-10-PCS | Mod: CPTII,S$GLB,, | Performed by: INTERNAL MEDICINE

## 2021-01-11 PROCEDURE — 99999 PR PBB SHADOW E&M-EST. PATIENT-LVL V: CPT | Mod: PBBFAC,HCNC,, | Performed by: INTERNAL MEDICINE

## 2021-01-11 PROCEDURE — 3288F FALL RISK ASSESSMENT DOCD: CPT | Mod: CPTII,S$GLB,, | Performed by: INTERNAL MEDICINE

## 2021-01-12 ENCOUNTER — LAB VISIT (OUTPATIENT)
Dept: LAB | Facility: HOSPITAL | Age: 81
End: 2021-01-12
Attending: INTERNAL MEDICINE
Payer: MEDICARE

## 2021-01-12 DIAGNOSIS — D50.9 IRON DEFICIENCY ANEMIA, UNSPECIFIED IRON DEFICIENCY ANEMIA TYPE: ICD-10-CM

## 2021-01-12 PROCEDURE — 87338 HPYLORI STOOL AG IA: CPT

## 2021-01-16 ENCOUNTER — IMMUNIZATION (OUTPATIENT)
Dept: INTERNAL MEDICINE | Facility: CLINIC | Age: 81
End: 2021-01-16
Payer: MEDICARE

## 2021-01-16 DIAGNOSIS — Z23 NEED FOR VACCINATION: Primary | ICD-10-CM

## 2021-01-16 PROCEDURE — 91300 COVID-19, MRNA, LNP-S, PF, 30 MCG/0.3 ML DOSE VACCINE: CPT | Mod: HCNC,PBBFAC | Performed by: FAMILY MEDICINE

## 2021-01-17 LAB — H PYLORI AG STL QL IA: NOT DETECTED

## 2021-01-21 ENCOUNTER — CLINICAL SUPPORT (OUTPATIENT)
Dept: INTERNAL MEDICINE | Facility: CLINIC | Age: 81
End: 2021-01-21
Payer: MEDICARE

## 2021-01-21 PROCEDURE — 96372 PR INJECTION,THERAP/PROPH/DIAG2ST, IM OR SUBCUT: ICD-10-PCS | Mod: S$GLB,,, | Performed by: INTERNAL MEDICINE

## 2021-01-21 PROCEDURE — 96372 THER/PROPH/DIAG INJ SC/IM: CPT | Mod: S$GLB,,, | Performed by: INTERNAL MEDICINE

## 2021-01-21 RX ADMIN — CYANOCOBALAMIN 100 MCG: 1000 INJECTION, SOLUTION INTRAMUSCULAR at 10:01

## 2021-01-24 ENCOUNTER — LAB VISIT (OUTPATIENT)
Dept: URGENT CARE | Facility: CLINIC | Age: 81
End: 2021-01-24
Payer: MEDICARE

## 2021-01-24 DIAGNOSIS — K31.A0 GASTRITIS WITH INTESTINAL METAPLASIA OF STOMACH: ICD-10-CM

## 2021-01-24 DIAGNOSIS — K29.70 GASTRITIS WITH INTESTINAL METAPLASIA OF STOMACH: ICD-10-CM

## 2021-01-24 PROCEDURE — U0003 INFECTIOUS AGENT DETECTION BY NUCLEIC ACID (DNA OR RNA); SEVERE ACUTE RESPIRATORY SYNDROME CORONAVIRUS 2 (SARS-COV-2) (CORONAVIRUS DISEASE [COVID-19]), AMPLIFIED PROBE TECHNIQUE, MAKING USE OF HIGH THROUGHPUT TECHNOLOGIES AS DESCRIBED BY CMS-2020-01-R: HCPCS

## 2021-01-25 ENCOUNTER — TELEPHONE (OUTPATIENT)
Dept: ENDOSCOPY | Facility: HOSPITAL | Age: 81
End: 2021-01-25

## 2021-01-25 LAB — SARS-COV-2 RNA RESP QL NAA+PROBE: NOT DETECTED

## 2021-01-26 ENCOUNTER — ANESTHESIA EVENT (OUTPATIENT)
Dept: ENDOSCOPY | Facility: HOSPITAL | Age: 81
End: 2021-01-26
Payer: MEDICARE

## 2021-01-27 ENCOUNTER — ANESTHESIA (OUTPATIENT)
Dept: ENDOSCOPY | Facility: HOSPITAL | Age: 81
End: 2021-01-27
Payer: MEDICARE

## 2021-01-27 ENCOUNTER — HOSPITAL ENCOUNTER (OUTPATIENT)
Facility: HOSPITAL | Age: 81
Discharge: HOME | End: 2021-01-27
Attending: INTERNAL MEDICINE | Admitting: INTERNAL MEDICINE
Payer: MEDICARE

## 2021-01-27 ENCOUNTER — TELEPHONE (OUTPATIENT)
Dept: GASTROENTEROLOGY | Facility: CLINIC | Age: 81
End: 2021-01-27

## 2021-01-27 VITALS
DIASTOLIC BLOOD PRESSURE: 62 MMHG | WEIGHT: 240 LBS | RESPIRATION RATE: 16 BRPM | BODY MASS INDEX: 33.6 KG/M2 | HEIGHT: 71 IN | HEART RATE: 71 BPM | SYSTOLIC BLOOD PRESSURE: 167 MMHG | OXYGEN SATURATION: 95 % | TEMPERATURE: 98 F

## 2021-01-27 DIAGNOSIS — Z12.11 SCREEN FOR COLON CANCER: ICD-10-CM

## 2021-01-27 DIAGNOSIS — D50.9 IRON DEFICIENCY ANEMIA, UNSPECIFIED IRON DEFICIENCY ANEMIA TYPE: Primary | ICD-10-CM

## 2021-01-27 LAB — POCT GLUCOSE: 107 MG/DL (ref 70–110)

## 2021-01-27 PROCEDURE — 88305 TISSUE EXAM BY PATHOLOGIST: ICD-10-PCS | Mod: 26,,, | Performed by: PATHOLOGY

## 2021-01-27 PROCEDURE — 88341 IMHCHEM/IMCYTCHM EA ADD ANTB: CPT | Mod: 26,,, | Performed by: PATHOLOGY

## 2021-01-27 PROCEDURE — 27201012 HC FORCEPS, HOT/COLD, DISP: Performed by: INTERNAL MEDICINE

## 2021-01-27 PROCEDURE — 88342 IMHCHEM/IMCYTCHM 1ST ANTB: CPT | Mod: 26,,, | Performed by: PATHOLOGY

## 2021-01-27 PROCEDURE — 88342 CHG IMMUNOCYTOCHEMISTRY: ICD-10-PCS | Mod: 26,,, | Performed by: PATHOLOGY

## 2021-01-27 PROCEDURE — 88342 IMHCHEM/IMCYTCHM 1ST ANTB: CPT | Mod: 59 | Performed by: PATHOLOGY

## 2021-01-27 PROCEDURE — 43239 EGD BIOPSY SINGLE/MULTIPLE: CPT | Mod: ,,, | Performed by: INTERNAL MEDICINE

## 2021-01-27 PROCEDURE — 63600175 PHARM REV CODE 636 W HCPCS: Performed by: NURSE ANESTHETIST, CERTIFIED REGISTERED

## 2021-01-27 PROCEDURE — 43239 EGD BIOPSY SINGLE/MULTIPLE: CPT | Performed by: INTERNAL MEDICINE

## 2021-01-27 PROCEDURE — 43239 PR EGD, FLEX, W/BIOPSY, SGL/MULTI: ICD-10-PCS | Mod: ,,, | Performed by: INTERNAL MEDICINE

## 2021-01-27 PROCEDURE — 37000008 HC ANESTHESIA 1ST 15 MINUTES: Performed by: INTERNAL MEDICINE

## 2021-01-27 PROCEDURE — 88305 TISSUE EXAM BY PATHOLOGIST: CPT | Mod: 26,,, | Performed by: PATHOLOGY

## 2021-01-27 PROCEDURE — 88341 PR IHC OR ICC EACH ADD'L SINGLE ANTIBODY  STAINPR: ICD-10-PCS | Mod: 26,,, | Performed by: PATHOLOGY

## 2021-01-27 PROCEDURE — 88305 TISSUE EXAM BY PATHOLOGIST: CPT | Performed by: PATHOLOGY

## 2021-01-27 PROCEDURE — 88341 IMHCHEM/IMCYTCHM EA ADD ANTB: CPT | Mod: 59 | Performed by: PATHOLOGY

## 2021-01-27 PROCEDURE — 25000003 PHARM REV CODE 250: Performed by: NURSE ANESTHETIST, CERTIFIED REGISTERED

## 2021-01-27 PROCEDURE — 37000009 HC ANESTHESIA EA ADD 15 MINS: Performed by: INTERNAL MEDICINE

## 2021-01-27 PROCEDURE — 25000003 PHARM REV CODE 250: Performed by: INTERNAL MEDICINE

## 2021-01-27 RX ORDER — PROPOFOL 10 MG/ML
VIAL (ML) INTRAVENOUS
Status: DISCONTINUED | OUTPATIENT
Start: 2021-01-27 | End: 2021-01-27

## 2021-01-27 RX ORDER — METHYLPREDNISOLONE SOD SUCC 125 MG
125 VIAL (EA) INJECTION ONCE AS NEEDED
Status: CANCELLED | OUTPATIENT
Start: 2021-02-01

## 2021-01-27 RX ORDER — SODIUM CHLORIDE 0.9 % (FLUSH) 0.9 %
10 SYRINGE (ML) INJECTION
Status: CANCELLED | OUTPATIENT
Start: 2021-02-01

## 2021-01-27 RX ORDER — LIDOCAINE HYDROCHLORIDE 20 MG/ML
INJECTION INTRAVENOUS
Status: DISCONTINUED | OUTPATIENT
Start: 2021-01-27 | End: 2021-01-27

## 2021-01-27 RX ORDER — HEPARIN 100 UNIT/ML
5 SYRINGE INTRAVENOUS
Status: CANCELLED | OUTPATIENT
Start: 2021-02-01

## 2021-01-27 RX ORDER — SODIUM CHLORIDE 0.9 % (FLUSH) 0.9 %
10 SYRINGE (ML) INJECTION
Status: DISCONTINUED | OUTPATIENT
Start: 2021-01-27 | End: 2021-01-27 | Stop reason: HOSPADM

## 2021-01-27 RX ORDER — SODIUM CHLORIDE 9 MG/ML
INJECTION, SOLUTION INTRAVENOUS CONTINUOUS
Status: DISCONTINUED | OUTPATIENT
Start: 2021-01-27 | End: 2021-01-27 | Stop reason: HOSPADM

## 2021-01-27 RX ORDER — DIPHENHYDRAMINE HYDROCHLORIDE 50 MG/ML
50 INJECTION INTRAMUSCULAR; INTRAVENOUS ONCE AS NEEDED
Status: CANCELLED | OUTPATIENT
Start: 2021-02-01

## 2021-01-27 RX ORDER — EPINEPHRINE 0.3 MG/.3ML
0.3 INJECTION SUBCUTANEOUS ONCE AS NEEDED
Status: CANCELLED | OUTPATIENT
Start: 2021-02-01

## 2021-01-27 RX ORDER — SODIUM CHLORIDE 9 MG/ML
INJECTION, SOLUTION INTRAVENOUS CONTINUOUS
Status: CANCELLED | OUTPATIENT
Start: 2021-02-01

## 2021-01-27 RX ADMIN — LIDOCAINE HYDROCHLORIDE 100 MG: 20 INJECTION, SOLUTION INTRAVENOUS at 11:01

## 2021-01-27 RX ADMIN — PROPOFOL 50 MG: 10 INJECTION, EMULSION INTRAVENOUS at 11:01

## 2021-01-27 RX ADMIN — PROPOFOL 20 MG: 10 INJECTION, EMULSION INTRAVENOUS at 11:01

## 2021-01-27 RX ADMIN — SODIUM CHLORIDE: 0.9 INJECTION, SOLUTION INTRAVENOUS at 10:01

## 2021-01-29 DIAGNOSIS — N40.0 BENIGN PROSTATIC HYPERPLASIA, UNSPECIFIED WHETHER LOWER URINARY TRACT SYMPTOMS PRESENT: Chronic | ICD-10-CM

## 2021-01-29 RX ORDER — TAMSULOSIN HYDROCHLORIDE 0.4 MG/1
CAPSULE ORAL
Qty: 30 CAPSULE | Refills: 0 | Status: SHIPPED | OUTPATIENT
Start: 2021-01-29 | End: 2021-03-22 | Stop reason: SDUPTHER

## 2021-02-03 LAB
FINAL PATHOLOGIC DIAGNOSIS: NORMAL
GROSS: NORMAL
Lab: NORMAL

## 2021-02-05 ENCOUNTER — INFUSION (OUTPATIENT)
Dept: INFUSION THERAPY | Facility: HOSPITAL | Age: 81
End: 2021-02-05
Attending: INTERNAL MEDICINE
Payer: MEDICARE

## 2021-02-05 VITALS
HEART RATE: 60 BPM | HEIGHT: 71 IN | BODY MASS INDEX: 33.6 KG/M2 | TEMPERATURE: 98 F | DIASTOLIC BLOOD PRESSURE: 71 MMHG | SYSTOLIC BLOOD PRESSURE: 168 MMHG | RESPIRATION RATE: 18 BRPM | WEIGHT: 240 LBS | OXYGEN SATURATION: 98 %

## 2021-02-05 DIAGNOSIS — D50.9 IRON DEFICIENCY ANEMIA, UNSPECIFIED IRON DEFICIENCY ANEMIA TYPE: Primary | ICD-10-CM

## 2021-02-05 PROCEDURE — A4216 STERILE WATER/SALINE, 10 ML: HCPCS | Performed by: INTERNAL MEDICINE

## 2021-02-05 PROCEDURE — 63600175 PHARM REV CODE 636 W HCPCS: Mod: JG | Performed by: INTERNAL MEDICINE

## 2021-02-05 PROCEDURE — 25000003 PHARM REV CODE 250: Performed by: INTERNAL MEDICINE

## 2021-02-05 PROCEDURE — 96365 THER/PROPH/DIAG IV INF INIT: CPT

## 2021-02-05 RX ORDER — SODIUM CHLORIDE 0.9 % (FLUSH) 0.9 %
10 SYRINGE (ML) INJECTION
Status: CANCELLED | OUTPATIENT
Start: 2021-02-12

## 2021-02-05 RX ORDER — HEPARIN 100 UNIT/ML
5 SYRINGE INTRAVENOUS
Status: CANCELLED | OUTPATIENT
Start: 2021-02-12

## 2021-02-05 RX ORDER — DIPHENHYDRAMINE HYDROCHLORIDE 50 MG/ML
50 INJECTION INTRAMUSCULAR; INTRAVENOUS ONCE AS NEEDED
Status: CANCELLED | OUTPATIENT
Start: 2021-02-12

## 2021-02-05 RX ORDER — SODIUM CHLORIDE 9 MG/ML
INJECTION, SOLUTION INTRAVENOUS CONTINUOUS
Status: DISCONTINUED | OUTPATIENT
Start: 2021-02-05 | End: 2021-02-05 | Stop reason: HOSPADM

## 2021-02-05 RX ORDER — HEPARIN 100 UNIT/ML
5 SYRINGE INTRAVENOUS
Status: DISCONTINUED | OUTPATIENT
Start: 2021-02-05 | End: 2021-02-05 | Stop reason: HOSPADM

## 2021-02-05 RX ORDER — METHYLPREDNISOLONE SOD SUCC 125 MG
125 VIAL (EA) INJECTION ONCE AS NEEDED
Status: CANCELLED | OUTPATIENT
Start: 2021-02-12

## 2021-02-05 RX ORDER — SODIUM CHLORIDE 0.9 % (FLUSH) 0.9 %
10 SYRINGE (ML) INJECTION
Status: DISCONTINUED | OUTPATIENT
Start: 2021-02-05 | End: 2021-02-05 | Stop reason: HOSPADM

## 2021-02-05 RX ORDER — SODIUM CHLORIDE 9 MG/ML
INJECTION, SOLUTION INTRAVENOUS CONTINUOUS
Status: CANCELLED | OUTPATIENT
Start: 2021-02-12

## 2021-02-05 RX ORDER — EPINEPHRINE 0.3 MG/.3ML
0.3 INJECTION SUBCUTANEOUS ONCE AS NEEDED
Status: CANCELLED | OUTPATIENT
Start: 2021-02-12

## 2021-02-05 RX ADMIN — SODIUM CHLORIDE: 0.9 INJECTION, SOLUTION INTRAVENOUS at 02:02

## 2021-02-05 RX ADMIN — Medication 10 ML: at 02:02

## 2021-02-05 RX ADMIN — FERRIC CARBOXYMALTOSE INJECTION 750 MG: 50 INJECTION, SOLUTION INTRAVENOUS at 02:02

## 2021-02-07 ENCOUNTER — IMMUNIZATION (OUTPATIENT)
Dept: INTERNAL MEDICINE | Facility: CLINIC | Age: 81
End: 2021-02-07
Payer: MEDICARE

## 2021-02-07 DIAGNOSIS — Z23 NEED FOR VACCINATION: Primary | ICD-10-CM

## 2021-02-07 PROCEDURE — 0002A COVID-19, MRNA, LNP-S, PF, 30 MCG/0.3 ML DOSE VACCINE: CPT | Mod: HCNC,PBBFAC | Performed by: FAMILY MEDICINE

## 2021-02-07 PROCEDURE — 91300 COVID-19, MRNA, LNP-S, PF, 30 MCG/0.3 ML DOSE VACCINE: CPT | Mod: HCNC,PBBFAC | Performed by: FAMILY MEDICINE

## 2021-02-12 ENCOUNTER — INFUSION (OUTPATIENT)
Dept: INFUSION THERAPY | Facility: HOSPITAL | Age: 81
End: 2021-02-12
Attending: INTERNAL MEDICINE
Payer: MEDICARE

## 2021-02-12 VITALS
WEIGHT: 240 LBS | HEIGHT: 71 IN | OXYGEN SATURATION: 97 % | RESPIRATION RATE: 18 BRPM | TEMPERATURE: 98 F | SYSTOLIC BLOOD PRESSURE: 168 MMHG | BODY MASS INDEX: 33.6 KG/M2 | HEART RATE: 61 BPM | DIASTOLIC BLOOD PRESSURE: 87 MMHG

## 2021-02-12 DIAGNOSIS — D50.9 IRON DEFICIENCY ANEMIA, UNSPECIFIED IRON DEFICIENCY ANEMIA TYPE: Primary | ICD-10-CM

## 2021-02-12 PROCEDURE — A4216 STERILE WATER/SALINE, 10 ML: HCPCS | Performed by: INTERNAL MEDICINE

## 2021-02-12 PROCEDURE — 96365 THER/PROPH/DIAG IV INF INIT: CPT

## 2021-02-12 PROCEDURE — 63600175 PHARM REV CODE 636 W HCPCS: Mod: JG | Performed by: INTERNAL MEDICINE

## 2021-02-12 PROCEDURE — 25000003 PHARM REV CODE 250: Performed by: INTERNAL MEDICINE

## 2021-02-12 RX ORDER — DIPHENHYDRAMINE HYDROCHLORIDE 50 MG/ML
50 INJECTION INTRAMUSCULAR; INTRAVENOUS ONCE AS NEEDED
Status: CANCELLED | OUTPATIENT
Start: 2021-02-12

## 2021-02-12 RX ORDER — SODIUM CHLORIDE 9 MG/ML
INJECTION, SOLUTION INTRAVENOUS CONTINUOUS
Status: CANCELLED | OUTPATIENT
Start: 2021-02-12

## 2021-02-12 RX ORDER — SODIUM CHLORIDE 0.9 % (FLUSH) 0.9 %
10 SYRINGE (ML) INJECTION
Status: DISCONTINUED | OUTPATIENT
Start: 2021-02-12 | End: 2021-02-12 | Stop reason: HOSPADM

## 2021-02-12 RX ORDER — SODIUM CHLORIDE 0.9 % (FLUSH) 0.9 %
10 SYRINGE (ML) INJECTION
Status: CANCELLED | OUTPATIENT
Start: 2021-02-12

## 2021-02-12 RX ORDER — HEPARIN 100 UNIT/ML
5 SYRINGE INTRAVENOUS
Status: DISCONTINUED | OUTPATIENT
Start: 2021-02-12 | End: 2021-02-12 | Stop reason: HOSPADM

## 2021-02-12 RX ORDER — EPINEPHRINE 0.3 MG/.3ML
0.3 INJECTION SUBCUTANEOUS ONCE AS NEEDED
Status: CANCELLED | OUTPATIENT
Start: 2021-02-12

## 2021-02-12 RX ORDER — METHYLPREDNISOLONE SOD SUCC 125 MG
125 VIAL (EA) INJECTION ONCE AS NEEDED
Status: CANCELLED | OUTPATIENT
Start: 2021-02-12

## 2021-02-12 RX ORDER — SODIUM CHLORIDE 9 MG/ML
INJECTION, SOLUTION INTRAVENOUS CONTINUOUS
Status: DISCONTINUED | OUTPATIENT
Start: 2021-02-12 | End: 2021-02-12 | Stop reason: HOSPADM

## 2021-02-12 RX ORDER — HEPARIN 100 UNIT/ML
5 SYRINGE INTRAVENOUS
Status: CANCELLED | OUTPATIENT
Start: 2021-02-12

## 2021-02-12 RX ADMIN — FERRIC CARBOXYMALTOSE INJECTION 750 MG: 50 INJECTION, SOLUTION INTRAVENOUS at 02:02

## 2021-02-12 RX ADMIN — Medication 10 ML: at 02:02

## 2021-02-12 RX ADMIN — SODIUM CHLORIDE: 0.9 INJECTION, SOLUTION INTRAVENOUS at 02:02

## 2021-02-18 ENCOUNTER — CLINICAL SUPPORT (OUTPATIENT)
Dept: INTERNAL MEDICINE | Facility: CLINIC | Age: 81
End: 2021-02-18
Payer: MEDICARE

## 2021-02-18 ENCOUNTER — TELEPHONE (OUTPATIENT)
Dept: INTERNAL MEDICINE | Facility: CLINIC | Age: 81
End: 2021-02-18

## 2021-02-18 ENCOUNTER — PATIENT OUTREACH (OUTPATIENT)
Dept: ADMINISTRATIVE | Facility: OTHER | Age: 81
End: 2021-02-18

## 2021-02-18 PROCEDURE — 96372 THER/PROPH/DIAG INJ SC/IM: CPT | Mod: S$GLB,,, | Performed by: INTERNAL MEDICINE

## 2021-02-18 PROCEDURE — 96372 PR INJECTION,THERAP/PROPH/DIAG2ST, IM OR SUBCUT: ICD-10-PCS | Mod: S$GLB,,, | Performed by: INTERNAL MEDICINE

## 2021-02-18 RX ADMIN — CYANOCOBALAMIN 100 MCG: 1000 INJECTION, SOLUTION INTRAMUSCULAR at 10:02

## 2021-02-23 ENCOUNTER — OFFICE VISIT (OUTPATIENT)
Dept: GASTROENTEROLOGY | Facility: CLINIC | Age: 81
End: 2021-02-23
Payer: MEDICARE

## 2021-02-23 VITALS — WEIGHT: 239.63 LBS | HEIGHT: 71 IN | BODY MASS INDEX: 33.55 KG/M2

## 2021-02-23 DIAGNOSIS — K29.40 ATROPHIC GASTRITIS WITHOUT HEMORRHAGE: ICD-10-CM

## 2021-02-23 DIAGNOSIS — D50.9 IRON DEFICIENCY ANEMIA, UNSPECIFIED IRON DEFICIENCY ANEMIA TYPE: Primary | ICD-10-CM

## 2021-02-23 DIAGNOSIS — K29.70 GASTRITIS WITH INTESTINAL METAPLASIA OF STOMACH: ICD-10-CM

## 2021-02-23 DIAGNOSIS — K31.A0 GASTRITIS WITH INTESTINAL METAPLASIA OF STOMACH: ICD-10-CM

## 2021-02-23 PROCEDURE — 1101F PT FALLS ASSESS-DOCD LE1/YR: CPT | Mod: CPTII,S$GLB,, | Performed by: INTERNAL MEDICINE

## 2021-02-23 PROCEDURE — 99999 PR PBB SHADOW E&M-EST. PATIENT-LVL IV: ICD-10-PCS | Mod: PBBFAC,,, | Performed by: INTERNAL MEDICINE

## 2021-02-23 PROCEDURE — 1126F AMNT PAIN NOTED NONE PRSNT: CPT | Mod: S$GLB,,, | Performed by: INTERNAL MEDICINE

## 2021-02-23 PROCEDURE — 99999 PR PBB SHADOW E&M-EST. PATIENT-LVL IV: CPT | Mod: PBBFAC,,, | Performed by: INTERNAL MEDICINE

## 2021-02-23 PROCEDURE — 99214 OFFICE O/P EST MOD 30 MIN: CPT | Mod: S$GLB,,, | Performed by: INTERNAL MEDICINE

## 2021-02-23 PROCEDURE — 99214 PR OFFICE/OUTPT VISIT, EST, LEVL IV, 30-39 MIN: ICD-10-PCS | Mod: S$GLB,,, | Performed by: INTERNAL MEDICINE

## 2021-02-23 PROCEDURE — 3288F PR FALLS RISK ASSESSMENT DOCUMENTED: ICD-10-PCS | Mod: CPTII,S$GLB,, | Performed by: INTERNAL MEDICINE

## 2021-02-23 PROCEDURE — 1126F PR PAIN SEVERITY QUANTIFIED, NO PAIN PRESENT: ICD-10-PCS | Mod: S$GLB,,, | Performed by: INTERNAL MEDICINE

## 2021-02-23 PROCEDURE — 1101F PR PT FALLS ASSESS DOC 0-1 FALLS W/OUT INJ PAST YR: ICD-10-PCS | Mod: CPTII,S$GLB,, | Performed by: INTERNAL MEDICINE

## 2021-02-23 PROCEDURE — 3288F FALL RISK ASSESSMENT DOCD: CPT | Mod: CPTII,S$GLB,, | Performed by: INTERNAL MEDICINE

## 2021-02-26 ENCOUNTER — OFFICE VISIT (OUTPATIENT)
Dept: NEPHROLOGY | Facility: CLINIC | Age: 81
End: 2021-02-26
Payer: MEDICARE

## 2021-02-26 ENCOUNTER — OFFICE VISIT (OUTPATIENT)
Dept: ENDOCRINOLOGY | Facility: CLINIC | Age: 81
End: 2021-02-26
Payer: MEDICARE

## 2021-02-26 VITALS
DIASTOLIC BLOOD PRESSURE: 62 MMHG | SYSTOLIC BLOOD PRESSURE: 160 MMHG | HEART RATE: 61 BPM | BODY MASS INDEX: 33.52 KG/M2 | WEIGHT: 239.44 LBS | HEIGHT: 71 IN | OXYGEN SATURATION: 96 %

## 2021-02-26 VITALS
SYSTOLIC BLOOD PRESSURE: 148 MMHG | WEIGHT: 240.06 LBS | DIASTOLIC BLOOD PRESSURE: 60 MMHG | HEIGHT: 71 IN | BODY MASS INDEX: 33.61 KG/M2

## 2021-02-26 DIAGNOSIS — E11.22 CKD STAGE 4 DUE TO TYPE 2 DIABETES MELLITUS: ICD-10-CM

## 2021-02-26 DIAGNOSIS — N18.4 CKD STAGE 4 DUE TO TYPE 2 DIABETES MELLITUS: ICD-10-CM

## 2021-02-26 DIAGNOSIS — G47.33 OSA ON CPAP: ICD-10-CM

## 2021-02-26 DIAGNOSIS — E11.59 HYPERTENSION ASSOCIATED WITH DIABETES: Chronic | ICD-10-CM

## 2021-02-26 DIAGNOSIS — E11.69 HYPERLIPIDEMIA ASSOCIATED WITH TYPE 2 DIABETES MELLITUS: ICD-10-CM

## 2021-02-26 DIAGNOSIS — Z79.4 CONTROLLED TYPE 2 DIABETES MELLITUS WITH BOTH EYES AFFECTED BY MILD NONPROLIFERATIVE RETINOPATHY AND MACULAR EDEMA, WITH LONG-TERM CURRENT USE OF INSULIN: ICD-10-CM

## 2021-02-26 DIAGNOSIS — D63.1 ANEMIA OF CHRONIC RENAL FAILURE, STAGE 4 (SEVERE): ICD-10-CM

## 2021-02-26 DIAGNOSIS — E78.5 HYPERLIPIDEMIA ASSOCIATED WITH TYPE 2 DIABETES MELLITUS: ICD-10-CM

## 2021-02-26 DIAGNOSIS — N18.4 CONTROLLED TYPE 2 DIABETES MELLITUS WITH STAGE 4 CHRONIC KIDNEY DISEASE, WITH LONG-TERM CURRENT USE OF INSULIN: Primary | ICD-10-CM

## 2021-02-26 DIAGNOSIS — Z79.4 CONTROLLED TYPE 2 DIABETES MELLITUS WITH STAGE 4 CHRONIC KIDNEY DISEASE, WITH LONG-TERM CURRENT USE OF INSULIN: Primary | ICD-10-CM

## 2021-02-26 DIAGNOSIS — E11.22 CONTROLLED TYPE 2 DIABETES MELLITUS WITH STAGE 4 CHRONIC KIDNEY DISEASE, WITH LONG-TERM CURRENT USE OF INSULIN: Primary | ICD-10-CM

## 2021-02-26 DIAGNOSIS — I12.9 HYPERTENSIVE KIDNEY DISEASE WITH CHRONIC KIDNEY DISEASE, STAGE 1-4 OR UNSPECIFIED CHRONIC KIDNEY DISEASE: ICD-10-CM

## 2021-02-26 DIAGNOSIS — E11.3213 CONTROLLED TYPE 2 DIABETES MELLITUS WITH BOTH EYES AFFECTED BY MILD NONPROLIFERATIVE RETINOPATHY AND MACULAR EDEMA, WITH LONG-TERM CURRENT USE OF INSULIN: ICD-10-CM

## 2021-02-26 DIAGNOSIS — N18.4 ANEMIA OF CHRONIC RENAL FAILURE, STAGE 4 (SEVERE): ICD-10-CM

## 2021-02-26 DIAGNOSIS — I15.2 HYPERTENSION ASSOCIATED WITH DIABETES: Chronic | ICD-10-CM

## 2021-02-26 PROCEDURE — 3077F SYST BP >= 140 MM HG: CPT | Mod: CPTII,S$GLB,, | Performed by: INTERNAL MEDICINE

## 2021-02-26 PROCEDURE — 3077F PR MOST RECENT SYSTOLIC BLOOD PRESSURE >= 140 MM HG: ICD-10-PCS | Mod: CPTII,S$GLB,, | Performed by: INTERNAL MEDICINE

## 2021-02-26 PROCEDURE — 1126F PR PAIN SEVERITY QUANTIFIED, NO PAIN PRESENT: ICD-10-PCS | Mod: S$GLB,,, | Performed by: NURSE PRACTITIONER

## 2021-02-26 PROCEDURE — 99999 PR PBB SHADOW E&M-EST. PATIENT-LVL IV: CPT | Mod: PBBFAC,,, | Performed by: INTERNAL MEDICINE

## 2021-02-26 PROCEDURE — 1126F PR PAIN SEVERITY QUANTIFIED, NO PAIN PRESENT: ICD-10-PCS | Mod: S$GLB,,, | Performed by: INTERNAL MEDICINE

## 2021-02-26 PROCEDURE — 1159F PR MEDICATION LIST DOCUMENTED IN MEDICAL RECORD: ICD-10-PCS | Mod: S$GLB,,, | Performed by: NURSE PRACTITIONER

## 2021-02-26 PROCEDURE — 99214 PR OFFICE/OUTPT VISIT, EST, LEVL IV, 30-39 MIN: ICD-10-PCS | Mod: S$GLB,,, | Performed by: NURSE PRACTITIONER

## 2021-02-26 PROCEDURE — 3078F PR MOST RECENT DIASTOLIC BLOOD PRESSURE < 80 MM HG: ICD-10-PCS | Mod: CPTII,S$GLB,, | Performed by: NURSE PRACTITIONER

## 2021-02-26 PROCEDURE — 1159F MED LIST DOCD IN RCRD: CPT | Mod: S$GLB,,, | Performed by: NURSE PRACTITIONER

## 2021-02-26 PROCEDURE — 99999 PR PBB SHADOW E&M-EST. PATIENT-LVL IV: ICD-10-PCS | Mod: PBBFAC,,, | Performed by: NURSE PRACTITIONER

## 2021-02-26 PROCEDURE — 1159F PR MEDICATION LIST DOCUMENTED IN MEDICAL RECORD: ICD-10-PCS | Mod: S$GLB,,, | Performed by: INTERNAL MEDICINE

## 2021-02-26 PROCEDURE — 99499 UNLISTED E&M SERVICE: CPT | Mod: HCNC,S$GLB,, | Performed by: NURSE PRACTITIONER

## 2021-02-26 PROCEDURE — 99999 PR PBB SHADOW E&M-EST. PATIENT-LVL IV: ICD-10-PCS | Mod: PBBFAC,,, | Performed by: INTERNAL MEDICINE

## 2021-02-26 PROCEDURE — 99214 OFFICE O/P EST MOD 30 MIN: CPT | Mod: S$GLB,,, | Performed by: NURSE PRACTITIONER

## 2021-02-26 PROCEDURE — 3078F PR MOST RECENT DIASTOLIC BLOOD PRESSURE < 80 MM HG: ICD-10-PCS | Mod: CPTII,S$GLB,, | Performed by: INTERNAL MEDICINE

## 2021-02-26 PROCEDURE — 99999 PR PBB SHADOW E&M-EST. PATIENT-LVL IV: CPT | Mod: PBBFAC,,, | Performed by: NURSE PRACTITIONER

## 2021-02-26 PROCEDURE — 3077F SYST BP >= 140 MM HG: CPT | Mod: CPTII,S$GLB,, | Performed by: NURSE PRACTITIONER

## 2021-02-26 PROCEDURE — 1159F MED LIST DOCD IN RCRD: CPT | Mod: S$GLB,,, | Performed by: INTERNAL MEDICINE

## 2021-02-26 PROCEDURE — 99213 OFFICE O/P EST LOW 20 MIN: CPT | Mod: S$GLB,,, | Performed by: INTERNAL MEDICINE

## 2021-02-26 PROCEDURE — 3077F PR MOST RECENT SYSTOLIC BLOOD PRESSURE >= 140 MM HG: ICD-10-PCS | Mod: CPTII,S$GLB,, | Performed by: NURSE PRACTITIONER

## 2021-02-26 PROCEDURE — 99499 RISK ADDL DX/OHS AUDIT: ICD-10-PCS | Mod: HCNC,S$GLB,, | Performed by: NURSE PRACTITIONER

## 2021-02-26 PROCEDURE — 3078F DIAST BP <80 MM HG: CPT | Mod: CPTII,S$GLB,, | Performed by: NURSE PRACTITIONER

## 2021-02-26 PROCEDURE — 1126F AMNT PAIN NOTED NONE PRSNT: CPT | Mod: S$GLB,,, | Performed by: NURSE PRACTITIONER

## 2021-02-26 PROCEDURE — 99213 PR OFFICE/OUTPT VISIT, EST, LEVL III, 20-29 MIN: ICD-10-PCS | Mod: S$GLB,,, | Performed by: INTERNAL MEDICINE

## 2021-02-26 PROCEDURE — 1126F AMNT PAIN NOTED NONE PRSNT: CPT | Mod: S$GLB,,, | Performed by: INTERNAL MEDICINE

## 2021-02-26 PROCEDURE — 3078F DIAST BP <80 MM HG: CPT | Mod: CPTII,S$GLB,, | Performed by: INTERNAL MEDICINE

## 2021-03-11 ENCOUNTER — CLINICAL SUPPORT (OUTPATIENT)
Dept: ENDOCRINOLOGY | Facility: CLINIC | Age: 81
End: 2021-03-11
Payer: MEDICARE

## 2021-03-11 DIAGNOSIS — E11.3213 CONTROLLED TYPE 2 DIABETES MELLITUS WITH BOTH EYES AFFECTED BY MILD NONPROLIFERATIVE RETINOPATHY AND MACULAR EDEMA, WITH LONG-TERM CURRENT USE OF INSULIN: ICD-10-CM

## 2021-03-11 DIAGNOSIS — Z79.4 CONTROLLED TYPE 2 DIABETES MELLITUS WITH BOTH EYES AFFECTED BY MILD NONPROLIFERATIVE RETINOPATHY AND MACULAR EDEMA, WITH LONG-TERM CURRENT USE OF INSULIN: ICD-10-CM

## 2021-03-16 ENCOUNTER — PATIENT OUTREACH (OUTPATIENT)
Dept: ADMINISTRATIVE | Facility: HOSPITAL | Age: 81
End: 2021-03-16

## 2021-03-17 ENCOUNTER — CLINICAL SUPPORT (OUTPATIENT)
Dept: ENDOCRINOLOGY | Facility: CLINIC | Age: 81
End: 2021-03-17
Payer: MEDICARE

## 2021-03-17 DIAGNOSIS — E11.3213 CONTROLLED TYPE 2 DIABETES MELLITUS WITH BOTH EYES AFFECTED BY MILD NONPROLIFERATIVE RETINOPATHY AND MACULAR EDEMA, WITH LONG-TERM CURRENT USE OF INSULIN: Chronic | ICD-10-CM

## 2021-03-17 DIAGNOSIS — Z79.4 CONTROLLED TYPE 2 DIABETES MELLITUS WITH BOTH EYES AFFECTED BY MILD NONPROLIFERATIVE RETINOPATHY AND MACULAR EDEMA, WITH LONG-TERM CURRENT USE OF INSULIN: Chronic | ICD-10-CM

## 2021-03-17 PROCEDURE — 95251 CONT GLUC MNTR ANALYSIS I&R: CPT | Mod: S$GLB,,, | Performed by: INTERNAL MEDICINE

## 2021-03-17 PROCEDURE — 95250 PR GLUCOSE MONITORING,72 HRS,SUB-Q SENSOR: ICD-10-PCS | Mod: S$GLB,,, | Performed by: INTERNAL MEDICINE

## 2021-03-17 PROCEDURE — 95251 PR GLUCOSE MONITOR, 72 HOUR, PHYS INTERP: ICD-10-PCS | Mod: S$GLB,,, | Performed by: INTERNAL MEDICINE

## 2021-03-17 PROCEDURE — 95250 CONT GLUC MNTR PHYS/QHP EQP: CPT | Mod: S$GLB,,, | Performed by: INTERNAL MEDICINE

## 2021-03-19 ENCOUNTER — CLINICAL SUPPORT (OUTPATIENT)
Dept: INTERNAL MEDICINE | Facility: CLINIC | Age: 81
End: 2021-03-19
Payer: MEDICARE

## 2021-03-19 ENCOUNTER — LAB VISIT (OUTPATIENT)
Dept: LAB | Facility: HOSPITAL | Age: 81
End: 2021-03-19
Attending: INTERNAL MEDICINE
Payer: MEDICARE

## 2021-03-19 DIAGNOSIS — N18.30 CKD (CHRONIC KIDNEY DISEASE) STAGE 3, GFR 30-59 ML/MIN: ICD-10-CM

## 2021-03-19 DIAGNOSIS — Z79.4 CONTROLLED TYPE 2 DIABETES MELLITUS WITH STAGE 4 CHRONIC KIDNEY DISEASE, WITH LONG-TERM CURRENT USE OF INSULIN: ICD-10-CM

## 2021-03-19 DIAGNOSIS — E11.22 CONTROLLED TYPE 2 DIABETES MELLITUS WITH STAGE 4 CHRONIC KIDNEY DISEASE, WITH LONG-TERM CURRENT USE OF INSULIN: ICD-10-CM

## 2021-03-19 DIAGNOSIS — I10 ESSENTIAL HYPERTENSION: ICD-10-CM

## 2021-03-19 DIAGNOSIS — Z79.4 CONTROLLED TYPE 2 DIABETES MELLITUS WITH BOTH EYES AFFECTED BY MILD NONPROLIFERATIVE RETINOPATHY AND MACULAR EDEMA, WITH LONG-TERM CURRENT USE OF INSULIN: ICD-10-CM

## 2021-03-19 DIAGNOSIS — E11.3213 CONTROLLED TYPE 2 DIABETES MELLITUS WITH BOTH EYES AFFECTED BY MILD NONPROLIFERATIVE RETINOPATHY AND MACULAR EDEMA, WITH LONG-TERM CURRENT USE OF INSULIN: ICD-10-CM

## 2021-03-19 DIAGNOSIS — N18.4 CONTROLLED TYPE 2 DIABETES MELLITUS WITH STAGE 4 CHRONIC KIDNEY DISEASE, WITH LONG-TERM CURRENT USE OF INSULIN: ICD-10-CM

## 2021-03-19 LAB
25(OH)D3+25(OH)D2 SERPL-MCNC: 33 NG/ML (ref 30–96)
ALBUMIN SERPL BCP-MCNC: 3.5 G/DL (ref 3.5–5.2)
ALBUMIN SERPL BCP-MCNC: 3.5 G/DL (ref 3.5–5.2)
ALP SERPL-CCNC: 42 U/L (ref 55–135)
ALT SERPL W/O P-5'-P-CCNC: 23 U/L (ref 10–44)
ANION GAP SERPL CALC-SCNC: 11 MMOL/L (ref 8–16)
AST SERPL-CCNC: 20 U/L (ref 10–40)
BILIRUB SERPL-MCNC: 0.7 MG/DL (ref 0.1–1)
BUN SERPL-MCNC: 30 MG/DL (ref 8–23)
CALCIUM SERPL-MCNC: 8.5 MG/DL (ref 8.7–10.5)
CHLORIDE SERPL-SCNC: 112 MMOL/L (ref 95–110)
CO2 SERPL-SCNC: 18 MMOL/L (ref 23–29)
CREAT SERPL-MCNC: 1.8 MG/DL (ref 0.5–1.4)
EST. GFR  (AFRICAN AMERICAN): 39.9 ML/MIN/1.73 M^2
EST. GFR  (NON AFRICAN AMERICAN): 34.5 ML/MIN/1.73 M^2
ESTIMATED AVG GLUCOSE: 131 MG/DL (ref 68–131)
GLUCOSE SERPL-MCNC: 113 MG/DL (ref 70–110)
HBA1C MFR BLD: 6.2 % (ref 4–5.6)
PHOSPHATE SERPL-MCNC: 3.3 MG/DL (ref 2.7–4.5)
POTASSIUM SERPL-SCNC: 4.6 MMOL/L (ref 3.5–5.1)
PROT SERPL-MCNC: 6.6 G/DL (ref 6–8.4)
PTH-INTACT SERPL-MCNC: 162 PG/ML (ref 9–77)
SODIUM SERPL-SCNC: 141 MMOL/L (ref 136–145)

## 2021-03-19 PROCEDURE — 80069 RENAL FUNCTION PANEL: CPT | Performed by: INTERNAL MEDICINE

## 2021-03-19 PROCEDURE — 83036 HEMOGLOBIN GLYCOSYLATED A1C: CPT | Performed by: NURSE PRACTITIONER

## 2021-03-19 PROCEDURE — 36415 COLL VENOUS BLD VENIPUNCTURE: CPT | Performed by: INTERNAL MEDICINE

## 2021-03-19 PROCEDURE — 80053 COMPREHEN METABOLIC PANEL: CPT | Performed by: NURSE PRACTITIONER

## 2021-03-19 PROCEDURE — 96372 PR INJECTION,THERAP/PROPH/DIAG2ST, IM OR SUBCUT: ICD-10-PCS | Mod: S$GLB,,, | Performed by: INTERNAL MEDICINE

## 2021-03-19 PROCEDURE — 82306 VITAMIN D 25 HYDROXY: CPT | Performed by: INTERNAL MEDICINE

## 2021-03-19 PROCEDURE — 83970 ASSAY OF PARATHORMONE: CPT | Performed by: INTERNAL MEDICINE

## 2021-03-19 PROCEDURE — 96372 THER/PROPH/DIAG INJ SC/IM: CPT | Mod: S$GLB,,, | Performed by: INTERNAL MEDICINE

## 2021-03-19 RX ADMIN — CYANOCOBALAMIN 100 MCG: 1000 INJECTION, SOLUTION INTRAMUSCULAR at 09:03

## 2021-03-22 ENCOUNTER — PATIENT OUTREACH (OUTPATIENT)
Dept: ADMINISTRATIVE | Facility: OTHER | Age: 81
End: 2021-03-22

## 2021-03-22 ENCOUNTER — PATIENT MESSAGE (OUTPATIENT)
Dept: ENDOCRINOLOGY | Facility: CLINIC | Age: 81
End: 2021-03-22

## 2021-03-22 ENCOUNTER — OFFICE VISIT (OUTPATIENT)
Dept: CARDIOLOGY | Facility: CLINIC | Age: 81
End: 2021-03-22
Payer: MEDICARE

## 2021-03-22 VITALS
HEIGHT: 71 IN | OXYGEN SATURATION: 97 % | HEART RATE: 63 BPM | WEIGHT: 241.38 LBS | BODY MASS INDEX: 33.79 KG/M2 | SYSTOLIC BLOOD PRESSURE: 149 MMHG | DIASTOLIC BLOOD PRESSURE: 67 MMHG

## 2021-03-22 DIAGNOSIS — E66.9 OBESITY, DIABETES, AND HYPERTENSION SYNDROME: ICD-10-CM

## 2021-03-22 DIAGNOSIS — I25.10 CORONARY ARTERY DISEASE INVOLVING NATIVE CORONARY ARTERY OF NATIVE HEART WITHOUT ANGINA PECTORIS: ICD-10-CM

## 2021-03-22 DIAGNOSIS — D50.9 IRON DEFICIENCY ANEMIA, UNSPECIFIED IRON DEFICIENCY ANEMIA TYPE: ICD-10-CM

## 2021-03-22 DIAGNOSIS — N40.0 BENIGN PROSTATIC HYPERPLASIA, UNSPECIFIED WHETHER LOWER URINARY TRACT SYMPTOMS PRESENT: Chronic | ICD-10-CM

## 2021-03-22 DIAGNOSIS — E78.5 HYPERLIPIDEMIA, UNSPECIFIED HYPERLIPIDEMIA TYPE: ICD-10-CM

## 2021-03-22 DIAGNOSIS — G47.33 OSA ON CPAP: ICD-10-CM

## 2021-03-22 DIAGNOSIS — R06.09 DOE (DYSPNEA ON EXERTION): Primary | ICD-10-CM

## 2021-03-22 DIAGNOSIS — E11.69 OBESITY, DIABETES, AND HYPERTENSION SYNDROME: ICD-10-CM

## 2021-03-22 DIAGNOSIS — I35.0 NONRHEUMATIC AORTIC VALVE STENOSIS: ICD-10-CM

## 2021-03-22 DIAGNOSIS — N18.32 STAGE 3B CHRONIC KIDNEY DISEASE: ICD-10-CM

## 2021-03-22 DIAGNOSIS — I10 ESSENTIAL HYPERTENSION: ICD-10-CM

## 2021-03-22 DIAGNOSIS — I15.2 OBESITY, DIABETES, AND HYPERTENSION SYNDROME: ICD-10-CM

## 2021-03-22 DIAGNOSIS — E11.59 OBESITY, DIABETES, AND HYPERTENSION SYNDROME: ICD-10-CM

## 2021-03-22 PROCEDURE — 3077F PR MOST RECENT SYSTOLIC BLOOD PRESSURE >= 140 MM HG: ICD-10-PCS | Mod: HCNC,CPTII,S$GLB, | Performed by: INTERNAL MEDICINE

## 2021-03-22 PROCEDURE — 1159F PR MEDICATION LIST DOCUMENTED IN MEDICAL RECORD: ICD-10-PCS | Mod: HCNC,S$GLB,, | Performed by: INTERNAL MEDICINE

## 2021-03-22 PROCEDURE — 99214 OFFICE O/P EST MOD 30 MIN: CPT | Mod: HCNC,S$GLB,, | Performed by: INTERNAL MEDICINE

## 2021-03-22 PROCEDURE — 1126F PR PAIN SEVERITY QUANTIFIED, NO PAIN PRESENT: ICD-10-PCS | Mod: HCNC,S$GLB,, | Performed by: INTERNAL MEDICINE

## 2021-03-22 PROCEDURE — 3078F DIAST BP <80 MM HG: CPT | Mod: HCNC,CPTII,S$GLB, | Performed by: INTERNAL MEDICINE

## 2021-03-22 PROCEDURE — 99999 PR PBB SHADOW E&M-EST. PATIENT-LVL V: CPT | Mod: PBBFAC,,, | Performed by: INTERNAL MEDICINE

## 2021-03-22 PROCEDURE — 1126F AMNT PAIN NOTED NONE PRSNT: CPT | Mod: HCNC,S$GLB,, | Performed by: INTERNAL MEDICINE

## 2021-03-22 PROCEDURE — 99999 PR PBB SHADOW E&M-EST. PATIENT-LVL V: ICD-10-PCS | Mod: PBBFAC,,, | Performed by: INTERNAL MEDICINE

## 2021-03-22 PROCEDURE — 99214 PR OFFICE/OUTPT VISIT, EST, LEVL IV, 30-39 MIN: ICD-10-PCS | Mod: HCNC,S$GLB,, | Performed by: INTERNAL MEDICINE

## 2021-03-22 PROCEDURE — 3077F SYST BP >= 140 MM HG: CPT | Mod: HCNC,CPTII,S$GLB, | Performed by: INTERNAL MEDICINE

## 2021-03-22 PROCEDURE — 3078F PR MOST RECENT DIASTOLIC BLOOD PRESSURE < 80 MM HG: ICD-10-PCS | Mod: HCNC,CPTII,S$GLB, | Performed by: INTERNAL MEDICINE

## 2021-03-22 PROCEDURE — 1159F MED LIST DOCD IN RCRD: CPT | Mod: HCNC,S$GLB,, | Performed by: INTERNAL MEDICINE

## 2021-03-22 RX ORDER — TAMSULOSIN HYDROCHLORIDE 0.4 MG/1
CAPSULE ORAL
Qty: 30 CAPSULE | Refills: 0 | Status: SHIPPED | OUTPATIENT
Start: 2021-03-22 | End: 2021-04-15 | Stop reason: SDUPTHER

## 2021-03-23 ENCOUNTER — HOSPITAL ENCOUNTER (OUTPATIENT)
Dept: RADIOLOGY | Facility: HOSPITAL | Age: 81
Discharge: HOME | End: 2021-03-23
Attending: INTERNAL MEDICINE
Payer: MEDICARE

## 2021-03-23 DIAGNOSIS — N18.4 CONTROLLED TYPE 2 DIABETES MELLITUS WITH STAGE 4 CHRONIC KIDNEY DISEASE, WITH LONG-TERM CURRENT USE OF INSULIN: ICD-10-CM

## 2021-03-23 DIAGNOSIS — E11.22 CONTROLLED TYPE 2 DIABETES MELLITUS WITH STAGE 4 CHRONIC KIDNEY DISEASE, WITH LONG-TERM CURRENT USE OF INSULIN: ICD-10-CM

## 2021-03-23 DIAGNOSIS — Z79.4 CONTROLLED TYPE 2 DIABETES MELLITUS WITH STAGE 4 CHRONIC KIDNEY DISEASE, WITH LONG-TERM CURRENT USE OF INSULIN: ICD-10-CM

## 2021-03-23 PROCEDURE — 93975 US DOPPLER RENAL ARTERY AND VEIN (XPD): ICD-10-PCS | Mod: 26,HCNC,, | Performed by: RADIOLOGY

## 2021-03-23 PROCEDURE — 93975 VASCULAR STUDY: CPT | Mod: 26,HCNC,, | Performed by: RADIOLOGY

## 2021-03-23 PROCEDURE — 93975 VASCULAR STUDY: CPT | Mod: TC,HCNC

## 2021-04-01 ENCOUNTER — PROCEDURE VISIT (OUTPATIENT)
Dept: UROLOGY | Facility: CLINIC | Age: 81
End: 2021-04-01
Payer: MEDICARE

## 2021-04-01 VITALS
HEART RATE: 66 BPM | RESPIRATION RATE: 16 BRPM | HEIGHT: 71 IN | SYSTOLIC BLOOD PRESSURE: 160 MMHG | BODY MASS INDEX: 33.91 KG/M2 | WEIGHT: 242.19 LBS | TEMPERATURE: 98 F | DIASTOLIC BLOOD PRESSURE: 68 MMHG

## 2021-04-01 DIAGNOSIS — Z85.51 PERSONAL HISTORY OF BLADDER CANCER: Primary | ICD-10-CM

## 2021-04-01 PROCEDURE — 88112 PR  CYTOPATH, CELL ENHANCE TECH: ICD-10-PCS | Mod: 26,HCNC,, | Performed by: PATHOLOGY

## 2021-04-01 PROCEDURE — 88112 CYTOPATH CELL ENHANCE TECH: CPT | Mod: 26,HCNC,, | Performed by: PATHOLOGY

## 2021-04-01 PROCEDURE — 52000 CYSTOSCOPY: ICD-10-PCS | Mod: HCNC,S$GLB,, | Performed by: UROLOGY

## 2021-04-01 PROCEDURE — 88112 CYTOPATH CELL ENHANCE TECH: CPT | Mod: HCNC | Performed by: PATHOLOGY

## 2021-04-01 PROCEDURE — 52000 CYSTOURETHROSCOPY: CPT | Mod: HCNC,S$GLB,, | Performed by: UROLOGY

## 2021-04-01 RX ORDER — DOXYCYCLINE HYCLATE 100 MG
100 TABLET ORAL
Status: COMPLETED | OUTPATIENT
Start: 2021-04-01 | End: 2021-04-01

## 2021-04-01 RX ORDER — DOXYCYCLINE HYCLATE 100 MG
100 TABLET ORAL ONCE
Status: CANCELLED | OUTPATIENT
Start: 2021-04-01 | End: 2021-04-01

## 2021-04-01 RX ORDER — DOXYCYCLINE HYCLATE 100 MG
100 TABLET ORAL EVERY 12 HOURS
Status: DISCONTINUED | OUTPATIENT
Start: 2021-04-01 | End: 2021-01-01

## 2021-04-01 RX ORDER — LIDOCAINE HYDROCHLORIDE 20 MG/ML
JELLY TOPICAL
Status: COMPLETED | OUTPATIENT
Start: 2021-04-01 | End: 2021-04-01

## 2021-04-01 RX ORDER — LIDOCAINE HYDROCHLORIDE 20 MG/ML
JELLY TOPICAL ONCE
Status: CANCELLED | OUTPATIENT
Start: 2021-04-01 | End: 2021-04-01

## 2021-04-01 RX ADMIN — LIDOCAINE HYDROCHLORIDE: 20 JELLY TOPICAL at 09:04

## 2021-04-01 RX ADMIN — Medication 100 MG: at 09:04

## 2021-04-07 LAB
FINAL PATHOLOGIC DIAGNOSIS: NORMAL
Lab: NORMAL

## 2021-04-08 ENCOUNTER — PATIENT MESSAGE (OUTPATIENT)
Dept: UROLOGY | Facility: CLINIC | Age: 81
End: 2021-04-08

## 2021-04-15 DIAGNOSIS — N40.0 BENIGN PROSTATIC HYPERPLASIA, UNSPECIFIED WHETHER LOWER URINARY TRACT SYMPTOMS PRESENT: Chronic | ICD-10-CM

## 2021-04-15 RX ORDER — TAMSULOSIN HYDROCHLORIDE 0.4 MG/1
CAPSULE ORAL
Qty: 30 CAPSULE | Refills: 11 | Status: SHIPPED | OUTPATIENT
Start: 2021-04-15 | End: 2021-01-01

## 2021-04-21 ENCOUNTER — OFFICE VISIT (OUTPATIENT)
Dept: PODIATRY | Facility: CLINIC | Age: 81
End: 2021-04-21
Payer: MEDICARE

## 2021-04-21 VITALS
HEART RATE: 46 BPM | DIASTOLIC BLOOD PRESSURE: 67 MMHG | SYSTOLIC BLOOD PRESSURE: 177 MMHG | RESPIRATION RATE: 19 BRPM | BODY MASS INDEX: 33.34 KG/M2 | WEIGHT: 238.13 LBS | HEIGHT: 71 IN

## 2021-04-21 DIAGNOSIS — E11.49 TYPE II DIABETES MELLITUS WITH NEUROLOGICAL MANIFESTATIONS: Primary | ICD-10-CM

## 2021-04-21 DIAGNOSIS — B35.1 ONYCHOMYCOSIS DUE TO DERMATOPHYTE: ICD-10-CM

## 2021-04-21 PROCEDURE — 1126F AMNT PAIN NOTED NONE PRSNT: CPT | Mod: HCNC,S$GLB,, | Performed by: PODIATRIST

## 2021-04-21 PROCEDURE — 99499 UNLISTED E&M SERVICE: CPT | Mod: HCNC,S$GLB,, | Performed by: PODIATRIST

## 2021-04-21 PROCEDURE — 99499 NO LOS: ICD-10-PCS | Mod: HCNC,S$GLB,, | Performed by: PODIATRIST

## 2021-04-21 PROCEDURE — 11721 PR DEBRIDEMENT OF NAILS, 6 OR MORE: ICD-10-PCS | Mod: Q9,HCNC,S$GLB, | Performed by: PODIATRIST

## 2021-04-21 PROCEDURE — 1126F PR PAIN SEVERITY QUANTIFIED, NO PAIN PRESENT: ICD-10-PCS | Mod: HCNC,S$GLB,, | Performed by: PODIATRIST

## 2021-04-21 PROCEDURE — 99999 PR PBB SHADOW E&M-EST. PATIENT-LVL IV: CPT | Mod: PBBFAC,HCNC,, | Performed by: PODIATRIST

## 2021-04-21 PROCEDURE — 11721 DEBRIDE NAIL 6 OR MORE: CPT | Mod: Q9,HCNC,S$GLB, | Performed by: PODIATRIST

## 2021-04-21 PROCEDURE — 99999 PR PBB SHADOW E&M-EST. PATIENT-LVL IV: ICD-10-PCS | Mod: PBBFAC,HCNC,, | Performed by: PODIATRIST

## 2021-04-30 DIAGNOSIS — E11.22 TYPE 2 DIABETES MELLITUS WITH STAGE 3 CHRONIC KIDNEY DISEASE, WITH LONG-TERM CURRENT USE OF INSULIN, UNSPECIFIED WHETHER STAGE 3A OR 3B CKD: ICD-10-CM

## 2021-04-30 DIAGNOSIS — Z79.4 TYPE 2 DIABETES MELLITUS WITH STAGE 3 CHRONIC KIDNEY DISEASE, WITH LONG-TERM CURRENT USE OF INSULIN, UNSPECIFIED WHETHER STAGE 3A OR 3B CKD: ICD-10-CM

## 2021-04-30 DIAGNOSIS — N18.30 TYPE 2 DIABETES MELLITUS WITH STAGE 3 CHRONIC KIDNEY DISEASE, WITH LONG-TERM CURRENT USE OF INSULIN, UNSPECIFIED WHETHER STAGE 3A OR 3B CKD: ICD-10-CM

## 2021-04-30 RX ORDER — CALCIUM CITRATE/VITAMIN D3 200MG-6.25
TABLET ORAL
Qty: 300 STRIP | Refills: 11 | Status: SHIPPED | OUTPATIENT
Start: 2021-04-30 | End: 2021-05-11 | Stop reason: SDUPTHER

## 2021-05-11 DIAGNOSIS — Z79.4 TYPE 2 DIABETES MELLITUS WITH STAGE 3 CHRONIC KIDNEY DISEASE, WITH LONG-TERM CURRENT USE OF INSULIN, UNSPECIFIED WHETHER STAGE 3A OR 3B CKD: ICD-10-CM

## 2021-05-11 DIAGNOSIS — N18.30 TYPE 2 DIABETES MELLITUS WITH STAGE 3 CHRONIC KIDNEY DISEASE, WITH LONG-TERM CURRENT USE OF INSULIN, UNSPECIFIED WHETHER STAGE 3A OR 3B CKD: ICD-10-CM

## 2021-05-11 DIAGNOSIS — E11.22 TYPE 2 DIABETES MELLITUS WITH STAGE 3 CHRONIC KIDNEY DISEASE, WITH LONG-TERM CURRENT USE OF INSULIN, UNSPECIFIED WHETHER STAGE 3A OR 3B CKD: ICD-10-CM

## 2021-05-11 RX ORDER — CALCIUM CITRATE/VITAMIN D3 200MG-6.25
TABLET ORAL
Qty: 300 STRIP | Refills: 11 | Status: SHIPPED | OUTPATIENT
Start: 2021-05-11

## 2022-01-01 ENCOUNTER — OFFICE VISIT (OUTPATIENT)
Dept: SPINE | Facility: CLINIC | Age: 82
End: 2022-01-01
Attending: PHYSICAL MEDICINE & REHABILITATION
Payer: MEDICARE

## 2022-01-01 ENCOUNTER — IMMUNIZATION (OUTPATIENT)
Dept: PHARMACY | Facility: CLINIC | Age: 82
End: 2022-01-01
Payer: MEDICARE

## 2022-01-01 ENCOUNTER — PATIENT MESSAGE (OUTPATIENT)
Dept: ELECTROPHYSIOLOGY | Facility: CLINIC | Age: 82
End: 2022-01-01

## 2022-01-01 ENCOUNTER — ANESTHESIA EVENT (OUTPATIENT)
Dept: MEDSURG UNIT | Facility: HOSPITAL | Age: 82
DRG: 242 | End: 2022-01-01
Payer: MEDICARE

## 2022-01-01 ENCOUNTER — CLINICAL SUPPORT (OUTPATIENT)
Dept: CARDIOLOGY | Facility: HOSPITAL | Age: 82
End: 2022-01-01
Attending: INTERNAL MEDICINE
Payer: MEDICARE

## 2022-01-01 ENCOUNTER — OFFICE VISIT (OUTPATIENT)
Dept: CARDIOLOGY | Facility: CLINIC | Age: 82
End: 2022-01-01
Payer: MEDICARE

## 2022-01-01 ENCOUNTER — HOSPITAL ENCOUNTER (INPATIENT)
Facility: HOSPITAL | Age: 82
LOS: 7 days | Discharge: HOME HEALTH | DRG: 242 | End: 2022-05-03
Attending: EMERGENCY MEDICINE | Admitting: EMERGENCY MEDICINE
Payer: MEDICARE

## 2022-01-01 ENCOUNTER — PATIENT MESSAGE (OUTPATIENT)
Dept: ENDOCRINOLOGY | Facility: CLINIC | Age: 82
End: 2022-01-01
Payer: MEDICARE

## 2022-01-01 ENCOUNTER — PATIENT MESSAGE (OUTPATIENT)
Dept: CARDIOLOGY | Facility: CLINIC | Age: 82
End: 2022-01-01
Payer: MEDICARE

## 2022-01-01 ENCOUNTER — OFFICE VISIT (OUTPATIENT)
Dept: GASTROENTEROLOGY | Facility: CLINIC | Age: 82
End: 2022-01-01
Payer: MEDICARE

## 2022-01-01 ENCOUNTER — LAB VISIT (OUTPATIENT)
Dept: LAB | Facility: HOSPITAL | Age: 82
End: 2022-01-01
Attending: INTERNAL MEDICINE
Payer: MEDICARE

## 2022-01-01 ENCOUNTER — HOSPITAL ENCOUNTER (OUTPATIENT)
Dept: CARDIOLOGY | Facility: CLINIC | Age: 82
Discharge: HOME | End: 2022-04-04
Payer: MEDICARE

## 2022-01-01 ENCOUNTER — TELEPHONE (OUTPATIENT)
Dept: CARDIOLOGY | Facility: CLINIC | Age: 82
End: 2022-01-01
Payer: MEDICARE

## 2022-01-01 ENCOUNTER — DOCUMENTATION ONLY (OUTPATIENT)
Dept: CARDIOLOGY | Facility: HOSPITAL | Age: 82
End: 2022-01-01
Payer: MEDICARE

## 2022-01-01 ENCOUNTER — DOCUMENTATION ONLY (OUTPATIENT)
Dept: INTERNAL MEDICINE | Facility: CLINIC | Age: 82
End: 2022-01-01
Payer: MEDICARE

## 2022-01-01 ENCOUNTER — OFFICE VISIT (OUTPATIENT)
Dept: ELECTROPHYSIOLOGY | Facility: CLINIC | Age: 82
End: 2022-01-01
Payer: MEDICARE

## 2022-01-01 ENCOUNTER — HOSPITAL ENCOUNTER (INPATIENT)
Facility: HOSPITAL | Age: 82
LOS: 9 days | DRG: 270 | End: 2022-05-13
Attending: EMERGENCY MEDICINE | Admitting: INTERNAL MEDICINE
Payer: MEDICARE

## 2022-01-01 ENCOUNTER — HOSPITAL ENCOUNTER (OUTPATIENT)
Dept: CARDIOLOGY | Facility: CLINIC | Age: 82
Discharge: HOME | End: 2022-04-26
Payer: MEDICARE

## 2022-01-01 ENCOUNTER — PATIENT MESSAGE (OUTPATIENT)
Dept: INTERNAL MEDICINE | Facility: CLINIC | Age: 82
End: 2022-01-01
Payer: MEDICARE

## 2022-01-01 ENCOUNTER — ANESTHESIA (OUTPATIENT)
Dept: MEDSURG UNIT | Facility: HOSPITAL | Age: 82
DRG: 242 | End: 2022-01-01
Payer: MEDICARE

## 2022-01-01 ENCOUNTER — PATIENT OUTREACH (OUTPATIENT)
Dept: ADMINISTRATIVE | Facility: OTHER | Age: 82
End: 2022-01-01
Payer: MEDICARE

## 2022-01-01 ENCOUNTER — OFFICE VISIT (OUTPATIENT)
Dept: ENDOCRINOLOGY | Facility: CLINIC | Age: 82
End: 2022-01-01
Payer: MEDICARE

## 2022-01-01 ENCOUNTER — HOSPITAL ENCOUNTER (OUTPATIENT)
Dept: RADIOLOGY | Facility: HOSPITAL | Age: 82
Discharge: HOME | End: 2022-02-02
Attending: INTERNAL MEDICINE
Payer: MEDICARE

## 2022-01-01 ENCOUNTER — PATIENT MESSAGE (OUTPATIENT)
Dept: CARDIOLOGY | Facility: CLINIC | Age: 82
End: 2022-01-01

## 2022-01-01 VITALS
HEART RATE: 65 BPM | DIASTOLIC BLOOD PRESSURE: 82 MMHG | WEIGHT: 220.38 LBS | BODY MASS INDEX: 31.56 KG/M2 | SYSTOLIC BLOOD PRESSURE: 120 MMHG | BODY MASS INDEX: 31.55 KG/M2 | HEIGHT: 70 IN | OXYGEN SATURATION: 98 % | DIASTOLIC BLOOD PRESSURE: 68 MMHG | HEIGHT: 70 IN | SYSTOLIC BLOOD PRESSURE: 156 MMHG | WEIGHT: 220.44 LBS | HEART RATE: 70 BPM

## 2022-01-01 VITALS
HEIGHT: 70 IN | OXYGEN SATURATION: 95 % | SYSTOLIC BLOOD PRESSURE: 123 MMHG | HEART RATE: 76 BPM | WEIGHT: 217.38 LBS | BODY MASS INDEX: 31.12 KG/M2 | DIASTOLIC BLOOD PRESSURE: 67 MMHG | RESPIRATION RATE: 18 BRPM | TEMPERATURE: 98 F

## 2022-01-01 VITALS
DIASTOLIC BLOOD PRESSURE: 56 MMHG | SYSTOLIC BLOOD PRESSURE: 115 MMHG | TEMPERATURE: 98 F | BODY MASS INDEX: 28.77 KG/M2 | HEART RATE: 80 BPM | WEIGHT: 201 LBS | RESPIRATION RATE: 18 BRPM | HEIGHT: 70 IN | OXYGEN SATURATION: 100 %

## 2022-01-01 VITALS
HEIGHT: 70 IN | WEIGHT: 231.06 LBS | HEART RATE: 61 BPM | WEIGHT: 233.94 LBS | DIASTOLIC BLOOD PRESSURE: 66 MMHG | HEIGHT: 70 IN | BODY MASS INDEX: 33.08 KG/M2 | SYSTOLIC BLOOD PRESSURE: 155 MMHG | OXYGEN SATURATION: 99 % | BODY MASS INDEX: 33.49 KG/M2

## 2022-01-01 VITALS
BODY MASS INDEX: 30.86 KG/M2 | SYSTOLIC BLOOD PRESSURE: 144 MMHG | OXYGEN SATURATION: 99 % | HEIGHT: 71 IN | DIASTOLIC BLOOD PRESSURE: 64 MMHG | HEART RATE: 65 BPM | WEIGHT: 220.44 LBS

## 2022-01-01 VITALS
HEIGHT: 71 IN | WEIGHT: 226 LBS | BODY MASS INDEX: 31.64 KG/M2 | DIASTOLIC BLOOD PRESSURE: 50 MMHG | HEART RATE: 57 BPM | SYSTOLIC BLOOD PRESSURE: 132 MMHG

## 2022-01-01 VITALS
WEIGHT: 220.88 LBS | BODY MASS INDEX: 30.92 KG/M2 | HEART RATE: 77 BPM | OXYGEN SATURATION: 98 % | SYSTOLIC BLOOD PRESSURE: 150 MMHG | DIASTOLIC BLOOD PRESSURE: 67 MMHG | HEIGHT: 71 IN

## 2022-01-01 DIAGNOSIS — G47.33 OSA ON CPAP: ICD-10-CM

## 2022-01-01 DIAGNOSIS — I25.110 CORONARY ARTERY DISEASE INVOLVING NATIVE CORONARY ARTERY OF NATIVE HEART WITH UNSTABLE ANGINA PECTORIS: ICD-10-CM

## 2022-01-01 DIAGNOSIS — M47.816 SPONDYLOSIS OF LUMBAR REGION WITHOUT MYELOPATHY OR RADICULOPATHY: Primary | ICD-10-CM

## 2022-01-01 DIAGNOSIS — I44.30 HEART BLOCK ATRIOVENTRICULAR: ICD-10-CM

## 2022-01-01 DIAGNOSIS — I21.4 NON-ST ELEVATION MYOCARDIAL INFARCTION (NSTEMI): ICD-10-CM

## 2022-01-01 DIAGNOSIS — R00.2 PALPITATIONS: Primary | ICD-10-CM

## 2022-01-01 DIAGNOSIS — I44.2 COMPLETE HEART BLOCK: Primary | ICD-10-CM

## 2022-01-01 DIAGNOSIS — R19.8 ALTERNATING CONSTIPATION AND DIARRHEA: Primary | ICD-10-CM

## 2022-01-01 DIAGNOSIS — I70.0 AORTIC ATHEROSCLEROSIS: ICD-10-CM

## 2022-01-01 DIAGNOSIS — I44.1 AV BLOCK, MOBITZ 1: ICD-10-CM

## 2022-01-01 DIAGNOSIS — I10 ESSENTIAL HYPERTENSION: ICD-10-CM

## 2022-01-01 DIAGNOSIS — N18.32 TYPE 2 DIABETES MELLITUS WITH STAGE 3B CHRONIC KIDNEY DISEASE, WITHOUT LONG-TERM CURRENT USE OF INSULIN: Chronic | ICD-10-CM

## 2022-01-01 DIAGNOSIS — I48.91 ATRIAL FIBRILLATION: ICD-10-CM

## 2022-01-01 DIAGNOSIS — E11.59 HYPERTENSION ASSOCIATED WITH DIABETES: Chronic | ICD-10-CM

## 2022-01-01 DIAGNOSIS — I15.2 HYPERTENSION ASSOCIATED WITH DIABETES: Chronic | ICD-10-CM

## 2022-01-01 DIAGNOSIS — N18.4 CKD STAGE 4 DUE TO TYPE 2 DIABETES MELLITUS: Chronic | ICD-10-CM

## 2022-01-01 DIAGNOSIS — E11.22 TYPE 2 DIABETES MELLITUS WITH DIABETIC CHRONIC KIDNEY DISEASE: Chronic | ICD-10-CM

## 2022-01-01 DIAGNOSIS — Z98.61 HISTORY OF PTCA: ICD-10-CM

## 2022-01-01 DIAGNOSIS — E78.5 HYPERLIPIDEMIA ASSOCIATED WITH TYPE 2 DIABETES MELLITUS: Chronic | ICD-10-CM

## 2022-01-01 DIAGNOSIS — I25.10 CORONARY ARTERY DISEASE: ICD-10-CM

## 2022-01-01 DIAGNOSIS — I25.10 CORONARY ARTERY DISEASE INVOLVING NATIVE CORONARY ARTERY WITHOUT ANGINA PECTORIS, UNSPECIFIED WHETHER NATIVE OR TRANSPLANTED HEART: ICD-10-CM

## 2022-01-01 DIAGNOSIS — I25.10 CORONARY ARTERY DISEASE INVOLVING NATIVE CORONARY ARTERY OF NATIVE HEART WITHOUT ANGINA PECTORIS: ICD-10-CM

## 2022-01-01 DIAGNOSIS — G89.29 CHRONIC LEFT-SIDED LOW BACK PAIN WITHOUT SCIATICA: ICD-10-CM

## 2022-01-01 DIAGNOSIS — E66.9 OBESITY, DIABETES, AND HYPERTENSION SYNDROME: ICD-10-CM

## 2022-01-01 DIAGNOSIS — E11.59 OBESITY, DIABETES, AND HYPERTENSION SYNDROME: ICD-10-CM

## 2022-01-01 DIAGNOSIS — Z95.0 CARDIAC PACEMAKER: ICD-10-CM

## 2022-01-01 DIAGNOSIS — E11.69 OBESITY, DIABETES, AND HYPERTENSION SYNDROME: ICD-10-CM

## 2022-01-01 DIAGNOSIS — I49.8 OTHER SPECIFIED CARDIAC ARRHYTHMIAS: Primary | ICD-10-CM

## 2022-01-01 DIAGNOSIS — D50.9 IRON DEFICIENCY ANEMIA, UNSPECIFIED IRON DEFICIENCY ANEMIA TYPE: ICD-10-CM

## 2022-01-01 DIAGNOSIS — I21.4 NSTEMI (NON-ST ELEVATED MYOCARDIAL INFARCTION): Primary | ICD-10-CM

## 2022-01-01 DIAGNOSIS — I35.0 NONRHEUMATIC AORTIC VALVE STENOSIS: ICD-10-CM

## 2022-01-01 DIAGNOSIS — R06.09 DOE (DYSPNEA ON EXERTION): ICD-10-CM

## 2022-01-01 DIAGNOSIS — E11.69 HYPERLIPIDEMIA ASSOCIATED WITH TYPE 2 DIABETES MELLITUS: Chronic | ICD-10-CM

## 2022-01-01 DIAGNOSIS — R07.9 CHEST PAIN: ICD-10-CM

## 2022-01-01 DIAGNOSIS — E11.22 CKD STAGE 4 DUE TO TYPE 2 DIABETES MELLITUS: Chronic | ICD-10-CM

## 2022-01-01 DIAGNOSIS — N17.9 ACUTE RENAL FAILURE SUPERIMPOSED ON STAGE 4 CHRONIC KIDNEY DISEASE, UNSPECIFIED ACUTE RENAL FAILURE TYPE: ICD-10-CM

## 2022-01-01 DIAGNOSIS — Z79.4 TYPE 2 DIABETES MELLITUS WITH STAGE 3 CHRONIC KIDNEY DISEASE, WITH LONG-TERM CURRENT USE OF INSULIN: Chronic | ICD-10-CM

## 2022-01-01 DIAGNOSIS — I44.30 AV BLOCK: ICD-10-CM

## 2022-01-01 DIAGNOSIS — N18.4 ACUTE RENAL FAILURE SUPERIMPOSED ON STAGE 4 CHRONIC KIDNEY DISEASE, UNSPECIFIED ACUTE RENAL FAILURE TYPE: ICD-10-CM

## 2022-01-01 DIAGNOSIS — G57.02 PIRIFORMIS SYNDROME OF LEFT SIDE: ICD-10-CM

## 2022-01-01 DIAGNOSIS — R07.9 CHEST PAIN, UNSPECIFIED TYPE: ICD-10-CM

## 2022-01-01 DIAGNOSIS — R19.7 DIARRHEA, UNSPECIFIED TYPE: ICD-10-CM

## 2022-01-01 DIAGNOSIS — R42 LIGHT HEADEDNESS: ICD-10-CM

## 2022-01-01 DIAGNOSIS — I44.2 COMPLETE HEART BLOCK: ICD-10-CM

## 2022-01-01 DIAGNOSIS — I21.4 NSTEMI (NON-ST ELEVATION MYOCARDIAL INFARCTION): ICD-10-CM

## 2022-01-01 DIAGNOSIS — I50.9 CHF (CONGESTIVE HEART FAILURE): ICD-10-CM

## 2022-01-01 DIAGNOSIS — R94.6 ABNORMAL THYROID FUNCTION TEST: ICD-10-CM

## 2022-01-01 DIAGNOSIS — R06.09 DOE (DYSPNEA ON EXERTION): Primary | ICD-10-CM

## 2022-01-01 DIAGNOSIS — I25.10 CORONARY ARTERY DISEASE INVOLVING NATIVE CORONARY ARTERY OF NATIVE HEART WITHOUT ANGINA PECTORIS: Primary | ICD-10-CM

## 2022-01-01 DIAGNOSIS — R60.9 DEPENDENT EDEMA: ICD-10-CM

## 2022-01-01 DIAGNOSIS — I15.2 OBESITY, DIABETES, AND HYPERTENSION SYNDROME: ICD-10-CM

## 2022-01-01 DIAGNOSIS — J96.01 ACUTE RESPIRATORY FAILURE WITH HYPOXIA: ICD-10-CM

## 2022-01-01 DIAGNOSIS — R00.2 PALPITATIONS: ICD-10-CM

## 2022-01-01 DIAGNOSIS — I20.89 ANGINA OF EFFORT: Primary | ICD-10-CM

## 2022-01-01 DIAGNOSIS — E11.42 TYPE 2 DIABETES MELLITUS WITH DIABETIC POLYNEUROPATHY, WITH LONG-TERM CURRENT USE OF INSULIN: ICD-10-CM

## 2022-01-01 DIAGNOSIS — E11.22 TYPE 2 DIABETES MELLITUS WITH STAGE 3B CHRONIC KIDNEY DISEASE, WITHOUT LONG-TERM CURRENT USE OF INSULIN: Chronic | ICD-10-CM

## 2022-01-01 DIAGNOSIS — E11.9 TYPE 2 DIABETES MELLITUS WITHOUT COMPLICATION, WITHOUT LONG-TERM CURRENT USE OF INSULIN: ICD-10-CM

## 2022-01-01 DIAGNOSIS — I25.10 CORONARY ARTERY DISEASE INVOLVING NATIVE CORONARY ARTERY OF NATIVE HEART WITHOUT ANGINA PECTORIS: Primary | Chronic | ICD-10-CM

## 2022-01-01 DIAGNOSIS — I49.9 ARRHYTHMIA: ICD-10-CM

## 2022-01-01 DIAGNOSIS — N18.32 STAGE 3B CHRONIC KIDNEY DISEASE: ICD-10-CM

## 2022-01-01 DIAGNOSIS — Z79.4 TYPE 2 DIABETES MELLITUS WITH DIABETIC POLYNEUROPATHY, WITH LONG-TERM CURRENT USE OF INSULIN: ICD-10-CM

## 2022-01-01 DIAGNOSIS — I44.30 HEART BLOCK ATRIOVENTRICULAR: Primary | ICD-10-CM

## 2022-01-01 DIAGNOSIS — R79.89 TROPONIN LEVEL ELEVATED: Primary | ICD-10-CM

## 2022-01-01 DIAGNOSIS — E11.22 TYPE 2 DIABETES MELLITUS WITH STAGE 3 CHRONIC KIDNEY DISEASE, WITH LONG-TERM CURRENT USE OF INSULIN: Chronic | ICD-10-CM

## 2022-01-01 DIAGNOSIS — I25.2 HISTORY OF MI (MYOCARDIAL INFARCTION): ICD-10-CM

## 2022-01-01 DIAGNOSIS — R57.0 CARDIOGENIC SHOCK: ICD-10-CM

## 2022-01-01 DIAGNOSIS — Z95.0 CARDIAC PACEMAKER IN SITU: ICD-10-CM

## 2022-01-01 DIAGNOSIS — M54.50 CHRONIC LEFT-SIDED LOW BACK PAIN WITHOUT SCIATICA: ICD-10-CM

## 2022-01-01 DIAGNOSIS — R42 LIGHT HEADEDNESS: Primary | ICD-10-CM

## 2022-01-01 DIAGNOSIS — R07.9 CHEST PAIN, UNSPECIFIED TYPE: Primary | ICD-10-CM

## 2022-01-01 DIAGNOSIS — I21.4 NSTEMI (NON-ST ELEVATED MYOCARDIAL INFARCTION): ICD-10-CM

## 2022-01-01 DIAGNOSIS — I50.43 ACUTE ON CHRONIC COMBINED SYSTOLIC AND DIASTOLIC HEART FAILURE: ICD-10-CM

## 2022-01-01 DIAGNOSIS — N18.30 TYPE 2 DIABETES MELLITUS WITH STAGE 3 CHRONIC KIDNEY DISEASE, WITH LONG-TERM CURRENT USE OF INSULIN: Chronic | ICD-10-CM

## 2022-01-01 DIAGNOSIS — I50.9 HEART FAILURE: ICD-10-CM

## 2022-01-01 DIAGNOSIS — R07.89 CHEST DISCOMFORT: ICD-10-CM

## 2022-01-01 DIAGNOSIS — I44.2 COMPLETE HEART BLOCK: Chronic | ICD-10-CM

## 2022-01-01 DIAGNOSIS — I49.8 OTHER SPECIFIED CARDIAC ARRHYTHMIAS: ICD-10-CM

## 2022-01-01 DIAGNOSIS — M53.86 SCIATICA OF LEFT SIDE ASSOCIATED WITH DISORDER OF LUMBAR SPINE: ICD-10-CM

## 2022-01-01 LAB
ABO + RH BLD: NORMAL
ALBUMIN SERPL BCP-MCNC: 2.1 G/DL (ref 3.5–5.2)
ALBUMIN SERPL BCP-MCNC: 2.2 G/DL (ref 3.5–5.2)
ALBUMIN SERPL BCP-MCNC: 2.3 G/DL (ref 3.5–5.2)
ALBUMIN SERPL BCP-MCNC: 2.4 G/DL (ref 3.5–5.2)
ALBUMIN SERPL BCP-MCNC: 2.5 G/DL (ref 3.5–5.2)
ALBUMIN SERPL BCP-MCNC: 2.5 G/DL (ref 3.5–5.2)
ALBUMIN SERPL BCP-MCNC: 2.6 G/DL (ref 3.5–5.2)
ALBUMIN SERPL BCP-MCNC: 2.8 G/DL (ref 3.5–5.2)
ALBUMIN SERPL BCP-MCNC: 2.8 G/DL (ref 3.5–5.2)
ALBUMIN SERPL BCP-MCNC: 2.9 G/DL (ref 3.5–5.2)
ALBUMIN SERPL BCP-MCNC: 3.1 G/DL (ref 3.5–5.2)
ALBUMIN SERPL BCP-MCNC: 3.1 G/DL (ref 3.5–5.2)
ALBUMIN SERPL BCP-MCNC: 3.2 G/DL (ref 3.5–5.2)
ALBUMIN SERPL BCP-MCNC: 3.2 G/DL (ref 3.5–5.2)
ALBUMIN SERPL BCP-MCNC: 3.9 G/DL (ref 3.5–5.2)
ALLENS TEST: ABNORMAL
ALLENS TEST: NORMAL
ALP SERPL-CCNC: 35 U/L (ref 55–135)
ALP SERPL-CCNC: 38 U/L (ref 55–135)
ALP SERPL-CCNC: 39 U/L (ref 55–135)
ALP SERPL-CCNC: 43 U/L (ref 55–135)
ALP SERPL-CCNC: 44 U/L (ref 55–135)
ALP SERPL-CCNC: 45 U/L (ref 55–135)
ALP SERPL-CCNC: 46 U/L (ref 55–135)
ALP SERPL-CCNC: 46 U/L (ref 55–135)
ALP SERPL-CCNC: 47 U/L (ref 55–135)
ALP SERPL-CCNC: 48 U/L (ref 55–135)
ALP SERPL-CCNC: 48 U/L (ref 55–135)
ALP SERPL-CCNC: 50 U/L (ref 55–135)
ALP SERPL-CCNC: 51 U/L (ref 55–135)
ALP SERPL-CCNC: 51 U/L (ref 55–135)
ALP SERPL-CCNC: 60 U/L (ref 55–135)
ALT SERPL W/O P-5'-P-CCNC: 13 U/L (ref 10–44)
ALT SERPL W/O P-5'-P-CCNC: 15 U/L (ref 10–44)
ALT SERPL W/O P-5'-P-CCNC: 15 U/L (ref 10–44)
ALT SERPL W/O P-5'-P-CCNC: 19 U/L (ref 10–44)
ALT SERPL W/O P-5'-P-CCNC: 21 U/L (ref 10–44)
ALT SERPL W/O P-5'-P-CCNC: 21 U/L (ref 10–44)
ALT SERPL W/O P-5'-P-CCNC: 22 U/L (ref 10–44)
ALT SERPL W/O P-5'-P-CCNC: 24 U/L (ref 10–44)
ALT SERPL W/O P-5'-P-CCNC: 28 U/L (ref 10–44)
ALT SERPL W/O P-5'-P-CCNC: 28 U/L (ref 10–44)
ALT SERPL W/O P-5'-P-CCNC: 31 U/L (ref 10–44)
ALT SERPL W/O P-5'-P-CCNC: 34 U/L (ref 10–44)
ALT SERPL W/O P-5'-P-CCNC: 36 U/L (ref 10–44)
ALT SERPL W/O P-5'-P-CCNC: 36 U/L (ref 10–44)
ALT SERPL W/O P-5'-P-CCNC: 37 U/L (ref 10–44)
ALT SERPL W/O P-5'-P-CCNC: 41 U/L (ref 10–44)
ALT SERPL W/O P-5'-P-CCNC: 43 U/L (ref 10–44)
ANION GAP SERPL CALC-SCNC: 10 MMOL/L (ref 8–16)
ANION GAP SERPL CALC-SCNC: 11 MMOL/L (ref 8–16)
ANION GAP SERPL CALC-SCNC: 12 MMOL/L (ref 8–16)
ANION GAP SERPL CALC-SCNC: 13 MMOL/L (ref 8–16)
ANION GAP SERPL CALC-SCNC: 14 MMOL/L (ref 8–16)
ANION GAP SERPL CALC-SCNC: 15 MMOL/L (ref 8–16)
ANION GAP SERPL CALC-SCNC: 16 MMOL/L (ref 8–16)
ANION GAP SERPL CALC-SCNC: 5 MMOL/L (ref 8–16)
ANION GAP SERPL CALC-SCNC: 9 MMOL/L (ref 8–16)
APTT BLDCRRT: 110.5 SEC (ref 21–32)
APTT BLDCRRT: 134.1 SEC (ref 21–32)
APTT BLDCRRT: 25.4 SEC (ref 21–32)
APTT BLDCRRT: 26.2 SEC (ref 21–32)
APTT BLDCRRT: 26.8 SEC (ref 21–32)
APTT BLDCRRT: 27.2 SEC (ref 21–32)
APTT BLDCRRT: 28.2 SEC (ref 21–32)
APTT BLDCRRT: 30.2 SEC (ref 21–32)
APTT BLDCRRT: 31 SEC (ref 21–32)
APTT BLDCRRT: 31 SEC (ref 21–32)
APTT BLDCRRT: 31.1 SEC (ref 21–32)
APTT BLDCRRT: 31.8 SEC (ref 21–32)
APTT BLDCRRT: 35.6 SEC (ref 21–32)
APTT BLDCRRT: 37 SEC (ref 21–32)
APTT BLDCRRT: 39.3 SEC (ref 21–32)
APTT BLDCRRT: 41 SEC (ref 21–32)
APTT BLDCRRT: 41.1 SEC (ref 21–32)
APTT BLDCRRT: 43.9 SEC (ref 21–32)
APTT BLDCRRT: 45.1 SEC (ref 21–32)
APTT BLDCRRT: 45.4 SEC (ref 21–32)
APTT BLDCRRT: 47.2 SEC (ref 21–32)
APTT BLDCRRT: 49.8 SEC (ref 21–32)
APTT BLDCRRT: 50.9 SEC (ref 21–32)
APTT BLDCRRT: 50.9 SEC (ref 21–32)
APTT BLDCRRT: 54.6 SEC (ref 21–32)
APTT BLDCRRT: 56.9 SEC (ref 21–32)
APTT BLDCRRT: 62.4 SEC (ref 21–32)
APTT BLDCRRT: 95.8 SEC (ref 21–32)
ASCENDING AORTA: 3.12 CM
ASCENDING AORTA: 3.32 CM
ASCENDING AORTA: 3.53 CM
AST SERPL-CCNC: 117 U/L (ref 10–40)
AST SERPL-CCNC: 14 U/L (ref 10–40)
AST SERPL-CCNC: 17 U/L (ref 10–40)
AST SERPL-CCNC: 17 U/L (ref 10–40)
AST SERPL-CCNC: 19 U/L (ref 10–40)
AST SERPL-CCNC: 28 U/L (ref 10–40)
AST SERPL-CCNC: 30 U/L (ref 10–40)
AST SERPL-CCNC: 32 U/L (ref 10–40)
AST SERPL-CCNC: 33 U/L (ref 10–40)
AST SERPL-CCNC: 34 U/L (ref 10–40)
AST SERPL-CCNC: 36 U/L (ref 10–40)
AST SERPL-CCNC: 45 U/L (ref 10–40)
AST SERPL-CCNC: 73 U/L (ref 10–40)
AST SERPL-CCNC: 74 U/L (ref 10–40)
AST SERPL-CCNC: 88 U/L (ref 10–40)
AST SERPL-CCNC: 91 U/L (ref 10–40)
AST SERPL-CCNC: 92 U/L (ref 10–40)
AV INDEX (PROSTH): 0.52
AV INDEX (PROSTH): 0.53
AV INDEX (PROSTH): 0.56
AV MEAN GRADIENT: 3 MMHG
AV MEAN GRADIENT: 6 MMHG
AV MEAN GRADIENT: 8 MMHG
AV PEAK GRADIENT: 12 MMHG
AV PEAK GRADIENT: 6 MMHG
AV PEAK GRADIENT: 8 MMHG
AV VALVE AREA: 1.68 CM2
AV VALVE AREA: 2.07 CM2
AV VALVE AREA: 2.67 CM2
AV VELOCITY RATIO: 0.47
AV VELOCITY RATIO: 0.53
AV VELOCITY RATIO: 0.65
BACTERIA #/AREA URNS AUTO: ABNORMAL /HPF
BACTERIA #/AREA URNS AUTO: ABNORMAL /HPF
BACTERIA BLD CULT: NORMAL
BACTERIA BLD CULT: NORMAL
BASOPHILS # BLD AUTO: 0.04 K/UL (ref 0–0.2)
BASOPHILS # BLD AUTO: 0.04 K/UL (ref 0–0.2)
BASOPHILS # BLD AUTO: 0.05 K/UL (ref 0–0.2)
BASOPHILS # BLD AUTO: 0.05 K/UL (ref 0–0.2)
BASOPHILS # BLD AUTO: 0.06 K/UL (ref 0–0.2)
BASOPHILS # BLD AUTO: 0.08 K/UL (ref 0–0.2)
BASOPHILS # BLD AUTO: 0.09 K/UL (ref 0–0.2)
BASOPHILS # BLD AUTO: 0.1 K/UL (ref 0–0.2)
BASOPHILS # BLD AUTO: 0.11 K/UL (ref 0–0.2)
BASOPHILS # BLD AUTO: 0.12 K/UL (ref 0–0.2)
BASOPHILS # BLD AUTO: 0.13 K/UL (ref 0–0.2)
BASOPHILS NFR BLD: 0.3 % (ref 0–1.9)
BASOPHILS NFR BLD: 0.4 % (ref 0–1.9)
BASOPHILS NFR BLD: 0.6 % (ref 0–1.9)
BASOPHILS NFR BLD: 0.6 % (ref 0–1.9)
BASOPHILS NFR BLD: 0.7 % (ref 0–1.9)
BASOPHILS NFR BLD: 0.8 % (ref 0–1.9)
BASOPHILS NFR BLD: 0.9 % (ref 0–1.9)
BILIRUB SERPL-MCNC: 0.8 MG/DL (ref 0.1–1)
BILIRUB SERPL-MCNC: 0.9 MG/DL (ref 0.1–1)
BILIRUB SERPL-MCNC: 0.9 MG/DL (ref 0.1–1)
BILIRUB SERPL-MCNC: 1 MG/DL (ref 0.1–1)
BILIRUB SERPL-MCNC: 1.1 MG/DL (ref 0.1–1)
BILIRUB SERPL-MCNC: 1.2 MG/DL (ref 0.1–1)
BILIRUB SERPL-MCNC: 1.3 MG/DL (ref 0.1–1)
BILIRUB SERPL-MCNC: 1.4 MG/DL (ref 0.1–1)
BILIRUB UR QL STRIP: NEGATIVE
BILIRUB UR QL STRIP: NEGATIVE
BLD GP AB SCN CELLS X3 SERPL QL: NORMAL
BNP SERPL-MCNC: 1194 PG/ML (ref 0–99)
BNP SERPL-MCNC: 140 PG/ML (ref 0–99)
BNP SERPL-MCNC: 1715 PG/ML (ref 0–99)
BNP SERPL-MCNC: 69 PG/ML (ref 0–99)
BSA FOR ECHO PROCEDURE: 2.12 M2
BSA FOR ECHO PROCEDURE: 2.21 M2
BSA FOR ECHO PROCEDURE: 2.26 M2
BUN SERPL-MCNC: 102 MG/DL (ref 8–23)
BUN SERPL-MCNC: 106 MG/DL (ref 8–23)
BUN SERPL-MCNC: 107 MG/DL (ref 8–23)
BUN SERPL-MCNC: 109 MG/DL (ref 8–23)
BUN SERPL-MCNC: 37 MG/DL (ref 8–23)
BUN SERPL-MCNC: 39 MG/DL (ref 8–23)
BUN SERPL-MCNC: 39 MG/DL (ref 8–23)
BUN SERPL-MCNC: 43 MG/DL (ref 8–23)
BUN SERPL-MCNC: 45 MG/DL (ref 8–23)
BUN SERPL-MCNC: 51 MG/DL (ref 8–23)
BUN SERPL-MCNC: 52 MG/DL (ref 8–23)
BUN SERPL-MCNC: 53 MG/DL (ref 8–23)
BUN SERPL-MCNC: 56 MG/DL (ref 8–23)
BUN SERPL-MCNC: 60 MG/DL (ref 8–23)
BUN SERPL-MCNC: 60 MG/DL (ref 8–23)
BUN SERPL-MCNC: 61 MG/DL (ref 8–23)
BUN SERPL-MCNC: 62 MG/DL (ref 8–23)
BUN SERPL-MCNC: 64 MG/DL (ref 8–23)
BUN SERPL-MCNC: 65 MG/DL (ref 8–23)
BUN SERPL-MCNC: 66 MG/DL (ref 8–23)
BUN SERPL-MCNC: 67 MG/DL (ref 8–23)
BUN SERPL-MCNC: 67 MG/DL (ref 8–23)
BUN SERPL-MCNC: 68 MG/DL (ref 8–23)
BUN SERPL-MCNC: 68 MG/DL (ref 8–23)
BUN SERPL-MCNC: 69 MG/DL (ref 8–23)
BUN SERPL-MCNC: 81 MG/DL (ref 8–23)
BUN SERPL-MCNC: 98 MG/DL (ref 8–23)
CALCIUM SERPL-MCNC: 10 MG/DL (ref 8.7–10.5)
CALCIUM SERPL-MCNC: 10.3 MG/DL (ref 8.7–10.5)
CALCIUM SERPL-MCNC: 8.3 MG/DL (ref 8.7–10.5)
CALCIUM SERPL-MCNC: 8.3 MG/DL (ref 8.7–10.5)
CALCIUM SERPL-MCNC: 8.4 MG/DL (ref 8.7–10.5)
CALCIUM SERPL-MCNC: 8.6 MG/DL (ref 8.7–10.5)
CALCIUM SERPL-MCNC: 8.6 MG/DL (ref 8.7–10.5)
CALCIUM SERPL-MCNC: 8.8 MG/DL (ref 8.7–10.5)
CALCIUM SERPL-MCNC: 8.9 MG/DL (ref 8.7–10.5)
CALCIUM SERPL-MCNC: 9 MG/DL (ref 8.7–10.5)
CALCIUM SERPL-MCNC: 9.3 MG/DL (ref 8.7–10.5)
CALCIUM SERPL-MCNC: 9.4 MG/DL (ref 8.7–10.5)
CALCIUM SERPL-MCNC: 9.6 MG/DL (ref 8.7–10.5)
CALCIUM SERPL-MCNC: 9.7 MG/DL (ref 8.7–10.5)
CALCIUM SERPL-MCNC: 9.7 MG/DL (ref 8.7–10.5)
CALCIUM SERPL-MCNC: 9.8 MG/DL (ref 8.7–10.5)
CALCIUM SERPL-MCNC: 9.9 MG/DL (ref 8.7–10.5)
CFR FLOW - ANTERIOR: 0.58
CFR FLOW - INFERIOR: 0.66
CFR FLOW - LATERAL: 0.64
CFR FLOW - MAX: 1.04
CFR FLOW - MIN: 0.36
CFR FLOW - SEPTAL: 0.81
CFR FLOW - WHOLE HEART: 0.67
CFR FLOW- DEFECT 1: 0.62
CHLORIDE SERPL-SCNC: 100 MMOL/L (ref 95–110)
CHLORIDE SERPL-SCNC: 100 MMOL/L (ref 95–110)
CHLORIDE SERPL-SCNC: 103 MMOL/L (ref 95–110)
CHLORIDE SERPL-SCNC: 104 MMOL/L (ref 95–110)
CHLORIDE SERPL-SCNC: 107 MMOL/L (ref 95–110)
CHLORIDE SERPL-SCNC: 109 MMOL/L (ref 95–110)
CHLORIDE SERPL-SCNC: 109 MMOL/L (ref 95–110)
CHLORIDE SERPL-SCNC: 110 MMOL/L (ref 95–110)
CHLORIDE SERPL-SCNC: 111 MMOL/L (ref 95–110)
CHLORIDE SERPL-SCNC: 111 MMOL/L (ref 95–110)
CHLORIDE SERPL-SCNC: 112 MMOL/L (ref 95–110)
CHLORIDE SERPL-SCNC: 112 MMOL/L (ref 95–110)
CHLORIDE SERPL-SCNC: 113 MMOL/L (ref 95–110)
CHLORIDE SERPL-SCNC: 89 MMOL/L (ref 95–110)
CHLORIDE SERPL-SCNC: 91 MMOL/L (ref 95–110)
CHLORIDE SERPL-SCNC: 91 MMOL/L (ref 95–110)
CHLORIDE SERPL-SCNC: 92 MMOL/L (ref 95–110)
CHLORIDE SERPL-SCNC: 93 MMOL/L (ref 95–110)
CHLORIDE SERPL-SCNC: 94 MMOL/L (ref 95–110)
CHLORIDE SERPL-SCNC: 95 MMOL/L (ref 95–110)
CHLORIDE SERPL-SCNC: 96 MMOL/L (ref 95–110)
CHLORIDE SERPL-SCNC: 96 MMOL/L (ref 95–110)
CHOLEST SERPL-MCNC: 77 MG/DL (ref 120–199)
CHOLEST/HDLC SERPL: 2.3 {RATIO} (ref 2–5)
CLARITY UR REFRACT.AUTO: ABNORMAL
CLARITY UR REFRACT.AUTO: CLEAR
CO2 SERPL-SCNC: 15 MMOL/L (ref 23–29)
CO2 SERPL-SCNC: 17 MMOL/L (ref 23–29)
CO2 SERPL-SCNC: 18 MMOL/L (ref 23–29)
CO2 SERPL-SCNC: 19 MMOL/L (ref 23–29)
CO2 SERPL-SCNC: 19 MMOL/L (ref 23–29)
CO2 SERPL-SCNC: 20 MMOL/L (ref 23–29)
CO2 SERPL-SCNC: 21 MMOL/L (ref 23–29)
CO2 SERPL-SCNC: 21 MMOL/L (ref 23–29)
CO2 SERPL-SCNC: 22 MMOL/L (ref 23–29)
CO2 SERPL-SCNC: 23 MMOL/L (ref 23–29)
CO2 SERPL-SCNC: 24 MMOL/L (ref 23–29)
CO2 SERPL-SCNC: 25 MMOL/L (ref 23–29)
CO2 SERPL-SCNC: 26 MMOL/L (ref 23–29)
COLOR UR AUTO: YELLOW
COLOR UR AUTO: YELLOW
CREAT SERPL-MCNC: 1.8 MG/DL (ref 0.5–1.4)
CREAT SERPL-MCNC: 1.9 MG/DL (ref 0.5–1.4)
CREAT SERPL-MCNC: 2.1 MG/DL (ref 0.5–1.4)
CREAT SERPL-MCNC: 2.2 MG/DL (ref 0.5–1.4)
CREAT SERPL-MCNC: 2.3 MG/DL (ref 0.5–1.4)
CREAT SERPL-MCNC: 2.4 MG/DL (ref 0.5–1.4)
CREAT SERPL-MCNC: 2.5 MG/DL (ref 0.5–1.4)
CREAT SERPL-MCNC: 2.5 MG/DL (ref 0.5–1.4)
CREAT SERPL-MCNC: 2.6 MG/DL (ref 0.5–1.4)
CREAT SERPL-MCNC: 2.8 MG/DL (ref 0.5–1.4)
CREAT SERPL-MCNC: 2.9 MG/DL (ref 0.5–1.4)
CV ECHO LV RWT: 0.32 CM
CV ECHO LV RWT: 0.33 CM
CV ECHO LV RWT: 0.41 CM
CV PHARM DOSE: 0.4 MG
CV STRESS BASE HR: 89 BPM
DELSYS: ABNORMAL
DELSYS: NORMAL
DIASTOLIC BLOOD PRESSURE: 71 MMHG
DIFFERENTIAL METHOD: ABNORMAL
DOP CALC AO PEAK VEL: 1.18 M/S
DOP CALC AO PEAK VEL: 1.42 M/S
DOP CALC AO PEAK VEL: 1.72 M/S
DOP CALC AO VTI: 17.78 CM
DOP CALC AO VTI: 24.86 CM
DOP CALC AO VTI: 31.74 CM
DOP CALC LVOT AREA: 3.2 CM2
DOP CALC LVOT AREA: 3.9 CM2
DOP CALC LVOT AREA: 4.8 CM2
DOP CALC LVOT DIAMETER: 2.02 CM
DOP CALC LVOT DIAMETER: 2.22 CM
DOP CALC LVOT DIAMETER: 2.47 CM
DOP CALC LVOT PEAK VEL: 0.55 M/S
DOP CALC LVOT PEAK VEL: 0.75 M/S
DOP CALC LVOT PEAK VEL: 1.11 M/S
DOP CALC LVOT STROKE VOLUME: 36.75 CM3
DOP CALC LVOT STROKE VOLUME: 41.8 CM3
DOP CALC LVOT STROKE VOLUME: 84.86 CM3
DOP CALCLVOT PEAK VEL VTI: 13.05 CM
DOP CALCLVOT PEAK VEL VTI: 17.72 CM
DOP CALCLVOT PEAK VEL VTI: 9.5 CM
E WAVE DECELERATION TIME: 187.57 MSEC
E/A RATIO: 0.74
E/E' RATIO: 23 M/S
E/E' RATIO: 6.4 M/S
ECHO LV POSTERIOR WALL: 0.81 CM (ref 0.6–1.1)
ECHO LV POSTERIOR WALL: 0.85 CM (ref 0.6–1.1)
ECHO LV POSTERIOR WALL: 0.89 CM (ref 0.6–1.1)
EJECTION FRACTION- HIGH: 65 %
EJECTION FRACTION: 15 %
EJECTION FRACTION: 20 %
EJECTION FRACTION: 58 %
END DIASTOLIC INDEX-HIGH: 153 ML/M2
END DIASTOLIC INDEX-LOW: 93 ML/M2
END SYSTOLIC INDEX-HIGH: 71 ML/M2
END SYSTOLIC INDEX-LOW: 31 ML/M2
EOSINOPHIL # BLD AUTO: 0 K/UL (ref 0–0.5)
EOSINOPHIL # BLD AUTO: 0.1 K/UL (ref 0–0.5)
EOSINOPHIL # BLD AUTO: 0.2 K/UL (ref 0–0.5)
EOSINOPHIL # BLD AUTO: 0.3 K/UL (ref 0–0.5)
EOSINOPHIL # BLD AUTO: 0.3 K/UL (ref 0–0.5)
EOSINOPHIL # BLD AUTO: 0.4 K/UL (ref 0–0.5)
EOSINOPHIL # BLD AUTO: 0.4 K/UL (ref 0–0.5)
EOSINOPHIL NFR BLD: 0 % (ref 0–8)
EOSINOPHIL NFR BLD: 0.1 % (ref 0–8)
EOSINOPHIL NFR BLD: 0.1 % (ref 0–8)
EOSINOPHIL NFR BLD: 0.2 % (ref 0–8)
EOSINOPHIL NFR BLD: 0.6 % (ref 0–8)
EOSINOPHIL NFR BLD: 0.6 % (ref 0–8)
EOSINOPHIL NFR BLD: 0.8 % (ref 0–8)
EOSINOPHIL NFR BLD: 1.5 % (ref 0–8)
EOSINOPHIL NFR BLD: 1.8 % (ref 0–8)
EOSINOPHIL NFR BLD: 2.1 % (ref 0–8)
EOSINOPHIL NFR BLD: 2.1 % (ref 0–8)
EOSINOPHIL NFR BLD: 3.1 % (ref 0–8)
EOSINOPHIL NFR BLD: 3.9 % (ref 0–8)
EOSINOPHIL NFR BLD: 5.7 % (ref 0–8)
EP: 5
ERYTHROCYTE [DISTWIDTH] IN BLOOD BY AUTOMATED COUNT: 12.4 % (ref 11.5–14.5)
ERYTHROCYTE [DISTWIDTH] IN BLOOD BY AUTOMATED COUNT: 12.5 % (ref 11.5–14.5)
ERYTHROCYTE [DISTWIDTH] IN BLOOD BY AUTOMATED COUNT: 12.5 % (ref 11.5–14.5)
ERYTHROCYTE [DISTWIDTH] IN BLOOD BY AUTOMATED COUNT: 12.6 % (ref 11.5–14.5)
ERYTHROCYTE [DISTWIDTH] IN BLOOD BY AUTOMATED COUNT: 12.7 % (ref 11.5–14.5)
ERYTHROCYTE [DISTWIDTH] IN BLOOD BY AUTOMATED COUNT: 12.8 % (ref 11.5–14.5)
ERYTHROCYTE [DISTWIDTH] IN BLOOD BY AUTOMATED COUNT: 12.8 % (ref 11.5–14.5)
ERYTHROCYTE [DISTWIDTH] IN BLOOD BY AUTOMATED COUNT: 12.9 % (ref 11.5–14.5)
ERYTHROCYTE [DISTWIDTH] IN BLOOD BY AUTOMATED COUNT: 12.9 % (ref 11.5–14.5)
ERYTHROCYTE [DISTWIDTH] IN BLOOD BY AUTOMATED COUNT: 13 % (ref 11.5–14.5)
ERYTHROCYTE [DISTWIDTH] IN BLOOD BY AUTOMATED COUNT: 13.2 % (ref 11.5–14.5)
ERYTHROCYTE [SEDIMENTATION RATE] IN BLOOD BY WESTERGREN METHOD: 12 MM/H
EST. GFR  (AFRICAN AMERICAN): 22.3 ML/MIN/1.73 M^2
EST. GFR  (AFRICAN AMERICAN): 23.2 ML/MIN/1.73 M^2
EST. GFR  (AFRICAN AMERICAN): 25.4 ML/MIN/1.73 M^2
EST. GFR  (AFRICAN AMERICAN): 26.6 ML/MIN/1.73 M^2
EST. GFR  (AFRICAN AMERICAN): 26.6 ML/MIN/1.73 M^2
EST. GFR  (AFRICAN AMERICAN): 28 ML/MIN/1.73 M^2
EST. GFR  (AFRICAN AMERICAN): 29.5 ML/MIN/1.73 M^2
EST. GFR  (AFRICAN AMERICAN): 31.1 ML/MIN/1.73 M^2
EST. GFR  (AFRICAN AMERICAN): 32.9 ML/MIN/1.73 M^2
EST. GFR  (AFRICAN AMERICAN): 37.1 ML/MIN/1.73 M^2
EST. GFR  (AFRICAN AMERICAN): 39.6 ML/MIN/1.73 M^2
EST. GFR  (NON AFRICAN AMERICAN): 19.3 ML/MIN/1.73 M^2
EST. GFR  (NON AFRICAN AMERICAN): 20.1 ML/MIN/1.73 M^2
EST. GFR  (NON AFRICAN AMERICAN): 22 ML/MIN/1.73 M^2
EST. GFR  (NON AFRICAN AMERICAN): 23 ML/MIN/1.73 M^2
EST. GFR  (NON AFRICAN AMERICAN): 23 ML/MIN/1.73 M^2
EST. GFR  (NON AFRICAN AMERICAN): 24.2 ML/MIN/1.73 M^2
EST. GFR  (NON AFRICAN AMERICAN): 25.5 ML/MIN/1.73 M^2
EST. GFR  (NON AFRICAN AMERICAN): 26.9 ML/MIN/1.73 M^2
EST. GFR  (NON AFRICAN AMERICAN): 28.5 ML/MIN/1.73 M^2
EST. GFR  (NON AFRICAN AMERICAN): 32.1 ML/MIN/1.73 M^2
EST. GFR  (NON AFRICAN AMERICAN): 34.3 ML/MIN/1.73 M^2
FIO2: 30
FIO2: 30
FIO2: 40
FIO2: 50
FIO2: 60
FIO2: 90
FLOW: 12
FLOW: 15
FLOW: 6
FLOW: 6
FLOW: 8
FRACTIONAL SHORTENING: 18 % (ref 28–44)
FRACTIONAL SHORTENING: 37 % (ref 28–44)
FRACTIONAL SHORTENING: 9 % (ref 28–44)
GLUCOSE SERPL-MCNC: 101 MG/DL (ref 70–110)
GLUCOSE SERPL-MCNC: 113 MG/DL (ref 70–110)
GLUCOSE SERPL-MCNC: 121 MG/DL (ref 70–110)
GLUCOSE SERPL-MCNC: 129 MG/DL (ref 70–110)
GLUCOSE SERPL-MCNC: 143 MG/DL (ref 70–110)
GLUCOSE SERPL-MCNC: 162 MG/DL (ref 70–110)
GLUCOSE SERPL-MCNC: 163 MG/DL (ref 70–110)
GLUCOSE SERPL-MCNC: 169 MG/DL (ref 70–110)
GLUCOSE SERPL-MCNC: 171 MG/DL (ref 70–110)
GLUCOSE SERPL-MCNC: 173 MG/DL (ref 70–110)
GLUCOSE SERPL-MCNC: 174 MG/DL (ref 70–110)
GLUCOSE SERPL-MCNC: 174 MG/DL (ref 70–110)
GLUCOSE SERPL-MCNC: 175 MG/DL (ref 70–110)
GLUCOSE SERPL-MCNC: 179 MG/DL (ref 70–110)
GLUCOSE SERPL-MCNC: 184 MG/DL (ref 70–110)
GLUCOSE SERPL-MCNC: 188 MG/DL (ref 70–110)
GLUCOSE SERPL-MCNC: 188 MG/DL (ref 70–110)
GLUCOSE SERPL-MCNC: 195 MG/DL (ref 70–110)
GLUCOSE SERPL-MCNC: 198 MG/DL (ref 70–110)
GLUCOSE SERPL-MCNC: 225 MG/DL (ref 70–110)
GLUCOSE SERPL-MCNC: 253 MG/DL (ref 70–110)
GLUCOSE SERPL-MCNC: 257 MG/DL (ref 70–110)
GLUCOSE SERPL-MCNC: 265 MG/DL (ref 70–110)
GLUCOSE SERPL-MCNC: 271 MG/DL (ref 70–110)
GLUCOSE SERPL-MCNC: 288 MG/DL (ref 70–110)
GLUCOSE SERPL-MCNC: 309 MG/DL (ref 70–110)
GLUCOSE SERPL-MCNC: 400 MG/DL (ref 70–110)
GLUCOSE UR QL STRIP: NEGATIVE
GLUCOSE UR QL STRIP: NEGATIVE
HCO3 UR-SCNC: 18.2 MMOL/L (ref 24–28)
HCO3 UR-SCNC: 21.2 MMOL/L (ref 24–28)
HCO3 UR-SCNC: 21.9 MMOL/L (ref 24–28)
HCO3 UR-SCNC: 22 MMOL/L (ref 24–28)
HCO3 UR-SCNC: 22.4 MMOL/L (ref 24–28)
HCO3 UR-SCNC: 24.3 MMOL/L (ref 24–28)
HCO3 UR-SCNC: 24.7 MMOL/L (ref 24–28)
HCO3 UR-SCNC: 24.7 MMOL/L (ref 24–28)
HCO3 UR-SCNC: 26.2 MMOL/L (ref 24–28)
HCO3 UR-SCNC: 26.6 MMOL/L (ref 24–28)
HCO3 UR-SCNC: 27.1 MMOL/L (ref 24–28)
HCO3 UR-SCNC: 27.2 MMOL/L (ref 24–28)
HCO3 UR-SCNC: 27.3 MMOL/L (ref 24–28)
HCO3 UR-SCNC: 27.3 MMOL/L (ref 24–28)
HCO3 UR-SCNC: 27.4 MMOL/L (ref 24–28)
HCO3 UR-SCNC: 27.5 MMOL/L (ref 24–28)
HCO3 UR-SCNC: 27.6 MMOL/L (ref 24–28)
HCO3 UR-SCNC: 27.8 MMOL/L (ref 24–28)
HCO3 UR-SCNC: 27.9 MMOL/L (ref 24–28)
HCO3 UR-SCNC: 28.1 MMOL/L (ref 24–28)
HCO3 UR-SCNC: 28.2 MMOL/L (ref 24–28)
HCO3 UR-SCNC: 28.2 MMOL/L (ref 24–28)
HCO3 UR-SCNC: 28.5 MMOL/L (ref 24–28)
HCO3 UR-SCNC: 28.7 MMOL/L (ref 24–28)
HCO3 UR-SCNC: 28.7 MMOL/L (ref 24–28)
HCO3 UR-SCNC: 29 MMOL/L (ref 24–28)
HCO3 UR-SCNC: 29.4 MMOL/L (ref 24–28)
HCO3 UR-SCNC: 29.7 MMOL/L (ref 24–28)
HCO3 UR-SCNC: 30.2 MMOL/L (ref 24–28)
HCO3 UR-SCNC: 30.5 MMOL/L (ref 24–28)
HCO3 UR-SCNC: 30.6 MMOL/L (ref 24–28)
HCT VFR BLD AUTO: 26.4 % (ref 40–54)
HCT VFR BLD AUTO: 26.7 % (ref 40–54)
HCT VFR BLD AUTO: 28.4 % (ref 40–54)
HCT VFR BLD AUTO: 28.9 % (ref 40–54)
HCT VFR BLD AUTO: 30.4 % (ref 40–54)
HCT VFR BLD AUTO: 30.4 % (ref 40–54)
HCT VFR BLD AUTO: 31.3 % (ref 40–54)
HCT VFR BLD AUTO: 31.3 % (ref 40–54)
HCT VFR BLD AUTO: 31.8 % (ref 40–54)
HCT VFR BLD AUTO: 31.9 % (ref 40–54)
HCT VFR BLD AUTO: 32.2 % (ref 40–54)
HCT VFR BLD AUTO: 32.4 % (ref 40–54)
HCT VFR BLD AUTO: 33.5 % (ref 40–54)
HCT VFR BLD AUTO: 33.8 % (ref 40–54)
HCT VFR BLD AUTO: 35.1 % (ref 40–54)
HCT VFR BLD AUTO: 35.2 % (ref 40–54)
HCT VFR BLD AUTO: 35.4 % (ref 40–54)
HCT VFR BLD AUTO: 37 % (ref 40–54)
HCT VFR BLD AUTO: 37.2 % (ref 40–54)
HCT VFR BLD AUTO: 38.3 % (ref 40–54)
HCT VFR BLD CALC: 32 %PCV (ref 36–54)
HDLC SERPL-MCNC: 34 MG/DL (ref 40–75)
HDLC SERPL: 44.2 % (ref 20–50)
HGB BLD-MCNC: 10 G/DL (ref 14–18)
HGB BLD-MCNC: 10.2 G/DL (ref 14–18)
HGB BLD-MCNC: 10.2 G/DL (ref 14–18)
HGB BLD-MCNC: 10.4 G/DL (ref 14–18)
HGB BLD-MCNC: 10.5 G/DL (ref 14–18)
HGB BLD-MCNC: 10.6 G/DL (ref 14–18)
HGB BLD-MCNC: 10.8 G/DL (ref 14–18)
HGB BLD-MCNC: 11.2 G/DL (ref 14–18)
HGB BLD-MCNC: 11.4 G/DL (ref 14–18)
HGB BLD-MCNC: 11.5 G/DL (ref 14–18)
HGB BLD-MCNC: 11.6 G/DL (ref 14–18)
HGB BLD-MCNC: 11.7 G/DL (ref 14–18)
HGB BLD-MCNC: 11.9 G/DL (ref 14–18)
HGB BLD-MCNC: 12.4 G/DL (ref 14–18)
HGB BLD-MCNC: 8.6 G/DL (ref 14–18)
HGB BLD-MCNC: 9 G/DL (ref 14–18)
HGB BLD-MCNC: 9.3 G/DL (ref 14–18)
HGB BLD-MCNC: 9.6 G/DL (ref 14–18)
HGB UR QL STRIP: ABNORMAL
HGB UR QL STRIP: ABNORMAL
HYALINE CASTS UR QL AUTO: 15 /LPF
HYALINE CASTS UR QL AUTO: 4 /LPF
IMM GRANULOCYTES # BLD AUTO: 0.02 K/UL (ref 0–0.04)
IMM GRANULOCYTES # BLD AUTO: 0.03 K/UL (ref 0–0.04)
IMM GRANULOCYTES # BLD AUTO: 0.06 K/UL (ref 0–0.04)
IMM GRANULOCYTES # BLD AUTO: 0.07 K/UL (ref 0–0.04)
IMM GRANULOCYTES # BLD AUTO: 0.07 K/UL (ref 0–0.04)
IMM GRANULOCYTES # BLD AUTO: 0.08 K/UL (ref 0–0.04)
IMM GRANULOCYTES # BLD AUTO: 0.09 K/UL (ref 0–0.04)
IMM GRANULOCYTES # BLD AUTO: 0.11 K/UL (ref 0–0.04)
IMM GRANULOCYTES # BLD AUTO: 0.16 K/UL (ref 0–0.04)
IMM GRANULOCYTES # BLD AUTO: 0.16 K/UL (ref 0–0.04)
IMM GRANULOCYTES # BLD AUTO: 0.17 K/UL (ref 0–0.04)
IMM GRANULOCYTES # BLD AUTO: 0.19 K/UL (ref 0–0.04)
IMM GRANULOCYTES # BLD AUTO: 0.3 K/UL (ref 0–0.04)
IMM GRANULOCYTES # BLD AUTO: 0.31 K/UL (ref 0–0.04)
IMM GRANULOCYTES NFR BLD AUTO: 0.3 % (ref 0–0.5)
IMM GRANULOCYTES NFR BLD AUTO: 0.4 % (ref 0–0.5)
IMM GRANULOCYTES NFR BLD AUTO: 0.5 % (ref 0–0.5)
IMM GRANULOCYTES NFR BLD AUTO: 0.6 % (ref 0–0.5)
IMM GRANULOCYTES NFR BLD AUTO: 0.7 % (ref 0–0.5)
IMM GRANULOCYTES NFR BLD AUTO: 0.9 % (ref 0–0.5)
IMM GRANULOCYTES NFR BLD AUTO: 1.2 % (ref 0–0.5)
IMM GRANULOCYTES NFR BLD AUTO: 1.3 % (ref 0–0.5)
IMM GRANULOCYTES NFR BLD AUTO: 1.3 % (ref 0–0.5)
IMM GRANULOCYTES NFR BLD AUTO: 1.6 % (ref 0–0.5)
IMM GRANULOCYTES NFR BLD AUTO: 1.7 % (ref 0–0.5)
IMM GRANULOCYTES NFR BLD AUTO: 1.9 % (ref 0–0.5)
INR PPP: 1 (ref 0.8–1.2)
INR PPP: 1.1 (ref 0.8–1.2)
INR PPP: 1.1 (ref 0.8–1.2)
INTERVENTRICULAR SEPTUM: 0.87 CM (ref 0.6–1.1)
INTERVENTRICULAR SEPTUM: 0.95 CM (ref 0.6–1.1)
INTERVENTRICULAR SEPTUM: 1.01 CM (ref 0.6–1.1)
IP: 12
IP: 15
IP: 17
KETONES UR QL STRIP: NEGATIVE
KETONES UR QL STRIP: NEGATIVE
LA MAJOR: 4.57 CM
LA MAJOR: 5.58 CM
LA MAJOR: 5.61 CM
LA MINOR: 3.73 CM
LA MINOR: 5.57 CM
LA MINOR: 5.63 CM
LA WIDTH: 3.84 CM
LA WIDTH: 3.95 CM
LA WIDTH: 4.86 CM
LACTATE SERPL-SCNC: 1.8 MMOL/L (ref 0.5–2.2)
LACTATE SERPL-SCNC: 2.1 MMOL/L (ref 0.5–2.2)
LDH SERPL L TO P-CCNC: 1 MMOL/L (ref 0.5–2.2)
LDLC SERPL CALC-MCNC: 32.6 MG/DL (ref 63–159)
LEFT ATRIUM SIZE: 3.84 CM
LEFT ATRIUM SIZE: 4.45 CM
LEFT ATRIUM SIZE: 4.54 CM
LEFT ATRIUM VOLUME INDEX MOD: 25.4 ML/M2
LEFT ATRIUM VOLUME INDEX MOD: 40 ML/M2
LEFT ATRIUM VOLUME INDEX: 24.4 ML/M2
LEFT ATRIUM VOLUME INDEX: 39 ML/M2
LEFT ATRIUM VOLUME INDEX: 47.4 ML/M2
LEFT ATRIUM VOLUME MOD: 55.02 CM3
LEFT ATRIUM VOLUME MOD: 88.29 CM3
LEFT ATRIUM VOLUME: 104.84 CM3
LEFT ATRIUM VOLUME: 52.96 CM3
LEFT ATRIUM VOLUME: 81.41 CM3
LEFT INTERNAL DIMENSION IN SYSTOLE: 2.62 CM (ref 2.1–4)
LEFT INTERNAL DIMENSION IN SYSTOLE: 4.46 CM (ref 2.1–4)
LEFT INTERNAL DIMENSION IN SYSTOLE: 4.61 CM (ref 2.1–4)
LEFT VENTRICLE DIASTOLIC VOLUME INDEX: 34.87 ML/M2
LEFT VENTRICLE DIASTOLIC VOLUME INDEX: 54.25 ML/M2
LEFT VENTRICLE DIASTOLIC VOLUME INDEX: 69.53 ML/M2
LEFT VENTRICLE DIASTOLIC VOLUME: 113.39 ML
LEFT VENTRICLE DIASTOLIC VOLUME: 153.67 ML
LEFT VENTRICLE DIASTOLIC VOLUME: 75.67 ML
LEFT VENTRICLE MASS INDEX: 50 G/M2
LEFT VENTRICLE MASS INDEX: 75 G/M2
LEFT VENTRICLE MASS INDEX: 89 G/M2
LEFT VENTRICLE SYSTOLIC VOLUME INDEX: 11.5 ML/M2
LEFT VENTRICLE SYSTOLIC VOLUME INDEX: 43.3 ML/M2
LEFT VENTRICLE SYSTOLIC VOLUME INDEX: 44.2 ML/M2
LEFT VENTRICLE SYSTOLIC VOLUME: 24.98 ML
LEFT VENTRICLE SYSTOLIC VOLUME: 90.48 ML
LEFT VENTRICLE SYSTOLIC VOLUME: 97.63 ML
LEFT VENTRICULAR INTERNAL DIMENSION IN DIASTOLE: 4.13 CM (ref 3.5–6)
LEFT VENTRICULAR INTERNAL DIMENSION IN DIASTOLE: 4.91 CM (ref 3.5–6)
LEFT VENTRICULAR INTERNAL DIMENSION IN DIASTOLE: 5.6 CM (ref 3.5–6)
LEFT VENTRICULAR MASS: 108.56 G
LEFT VENTRICULAR MASS: 155.73 G
LEFT VENTRICULAR MASS: 197.13 G
LEUKOCYTE ESTERASE UR QL STRIP: ABNORMAL
LEUKOCYTE ESTERASE UR QL STRIP: NEGATIVE
LV LATERAL E/E' RATIO: 34.5 M/S
LV LATERAL E/E' RATIO: 7.11 M/S
LV SEPTAL E/E' RATIO: 17.25 M/S
LV SEPTAL E/E' RATIO: 5.82 M/S
LYMPHOCYTES # BLD AUTO: 0.6 K/UL (ref 1–4.8)
LYMPHOCYTES # BLD AUTO: 0.6 K/UL (ref 1–4.8)
LYMPHOCYTES # BLD AUTO: 0.7 K/UL (ref 1–4.8)
LYMPHOCYTES # BLD AUTO: 0.8 K/UL (ref 1–4.8)
LYMPHOCYTES # BLD AUTO: 0.8 K/UL (ref 1–4.8)
LYMPHOCYTES # BLD AUTO: 0.9 K/UL (ref 1–4.8)
LYMPHOCYTES # BLD AUTO: 1 K/UL (ref 1–4.8)
LYMPHOCYTES # BLD AUTO: 1.3 K/UL (ref 1–4.8)
LYMPHOCYTES NFR BLD: 12.8 % (ref 18–48)
LYMPHOCYTES NFR BLD: 16 % (ref 18–48)
LYMPHOCYTES NFR BLD: 17.1 % (ref 18–48)
LYMPHOCYTES NFR BLD: 19.8 % (ref 18–48)
LYMPHOCYTES NFR BLD: 4.9 % (ref 18–48)
LYMPHOCYTES NFR BLD: 5.2 % (ref 18–48)
LYMPHOCYTES NFR BLD: 5.4 % (ref 18–48)
LYMPHOCYTES NFR BLD: 5.6 % (ref 18–48)
LYMPHOCYTES NFR BLD: 6.3 % (ref 18–48)
LYMPHOCYTES NFR BLD: 6.7 % (ref 18–48)
LYMPHOCYTES NFR BLD: 6.7 % (ref 18–48)
LYMPHOCYTES NFR BLD: 6.8 % (ref 18–48)
LYMPHOCYTES NFR BLD: 7.2 % (ref 18–48)
LYMPHOCYTES NFR BLD: 7.3 % (ref 18–48)
MAGNESIUM SERPL-MCNC: 1.3 MG/DL (ref 1.6–2.6)
MAGNESIUM SERPL-MCNC: 1.4 MG/DL (ref 1.6–2.6)
MAGNESIUM SERPL-MCNC: 1.6 MG/DL (ref 1.6–2.6)
MAGNESIUM SERPL-MCNC: 1.7 MG/DL (ref 1.6–2.6)
MAGNESIUM SERPL-MCNC: 1.7 MG/DL (ref 1.6–2.6)
MAGNESIUM SERPL-MCNC: 1.8 MG/DL (ref 1.6–2.6)
MAGNESIUM SERPL-MCNC: 1.9 MG/DL (ref 1.6–2.6)
MAGNESIUM SERPL-MCNC: 1.9 MG/DL (ref 1.6–2.6)
MAGNESIUM SERPL-MCNC: 2 MG/DL (ref 1.6–2.6)
MAGNESIUM SERPL-MCNC: 2 MG/DL (ref 1.6–2.6)
MAGNESIUM SERPL-MCNC: 2.1 MG/DL (ref 1.6–2.6)
MAGNESIUM SERPL-MCNC: 2.2 MG/DL (ref 1.6–2.6)
MAGNESIUM SERPL-MCNC: 2.3 MG/DL (ref 1.6–2.6)
MAGNESIUM SERPL-MCNC: 2.3 MG/DL (ref 1.6–2.6)
MCH RBC QN AUTO: 28.6 PG (ref 27–31)
MCH RBC QN AUTO: 28.7 PG (ref 27–31)
MCH RBC QN AUTO: 28.8 PG (ref 27–31)
MCH RBC QN AUTO: 28.9 PG (ref 27–31)
MCH RBC QN AUTO: 28.9 PG (ref 27–31)
MCH RBC QN AUTO: 29 PG (ref 27–31)
MCH RBC QN AUTO: 29 PG (ref 27–31)
MCH RBC QN AUTO: 29.1 PG (ref 27–31)
MCH RBC QN AUTO: 29.2 PG (ref 27–31)
MCH RBC QN AUTO: 29.3 PG (ref 27–31)
MCH RBC QN AUTO: 29.6 PG (ref 27–31)
MCH RBC QN AUTO: 29.7 PG (ref 27–31)
MCHC RBC AUTO-ENTMCNC: 31.5 G/DL (ref 32–36)
MCHC RBC AUTO-ENTMCNC: 31.8 G/DL (ref 32–36)
MCHC RBC AUTO-ENTMCNC: 32.2 G/DL (ref 32–36)
MCHC RBC AUTO-ENTMCNC: 32.2 G/DL (ref 32–36)
MCHC RBC AUTO-ENTMCNC: 32.4 G/DL (ref 32–36)
MCHC RBC AUTO-ENTMCNC: 32.4 G/DL (ref 32–36)
MCHC RBC AUTO-ENTMCNC: 32.6 G/DL (ref 32–36)
MCHC RBC AUTO-ENTMCNC: 32.7 G/DL (ref 32–36)
MCHC RBC AUTO-ENTMCNC: 32.7 G/DL (ref 32–36)
MCHC RBC AUTO-ENTMCNC: 32.8 G/DL (ref 32–36)
MCHC RBC AUTO-ENTMCNC: 32.8 G/DL (ref 32–36)
MCHC RBC AUTO-ENTMCNC: 32.9 G/DL (ref 32–36)
MCHC RBC AUTO-ENTMCNC: 32.9 G/DL (ref 32–36)
MCHC RBC AUTO-ENTMCNC: 33.2 G/DL (ref 32–36)
MCHC RBC AUTO-ENTMCNC: 33.2 G/DL (ref 32–36)
MCHC RBC AUTO-ENTMCNC: 33.6 G/DL (ref 32–36)
MCHC RBC AUTO-ENTMCNC: 33.7 G/DL (ref 32–36)
MCHC RBC AUTO-ENTMCNC: 33.7 G/DL (ref 32–36)
MCV RBC AUTO: 86 FL (ref 82–98)
MCV RBC AUTO: 87 FL (ref 82–98)
MCV RBC AUTO: 88 FL (ref 82–98)
MCV RBC AUTO: 89 FL (ref 82–98)
MCV RBC AUTO: 90 FL (ref 82–98)
MCV RBC AUTO: 91 FL (ref 82–98)
MCV RBC AUTO: 92 FL (ref 82–98)
METHEMOGLOBIN: 0.3 % (ref 0–3)
METHEMOGLOBIN: 0.3 % (ref 0–3)
METHEMOGLOBIN: 0.5 % (ref 0–3)
METHEMOGLOBIN: 0.6 % (ref 0–3)
METHEMOGLOBIN: 0.8 % (ref 0–3)
METHEMOGLOBIN: 1.7 % (ref 0–3)
MICROSCOPIC COMMENT: ABNORMAL
MICROSCOPIC COMMENT: ABNORMAL
MIN VOL: 10.2
MIN VOL: 10.3
MIN VOL: 10.4
MIN VOL: 12.3
MIN VOL: 13.8
MIN VOL: 13.9
MIN VOL: 14.2
MIN VOL: 9.78
MODE: ABNORMAL
MODE: NORMAL
MONOCYTES # BLD AUTO: 0.6 K/UL (ref 0.3–1)
MONOCYTES # BLD AUTO: 0.6 K/UL (ref 0.3–1)
MONOCYTES # BLD AUTO: 0.7 K/UL (ref 0.3–1)
MONOCYTES # BLD AUTO: 0.7 K/UL (ref 0.3–1)
MONOCYTES # BLD AUTO: 1.2 K/UL (ref 0.3–1)
MONOCYTES # BLD AUTO: 1.3 K/UL (ref 0.3–1)
MONOCYTES # BLD AUTO: 1.3 K/UL (ref 0.3–1)
MONOCYTES # BLD AUTO: 1.6 K/UL (ref 0.3–1)
MONOCYTES # BLD AUTO: 1.7 K/UL (ref 0.3–1)
MONOCYTES # BLD AUTO: 1.7 K/UL (ref 0.3–1)
MONOCYTES # BLD AUTO: 1.8 K/UL (ref 0.3–1)
MONOCYTES # BLD AUTO: 2.1 K/UL (ref 0.3–1)
MONOCYTES NFR BLD: 10.1 % (ref 4–15)
MONOCYTES NFR BLD: 10.4 % (ref 4–15)
MONOCYTES NFR BLD: 11.3 % (ref 4–15)
MONOCYTES NFR BLD: 11.7 % (ref 4–15)
MONOCYTES NFR BLD: 12.4 % (ref 4–15)
MONOCYTES NFR BLD: 12.7 % (ref 4–15)
MONOCYTES NFR BLD: 12.9 % (ref 4–15)
MONOCYTES NFR BLD: 13.3 % (ref 4–15)
MONOCYTES NFR BLD: 7.1 % (ref 4–15)
MONOCYTES NFR BLD: 8.4 % (ref 4–15)
MONOCYTES NFR BLD: 8.8 % (ref 4–15)
MONOCYTES NFR BLD: 8.8 % (ref 4–15)
MONOCYTES NFR BLD: 8.9 % (ref 4–15)
MONOCYTES NFR BLD: 8.9 % (ref 4–15)
MONOCYTES NFR BLD: 9.6 % (ref 4–15)
MONOCYTES NFR BLD: 9.8 % (ref 4–15)
MV A" WAVE DURATION": 8.56 MSEC
MV PEAK A VEL: 0.86 M/S
MV PEAK E VEL: 0.64 M/S
MV PEAK E VEL: 0.69 M/S
MV STENOSIS PRESSURE HALF TIME: 54.4 MS
MV VALVE AREA P 1/2 METHOD: 4.04 CM2
NEUTROPHILS # BLD AUTO: 10.1 K/UL (ref 1.8–7.7)
NEUTROPHILS # BLD AUTO: 10.5 K/UL (ref 1.8–7.7)
NEUTROPHILS # BLD AUTO: 10.6 K/UL (ref 1.8–7.7)
NEUTROPHILS # BLD AUTO: 10.6 K/UL (ref 1.8–7.7)
NEUTROPHILS # BLD AUTO: 11.1 K/UL (ref 1.8–7.7)
NEUTROPHILS # BLD AUTO: 12 K/UL (ref 1.8–7.7)
NEUTROPHILS # BLD AUTO: 12.7 K/UL (ref 1.8–7.7)
NEUTROPHILS # BLD AUTO: 13.2 K/UL (ref 1.8–7.7)
NEUTROPHILS # BLD AUTO: 13.9 K/UL (ref 1.8–7.7)
NEUTROPHILS # BLD AUTO: 4.3 K/UL (ref 1.8–7.7)
NEUTROPHILS # BLD AUTO: 5.3 K/UL (ref 1.8–7.7)
NEUTROPHILS # BLD AUTO: 5.6 K/UL (ref 1.8–7.7)
NEUTROPHILS # BLD AUTO: 6.1 K/UL (ref 1.8–7.7)
NEUTROPHILS # BLD AUTO: 9.5 K/UL (ref 1.8–7.7)
NEUTROPHILS # BLD AUTO: 9.6 K/UL (ref 1.8–7.7)
NEUTROPHILS # BLD AUTO: 9.9 K/UL (ref 1.8–7.7)
NEUTROPHILS NFR BLD: 64.7 % (ref 38–73)
NEUTROPHILS NFR BLD: 68.9 % (ref 38–73)
NEUTROPHILS NFR BLD: 71.4 % (ref 38–73)
NEUTROPHILS NFR BLD: 76.1 % (ref 38–73)
NEUTROPHILS NFR BLD: 76.5 % (ref 38–73)
NEUTROPHILS NFR BLD: 77.1 % (ref 38–73)
NEUTROPHILS NFR BLD: 77.3 % (ref 38–73)
NEUTROPHILS NFR BLD: 77.5 % (ref 38–73)
NEUTROPHILS NFR BLD: 78.8 % (ref 38–73)
NEUTROPHILS NFR BLD: 79.3 % (ref 38–73)
NEUTROPHILS NFR BLD: 80.5 % (ref 38–73)
NEUTROPHILS NFR BLD: 80.8 % (ref 38–73)
NEUTROPHILS NFR BLD: 82.7 % (ref 38–73)
NEUTROPHILS NFR BLD: 83.3 % (ref 38–73)
NEUTROPHILS NFR BLD: 84.6 % (ref 38–73)
NEUTROPHILS NFR BLD: 84.8 % (ref 38–73)
NITRITE UR QL STRIP: NEGATIVE
NITRITE UR QL STRIP: NEGATIVE
NONHDLC SERPL-MCNC: 43 MG/DL
NRBC BLD-RTO: 0 /100 WBC
NUC REST DIASTOLIC VOLUME INDEX: 110
NUC REST EJECTION FRACTION: 48
NUC REST SYSTOLIC VOLUME INDEX: 57
NUC STRESS DIASTOLIC VOLUME INDEX: 93
NUC STRESS EJECTION FRACTION: 42 %
NUC STRESS SYSTOLIC VOLUME INDEX: 54
OHS CV CPX 85 PERCENT MAX PREDICTED HEART RATE MALE: 117
OHS CV CPX MAX PREDICTED HEART RATE: 138
OHS CV CPX PATIENT IS FEMALE: 0
OHS CV CPX PATIENT IS MALE: 1
OHS CV CPX PEAK DIASTOLIC BLOOD PRESSURE: 45 MMHG
OHS CV CPX PEAK HEAR RATE: 93 BPM
OHS CV CPX PEAK RATE PRESSURE PRODUCT: 8835
OHS CV CPX PEAK SYSTOLIC BLOOD PRESSURE: 95 MMHG
OHS CV CPX PERCENT MAX PREDICTED HEART RATE ACHIEVED: 67
OHS CV CPX RATE PRESSURE PRODUCT PRESENTING: NORMAL
OHS CV EVENT MONITOR DAY: 0
OHS CV HOLTER LENGTH DECIMAL HOURS: 48
OHS CV HOLTER LENGTH HOURS: 48
OHS CV HOLTER LENGTH MINUTES: 0
OHS CV HOLTER SINUS AVERAGE HR: 52
OHS CV HOLTER SINUS MAX HR: 89
OHS CV HOLTER SINUS MIN HR: 32
OVALOCYTES BLD QL SMEAR: ABNORMAL
PCO2 BLDA: 33.6 MMHG (ref 35–45)
PCO2 BLDA: 34.4 MMHG (ref 35–45)
PCO2 BLDA: 40.6 MMHG (ref 35–45)
PCO2 BLDA: 41.6 MMHG (ref 35–45)
PCO2 BLDA: 41.6 MMHG (ref 35–45)
PCO2 BLDA: 42.3 MMHG (ref 35–45)
PCO2 BLDA: 42.6 MMHG (ref 35–45)
PCO2 BLDA: 42.9 MMHG (ref 35–45)
PCO2 BLDA: 43.2 MMHG (ref 35–45)
PCO2 BLDA: 43.4 MMHG (ref 35–45)
PCO2 BLDA: 43.6 MMHG (ref 35–45)
PCO2 BLDA: 43.6 MMHG (ref 35–45)
PCO2 BLDA: 43.9 MMHG (ref 35–45)
PCO2 BLDA: 44 MMHG (ref 35–45)
PCO2 BLDA: 44 MMHG (ref 35–45)
PCO2 BLDA: 44.3 MMHG (ref 35–45)
PCO2 BLDA: 44.4 MMHG (ref 35–45)
PCO2 BLDA: 44.6 MMHG (ref 35–45)
PCO2 BLDA: 45.3 MMHG (ref 35–45)
PCO2 BLDA: 45.3 MMHG (ref 35–45)
PCO2 BLDA: 45.9 MMHG (ref 35–45)
PCO2 BLDA: 46.3 MMHG (ref 35–45)
PCO2 BLDA: 46.7 MMHG (ref 35–45)
PCO2 BLDA: 46.7 MMHG (ref 35–45)
PCO2 BLDA: 46.9 MMHG (ref 35–45)
PCO2 BLDA: 47.1 MMHG (ref 35–45)
PCO2 BLDA: 47.5 MMHG (ref 35–45)
PCO2 BLDA: 47.5 MMHG (ref 35–45)
PCO2 BLDA: 48.1 MMHG (ref 35–45)
PCO2 BLDA: 48.5 MMHG (ref 35–45)
PCO2 BLDA: 48.7 MMHG (ref 35–45)
PCO2 BLDA: 50.3 MMHG (ref 35–45)
PCO2 BLDA: 52.6 MMHG (ref 35–45)
PEEP: 5
PERFUSION DEFECT 1 SIZE IN %: 41 %
PERFUSION DEFECT SIZE WORSENS % 1: 51 %
PH SMN: 7.26 [PH] (ref 7.35–7.45)
PH SMN: 7.31 [PH] (ref 7.35–7.45)
PH SMN: 7.32 [PH] (ref 7.35–7.45)
PH SMN: 7.34 [PH] (ref 7.35–7.45)
PH SMN: 7.35 [PH] (ref 7.35–7.45)
PH SMN: 7.36 [PH] (ref 7.35–7.45)
PH SMN: 7.36 [PH] (ref 7.35–7.45)
PH SMN: 7.37 [PH] (ref 7.35–7.45)
PH SMN: 7.38 [PH] (ref 7.35–7.45)
PH SMN: 7.38 [PH] (ref 7.35–7.45)
PH SMN: 7.39 [PH] (ref 7.35–7.45)
PH SMN: 7.4 [PH] (ref 7.35–7.45)
PH SMN: 7.41 [PH] (ref 7.35–7.45)
PH SMN: 7.42 [PH] (ref 7.35–7.45)
PH SMN: 7.43 [PH] (ref 7.35–7.45)
PH SMN: 7.43 [PH] (ref 7.35–7.45)
PH SMN: 7.46 [PH] (ref 7.35–7.45)
PH UR STRIP: 5 [PH] (ref 5–8)
PH UR STRIP: 5 [PH] (ref 5–8)
PHOSPHATE SERPL-MCNC: 2.7 MG/DL (ref 2.7–4.5)
PHOSPHATE SERPL-MCNC: 3.1 MG/DL (ref 2.7–4.5)
PHOSPHATE SERPL-MCNC: 3.4 MG/DL (ref 2.7–4.5)
PHOSPHATE SERPL-MCNC: 3.7 MG/DL (ref 2.7–4.5)
PHOSPHATE SERPL-MCNC: 3.7 MG/DL (ref 2.7–4.5)
PHOSPHATE SERPL-MCNC: 3.8 MG/DL (ref 2.7–4.5)
PHOSPHATE SERPL-MCNC: 4 MG/DL (ref 2.7–4.5)
PHOSPHATE SERPL-MCNC: 4.4 MG/DL (ref 2.7–4.5)
PHOSPHATE SERPL-MCNC: 4.8 MG/DL (ref 2.7–4.5)
PHOSPHATE SERPL-MCNC: 4.8 MG/DL (ref 2.7–4.5)
PHOSPHATE SERPL-MCNC: 5.1 MG/DL (ref 2.7–4.5)
PISA TR MAX VEL: 3.03 M/S
PISA TR MAX VEL: 3.83 M/S
PLATELET # BLD AUTO: 100 K/UL (ref 150–450)
PLATELET # BLD AUTO: 109 K/UL (ref 150–450)
PLATELET # BLD AUTO: 113 K/UL (ref 150–450)
PLATELET # BLD AUTO: 115 K/UL (ref 150–450)
PLATELET # BLD AUTO: 128 K/UL (ref 150–450)
PLATELET # BLD AUTO: 129 K/UL (ref 150–450)
PLATELET # BLD AUTO: 130 K/UL (ref 150–450)
PLATELET # BLD AUTO: 130 K/UL (ref 150–450)
PLATELET # BLD AUTO: 133 K/UL (ref 150–450)
PLATELET # BLD AUTO: 136 K/UL (ref 150–450)
PLATELET # BLD AUTO: 137 K/UL (ref 150–450)
PLATELET # BLD AUTO: 145 K/UL (ref 150–450)
PLATELET # BLD AUTO: 146 K/UL (ref 150–450)
PLATELET # BLD AUTO: 151 K/UL (ref 150–450)
PLATELET # BLD AUTO: 152 K/UL (ref 150–450)
PLATELET # BLD AUTO: 152 K/UL (ref 150–450)
PLATELET # BLD AUTO: 157 K/UL (ref 150–450)
PLATELET # BLD AUTO: 88 K/UL (ref 150–450)
PLATELET # BLD AUTO: 91 K/UL (ref 150–450)
PLATELET # BLD AUTO: 93 K/UL (ref 150–450)
PLATELET BLD QL SMEAR: ABNORMAL
PMV BLD AUTO: 10.5 FL (ref 9.2–12.9)
PMV BLD AUTO: 10.5 FL (ref 9.2–12.9)
PMV BLD AUTO: 10.9 FL (ref 9.2–12.9)
PMV BLD AUTO: 10.9 FL (ref 9.2–12.9)
PMV BLD AUTO: 11 FL (ref 9.2–12.9)
PMV BLD AUTO: 11 FL (ref 9.2–12.9)
PMV BLD AUTO: 11.1 FL (ref 9.2–12.9)
PMV BLD AUTO: 11.2 FL (ref 9.2–12.9)
PMV BLD AUTO: 11.3 FL (ref 9.2–12.9)
PMV BLD AUTO: 11.3 FL (ref 9.2–12.9)
PMV BLD AUTO: 11.4 FL (ref 9.2–12.9)
PMV BLD AUTO: 11.5 FL (ref 9.2–12.9)
PMV BLD AUTO: 11.6 FL (ref 9.2–12.9)
PMV BLD AUTO: 11.8 FL (ref 9.2–12.9)
PMV BLD AUTO: 12 FL (ref 9.2–12.9)
PMV BLD AUTO: 12.1 FL (ref 9.2–12.9)
PO2 BLDA: 27 MMHG (ref 40–60)
PO2 BLDA: 28 MMHG (ref 40–60)
PO2 BLDA: 28 MMHG (ref 40–60)
PO2 BLDA: 29 MMHG (ref 40–60)
PO2 BLDA: 30 MMHG (ref 40–60)
PO2 BLDA: 30 MMHG (ref 40–60)
PO2 BLDA: 31 MMHG (ref 40–60)
PO2 BLDA: 32 MMHG (ref 40–60)
PO2 BLDA: 33 MMHG (ref 40–60)
PO2 BLDA: 34 MMHG (ref 40–60)
PO2 BLDA: 34 MMHG (ref 40–60)
PO2 BLDA: 35 MMHG (ref 40–60)
PO2 BLDA: 36 MMHG (ref 40–60)
PO2 BLDA: 41 MMHG (ref 40–60)
PO2 BLDA: 42 MMHG (ref 40–60)
PO2 BLDA: 64 MMHG (ref 80–100)
PO2 BLDA: 73 MMHG (ref 80–100)
POC BE: -1 MMOL/L
POC BE: -1 MMOL/L
POC BE: -3 MMOL/L
POC BE: -4 MMOL/L
POC BE: -5 MMOL/L
POC BE: -5 MMOL/L
POC BE: -7 MMOL/L
POC BE: 1 MMOL/L
POC BE: 2 MMOL/L
POC BE: 3 MMOL/L
POC BE: 4 MMOL/L
POC BE: 5 MMOL/L
POC BE: 6 MMOL/L
POC SATURATED O2: 45 % (ref 95–100)
POC SATURATED O2: 50 % (ref 95–100)
POC SATURATED O2: 52 % (ref 95–100)
POC SATURATED O2: 53 % (ref 95–100)
POC SATURATED O2: 53 % (ref 95–100)
POC SATURATED O2: 55 % (ref 95–100)
POC SATURATED O2: 56 % (ref 95–100)
POC SATURATED O2: 58 % (ref 95–100)
POC SATURATED O2: 58 % (ref 95–100)
POC SATURATED O2: 59 % (ref 95–100)
POC SATURATED O2: 60 % (ref 95–100)
POC SATURATED O2: 60 % (ref 95–100)
POC SATURATED O2: 61 % (ref 95–100)
POC SATURATED O2: 61 % (ref 95–100)
POC SATURATED O2: 62 % (ref 95–100)
POC SATURATED O2: 62 % (ref 95–100)
POC SATURATED O2: 63 % (ref 95–100)
POC SATURATED O2: 64 % (ref 95–100)
POC SATURATED O2: 65 % (ref 95–100)
POC SATURATED O2: 68 % (ref 95–100)
POC SATURATED O2: 74 % (ref 95–100)
POC SATURATED O2: 74 % (ref 95–100)
POC SATURATED O2: 91 % (ref 95–100)
POC SATURATED O2: 95 % (ref 95–100)
POC TCO2: 19 MMOL/L (ref 23–27)
POC TCO2: 22 MMOL/L (ref 24–29)
POC TCO2: 23 MMOL/L (ref 24–29)
POC TCO2: 23 MMOL/L (ref 24–29)
POC TCO2: 24 MMOL/L (ref 24–29)
POC TCO2: 26 MMOL/L (ref 23–27)
POC TCO2: 26 MMOL/L (ref 24–29)
POC TCO2: 26 MMOL/L (ref 24–29)
POC TCO2: 28 MMOL/L (ref 24–29)
POC TCO2: 29 MMOL/L (ref 24–29)
POC TCO2: 30 MMOL/L (ref 24–29)
POC TCO2: 31 MMOL/L (ref 24–29)
POC TCO2: 31 MMOL/L (ref 24–29)
POC TCO2: 32 MMOL/L (ref 24–29)
POCT GLUCOSE: 115 MG/DL (ref 70–110)
POCT GLUCOSE: 118 MG/DL (ref 70–110)
POCT GLUCOSE: 120 MG/DL (ref 70–110)
POCT GLUCOSE: 122 MG/DL (ref 70–110)
POCT GLUCOSE: 128 MG/DL (ref 70–110)
POCT GLUCOSE: 134 MG/DL (ref 70–110)
POCT GLUCOSE: 137 MG/DL (ref 70–110)
POCT GLUCOSE: 138 MG/DL (ref 70–110)
POCT GLUCOSE: 142 MG/DL (ref 70–110)
POCT GLUCOSE: 143 MG/DL (ref 70–110)
POCT GLUCOSE: 147 MG/DL (ref 70–110)
POCT GLUCOSE: 147 MG/DL (ref 70–110)
POCT GLUCOSE: 149 MG/DL (ref 70–110)
POCT GLUCOSE: 153 MG/DL (ref 70–110)
POCT GLUCOSE: 155 MG/DL (ref 70–110)
POCT GLUCOSE: 156 MG/DL (ref 70–110)
POCT GLUCOSE: 157 MG/DL (ref 70–110)
POCT GLUCOSE: 162 MG/DL (ref 70–110)
POCT GLUCOSE: 166 MG/DL (ref 70–110)
POCT GLUCOSE: 167 MG/DL (ref 70–110)
POCT GLUCOSE: 173 MG/DL (ref 70–110)
POCT GLUCOSE: 174 MG/DL (ref 70–110)
POCT GLUCOSE: 175 MG/DL (ref 70–110)
POCT GLUCOSE: 179 MG/DL (ref 70–110)
POCT GLUCOSE: 179 MG/DL (ref 70–110)
POCT GLUCOSE: 184 MG/DL (ref 70–110)
POCT GLUCOSE: 186 MG/DL (ref 70–110)
POCT GLUCOSE: 189 MG/DL (ref 70–110)
POCT GLUCOSE: 191 MG/DL (ref 70–110)
POCT GLUCOSE: 194 MG/DL (ref 70–110)
POCT GLUCOSE: 202 MG/DL (ref 70–110)
POCT GLUCOSE: 203 MG/DL (ref 70–110)
POCT GLUCOSE: 208 MG/DL (ref 70–110)
POCT GLUCOSE: 209 MG/DL (ref 70–110)
POCT GLUCOSE: 214 MG/DL (ref 70–110)
POCT GLUCOSE: 220 MG/DL (ref 70–110)
POCT GLUCOSE: 227 MG/DL (ref 70–110)
POCT GLUCOSE: 228 MG/DL (ref 70–110)
POCT GLUCOSE: 236 MG/DL (ref 70–110)
POCT GLUCOSE: 236 MG/DL (ref 70–110)
POCT GLUCOSE: 250 MG/DL (ref 70–110)
POCT GLUCOSE: 251 MG/DL (ref 70–110)
POCT GLUCOSE: 255 MG/DL (ref 70–110)
POCT GLUCOSE: 261 MG/DL (ref 70–110)
POCT GLUCOSE: 265 MG/DL (ref 70–110)
POCT GLUCOSE: 265 MG/DL (ref 70–110)
POCT GLUCOSE: 289 MG/DL (ref 70–110)
POCT GLUCOSE: 292 MG/DL (ref 70–110)
POCT GLUCOSE: 299 MG/DL (ref 70–110)
POCT GLUCOSE: 299 MG/DL (ref 70–110)
POCT GLUCOSE: 319 MG/DL (ref 70–110)
POCT GLUCOSE: 326 MG/DL (ref 70–110)
POCT GLUCOSE: 345 MG/DL (ref 70–110)
POCT GLUCOSE: 346 MG/DL (ref 70–110)
POCT GLUCOSE: 396 MG/DL (ref 70–110)
POCT GLUCOSE: 91 MG/DL (ref 70–110)
POCT GLUCOSE: 92 MG/DL (ref 70–110)
POTASSIUM SERPL-SCNC: 3.6 MMOL/L (ref 3.5–5.1)
POTASSIUM SERPL-SCNC: 3.7 MMOL/L (ref 3.5–5.1)
POTASSIUM SERPL-SCNC: 3.7 MMOL/L (ref 3.5–5.1)
POTASSIUM SERPL-SCNC: 3.8 MMOL/L (ref 3.5–5.1)
POTASSIUM SERPL-SCNC: 3.9 MMOL/L (ref 3.5–5.1)
POTASSIUM SERPL-SCNC: 3.9 MMOL/L (ref 3.5–5.1)
POTASSIUM SERPL-SCNC: 4 MMOL/L (ref 3.5–5.1)
POTASSIUM SERPL-SCNC: 4.1 MMOL/L (ref 3.5–5.1)
POTASSIUM SERPL-SCNC: 4.1 MMOL/L (ref 3.5–5.1)
POTASSIUM SERPL-SCNC: 4.2 MMOL/L (ref 3.5–5.1)
POTASSIUM SERPL-SCNC: 4.2 MMOL/L (ref 3.5–5.1)
POTASSIUM SERPL-SCNC: 4.3 MMOL/L (ref 3.5–5.1)
POTASSIUM SERPL-SCNC: 4.4 MMOL/L (ref 3.5–5.1)
POTASSIUM SERPL-SCNC: 4.6 MMOL/L (ref 3.5–5.1)
POTASSIUM SERPL-SCNC: 5.7 MMOL/L (ref 3.5–5.1)
POTASSIUM SERPL-SCNC: 6 MMOL/L (ref 3.5–5.1)
PROCALCITONIN SERPL IA-MCNC: 0.22 NG/ML
PROT SERPL-MCNC: 4.9 G/DL (ref 6–8.4)
PROT SERPL-MCNC: 5 G/DL (ref 6–8.4)
PROT SERPL-MCNC: 5.1 G/DL (ref 6–8.4)
PROT SERPL-MCNC: 5.1 G/DL (ref 6–8.4)
PROT SERPL-MCNC: 5.2 G/DL (ref 6–8.4)
PROT SERPL-MCNC: 5.3 G/DL (ref 6–8.4)
PROT SERPL-MCNC: 5.3 G/DL (ref 6–8.4)
PROT SERPL-MCNC: 5.4 G/DL (ref 6–8.4)
PROT SERPL-MCNC: 5.4 G/DL (ref 6–8.4)
PROT SERPL-MCNC: 5.7 G/DL (ref 6–8.4)
PROT SERPL-MCNC: 6 G/DL (ref 6–8.4)
PROT SERPL-MCNC: 6.1 G/DL (ref 6–8.4)
PROT SERPL-MCNC: 6.2 G/DL (ref 6–8.4)
PROT SERPL-MCNC: 6.3 G/DL (ref 6–8.4)
PROT SERPL-MCNC: 6.3 G/DL (ref 6–8.4)
PROT SERPL-MCNC: 6.4 G/DL (ref 6–8.4)
PROT SERPL-MCNC: 7.5 G/DL (ref 6–8.4)
PROT UR QL STRIP: NEGATIVE
PROT UR QL STRIP: NEGATIVE
PROTHROMBIN TIME: 10.9 SEC (ref 9–12.5)
PROTHROMBIN TIME: 11.3 SEC (ref 9–12.5)
PROTHROMBIN TIME: 11.3 SEC (ref 9–12.5)
PS: 12
PULM VEIN S/D RATIO: 0.64
PV PEAK D VEL: 0.53 M/S
PV PEAK S VEL: 0.34 M/S
RA MAJOR: 3.61 CM
RA MAJOR: 4.63 CM
RA MAJOR: 5.09 CM
RA PRESSURE: 3 MMHG
RA PRESSURE: 8 MMHG
RA WIDTH: 3.43 CM
RA WIDTH: 3.47 CM
RA WIDTH: 4.49 CM
RBC # BLD AUTO: 2.96 M/UL (ref 4.6–6.2)
RBC # BLD AUTO: 3.11 M/UL (ref 4.6–6.2)
RBC # BLD AUTO: 3.17 M/UL (ref 4.6–6.2)
RBC # BLD AUTO: 3.35 M/UL (ref 4.6–6.2)
RBC # BLD AUTO: 3.46 M/UL (ref 4.6–6.2)
RBC # BLD AUTO: 3.52 M/UL (ref 4.6–6.2)
RBC # BLD AUTO: 3.55 M/UL (ref 4.6–6.2)
RBC # BLD AUTO: 3.57 M/UL (ref 4.6–6.2)
RBC # BLD AUTO: 3.58 M/UL (ref 4.6–6.2)
RBC # BLD AUTO: 3.58 M/UL (ref 4.6–6.2)
RBC # BLD AUTO: 3.59 M/UL (ref 4.6–6.2)
RBC # BLD AUTO: 3.63 M/UL (ref 4.6–6.2)
RBC # BLD AUTO: 3.64 M/UL (ref 4.6–6.2)
RBC # BLD AUTO: 3.85 M/UL (ref 4.6–6.2)
RBC # BLD AUTO: 3.91 M/UL (ref 4.6–6.2)
RBC # BLD AUTO: 3.95 M/UL (ref 4.6–6.2)
RBC # BLD AUTO: 4.04 M/UL (ref 4.6–6.2)
RBC # BLD AUTO: 4.08 M/UL (ref 4.6–6.2)
RBC # BLD AUTO: 4.09 M/UL (ref 4.6–6.2)
RBC # BLD AUTO: 4.3 M/UL (ref 4.6–6.2)
RBC #/AREA URNS AUTO: 4 /HPF (ref 0–4)
RBC #/AREA URNS AUTO: >100 /HPF (ref 0–4)
REST FLOW - ANTERIOR: 1.21 CC/MIN/G
REST FLOW - INFERIOR: 0.71 CC/MIN/G
REST FLOW - LATERAL: 0.36 CC/MIN/G
REST FLOW - MAX: 1.64 CC/MIN/G
REST FLOW - MIN: 0.19 CC/MIN/G
REST FLOW - SEPTAL: 1.13 CC/MIN/G
REST FLOW - WHOLE HEART: 0.85 CC/MIN/G
REST FLOW- DEFECT 1: 0.48 CC/MIN/G
RETIRED EF AND QEF - SEE NOTES: 53 %
RIGHT VENTRICULAR END-DIASTOLIC DIMENSION: 3.1 CM
RIGHT VENTRICULAR END-DIASTOLIC DIMENSION: 3.58 CM
RIGHT VENTRICULAR END-DIASTOLIC DIMENSION: 4.69 CM
RV TISSUE DOPPLER FREE WALL SYSTOLIC VELOCITY 1 (APICAL 4 CHAMBER VIEW): 11.17 CM/S
RV TISSUE DOPPLER FREE WALL SYSTOLIC VELOCITY 1 (APICAL 4 CHAMBER VIEW): 13.91 CM/S
SAMPLE: ABNORMAL
SAMPLE: NORMAL
SINUS: 3.31 CM
SINUS: 3.46 CM
SINUS: 3.51 CM
SITE: ABNORMAL
SITE: NORMAL
SODIUM SERPL-SCNC: 125 MMOL/L (ref 136–145)
SODIUM SERPL-SCNC: 128 MMOL/L (ref 136–145)
SODIUM SERPL-SCNC: 128 MMOL/L (ref 136–145)
SODIUM SERPL-SCNC: 129 MMOL/L (ref 136–145)
SODIUM SERPL-SCNC: 130 MMOL/L (ref 136–145)
SODIUM SERPL-SCNC: 131 MMOL/L (ref 136–145)
SODIUM SERPL-SCNC: 132 MMOL/L (ref 136–145)
SODIUM SERPL-SCNC: 133 MMOL/L (ref 136–145)
SODIUM SERPL-SCNC: 135 MMOL/L (ref 136–145)
SODIUM SERPL-SCNC: 135 MMOL/L (ref 136–145)
SODIUM SERPL-SCNC: 136 MMOL/L (ref 136–145)
SODIUM SERPL-SCNC: 137 MMOL/L (ref 136–145)
SODIUM SERPL-SCNC: 139 MMOL/L (ref 136–145)
SODIUM SERPL-SCNC: 140 MMOL/L (ref 136–145)
SODIUM SERPL-SCNC: 141 MMOL/L (ref 136–145)
SODIUM SERPL-SCNC: 142 MMOL/L (ref 136–145)
SODIUM SERPL-SCNC: 142 MMOL/L (ref 136–145)
SP GR UR STRIP: 1.01 (ref 1–1.03)
SP GR UR STRIP: 1.01 (ref 1–1.03)
SP02: 100
SP02: 95
SP02: 96
SP02: 96
SP02: 97
SP02: 97
SP02: 98
SP02: 99
SPONT RATE: 16
SPONT RATE: 17
SPONT RATE: 18
SPONT RATE: 19
SPONT RATE: 23
SPONT RATE: 25
SPONT RATE: 26
SPONT RATE: 28
SQUAMOUS #/AREA URNS AUTO: 0 /HPF
SQUAMOUS #/AREA URNS AUTO: 1 /HPF
STJ: 3.05 CM
STJ: 3.19 CM
STJ: 3.2 CM
STRESS FLOW - ANTERIOR: 0.71 CC/MIN/G
STRESS FLOW - INFERIOR: 0.45 CC/MIN/G
STRESS FLOW - LATERAL: 0.22 CC/MIN/G
STRESS FLOW - MAX: 1.26 CC/MIN/G
STRESS FLOW - MIN: 0.14 CC/MIN/G
STRESS FLOW - SEPTAL: 0.93 CC/MIN/G
STRESS FLOW - WHOLE HEART: 0.58 CC/MIN/G
STRESS FLOW- DEFECT 1: 0.28 CC/MIN/G
SYSTOLIC BLOOD PRESSURE: 159 MMHG
TDI LATERAL: 0.02 M/S
TDI LATERAL: 0.09 M/S
TDI LATERAL: 0.1 M/S
TDI SEPTAL: 0.04 M/S
TDI SEPTAL: 0.06 M/S
TDI SEPTAL: 0.11 M/S
TDI: 0.03 M/S
TDI: 0.08 M/S
TDI: 0.1 M/S
TR MAX PG: 37 MMHG
TR MAX PG: 59 MMHG
TRICUSPID ANNULAR PLANE SYSTOLIC EXCURSION: 1.8 CM
TRICUSPID ANNULAR PLANE SYSTOLIC EXCURSION: 1.99 CM
TRICUSPID ANNULAR PLANE SYSTOLIC EXCURSION: 2.02 CM
TRIGL SERPL-MCNC: 52 MG/DL (ref 30–150)
TROPONIN I SERPL DL<=0.01 NG/ML-MCNC: 0.2 NG/ML (ref 0–0.03)
TROPONIN I SERPL DL<=0.01 NG/ML-MCNC: 0.21 NG/ML (ref 0–0.03)
TROPONIN I SERPL DL<=0.01 NG/ML-MCNC: 0.22 NG/ML (ref 0–0.03)
TROPONIN I SERPL DL<=0.01 NG/ML-MCNC: 0.22 NG/ML (ref 0–0.03)
TROPONIN I SERPL DL<=0.01 NG/ML-MCNC: 0.23 NG/ML (ref 0–0.03)
TROPONIN I SERPL DL<=0.01 NG/ML-MCNC: 0.24 NG/ML (ref 0–0.03)
TROPONIN I SERPL DL<=0.01 NG/ML-MCNC: 0.26 NG/ML (ref 0–0.03)
TROPONIN I SERPL DL<=0.01 NG/ML-MCNC: 0.27 NG/ML (ref 0–0.03)
TROPONIN I SERPL DL<=0.01 NG/ML-MCNC: 0.27 NG/ML (ref 0–0.03)
TROPONIN I SERPL DL<=0.01 NG/ML-MCNC: 0.29 NG/ML (ref 0–0.03)
TROPONIN I SERPL DL<=0.01 NG/ML-MCNC: 27.39 NG/ML (ref 0–0.03)
TROPONIN I SERPL DL<=0.01 NG/ML-MCNC: 33.59 NG/ML (ref 0–0.03)
TROPONIN I SERPL DL<=0.01 NG/ML-MCNC: 35.95 NG/ML (ref 0–0.03)
TROPONIN I SERPL DL<=0.01 NG/ML-MCNC: 39.39 NG/ML (ref 0–0.03)
TROPONIN I SERPL DL<=0.01 NG/ML-MCNC: 39.99 NG/ML (ref 0–0.03)
TROPONIN I SERPL DL<=0.01 NG/ML-MCNC: 40.69 NG/ML (ref 0–0.03)
TV REST PULMONARY ARTERY PRESSURE: 45 MMHG
URN SPEC COLLECT METH UR: ABNORMAL
URN SPEC COLLECT METH UR: ABNORMAL
VANCOMYCIN SERPL-MCNC: 17.7 UG/ML
VANCOMYCIN SERPL-MCNC: 21.1 UG/ML
VANCOMYCIN SERPL-MCNC: 24.7 UG/ML
VANCOMYCIN SERPL-MCNC: 30 UG/ML
WBC # BLD AUTO: 12.08 K/UL (ref 3.9–12.7)
WBC # BLD AUTO: 12.16 K/UL (ref 3.9–12.7)
WBC # BLD AUTO: 12.46 K/UL (ref 3.9–12.7)
WBC # BLD AUTO: 12.49 K/UL (ref 3.9–12.7)
WBC # BLD AUTO: 12.56 K/UL (ref 3.9–12.7)
WBC # BLD AUTO: 13.55 K/UL (ref 3.9–12.7)
WBC # BLD AUTO: 13.76 K/UL (ref 3.9–12.7)
WBC # BLD AUTO: 13.77 K/UL (ref 3.9–12.7)
WBC # BLD AUTO: 14.01 K/UL (ref 3.9–12.7)
WBC # BLD AUTO: 14.13 K/UL (ref 3.9–12.7)
WBC # BLD AUTO: 15.2 K/UL (ref 3.9–12.7)
WBC # BLD AUTO: 16.37 K/UL (ref 3.9–12.7)
WBC # BLD AUTO: 18.16 K/UL (ref 3.9–12.7)
WBC # BLD AUTO: 6.63 K/UL (ref 3.9–12.7)
WBC # BLD AUTO: 7.62 K/UL (ref 3.9–12.7)
WBC # BLD AUTO: 7.69 K/UL (ref 3.9–12.7)
WBC # BLD AUTO: 7.82 K/UL (ref 3.9–12.7)
WBC # BLD AUTO: 7.89 K/UL (ref 3.9–12.7)
WBC # BLD AUTO: 8.01 K/UL (ref 3.9–12.7)
WBC # BLD AUTO: 9.94 K/UL (ref 3.9–12.7)
WBC #/AREA URNS AUTO: 1 /HPF (ref 0–5)
WBC #/AREA URNS AUTO: 5 /HPF (ref 0–5)

## 2022-01-01 PROCEDURE — 84484 ASSAY OF TROPONIN QUANT: CPT | Mod: 91 | Performed by: HOSPITALIST

## 2022-01-01 PROCEDURE — 3074F PR MOST RECENT SYSTOLIC BLOOD PRESSURE < 130 MM HG: ICD-10-PCS | Mod: HCNC,CPTII,S$GLB, | Performed by: NURSE PRACTITIONER

## 2022-01-01 PROCEDURE — 83735 ASSAY OF MAGNESIUM: CPT | Performed by: STUDENT IN AN ORGANIZED HEALTH CARE EDUCATION/TRAINING PROGRAM

## 2022-01-01 PROCEDURE — 3288F FALL RISK ASSESSMENT DOCD: CPT | Mod: CPTII,S$GLB,, | Performed by: INTERNAL MEDICINE

## 2022-01-01 PROCEDURE — 3288F PR FALLS RISK ASSESSMENT DOCUMENTED: ICD-10-PCS | Mod: HCNC,CPTII,S$GLB, | Performed by: NURSE PRACTITIONER

## 2022-01-01 PROCEDURE — 1126F PR PAIN SEVERITY QUANTIFIED, NO PAIN PRESENT: ICD-10-PCS | Mod: HCNC,CPTII,S$GLB, | Performed by: NURSE PRACTITIONER

## 2022-01-01 PROCEDURE — 25000003 PHARM REV CODE 250: Performed by: INTERNAL MEDICINE

## 2022-01-01 PROCEDURE — 1111F PR DISCHARGE MEDS RECONCILED W/ CURRENT OUTPATIENT MED LIST: ICD-10-PCS | Mod: CPTII,,, | Performed by: INTERNAL MEDICINE

## 2022-01-01 PROCEDURE — 99214 PR OFFICE/OUTPT VISIT, EST, LEVL IV, 30-39 MIN: ICD-10-PCS | Mod: GC,S$GLB,, | Performed by: STUDENT IN AN ORGANIZED HEALTH CARE EDUCATION/TRAINING PROGRAM

## 2022-01-01 PROCEDURE — 99233 PR SUBSEQUENT HOSPITAL CARE,LEVL III: ICD-10-PCS | Mod: GC,,, | Performed by: INTERNAL MEDICINE

## 2022-01-01 PROCEDURE — 83735 ASSAY OF MAGNESIUM: CPT | Performed by: HOSPITALIST

## 2022-01-01 PROCEDURE — 99152 PR MOD CONSCIOUS SEDATION, SAME PHYS, 5+ YRS, FIRST 15 MIN: ICD-10-PCS | Mod: GC,,, | Performed by: INTERNAL MEDICINE

## 2022-01-01 PROCEDURE — 94640 AIRWAY INHALATION TREATMENT: CPT

## 2022-01-01 PROCEDURE — 83605 ASSAY OF LACTIC ACID: CPT | Performed by: HOSPITALIST

## 2022-01-01 PROCEDURE — 63600175 PHARM REV CODE 636 W HCPCS: Performed by: ANESTHESIOLOGY

## 2022-01-01 PROCEDURE — 82803 BLOOD GASES ANY COMBINATION: CPT

## 2022-01-01 PROCEDURE — 63600175 PHARM REV CODE 636 W HCPCS: Performed by: HOSPITALIST

## 2022-01-01 PROCEDURE — 93010 ELECTROCARDIOGRAM REPORT: CPT | Mod: ,,, | Performed by: INTERNAL MEDICINE

## 2022-01-01 PROCEDURE — 93010 EKG 12-LEAD: ICD-10-PCS | Mod: S$GLB,,, | Performed by: INTERNAL MEDICINE

## 2022-01-01 PROCEDURE — 84100 ASSAY OF PHOSPHORUS: CPT | Performed by: INTERNAL MEDICINE

## 2022-01-01 PROCEDURE — 51798 US URINE CAPACITY MEASURE: CPT

## 2022-01-01 PROCEDURE — 99232 PR SUBSEQUENT HOSPITAL CARE,LEVL II: ICD-10-PCS | Mod: ,,, | Performed by: INTERNAL MEDICINE

## 2022-01-01 PROCEDURE — 80053 COMPREHEN METABOLIC PANEL: CPT | Performed by: HOSPITALIST

## 2022-01-01 PROCEDURE — 74018 XR ABDOMEN AP 1 VIEW: ICD-10-PCS | Mod: 26,HCNC,, | Performed by: RADIOLOGY

## 2022-01-01 PROCEDURE — 63600175 PHARM REV CODE 636 W HCPCS: Performed by: INTERNAL MEDICINE

## 2022-01-01 PROCEDURE — 25000003 PHARM REV CODE 250

## 2022-01-01 PROCEDURE — 84100 ASSAY OF PHOSPHORUS: CPT | Performed by: STUDENT IN AN ORGANIZED HEALTH CARE EDUCATION/TRAINING PROGRAM

## 2022-01-01 PROCEDURE — 99900035 HC TECH TIME PER 15 MIN (STAT)

## 2022-01-01 PROCEDURE — 99223 PR INITIAL HOSPITAL CARE,LEVL III: ICD-10-PCS | Mod: GC,,, | Performed by: STUDENT IN AN ORGANIZED HEALTH CARE EDUCATION/TRAINING PROGRAM

## 2022-01-01 PROCEDURE — 84484 ASSAY OF TROPONIN QUANT: CPT | Performed by: EMERGENCY MEDICINE

## 2022-01-01 PROCEDURE — 80053 COMPREHEN METABOLIC PANEL: CPT | Performed by: STUDENT IN AN ORGANIZED HEALTH CARE EDUCATION/TRAINING PROGRAM

## 2022-01-01 PROCEDURE — C9399 UNCLASSIFIED DRUGS OR BIOLOG: HCPCS | Performed by: INTERNAL MEDICINE

## 2022-01-01 PROCEDURE — 96374 THER/PROPH/DIAG INJ IV PUSH: CPT

## 2022-01-01 PROCEDURE — 99499 UNLISTED E&M SERVICE: CPT | Mod: S$GLB,,, | Performed by: INTERNAL MEDICINE

## 2022-01-01 PROCEDURE — 85025 COMPLETE CBC W/AUTO DIFF WBC: CPT

## 2022-01-01 PROCEDURE — 99233 SBSQ HOSP IP/OBS HIGH 50: CPT | Mod: ,,, | Performed by: STUDENT IN AN ORGANIZED HEALTH CARE EDUCATION/TRAINING PROGRAM

## 2022-01-01 PROCEDURE — 99999 PR PBB SHADOW E&M-EST. PATIENT-LVL V: ICD-10-PCS | Mod: PBBFAC,,, | Performed by: INTERNAL MEDICINE

## 2022-01-01 PROCEDURE — 99999 PR PBB SHADOW E&M-EST. PATIENT-LVL V: ICD-10-PCS | Mod: PBBFAC,HCNC,, | Performed by: INTERNAL MEDICINE

## 2022-01-01 PROCEDURE — 80048 BASIC METABOLIC PNL TOTAL CA: CPT | Mod: 91,XB | Performed by: STUDENT IN AN ORGANIZED HEALTH CARE EDUCATION/TRAINING PROGRAM

## 2022-01-01 PROCEDURE — 99232 PR SUBSEQUENT HOSPITAL CARE,LEVL II: ICD-10-PCS | Mod: GC,,, | Performed by: INTERNAL MEDICINE

## 2022-01-01 PROCEDURE — 74018 RADEX ABDOMEN 1 VIEW: CPT | Mod: TC,HCNC,FY

## 2022-01-01 PROCEDURE — 36415 COLL VENOUS BLD VENIPUNCTURE: CPT | Performed by: HOSPITALIST

## 2022-01-01 PROCEDURE — 20600001 HC STEP DOWN PRIVATE ROOM

## 2022-01-01 PROCEDURE — 85025 COMPLETE CBC W/AUTO DIFF WBC: CPT | Performed by: HOSPITALIST

## 2022-01-01 PROCEDURE — 84132 ASSAY OF SERUM POTASSIUM: CPT | Performed by: INTERNAL MEDICINE

## 2022-01-01 PROCEDURE — 94660 CPAP INITIATION&MGMT: CPT

## 2022-01-01 PROCEDURE — 63600175 PHARM REV CODE 636 W HCPCS: Performed by: STUDENT IN AN ORGANIZED HEALTH CARE EDUCATION/TRAINING PROGRAM

## 2022-01-01 PROCEDURE — 3077F PR MOST RECENT SYSTOLIC BLOOD PRESSURE >= 140 MM HG: ICD-10-PCS | Mod: CPTII,S$GLB,, | Performed by: INTERNAL MEDICINE

## 2022-01-01 PROCEDURE — 37000009 HC ANESTHESIA EA ADD 15 MINS: Performed by: INTERNAL MEDICINE

## 2022-01-01 PROCEDURE — 25500020 PHARM REV CODE 255: Performed by: STUDENT IN AN ORGANIZED HEALTH CARE EDUCATION/TRAINING PROGRAM

## 2022-01-01 PROCEDURE — 85730 THROMBOPLASTIN TIME PARTIAL: CPT | Performed by: INTERNAL MEDICINE

## 2022-01-01 PROCEDURE — 83050 HGB METHEMOGLOBIN QUAN: CPT

## 2022-01-01 PROCEDURE — 85027 COMPLETE CBC AUTOMATED: CPT | Performed by: INTERNAL MEDICINE

## 2022-01-01 PROCEDURE — 74018 RADEX ABDOMEN 1 VIEW: CPT | Mod: 26,HCNC,, | Performed by: RADIOLOGY

## 2022-01-01 PROCEDURE — 1125F AMNT PAIN NOTED PAIN PRSNT: CPT | Mod: CPTII,S$GLB,, | Performed by: INTERNAL MEDICINE

## 2022-01-01 PROCEDURE — 99233 SBSQ HOSP IP/OBS HIGH 50: CPT | Mod: GC,,, | Performed by: INTERNAL MEDICINE

## 2022-01-01 PROCEDURE — 85025 COMPLETE CBC W/AUTO DIFF WBC: CPT | Performed by: STUDENT IN AN ORGANIZED HEALTH CARE EDUCATION/TRAINING PROGRAM

## 2022-01-01 PROCEDURE — 83735 ASSAY OF MAGNESIUM: CPT | Mod: 91 | Performed by: INTERNAL MEDICINE

## 2022-01-01 PROCEDURE — 94761 N-INVAS EAR/PLS OXIMETRY MLT: CPT

## 2022-01-01 PROCEDURE — 63600175 PHARM REV CODE 636 W HCPCS: Performed by: EMERGENCY MEDICINE

## 2022-01-01 PROCEDURE — 99999 PR PBB SHADOW E&M-EST. PATIENT-LVL V: CPT | Mod: PBBFAC,HCNC,, | Performed by: NURSE PRACTITIONER

## 2022-01-01 PROCEDURE — 83735 ASSAY OF MAGNESIUM: CPT | Performed by: INTERNAL MEDICINE

## 2022-01-01 PROCEDURE — 27000221 HC OXYGEN, UP TO 24 HOURS

## 2022-01-01 PROCEDURE — 25000003 PHARM REV CODE 250: Performed by: HOSPITALIST

## 2022-01-01 PROCEDURE — 99214 PR OFFICE/OUTPT VISIT, EST, LEVL IV, 30-39 MIN: ICD-10-PCS | Mod: HCNC,S$GLB,, | Performed by: INTERNAL MEDICINE

## 2022-01-01 PROCEDURE — 1160F PR REVIEW ALL MEDS BY PRESCRIBER/CLIN PHARMACIST DOCUMENTED: ICD-10-PCS | Mod: HCNC,CPTII,S$GLB, | Performed by: PHYSICAL MEDICINE & REHABILITATION

## 2022-01-01 PROCEDURE — 63600367 HC NITRIC OXIDE PER HOUR

## 2022-01-01 PROCEDURE — 27000646 HC AEROBIKA DEVICE

## 2022-01-01 PROCEDURE — 94799 UNLISTED PULMONARY SVC/PX: CPT

## 2022-01-01 PROCEDURE — C1769 GUIDE WIRE: HCPCS | Performed by: INTERNAL MEDICINE

## 2022-01-01 PROCEDURE — 25000003 PHARM REV CODE 250: Performed by: EMERGENCY MEDICINE

## 2022-01-01 PROCEDURE — 1159F PR MEDICATION LIST DOCUMENTED IN MEDICAL RECORD: ICD-10-PCS | Mod: HCNC,CPTII,S$GLB, | Performed by: NURSE PRACTITIONER

## 2022-01-01 PROCEDURE — 93226 XTRNL ECG REC<48 HR SCAN A/R: CPT

## 2022-01-01 PROCEDURE — C9399 UNCLASSIFIED DRUGS OR BIOLOG: HCPCS | Performed by: HOSPITALIST

## 2022-01-01 PROCEDURE — 85730 THROMBOPLASTIN TIME PARTIAL: CPT | Mod: 91 | Performed by: INTERNAL MEDICINE

## 2022-01-01 PROCEDURE — 36415 COLL VENOUS BLD VENIPUNCTURE: CPT | Performed by: INTERNAL MEDICINE

## 2022-01-01 PROCEDURE — 94003 VENT MGMT INPAT SUBQ DAY: CPT

## 2022-01-01 PROCEDURE — 99291 PR CRITICAL CARE, E/M 30-74 MINUTES: ICD-10-PCS | Mod: GC,,, | Performed by: INTERNAL MEDICINE

## 2022-01-01 PROCEDURE — 85730 THROMBOPLASTIN TIME PARTIAL: CPT | Performed by: EMERGENCY MEDICINE

## 2022-01-01 PROCEDURE — 99214 OFFICE O/P EST MOD 30 MIN: CPT | Mod: HCNC,S$GLB,, | Performed by: INTERNAL MEDICINE

## 2022-01-01 PROCEDURE — 1160F RVW MEDS BY RX/DR IN RCRD: CPT | Mod: HCNC,CPTII,S$GLB, | Performed by: NURSE PRACTITIONER

## 2022-01-01 PROCEDURE — 25000242 PHARM REV CODE 250 ALT 637 W/ HCPCS: Performed by: INTERNAL MEDICINE

## 2022-01-01 PROCEDURE — 93227 HOLTER MONITOR - 48 HOUR (CUPID ONLY): ICD-10-PCS | Mod: ,,, | Performed by: INTERNAL MEDICINE

## 2022-01-01 PROCEDURE — 27100092 HC HIGH FLOW DELIVERY CANNULA

## 2022-01-01 PROCEDURE — 85610 PROTHROMBIN TIME: CPT | Performed by: EMERGENCY MEDICINE

## 2022-01-01 PROCEDURE — 3078F DIAST BP <80 MM HG: CPT | Mod: HCNC,CPTII,S$GLB, | Performed by: INTERNAL MEDICINE

## 2022-01-01 PROCEDURE — 99205 OFFICE O/P NEW HI 60 MIN: CPT | Mod: S$GLB,,, | Performed by: INTERNAL MEDICINE

## 2022-01-01 PROCEDURE — 99233 SBSQ HOSP IP/OBS HIGH 50: CPT | Mod: ,,, | Performed by: INTERNAL MEDICINE

## 2022-01-01 PROCEDURE — C1898 LEAD, PMKR, OTHER THAN TRANS: HCPCS | Performed by: INTERNAL MEDICINE

## 2022-01-01 PROCEDURE — C1894 INTRO/SHEATH, NON-LASER: HCPCS | Performed by: INTERNAL MEDICINE

## 2022-01-01 PROCEDURE — 85025 COMPLETE CBC W/AUTO DIFF WBC: CPT | Mod: 91 | Performed by: STUDENT IN AN ORGANIZED HEALTH CARE EDUCATION/TRAINING PROGRAM

## 2022-01-01 PROCEDURE — 25000003 PHARM REV CODE 250: Performed by: STUDENT IN AN ORGANIZED HEALTH CARE EDUCATION/TRAINING PROGRAM

## 2022-01-01 PROCEDURE — 1101F PR PT FALLS ASSESS DOC 0-1 FALLS W/OUT INJ PAST YR: ICD-10-PCS | Mod: HCNC,CPTII,S$GLB, | Performed by: NURSE PRACTITIONER

## 2022-01-01 PROCEDURE — 93005 EKG 12-LEAD: ICD-10-PCS | Mod: S$GLB,,, | Performed by: STUDENT IN AN ORGANIZED HEALTH CARE EDUCATION/TRAINING PROGRAM

## 2022-01-01 PROCEDURE — 27000202 HC IABP, ADD'L DAY

## 2022-01-01 PROCEDURE — 99291 PR CRITICAL CARE, E/M 30-74 MINUTES: ICD-10-PCS | Mod: ,,, | Performed by: EMERGENCY MEDICINE

## 2022-01-01 PROCEDURE — 93010 ELECTROCARDIOGRAM REPORT: CPT | Mod: S$GLB,,, | Performed by: INTERNAL MEDICINE

## 2022-01-01 PROCEDURE — 3288F PR FALLS RISK ASSESSMENT DOCUMENTED: ICD-10-PCS | Mod: HCNC,CPTII,S$GLB, | Performed by: INTERNAL MEDICINE

## 2022-01-01 PROCEDURE — 1111F DSCHRG MED/CURRENT MED MERGE: CPT | Mod: CPTII,,, | Performed by: INTERNAL MEDICINE

## 2022-01-01 PROCEDURE — 1126F AMNT PAIN NOTED NONE PRSNT: CPT | Mod: HCNC,CPTII,S$GLB, | Performed by: NURSE PRACTITIONER

## 2022-01-01 PROCEDURE — 93010 EKG 12-LEAD: ICD-10-PCS | Mod: ,,, | Performed by: INTERNAL MEDICINE

## 2022-01-01 PROCEDURE — 27000190 HC CPAP FULL FACE MASK W/VALVE

## 2022-01-01 PROCEDURE — 93227 XTRNL ECG REC<48 HR R&I: CPT | Mod: ,,, | Performed by: INTERNAL MEDICINE

## 2022-01-01 PROCEDURE — 84484 ASSAY OF TROPONIN QUANT: CPT | Performed by: HOSPITALIST

## 2022-01-01 PROCEDURE — 99232 SBSQ HOSP IP/OBS MODERATE 35: CPT | Mod: GC,,, | Performed by: INTERNAL MEDICINE

## 2022-01-01 PROCEDURE — 87040 BLOOD CULTURE FOR BACTERIA: CPT | Mod: 59 | Performed by: STUDENT IN AN ORGANIZED HEALTH CARE EDUCATION/TRAINING PROGRAM

## 2022-01-01 PROCEDURE — 99291 CRITICAL CARE FIRST HOUR: CPT | Mod: ,,, | Performed by: EMERGENCY MEDICINE

## 2022-01-01 PROCEDURE — 93454 CORONARY ARTERY ANGIO S&I: CPT | Mod: GC | Performed by: INTERNAL MEDICINE

## 2022-01-01 PROCEDURE — 99999 PR PBB SHADOW E&M-EST. PATIENT-LVL IV: ICD-10-PCS | Mod: PBBFAC,,, | Performed by: INTERNAL MEDICINE

## 2022-01-01 PROCEDURE — 63600175 PHARM REV CODE 636 W HCPCS

## 2022-01-01 PROCEDURE — 99223 1ST HOSP IP/OBS HIGH 75: CPT | Mod: AI,,, | Performed by: INTERNAL MEDICINE

## 2022-01-01 PROCEDURE — 3079F DIAST BP 80-89 MM HG: CPT | Mod: HCNC,CPTII,S$GLB, | Performed by: NURSE PRACTITIONER

## 2022-01-01 PROCEDURE — 80053 COMPREHEN METABOLIC PANEL: CPT | Mod: 91 | Performed by: INTERNAL MEDICINE

## 2022-01-01 PROCEDURE — 1160F PR REVIEW ALL MEDS BY PRESCRIBER/CLIN PHARMACIST DOCUMENTED: ICD-10-PCS | Mod: HCNC,CPTII,S$GLB, | Performed by: NURSE PRACTITIONER

## 2022-01-01 PROCEDURE — 99999 PR PBB SHADOW E&M-EST. PATIENT-LVL V: CPT | Mod: PBBFAC,,, | Performed by: INTERNAL MEDICINE

## 2022-01-01 PROCEDURE — 37000008 HC ANESTHESIA 1ST 15 MINUTES: Performed by: INTERNAL MEDICINE

## 2022-01-01 PROCEDURE — 25500020 PHARM REV CODE 255: Performed by: INTERNAL MEDICINE

## 2022-01-01 PROCEDURE — 3078F PR MOST RECENT DIASTOLIC BLOOD PRESSURE < 80 MM HG: ICD-10-PCS | Mod: CPTII,S$GLB,, | Performed by: INTERNAL MEDICINE

## 2022-01-01 PROCEDURE — 3077F SYST BP >= 140 MM HG: CPT | Mod: HCNC,CPTII,S$GLB, | Performed by: PHYSICAL MEDICINE & REHABILITATION

## 2022-01-01 PROCEDURE — 99499 RISK ADDL DX/OHS AUDIT: ICD-10-PCS | Mod: S$GLB,,, | Performed by: INTERNAL MEDICINE

## 2022-01-01 PROCEDURE — 84100 ASSAY OF PHOSPHORUS: CPT | Performed by: HOSPITALIST

## 2022-01-01 PROCEDURE — 63700000 PHARM REV CODE 250 ALT 637 W/O HCPCS: Performed by: STUDENT IN AN ORGANIZED HEALTH CARE EDUCATION/TRAINING PROGRAM

## 2022-01-01 PROCEDURE — 84100 ASSAY OF PHOSPHORUS: CPT | Mod: 91 | Performed by: INTERNAL MEDICINE

## 2022-01-01 PROCEDURE — 99291 CRITICAL CARE FIRST HOUR: CPT | Mod: GC,,, | Performed by: INTERNAL MEDICINE

## 2022-01-01 PROCEDURE — 1126F PR PAIN SEVERITY QUANTIFIED, NO PAIN PRESENT: ICD-10-PCS | Mod: HCNC,CPTII,S$GLB, | Performed by: INTERNAL MEDICINE

## 2022-01-01 PROCEDURE — 27100171 HC OXYGEN HIGH FLOW UP TO 24 HOURS

## 2022-01-01 PROCEDURE — 94664 DEMO&/EVAL PT USE INHALER: CPT

## 2022-01-01 PROCEDURE — 93005 ELECTROCARDIOGRAM TRACING: CPT

## 2022-01-01 PROCEDURE — 99999 PR PBB SHADOW E&M-EST. PATIENT-LVL V: ICD-10-PCS | Mod: PBBFAC,HCNC,, | Performed by: NURSE PRACTITIONER

## 2022-01-01 PROCEDURE — 3077F SYST BP >= 140 MM HG: CPT | Mod: HCNC,CPTII,S$GLB, | Performed by: INTERNAL MEDICINE

## 2022-01-01 PROCEDURE — 99232 SBSQ HOSP IP/OBS MODERATE 35: CPT | Mod: ,,, | Performed by: INTERNAL MEDICINE

## 2022-01-01 PROCEDURE — 25000003 PHARM REV CODE 250: Performed by: NURSE ANESTHETIST, CERTIFIED REGISTERED

## 2022-01-01 PROCEDURE — 99204 PR OFFICE/OUTPT VISIT, NEW, LEVL IV, 45-59 MIN: ICD-10-PCS | Mod: HCNC,S$GLB,, | Performed by: PHYSICAL MEDICINE & REHABILITATION

## 2022-01-01 PROCEDURE — 99214 OFFICE O/P EST MOD 30 MIN: CPT | Mod: HCNC,S$GLB,, | Performed by: NURSE PRACTITIONER

## 2022-01-01 PROCEDURE — 99499 UNLISTED E&M SERVICE: CPT | Mod: S$GLB,,, | Performed by: NURSE PRACTITIONER

## 2022-01-01 PROCEDURE — 99291 CRITICAL CARE FIRST HOUR: CPT

## 2022-01-01 PROCEDURE — 94002 VENT MGMT INPAT INIT DAY: CPT

## 2022-01-01 PROCEDURE — 3288F FALL RISK ASSESSMENT DOCD: CPT | Mod: HCNC,CPTII,S$GLB, | Performed by: NURSE PRACTITIONER

## 2022-01-01 PROCEDURE — 84484 ASSAY OF TROPONIN QUANT: CPT | Mod: 91

## 2022-01-01 PROCEDURE — 99223 PR INITIAL HOSPITAL CARE,LEVL III: ICD-10-PCS | Mod: AI,,, | Performed by: INTERNAL MEDICINE

## 2022-01-01 PROCEDURE — 99999 PR PBB SHADOW E&M-EST. PATIENT-LVL IV: CPT | Mod: PBBFAC,HCNC,, | Performed by: INTERNAL MEDICINE

## 2022-01-01 PROCEDURE — 3288F PR FALLS RISK ASSESSMENT DOCUMENTED: ICD-10-PCS | Mod: HCNC,CPTII,S$GLB, | Performed by: PHYSICAL MEDICINE & REHABILITATION

## 2022-01-01 PROCEDURE — 1159F MED LIST DOCD IN RCRD: CPT | Mod: HCNC,CPTII,S$GLB, | Performed by: INTERNAL MEDICINE

## 2022-01-01 PROCEDURE — 99213 PR OFFICE/OUTPT VISIT, EST, LEVL III, 20-29 MIN: ICD-10-PCS | Mod: S$GLB,,, | Performed by: INTERNAL MEDICINE

## 2022-01-01 PROCEDURE — 93005 ELECTROCARDIOGRAM TRACING: CPT | Mod: S$GLB,,, | Performed by: INTERNAL MEDICINE

## 2022-01-01 PROCEDURE — 99152 MOD SED SAME PHYS/QHP 5/>YRS: CPT | Performed by: INTERNAL MEDICINE

## 2022-01-01 PROCEDURE — 99999 PR PBB SHADOW E&M-EST. PATIENT-LVL V: ICD-10-PCS | Mod: PBBFAC,GC,, | Performed by: STUDENT IN AN ORGANIZED HEALTH CARE EDUCATION/TRAINING PROGRAM

## 2022-01-01 PROCEDURE — 80048 BASIC METABOLIC PNL TOTAL CA: CPT | Mod: XB | Performed by: STUDENT IN AN ORGANIZED HEALTH CARE EDUCATION/TRAINING PROGRAM

## 2022-01-01 PROCEDURE — 81001 URINALYSIS AUTO W/SCOPE: CPT | Performed by: STUDENT IN AN ORGANIZED HEALTH CARE EDUCATION/TRAINING PROGRAM

## 2022-01-01 PROCEDURE — 3077F SYST BP >= 140 MM HG: CPT | Mod: CPTII,S$GLB,, | Performed by: INTERNAL MEDICINE

## 2022-01-01 PROCEDURE — 82800 BLOOD PH: CPT

## 2022-01-01 PROCEDURE — 83880 ASSAY OF NATRIURETIC PEPTIDE: CPT

## 2022-01-01 PROCEDURE — 3079F PR MOST RECENT DIASTOLIC BLOOD PRESSURE 80-89 MM HG: ICD-10-PCS | Mod: HCNC,CPTII,S$GLB, | Performed by: NURSE PRACTITIONER

## 2022-01-01 PROCEDURE — 99205 PR OFFICE/OUTPT VISIT, NEW, LEVL V, 60-74 MIN: ICD-10-PCS | Mod: S$GLB,,, | Performed by: INTERNAL MEDICINE

## 2022-01-01 PROCEDURE — 93454 CORONARY ARTERY ANGIO S&I: CPT | Mod: 26,GC,, | Performed by: INTERNAL MEDICINE

## 2022-01-01 PROCEDURE — 1101F PR PT FALLS ASSESS DOC 0-1 FALLS W/OUT INJ PAST YR: ICD-10-PCS | Mod: HCNC,CPTII,S$GLB, | Performed by: INTERNAL MEDICINE

## 2022-01-01 PROCEDURE — 1159F PR MEDICATION LIST DOCUMENTED IN MEDICAL RECORD: ICD-10-PCS | Mod: HCNC,CPTII,S$GLB, | Performed by: INTERNAL MEDICINE

## 2022-01-01 PROCEDURE — 93010 RHYTHM STRIP: ICD-10-PCS | Mod: S$GLB,,, | Performed by: INTERNAL MEDICINE

## 2022-01-01 PROCEDURE — 99239 PR HOSPITAL DISCHARGE DAY,>30 MIN: ICD-10-PCS | Mod: ,,, | Performed by: INTERNAL MEDICINE

## 2022-01-01 PROCEDURE — 3078F DIAST BP <80 MM HG: CPT | Mod: HCNC,CPTII,S$GLB, | Performed by: PHYSICAL MEDICINE & REHABILITATION

## 2022-01-01 PROCEDURE — 20000000 HC ICU ROOM

## 2022-01-01 PROCEDURE — 99999 PR PBB SHADOW E&M-EST. PATIENT-LVL V: CPT | Mod: PBBFAC,HCNC,, | Performed by: INTERNAL MEDICINE

## 2022-01-01 PROCEDURE — 3288F FALL RISK ASSESSMENT DOCD: CPT | Mod: HCNC,CPTII,S$GLB, | Performed by: INTERNAL MEDICINE

## 2022-01-01 PROCEDURE — 80048 BASIC METABOLIC PNL TOTAL CA: CPT | Performed by: INTERNAL MEDICINE

## 2022-01-01 PROCEDURE — 1126F PR PAIN SEVERITY QUANTIFIED, NO PAIN PRESENT: ICD-10-PCS | Mod: CPTII,S$GLB,, | Performed by: INTERNAL MEDICINE

## 2022-01-01 PROCEDURE — 99214 OFFICE O/P EST MOD 30 MIN: CPT | Mod: GC,S$GLB,, | Performed by: STUDENT IN AN ORGANIZED HEALTH CARE EDUCATION/TRAINING PROGRAM

## 2022-01-01 PROCEDURE — 33208 INSRT HEART PM ATRIAL & VENT: CPT | Mod: GC | Performed by: INTERNAL MEDICINE

## 2022-01-01 PROCEDURE — 82962 GLUCOSE BLOOD TEST: CPT | Performed by: INTERNAL MEDICINE

## 2022-01-01 PROCEDURE — 27100094 HC IABP, SET-UP

## 2022-01-01 PROCEDURE — 1159F PR MEDICATION LIST DOCUMENTED IN MEDICAL RECORD: ICD-10-PCS | Mod: CPTII,S$GLB,, | Performed by: INTERNAL MEDICINE

## 2022-01-01 PROCEDURE — 93005 RHYTHM STRIP: ICD-10-PCS | Mod: S$GLB,,, | Performed by: INTERNAL MEDICINE

## 2022-01-01 PROCEDURE — D9220A PRA ANESTHESIA: Mod: ANES,,, | Performed by: ANESTHESIOLOGY

## 2022-01-01 PROCEDURE — 3288F FALL RISK ASSESSMENT DOCD: CPT | Mod: HCNC,CPTII,S$GLB, | Performed by: PHYSICAL MEDICINE & REHABILITATION

## 2022-01-01 PROCEDURE — 85730 THROMBOPLASTIN TIME PARTIAL: CPT | Mod: 91 | Performed by: STUDENT IN AN ORGANIZED HEALTH CARE EDUCATION/TRAINING PROGRAM

## 2022-01-01 PROCEDURE — 63600175 PHARM REV CODE 636 W HCPCS: Mod: JG | Performed by: INTERNAL MEDICINE

## 2022-01-01 PROCEDURE — 1101F PR PT FALLS ASSESS DOC 0-1 FALLS W/OUT INJ PAST YR: ICD-10-PCS | Mod: CPTII,S$GLB,, | Performed by: INTERNAL MEDICINE

## 2022-01-01 PROCEDURE — 80202 ASSAY OF VANCOMYCIN: CPT | Performed by: INTERNAL MEDICINE

## 2022-01-01 PROCEDURE — 80053 COMPREHEN METABOLIC PANEL: CPT

## 2022-01-01 PROCEDURE — 83880 ASSAY OF NATRIURETIC PEPTIDE: CPT | Performed by: EMERGENCY MEDICINE

## 2022-01-01 PROCEDURE — 99291 CRITICAL CARE FIRST HOUR: CPT | Mod: 25

## 2022-01-01 PROCEDURE — 86850 RBC ANTIBODY SCREEN: CPT | Performed by: INTERNAL MEDICINE

## 2022-01-01 PROCEDURE — 85025 COMPLETE CBC W/AUTO DIFF WBC: CPT | Performed by: EMERGENCY MEDICINE

## 2022-01-01 PROCEDURE — 36415 COLL VENOUS BLD VENIPUNCTURE: CPT | Performed by: STUDENT IN AN ORGANIZED HEALTH CARE EDUCATION/TRAINING PROGRAM

## 2022-01-01 PROCEDURE — D9220A PRA ANESTHESIA: Mod: CRNA,,, | Performed by: NURSE ANESTHETIST, CERTIFIED REGISTERED

## 2022-01-01 PROCEDURE — 37799 UNLISTED PX VASCULAR SURGERY: CPT

## 2022-01-01 PROCEDURE — 99233 PR SUBSEQUENT HOSPITAL CARE,LEVL III: ICD-10-PCS | Mod: ,,, | Performed by: INTERNAL MEDICINE

## 2022-01-01 PROCEDURE — 99221 1ST HOSP IP/OBS SF/LOW 40: CPT | Mod: ,,, | Performed by: INTERNAL MEDICINE

## 2022-01-01 PROCEDURE — 3078F DIAST BP <80 MM HG: CPT | Mod: CPTII,S$GLB,, | Performed by: INTERNAL MEDICINE

## 2022-01-01 PROCEDURE — 3077F PR MOST RECENT SYSTOLIC BLOOD PRESSURE >= 140 MM HG: ICD-10-PCS | Mod: HCNC,CPTII,S$GLB, | Performed by: PHYSICAL MEDICINE & REHABILITATION

## 2022-01-01 PROCEDURE — 99239 HOSP IP/OBS DSCHRG MGMT >30: CPT | Mod: ,,, | Performed by: INTERNAL MEDICINE

## 2022-01-01 PROCEDURE — 85610 PROTHROMBIN TIME: CPT | Performed by: INTERNAL MEDICINE

## 2022-01-01 PROCEDURE — 1160F RVW MEDS BY RX/DR IN RCRD: CPT | Mod: HCNC,CPTII,S$GLB, | Performed by: PHYSICAL MEDICINE & REHABILITATION

## 2022-01-01 PROCEDURE — 1126F AMNT PAIN NOTED NONE PRSNT: CPT | Mod: HCNC,CPTII,S$GLB, | Performed by: INTERNAL MEDICINE

## 2022-01-01 PROCEDURE — 99222 1ST HOSP IP/OBS MODERATE 55: CPT | Mod: ,,, | Performed by: INTERNAL MEDICINE

## 2022-01-01 PROCEDURE — 85610 PROTHROMBIN TIME: CPT | Performed by: STUDENT IN AN ORGANIZED HEALTH CARE EDUCATION/TRAINING PROGRAM

## 2022-01-01 PROCEDURE — 1126F AMNT PAIN NOTED NONE PRSNT: CPT | Mod: CPTII,S$GLB,, | Performed by: INTERNAL MEDICINE

## 2022-01-01 PROCEDURE — 1159F PR MEDICATION LIST DOCUMENTED IN MEDICAL RECORD: ICD-10-PCS | Mod: HCNC,CPTII,S$GLB, | Performed by: PHYSICAL MEDICINE & REHABILITATION

## 2022-01-01 PROCEDURE — 83605 ASSAY OF LACTIC ACID: CPT | Mod: 91 | Performed by: STUDENT IN AN ORGANIZED HEALTH CARE EDUCATION/TRAINING PROGRAM

## 2022-01-01 PROCEDURE — 93005 EKG 12-LEAD: ICD-10-PCS | Mod: S$GLB,,, | Performed by: INTERNAL MEDICINE

## 2022-01-01 PROCEDURE — 36415 COLL VENOUS BLD VENIPUNCTURE: CPT | Performed by: EMERGENCY MEDICINE

## 2022-01-01 PROCEDURE — 1125F PR PAIN SEVERITY QUANTIFIED, PAIN PRESENT: ICD-10-PCS | Mod: HCNC,CPTII,S$GLB, | Performed by: PHYSICAL MEDICINE & REHABILITATION

## 2022-01-01 PROCEDURE — 99213 OFFICE O/P EST LOW 20 MIN: CPT | Mod: S$GLB,,, | Performed by: INTERNAL MEDICINE

## 2022-01-01 PROCEDURE — 83880 ASSAY OF NATRIURETIC PEPTIDE: CPT | Mod: HCNC | Performed by: INTERNAL MEDICINE

## 2022-01-01 PROCEDURE — 80202 ASSAY OF VANCOMYCIN: CPT | Mod: 91 | Performed by: INTERNAL MEDICINE

## 2022-01-01 PROCEDURE — 1101F PR PT FALLS ASSESS DOC 0-1 FALLS W/OUT INJ PAST YR: ICD-10-PCS | Mod: HCNC,CPTII,S$GLB, | Performed by: PHYSICAL MEDICINE & REHABILITATION

## 2022-01-01 PROCEDURE — 99499 RISK ADDL DX/OHS AUDIT: ICD-10-PCS | Mod: S$GLB,,, | Performed by: NURSE PRACTITIONER

## 2022-01-01 PROCEDURE — 3288F PR FALLS RISK ASSESSMENT DOCUMENTED: ICD-10-PCS | Mod: CPTII,S$GLB,, | Performed by: INTERNAL MEDICINE

## 2022-01-01 PROCEDURE — 3074F SYST BP LT 130 MM HG: CPT | Mod: HCNC,CPTII,S$GLB, | Performed by: NURSE PRACTITIONER

## 2022-01-01 PROCEDURE — 99499 RISK ADDL DX/OHS AUDIT: ICD-10-PCS | Mod: S$GLB,,, | Performed by: STUDENT IN AN ORGANIZED HEALTH CARE EDUCATION/TRAINING PROGRAM

## 2022-01-01 PROCEDURE — 99204 OFFICE O/P NEW MOD 45 MIN: CPT | Mod: HCNC,S$GLB,, | Performed by: PHYSICAL MEDICINE & REHABILITATION

## 2022-01-01 PROCEDURE — 1125F PR PAIN SEVERITY QUANTIFIED, PAIN PRESENT: ICD-10-PCS | Mod: CPTII,S$GLB,, | Performed by: INTERNAL MEDICINE

## 2022-01-01 PROCEDURE — 99999 PR PBB SHADOW E&M-EST. PATIENT-LVL V: CPT | Mod: PBBFAC,GC,, | Performed by: STUDENT IN AN ORGANIZED HEALTH CARE EDUCATION/TRAINING PROGRAM

## 2022-01-01 PROCEDURE — 99222 PR INITIAL HOSPITAL CARE,LEVL II: ICD-10-PCS | Mod: ,,, | Performed by: INTERNAL MEDICINE

## 2022-01-01 PROCEDURE — D9220A PRA ANESTHESIA: ICD-10-PCS | Mod: ANES,,, | Performed by: ANESTHESIOLOGY

## 2022-01-01 PROCEDURE — 99999 PR PBB SHADOW E&M-EST. PATIENT-LVL V: CPT | Mod: PBBFAC,HCNC,, | Performed by: PHYSICAL MEDICINE & REHABILITATION

## 2022-01-01 PROCEDURE — 83735 ASSAY OF MAGNESIUM: CPT

## 2022-01-01 PROCEDURE — 99221 PR INITIAL HOSPITAL CARE,LEVL I: ICD-10-PCS | Mod: ,,, | Performed by: INTERNAL MEDICINE

## 2022-01-01 PROCEDURE — 84484 ASSAY OF TROPONIN QUANT: CPT | Mod: 91 | Performed by: STUDENT IN AN ORGANIZED HEALTH CARE EDUCATION/TRAINING PROGRAM

## 2022-01-01 PROCEDURE — 99999 PR PBB SHADOW E&M-EST. PATIENT-LVL IV: ICD-10-PCS | Mod: PBBFAC,HCNC,, | Performed by: INTERNAL MEDICINE

## 2022-01-01 PROCEDURE — 27000200 HC HIGH FLOW DEL DISP CIRCUIT

## 2022-01-01 PROCEDURE — 99291 CRITICAL CARE FIRST HOUR: CPT | Mod: ,,, | Performed by: HOSPITALIST

## 2022-01-01 PROCEDURE — C1785 PMKR, DUAL, RATE-RESP: HCPCS | Performed by: INTERNAL MEDICINE

## 2022-01-01 PROCEDURE — 1159F MED LIST DOCD IN RCRD: CPT | Mod: HCNC,CPTII,S$GLB, | Performed by: NURSE PRACTITIONER

## 2022-01-01 PROCEDURE — 99499 ARTERIAL LINE: ICD-10-PCS | Mod: ,,, | Performed by: INTERNAL MEDICINE

## 2022-01-01 PROCEDURE — 1101F PT FALLS ASSESS-DOCD LE1/YR: CPT | Mod: HCNC,CPTII,S$GLB, | Performed by: INTERNAL MEDICINE

## 2022-01-01 PROCEDURE — 1159F MED LIST DOCD IN RCRD: CPT | Mod: CPTII,S$GLB,, | Performed by: INTERNAL MEDICINE

## 2022-01-01 PROCEDURE — 3075F PR MOST RECENT SYSTOLIC BLOOD PRESS GE 130-139MM HG: ICD-10-PCS | Mod: CPTII,S$GLB,, | Performed by: INTERNAL MEDICINE

## 2022-01-01 PROCEDURE — 94645 CONT INHLJ TX EACH ADDL HOUR: CPT

## 2022-01-01 PROCEDURE — 63600175 PHARM REV CODE 636 W HCPCS: Performed by: NURSE ANESTHETIST, CERTIFIED REGISTERED

## 2022-01-01 PROCEDURE — 93454 PR CATH PLACE/CORONARY ANGIO, IMG SUPER/INTERP: ICD-10-PCS | Mod: 26,GC,, | Performed by: INTERNAL MEDICINE

## 2022-01-01 PROCEDURE — 99214 PR OFFICE/OUTPT VISIT, EST, LEVL IV, 30-39 MIN: ICD-10-PCS | Mod: HCNC,S$GLB,, | Performed by: NURSE PRACTITIONER

## 2022-01-01 PROCEDURE — 99499 UNLISTED E&M SERVICE: CPT | Mod: ,,, | Performed by: INTERNAL MEDICINE

## 2022-01-01 PROCEDURE — D9220A PRA ANESTHESIA: ICD-10-PCS | Mod: CRNA,,, | Performed by: NURSE ANESTHETIST, CERTIFIED REGISTERED

## 2022-01-01 PROCEDURE — 80053 COMPREHEN METABOLIC PANEL: CPT | Mod: 91 | Performed by: STUDENT IN AN ORGANIZED HEALTH CARE EDUCATION/TRAINING PROGRAM

## 2022-01-01 PROCEDURE — 33208 INSRT HEART PM ATRIAL & VENT: CPT | Mod: GC,,, | Performed by: INTERNAL MEDICINE

## 2022-01-01 PROCEDURE — 3078F PR MOST RECENT DIASTOLIC BLOOD PRESSURE < 80 MM HG: ICD-10-PCS | Mod: HCNC,CPTII,S$GLB, | Performed by: PHYSICAL MEDICINE & REHABILITATION

## 2022-01-01 PROCEDURE — 3078F PR MOST RECENT DIASTOLIC BLOOD PRESSURE < 80 MM HG: ICD-10-PCS | Mod: HCNC,CPTII,S$GLB, | Performed by: INTERNAL MEDICINE

## 2022-01-01 PROCEDURE — 99999 PR PBB SHADOW E&M-EST. PATIENT-LVL IV: CPT | Mod: PBBFAC,,, | Performed by: INTERNAL MEDICINE

## 2022-01-01 PROCEDURE — 99291 PR CRITICAL CARE, E/M 30-74 MINUTES: ICD-10-PCS | Mod: ,,, | Performed by: HOSPITALIST

## 2022-01-01 PROCEDURE — 99223 1ST HOSP IP/OBS HIGH 75: CPT | Mod: GC,,, | Performed by: STUDENT IN AN ORGANIZED HEALTH CARE EDUCATION/TRAINING PROGRAM

## 2022-01-01 PROCEDURE — 99999 PR PBB SHADOW E&M-EST. PATIENT-LVL V: ICD-10-PCS | Mod: PBBFAC,HCNC,, | Performed by: PHYSICAL MEDICINE & REHABILITATION

## 2022-01-01 PROCEDURE — 36415 COLL VENOUS BLD VENIPUNCTURE: CPT | Mod: HCNC | Performed by: INTERNAL MEDICINE

## 2022-01-01 PROCEDURE — 1159F MED LIST DOCD IN RCRD: CPT | Mod: HCNC,CPTII,S$GLB, | Performed by: PHYSICAL MEDICINE & REHABILITATION

## 2022-01-01 PROCEDURE — 80053 COMPREHEN METABOLIC PANEL: CPT | Performed by: EMERGENCY MEDICINE

## 2022-01-01 PROCEDURE — C1887 CATHETER, GUIDING: HCPCS | Performed by: INTERNAL MEDICINE

## 2022-01-01 PROCEDURE — 1101F PT FALLS ASSESS-DOCD LE1/YR: CPT | Mod: HCNC,CPTII,S$GLB, | Performed by: NURSE PRACTITIONER

## 2022-01-01 PROCEDURE — 99233 PR SUBSEQUENT HOSPITAL CARE,LEVL III: ICD-10-PCS | Mod: ,,, | Performed by: STUDENT IN AN ORGANIZED HEALTH CARE EDUCATION/TRAINING PROGRAM

## 2022-01-01 PROCEDURE — 80061 LIPID PANEL: CPT | Performed by: HOSPITALIST

## 2022-01-01 PROCEDURE — 3077F PR MOST RECENT SYSTOLIC BLOOD PRESSURE >= 140 MM HG: ICD-10-PCS | Mod: HCNC,CPTII,S$GLB, | Performed by: INTERNAL MEDICINE

## 2022-01-01 PROCEDURE — 99152 MOD SED SAME PHYS/QHP 5/>YRS: CPT | Mod: GC,,, | Performed by: INTERNAL MEDICINE

## 2022-01-01 PROCEDURE — 3075F SYST BP GE 130 - 139MM HG: CPT | Mod: CPTII,S$GLB,, | Performed by: INTERNAL MEDICINE

## 2022-01-01 PROCEDURE — 99499 UNLISTED E&M SERVICE: CPT | Mod: S$GLB,,, | Performed by: STUDENT IN AN ORGANIZED HEALTH CARE EDUCATION/TRAINING PROGRAM

## 2022-01-01 PROCEDURE — 33208 PR INSER HART PACER XVENOUS ATR/VENTR: ICD-10-PCS | Mod: GC,,, | Performed by: INTERNAL MEDICINE

## 2022-01-01 PROCEDURE — 93005 ELECTROCARDIOGRAM TRACING: CPT | Mod: S$GLB,,, | Performed by: STUDENT IN AN ORGANIZED HEALTH CARE EDUCATION/TRAINING PROGRAM

## 2022-01-01 PROCEDURE — 1125F AMNT PAIN NOTED PAIN PRSNT: CPT | Mod: HCNC,CPTII,S$GLB, | Performed by: PHYSICAL MEDICINE & REHABILITATION

## 2022-01-01 PROCEDURE — 84145 PROCALCITONIN (PCT): CPT | Performed by: HOSPITALIST

## 2022-01-01 PROCEDURE — 1101F PT FALLS ASSESS-DOCD LE1/YR: CPT | Mod: HCNC,CPTII,S$GLB, | Performed by: PHYSICAL MEDICINE & REHABILITATION

## 2022-01-01 PROCEDURE — 80048 BASIC METABOLIC PNL TOTAL CA: CPT | Mod: XB | Performed by: INTERNAL MEDICINE

## 2022-01-01 PROCEDURE — 1101F PT FALLS ASSESS-DOCD LE1/YR: CPT | Mod: CPTII,S$GLB,, | Performed by: INTERNAL MEDICINE

## 2022-01-01 PROCEDURE — 83605 ASSAY OF LACTIC ACID: CPT

## 2022-01-01 PROCEDURE — 80048 BASIC METABOLIC PNL TOTAL CA: CPT | Mod: XB

## 2022-01-01 DEVICE — IMPLANTABLE DEVICE
Type: IMPLANTABLE DEVICE | Site: HEART | Status: FUNCTIONAL
Brand: SOLIA

## 2022-01-01 DEVICE — IMPLANTABLE DEVICE
Type: IMPLANTABLE DEVICE | Site: HEART | Status: FUNCTIONAL
Brand: EDORA 8 DR-T

## 2022-01-01 RX ORDER — FINASTERIDE 5 MG/1
5 TABLET, FILM COATED ORAL DAILY
Status: DISCONTINUED | OUTPATIENT
Start: 2022-01-01 | End: 2022-01-01 | Stop reason: HOSPADM

## 2022-01-01 RX ORDER — LIDOCAINE HYDROCHLORIDE 10 MG/ML
INJECTION, SOLUTION EPIDURAL; INFILTRATION; INTRACAUDAL; PERINEURAL
Status: DISPENSED
Start: 2022-01-01 | End: 2022-01-01

## 2022-01-01 RX ORDER — INSULIN ASPART 100 [IU]/ML
13 INJECTION, SOLUTION INTRAVENOUS; SUBCUTANEOUS
Status: DISCONTINUED | OUTPATIENT
Start: 2022-01-01 | End: 2022-01-01 | Stop reason: HOSPADM

## 2022-01-01 RX ORDER — AMOXICILLIN 250 MG
1 CAPSULE ORAL NIGHTLY
Status: DISCONTINUED | OUTPATIENT
Start: 2022-01-01 | End: 2022-01-01 | Stop reason: HOSPADM

## 2022-01-01 RX ORDER — ATORVASTATIN CALCIUM 20 MG/1
80 TABLET, FILM COATED ORAL DAILY
Status: DISCONTINUED | OUTPATIENT
Start: 2022-01-01 | End: 2022-01-01 | Stop reason: HOSPADM

## 2022-01-01 RX ORDER — INSULIN DEGLUDEC 100 U/ML
10 INJECTION, SOLUTION SUBCUTANEOUS DAILY
Qty: 7 PEN | Refills: 3 | Status: SHIPPED | OUTPATIENT
Start: 2022-01-01 | End: 2022-08-01

## 2022-01-01 RX ORDER — EPINEPHRINE 1 MG/ML
INJECTION, SOLUTION INTRACARDIAC; INTRAMUSCULAR; INTRAVENOUS; SUBCUTANEOUS
Status: COMPLETED
Start: 2022-01-01 | End: 2022-01-01

## 2022-01-01 RX ORDER — VASOPRESSIN 20 [USP'U]/ML
INJECTION, SOLUTION INTRAMUSCULAR; SUBCUTANEOUS
Status: COMPLETED
Start: 2022-01-01 | End: 2022-01-01

## 2022-01-01 RX ORDER — CHOLESTYRAMINE 4 G/9G
4 POWDER, FOR SUSPENSION ORAL 2 TIMES DAILY PRN
Qty: 60 PACKET | Refills: 2 | Status: ON HOLD | OUTPATIENT
Start: 2022-01-01 | End: 2022-01-01

## 2022-01-01 RX ORDER — ACETAMINOPHEN 500 MG
1000 TABLET ORAL EVERY 8 HOURS PRN
Status: DISCONTINUED | OUTPATIENT
Start: 2022-01-01 | End: 2022-01-01

## 2022-01-01 RX ORDER — LIDOCAINE HYDROCHLORIDE 10 MG/ML
INJECTION, SOLUTION EPIDURAL; INFILTRATION; INTRACAUDAL; PERINEURAL
Status: COMPLETED
Start: 2022-01-01 | End: 2022-01-01

## 2022-01-01 RX ORDER — SODIUM BICARBONATE 650 MG/1
650 TABLET ORAL 3 TIMES DAILY
Status: DISCONTINUED | OUTPATIENT
Start: 2022-01-01 | End: 2022-01-01 | Stop reason: HOSPADM

## 2022-01-01 RX ORDER — ISOSORBIDE MONONITRATE 30 MG/1
30 TABLET, EXTENDED RELEASE ORAL DAILY
Status: DISCONTINUED | OUTPATIENT
Start: 2022-01-01 | End: 2022-01-01

## 2022-01-01 RX ORDER — POLYETHYLENE GLYCOL 3350 17 G/17G
17 POWDER, FOR SOLUTION ORAL DAILY PRN
Status: DISCONTINUED | OUTPATIENT
Start: 2022-01-01 | End: 2022-01-01

## 2022-01-01 RX ORDER — POTASSIUM CHLORIDE 14.9 MG/ML
20 INJECTION INTRAVENOUS ONCE
Status: COMPLETED | OUTPATIENT
Start: 2022-01-01 | End: 2022-01-01

## 2022-01-01 RX ORDER — SODIUM CHLORIDE 0.9 % (FLUSH) 0.9 %
10 SYRINGE (ML) INJECTION
Status: DISCONTINUED | OUTPATIENT
Start: 2022-01-01 | End: 2022-01-01

## 2022-01-01 RX ORDER — HEPARIN SODIUM,PORCINE/D5W 25000/250
0-40 INTRAVENOUS SOLUTION INTRAVENOUS CONTINUOUS
Status: DISCONTINUED | OUTPATIENT
Start: 2022-01-01 | End: 2022-01-01

## 2022-01-01 RX ORDER — NAPROXEN SODIUM 220 MG/1
81 TABLET, FILM COATED ORAL DAILY
Status: DISCONTINUED | OUTPATIENT
Start: 2022-01-01 | End: 2022-01-01 | Stop reason: HOSPADM

## 2022-01-01 RX ORDER — PHENYLEPHRINE HYDROCHLORIDE 10 MG/ML
INJECTION INTRAVENOUS
Status: DISCONTINUED | OUTPATIENT
Start: 2022-01-01 | End: 2022-01-01

## 2022-01-01 RX ORDER — ACETAMINOPHEN 325 MG/1
650 TABLET ORAL EVERY 4 HOURS PRN
Status: DISCONTINUED | OUTPATIENT
Start: 2022-01-01 | End: 2022-01-01 | Stop reason: HOSPADM

## 2022-01-01 RX ORDER — IODIXANOL 320 MG/ML
INJECTION, SOLUTION INTRAVASCULAR
Status: DISCONTINUED | OUTPATIENT
Start: 2022-01-01 | End: 2022-01-01 | Stop reason: HOSPADM

## 2022-01-01 RX ORDER — PHENYLEPHRINE HCL IN 0.9% NACL 1 MG/10 ML
100 SYRINGE (ML) INTRAVENOUS ONCE
Status: COMPLETED | OUTPATIENT
Start: 2022-01-01 | End: 2022-01-01

## 2022-01-01 RX ORDER — IPRATROPIUM BROMIDE AND ALBUTEROL SULFATE 2.5; .5 MG/3ML; MG/3ML
3 SOLUTION RESPIRATORY (INHALATION)
Status: DISCONTINUED | OUTPATIENT
Start: 2022-01-01 | End: 2022-01-01 | Stop reason: HOSPADM

## 2022-01-01 RX ORDER — DOBUTAMINE HYDROCHLORIDE 400 MG/100ML
INJECTION INTRAVENOUS
Status: DISCONTINUED
Start: 2022-01-01 | End: 2022-01-01 | Stop reason: HOSPADM

## 2022-01-01 RX ORDER — TALC
6 POWDER (GRAM) TOPICAL NIGHTLY PRN
Status: DISCONTINUED | OUTPATIENT
Start: 2022-01-01 | End: 2022-01-01

## 2022-01-01 RX ORDER — POLYETHYLENE GLYCOL 3350 17 G/17G
17 POWDER, FOR SOLUTION ORAL DAILY PRN
Status: DISCONTINUED | OUTPATIENT
Start: 2022-01-01 | End: 2022-01-01 | Stop reason: HOSPADM

## 2022-01-01 RX ORDER — MAGNESIUM SULFATE HEPTAHYDRATE 40 MG/ML
2 INJECTION, SOLUTION INTRAVENOUS ONCE
Status: COMPLETED | OUTPATIENT
Start: 2022-01-01 | End: 2022-01-01

## 2022-01-01 RX ORDER — CALCITRIOL 0.25 UG/1
0.25 CAPSULE ORAL DAILY
Status: DISCONTINUED | OUTPATIENT
Start: 2022-01-01 | End: 2022-01-01 | Stop reason: HOSPADM

## 2022-01-01 RX ORDER — ISOSORBIDE DINITRATE 20 MG/1
20 TABLET ORAL 3 TIMES DAILY
Qty: 90 TABLET | Refills: 11 | Status: SHIPPED | OUTPATIENT
Start: 2022-01-01 | End: 2023-05-03

## 2022-01-01 RX ORDER — OXYCODONE HYDROCHLORIDE 5 MG/1
5 TABLET ORAL EVERY 6 HOURS PRN
Status: DISCONTINUED | OUTPATIENT
Start: 2022-01-01 | End: 2022-01-01 | Stop reason: HOSPADM

## 2022-01-01 RX ORDER — FUROSEMIDE 10 MG/ML
80 INJECTION INTRAMUSCULAR; INTRAVENOUS ONCE
Status: COMPLETED | OUTPATIENT
Start: 2022-01-01 | End: 2022-01-01

## 2022-01-01 RX ORDER — IBUPROFEN 100 MG/5ML
SUSPENSION, ORAL (FINAL DOSE FORM) ORAL DAILY
Status: ON HOLD | COMMUNITY
End: 2022-01-01

## 2022-01-01 RX ORDER — PROPOFOL 10 MG/ML
VIAL (ML) INTRAVENOUS
Status: DISCONTINUED | OUTPATIENT
Start: 2022-01-01 | End: 2022-01-01

## 2022-01-01 RX ORDER — IBUPROFEN 200 MG
16 TABLET ORAL
Status: DISCONTINUED | OUTPATIENT
Start: 2022-01-01 | End: 2022-01-01 | Stop reason: HOSPADM

## 2022-01-01 RX ORDER — IBUPROFEN 200 MG
24 TABLET ORAL
Status: DISCONTINUED | OUTPATIENT
Start: 2022-01-01 | End: 2022-01-01 | Stop reason: HOSPADM

## 2022-01-01 RX ORDER — MORPHINE SULFATE 2 MG/ML
2 INJECTION, SOLUTION INTRAMUSCULAR; INTRAVENOUS EVERY 6 HOURS PRN
Status: DISCONTINUED | OUTPATIENT
Start: 2022-01-01 | End: 2022-01-01 | Stop reason: HOSPADM

## 2022-01-01 RX ORDER — SODIUM CHLORIDE 9 MG/ML
INJECTION, SOLUTION INTRAVENOUS CONTINUOUS
Status: DISCONTINUED | OUTPATIENT
Start: 2022-01-01 | End: 2022-01-01

## 2022-01-01 RX ORDER — AMINOPHYLLINE 25 MG/ML
75 INJECTION, SOLUTION INTRAVENOUS ONCE
Status: COMPLETED | OUTPATIENT
Start: 2022-01-01 | End: 2022-01-01

## 2022-01-01 RX ORDER — NITROGLYCERIN 0.4 MG/1
0.4 TABLET SUBLINGUAL EVERY 5 MIN PRN
Status: DISCONTINUED | OUTPATIENT
Start: 2022-01-01 | End: 2022-01-01 | Stop reason: HOSPADM

## 2022-01-01 RX ORDER — SODIUM CHLORIDE 9 MG/ML
INJECTION, SOLUTION INTRAVENOUS CONTINUOUS
Status: DISCONTINUED | OUTPATIENT
Start: 2022-01-01 | End: 2022-01-01 | Stop reason: HOSPADM

## 2022-01-01 RX ORDER — ASPIRIN 325 MG
325 TABLET ORAL
Status: COMPLETED | OUTPATIENT
Start: 2022-01-01 | End: 2022-01-01

## 2022-01-01 RX ORDER — INFLUENZA VACCINE, ADJUVANTED 15; 15; 15; 15 UG/.5ML; UG/.5ML; UG/.5ML; UG/.5ML
INJECTION, SUSPENSION INTRAMUSCULAR
Status: ON HOLD | COMMUNITY
Start: 2021-01-01 | End: 2022-01-01 | Stop reason: HOSPADM

## 2022-01-01 RX ORDER — FENTANYL CITRATE 50 UG/ML
INJECTION, SOLUTION INTRAMUSCULAR; INTRAVENOUS
Status: DISCONTINUED | OUTPATIENT
Start: 2022-01-01 | End: 2022-01-01 | Stop reason: HOSPADM

## 2022-01-01 RX ORDER — ADHESIVE BANDAGE
30 BANDAGE TOPICAL DAILY PRN
Status: DISCONTINUED | OUTPATIENT
Start: 2022-01-01 | End: 2022-01-01 | Stop reason: HOSPADM

## 2022-01-01 RX ORDER — LIDOCAINE HCL/PF 100 MG/5ML
SYRINGE (ML) INTRAVENOUS
Status: DISCONTINUED | OUTPATIENT
Start: 2022-01-01 | End: 2022-01-01

## 2022-01-01 RX ORDER — INSULIN ASPART 100 [IU]/ML
5 INJECTION, SOLUTION INTRAVENOUS; SUBCUTANEOUS
Qty: 90 ML | Refills: 8 | Status: SHIPPED | OUTPATIENT
Start: 2022-01-01

## 2022-01-01 RX ORDER — TAMSULOSIN HYDROCHLORIDE 0.4 MG/1
0.4 CAPSULE ORAL DAILY
Status: DISCONTINUED | OUTPATIENT
Start: 2022-01-01 | End: 2022-01-01 | Stop reason: HOSPADM

## 2022-01-01 RX ORDER — GLUCAGON 1 MG
1 KIT INJECTION
Status: DISCONTINUED | OUTPATIENT
Start: 2022-01-01 | End: 2022-01-01 | Stop reason: HOSPADM

## 2022-01-01 RX ORDER — FUROSEMIDE 10 MG/ML
80 INJECTION INTRAMUSCULAR; INTRAVENOUS EVERY 8 HOURS
Status: DISCONTINUED | OUTPATIENT
Start: 2022-01-01 | End: 2022-01-01

## 2022-01-01 RX ORDER — LIDOCAINE HYDROCHLORIDE 20 MG/ML
INJECTION, SOLUTION INFILTRATION; PERINEURAL
Status: DISCONTINUED | OUTPATIENT
Start: 2022-01-01 | End: 2022-01-01 | Stop reason: HOSPADM

## 2022-01-01 RX ORDER — INSULIN ASPART 100 [IU]/ML
1-10 INJECTION, SOLUTION INTRAVENOUS; SUBCUTANEOUS
Status: DISCONTINUED | OUTPATIENT
Start: 2022-01-01 | End: 2022-01-01

## 2022-01-01 RX ORDER — ONDANSETRON 2 MG/ML
INJECTION INTRAMUSCULAR; INTRAVENOUS
Status: DISCONTINUED | OUTPATIENT
Start: 2022-01-01 | End: 2022-01-01

## 2022-01-01 RX ORDER — DOXYCYCLINE HYCLATE 100 MG
100 TABLET ORAL EVERY 12 HOURS
Status: DISCONTINUED | OUTPATIENT
Start: 2022-01-01 | End: 2022-01-01

## 2022-01-01 RX ORDER — ONDANSETRON 2 MG/ML
8 INJECTION INTRAMUSCULAR; INTRAVENOUS EVERY 6 HOURS PRN
Status: DISCONTINUED | OUTPATIENT
Start: 2022-01-01 | End: 2022-01-01

## 2022-01-01 RX ORDER — SODIUM CHLORIDE 0.9 % (FLUSH) 0.9 %
10 SYRINGE (ML) INJECTION
Status: DISCONTINUED | OUTPATIENT
Start: 2022-01-01 | End: 2022-01-01 | Stop reason: HOSPADM

## 2022-01-01 RX ORDER — SODIUM CHLORIDE 9 MG/ML
INJECTION, SOLUTION INTRAVENOUS CONTINUOUS
Status: ACTIVE | OUTPATIENT
Start: 2022-01-01 | End: 2022-01-01

## 2022-01-01 RX ORDER — GLYCERIN 1 G/1
1 SUPPOSITORY RECTAL ONCE
Status: COMPLETED | OUTPATIENT
Start: 2022-01-01 | End: 2022-01-01

## 2022-01-01 RX ORDER — PROPOFOL 10 MG/ML
VIAL (ML) INTRAVENOUS CONTINUOUS PRN
Status: DISCONTINUED | OUTPATIENT
Start: 2022-01-01 | End: 2022-01-01

## 2022-01-01 RX ORDER — CLOPIDOGREL 300 MG/1
300 TABLET, FILM COATED ORAL ONCE
Status: DISCONTINUED | OUTPATIENT
Start: 2022-01-01 | End: 2022-01-01

## 2022-01-01 RX ORDER — CLOPIDOGREL BISULFATE 75 MG/1
75 TABLET ORAL DAILY
Status: DISCONTINUED | OUTPATIENT
Start: 2022-01-01 | End: 2022-01-01 | Stop reason: HOSPADM

## 2022-01-01 RX ORDER — TALC
6 POWDER (GRAM) TOPICAL NIGHTLY PRN
Status: DISCONTINUED | OUTPATIENT
Start: 2022-01-01 | End: 2022-01-01 | Stop reason: HOSPADM

## 2022-01-01 RX ORDER — ATORVASTATIN CALCIUM 40 MG/1
40 TABLET, FILM COATED ORAL DAILY
Status: DISCONTINUED | OUTPATIENT
Start: 2022-01-01 | End: 2022-01-01

## 2022-01-01 RX ORDER — MIDAZOLAM HYDROCHLORIDE 1 MG/ML
INJECTION INTRAMUSCULAR; INTRAVENOUS
Status: DISCONTINUED | OUTPATIENT
Start: 2022-01-01 | End: 2022-01-01 | Stop reason: HOSPADM

## 2022-01-01 RX ORDER — LOSARTAN POTASSIUM 50 MG/1
100 TABLET ORAL DAILY
Refills: 3 | Status: DISCONTINUED | OUTPATIENT
Start: 2022-01-01 | End: 2022-01-01

## 2022-01-01 RX ORDER — HYDROMORPHONE HYDROCHLORIDE 1 MG/ML
0.2 INJECTION, SOLUTION INTRAMUSCULAR; INTRAVENOUS; SUBCUTANEOUS EVERY 5 MIN PRN
Status: DISCONTINUED | OUTPATIENT
Start: 2022-01-01 | End: 2022-01-01 | Stop reason: HOSPADM

## 2022-01-01 RX ORDER — DOBUTAMINE HYDROCHLORIDE 400 MG/100ML
2.5 INJECTION INTRAVENOUS CONTINUOUS
Status: DISCONTINUED | OUTPATIENT
Start: 2022-01-01 | End: 2022-01-01 | Stop reason: HOSPADM

## 2022-01-01 RX ORDER — HEPARIN SOD,PORCINE/0.9 % NACL 1000/500ML
INTRAVENOUS SOLUTION INTRAVENOUS
Status: DISCONTINUED | OUTPATIENT
Start: 2022-01-01 | End: 2022-01-01 | Stop reason: HOSPADM

## 2022-01-01 RX ORDER — VANCOMYCIN HCL IN 5 % DEXTROSE 1G/250ML
1000 PLASTIC BAG, INJECTION (ML) INTRAVENOUS ONCE
Status: COMPLETED | OUTPATIENT
Start: 2022-01-01 | End: 2022-01-01

## 2022-01-01 RX ORDER — ACETAMINOPHEN 500 MG
500 TABLET ORAL DAILY PRN
COMMUNITY

## 2022-01-01 RX ORDER — TORSEMIDE 10 MG/1
20 TABLET ORAL DAILY
Qty: 60 TABLET | Refills: 11 | Status: ON HOLD | OUTPATIENT
Start: 2022-01-01 | End: 2022-01-01 | Stop reason: SDUPTHER

## 2022-01-01 RX ORDER — INSULIN ASPART 100 [IU]/ML
10 INJECTION, SOLUTION INTRAVENOUS; SUBCUTANEOUS
Status: DISCONTINUED | OUTPATIENT
Start: 2022-01-01 | End: 2022-01-01

## 2022-01-01 RX ORDER — INSULIN ASPART 100 [IU]/ML
0-5 INJECTION, SOLUTION INTRAVENOUS; SUBCUTANEOUS
Status: DISCONTINUED | OUTPATIENT
Start: 2022-01-01 | End: 2022-01-01 | Stop reason: HOSPADM

## 2022-01-01 RX ORDER — INSULIN ASPART 100 [IU]/ML
5 INJECTION, SOLUTION INTRAVENOUS; SUBCUTANEOUS
Status: DISCONTINUED | OUTPATIENT
Start: 2022-01-01 | End: 2022-01-01 | Stop reason: HOSPADM

## 2022-01-01 RX ORDER — VASOPRESSIN 20 [USP'U]/ML
INJECTION, SOLUTION INTRAMUSCULAR; SUBCUTANEOUS
Status: DISCONTINUED | OUTPATIENT
Start: 2022-01-01 | End: 2022-01-01

## 2022-01-01 RX ORDER — DIPHENHYDRAMINE HCL 50 MG
50 CAPSULE ORAL ONCE
Status: COMPLETED | OUTPATIENT
Start: 2022-01-01 | End: 2022-01-01

## 2022-01-01 RX ORDER — MUPIROCIN 20 MG/G
OINTMENT TOPICAL 2 TIMES DAILY
Status: COMPLETED | OUTPATIENT
Start: 2022-01-01 | End: 2022-01-01

## 2022-01-01 RX ORDER — FUROSEMIDE 10 MG/ML
40 INJECTION INTRAMUSCULAR; INTRAVENOUS ONCE
Status: COMPLETED | OUTPATIENT
Start: 2022-01-01 | End: 2022-01-01

## 2022-01-01 RX ORDER — OXYMETAZOLINE HCL 0.05 %
2 SPRAY, NON-AEROSOL (ML) NASAL 2 TIMES DAILY PRN
Status: DISPENSED | OUTPATIENT
Start: 2022-01-01 | End: 2022-01-01

## 2022-01-01 RX ORDER — REGADENOSON 0.08 MG/ML
0.4 INJECTION, SOLUTION INTRAVENOUS ONCE
Status: COMPLETED | OUTPATIENT
Start: 2022-01-01 | End: 2022-01-01

## 2022-01-01 RX ORDER — INSULIN ASPART 100 [IU]/ML
5 INJECTION, SOLUTION INTRAVENOUS; SUBCUTANEOUS
Status: DISCONTINUED | OUTPATIENT
Start: 2022-01-01 | End: 2022-01-01

## 2022-01-01 RX ORDER — VANCOMYCIN HYDROCHLORIDE 1 G/20ML
INJECTION, POWDER, LYOPHILIZED, FOR SOLUTION INTRAVENOUS
Status: DISCONTINUED | OUTPATIENT
Start: 2022-01-01 | End: 2022-01-01 | Stop reason: HOSPADM

## 2022-01-01 RX ORDER — MIDAZOLAM HYDROCHLORIDE 1 MG/ML
INJECTION INTRAMUSCULAR; INTRAVENOUS
Status: COMPLETED
Start: 2022-01-01 | End: 2022-01-01

## 2022-01-01 RX ORDER — BUPIVACAINE HYDROCHLORIDE 2.5 MG/ML
INJECTION, SOLUTION EPIDURAL; INFILTRATION; INTRACAUDAL
Status: DISCONTINUED | OUTPATIENT
Start: 2022-01-01 | End: 2022-01-01 | Stop reason: HOSPADM

## 2022-01-01 RX ORDER — HEPARIN SODIUM 5000 [USP'U]/ML
5000 INJECTION, SOLUTION INTRAVENOUS; SUBCUTANEOUS EVERY 8 HOURS
Status: DISCONTINUED | OUTPATIENT
Start: 2022-01-01 | End: 2022-01-01 | Stop reason: HOSPADM

## 2022-01-01 RX ORDER — LACTULOSE 10 G/15ML
10 SOLUTION ORAL 3 TIMES DAILY
Status: DISCONTINUED | OUTPATIENT
Start: 2022-01-01 | End: 2022-01-01 | Stop reason: HOSPADM

## 2022-01-01 RX ORDER — ZINC GLUCONATE 50 MG
50 TABLET ORAL DAILY
Status: ON HOLD | COMMUNITY
End: 2022-01-01

## 2022-01-01 RX ORDER — SODIUM CHLORIDE 0.9 G/100ML
IRRIGANT IRRIGATION
Status: DISCONTINUED | OUTPATIENT
Start: 2022-01-01 | End: 2022-01-01 | Stop reason: HOSPADM

## 2022-01-01 RX ORDER — ATORVASTATIN CALCIUM 20 MG/1
40 TABLET, FILM COATED ORAL DAILY
Status: DISCONTINUED | OUTPATIENT
Start: 2022-01-01 | End: 2022-01-01 | Stop reason: HOSPADM

## 2022-01-01 RX ORDER — FAMOTIDINE 10 MG/ML
INJECTION INTRAVENOUS
Status: DISCONTINUED | OUTPATIENT
Start: 2022-01-01 | End: 2022-01-01

## 2022-01-01 RX ORDER — SYRING-NEEDL,DISP,INSUL,0.3 ML 29 G X1/2"
296 SYRINGE, EMPTY DISPOSABLE MISCELLANEOUS
Status: DISCONTINUED | OUTPATIENT
Start: 2022-01-01 | End: 2022-01-01 | Stop reason: HOSPADM

## 2022-01-01 RX ORDER — MAGNESIUM SULFATE HEPTAHYDRATE 40 MG/ML
2 INJECTION, SOLUTION INTRAVENOUS
Status: COMPLETED | OUTPATIENT
Start: 2022-01-01 | End: 2022-01-01

## 2022-01-01 RX ORDER — LOPERAMIDE HCL 2 MG
TABLET ORAL
COMMUNITY

## 2022-01-01 RX ORDER — MIDAZOLAM HYDROCHLORIDE 1 MG/ML
0.5 INJECTION INTRAMUSCULAR; INTRAVENOUS ONCE
Status: COMPLETED | OUTPATIENT
Start: 2022-01-01 | End: 2022-01-01

## 2022-01-01 RX ORDER — ACETAMINOPHEN 325 MG/1
650 TABLET ORAL EVERY 4 HOURS PRN
Status: DISCONTINUED | OUTPATIENT
Start: 2022-01-01 | End: 2022-01-01

## 2022-01-01 RX ORDER — ACETAMINOPHEN 500 MG
1000 TABLET ORAL EVERY 8 HOURS PRN
Status: DISCONTINUED | OUTPATIENT
Start: 2022-01-01 | End: 2022-01-01 | Stop reason: HOSPADM

## 2022-01-01 RX ORDER — HYDRALAZINE HYDROCHLORIDE 25 MG/1
25 TABLET, FILM COATED ORAL EVERY 8 HOURS
Status: DISCONTINUED | OUTPATIENT
Start: 2022-01-01 | End: 2022-01-01 | Stop reason: HOSPADM

## 2022-01-01 RX ORDER — ISOSORBIDE DINITRATE 20 MG/1
20 TABLET ORAL 3 TIMES DAILY
Status: DISCONTINUED | OUTPATIENT
Start: 2022-01-01 | End: 2022-01-01 | Stop reason: HOSPADM

## 2022-01-01 RX ORDER — SODIUM CHLORIDE 0.9 % (FLUSH) 0.9 %
5 SYRINGE (ML) INJECTION
Status: DISCONTINUED | OUTPATIENT
Start: 2022-01-01 | End: 2022-01-01 | Stop reason: HOSPADM

## 2022-01-01 RX ORDER — FUROSEMIDE 10 MG/ML
120 INJECTION INTRAMUSCULAR; INTRAVENOUS ONCE
Status: COMPLETED | OUTPATIENT
Start: 2022-01-01 | End: 2022-01-01

## 2022-01-01 RX ORDER — CEFEPIME HYDROCHLORIDE 1 G/50ML
2 INJECTION, SOLUTION INTRAVENOUS
Status: DISCONTINUED | OUTPATIENT
Start: 2022-01-01 | End: 2022-01-01 | Stop reason: HOSPADM

## 2022-01-01 RX ORDER — CEFEPIME HYDROCHLORIDE 1 G/50ML
1 INJECTION, SOLUTION INTRAVENOUS
Status: DISCONTINUED | OUTPATIENT
Start: 2022-01-01 | End: 2022-01-01

## 2022-01-01 RX ORDER — PROCHLORPERAZINE EDISYLATE 5 MG/ML
5 INJECTION INTRAMUSCULAR; INTRAVENOUS EVERY 30 MIN PRN
Status: DISCONTINUED | OUTPATIENT
Start: 2022-01-01 | End: 2022-01-01

## 2022-01-01 RX ORDER — POTASSIUM CHLORIDE 29.8 MG/ML
40 INJECTION INTRAVENOUS ONCE
Status: COMPLETED | OUTPATIENT
Start: 2022-01-01 | End: 2022-01-01

## 2022-01-01 RX ORDER — NOREPINEPHRINE BITARTRATE/D5W 4MG/250ML
PLASTIC BAG, INJECTION (ML) INTRAVENOUS
Status: DISPENSED
Start: 2022-01-01 | End: 2022-01-01

## 2022-01-01 RX ORDER — CARVEDILOL 12.5 MG/1
12.5 TABLET ORAL 2 TIMES DAILY WITH MEALS
Status: ON HOLD | COMMUNITY
End: 2022-01-01

## 2022-01-01 RX ORDER — CEFEPIME HYDROCHLORIDE 1 G/50ML
2 INJECTION, SOLUTION INTRAVENOUS
Status: DISCONTINUED | OUTPATIENT
Start: 2022-01-01 | End: 2022-01-01

## 2022-01-01 RX ORDER — AZITHROMYCIN 250 MG/1
500 TABLET, FILM COATED ORAL DAILY
Status: DISCONTINUED | OUTPATIENT
Start: 2022-01-01 | End: 2022-01-01 | Stop reason: HOSPADM

## 2022-01-01 RX ORDER — AZITHROMYCIN 250 MG/1
500 TABLET, FILM COATED ORAL DAILY
Status: DISCONTINUED | OUTPATIENT
Start: 2022-01-01 | End: 2022-01-01

## 2022-01-01 RX ORDER — INSULIN ASPART 100 [IU]/ML
6 INJECTION, SOLUTION INTRAVENOUS; SUBCUTANEOUS
Status: DISCONTINUED | OUTPATIENT
Start: 2022-01-01 | End: 2022-01-01

## 2022-01-01 RX ORDER — ONDANSETRON 4 MG/1
8 TABLET, ORALLY DISINTEGRATING ORAL EVERY 8 HOURS PRN
Status: DISCONTINUED | OUTPATIENT
Start: 2022-01-01 | End: 2022-01-01 | Stop reason: HOSPADM

## 2022-01-01 RX ORDER — CLOPIDOGREL BISULFATE 75 MG/1
225 TABLET ORAL
Status: COMPLETED | OUTPATIENT
Start: 2022-01-01 | End: 2022-01-01

## 2022-01-01 RX ORDER — PROCHLORPERAZINE EDISYLATE 5 MG/ML
5 INJECTION INTRAMUSCULAR; INTRAVENOUS EVERY 30 MIN PRN
Status: DISCONTINUED | OUTPATIENT
Start: 2022-01-01 | End: 2022-01-01 | Stop reason: HOSPADM

## 2022-01-01 RX ORDER — AMLODIPINE BESYLATE 5 MG/1
TABLET ORAL
Qty: 90 TABLET | Refills: 1 | Status: SHIPPED | OUTPATIENT
Start: 2022-01-01 | End: 2022-01-01

## 2022-01-01 RX ORDER — SODIUM BICARBONATE 650 MG/1
650 TABLET ORAL 3 TIMES DAILY
Qty: 90 TABLET | Refills: 11 | Status: SHIPPED | OUTPATIENT
Start: 2022-01-01 | End: 2023-05-03

## 2022-01-01 RX ORDER — METOPROLOL TARTRATE 25 MG/1
25 TABLET, FILM COATED ORAL 2 TIMES DAILY
Status: DISCONTINUED | OUTPATIENT
Start: 2022-01-01 | End: 2022-01-01

## 2022-01-01 RX ORDER — INSULIN ASPART 100 [IU]/ML
0-5 INJECTION, SOLUTION INTRAVENOUS; SUBCUTANEOUS
Status: DISCONTINUED | OUTPATIENT
Start: 2022-01-01 | End: 2022-01-01

## 2022-01-01 RX ORDER — NAPROXEN SODIUM 220 MG/1
81 TABLET, FILM COATED ORAL DAILY
Qty: 30 TABLET | Refills: 11 | Status: SHIPPED | OUTPATIENT
Start: 2022-01-01 | End: 2023-05-03

## 2022-01-01 RX ORDER — ONDANSETRON 8 MG/1
8 TABLET, ORALLY DISINTEGRATING ORAL EVERY 8 HOURS PRN
Status: DISCONTINUED | OUTPATIENT
Start: 2022-01-01 | End: 2022-01-01

## 2022-01-01 RX ORDER — AMLODIPINE BESYLATE 5 MG/1
5 TABLET ORAL DAILY
Status: DISCONTINUED | OUTPATIENT
Start: 2022-01-01 | End: 2022-01-01 | Stop reason: HOSPADM

## 2022-01-01 RX ORDER — PHENYLEPHRINE HCL IN 0.9% NACL 1 MG/10 ML
SYRINGE (ML) INTRAVENOUS
Status: COMPLETED
Start: 2022-01-01 | End: 2022-01-01

## 2022-01-01 RX ORDER — INSULIN DEGLUDEC 100 U/ML
22 INJECTION, SOLUTION SUBCUTANEOUS DAILY
Qty: 7 PEN | Refills: 3 | Status: ON HOLD | OUTPATIENT
Start: 2022-01-01 | End: 2022-01-01 | Stop reason: SDUPTHER

## 2022-01-01 RX ORDER — DOXYCYCLINE HYCLATE 100 MG
100 TABLET ORAL EVERY 12 HOURS
Status: DISCONTINUED | OUTPATIENT
Start: 2022-01-01 | End: 2022-01-01 | Stop reason: HOSPADM

## 2022-01-01 RX ORDER — NOREPINEPHRINE BITARTRATE/D5W 4MG/250ML
0-3 PLASTIC BAG, INJECTION (ML) INTRAVENOUS CONTINUOUS
Status: DISCONTINUED | OUTPATIENT
Start: 2022-01-01 | End: 2022-01-01

## 2022-01-01 RX ORDER — DOXYCYCLINE 100 MG/1
100 CAPSULE ORAL EVERY 12 HOURS
Qty: 10 CAPSULE | Refills: 0 | Status: SHIPPED | OUTPATIENT
Start: 2022-01-01 | End: 2022-01-01

## 2022-01-01 RX ORDER — TORSEMIDE 10 MG/1
10 TABLET ORAL DAILY
Qty: 30 TABLET | Refills: 11 | Status: SHIPPED | OUTPATIENT
Start: 2022-01-01 | End: 2023-05-03

## 2022-01-01 RX ORDER — PSEUDOEPHEDRINE/ACETAMINOPHEN 30MG-500MG
100 TABLET ORAL
Status: DISCONTINUED | OUTPATIENT
Start: 2022-01-01 | End: 2022-01-01 | Stop reason: HOSPADM

## 2022-01-01 RX ADMIN — INSULIN DETEMIR 10 UNITS: 100 INJECTION, SOLUTION SUBCUTANEOUS at 09:05

## 2022-01-01 RX ADMIN — HYPROMELLOSE 2910 1 DROP: 5 SOLUTION OPHTHALMIC at 08:05

## 2022-01-01 RX ADMIN — IPRATROPIUM BROMIDE AND ALBUTEROL SULFATE 3 ML: 2.5; .5 SOLUTION RESPIRATORY (INHALATION) at 02:05

## 2022-01-01 RX ADMIN — INSULIN ASPART 5 UNITS: 100 INJECTION, SOLUTION INTRAVENOUS; SUBCUTANEOUS at 09:04

## 2022-01-01 RX ADMIN — HYPROMELLOSE 2910 1 DROP: 5 SOLUTION OPHTHALMIC at 09:05

## 2022-01-01 RX ADMIN — INSULIN ASPART 2 UNITS: 100 INJECTION, SOLUTION INTRAVENOUS; SUBCUTANEOUS at 04:04

## 2022-01-01 RX ADMIN — SODIUM BICARBONATE 650 MG TABLET 650 MG: at 02:05

## 2022-01-01 RX ADMIN — VASOPRESSIN 0.04 UNITS/MIN: 20 INJECTION INTRAVENOUS at 03:05

## 2022-01-01 RX ADMIN — INSULIN ASPART 13 UNITS: 100 INJECTION, SOLUTION INTRAVENOUS; SUBCUTANEOUS at 11:05

## 2022-01-01 RX ADMIN — INSULIN DETEMIR 10 UNITS: 100 INJECTION, SOLUTION SUBCUTANEOUS at 09:04

## 2022-01-01 RX ADMIN — AZITHROMYCIN MONOHYDRATE 500 MG: 250 TABLET ORAL at 12:05

## 2022-01-01 RX ADMIN — TAMSULOSIN HYDROCHLORIDE 0.4 MG: 0.4 CAPSULE ORAL at 09:04

## 2022-01-01 RX ADMIN — IPRATROPIUM BROMIDE AND ALBUTEROL SULFATE 3 ML: 2.5; .5 SOLUTION RESPIRATORY (INHALATION) at 07:05

## 2022-01-01 RX ADMIN — INSULIN ASPART 6 UNITS: 100 INJECTION, SOLUTION INTRAVENOUS; SUBCUTANEOUS at 11:05

## 2022-01-01 RX ADMIN — CALCITRIOL CAPSULES 0.25 MCG 0.25 MCG: 0.25 CAPSULE ORAL at 09:05

## 2022-01-01 RX ADMIN — VASOPRESSIN 0.04 UNITS/MIN: 20 INJECTION INTRAVENOUS at 06:05

## 2022-01-01 RX ADMIN — HYPROMELLOSE 2910 1 DROP: 5 SOLUTION OPHTHALMIC at 03:05

## 2022-01-01 RX ADMIN — CLOPIDOGREL 75 MG: 75 TABLET, FILM COATED ORAL at 09:05

## 2022-01-01 RX ADMIN — ASPIRIN 81 MG CHEWABLE TABLET 81 MG: 81 TABLET CHEWABLE at 08:05

## 2022-01-01 RX ADMIN — SENNOSIDES AND DOCUSATE SODIUM 1 TABLET: 50; 8.6 TABLET ORAL at 08:05

## 2022-01-01 RX ADMIN — POTASSIUM CHLORIDE 40 MEQ: 29.8 INJECTION, SOLUTION INTRAVENOUS at 04:05

## 2022-01-01 RX ADMIN — INSULIN ASPART 4 UNITS: 100 INJECTION, SOLUTION INTRAVENOUS; SUBCUTANEOUS at 11:05

## 2022-01-01 RX ADMIN — HEPARIN SODIUM 5000 UNITS: 5000 INJECTION INTRAVENOUS; SUBCUTANEOUS at 05:05

## 2022-01-01 RX ADMIN — TAMSULOSIN HYDROCHLORIDE 0.4 MG: 0.4 CAPSULE ORAL at 08:05

## 2022-01-01 RX ADMIN — INSULIN ASPART 3 UNITS: 100 INJECTION, SOLUTION INTRAVENOUS; SUBCUTANEOUS at 09:05

## 2022-01-01 RX ADMIN — SODIUM CHLORIDE: 0.9 INJECTION, SOLUTION INTRAVENOUS at 12:05

## 2022-01-01 RX ADMIN — SODIUM BICARBONATE 650 MG TABLET 650 MG: at 09:05

## 2022-01-01 RX ADMIN — HYDROMORPHONE HYDROCHLORIDE 0.2 MG: 1 INJECTION, SOLUTION INTRAMUSCULAR; INTRAVENOUS; SUBCUTANEOUS at 04:05

## 2022-01-01 RX ADMIN — VASOPRESSIN 0.04 UNITS/MIN: 20 INJECTION INTRAVENOUS at 01:05

## 2022-01-01 RX ADMIN — HEPARIN SODIUM 14 UNITS/KG/HR: 5000 INJECTION INTRAVENOUS; SUBCUTANEOUS at 07:04

## 2022-01-01 RX ADMIN — AMLODIPINE BESYLATE 5 MG: 5 TABLET ORAL at 09:05

## 2022-01-01 RX ADMIN — ATORVASTATIN CALCIUM 40 MG: 20 TABLET, FILM COATED ORAL at 09:04

## 2022-01-01 RX ADMIN — FINASTERIDE 5 MG: 5 TABLET, FILM COATED ORAL at 09:04

## 2022-01-01 RX ADMIN — MAGNESIUM SULFATE 2 G: 2 INJECTION INTRAVENOUS at 02:05

## 2022-01-01 RX ADMIN — SODIUM BICARBONATE 650 MG TABLET 650 MG: at 09:04

## 2022-01-01 RX ADMIN — HEPARIN SODIUM 5000 UNITS: 5000 INJECTION INTRAVENOUS; SUBCUTANEOUS at 03:05

## 2022-01-01 RX ADMIN — ATORVASTATIN CALCIUM 80 MG: 20 TABLET, FILM COATED ORAL at 10:05

## 2022-01-01 RX ADMIN — HEPARIN SODIUM 17 UNITS/KG/HR: 5000 INJECTION INTRAVENOUS; SUBCUTANEOUS at 11:05

## 2022-01-01 RX ADMIN — FUROSEMIDE 40 MG: 10 INJECTION, SOLUTION INTRAMUSCULAR; INTRAVENOUS at 04:05

## 2022-01-01 RX ADMIN — FUROSEMIDE 20 MG/HR: 10 INJECTION, SOLUTION INTRAMUSCULAR; INTRAVENOUS at 01:05

## 2022-01-01 RX ADMIN — VASOPRESSIN 0.04 UNITS/MIN: 20 INJECTION INTRAVENOUS at 05:05

## 2022-01-01 RX ADMIN — INSULIN ASPART 1 UNITS: 100 INJECTION, SOLUTION INTRAVENOUS; SUBCUTANEOUS at 09:05

## 2022-01-01 RX ADMIN — PROPOFOL 30 MG: 10 INJECTION, EMULSION INTRAVENOUS at 01:05

## 2022-01-01 RX ADMIN — INSULIN DETEMIR 10 UNITS: 100 INJECTION, SOLUTION SUBCUTANEOUS at 11:04

## 2022-01-01 RX ADMIN — SODIUM BICARBONATE 650 MG TABLET 650 MG: at 05:05

## 2022-01-01 RX ADMIN — ACETAMINOPHEN 650 MG: 325 TABLET ORAL at 03:05

## 2022-01-01 RX ADMIN — DOBUTAMINE HYDROCHLORIDE 2.5 MCG/KG/MIN: 400 INJECTION INTRAVENOUS at 05:05

## 2022-01-01 RX ADMIN — ATORVASTATIN CALCIUM 40 MG: 20 TABLET, FILM COATED ORAL at 09:05

## 2022-01-01 RX ADMIN — FINASTERIDE 5 MG: 5 TABLET, FILM COATED ORAL at 09:05

## 2022-01-01 RX ADMIN — TAMSULOSIN HYDROCHLORIDE 0.4 MG: 0.4 CAPSULE ORAL at 09:05

## 2022-01-01 RX ADMIN — VANCOMYCIN HYDROCHLORIDE 1000 MG: 1 INJECTION, POWDER, LYOPHILIZED, FOR SOLUTION INTRAVENOUS at 11:05

## 2022-01-01 RX ADMIN — INSULIN ASPART 5 UNITS: 100 INJECTION, SOLUTION INTRAVENOUS; SUBCUTANEOUS at 04:04

## 2022-01-01 RX ADMIN — FUROSEMIDE 80 MG: 10 INJECTION, SOLUTION INTRAMUSCULAR; INTRAVENOUS at 01:05

## 2022-01-01 RX ADMIN — HUMAN ALBUMIN MICROSPHERES AND PERFLUTREN 0.66 MG: 10; .22 INJECTION, SOLUTION INTRAVENOUS at 10:05

## 2022-01-01 RX ADMIN — CLOPIDOGREL 75 MG: 75 TABLET, FILM COATED ORAL at 09:04

## 2022-01-01 RX ADMIN — EPINEPHRINE 0.02 MCG/KG/MIN: 1 INJECTION INTRAMUSCULAR; INTRAVENOUS; SUBCUTANEOUS at 06:05

## 2022-01-01 RX ADMIN — CLOPIDOGREL 75 MG: 75 TABLET, FILM COATED ORAL at 08:05

## 2022-01-01 RX ADMIN — ASPIRIN 81 MG CHEWABLE TABLET 81 MG: 81 TABLET CHEWABLE at 09:04

## 2022-01-01 RX ADMIN — MUPIROCIN: 20 OINTMENT TOPICAL at 09:05

## 2022-01-01 RX ADMIN — IPRATROPIUM BROMIDE AND ALBUTEROL SULFATE 3 ML: 2.5; .5 SOLUTION RESPIRATORY (INHALATION) at 01:05

## 2022-01-01 RX ADMIN — GLYCERIN 1 SUPPOSITORY: 2 SUPPOSITORY RECTAL at 01:05

## 2022-01-01 RX ADMIN — CLOPIDOGREL 75 MG: 75 TABLET, FILM COATED ORAL at 08:04

## 2022-01-01 RX ADMIN — CEFEPIME HYDROCHLORIDE 2 G: 2 INJECTION, SOLUTION INTRAVENOUS at 11:05

## 2022-01-01 RX ADMIN — LOSARTAN POTASSIUM 100 MG: 50 TABLET, FILM COATED ORAL at 09:04

## 2022-01-01 RX ADMIN — INSULIN ASPART 5 UNITS: 100 INJECTION, SOLUTION INTRAVENOUS; SUBCUTANEOUS at 08:04

## 2022-01-01 RX ADMIN — IPRATROPIUM BROMIDE AND ALBUTEROL SULFATE 3 ML: 2.5; .5 SOLUTION RESPIRATORY (INHALATION) at 08:05

## 2022-01-01 RX ADMIN — INSULIN ASPART 5 UNITS: 100 INJECTION, SOLUTION INTRAVENOUS; SUBCUTANEOUS at 05:05

## 2022-01-01 RX ADMIN — HYPROMELLOSE 2910 1 DROP: 5 SOLUTION OPHTHALMIC at 10:05

## 2022-01-01 RX ADMIN — VANCOMYCIN HYDROCHLORIDE 1500 MG: 1.5 INJECTION, POWDER, LYOPHILIZED, FOR SOLUTION INTRAVENOUS at 10:05

## 2022-01-01 RX ADMIN — INSULIN ASPART 5 UNITS: 100 INJECTION, SOLUTION INTRAVENOUS; SUBCUTANEOUS at 12:04

## 2022-01-01 RX ADMIN — PHENYLEPHRINE HYDROCHLORIDE 100 MCG: 10 INJECTION, SOLUTION INTRAMUSCULAR; INTRAVENOUS; SUBCUTANEOUS at 02:05

## 2022-01-01 RX ADMIN — HYPROMELLOSE 2910 1 DROP: 5 SOLUTION OPHTHALMIC at 09:04

## 2022-01-01 RX ADMIN — ATORVASTATIN CALCIUM 80 MG: 20 TABLET, FILM COATED ORAL at 09:05

## 2022-01-01 RX ADMIN — LIDOCAINE HYDROCHLORIDE 50 MG: 20 INJECTION, SOLUTION INTRAVENOUS at 01:05

## 2022-01-01 RX ADMIN — VASOPRESSIN 0.02 UNITS/MIN: 20 INJECTION INTRAVENOUS at 03:05

## 2022-01-01 RX ADMIN — MUPIROCIN: 20 OINTMENT TOPICAL at 10:05

## 2022-01-01 RX ADMIN — SENNOSIDES AND DOCUSATE SODIUM 1 TABLET: 50; 8.6 TABLET ORAL at 09:05

## 2022-01-01 RX ADMIN — DOBUTAMINE HYDROCHLORIDE 2.5 MCG/KG/MIN: 400 INJECTION INTRAVENOUS at 08:05

## 2022-01-01 RX ADMIN — AMINOPHYLLINE 75 MG: 25 INJECTION, SOLUTION INTRAVENOUS at 10:04

## 2022-01-01 RX ADMIN — FINASTERIDE 5 MG: 5 TABLET, FILM COATED ORAL at 08:05

## 2022-01-01 RX ADMIN — VASOPRESSIN 1 UNITS: 20 INJECTION INTRAVENOUS at 02:05

## 2022-01-01 RX ADMIN — EPINEPHRINE 0.04 MCG/KG/MIN: 1 INJECTION INTRAMUSCULAR; INTRAVENOUS; SUBCUTANEOUS at 01:05

## 2022-01-01 RX ADMIN — INSULIN ASPART 6 UNITS: 100 INJECTION, SOLUTION INTRAVENOUS; SUBCUTANEOUS at 07:05

## 2022-01-01 RX ADMIN — ASPIRIN 81 MG CHEWABLE TABLET 81 MG: 81 TABLET CHEWABLE at 09:05

## 2022-01-01 RX ADMIN — ISOSORBIDE MONONITRATE 30 MG: 30 TABLET, EXTENDED RELEASE ORAL at 09:04

## 2022-01-01 RX ADMIN — SODIUM CHLORIDE: 0.9 INJECTION, SOLUTION INTRAVENOUS at 07:04

## 2022-01-01 RX ADMIN — VASOPRESSIN 0.04 UNITS/MIN: 20 INJECTION INTRAVENOUS at 10:05

## 2022-01-01 RX ADMIN — Medication 6 MG: at 09:05

## 2022-01-01 RX ADMIN — VASOPRESSIN 0.5 UNITS: 20 INJECTION INTRAVENOUS at 02:05

## 2022-01-01 RX ADMIN — INSULIN ASPART 5 UNITS: 100 INJECTION, SOLUTION INTRAVENOUS; SUBCUTANEOUS at 08:05

## 2022-01-01 RX ADMIN — INSULIN ASPART 13 UNITS: 100 INJECTION, SOLUTION INTRAVENOUS; SUBCUTANEOUS at 05:05

## 2022-01-01 RX ADMIN — SODIUM BICARBONATE 650 MG TABLET 650 MG: at 01:05

## 2022-01-01 RX ADMIN — INSULIN ASPART 10 UNITS: 100 INJECTION, SOLUTION INTRAVENOUS; SUBCUTANEOUS at 05:05

## 2022-01-01 RX ADMIN — INSULIN DETEMIR 5 UNITS: 100 INJECTION, SOLUTION SUBCUTANEOUS at 09:05

## 2022-01-01 RX ADMIN — DOXYCYCLINE HYCLATE 100 MG: 100 TABLET, COATED ORAL at 08:05

## 2022-01-01 RX ADMIN — HEPARIN SODIUM 12 UNITS/KG/HR: 5000 INJECTION INTRAVENOUS; SUBCUTANEOUS at 07:05

## 2022-01-01 RX ADMIN — ACETAMINOPHEN 650 MG: 325 TABLET ORAL at 04:05

## 2022-01-01 RX ADMIN — ISOSORBIDE DINITRATE 20 MG: 20 TABLET ORAL at 08:05

## 2022-01-01 RX ADMIN — CALCITRIOL CAPSULES 0.25 MCG 0.25 MCG: 0.25 CAPSULE ORAL at 08:04

## 2022-01-01 RX ADMIN — EPINEPHRINE 0.02 MCG/KG/MIN: 1 INJECTION INTRAMUSCULAR; INTRAVENOUS; SUBCUTANEOUS at 10:05

## 2022-01-01 RX ADMIN — CALCITRIOL CAPSULES 0.25 MCG 0.25 MCG: 0.25 CAPSULE ORAL at 08:05

## 2022-01-01 RX ADMIN — SODIUM CHLORIDE 0.25 MCG/KG/MIN: 9 INJECTION, SOLUTION INTRAVENOUS at 01:05

## 2022-01-01 RX ADMIN — INSULIN DETEMIR 10 UNITS: 100 INJECTION, SOLUTION SUBCUTANEOUS at 06:05

## 2022-01-01 RX ADMIN — VASOPRESSIN 1 UNITS: 20 INJECTION INTRAVENOUS at 03:05

## 2022-01-01 RX ADMIN — INSULIN ASPART 13 UNITS: 100 INJECTION, SOLUTION INTRAVENOUS; SUBCUTANEOUS at 08:05

## 2022-01-01 RX ADMIN — PROPOFOL 150 MCG/KG/MIN: 10 INJECTION, EMULSION INTRAVENOUS at 01:05

## 2022-01-01 RX ADMIN — ONDANSETRON HYDROCHLORIDE 4 MG: 2 INJECTION INTRAMUSCULAR; INTRAVENOUS at 01:05

## 2022-01-01 RX ADMIN — VASOPRESSIN 0.04 UNITS/MIN: 20 INJECTION INTRAVENOUS at 12:05

## 2022-01-01 RX ADMIN — FINASTERIDE 5 MG: 5 TABLET, FILM COATED ORAL at 10:05

## 2022-01-01 RX ADMIN — MUPIROCIN: 20 OINTMENT TOPICAL at 08:05

## 2022-01-01 RX ADMIN — FAMOTIDINE 20 MG: 10 INJECTION, SOLUTION INTRAVENOUS at 01:05

## 2022-01-01 RX ADMIN — HEPARIN SODIUM 5000 UNITS: 5000 INJECTION INTRAVENOUS; SUBCUTANEOUS at 01:05

## 2022-01-01 RX ADMIN — INSULIN ASPART 2 UNITS: 100 INJECTION, SOLUTION INTRAVENOUS; SUBCUTANEOUS at 07:05

## 2022-01-01 RX ADMIN — SODIUM BICARBONATE 650 MG TABLET 650 MG: at 04:05

## 2022-01-01 RX ADMIN — INSULIN ASPART 6 UNITS: 100 INJECTION, SOLUTION INTRAVENOUS; SUBCUTANEOUS at 09:05

## 2022-01-01 RX ADMIN — SODIUM BICARBONATE 650 MG TABLET 650 MG: at 10:04

## 2022-01-01 RX ADMIN — ATORVASTATIN CALCIUM 80 MG: 20 TABLET, FILM COATED ORAL at 08:05

## 2022-01-01 RX ADMIN — HYPROMELLOSE 2910 1 DROP: 5 SOLUTION OPHTHALMIC at 02:05

## 2022-01-01 RX ADMIN — HYPROMELLOSE 2910 1 DROP: 5 SOLUTION OPHTHALMIC at 11:05

## 2022-01-01 RX ADMIN — INSULIN ASPART 13 UNITS: 100 INJECTION, SOLUTION INTRAVENOUS; SUBCUTANEOUS at 12:05

## 2022-01-01 RX ADMIN — INSULIN ASPART 13 UNITS: 100 INJECTION, SOLUTION INTRAVENOUS; SUBCUTANEOUS at 01:05

## 2022-01-01 RX ADMIN — AZITHROMYCIN MONOHYDRATE 500 MG: 250 TABLET ORAL at 09:05

## 2022-01-01 RX ADMIN — FINASTERIDE 5 MG: 5 TABLET, FILM COATED ORAL at 08:04

## 2022-01-01 RX ADMIN — MAGNESIUM SULFATE 2 G: 2 INJECTION INTRAVENOUS at 11:05

## 2022-01-01 RX ADMIN — SODIUM BICARBONATE 650 MG TABLET 650 MG: at 06:05

## 2022-01-01 RX ADMIN — HEPARIN SODIUM 14 UNITS/KG/HR: 5000 INJECTION INTRAVENOUS; SUBCUTANEOUS at 11:04

## 2022-01-01 RX ADMIN — METOPROLOL TARTRATE 25 MG: 25 TABLET, FILM COATED ORAL at 04:05

## 2022-01-01 RX ADMIN — SODIUM BICARBONATE 650 MG TABLET 650 MG: at 10:05

## 2022-01-01 RX ADMIN — ONDANSETRON 8 MG: 4 TABLET, ORALLY DISINTEGRATING ORAL at 08:05

## 2022-01-01 RX ADMIN — HEPARIN SODIUM 15 UNITS/KG/HR: 5000 INJECTION INTRAVENOUS; SUBCUTANEOUS at 08:05

## 2022-01-01 RX ADMIN — MAGNESIUM SULFATE 2 G: 2 INJECTION INTRAVENOUS at 06:05

## 2022-01-01 RX ADMIN — DOXYCYCLINE HYCLATE 100 MG: 100 TABLET, COATED ORAL at 10:05

## 2022-01-01 RX ADMIN — HEPARIN SODIUM 17 UNITS/KG/HR: 5000 INJECTION INTRAVENOUS; SUBCUTANEOUS at 04:05

## 2022-01-01 RX ADMIN — ISOSORBIDE MONONITRATE 30 MG: 30 TABLET, EXTENDED RELEASE ORAL at 08:05

## 2022-01-01 RX ADMIN — INSULIN ASPART 5 UNITS: 100 INJECTION, SOLUTION INTRAVENOUS; SUBCUTANEOUS at 05:04

## 2022-01-01 RX ADMIN — INSULIN DETEMIR 5 UNITS: 100 INJECTION, SOLUTION SUBCUTANEOUS at 10:05

## 2022-01-01 RX ADMIN — INSULIN ASPART 5 UNITS: 100 INJECTION, SOLUTION INTRAVENOUS; SUBCUTANEOUS at 01:05

## 2022-01-01 RX ADMIN — Medication 6 MG: at 08:05

## 2022-01-01 RX ADMIN — ISOSORBIDE MONONITRATE 30 MG: 30 TABLET, EXTENDED RELEASE ORAL at 08:04

## 2022-01-01 RX ADMIN — HEPARIN SODIUM 15 UNITS/KG/HR: 5000 INJECTION INTRAVENOUS; SUBCUTANEOUS at 01:05

## 2022-01-01 RX ADMIN — VANCOMYCIN HYDROCHLORIDE 1500 MG: 1 INJECTION, POWDER, LYOPHILIZED, FOR SOLUTION INTRAVENOUS at 01:05

## 2022-01-01 RX ADMIN — ATORVASTATIN CALCIUM 40 MG: 20 TABLET, FILM COATED ORAL at 08:05

## 2022-01-01 RX ADMIN — INSULIN ASPART 5 UNITS: 100 INJECTION, SOLUTION INTRAVENOUS; SUBCUTANEOUS at 06:04

## 2022-01-01 RX ADMIN — FUROSEMIDE 80 MG: 10 INJECTION, SOLUTION INTRAMUSCULAR; INTRAVENOUS at 06:05

## 2022-01-01 RX ADMIN — HUMAN ALBUMIN MICROSPHERES AND PERFLUTREN 0.66 MG: 10; .22 INJECTION, SOLUTION INTRAVENOUS at 02:04

## 2022-01-01 RX ADMIN — INSULIN ASPART 4 UNITS: 100 INJECTION, SOLUTION INTRAVENOUS; SUBCUTANEOUS at 09:05

## 2022-01-01 RX ADMIN — DOBUTAMINE HYDROCHLORIDE 2.5 MCG/KG/MIN: 400 INJECTION INTRAVENOUS at 01:05

## 2022-01-01 RX ADMIN — HYPROMELLOSE 2910 1 DROP: 5 SOLUTION OPHTHALMIC at 03:04

## 2022-01-01 RX ADMIN — HEPARIN SODIUM 10 UNITS/KG/HR: 5000 INJECTION INTRAVENOUS; SUBCUTANEOUS at 07:04

## 2022-01-01 RX ADMIN — SENNOSIDES AND DOCUSATE SODIUM 1 TABLET: 50; 8.6 TABLET ORAL at 10:05

## 2022-01-01 RX ADMIN — ASPIRIN 325 MG ORAL TABLET 325 MG: 325 PILL ORAL at 12:05

## 2022-01-01 RX ADMIN — INSULIN ASPART 2 UNITS: 100 INJECTION, SOLUTION INTRAVENOUS; SUBCUTANEOUS at 01:05

## 2022-01-01 RX ADMIN — DOXYCYCLINE HYCLATE 100 MG: 100 TABLET, COATED ORAL at 09:05

## 2022-01-01 RX ADMIN — OXYMETAZOLINE HCL 2 SPRAY: 0.05 SPRAY NASAL at 04:05

## 2022-01-01 RX ADMIN — SODIUM BICARBONATE 650 MG TABLET 650 MG: at 03:04

## 2022-01-01 RX ADMIN — VASOPRESSIN 0.02 UNITS/MIN: 20 INJECTION INTRAVENOUS at 06:05

## 2022-01-01 RX ADMIN — CALCITRIOL CAPSULES 0.25 MCG 0.25 MCG: 0.25 CAPSULE ORAL at 09:04

## 2022-01-01 RX ADMIN — HYPROMELLOSE 2910 1 DROP: 5 SOLUTION OPHTHALMIC at 04:05

## 2022-01-01 RX ADMIN — AMLODIPINE BESYLATE 5 MG: 5 TABLET ORAL at 08:05

## 2022-01-01 RX ADMIN — FUROSEMIDE 40 MG: 10 INJECTION, SOLUTION INTRAMUSCULAR; INTRAVENOUS at 10:05

## 2022-01-01 RX ADMIN — ASPIRIN 81 MG CHEWABLE TABLET 81 MG: 81 TABLET CHEWABLE at 10:05

## 2022-01-01 RX ADMIN — FUROSEMIDE 10 MG/HR: 10 INJECTION, SOLUTION INTRAMUSCULAR; INTRAVENOUS at 02:05

## 2022-01-01 RX ADMIN — INSULIN ASPART 2 UNITS: 100 INJECTION, SOLUTION INTRAVENOUS; SUBCUTANEOUS at 10:05

## 2022-01-01 RX ADMIN — METOPROLOL TARTRATE 25 MG: 25 TABLET, FILM COATED ORAL at 10:05

## 2022-01-01 RX ADMIN — INSULIN ASPART 4 UNITS: 100 INJECTION, SOLUTION INTRAVENOUS; SUBCUTANEOUS at 06:05

## 2022-01-01 RX ADMIN — EPINEPHRINE 0.02 MCG/KG/MIN: 1 INJECTION INTRAMUSCULAR; INTRAVENOUS; SUBCUTANEOUS at 03:05

## 2022-01-01 RX ADMIN — INSULIN ASPART 2 UNITS: 100 INJECTION, SOLUTION INTRAVENOUS; SUBCUTANEOUS at 08:05

## 2022-01-01 RX ADMIN — OXYMETAZOLINE HCL 2 SPRAY: 0.05 SPRAY NASAL at 07:05

## 2022-01-01 RX ADMIN — INSULIN ASPART 13 UNITS: 100 INJECTION, SOLUTION INTRAVENOUS; SUBCUTANEOUS at 09:05

## 2022-01-01 RX ADMIN — SODIUM CHLORIDE: 0.9 INJECTION, SOLUTION INTRAVENOUS at 05:04

## 2022-01-01 RX ADMIN — FUROSEMIDE 5 MG/HR: 10 INJECTION, SOLUTION INTRAMUSCULAR; INTRAVENOUS at 03:05

## 2022-01-01 RX ADMIN — INSULIN ASPART 6 UNITS: 100 INJECTION, SOLUTION INTRAVENOUS; SUBCUTANEOUS at 05:05

## 2022-01-01 RX ADMIN — VASOPRESSIN 0.04 UNITS/MIN: 20 INJECTION INTRAVENOUS at 09:05

## 2022-01-01 RX ADMIN — FUROSEMIDE 10 MG/HR: 10 INJECTION, SOLUTION INTRAMUSCULAR; INTRAVENOUS at 10:05

## 2022-01-01 RX ADMIN — Medication 6 MG: at 10:05

## 2022-01-01 RX ADMIN — INSULIN ASPART 2 UNITS: 100 INJECTION, SOLUTION INTRAVENOUS; SUBCUTANEOUS at 09:05

## 2022-01-01 RX ADMIN — POLYETHYLENE GLYCOL 3350 17 G: 17 POWDER, FOR SOLUTION ORAL at 08:05

## 2022-01-01 RX ADMIN — ACETAMINOPHEN 650 MG: 325 TABLET ORAL at 10:05

## 2022-01-01 RX ADMIN — HEPARIN SODIUM 12 UNITS/KG/HR: 5000 INJECTION INTRAVENOUS; SUBCUTANEOUS at 08:04

## 2022-01-01 RX ADMIN — TAMSULOSIN HYDROCHLORIDE 0.4 MG: 0.4 CAPSULE ORAL at 10:05

## 2022-01-01 RX ADMIN — INSULIN ASPART 10 UNITS: 100 INJECTION, SOLUTION INTRAVENOUS; SUBCUTANEOUS at 07:05

## 2022-01-01 RX ADMIN — POTASSIUM CHLORIDE 20 MEQ: 14.9 INJECTION, SOLUTION INTRAVENOUS at 01:05

## 2022-01-01 RX ADMIN — CALCITRIOL CAPSULES 0.25 MCG 0.25 MCG: 0.25 CAPSULE ORAL at 10:05

## 2022-01-01 RX ADMIN — SODIUM CHLORIDE: 0.9 INJECTION, SOLUTION INTRAVENOUS at 08:04

## 2022-01-01 RX ADMIN — EPINEPHRINE 0.02 MCG/KG/MIN: 1 INJECTION INTRAMUSCULAR; INTRAVENOUS; SUBCUTANEOUS at 09:05

## 2022-01-01 RX ADMIN — OXYCODONE 5 MG: 5 TABLET ORAL at 08:05

## 2022-01-01 RX ADMIN — HUMAN ALBUMIN MICROSPHERES AND PERFLUTREN 0.66 MG: 10; .22 INJECTION, SOLUTION INTRAVENOUS at 09:05

## 2022-01-01 RX ADMIN — ASPIRIN 325 MG ORAL TABLET 325 MG: 325 PILL ORAL at 05:04

## 2022-01-01 RX ADMIN — FUROSEMIDE 80 MG: 10 INJECTION, SOLUTION INTRAMUSCULAR; INTRAVENOUS at 09:05

## 2022-01-01 RX ADMIN — DIPHENHYDRAMINE HYDROCHLORIDE 50 MG: 50 CAPSULE ORAL at 12:04

## 2022-01-01 RX ADMIN — DOBUTAMINE HYDROCHLORIDE 2.5 MCG/KG/MIN: 400 INJECTION INTRAVENOUS at 10:05

## 2022-01-01 RX ADMIN — AMLODIPINE BESYLATE 5 MG: 5 TABLET ORAL at 09:04

## 2022-01-01 RX ADMIN — ASPIRIN 81 MG CHEWABLE TABLET 81 MG: 81 TABLET CHEWABLE at 08:04

## 2022-01-01 RX ADMIN — MAGNESIUM SULFATE 2 G: 2 INJECTION INTRAVENOUS at 09:05

## 2022-01-01 RX ADMIN — ATORVASTATIN CALCIUM 40 MG: 20 TABLET, FILM COATED ORAL at 08:04

## 2022-01-01 RX ADMIN — PROPOFOL 50 MG: 10 INJECTION, EMULSION INTRAVENOUS at 01:05

## 2022-01-01 RX ADMIN — ACETAMINOPHEN 650 MG: 325 TABLET ORAL at 09:05

## 2022-01-01 RX ADMIN — SODIUM CHLORIDE: 0.9 INJECTION, SOLUTION INTRAVENOUS at 03:04

## 2022-01-01 RX ADMIN — INSULIN ASPART 2 UNITS: 100 INJECTION, SOLUTION INTRAVENOUS; SUBCUTANEOUS at 12:04

## 2022-01-01 RX ADMIN — HEPARIN SODIUM 5000 UNITS: 5000 INJECTION INTRAVENOUS; SUBCUTANEOUS at 09:05

## 2022-01-01 RX ADMIN — ACETAMINOPHEN 650 MG: 325 TABLET ORAL at 06:05

## 2022-01-01 RX ADMIN — VASOPRESSIN 0.02 UNITS/MIN: 20 INJECTION INTRAVENOUS at 01:05

## 2022-01-01 RX ADMIN — AZITHROMYCIN MONOHYDRATE 500 MG: 250 TABLET ORAL at 08:05

## 2022-01-01 RX ADMIN — SODIUM CHLORIDE: 0.9 INJECTION, SOLUTION INTRAVENOUS at 09:05

## 2022-01-01 RX ADMIN — VANCOMYCIN HYDROCHLORIDE 750 MG: 750 INJECTION, POWDER, LYOPHILIZED, FOR SOLUTION INTRAVENOUS at 06:05

## 2022-01-01 RX ADMIN — INSULIN ASPART 3 UNITS: 100 INJECTION, SOLUTION INTRAVENOUS; SUBCUTANEOUS at 12:05

## 2022-01-01 RX ADMIN — ISOSORBIDE DINITRATE 20 MG: 20 TABLET ORAL at 10:05

## 2022-01-01 RX ADMIN — INSULIN ASPART 6 UNITS: 100 INJECTION, SOLUTION INTRAVENOUS; SUBCUTANEOUS at 12:05

## 2022-01-01 RX ADMIN — CHLOROTHIAZIDE SODIUM 250 MG: 500 INJECTION, POWDER, LYOPHILIZED, FOR SOLUTION INTRAVENOUS at 04:05

## 2022-01-01 RX ADMIN — INSULIN DETEMIR 10 UNITS: 100 INJECTION, SOLUTION SUBCUTANEOUS at 08:05

## 2022-01-01 RX ADMIN — SODIUM CHLORIDE, SODIUM GLUCONATE, SODIUM ACETATE, POTASSIUM CHLORIDE, MAGNESIUM CHLORIDE, SODIUM PHOSPHATE, DIBASIC, AND POTASSIUM PHOSPHATE: .53; .5; .37; .037; .03; .012; .00082 INJECTION, SOLUTION INTRAVENOUS at 01:05

## 2022-01-01 RX ADMIN — SODIUM BICARBONATE 650 MG TABLET 650 MG: at 02:04

## 2022-01-01 RX ADMIN — LACTULOSE 10 G: 20 SOLUTION ORAL at 03:05

## 2022-01-01 RX ADMIN — INSULIN ASPART 10 UNITS: 100 INJECTION, SOLUTION INTRAVENOUS; SUBCUTANEOUS at 11:05

## 2022-01-01 RX ADMIN — ISOSORBIDE DINITRATE 20 MG: 20 TABLET ORAL at 09:05

## 2022-01-01 RX ADMIN — FUROSEMIDE 10 MG/HR: 10 INJECTION, SOLUTION INTRAMUSCULAR; INTRAVENOUS at 03:05

## 2022-01-01 RX ADMIN — INSULIN ASPART 4 UNITS: 100 INJECTION, SOLUTION INTRAVENOUS; SUBCUTANEOUS at 05:05

## 2022-01-01 RX ADMIN — CLOPIDOGREL 225 MG: 75 TABLET, FILM COATED ORAL at 06:04

## 2022-01-01 RX ADMIN — INSULIN ASPART 8 UNITS: 100 INJECTION, SOLUTION INTRAVENOUS; SUBCUTANEOUS at 07:05

## 2022-01-01 RX ADMIN — SODIUM BICARBONATE 650 MG TABLET 650 MG: at 05:04

## 2022-01-01 RX ADMIN — HEPARIN SODIUM 12 UNITS/KG/HR: 5000 INJECTION INTRAVENOUS; SUBCUTANEOUS at 03:04

## 2022-01-01 RX ADMIN — FUROSEMIDE 120 MG: 10 INJECTION, SOLUTION INTRAMUSCULAR; INTRAVENOUS at 05:05

## 2022-01-01 RX ADMIN — VASOPRESSIN 0.04 UNITS/MIN: 20 INJECTION INTRAVENOUS at 07:05

## 2022-01-01 RX ADMIN — MIDAZOLAM 0.5 MG: 1 INJECTION INTRAMUSCULAR; INTRAVENOUS at 06:05

## 2022-01-01 RX ADMIN — OXYMETAZOLINE HCL 2 SPRAY: 0.05 SPRAY NASAL at 10:05

## 2022-01-01 RX ADMIN — INSULIN ASPART 5 UNITS: 100 INJECTION, SOLUTION INTRAVENOUS; SUBCUTANEOUS at 09:05

## 2022-01-01 RX ADMIN — LACTULOSE 10 G: 20 SOLUTION ORAL at 08:05

## 2022-01-01 RX ADMIN — PROPOFOL 20 MG: 10 INJECTION, EMULSION INTRAVENOUS at 01:05

## 2022-01-01 RX ADMIN — CEFEPIME HYDROCHLORIDE 2 G: 2 INJECTION, SOLUTION INTRAVENOUS at 12:05

## 2022-01-01 RX ADMIN — MIDAZOLAM HYDROCHLORIDE 0.5 MG: 1 INJECTION INTRAMUSCULAR; INTRAVENOUS at 06:05

## 2022-01-01 RX ADMIN — REGADENOSON 0.4 MG: 0.08 INJECTION, SOLUTION INTRAVENOUS at 10:04

## 2022-01-01 RX ADMIN — LOSARTAN POTASSIUM 100 MG: 50 TABLET, FILM COATED ORAL at 08:04

## 2022-01-01 RX ADMIN — ALTEPLASE 2 MG: 2.2 INJECTION, POWDER, LYOPHILIZED, FOR SOLUTION INTRAVENOUS at 06:05

## 2022-01-01 RX ADMIN — EPINEPHRINE 0.04 MCG/KG/MIN: 1 INJECTION INTRAMUSCULAR; INTRAVENOUS; SUBCUTANEOUS at 05:05

## 2022-01-01 RX ADMIN — FUROSEMIDE 10 MG/HR: 10 INJECTION, SOLUTION INTRAMUSCULAR; INTRAVENOUS at 05:05

## 2022-01-01 RX ADMIN — VASOPRESSIN 0.04 UNITS/MIN: 20 INJECTION INTRAVENOUS at 11:05

## 2022-01-01 RX ADMIN — OXYMETAZOLINE HCL 2 SPRAY: 0.05 SPRAY NASAL at 09:05

## 2022-01-01 RX ADMIN — DOBUTAMINE HYDROCHLORIDE 2.5 MCG/KG/MIN: 400 INJECTION INTRAVENOUS at 04:05

## 2022-01-01 RX ADMIN — FUROSEMIDE 20 MG/HR: 10 INJECTION, SOLUTION INTRAMUSCULAR; INTRAVENOUS at 06:05

## 2022-01-01 RX ADMIN — CLOPIDOGREL 75 MG: 75 TABLET, FILM COATED ORAL at 10:05

## 2022-01-01 RX ADMIN — VANCOMYCIN HYDROCHLORIDE 1750 MG: 500 INJECTION, POWDER, LYOPHILIZED, FOR SOLUTION INTRAVENOUS at 12:05

## 2022-01-01 RX ADMIN — SODIUM BICARBONATE 650 MG TABLET 650 MG: at 08:05

## 2022-01-01 RX ADMIN — LACTULOSE 10 G: 20 SOLUTION ORAL at 09:05

## 2022-01-01 RX ADMIN — TAMSULOSIN HYDROCHLORIDE 0.4 MG: 0.4 CAPSULE ORAL at 08:04

## 2022-01-25 NOTE — PROGRESS NOTES
Internal medicine note      The electrocardiogram from his visit noted AV block, 2nd degree, Mobitz type 1.    The finding is new when compared to previous electrocardiogram 2016      Doubt this has any clinical significance.  He reports no dizziness or syncope.  He is on carvedilol.  Nevertheless I would recommend a follow-up with his cardiologist for his yearly check as well as to address this issue

## 2022-02-01 NOTE — PATIENT INSTRUCTIONS
Increase the metamucil to twice a day.  Then can try adding the cholestryamine packet before bedtime if stools are still loose.

## 2022-02-01 NOTE — PROGRESS NOTES
GASTROENTEROLOGY CLINIC NOTE    Reason for visit: The primary encounter diagnosis was Alternating constipation and diarrhea. A diagnosis of Diarrhea, unspecified type was also pertinent to this visit.  Referring provider/PCP: Cipirano Sol MD    HPI:  Kevon Perez is a 81 y.o. male here today for follow up iron def.    Interval  Last seen about 1 year ago.  Now here for looser stools, some explosive stools.   Sometimes soft to liquid stools, maybe 3 days a week. Some days doesn't have a BM, maybe 2 days in a row. 2 BM yesterday.   Seems to have back and forth symptoms, some days no BM, then will have a few more solid followed by liquid urges. Sometimes doesn't make it and soils himself. No blood.  No new meds      ===================================  Initial clinic visit  Symptoms of worsening SOB. More MATTHEWS. Low energy over past many months. Recently Hgb down to 9.9, baseline is around 12 or so.  Mentions chronic longstanding iron deficiency.  He has been on iron many years.  He has even tried IV infusions in the distant past.  He never sees blood in the stool.  Never melena. There are no radiating symptoms.  There are no alleviating factors, he does take ASA and plavix and that is likely contributing. He does not take a PPI.  He has noticed some increased lower extremity swelling.    Recently had a colonoscopy and we reviewed this result.  I also reviewed his EGD from 2015 with him.        Prior Endoscopy:  EGD:  1/2021  Impression:           - Normal esophagus.                         - Atrophic and discolored mucosa in the pylorus.                         Biopsied.                         - Gastric mucosal atrophy.                         - Normal examined duodenum.                         - Multiple biopsies were obtained in the gastric                         body, at the incisura and on the greater curvature                         of the gastric antrum.   Recommendation:       - Discharge  patient to home.                         - Patient has a contact number available for                         emergencies. The signs and symptoms of potential                         delayed complications were discussed with the                         patient. Return to normal activities tomorrow.                         Written discharge instructions were provided to the                         patient.                         - Resume previous diet.                         - Continue present medications.                         - Await pathology results.                         - Repeat upper endoscopy in 3 years for                         surveillance.                         - PPI daily, consider IV iron therapy. Suspect                         chronic iron def anemia is related to malabsorption                         from atrophic / metaplastic gastritis.     PATH:  1.  Stomach, pyloric area (biopsy):   - Pyloric type mucosa with focal intestinal metaplasia   - No dysplasia or carcinoma   - H. pylori immunostain is negative; H. pylori stain with appropriate controls   2.  Stomach, antrum (biopsy):   - Benign antral-type mucosa with intestinal metaplasia and reactive   gastropathy   - No dysplasia or carcinoma   - H. pylori immunostain is negative; H. pylori stain with appropriate controls   - Chromogranin and synaptophysin immunostains with appropriate controls, show   no neuroendocrine hyperplasia   3.  Stomach, incisura (biopsy):   - Benign gastric mucosa with chronic inflammation and atrophic changes   - No intestinal metaplasia, dysplasia or carcinoma   4.  Gastric body (biopsy):   - Atrophic gastritis with intestinal metaplasia   - No dysplasia or carcinoma   - H. pylori immunostain is negative; H. pylori stain with appropriate controls     1/2015 jarvis  Impression:           - Normal esophagus.                         - Erythematous mucosa in the antrum. Biopsied.                         - Normal  examined duodenum.                         if the biopsies show intestinal metaplasia, I would                         recommend a follow up exam with biopsies in 2 to 3                         years                         However, no cause for abdominal pain attacks found,                         I still think it is likely that the pain was due to                         gallbladder   PATH: intestinal metaplasia, negative HP    Colon:  12/20 yinka  Impression:           - Eight 3 to 9 mm polyps in the descending colon,                         in the transverse colon and in the ascending colon,                         removed with a cold snare. Resected and retrieved.                         - One 2 mm polyp in the transverse colon, removed                         with a jumbo cold forceps. Resected and retrieved.                         - The examined portion of the ileum was normal.                         - The examination was otherwise normal on direct                         and retroflexion views.   Recommendation:                               - Resume previous diet.                         - Resume Plavix (clopidogrel) at prior dose                         tomorrow. Refer to managing physician for further                         adjustment of therapy.                         - Await pathology results.                         - Repeat colonoscopy in 3 years for surveillance   PATH - TAs    (Portions of this note were dictated using voice recognition software and may contain dictation related errors in spelling/grammar/syntax not found on text review)    Review of Systems   Constitutional: Negative for chills and malaise/fatigue.   Gastrointestinal: Negative for abdominal pain and vomiting.   Neurological: Negative for dizziness and headaches.   Psychiatric/Behavioral: The patient is not nervous/anxious and does not have insomnia.        Past Medical History: has a past medical history of Acute coronary  syndrome, Anticoagulant long-term use, Basal cell cancer, BCC (basal cell carcinoma of skin), Cancer of bladder, Cataract, Chronic kidney disease, Colon polyp, Coronary artery disease, CPAP (continuous positive airway pressure) dependence, Diabetes mellitus, Diabetic retinopathy, Encounter for blood transfusion, GERD (gastroesophageal reflux disease), High cholesterol, Hyperlipidemia, Hypertension, Iron deficiency anemia, GINGER (obstructive sleep apnea), Renal manifestation of secondary diabetes mellitus, and SOB (shortness of breath).    Past Surgical History: has a past surgical history that includes Cardiac catheterization; Coronary angioplasty (9/10/09); Excision basal cell carcinoma; Bladder surgery; Cystoscopy; Coronary angioplasty with stent; Cataract extraction; hydrocel; Colonoscopy (3/26/15); Eye surgery; Colonoscopy (N/A, 12/4/2020); and Esophagogastroduodenoscopy (N/A, 1/27/2021).    Family History:family history includes Alcohol abuse in his brother; Cancer in his maternal aunt; Cataracts in his mother; Diabetes in his father, paternal uncle, and sister; Goiter in his mother; Heart disease in his father and mother; Hypertension in his father; No Known Problems in his daughter, son, and son.    Allergies:   Review of patient's allergies indicates:   Allergen Reactions    Penicillins Other (See Comments)     Muscle stiffness    Bactrim [sulfamethoxazole-trimethoprim] Rash       Social History: reports that he quit smoking about 51 years ago. His smoking use included cigarettes. He has a 40.00 pack-year smoking history. He has quit using smokeless tobacco. He reports current alcohol use of about 3.0 standard drinks of alcohol per week. He reports that he does not use drugs.    Home medications:   Current Outpatient Medications on File Prior to Visit   Medication Sig Dispense Refill    amLODIPine (NORVASC) 5 MG tablet TAKE 1 TABLET(5 MG) BY MOUTH EVERY DAY 90 tablet 1    atorvastatin (LIPITOR) 20 MG  "tablet TAKE 1 TABLET(20 MG) BY MOUTH EVERY DAY 90 tablet 1    BD ULTRA-FINE SHORT PEN NEEDLE 31 gauge x 5/16" Ndle USE UP TO 6 TIMES DAILY 200 each 11    blood sugar diagnostic (TRUE METRIX GLUCOSE TEST STRIP) Strp USE TO CHECK BLOOD SUGAR FOUR TIMES DAILY 300 strip 11    blood-glucose meter kit To check BG 4 times daily, to use with insurance preferred meter 1 each 0    calcitRIOL (ROCALTROL) 0.25 MCG Cap TAKE 1 CAPSULE BY MOUTH EVERY DAY 30 capsule 11    carvediloL (COREG) 12.5 MG tablet TAKE 1 TABLET BY MOUTH TWICE DAILY 180 tablet 3    clopidogreL (PLAVIX) 75 mg tablet TAKE 1 TABLET(75 MG) BY MOUTH EVERY DAY 90 tablet 1    finasteride (PROSCAR) 5 mg tablet Take 1 tablet (5 mg total) by mouth once daily. 90 tablet 3    fish oil-omega-3 fatty acids 300-1,000 mg capsule Take by mouth once daily.      fluticasone propionate (FLONASE) 50 mcg/actuation nasal spray 2 sprays (100 mcg total) by Each Nostril route once daily. 16 g 1    folic acid (FOLVITE) 1 MG tablet Take 1 mg by mouth once daily.      hydroCHLOROthiazide (HYDRODIURIL) 12.5 MG Tab TAKE 1 TABLET(12.5 MG) BY MOUTH EVERY DAY 30 tablet 11    irbesartan (AVAPRO) 300 MG tablet TAKE 1 TABLET(300 MG) BY MOUTH EVERY EVENING 90 tablet 3    nitroGLYCERIN (NITROSTAT) 0.4 MG SL tablet TAKE 1 TABLET UNDER THE TONGUE EVERY 5 MINUTES AS NEEDED 25 tablet 3    NOVOLOG FLEXPEN U-100 INSULIN 100 unit/mL (3 mL) InPn pen INJECT 15 UNITS UNDER THE SKIN FOUR TIMES DAILY, BEFORE MEALS PLUS CORRECTION SCALE, MAX TOTAL DAILY DOSE OF 75 UNITS 90 mL 8    tamsulosin (FLOMAX) 0.4 mg Cap TAKE 1 CAPSULE(0.4 MG) BY MOUTH EVERY EVENING 90 capsule 3    TRUEPLUS LANCETS 30 gauge Misc USE TO CHECK BLOOD SUGAR FOUR TIMES DAILY 300 each 3    vitamin D (VITAMIN D3) 1000 units Tab Take 1,000 Units by mouth once daily.       Current Facility-Administered Medications on File Prior to Visit   Medication Dose Route Frequency Provider Last Rate Last Admin    cyanocobalamin injection " "1,000 mcg  1,000 mcg Intramuscular Q30 Days Cipriano Sol MD        cyanocobalamin injection 100 mcg  100 mcg Intramuscular Q30 Days Cipriano Sol MD   100 mcg at 06/22/21 1034       Vital signs:  Ht 5' 10" (1.778 m)   Wt 104.8 kg (231 lb 0.7 oz)   BMI 33.15 kg/m²     Physical Exam  Vitals reviewed.   Constitutional:       Appearance: He is not toxic-appearing.   HENT:      Head: Normocephalic and atraumatic.   Eyes:      General: No scleral icterus.     Conjunctiva/sclera: Conjunctivae normal.   Neurological:      Mental Status: He is alert and oriented to person, place, and time.      Gait: Gait normal.   Psychiatric:         Mood and Affect: Mood normal.         Behavior: Behavior normal.         Routine labs:  Lab Results   Component Value Date    WBC 6.85 12/28/2021    HGB 11.7 (L) 12/28/2021    HCT 37.7 (L) 12/28/2021    MCV 93 12/28/2021     (L) 12/28/2021     Lab Results   Component Value Date    INR 1.0 01/09/2015     Lab Results   Component Value Date    IRON 91 12/28/2021    FERRITIN 218 06/22/2021    TIBC 290 12/28/2021    FESATURATED 31 12/28/2021     Lab Results   Component Value Date     12/28/2021    K 4.9 12/28/2021     (H) 12/28/2021    CO2 23 12/28/2021    BUN 38 (H) 12/28/2021    CREATININE 1.7 (H) 12/28/2021    GLUF 111 (H) 02/01/2010     Lab Results   Component Value Date    ALBUMIN 3.6 12/28/2021    ALT 17 12/28/2021    AST 15 12/28/2021    ALKPHOS 35 (L) 12/28/2021    BILITOT 1.3 (H) 12/28/2021     No results found for: GLUCOSE    I have reviewed prior labs, imaging, notes from last month      Assessment:  1. Alternating constipation and diarrhea    2. Diarrhea, unspecified type        Fluctuating diarrhea and some constipation.  symtpoms consistent with rectal weakness and irregular bowel habits as a result  Need to bulk the stool    Plan:  Orders Placed This Encounter    X-Ray Abdomen AP 1 View    cholestyramine (QUESTRAN) 4 gram packet       Increase " metamucil to twice a day  Ok to stop probiotic    Add questran powder as needed    Xray to rule out significant stool burden today    RTC 6 weeks    Matt Acosta MD  [facility]

## 2022-02-09 NOTE — PROGRESS NOTES
Subjective:    Patient ID:  Kevon Perez is a 81 y.o. male who presents for follow-up of CAD    HPI     The patient is a 81 year old male followed with CAD post STEMI/PCI 2009, hypertension, hyperlipidemia,GINGER, type 2 diabetes and CRI III. He continues with MATTHEWS but but developed a iron deficient anemia. Apparently no bleeding source noted in upper and lower GI studies. Aspirin was stopped and hgb is improved. He reports increase in MATTHEWS and edema but no chest pain.  His home BPs are in 140/60 range. He has not been exercising due to sciatica for past 6 months.He is not adherent to a low salt diet    Summary 1/6/21    · The left ventricle is normal in size with normal systolic function. The estimated ejection fraction is 63%  · Normal left ventricular diastolic function.  · Mild left atrial enlargement.  · Normal right ventricular size with normal right ventricular systolic function.  · There is mild-to-moderate aortic valve stenosis.  · Aortic valve area is 1.53 cm2; peak velocity is 2.03 m/s; mean gradient is 9 mmHg.  · Normal central venous pressure (3 mmHg).  · The estimated PA systolic pressure is 30 mmHg.          Lab Results   Component Value Date     12/28/2021    K 4.9 12/28/2021     (H) 12/28/2021    CO2 23 12/28/2021    BUN 38 (H) 12/28/2021    CREATININE 1.7 (H) 12/28/2021     12/28/2021    HGBA1C 6.4 (H) 12/28/2021    MG 1.6 12/28/2021    AST 15 12/28/2021    ALT 17 12/28/2021    ALBUMIN 3.6 12/28/2021    PROT 6.5 12/28/2021    BILITOT 1.3 (H) 12/28/2021    WBC 6.85 12/28/2021    HGB 11.7 (L) 12/28/2021    HCT 37.7 (L) 12/28/2021    MCV 93 12/28/2021     (L) 12/28/2021    INR 1.0 01/09/2015    PSA 1.42 06/13/2013    TSH 6.099 (H) 12/28/2021         Lab Results   Component Value Date    CHOL 98 (L) 12/28/2021    HDL 35 (L) 12/28/2021    TRIG 62 12/28/2021       Lab Results   Component Value Date    LDLCALC 50.6 (L) 12/28/2021       Past Medical History:   Diagnosis Date     "Acute coronary syndrome 9/10/09    STEMI    Anticoagulant long-term use     plavix    Basal cell cancer     BCC (basal cell carcinoma of skin)     nose    Cancer of bladder January 2013    Cataract     Chronic kidney disease     Colon polyp     Coronary artery disease     CPAP (continuous positive airway pressure) dependence     Diabetes mellitus     Diabetic retinopathy     Encounter for blood transfusion     GERD (gastroesophageal reflux disease)     High cholesterol     Hyperlipidemia     Hypertension     Iron deficiency anemia 5/11/2018    GINGER (obstructive sleep apnea)     CPAP     Renal manifestation of secondary diabetes mellitus     SOB (shortness of breath)        Current Outpatient Medications:     amLODIPine (NORVASC) 5 MG tablet, TAKE 1 TABLET(5 MG) BY MOUTH EVERY DAY, Disp: 90 tablet, Rfl: 1    atorvastatin (LIPITOR) 20 MG tablet, TAKE 1 TABLET(20 MG) BY MOUTH EVERY DAY, Disp: 90 tablet, Rfl: 1    BD ULTRA-FINE SHORT PEN NEEDLE 31 gauge x 5/16" Ndle, USE UP TO 6 TIMES DAILY, Disp: 200 each, Rfl: 11    blood sugar diagnostic (TRUE METRIX GLUCOSE TEST STRIP) Strp, USE TO CHECK BLOOD SUGAR FOUR TIMES DAILY, Disp: 300 strip, Rfl: 11    blood-glucose meter kit, To check BG 4 times daily, to use with insurance preferred meter, Disp: 1 each, Rfl: 0    calcitRIOL (ROCALTROL) 0.25 MCG Cap, TAKE 1 CAPSULE BY MOUTH EVERY DAY, Disp: 30 capsule, Rfl: 11    carvediloL (COREG) 12.5 MG tablet, TAKE 1 TABLET BY MOUTH TWICE DAILY, Disp: 180 tablet, Rfl: 3    cholestyramine (QUESTRAN) 4 gram packet, Take 1 packet (4 g total) by mouth 2 (two) times daily as needed (for loose stool)., Disp: 60 packet, Rfl: 2    clopidogreL (PLAVIX) 75 mg tablet, TAKE 1 TABLET(75 MG) BY MOUTH EVERY DAY, Disp: 90 tablet, Rfl: 1    finasteride (PROSCAR) 5 mg tablet, Take 1 tablet (5 mg total) by mouth once daily., Disp: 90 tablet, Rfl: 3    fish oil-omega-3 fatty acids 300-1,000 mg capsule, Take by mouth once " daily., Disp: , Rfl:     fluticasone propionate (FLONASE) 50 mcg/actuation nasal spray, 2 sprays (100 mcg total) by Each Nostril route once daily., Disp: 16 g, Rfl: 1    folic acid (FOLVITE) 1 MG tablet, Take 1 mg by mouth once daily., Disp: , Rfl:     hydroCHLOROthiazide (HYDRODIURIL) 12.5 MG Tab, TAKE 1 TABLET(12.5 MG) BY MOUTH EVERY DAY, Disp: 30 tablet, Rfl: 11    irbesartan (AVAPRO) 300 MG tablet, TAKE 1 TABLET(300 MG) BY MOUTH EVERY EVENING, Disp: 90 tablet, Rfl: 3    nitroGLYCERIN (NITROSTAT) 0.4 MG SL tablet, TAKE 1 TABLET UNDER THE TONGUE EVERY 5 MINUTES AS NEEDED, Disp: 25 tablet, Rfl: 3    NOVOLOG FLEXPEN U-100 INSULIN 100 unit/mL (3 mL) InPn pen, INJECT 15 UNITS UNDER THE SKIN FOUR TIMES DAILY, BEFORE MEALS PLUS CORRECTION SCALE, MAX TOTAL DAILY DOSE OF 75 UNITS, Disp: 90 mL, Rfl: 8    psyllium (METAMUCIL) powder, Take 1 packet by mouth., Disp: , Rfl:     tamsulosin (FLOMAX) 0.4 mg Cap, TAKE 1 CAPSULE(0.4 MG) BY MOUTH EVERY EVENING, Disp: 90 capsule, Rfl: 3    TRUEPLUS LANCETS 30 gauge Misc, USE TO CHECK BLOOD SUGAR FOUR TIMES DAILY, Disp: 300 each, Rfl: 3    vitamin D (VITAMIN D3) 1000 units Tab, Take 1,000 Units by mouth once daily., Disp: , Rfl:     Current Facility-Administered Medications:     cyanocobalamin injection 1,000 mcg, 1,000 mcg, Intramuscular, Q30 Days, Cipriano Sol MD    cyanocobalamin injection 100 mcg, 100 mcg, Intramuscular, Q30 Days, Cipriano Sol MD, 100 mcg at 06/22/21 1034          Review of Systems   Constitutional: Negative for decreased appetite, diaphoresis, fever, malaise/fatigue, weight gain and weight loss.   HENT: Negative for congestion, ear discharge, ear pain and nosebleeds.    Eyes: Negative for blurred vision, double vision and visual disturbance.   Cardiovascular: Positive for dyspnea on exertion and leg swelling. Negative for chest pain, claudication, cyanosis, irregular heartbeat, near-syncope, orthopnea, palpitations, paroxysmal nocturnal  "dyspnea and syncope.   Respiratory: Negative for cough, hemoptysis, shortness of breath, sleep disturbances due to breathing, snoring, sputum production and wheezing.    Endocrine: Negative for polydipsia, polyphagia and polyuria.   Hematologic/Lymphatic: Negative for adenopathy and bleeding problem. Does not bruise/bleed easily.   Skin: Negative for color change, nail changes, poor wound healing and rash.   Musculoskeletal: Negative for muscle cramps and muscle weakness.   Gastrointestinal: Negative for abdominal pain, anorexia, change in bowel habit, hematochezia, nausea and vomiting.   Genitourinary: Negative for dysuria, frequency and hematuria.   Neurological: Negative for brief paralysis, difficulty with concentration, excessive daytime sleepiness, dizziness, focal weakness, headaches, light-headedness, seizures, vertigo and weakness.   Psychiatric/Behavioral: Negative for altered mental status and depression.   Allergic/Immunologic: Negative for persistent infections.        Objective:BP (!) 155/66 (BP Location: Left arm, Patient Position: Sitting, BP Method: Medium (Automatic))   Pulse 61   Ht 5' 10" (1.778 m)   Wt 106.1 kg (233 lb 14.5 oz)   SpO2 99%   BMI 33.56 kg/m²             Physical Exam  Vitals reviewed.   Constitutional:       Appearance: He is obese.   Cardiovascular:      Rate and Rhythm: Bradycardia present.      Pulses:           Carotid pulses are 2+ on the right side and 2+ on the left side.       Dorsalis pedis pulses are 2+ on the right side and 2+ on the left side.      Heart sounds: Murmur heard.    Blowing midsystolic murmur is present with a grade of 2/6 at the upper right sternal border and upper left sternal border.       Comments: Plus 1-2 dependent edema  Neurological:      Mental Status: He is alert.           Assessment:       1. MATTHEWS (dyspnea on exertion)    2. Coronary artery disease involving native coronary artery of native heart without angina pectoris    3. AV block, " Radha 1    4. 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 GINGER on CPAP    6. Iron deficiency anemia, unspecified iron deficiency anemia type    7. Stage 3b chronic kidney disease    8. Essential hypertension    9. Nonrheumatic aortic valve stenosis    10. Obesity, diabetes, and hypertension syndrome    11. Dependent edema         Plan:       Kevon was seen today for follow-up and shortness of breath.    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

## 2022-02-16 NOTE — PROGRESS NOTES
Subjective:      Patient ID: Kevon Perez is a 81 y.o. male.    Chief Complaint:  Follow-up    History of Present Illness  Kevon Perez is a 81 y.o. male with type 2 diabetes who is here for follow up. Previous patient of Dr. Villa. Last visit in Feb 2021    Recently started on diuretic per his cardiologist. States he lost about 8 pounds since starting.     After his last visit, we checked CGMS and continued current doses as BGs were variable.     With regards to the diabetes:    Diagnosed with Type 2 DM around 1990's  Family History of Type 2 DM: father and paternal aunts and uncles; sister  Known complications:  DKA/HHS: denies  RN: +; note reviewed 12/2021  PN: +; tolerable sees podiatry ; due in April   Nephropathy: +; sees nephrology- saw in Oct 2021  Gastroparesis: denies   CAD: MI in 1990 and 2009     Current regimen:  Tresiba 24 units in the morning (noticed lower blood sugars and reduced from 30 units daily himself)  Novolog 9 units if he has breakfast but skipped if not eating  Novolog 12 units before meals + SSI isf 25/150  Skips novolog if BG is <110    He is rotating injection sites and changing needle every time. He is giving novolog 10 minutes before a meal.     Missed doses-rare but related to sleeping in     Other medications tried:  Metformin   Levemir     4 # times a day testing  Log reviewed: yes as below  See media tab    Hypoglycemic event-- lowest of 80's on his log. Denies <70  Yes, did not need assistance   Knows how to correct with 15 grams of carbs- juice, coke, or a peppermint.     Dietary recall:  Eats 2-3 meals a day.   Breakfast: toast or eggs with milk  Lunch: sandwich with chips occasionally or sometimes skipped  Dinner: sandwiches, red beans, casserole, spaghetti  Snacks: candy bars    Drinks: water and milk, diet coke    Exercise - Was using elliptical machine that he uses occasionally but having more sciatica issues lately. He is going to see ortho tomorrow.      Education - last visit: none; [facility]    Has a Medic alert tag-none   Glucagon/Baqsimi- lives alone    Diabetes Management Status  Statin: Taking  ACE/ARB: Taking    Lab Results   Component Value Date    HGBA1C 6.4 (H) 12/28/2021    HGBA1C 6.6 (H) 10/18/2021    HGBA1C 6.4 (H) 06/22/2021     Screening or Prevention Patient's value Goal Complete/Controlled?   HgA1C Testing and Control   Lab Results   Component Value Date    HGBA1C 6.4 (H) 12/28/2021      Annually/Less than 8% Yes   Lipid profile : 12/28/2021 Annually Yes   LDL control Lab Results   Component Value Date    LDLCALC 50.6 (L) 12/28/2021    Annually/Less than 100 mg/dl  Yes   Nephropathy screening Lab Results   Component Value Date    LABMICR 254.0 08/30/2018     Lab Results   Component Value Date    PROTEINUA 2+ (A) 10/29/2021    Annually No   Blood pressure BP Readings from Last 1 Encounters:   02/17/22 120/82    Less than 140/90 No   Dilated retinal exam : 12/21/2021 Annually Yes   Foot exam   : 04/21/2021 Annually No     With regards to abnormal thyroid function tests,     Lab Results   Component Value Date    TSH 6.099 (H) 12/28/2021    FREET4 0.85 12/28/2021     Denies symptoms at this time    With regards to dyslipidemia,     He is on atorvastatin 20 mg daily    LDL controlled    Review of Systems   Constitutional: Positive for fatigue. Negative for unexpected weight change.   Eyes: Negative for visual disturbance.   Respiratory: Positive for shortness of breath (on exertion ).    Endocrine: Negative for polydipsia, polyphagia and polyuria.     Objective:   Physical Exam  Constitutional:       Appearance: Normal appearance.   Pulmonary:      Effort: Pulmonary effort is normal.   Abdominal:      Palpations: Abdomen is soft.   Neurological:      Mental Status: He is alert.     injection sites are without edema or erythema. No lipo hypertropthy or atrophy  Diabetes Foot Exam:   Denies sores or lesions to bilateral feet  Shoes  "appropriate  DM foot exam deferred; done by podiatry in April 2021    Vitals:    02/17/22 0849   BP: 120/82   Pulse: 70   SpO2: 98%   Weight: 100 kg (220 lb 5.6 oz)   Height: 5' 10" (1.778 m)       BP Readings from Last 3 Encounters:   02/17/22 120/82   02/09/22 (!) 155/66   12/28/21 130/82     Wt Readings from Last 1 Encounters:   02/17/22 0849 100 kg (220 lb 5.6 oz)     Body mass index is 31.62 kg/m².    Lab Review:   Lab Results   Component Value Date    HGBA1C 6.4 (H) 12/28/2021     Lab Results   Component Value Date    CHOL 98 (L) 12/28/2021    HDL 35 (L) 12/28/2021    LDLCALC 50.6 (L) 12/28/2021    TRIG 62 12/28/2021    CHOLHDL 35.7 12/28/2021     Lab Results   Component Value Date     12/28/2021    K 4.9 12/28/2021     (H) 12/28/2021    CO2 23 12/28/2021     12/28/2021    BUN 38 (H) 12/28/2021    CREATININE 1.7 (H) 12/28/2021    CALCIUM 9.6 12/28/2021    PROT 6.5 12/28/2021    ALBUMIN 3.6 12/28/2021    BILITOT 1.3 (H) 12/28/2021    ALKPHOS 35 (L) 12/28/2021    AST 15 12/28/2021    ALT 17 12/28/2021    ANIONGAP 7 (L) 12/28/2021    ESTGFRAFRICA 42.5 (A) 12/28/2021    EGFRNONAA 36.7 (A) 12/28/2021    TSH 6.099 (H) 12/28/2021         Assessment and Plan     1. Type 2 diabetes mellitus with stage 3b chronic kidney disease, without long-term current use of insulin  Hemoglobin A1C    Comprehensive Metabolic Panel   2. Hyperlipidemia associated with type 2 diabetes mellitus     3. Hypertension associated with diabetes     4. CKD stage 4 due to type 2 diabetes mellitus     5. GINGER on CPAP     6. Abnormal thyroid function test       Type 2 diabetes mellitus with diabetic chronic kidney disease  Complicated by CKD and high risk for hypoglycemia   -- Reviewed goals of therapy are to get the best control we can without hypoglycemia  Does not wish to see diabetes education    Medication changes:     Discussed A1c is at goal but concern for overnight hypoglycemia as he is waking up in the 80s. Will lower " Tresiba as below  Decrease Tresiba to 22 units daily   Decrease Novolog 7 units with breakfast; 10 units with lunch and dinner + sliding scale below    With using correction scale:  MDD of:   150-200: +1 unit  201-250: +2 units  251-300: +3 units  301-350: +4 units  >350: +5 units    Discussed dexcom and he is not interested. Discussed CGMS and he not interested at this time    -- Reviewed patient's current insulin regimen. Clarified proper insulin dose and timing in relation to meals, etc. Insulin injection sites and proper rotation instructed.    -- Advised frequent self blood glucose monitoring.  Patient encouraged to document glucose results and bring them to every clinic visit    -- Hypoglycemia precautions discussed. Instructed on precautions before driving.    -- Call for Bg repeatedly < 90 or > 180.   -- Close adherence to lifestyle changes recommended.   -- Periodic follow ups for eye evaluations, foot care and dental care suggested.      Hyperlipidemia associated with type 2 diabetes mellitus  Controlled   On statin per ADA recommendations      Hypertension associated with diabetes  -- on ARB  -- Controlled  -- Blood pressure goals discussed with patient      CKD stage 4 due to type 2 diabetes mellitus  -- Sees nephrology     GINGER on CPAP  -- Compliant with CPAP    Abnormal thyroid function test  Denies symptoms  Stable   Will watch levels and consider medication if above 10      Follow up in about 6 months (around 8/17/2022).  Labs in one month and on RTC

## 2022-02-16 NOTE — TELEPHONE ENCOUNTER
No new care gaps identified.  Powered by [employer]. Reference number: [id number].   2/16/2022 5:41:51 AM CST

## 2022-02-17 PROBLEM — R94.6 ABNORMAL THYROID FUNCTION TEST: Status: ACTIVE | Noted: 2022-01-01

## 2022-02-17 NOTE — ASSESSMENT & PLAN NOTE
Complicated by CKD and high risk for hypoglycemia   -- Reviewed goals of therapy are to get the best control we can without hypoglycemia  Does not wish to see diabetes education    Medication changes:     Discussed A1c is at goal but concern for overnight hypoglycemia as he is waking up in the 80s. Will lower Tresiba as below  Decrease Tresiba to 22 units daily   Decrease Novolog 7 units with breakfast; 10 units with lunch and dinner + sliding scale below    With using correction scale:  MDD of:   150-200: +1 unit  201-250: +2 units  251-300: +3 units  301-350: +4 units  >350: +5 units    Discussed dexcom and he is not interested. Discussed CGMS and he not interested at this time    -- Reviewed patient's current insulin regimen. Clarified proper insulin dose and timing in relation to meals, etc. Insulin injection sites and proper rotation instructed.    -- Advised frequent self blood glucose monitoring.  Patient encouraged to document glucose results and bring them to every clinic visit    -- Hypoglycemia precautions discussed. Instructed on precautions before driving.    -- Call for Bg repeatedly < 90 or > 180.   -- Close adherence to lifestyle changes recommended.   -- Periodic follow ups for eye evaluations, foot care and dental care suggested.

## 2022-02-17 NOTE — PATIENT INSTRUCTIONS
Abnormal thyroid function tests   Stable    No need for medication at this time      Cholesterol    At goal    Continue atorvastatin 20 mg daily    Diabetes   Discussed A1c is at goal but concern for overnight hypoglycemia as he is waking up in the 80s. Will lower Tresiba as below  Decrease Tresiba to 22 units daily   Decrease Novolog 7 units with breakfast; 10 units with lunch and dinner + sliding scale below    With using correction scale:  MDD of:   150-200: +1 unit  201-250: +2 units  251-300: +3 units  301-350: +4 units  >350: +5 units

## 2022-02-17 NOTE — PROGRESS NOTES
Subjective:      Patient ID: Kevon Perez is a 81 y.o. male.    Chief Complaint: Low-back Pain (3-4 months,)    Mr Perez is an 80 yo male sent in consultation by Dr. Teixeira for evaluation of back pain.  The back pain has been for 4-5 months.  He has had back pain in the past.  He has had episodes off and on for 20 years.  The pain is in the middle of the lower back and moves to the left.  He will have occasional shooting pain is down the back of the left leg.  The back ache is constant.  The pain is worse with getting up and down from chair, going up and down stairs, up is worse.  He does not feel like the pain increase with sitting.  The pain down the leg will happen sometimes when first starts to walk, but not every time he starts to walk.  The pain is down the back of the leg to just below the knee.  The pain is 1/10 now, worst 5/10 going up stairs, best 1/10 sitting.  He does not take any med.  He did PT 2014, no chiropractor, no injections, no surgery.       2 PT visits in 2014    X-ray lumbar  2014  Very minimal scoliosis convex to the right in the lumbar region seen, but there is no significant alignment abnormality.  Vertebral body heights are normally maintained, without compression deformity at any level.  There is prominent disc narrowing at   L2-3, with the other lumbar discs being better preserved in height.  Marginal vertebral endplate spurring is seen at several thoracolumbar levels, most prominent at L2-3.  Vertebral endplates are well defined.  No osseous destructive process.  Extensive   aortoiliac calcification is seen, without definable aneurysm.  Impression   Degenerative changes as noted above, particularly at the L2-3 level.     Past Medical History:  9/10/09: Acute coronary syndrome      Comment:  STEMI  No date: Anticoagulant long-term use      Comment:  plavix  No date: Basal cell cancer  No date: BCC (basal cell carcinoma of skin)      Comment:  nose  January 2013: Cancer of  bladder  No date: Cataract  No date: Chronic kidney disease  No date: Colon polyp  No date: Coronary artery disease  No date: CPAP (continuous positive airway pressure) dependence  No date: Diabetes mellitus  No date: Diabetic retinopathy  No date: Encounter for blood transfusion  No date: GERD (gastroesophageal reflux disease)  No date: High cholesterol  No date: Hyperlipidemia  No date: Hypertension  5/11/2018: Iron deficiency anemia  No date: GINGER (obstructive sleep apnea)      Comment:  CPAP   No date: Renal manifestation of secondary diabetes mellitus  No date: SOB (shortness of breath)    Past Surgical History:  No date: BASAL CELL CARCINOMA EXCISION      Comment:  nose   No date: BLADDER SURGERY      Comment:  bladder cancer  No date: CARDIAC CATHETERIZATION  No date: CATARACT EXTRACTION      Comment:  bilateral   3/26/15: COLONOSCOPY  12/4/2020: COLONOSCOPY; N/A      Comment:  Procedure: COLONOSCOPY;  Surgeon: Keshav Rajan MD;  Location: [facility] ([address] [place] [address]);  Service: Endoscopy;                Laterality: N/A;  covid test 12/1-pcw-tbpt SOB x 1                year-ok to hold Plavix x 5 days per Dr. Sol-see                telephone encounter dated 8/2510/14-instructions                emailed to [email or web address]  9/10/09: CORONARY ANGIOPLASTY      Comment:  CFX  No date: CORONARY ANGIOPLASTY WITH STENT PLACEMENT  No date: CYSTOSCOPY  1/27/2021: ESOPHAGOGASTRODUODENOSCOPY; N/A      Comment:  Procedure: EGD (ESOPHAGOGASTRODUODENOSCOPY);  Surgeon:                Matt Acosta MD;  Location: [facility];  Service:                Endoscopy;  Laterality: N/A;  No date: EYE SURGERY  No date: hydrocel    Review of patient's family history indicates:  Problem: Hypertension      Relation: Father          Age of Onset: (Not Specified)  Problem: Heart disease      Relation: Father          Age of Onset: (Not Specified)          Comment: CHF  Problem: Diabetes      Relation: Father           Age of Onset: (Not Specified)  Problem: Diabetes      Relation: Sister          Age of Onset: (Not Specified)  Problem: Cataracts      Relation: Mother          Age of Onset: (Not Specified)  Problem: Goiter      Relation: Mother          Age of Onset: (Not Specified)  Problem: Heart disease      Relation: Mother          Age of Onset: (Not Specified)          Comment: CHF  Problem: Diabetes      Relation: Paternal Uncle          Age of Onset: (Not Specified)  Problem: Alcohol abuse      Relation: Brother          Age of Onset: (Not Specified)  Problem: No Known Problems      Relation: Daughter          Age of Onset: (Not Specified)  Problem: No Known Problems      Relation: Son          Age of Onset: (Not Specified)  Problem: No Known Problems      Relation: Son          Age of Onset: (Not Specified)  Problem: Cancer      Relation: Maternal Aunt          Age of Onset: (Not Specified)          Comment: colon  Problem: Kidney disease      Relation: Neg Hx          Age of Onset: (Not Specified)  Problem: Amblyopia      Relation: Neg Hx          Age of Onset: (Not Specified)  Problem: Blindness      Relation: Neg Hx          Age of Onset: (Not Specified)  Problem: Glaucoma      Relation: Neg Hx          Age of Onset: (Not Specified)  Problem: Macular degeneration      Relation: Neg Hx          Age of Onset: (Not Specified)  Problem: Retinal detachment      Relation: Neg Hx          Age of Onset: (Not Specified)  Problem: Strabismus      Relation: Neg Hx          Age of Onset: (Not Specified)  Problem: Stroke      Relation: Neg Hx          Age of Onset: (Not Specified)  Problem: Thyroid disease      Relation: Neg Hx          Age of Onset: (Not Specified)      Social History    Socioeconomic History      Marital status:     Occupational History      Occupation: retired    Tobacco Use      Smoking status: Former Smoker        Packs/day: 1.00        Years: 40.00        Pack years: 40        Types: Cigarettes         "Quit date: 1970        Years since quittin.6      Smokeless tobacco: Former User    Substance and Sexual Activity      Alcohol use: Yes        Alcohol/week: 3.0 standard drinks        Types: 1 Cans of beer, 1 Shots of liquor, 1 Standard drinks or equivalent per week      Drug use: No      Sexual activity: Not Currently    Social Determinants of Health  Financial Resource Strain: Low Risk       Difficulty of Paying Living Expenses: Not hard at all  Food Insecurity: No Food Insecurity      Worried About Running Out of Food in the Last Year: Never true      Ran Out of Food in the Last Year: Never true  Transportation Needs: No Transportation Needs      Lack of Transportation (Medical): No      Lack of Transportation (Non-Medical): No  Physical Activity: Unknown      Days of Exercise per Week: 0 days  Stress: No Stress Concern Present      Feeling of Stress : Not at all  Social Connections: Unknown      Frequency of Communication with Friends and Family: Three times a week      Frequency of Social Gatherings with Friends and Family: Once a week      Active Member of Clubs or Organizations: Yes      Attends Club or Organization Meetings: More than 4 times per year      Marital Status:   Housing Stability: Low Risk       Unable to Pay for Housing in the Last Year: No      Number of Places Lived in the Last Year: 1      Unstable Housing in the Last Year: No    Current Outpatient Medications:  amLODIPine (NORVASC) 5 MG tablet, TAKE 1 TABLET(5 MG) BY MOUTH EVERY DAY, Disp: 90 tablet, Rfl: 1  atorvastatin (LIPITOR) 20 MG tablet, TAKE 1 TABLET(20 MG) BY MOUTH EVERY DAY, Disp: 90 tablet, Rfl: 1  BD ULTRA-FINE SHORT PEN NEEDLE 31 gauge x 5/16" Ndle, USE UP TO 6 TIMES DAILY, Disp: 200 each, Rfl: 11  blood sugar diagnostic (TRUE METRIX GLUCOSE TEST STRIP) Strp, USE TO CHECK BLOOD SUGAR FOUR TIMES DAILY, Disp: 300 strip, Rfl: 11  blood-glucose meter kit, To check BG 4 times daily, to use with insurance preferred " meter, Disp: 1 each, Rfl: 0  calcitRIOL (ROCALTROL) 0.25 MCG Cap, TAKE 1 CAPSULE BY MOUTH EVERY DAY, Disp: 30 capsule, Rfl: 11  carvediloL (COREG) 12.5 MG tablet, TAKE 1 TABLET BY MOUTH TWICE DAILY, Disp: 180 tablet, Rfl: 3  cholestyramine (QUESTRAN) 4 gram packet, Take 1 packet (4 g total) by mouth 2 (two) times daily as needed (for loose stool)., Disp: 60 packet, Rfl: 2  clopidogreL (PLAVIX) 75 mg tablet, TAKE 1 TABLET(75 MG) BY MOUTH EVERY DAY, Disp: 90 tablet, Rfl: 1  finasteride (PROSCAR) 5 mg tablet, Take 1 tablet (5 mg total) by mouth once daily., Disp: 90 tablet, Rfl: 3  fish oil-omega-3 fatty acids 300-1,000 mg capsule, Take by mouth once daily., Disp: , Rfl:   fluticasone propionate (FLONASE) 50 mcg/actuation nasal spray, 2 sprays (100 mcg total) by Each Nostril route once daily., Disp: 16 g, Rfl: 1  folic acid (FOLVITE) 1 MG tablet, Take 1 mg by mouth once daily., Disp: , Rfl:   hydroCHLOROthiazide (HYDRODIURIL) 12.5 MG Tab, TAKE 1 TABLET(12.5 MG) BY MOUTH EVERY DAY, Disp: 30 tablet, Rfl: 11  irbesartan (AVAPRO) 300 MG tablet, TAKE 1 TABLET(300 MG) BY MOUTH EVERY EVENING, Disp: 90 tablet, Rfl: 3  nitroGLYCERIN (NITROSTAT) 0.4 MG SL tablet, TAKE 1 TABLET UNDER THE TONGUE EVERY 5 MINUTES AS NEEDED, Disp: 25 tablet, Rfl: 3  NOVOLOG FLEXPEN U-100 INSULIN 100 unit/mL (3 mL) InPn pen, INJECT 15 UNITS UNDER THE SKIN FOUR TIMES DAILY, BEFORE MEALS PLUS CORRECTION SCALE, MAX TOTAL DAILY DOSE OF 75 UNITS, Disp: 90 mL, Rfl: 8  psyllium (METAMUCIL) powder, Take 1 packet by mouth., Disp: , Rfl:   tamsulosin (FLOMAX) 0.4 mg Cap, TAKE 1 CAPSULE(0.4 MG) BY MOUTH EVERY EVENING, Disp: 90 capsule, Rfl: 3  torsemide (DEMADEX) 10 MG Tab, Take 2 tablets (20 mg total) by mouth once daily., Disp: 60 tablet, Rfl: 11  TRUEPLUS LANCETS 30 gauge Misc, USE TO CHECK BLOOD SUGAR FOUR TIMES DAILY, Disp: 300 each, Rfl: 3  vitamin D (VITAMIN D3) 1000 units Tab, Take 1,000 Units by mouth once daily., Disp: , Rfl:     Current  Facility-Administered Medications:  cyanocobalamin injection 1,000 mcg, 1,000 mcg, Intramuscular, Q30 Days, Cipriano Sol MD  cyanocobalamin injection 100 mcg, 100 mcg, Intramuscular, Q30 Days, Cipriano Sol MD, 100 mcg at 06/22/21 1034        Review of patient's allergies indicates:   -- Penicillins -- Other (See Comments)    --  Muscle stiffness   -- Bactrim (sulfamethoxazole-trimethoprim) -- Rash        Review of Systems   Constitutional: Negative for weight gain and weight loss.   Cardiovascular: Negative for chest pain.   Respiratory: Positive for shortness of breath.    Musculoskeletal: Positive for back pain. Negative for joint pain and joint swelling.   Gastrointestinal: Negative for abdominal pain, bowel incontinence, nausea and vomiting.   Genitourinary: Negative for bladder incontinence.   Neurological: Positive for paresthesias (PN in feet). Negative for numbness.         Objective:        General: Kevon is well-developed, well-nourished, appears stated age, in no acute distress, alert and oriented to time, place and person.     General    Vitals reviewed.  Constitutional: He is oriented to person, place, and time. He appears well-developed and well-nourished.   HENT:   Head: Normocephalic and atraumatic.   Pulmonary/Chest: Effort normal.   Neurological: He is alert and oriented to person, place, and time.   Psychiatric: He has a normal mood and affect. His behavior is normal. Judgment and thought content normal.     General Musculoskeletal Exam   Gait: normal     Right Ankle/Foot Exam     Tests   Heel Walk: unable to perform  Tiptoe Walk: unable to perform    Left Ankle/Foot Exam     Tests   Heel Walk: unable to perform  Tiptoe Walk: unable to perform  Back (L-Spine & T-Spine) / Neck (C-Spine) Exam     Tenderness Left paramedian tenderness of the Lower L-Spine.     Back (L-Spine & T-Spine) Range of Motion   Extension: 10   Flexion: 70   Lateral bend right: 10 (with back pain)   Lateral bend  left: 10 (with back pain)   Rotation right: 30   Rotation left: 30     Spinal Sensation   Right Side Sensation  C-Spine Level: normal   L-Spine Level: normal  S-Spine Level: normal  Left Side Sensation  C-Spine Level: normal  L-Spine Level: normal  S-Spine Level: normal    Back (L-Spine & T-Spine) Tests   Right Side Tests  Straight leg raise:      Sitting SLR: > 70 degrees      Left Side Tests  Straight leg raise:     Sitting SLR: > 70 degrees          Other He has no scoliosis .  Spinal Kyphosis:  Absent      Muscle Strength   Right Upper Extremity   Biceps: 5/5   Deltoid:  5/5  Triceps:  5/5  Wrist extension: 5/5   Finger Flexors:  5/5  Left Upper Extremity  Biceps: 5/5   Deltoid:  5/5  Triceps:  5/5  Wrist extension: 5/5   Finger Flexors:  5/5  Right Lower Extremity   Hip Flexion: 5/5   Quadriceps:  5/5   Anterior tibial:  5/5   EHL:  5/5  Left Lower Extremity   Hip Flexion: 5/5   Quadriceps:  5/5   Anterior tibial:  5/5   EHL:  5/5    Reflexes     Left Side  Biceps:  2+  Triceps:  2+  Brachioradialis:  2+  Achilles:  2+  Left Martin's Sign:  Absent  Babinski Sign:  absent  Quadriceps:  2+    Right Side   Biceps:  2+  Triceps:  2+  Brachioradialis:  2+  Achilles:  2+  Right Martin's Sign:  absent  Babinski Sign:  absent  Quadriceps:  2+    Vascular Exam     Right Pulses        Carotid:                  2+    Left Pulses        Carotid:                  2+              Assessment:       1. Spondylosis of lumbar region without myelopathy or radiculopathy    2. Piriformis syndrome of left side    3. Chronic left-sided low back pain without sciatica           Plan:       Orders Placed This Encounter    Ambulatory referral/consult to Physical/Occupational Therapy     1. We discussed back pain and the nature of back pain.  We discussed that it is not one thing that causes the pain but an accumulation of multiple things that we do.  He feels more of a discomfort than a pain.  He did well after PT in 2014  2. We  discussed posture sitting and the importance of trying to sit better.  We discussed sitting too much with pandemic.  He does feel like he has been less active.  We discussed taking standing breaks regularly throughout the day.  We also discussed keeping normal curve in lower back while sitting.    3. We discussed removing wallet out of left back pocket  4. We discussed the benefits of therapy and exercise and continuing to move.  He is interested in PT.  We also discussed regular walks and trying for a goal of 30 min 3 days a week.  We discussed the importance of exercise as we age.  He is not interested in coming to [facility] for healthy back   5. PT for back and core strengthening and si joint mobilization and piriformis stretches and extension exercises and HEP at [place]  6. Tylenol 2 pills twice a day as needed  7. We discussed repeat x-ray but he would like to wait  8. RTC 3 months or sooner if needed      More than 50% of the total time  of 45 minutes was spent face to face in counseling on diagnosis and treatment options. I also counseled patient  on common and most usual side effect of prescribed medications.  I reviewed Primary care , and other specialty's notes to better coordinate patient's care. All questions were answered, and patient voiced understanding.     A consultation note will be sent to Dr. Teixeira through epic.  Thanks for the consult    Follow-up: Follow up in about 3 months (around 5/18/2022). If there are any questions prior to this, the patient was instructed to contact the office.

## 2022-03-07 NOTE — PROGRESS NOTES
Subjective:    Patient ID:  Kevon Perez is a 81 y.o. male who presents for follow-up of CAD MATTHEWS    HPI     The patient is a 81 year old male followed with CAD post STEMI/PCI 2009, mild aortic stenosis, hypertension, hyperlipidemia,GINGER, type 2 diabetes and CRI 3b. EKG 12/28/21 revealed Mobitz 1.He has had mild orthostatic light headedness but no light headedness sitting or lying. EKG today again reveals Mobitz 1 AV block. He has been on a diet and has lost 13# since last visit.  Lab Results   Component Value Date     12/28/2021    K 4.9 12/28/2021     (H) 12/28/2021    CO2 23 12/28/2021    BUN 38 (H) 12/28/2021    CREATININE 1.7 (H) 12/28/2021     12/28/2021    HGBA1C 6.4 (H) 12/28/2021    MG 1.6 12/28/2021    AST 15 12/28/2021    ALT 17 12/28/2021    ALBUMIN 3.6 12/28/2021    PROT 6.5 12/28/2021    BILITOT 1.3 (H) 12/28/2021    WBC 6.85 12/28/2021    HGB 11.7 (L) 12/28/2021    HCT 37.7 (L) 12/28/2021    MCV 93 12/28/2021     (L) 12/28/2021    INR 1.0 01/09/2015    PSA 1.42 06/13/2013    TSH 6.099 (H) 12/28/2021         Lab Results   Component Value Date    CHOL 98 (L) 12/28/2021    HDL 35 (L) 12/28/2021    TRIG 62 12/28/2021       Lab Results   Component Value Date    LDLCALC 50.6 (L) 12/28/2021       Past Medical History:   Diagnosis Date    Acute coronary syndrome 9/10/09    STEMI    Anticoagulant long-term use     plavix    Basal cell cancer     BCC (basal cell carcinoma of skin)     nose    Cancer of bladder January 2013    Cataract     Chronic kidney disease     Colon polyp     Coronary artery disease     CPAP (continuous positive airway pressure) dependence     Diabetes mellitus     Diabetic retinopathy     Encounter for blood transfusion     GERD (gastroesophageal reflux disease)     High cholesterol     Hyperlipidemia     Hypertension     Iron deficiency anemia 5/11/2018    GINGER (obstructive sleep apnea)     CPAP     Renal manifestation of secondary  "diabetes mellitus     SOB (shortness of breath)        Current Outpatient Medications:     amLODIPine (NORVASC) 5 MG tablet, TAKE 1 TABLET(5 MG) BY MOUTH EVERY DAY, Disp: 90 tablet, Rfl: 1    atorvastatin (LIPITOR) 20 MG tablet, TAKE 1 TABLET(20 MG) BY MOUTH EVERY DAY, Disp: 90 tablet, Rfl: 1    BD ULTRA-FINE SHORT PEN NEEDLE 31 gauge x 5/16" Ndle, USE UP TO 6 TIMES DAILY, Disp: 200 each, Rfl: 11    blood sugar diagnostic (TRUE METRIX GLUCOSE TEST STRIP) Strp, USE TO CHECK BLOOD SUGAR FOUR TIMES DAILY, Disp: 300 strip, Rfl: 11    calcitRIOL (ROCALTROL) 0.25 MCG Cap, TAKE 1 CAPSULE BY MOUTH EVERY DAY, Disp: 30 capsule, Rfl: 11    carvediloL (COREG) 12.5 MG tablet, TAKE 1 TABLET BY MOUTH TWICE DAILY, Disp: 180 tablet, Rfl: 3    cholestyramine (QUESTRAN) 4 gram packet, Take 1 packet (4 g total) by mouth 2 (two) times daily as needed (for loose stool)., Disp: 60 packet, Rfl: 2    clopidogreL (PLAVIX) 75 mg tablet, TAKE 1 TABLET(75 MG) BY MOUTH EVERY DAY, Disp: 90 tablet, Rfl: 1    finasteride (PROSCAR) 5 mg tablet, Take 1 tablet (5 mg total) by mouth once daily., Disp: 90 tablet, Rfl: 3    fish oil-omega-3 fatty acids 300-1,000 mg capsule, Take by mouth once daily., Disp: , Rfl:     fluticasone propionate (FLONASE) 50 mcg/actuation nasal spray, 2 sprays (100 mcg total) by Each Nostril route once daily., Disp: 16 g, Rfl: 1    folic acid (FOLVITE) 1 MG tablet, Take 1 mg by mouth once daily., Disp: , Rfl:     hydroCHLOROthiazide (HYDRODIURIL) 12.5 MG Tab, TAKE 1 TABLET(12.5 MG) BY MOUTH EVERY DAY, Disp: 30 tablet, Rfl: 11    irbesartan (AVAPRO) 300 MG tablet, TAKE 1 TABLET(300 MG) BY MOUTH EVERY EVENING, Disp: 90 tablet, Rfl: 3    nitroGLYCERIN (NITROSTAT) 0.4 MG SL tablet, TAKE 1 TABLET UNDER THE TONGUE EVERY 5 MINUTES AS NEEDED, Disp: 25 tablet, Rfl: 3    NOVOLOG FLEXPEN U-100 INSULIN 100 unit/mL (3 mL) InPn pen, INJECT 15 UNITS UNDER THE SKIN FOUR TIMES DAILY, BEFORE MEALS PLUS CORRECTION SCALE, MAX " TOTAL DAILY DOSE OF 75 UNITS, Disp: 90 mL, Rfl: 8    psyllium (METAMUCIL) powder, Take 1 packet by mouth., Disp: , Rfl:     tamsulosin (FLOMAX) 0.4 mg Cap, TAKE 1 CAPSULE(0.4 MG) BY MOUTH EVERY EVENING, Disp: 90 capsule, Rfl: 3    torsemide (DEMADEX) 10 MG Tab, Take 2 tablets (20 mg total) by mouth once daily., Disp: 60 tablet, Rfl: 11    TRUEPLUS LANCETS 30 gauge Misc, USE TO CHECK BLOOD SUGAR FOUR TIMES DAILY, Disp: 300 each, Rfl: 3    vitamin D (VITAMIN D3) 1000 units Tab, Take 1,000 Units by mouth once daily., Disp: , Rfl:     blood-glucose meter kit, To check BG 4 times daily, to use with insurance preferred meter, Disp: 1 each, Rfl: 0    Current Facility-Administered Medications:     cyanocobalamin injection 1,000 mcg, 1,000 mcg, Intramuscular, Q30 Days, Cipriano Sol MD    cyanocobalamin injection 100 mcg, 100 mcg, Intramuscular, Q30 Days, Cipriano Sol MD, 100 mcg at 06/22/21 1034          Review of Systems   Constitutional: Positive for weight loss (13#). Negative for decreased appetite, diaphoresis, fever, malaise/fatigue and weight gain.   HENT: Negative for congestion, ear discharge, ear pain and nosebleeds.    Eyes: Negative for blurred vision, double vision and visual disturbance.   Cardiovascular: Negative for chest pain, claudication, cyanosis, irregular heartbeat, leg swelling, near-syncope, orthopnea, palpitations, paroxysmal nocturnal dyspnea and syncope. Dyspnea on exertion: unchanged.   Respiratory: Negative for cough, hemoptysis, shortness of breath, sleep disturbances due to breathing, snoring, sputum production and wheezing.    Endocrine: Negative for polydipsia, polyphagia and polyuria.   Hematologic/Lymphatic: Negative for adenopathy and bleeding problem. Does not bruise/bleed easily.   Skin: Negative for color change, nail changes, poor wound healing and rash.   Musculoskeletal: Negative for muscle cramps and muscle weakness.   Gastrointestinal: Negative for abdominal pain,  "anorexia, change in bowel habit, hematochezia, nausea and vomiting.   Genitourinary: Negative for dysuria, frequency and hematuria.   Neurological: Negative for brief paralysis, difficulty with concentration, excessive daytime sleepiness, dizziness, focal weakness, headaches, light-headedness, seizures, vertigo and weakness.   Psychiatric/Behavioral: Negative for altered mental status and depression.   Allergic/Immunologic: Negative for persistent infections.        Objective:BP (!) 144/64 (BP Location: Left arm, Patient Position: Sitting, BP Method: Medium (Automatic))   Pulse 65   Ht 5' 10.5" (1.791 m)   Wt 100 kg (220 lb 7.4 oz)   SpO2 99%   BMI 31.19 kg/m²             Physical Exam  Constitutional:       Appearance: He is well-developed. He is obese.   HENT:      Head: Normocephalic.      Right Ear: External ear normal.      Left Ear: External ear normal.      Nose: Nose normal.   Eyes:      General: No scleral icterus.     Pupils: Pupils are equal, round, and reactive to light.   Neck:      Thyroid: No thyromegaly.      Vascular: No JVD.      Trachea: No tracheal deviation.   Cardiovascular:      Rate and Rhythm: Normal rate and regular rhythm.      Pulses: Intact distal pulses.           Carotid pulses are 2+ on the right side and 2+ on the left side.       Dorsalis pedis pulses are 2+ on the right side and 2+ on the left side.        Posterior tibial pulses are 2+ on the right side and 2+ on the left side.      Heart sounds: No murmur heard.    No friction rub. No gallop.   Pulmonary:      Effort: Pulmonary effort is normal.      Breath sounds: Normal breath sounds.   Abdominal:      General: Bowel sounds are normal. There is no distension.      Tenderness: There is no abdominal tenderness. There is no guarding.   Musculoskeletal:         General: No tenderness. Normal range of motion.      Cervical back: Normal range of motion and neck supple.   Lymphadenopathy:      Comments: Palpation of neck and " groin lymph nodes normal   Skin:     General: Skin is dry.      Comments: Palpation of skin normal   Neurological:      Mental Status: He is alert and oriented to person, place, and time.      Cranial Nerves: No cranial nerve deficit.      Motor: No abnormal muscle tone.      Coordination: Coordination normal.   Psychiatric:         Behavior: Behavior normal.         Thought Content: Thought content normal.         Judgment: Judgment normal.           Assessment:       1. Light headedness    2. AV block, Mobitz 1    3. Coronary artery disease involving native coronary artery of native heart without angina pectoris    4. Coronary artery disease involving native coronary artery without angina pectoris, unspecified whether native or transplanted heart    5. History of PTCA    6. Stage 3b chronic kidney disease    7. GINGER on CPAP    8. Nonrheumatic aortic valve stenosis    9. Obesity, diabetes, and hypertension syndrome    10. Essential hypertension    11. Iron deficiency anemia, unspecified iron deficiency anemia type    12. MATTHEWS (dyspnea on exertion)    13. Type 2 diabetes mellitus without complication, without long-term current use of insulin         Plan:       Kevon was seen today for matthews (dyspnea on exertion)  and dizziness.    Diagnoses and all orders for this visit:    Light headedness  -     Holter monitor - 48 hour; Future    AV block, Mobitz 1  -     Holter monitor - 48 hour; Future    Coronary artery disease involving native coronary artery of native heart without angina pectoris  -     Lipid Panel; Future; Expected date: 09/06/2022    Coronary artery disease involving native coronary artery without angina pectoris, unspecified whether native or transplanted heart    History of PTCA    Stage 3b chronic kidney disease  -     CBC Auto Differential; Future; Expected date: 09/06/2022  -     Comprehensive Metabolic Panel; Future; Expected date: 09/06/2022    GINGER on CPAP    Nonrheumatic aortic valve  stenosis    Obesity, diabetes, and hypertension syndrome    Essential hypertension  -     Comprehensive Metabolic Panel; Future; Expected date: 09/06/2022    Iron deficiency anemia, unspecified iron deficiency anemia type    MATTHEWS (dyspnea on exertion)    Type 2 diabetes mellitus without complication, without long-term current use of insulin  -     Hemoglobin A1C; Future; Expected date: 09/06/2022

## 2022-03-17 NOTE — TELEPHONE ENCOUNTER
Latest Reference Range & Units 03/16/22 14:21   Sodium 136 - 145 mmol/L 138   Potassium 3.5 - 5.1 mmol/L 4.3   Chloride 95 - 110 mmol/L 109   CO2 23 - 29 mmol/L 20 (L)   Anion Gap 8 - 16 mmol/L 9   BUN 8 - 23 mg/dL 55 (H)   Creatinine 0.5 - 1.4 mg/dL 2.5 (H)   EGFR if non African American >60 mL/min/1.73 m^2 23.0 ! [1]   EGFR if African American >60 mL/min/1.73 m^2 26.6 !   Glucose 70 - 110 mg/dL 225 (H)   Calcium 8.7 - 10.5 mg/dL 9.4   Alkaline Phosphatase 55 - 135 U/L 42 (L)   PROTEIN TOTAL 6.0 - 8.4 g/dL 6.5   Albumin 3.5 - 5.2 g/dL 3.4 (L)   BILIRUBIN TOTAL 0.1 - 1.0 mg/dL 0.6 [2]   AST 10 - 40 U/L 16   ALT 10 - 44 U/L 22   Hemoglobin A1C External 4.0 - 5.6 % 6.5 (H) [3]   Estimated Avg Glucose 68 - 131 mg/dL 140 (H)     - Your liver function was normal   - Your kidney function was decreased, more so than last check. It looks like you should be seeing your kidney doctor-- Dr. Hu around April. Please make an appointment  - Your A1c was normal at 6.5%- are you having any blood sugars less than 70?  - your electrolytes were normal

## 2022-04-01 PROBLEM — I44.30 AV BLOCK: Status: ACTIVE | Noted: 2022-01-01

## 2022-04-01 NOTE — PROGRESS NOTES
Subjective:     HPI    I had the pleasure of seeing Kevon Perez in consultation at your request for the evaluation of AV block. He is an 81M with HTN, HLD, CAD status-post STEMI and PCI in 2009, DM2, CKD3, who was recently seen in cardiology clinic, where an ECG was performed showing 2nd degree AVB (Mobitz I). This prompted a 48 hour Holter monitor, which showed sinus rhythm with periods of high-grade AV block and complete heart block.     An echo in 1/2021 showed an EF of 63% and mild-mod AS.    My interpretation of today's ECG is    Review of Systems   Constitutional: Positive for malaise/fatigue. Negative for decreased appetite, weight gain and weight loss.   HENT: Negative for sore throat.    Eyes: Negative for blurred vision.   Cardiovascular: Positive for dyspnea on exertion. Negative for chest pain, irregular heartbeat, leg swelling, near-syncope, orthopnea, palpitations, paroxysmal nocturnal dyspnea and syncope.   Respiratory: Negative for shortness of breath.    Skin: Negative for rash.   Musculoskeletal: Negative for arthritis.   Gastrointestinal: Negative for abdominal pain.   Neurological: Negative for focal weakness.   Psychiatric/Behavioral: Negative for altered mental status.        Objective:    Physical Exam  Vitals and nursing note reviewed.   Constitutional:       General: He is not in acute distress.     Appearance: He is well-developed.   HENT:      Head: Normocephalic and atraumatic.   Eyes:      General: No scleral icterus.     Pupils: Pupils are equal, round, and reactive to light.   Neck:      Thyroid: No thyromegaly.   Cardiovascular:      Rate and Rhythm: Regular rhythm.      Pulses: Normal pulses.      Heart sounds: Normal heart sounds. No murmur heard.    No friction rub. No gallop.   Pulmonary:      Effort: Pulmonary effort is normal.      Breath sounds: Normal breath sounds.   Abdominal:      General: Bowel sounds are normal. There is no distension.      Palpations: Abdomen is  soft.      Tenderness: There is no abdominal tenderness.   Musculoskeletal:      Cervical back: Neck supple.   Skin:     General: Skin is warm and dry.      Findings: No rash.   Neurological:      Mental Status: He is alert and oriented to person, place, and time.   Psychiatric:         Behavior: Behavior normal.           Assessment:       1. Coronary artery disease involving native coronary artery of native heart without angina pectoris    2. AV block    3. Hyperlipidemia associated with type 2 diabetes mellitus    4. Hypertension associated with diabetes         Plan:       In summary, Kevon Perez is an 81M with periods of high grade and complete heart block on recent Holter. I explained to the patient the risks, benefits, indications, and alternatives to pacemaker implantation. Mr. Perez is reluctant to proceed with pacemaker at this time. Plan is to stop coreg, and see me in 3 months. A Holter will be repeated prior to that visit.    Thank you for allowing me to participate in the care of this patient. Please do not hesitate to call me with any questions or concerns.

## 2022-04-25 NOTE — TELEPHONE ENCOUNTER
Spoke with patient,in reference to having angina, advised to go to emergency room  But he would like to see a Dr scheduled appointment for 04/26/22.

## 2022-04-26 PROBLEM — I20.89 ANGINA OF EFFORT: Status: ACTIVE | Noted: 2022-01-01

## 2022-04-26 PROBLEM — I13.10 BENIGN HYPERTENSIVE HEART AND KIDNEY DISEASE WITH CKD: Status: ACTIVE | Noted: 2022-01-01

## 2022-04-26 PROBLEM — Z12.11 SCREEN FOR COLON CANCER: Status: RESOLVED | Noted: 2021-01-27 | Resolved: 2022-01-01

## 2022-04-26 PROBLEM — I20.0 UNSTABLE ANGINA: Status: ACTIVE | Noted: 2022-01-01

## 2022-04-26 PROBLEM — E87.20 METABOLIC ACIDOSIS, NORMAL ANION GAP (NAG): Status: ACTIVE | Noted: 2022-01-01

## 2022-04-26 NOTE — SUBJECTIVE & OBJECTIVE
Past Medical History:   Diagnosis Date    Acute coronary syndrome 9/10/09    STEMI    Anticoagulant long-term use     plavix    Basal cell cancer     BCC (basal cell carcinoma of skin)     nose    Cancer of bladder January 2013    Cataract     Chronic kidney disease     Colon polyp     Coronary artery disease     CPAP (continuous positive airway pressure) dependence     Diabetes mellitus     Diabetic retinopathy     Encounter for blood transfusion     GERD (gastroesophageal reflux disease)     High cholesterol     Hyperlipidemia     Hypertension     Iron deficiency anemia 5/11/2018    GINGER (obstructive sleep apnea)     CPAP     Renal manifestation of secondary diabetes mellitus     SOB (shortness of breath)        Past Surgical History:   Procedure Laterality Date    BASAL CELL CARCINOMA EXCISION      nose     BLADDER SURGERY      bladder cancer    CARDIAC CATHETERIZATION      CATARACT EXTRACTION      bilateral     COLONOSCOPY  3/26/15    COLONOSCOPY N/A 12/4/2020    Procedure: COLONOSCOPY;  Surgeon: Keshav Rajan MD;  Location: [facility] ([address] [place] [address]);  Service: Endoscopy;  Laterality: N/A;  covid test 12/1-pcw-tb  pt SOB x 1 year-ok to hold Plavix x 5 days per Dr. Sol-see telephone encounter dated 8/25  10/14-instructions emailed to [email or web address]    CORONARY ANGIOPLASTY  9/10/09    CFX    CORONARY ANGIOPLASTY WITH STENT PLACEMENT      CYSTOSCOPY      ESOPHAGOGASTRODUODENOSCOPY N/A 1/27/2021    Procedure: EGD (ESOPHAGOGASTRODUODENOSCOPY);  Surgeon: Matt Acosta MD;  Location: [facility];  Service: Endoscopy;  Laterality: N/A;    EYE SURGERY      hydrocel         Review of patient's allergies indicates:   Allergen Reactions    Penicillins Other (See Comments)     Muscle stiffness    Bactrim [sulfamethoxazole-trimethoprim] Rash       Current Facility-Administered Medications on File Prior to Encounter   Medication    cyanocobalamin injection 1,000 mcg    cyanocobalamin injection 100 mcg     Current  "Outpatient Medications on File Prior to Encounter   Medication Sig    amLODIPine (NORVASC) 5 MG tablet TAKE 1 TABLET(5 MG) BY MOUTH EVERY DAY    ascorbic acid, vitamin C, (VITAMIN C) 1000 MG tablet Take by mouth once daily.    atorvastatin (LIPITOR) 20 MG tablet TAKE 1 TABLET(20 MG) BY MOUTH EVERY DAY    BD ULTRA-FINE SHORT PEN NEEDLE 31 gauge x 5/16" Ndle USE UP TO 6 TIMES DAILY    blood sugar diagnostic (TRUE METRIX GLUCOSE TEST STRIP) Strp USE TO CHECK BLOOD SUGAR FOUR TIMES DAILY    blood-glucose meter kit To check BG 4 times daily, to use with insurance preferred meter    calcitRIOL (ROCALTROL) 0.25 MCG Cap TAKE 1 CAPSULE BY MOUTH EVERY DAY    cholestyramine (QUESTRAN) 4 gram packet Take 1 packet (4 g total) by mouth 2 (two) times daily as needed (for loose stool).    clopidogreL (PLAVIX) 75 mg tablet TAKE 1 TABLET(75 MG) BY MOUTH EVERY DAY    finasteride (PROSCAR) 5 mg tablet Take 1 tablet (5 mg total) by mouth once daily.    fish oil-omega-3 fatty acids 300-1,000 mg capsule Take by mouth once daily.    FLUAD QUAD 2021-22,65Y UP,,PF, 60 mcg (15 mcg x 4)/0.5 mL Syrg     fluticasone propionate (FLONASE) 50 mcg/actuation nasal spray 2 sprays (100 mcg total) by Each Nostril route once daily.    folic acid (FOLVITE) 1 MG tablet Take 1 mg by mouth once daily.    hydroCHLOROthiazide (HYDRODIURIL) 12.5 MG Tab TAKE 1 TABLET(12.5 MG) BY MOUTH EVERY DAY    insulin degludec (TRESIBA FLEXTOUCH U-100) 100 unit/mL (3 mL) insulin pen Inject 22 Units into the skin once daily.    irbesartan (AVAPRO) 300 MG tablet TAKE 1 TABLET(300 MG) BY MOUTH EVERY EVENING    nitroGLYCERIN (NITROSTAT) 0.4 MG SL tablet TAKE 1 TABLET UNDER THE TONGUE EVERY 5 MINUTES AS NEEDED    NOVOLOG FLEXPEN U-100 INSULIN 100 unit/mL (3 mL) InPn pen INJECT 15 UNITS UNDER THE SKIN FOUR TIMES DAILY, BEFORE MEALS PLUS CORRECTION SCALE, MAX TOTAL DAILY DOSE OF 75 UNITS    psyllium (METAMUCIL) powder Take 1 packet by mouth.    tamsulosin (FLOMAX) 0.4 mg Cap TAKE 1 " CAPSULE(0.4 MG) BY MOUTH EVERY EVENING    torsemide (DEMADEX) 10 MG Tab Take 2 tablets (20 mg total) by mouth once daily. (Patient taking differently: Take 20 mg by mouth once daily. 1 tablet once a day)    TRUEPLUS LANCETS 30 gauge Misc USE TO CHECK BLOOD SUGAR FOUR TIMES DAILY    vitamin D (VITAMIN D3) 1000 units Tab Take 1,000 Units by mouth once daily.    zinc gluconate 50 mg tablet Take 50 mg by mouth once daily.     Family History       Problem Relation (Age of Onset)    Alcohol abuse Brother    Cancer Maternal Aunt    Cataracts Mother    Diabetes Father, Sister, Paternal Uncle    Goiter Mother    Heart disease Father, Mother    Hypertension Father    No Known Problems Daughter, Son, Son          Tobacco Use    Smoking status: Former Smoker     Packs/day: 1.00     Years: 40.00     Pack years: 40.00     Types: Cigarettes     Quit date: 1970     Years since quittin.8    Smokeless tobacco: Former User   Substance and Sexual Activity    Alcohol use: Yes     Alcohol/week: 3.0 standard drinks     Types: 1 Cans of beer, 1 Shots of liquor, 1 Standard drinks or equivalent per week    Drug use: No    Sexual activity: Not Currently     Review of Systems   Constitutional: Negative for chills and decreased appetite.   HENT:  Negative for congestion.    Cardiovascular:  Positive for chest pain. Negative for irregular heartbeat and leg swelling.   Respiratory:  Negative for cough and shortness of breath.    Endocrine: Negative for cold intolerance and heat intolerance.   Skin:  Negative for rash.   Musculoskeletal:  Negative for arthritis and back pain.   Gastrointestinal:  Negative for abdominal pain, constipation and diarrhea.   Genitourinary:  Negative for dysuria and hematuria.   Neurological:  Negative for dizziness and headaches.   Psychiatric/Behavioral:  Negative for altered mental status.    Objective:     Vital Signs (Most Recent):  Temp: 98.7 °F (37.1 °C) (22 1643)  Pulse: 80 (22 1643)  Resp:  18 (04/26/22 1643)  BP: (!) 140/64 (04/26/22 1643)  SpO2: 99 % (04/26/22 1643)   Vital Signs (24h Range):  Temp:  [98.7 °F (37.1 °C)] 98.7 °F (37.1 °C)  Pulse:  [77-80] 80  Resp:  [18] 18  SpO2:  [98 %-99 %] 99 %  BP: (140-150)/(64-67) 140/64     Weight: 99.8 kg (220 lb)  Body mass index is 31.12 kg/m².    SpO2: 99 %       No intake or output data in the 24 hours ending 04/26/22 1754    Lines/Drains/Airways       Peripheral Intravenous Line  Duration                  Peripheral IV - Single Lumen 04/26/22 1741 18 G Right Forearm <1 day                    Physical Exam  Vitals reviewed.   Constitutional:       General: He is not in acute distress.  HENT:      Head: Normocephalic.   Eyes:      Pupils: Pupils are equal, round, and reactive to light.   Neck:      Thyroid: No thyromegaly.      Vascular: No JVD.   Cardiovascular:      Rate and Rhythm: Normal rate and regular rhythm.      Heart sounds: No murmur heard.    No friction rub.   Pulmonary:      Effort: Pulmonary effort is normal. No respiratory distress.      Breath sounds: No rales.   Neurological:      Mental Status: He is alert and oriented to person, place, and time.       Significant Labs: CMP No results for input(s): NA, K, CL, CO2, GLU, BUN, CREATININE, CALCIUM, PROT, ALBUMIN, BILITOT, ALKPHOS, AST, ALT, ANIONGAP, ESTGFRAFRICA, EGFRNONAA in the last 48 hours., CBC No results for input(s): WBC, HGB, HCT, PLT in the last 48 hours., and Troponin No results for input(s): TROPONINI in the last 48 hours.    Significant Imaging: Echocardiogram: 2D echo with color flow doppler:   Results for orders placed or performed in visit on 09/10/18   2D echo with color flow doppler   Result Value Ref Range    EF + QEF 55 55 - 65    Diastolic Dysfunction No     Est. PA Systolic Pressure 13.89     Narrative    Date of Procedure: 09/10/2018        TEST DESCRIPTION   Technical Quality: This is a technically adequate study. This study was performed in conjunction with a 3ml  intravenous injection of Optison contrast agent.     Aorta: The aortic root is normal in size, measuring 3.1 cm at sinotubular junction and 3.6 cm at Sinuses of Valsalva. The proximal ascending aorta is normal in size, measuring 3.7 cm across.     Left Atrium: The left atrial volume index is normal, measuring 32.52 cc/m2.     Left Ventricle: The left ventricle is normal in size, with an end-diastolic diameter of 4.3 cm, and an end-systolic diameter of 2.9 cm. LV wall thickness is normal, with the septum measuring 0.8 cm and the posterior wall measuring 0.9 cm across. Relative   wall thickness was normal at 0.42, and the LV mass index was 57.0 g/m2 consistent with normal left ventricular mass. The following segments were akinetic: basal inferoseptum.  The following segments were hypokinetic: basal inferior wall.  Left ventricular systolic function appears normal. Visually estimated ejection fraction is 55-60%. The LV Doppler derived stroke volume equals 91.0 ccs.     Diastolic indices: E wave velocity 0.7 m/s, E/A ratio 0.8,  msec., E/e' ratio(avg) 10. Diastolic function is normal.     Right Atrium: The right atrium is upper limit of normal, measuring 5.4 cm in length and 3.7 cm in width in the apical view.     Right Ventricle: The right ventricle is normal in size measuring 2.7 cm at the base in the apical right ventricle-focused view. Global right ventricular systolic function appears normal. Tricuspid annular plane systolic excursion (TAPSE) is 3.8 cm. The   estimated PA systolic pressure is 14 mmHg.     Aortic Valve:  The aortic valve is mildly sclerotic with normal leaflet mobility.     Mitral Valve:  Mitral valve is normal in structure with normal leaflet mobility.     Tricuspid Valve:  Tricuspid valve is normal in structure with normal leaflet mobility.     Pulmonary Valve:  The pulmonic valve is not well seen.     IVC: IVC is normal in size and collapses > 50% with a sniff, suggesting normal right  "atrial pressure of 3 mmHg.     Intracavitary: There is no evidence of pericardial effusion, intracavity mass, thrombi, or vegetation.         CONCLUSIONS     1 - Preserved left ventricular systolic function (EF 55-60%) with basal wall motion abnormalities.     2 - Normal right ventricular systolic function .     3 - Normal left ventricular diastolic function.             This document has been electronically    SIGNED BY: Elia Olivier MD On: 09/10/2018 12:08    and Transthoracic echo (TTE) complete (Cupid Only):   Results for orders placed or performed in visit on 12/21/20   Echo Color Flow Doppler? Yes   Result Value Ref Range    Ascending aorta 3.57 cm    STJ 3.24 cm    AV mean gradient 9 mmHg    Ao peak gail 2.03 m/s    Ao VTI 55.03 cm    IVS 1.12 (A) 0.6 - 1.1 cm    LA size 4.69 cm    Left Atrium Major Axis 5.35 cm    Left Atrium Minor Axis 4.61 cm    LVIDd 5.30 3.5 - 6.0 cm    LVIDs 3.78 2.1 - 4.0 cm    LVOT diameter 2.09 cm    LVOT peak VTI 24.63 cm    Posterior Wall 1.00 0.6 - 1.1 cm    PV Peak D Gail 0.65 m/s    PV Peak S Gail 0.77 m/s    RA Major Axis 4.28 cm    RA Width 3.87 cm    RVDD 4.09 cm    Sinus 3.29 cm    TAPSE 1.78 cm    TR Max Gail 2.59 m/s    TDI LATERAL 0.12 m/s    TDI SEPTAL 0.07 m/s    LA WIDTH 4.24 cm    LV Diastolic Volume 135.57 mL    LV Systolic Volume 61.13 mL    RV S' 15.67 cm/s    LVOT peak gail 0.96 m/s    LA volume (mod) 85.94 cm3    MV "A" wave duration 8.75 msec    FS 29 %    LA volume 83.71 cm3    LV mass 216.62 g    Left Ventricle Relative Wall Thickness 0.38 cm    AV valve area 1.53 cm2    AV Velocity Ratio 0.47     AV index (prosthetic) 0.45     Mean e' 0.10 m/s    Pulm vein S/D ratio 1.18     LVOT area 3.4 cm2    LVOT stroke volume 84.46 cm3    AV peak gradient 16 mmHg    Triscuspid Valve Regurgitation Peak Gradient 27 mmHg    BSA 2.36 m2    LV Systolic Volume Index 26.5 mL/m2    LV Diastolic Volume Index 58.68 mL/m2    LA Volume Index 36.2 mL/m2    LV Mass Index 94 g/m2    LA " Volume Index (Mod) 37.2 mL/m2    Right Atrial Pressure (from IVC) 3 mmHg    TV rest pulmonary artery pressure 30 mmHg    Narrative    · The left ventricle is normal in size with normal systolic function. The   estimated ejection fraction is 63%  · Normal left ventricular diastolic function.  · Mild left atrial enlargement.  · Normal right ventricular size with normal right ventricular systolic   function.  · There is mild-to-moderate aortic valve stenosis.  · Aortic valve area is 1.53 cm2; peak velocity is 2.03 m/s; mean gradient   is 9 mmHg.  · Normal central venous pressure (3 mmHg).  · The estimated PA systolic pressure is 30 mmHg.

## 2022-04-26 NOTE — ASSESSMENT & PLAN NOTE
Kevon Perez is a 81 y.o. y.o. male with known CAD s/p PCI in 2009 who presents from clinic to ED with chest pain, Shortness of Breath, Dizziness, and Fatigue.       Patient with frequent episodes of angina that are happening daily over the past 3-4 days. He has had no angina for many years. He has known severe CAD and I am concerned his story is consistent with unstable angina.    - Admit to inpatient for further work up   - S/p reloading with Aspirin and Plavix in ED.  - Prior sCr 1.7-2.5 and this could be prohibitive to proceed with invasive angiography   - Will proceed with medical management of ACS   - Continue HI statin Lipitor 80 mg daily   - Control blood pressure at home medication

## 2022-04-26 NOTE — Clinical Note
The groin and right radial was prepped. The site was prepped with ChloraPrep. The patient was draped.

## 2022-04-26 NOTE — Clinical Note
The generator was inserted into pocket in and was sutured into the pocket in the the left upper chest.

## 2022-04-26 NOTE — CONSULTS
[facility]  Cardiology  Consult Note    Patient Name: Kevon Perez  MRN: [id number]  Admission Date: 4/26/2022  Hospital Length of Stay: 0 days  Code Status: Prior   Attending Provider: Paula Simmons MD   Consulting Provider: Brooks Mcmillan MD  Primary Care Physician: Cipriano Sol MD  Principal Problem:<principal problem not specified>    Patient information was obtained from patient, past medical records and ER records.     Consults  Subjective:     Chief Complaint:  Chest pain      HPI:   Kevon Perez is a 81 y.o. y.o. male who presents from clinic to ED with chest pain, Shortness of Breath, Dizziness, and Fatigue.     He is known to have CAD with prior STEMI s/p OM and Lcx PCI 2009, DM, HTN, HLD, CKD stage III. He presents to clinic then to ED with couple of days of having chest tightness that lasted ~ 20 minutes. There was no clear triggering or alleviating factors. He began to notice angina with minimal exertion while working around the house. He states his pain was midsternal and felt similar to his previous MI. He had SL NTG at home, however, he did not take this at home as his pain resolved shortly. Of note, he had a recent diagnosis of high degree AV block who presents for angina. He recently saw EP earlier this month and after wearing holter monitor he was found to have high degree AV block. He was offered a ppm, however, he wanted to trial discontinuing of coreg prior to ppm implantation. He stopped this evangelist. 04/05 and states his HR has been higher since that time.       Past Medical History:   Diagnosis Date    Acute coronary syndrome 9/10/09    STEMI    Anticoagulant long-term use     plavix    Basal cell cancer     BCC (basal cell carcinoma of skin)     nose    Cancer of bladder January 2013    Cataract     Chronic kidney disease     Colon polyp     Coronary artery disease     CPAP (continuous positive airway pressure) dependence     Diabetes mellitus      "Diabetic retinopathy     Encounter for blood transfusion     GERD (gastroesophageal reflux disease)     High cholesterol     Hyperlipidemia     Hypertension     Iron deficiency anemia 5/11/2018    GINGER (obstructive sleep apnea)     CPAP     Renal manifestation of secondary diabetes mellitus     SOB (shortness of breath)        Past Surgical History:   Procedure Laterality Date    BASAL CELL CARCINOMA EXCISION      nose     BLADDER SURGERY      bladder cancer    CARDIAC CATHETERIZATION      CATARACT EXTRACTION      bilateral     COLONOSCOPY  3/26/15    COLONOSCOPY N/A 12/4/2020    Procedure: COLONOSCOPY;  Surgeon: Keshav Rajan MD;  Location: [facility] ([address] [place] [address]);  Service: Endoscopy;  Laterality: N/A;  covid test 12/1-pcw-tb  pt SOB x 1 year-ok to hold Plavix x 5 days per Dr. Sol-see telephone encounter dated 8/25  10/14-instructions emailed to [email or web address]    CORONARY ANGIOPLASTY  9/10/09    CFX    CORONARY ANGIOPLASTY WITH STENT PLACEMENT      CYSTOSCOPY      ESOPHAGOGASTRODUODENOSCOPY N/A 1/27/2021    Procedure: EGD (ESOPHAGOGASTRODUODENOSCOPY);  Surgeon: Matt Acosta MD;  Location[facility];  Service: Endoscopy;  Laterality: N/A;    EYE SURGERY      hydrocel         Review of patient's allergies indicates:   Allergen Reactions    Penicillins Other (See Comments)     Muscle stiffness    Bactrim [sulfamethoxazole-trimethoprim] Rash       Current Facility-Administered Medications on File Prior to Encounter   Medication    cyanocobalamin injection 1,000 mcg    cyanocobalamin injection 100 mcg     Current Outpatient Medications on File Prior to Encounter   Medication Sig    amLODIPine (NORVASC) 5 MG tablet TAKE 1 TABLET(5 MG) BY MOUTH EVERY DAY    ascorbic acid, vitamin C, (VITAMIN C) 1000 MG tablet Take by mouth once daily.    atorvastatin (LIPITOR) 20 MG tablet TAKE 1 TABLET(20 MG) BY MOUTH EVERY DAY    BD ULTRA-FINE SHORT PEN NEEDLE 31 gauge x 5/16" Ndle USE UP TO " 6 TIMES DAILY    blood sugar diagnostic (TRUE METRIX GLUCOSE TEST STRIP) Strp USE TO CHECK BLOOD SUGAR FOUR TIMES DAILY    blood-glucose meter kit To check BG 4 times daily, to use with insurance preferred meter    calcitRIOL (ROCALTROL) 0.25 MCG Cap TAKE 1 CAPSULE BY MOUTH EVERY DAY    cholestyramine (QUESTRAN) 4 gram packet Take 1 packet (4 g total) by mouth 2 (two) times daily as needed (for loose stool).    clopidogreL (PLAVIX) 75 mg tablet TAKE 1 TABLET(75 MG) BY MOUTH EVERY DAY    finasteride (PROSCAR) 5 mg tablet Take 1 tablet (5 mg total) by mouth once daily.    fish oil-omega-3 fatty acids 300-1,000 mg capsule Take by mouth once daily.    FLUAD QUAD 2021-22,65Y UP,,PF, 60 mcg (15 mcg x 4)/0.5 mL Syrg     fluticasone propionate (FLONASE) 50 mcg/actuation nasal spray 2 sprays (100 mcg total) by Each Nostril route once daily.    folic acid (FOLVITE) 1 MG tablet Take 1 mg by mouth once daily.    hydroCHLOROthiazide (HYDRODIURIL) 12.5 MG Tab TAKE 1 TABLET(12.5 MG) BY MOUTH EVERY DAY    insulin degludec (TRESIBA FLEXTOUCH U-100) 100 unit/mL (3 mL) insulin pen Inject 22 Units into the skin once daily.    irbesartan (AVAPRO) 300 MG tablet TAKE 1 TABLET(300 MG) BY MOUTH EVERY EVENING    nitroGLYCERIN (NITROSTAT) 0.4 MG SL tablet TAKE 1 TABLET UNDER THE TONGUE EVERY 5 MINUTES AS NEEDED    NOVOLOG FLEXPEN U-100 INSULIN 100 unit/mL (3 mL) InPn pen INJECT 15 UNITS UNDER THE SKIN FOUR TIMES DAILY, BEFORE MEALS PLUS CORRECTION SCALE, MAX TOTAL DAILY DOSE OF 75 UNITS    psyllium (METAMUCIL) powder Take 1 packet by mouth.    tamsulosin (FLOMAX) 0.4 mg Cap TAKE 1 CAPSULE(0.4 MG) BY MOUTH EVERY EVENING    torsemide (DEMADEX) 10 MG Tab Take 2 tablets (20 mg total) by mouth once daily. (Patient taking differently: Take 20 mg by mouth once daily. 1 tablet once a day)    TRUEPLUS LANCETS 30 gauge Misc USE TO CHECK BLOOD SUGAR FOUR TIMES DAILY    vitamin D (VITAMIN D3) 1000 units Tab Take 1,000 Units by mouth  once daily.    zinc gluconate 50 mg tablet Take 50 mg by mouth once daily.     Family History       Problem Relation (Age of Onset)    Alcohol abuse Brother    Cancer Maternal Aunt    Cataracts Mother    Diabetes Father, Sister, Paternal Uncle    Goiter Mother    Heart disease Father, Mother    Hypertension Father    No Known Problems Daughter, Son, Son          Tobacco Use    Smoking status: Former Smoker     Packs/day: 1.00     Years: 40.00     Pack years: 40.00     Types: Cigarettes     Quit date: 1970     Years since quittin.8    Smokeless tobacco: Former User   Substance and Sexual Activity    Alcohol use: Yes     Alcohol/week: 3.0 standard drinks     Types: 1 Cans of beer, 1 Shots of liquor, 1 Standard drinks or equivalent per week    Drug use: No    Sexual activity: Not Currently     Review of Systems   Constitutional: Negative for chills and decreased appetite.   HENT:  Negative for congestion.    Cardiovascular:  Positive for chest pain. Negative for irregular heartbeat and leg swelling.   Respiratory:  Negative for cough and shortness of breath.    Endocrine: Negative for cold intolerance and heat intolerance.   Skin:  Negative for rash.   Musculoskeletal:  Negative for arthritis and back pain.   Gastrointestinal:  Negative for abdominal pain, constipation and diarrhea.   Genitourinary:  Negative for dysuria and hematuria.   Neurological:  Negative for dizziness and headaches.   Psychiatric/Behavioral:  Negative for altered mental status.    Objective:     Vital Signs (Most Recent):  Temp: 98.7 °F (37.1 °C) (22 1643)  Pulse: 80 (22 1643)  Resp: 18 (22 1643)  BP: (!) 140/64 (22 1643)  SpO2: 99 % (22)   Vital Signs (24h Range):  Temp:  [98.7 °F (37.1 °C)] 98.7 °F (37.1 °C)  Pulse:  [77-80] 80  Resp:  [18] 18  SpO2:  [98 %-99 %] 99 %  BP: (140-150)/(64-67) 140/64     Weight: 99.8 kg (220 lb)  Body mass index is 31.12 kg/m².    SpO2: 99 %       No intake or  output data in the 24 hours ending 04/26/22 1754    Lines/Drains/Airways       Peripheral Intravenous Line  Duration                  Peripheral IV - Single Lumen 04/26/22 1741 18 G Right Forearm <1 day                    Physical Exam  Vitals reviewed.   Constitutional:       General: He is not in acute distress.  HENT:      Head: Normocephalic.   Eyes:      Pupils: Pupils are equal, round, and reactive to light.   Neck:      Thyroid: No thyromegaly.      Vascular: No JVD.   Cardiovascular:      Rate and Rhythm: Normal rate and regular rhythm.      Heart sounds: No murmur heard.    No friction rub.   Pulmonary:      Effort: Pulmonary effort is normal. No respiratory distress.      Breath sounds: No rales.   Neurological:      Mental Status: He is alert and oriented to person, place, and time.       Significant Labs: CMP No results for input(s): NA, K, CL, CO2, GLU, BUN, CREATININE, CALCIUM, PROT, ALBUMIN, BILITOT, ALKPHOS, AST, ALT, ANIONGAP, ESTGFRAFRICA, EGFRNONAA in the last 48 hours., CBC No results for input(s): WBC, HGB, HCT, PLT in the last 48 hours., and Troponin No results for input(s): TROPONINI in the last 48 hours.    Significant Imaging: Echocardiogram: 2D echo with color flow doppler:   Results for orders placed or performed in visit on 09/10/18   2D echo with color flow doppler   Result Value Ref Range    EF + QEF 55 55 - 65    Diastolic Dysfunction No     Est. PA Systolic Pressure 13.89     Narrative    Date of Procedure: 09/10/2018        TEST DESCRIPTION   Technical Quality: This is a technically adequate study. This study was performed in conjunction with a 3ml intravenous injection of Optison contrast agent.     Aorta: The aortic root is normal in size, measuring 3.1 cm at sinotubular junction and 3.6 cm at Sinuses of Valsalva. The proximal ascending aorta is normal in size, measuring 3.7 cm across.     Left Atrium: The left atrial volume index is normal, measuring 32.52 cc/m2.     Left  Ventricle: The left ventricle is normal in size, with an end-diastolic diameter of 4.3 cm, and an end-systolic diameter of 2.9 cm. LV wall thickness is normal, with the septum measuring 0.8 cm and the posterior wall measuring 0.9 cm across. Relative   wall thickness was normal at 0.42, and the LV mass index was 57.0 g/m2 consistent with normal left ventricular mass. The following segments were akinetic: basal inferoseptum.  The following segments were hypokinetic: basal inferior wall.  Left ventricular systolic function appears normal. Visually estimated ejection fraction is 55-60%. The LV Doppler derived stroke volume equals 91.0 ccs.     Diastolic indices: E wave velocity 0.7 m/s, E/A ratio 0.8,  msec., E/e' ratio(avg) 10. Diastolic function is normal.     Right Atrium: The right atrium is upper limit of normal, measuring 5.4 cm in length and 3.7 cm in width in the apical view.     Right Ventricle: The right ventricle is normal in size measuring 2.7 cm at the base in the apical right ventricle-focused view. Global right ventricular systolic function appears normal. Tricuspid annular plane systolic excursion (TAPSE) is 3.8 cm. The   estimated PA systolic pressure is 14 mmHg.     Aortic Valve:  The aortic valve is mildly sclerotic with normal leaflet mobility.     Mitral Valve:  Mitral valve is normal in structure with normal leaflet mobility.     Tricuspid Valve:  Tricuspid valve is normal in structure with normal leaflet mobility.     Pulmonary Valve:  The pulmonic valve is not well seen.     IVC: IVC is normal in size and collapses > 50% with a sniff, suggesting normal right atrial pressure of 3 mmHg.     Intracavitary: There is no evidence of pericardial effusion, intracavity mass, thrombi, or vegetation.         CONCLUSIONS     1 - Preserved left ventricular systolic function (EF 55-60%) with basal wall motion abnormalities.     2 - Normal right ventricular systolic function .     3 - Normal left  "ventricular diastolic function.             This document has been electronically    SIGNED BY: Elia Olivier MD On: 09/10/2018 12:08    and Transthoracic echo (TTE) complete (Cupid Only):   Results for orders placed or performed in visit on 12/21/20   Echo Color Flow Doppler? Yes   Result Value Ref Range    Ascending aorta 3.57 cm    STJ 3.24 cm    AV mean gradient 9 mmHg    Ao peak gail 2.03 m/s    Ao VTI 55.03 cm    IVS 1.12 (A) 0.6 - 1.1 cm    LA size 4.69 cm    Left Atrium Major Axis 5.35 cm    Left Atrium Minor Axis 4.61 cm    LVIDd 5.30 3.5 - 6.0 cm    LVIDs 3.78 2.1 - 4.0 cm    LVOT diameter 2.09 cm    LVOT peak VTI 24.63 cm    Posterior Wall 1.00 0.6 - 1.1 cm    PV Peak D Gail 0.65 m/s    PV Peak S Gail 0.77 m/s    RA Major Axis 4.28 cm    RA Width 3.87 cm    RVDD 4.09 cm    Sinus 3.29 cm    TAPSE 1.78 cm    TR Max Gail 2.59 m/s    TDI LATERAL 0.12 m/s    TDI SEPTAL 0.07 m/s    LA WIDTH 4.24 cm    LV Diastolic Volume 135.57 mL    LV Systolic Volume 61.13 mL    RV S' 15.67 cm/s    LVOT peak gail 0.96 m/s    LA volume (mod) 85.94 cm3    MV "A" wave duration 8.75 msec    FS 29 %    LA volume 83.71 cm3    LV mass 216.62 g    Left Ventricle Relative Wall Thickness 0.38 cm    AV valve area 1.53 cm2    AV Velocity Ratio 0.47     AV index (prosthetic) 0.45     Mean e' 0.10 m/s    Pulm vein S/D ratio 1.18     LVOT area 3.4 cm2    LVOT stroke volume 84.46 cm3    AV peak gradient 16 mmHg    Triscuspid Valve Regurgitation Peak Gradient 27 mmHg    BSA 2.36 m2    LV Systolic Volume Index 26.5 mL/m2    LV Diastolic Volume Index 58.68 mL/m2    LA Volume Index 36.2 mL/m2    LV Mass Index 94 g/m2    LA Volume Index (Mod) 37.2 mL/m2    Right Atrial Pressure (from IVC) 3 mmHg    TV rest pulmonary artery pressure 30 mmHg    Narrative    · The left ventricle is normal in size with normal systolic function. The   estimated ejection fraction is 63%  · Normal left ventricular diastolic function.  · Mild left atrial enlargement.  · " Normal right ventricular size with normal right ventricular systolic   function.  · There is mild-to-moderate aortic valve stenosis.  · Aortic valve area is 1.53 cm2; peak velocity is 2.03 m/s; mean gradient   is 9 mmHg.  · Normal central venous pressure (3 mmHg).  · The estimated PA systolic pressure is 30 mmHg.        Assessment and Plan:     Coronary artery disease involving native coronary artery of native heart with unstable angina pectoris  NSTEMI   Kevon Perez is a 81 y.o. y.o. male with known CAD s/p PCI in 2009 who presents from clinic to ED with chest pain, Shortness of Breath, Dizziness, and Fatigue.       Patient with frequent episodes of angina that are happening daily over the past 3-4 days. He has had no angina for many years. He has known severe CAD and I am concerned his story is consistent with unstable angina.    - Admit to inpatient for further work up   - S/p reloading with Aspirin and Plavix in ED.  - Heparin gtt per ACS protocol   - Prior sCr 1.7-2.5 and this could be prohibitive to proceed with invasive angiography   - Will proceed with medical management of ACS   - Continue HI statin Lipitor 80 mg daily   - Control blood pressure at home medication       VTE Risk Mitigation (From admission, onward)    None        Thank you for your consult. I will follow-up with patient. Please contact us if you have any additional questions.    Brooks Mcmillan MD  Cardiology   [facility]

## 2022-04-26 NOTE — ASSESSMENT & PLAN NOTE
Patient with frequent episodes of angina that are happening daily over the past 3-4 days. He has had no angina for many years. He has known severe CAD and I am concerned his story is consistent with unstable angina.     Plan:   -admit to ED for evaluation of ACS  -if biomarkers negative, would admit for PET scan   -renal function may be prohibitive to angiogram, recheck in ED pending   -continue lipitor and BP control  -continue plavix, and load with asa in ED

## 2022-04-26 NOTE — Clinical Note
23 ml of contrast were injected throughout the case. 177 mL of contrast was the total wasted during the case. 200 mL was the total amount used during the case.

## 2022-04-26 NOTE — Clinical Note
The chest was prepped. The site was prepped with ChloraPrep and Ioban. The site was clipped. The patient was draped. The patient was positioned supine.

## 2022-04-26 NOTE — HPI
Kevon Perez is a 81 y.o. y.o. male who presents from clinic to ED with chest pain, Shortness of Breath, Dizziness, and Fatigue.     He is known to have CAD with prior STEMI s/p OM and Lcx PCI 2009, DM, HTN, HLD, CKD stage III. He presents to clinic then to ED with couple of days of having chest tightness that lasted ~ 20 minutes. There was no clear triggering or alleviating factors. He began to notice angina with minimal exertion while working around the house. He states his pain was midsternal and felt similar to his previous MI. He had SL NTG at home, however, he did not take this at home as his pain resolved shortly. Of note, he had a recent diagnosis of high degree AV block who presents for angina. He recently saw EP earlier this month and after wearing holter monitor he was found to have high degree AV block. He was offered a ppm, however, he wanted to trial discontinuing of coreg prior to ppm implantation. He stopped this evangelist. 04/05 and states his HR has been higher since that time.

## 2022-04-26 NOTE — ED PROVIDER NOTES
"Encounter Date: 4/26/2022       History     Chief Complaint   Patient presents with    Chest Pain     Sent from cards clinic for admission, has stents     80 y/o m, h/o CAD, HTN, DM, referred to the ED from cardiology clinic 2/2 CP with concern for unstable angina.  Pt began having CP 3 days ago with minimal exertion described as an "ache" in his chest, no sob/diaphoresis/n/v.  Pain with improve with about 20 min of rest.  Last episode yesterday morning.  Seen in clinic today, concern for sx, referred to the ED for admission.  Of note, EKG x 2 done in clinic.  Pt currently CP free    The history is provided by the patient.     Review of patient's allergies indicates:   Allergen Reactions    Penicillins Other (See Comments)     Muscle stiffness    Bactrim [sulfamethoxazole-trimethoprim] Rash     Past Medical History:   Diagnosis Date    Acute coronary syndrome 9/10/09    STEMI    Anticoagulant long-term use     plavix    Basal cell cancer     BCC (basal cell carcinoma of skin)     nose    Cancer of bladder January 2013    Cataract     Chronic kidney disease     Colon polyp     Coronary artery disease     CPAP (continuous positive airway pressure) dependence     Diabetes mellitus     Diabetic retinopathy     Encounter for blood transfusion     GERD (gastroesophageal reflux disease)     High cholesterol     Hyperlipidemia     Hypertension     Iron deficiency anemia 5/11/2018    GINGER (obstructive sleep apnea)     CPAP     Renal manifestation of secondary diabetes mellitus     SOB (shortness of breath)      Past Surgical History:   Procedure Laterality Date    BASAL CELL CARCINOMA EXCISION      nose     BLADDER SURGERY      bladder cancer    CARDIAC CATHETERIZATION      CATARACT EXTRACTION      bilateral     COLONOSCOPY  3/26/15    COLONOSCOPY N/A 12/4/2020    Procedure: COLONOSCOPY;  Surgeon: Keshav Rajan MD;  Location: [address]);  Service: Endoscopy;  Laterality: N/A;  " covid test -pcw-tb  pt SOB x 1 year-ok to hold Plavix x 5 days per Dr. Sol-see telephone encounter dated 8/25  10/14-instructions emailed to [email or web address]    CORONARY ANGIOPLASTY  9/10/09    CFX    CORONARY ANGIOPLASTY WITH STENT PLACEMENT      CYSTOSCOPY      ESOPHAGOGASTRODUODENOSCOPY N/A 2021    Procedure: EGD (ESOPHAGOGASTRODUODENOSCOPY);  Surgeon: Matt Acosta MD;  Location[facility];  Service: Endoscopy;  Laterality: N/A;    EYE SURGERY      hydrocel       Family History   Problem Relation Age of Onset    Hypertension Father     Heart disease Father         CHF    Diabetes Father     Diabetes Sister     Cataracts Mother     Goiter Mother     Heart disease Mother         CHF    Diabetes Paternal Uncle     Alcohol abuse Brother     No Known Problems Daughter     No Known Problems Son     No Known Problems Son     Cancer Maternal Aunt         colon    Kidney disease Neg Hx     Amblyopia Neg Hx     Blindness Neg Hx     Glaucoma Neg Hx     Macular degeneration Neg Hx     Retinal detachment Neg Hx     Strabismus Neg Hx     Stroke Neg Hx     Thyroid disease Neg Hx      Social History     Tobacco Use    Smoking status: Former Smoker     Packs/day: 1.00     Years: 40.00     Pack years: 40.00     Types: Cigarettes     Quit date: 1970     Years since quittin.8    Smokeless tobacco: Former User   Substance Use Topics    Alcohol use: Yes     Alcohol/week: 3.0 standard drinks     Types: 1 Cans of beer, 1 Shots of liquor, 1 Standard drinks or equivalent per week    Drug use: No     Review of Systems   Constitutional: Negative for chills, diaphoresis, fatigue and fever.   Respiratory: Negative for shortness of breath.    Cardiovascular: Positive for chest pain. Negative for palpitations and leg swelling.   All other systems reviewed and are negative.      Physical Exam     Initial Vitals [22 1643]   BP Pulse Resp Temp SpO2   (!) 140/64 80 18 98.7 °F  (37.1 °C) 99 %      MAP       --         Physical Exam    Nursing note and vitals reviewed.  Constitutional: Vital signs are normal. He appears well-developed and well-nourished. He is not diaphoretic.  Non-toxic appearance. He does not appear ill. No distress.   HENT:   Head: Normocephalic and atraumatic.   Mouth/Throat: Mucous membranes are normal. Mucous membranes are not dry.   Eyes: Conjunctivae and lids are normal.   Neck: Neck supple.   Normal range of motion.  Cardiovascular: Normal rate.   Pulmonary/Chest: No respiratory distress.   Abdominal: He exhibits no distension.   Musculoskeletal:         General: No edema.      Cervical back: Normal range of motion and neck supple.     Neurological: He is alert and oriented to person, place, and time.   Skin: Skin is dry and intact. No pallor.   Psychiatric: He has a normal mood and affect. His speech is normal and behavior is normal.         ED Course   Procedures  Labs Reviewed   CBC W/ AUTO DIFFERENTIAL - Abnormal; Notable for the following components:       Result Value    RBC 4.30 (*)     Hemoglobin 12.4 (*)     Hematocrit 38.3 (*)     Platelets 146 (*)     Gran % 77.5 (*)     Lymph % 12.8 (*)     All other components within normal limits   COMPREHENSIVE METABOLIC PANEL - Abnormal; Notable for the following components:    CO2 15 (*)     Glucose 171 (*)     BUN 69 (*)     Creatinine 2.5 (*)     Alkaline Phosphatase 46 (*)     eGFR if  26.6 (*)     eGFR if non  23.0 (*)     All other components within normal limits   TROPONIN I - Abnormal; Notable for the following components:    Troponin I 0.203 (*)     All other components within normal limits   TROPONIN I - Abnormal; Notable for the following components:    Troponin I 0.223 (*)     All other components within normal limits   B-TYPE NATRIURETIC PEPTIDE   APTT    Narrative:     (if patient is on warfarin prior to heparin therapy)   PROTIME-INR    Narrative:     (if patient is on  warfarin prior to heparin therapy)   POCT GLUCOSE MONITORING CONTINUOUS     EKG Readings: (Independently Interpreted)   Initial Reading: No STEMI. Rhythm: Normal Sinus Rhythm. Ectopy: No Ectopy. Conduction: Normal.   Q waves inferior leads  ? 1 mm elevation inferior leads - discussed with cardiology fellow, does not think c/w stemi       Imaging Results          X-Ray Chest AP Portable (Final result)  Result time 04/26/22 18:04:36    Final result by Timothy Krishnamurthy MD (04/26/22 18:04:36)                 Impression:      No acute process.      Electronically signed by: Timothy Krishnamurthy MD  Date:    04/26/2022  Time:    18:04             Narrative:    EXAMINATION:  XR CHEST AP PORTABLE    CLINICAL HISTORY:  Chest Pain;    TECHNIQUE:  Single frontal view of the chest was performed.    COMPARISON:  06/25/2020.    FINDINGS:  Monitoring EKG leads are present.  The trachea is unremarkable.  The cardiomediastinal silhouette is within normal limits.  The hemidiaphragms are unremarkable.  There are no pleural effusions.  There is no evidence of a pneumothorax.  There is no evidence of pneumomediastinum.  No airspace opacity is present.  There is subsegmental atelectasis in the left lung base.  The osseous structures are unremarkable.                                 Medications   heparin 25,000 units in dextrose 5% 250 mL (100 units/mL) infusion LOW INTENSITY nomogram - [facility] (12 Units/kg/hr × 84.4 kg (Adjusted) Intravenous New Bag 4/26/22 2006)   heparin 25,000 units in dextrose 5% (100 units/ml) IV bolus from bag - ADDITIONAL PRN BOLUS - 60 units/kg (max bolus 4000 units) (has no administration in time range)   heparin 25,000 units in dextrose 5% (100 units/ml) IV bolus from bag - ADDITIONAL PRN BOLUS - 30 units/kg (max bolus 4000 units) (has no administration in time range)   sodium chloride 0.9% flush 10 mL (has no administration in time range)   melatonin tablet 6 mg (has no administration in time range)   glucose chewable tablet  16 g (has no administration in time range)   glucose chewable tablet 24 g (has no administration in time range)   glucagon (human recombinant) injection 1 mg (has no administration in time range)   insulin aspart U-100 pen 1-10 Units (has no administration in time range)   aspirin chewable tablet 81 mg (has no administration in time range)   nitroGLYCERIN SL tablet 0.4 mg (has no administration in time range)   clopidogreL tablet 75 mg (has no administration in time range)   sodium chloride 0.9% flush 5 mL (has no administration in time range)   ondansetron injection 8 mg (has no administration in time range)   polyethylene glycol packet 17 g (has no administration in time range)   acetaminophen tablet 650 mg (has no administration in time range)   acetaminophen tablet 1,000 mg (has no administration in time range)   dextrose 10% bolus 125 mL (has no administration in time range)   dextrose 10% bolus 250 mL (has no administration in time range)   atorvastatin tablet 40 mg (has no administration in time range)   calcitRIOL capsule 0.25 mcg (has no administration in time range)   finasteride tablet 5 mg (has no administration in time range)   losartan tablet 100 mg (has no administration in time range)   tamsulosin 24 hr capsule 0.4 mg (has no administration in time range)   isosorbide mononitrate 24 hr tablet 30 mg (has no administration in time range)   insulin detemir U-100 pen 10 Units (has no administration in time range)   insulin aspart U-100 pen 5 Units (has no administration in time range)   aspirin tablet 325 mg (325 mg Oral Given 4/26/22 1743)   clopidogreL tablet 225 mg (225 mg Oral Given 4/26/22 1807)   heparin 25,000 units in dextrose 5% (100 units/ml) IV bolus from bag INITIAL BOLUS (max bolus 4000 units) (4,000 Units Intravenous Bolus from Bag 4/26/22 2009)     Medical Decision Making:   History:   Old Medical Records: I decided to obtain old medical records.  Differential Diagnosis:   DDx for chest  pain would be broad, including but not limited to serious diagnoses such as ACS, PE, aortic dissection, pericarditis, myocarditis, pneumothorax, and esophageal rupture.   Greatest concern for angina/unstable angina    Clinical Tests:   Lab Tests: Ordered and Reviewed  Radiological Study: Reviewed and Ordered  Medical Tests: Ordered and Reviewed  ED Management:  Discussed with cardiology fellow on arrival as review of EKG in clinic concerning for STEMI  They have reviewed this EKG and subsequent EKG and do not believe EKG c/w STEMI but would like pt treated as UA  Pt is currently CP free  Plan for labs, ASA and plavix, admission    Other:   I have discussed this case with another health care provider.       <> Summary of the Discussion: Cardiology fellow            Attending Attestation:         Attending Critical Care:   Critical Care Times:   ==============================================================  · Total Critical Care Time - exclusive of procedural time: 35 minutes.  ==============================================================  Critical care was necessary to treat or prevent imminent or life-threatening deterioration of the following conditions: myocardial infarction.   The following critical care procedures were done by me (see procedure notes): pulse oximetry.   Critical care was time spent personally by me on the following activities: obtaining history from patient or relative, review of x-rays / CT sent with the patient, examination of patient, review of old charts, ordering lab, x-rays, and/or EKG, development of treatment plan with patient or relative, ordering and performing treatments and interventions, evaluation of patient's response to treatment, discussion with consultants and re-evaluation of patient's conition.   Critical Care Condition: potentially life-threatening           ED Course as of 04/26/22 2248 Tue Apr 26, 2022   1849 Troponin I(!): 0.203  Discussed with cardiology fellow.   Recommends initiation of heparin for NSTEMI management.  Will start with medical management. [AS]      ED Course User Index  [AS] Paula Simmons MD             Clinical Impression:   Final diagnoses:  [R07.89] Chest discomfort  [R07.9] Chest pain  [I21.4] NSTEMI (non-ST elevated myocardial infarction) (Primary)          ED Disposition Condition    Admit               Paula Simmons MD  04/26/22 1034

## 2022-04-27 PROBLEM — I21.4 NSTEMI (NON-ST ELEVATED MYOCARDIAL INFARCTION): Status: ACTIVE | Noted: 2022-01-01

## 2022-04-27 NOTE — PLAN OF CARE
Patient had no overnight events of chest pain.For most of the evening patient was sinus zaid and instances of a-fib. 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

## 2022-04-27 NOTE — CONSULTS
"  [facility] - Cardiology Stepdown  Adult Nutrition  Consult Note    SUMMARY     Recommendations  1. Continue current cardiac diet- fluid per MD   2. If PO intake declines <50% add Boost Glucose Control BID  3. RD following    Goals: Meet % EEN, EPN by RD f/u date  Nutrition Goal Status: goal met  Communication of RD Recs: other (POC)    Assessment and Plan  Nutrition Problem  Increased protein/energy needs     Related to (etiology):   Physiological demands    Signs and Symptoms (as evidenced by):   NSTEMI    Interventions/Recommendations (treatment strategy):  Collaboration of nutrition care w/ other providers     Nutrition Diagnosis Status:   Continues     Reason for Assessment    Reason For Assessment: consult-NSTEMI   Diagnosis: cardiac disease  Relevant Medical History: HLD, HTN, CAD, DM, CKD  Interdisciplinary Rounds: did not attend  General Information Comments: Spoke w pt at bedside reports a good appetite currently and PTA. UBW unknown. No issues chewing/swallowing. No n/vd/c. Low sodium/cardiac diet education provided 4/27. Pt v/u understanding. Handouts provided. NFPE completed 4/27 pt is well nourished with no s/s of malnutrtition at this time. LBM noted-4/25    Nutrition Risk Screen    Nutrition Risk Screen: no indicators present    Nutrition/Diet History    Typical Food/Fluid Intake: 3 meals/day    Anthropometrics    Temp: 97.5 °F (36.4 °C)  Height Method: Stated  Height: 5' 10" (177.8 cm)  Height (inches): 70 in  Weight Method: Stated  Weight: 98.9 kg (218 lb)  Weight (lb): 218 lb  Ideal Body Weight (IBW), Male: 166 lb  % Ideal Body Weight, Male (lb): 131.33 %  BMI (Calculated): 31.3       Lab/Procedures/Meds    Pertinent Labs Reviewed: reviewed  Pertinent Labs Comments: BUN 62, Cr 2.4, GFR 24.2, Glucose 169  Pertinent Medications Reviewed: reviewed  Pertinent Medications Comments: insulin      Estimated/Assessed Needs    Weight Used For Calorie Calculations: 98.9 kg (218 lb 0.6 oz)  Energy " Calorie Requirements (kcal): 2034 (PAL 1.2)  Energy Need Method: [place]  Protein Requirements: 118 g (1.2 g/kg)  Weight Used For Protein Calculations: 98.9 kg (218 lb 0.6 oz)   RDA Method (mL): 2034  CHO Requirement: 254      Nutrition Prescription Ordered    Current Diet Order: Cardiac diet    Evaluation of Received Nutrient/Fluid Intake    I/O: -1.1 L since admit  Energy Calories Required: meeting needs  Protein Required: meeting needs  Fluid Required: meeting needs  Total Fluid Intake (mL/kg): 1 ml or fluid per MD  Tolerance: tolerating  % Intake of Estimated Energy Needs: 75%  % Meal Intake: 75%    Nutrition Risk    Level of Risk/Frequency of Follow-up: low       Monitor and Evaluation    Food and Nutrient Intake: energy intake, food and beverage intake  Food and Nutrient Adminstration: diet order  Knowledge/Beliefs/Attitudes: food and nutrition knowledge/skill, beliefs and attitudes  Physical Activity and Function: nutrition-related ADLs and IADLs, factors affecting access to physical activity  Anthropometric Measurements: height/length, weight, weight change, body mass index, growth pattern indices/percentile ranks  Biochemical Data, Medical Tests and Procedures: electrolyte and renal panel, gastrointestinal profile, glucose/endocrine profile, inflammatory profile, lipid profile  Nutrition-Focused Physical Findings: overall appearance, extremities, muscles and bones, head and eyes, skin       Nutrition Follow-Up    RD Follow-up?: Yes   By Ricarda CARLOS

## 2022-04-27 NOTE — ASSESSMENT & PLAN NOTE
· Chronic issue  · History of STEMI with PCI in 2009  · Was previously on Aspirin and Plavix, but taken off Aspirin  · Continue Aspirin and Plavix for now with NSTEMI  · Continue Statin as above     7.842

## 2022-04-27 NOTE — H&P
[facility] - Emergency Dept  [facility] Medicine  History & Physical    Patient Name: Kevon Perez  MRN: [id number]  Patient Class: IP- Inpatient  Admission Date: 4/26/2022  Attending Physician: Keith Miles MD   Primary Care Provider: Cipriano Sol MD         Patient information was obtained from patient, past medical records and ER records.     Subjective:     Principal Problem:Unstable angina    Chief Complaint:   Chief Complaint   Patient presents with    Chest Pain     Sent from cards clinic for admission, has stents        HPI: Mr. Kevon Perez is a 81 y.o. male with CAD, history of STEMI s/p PCI, and 2nd degree AV block who presents to the ER from Cardiology clinic for evaluation of chest pain.  He reports that for the last 3 days, he has been having midsternal chest pain with minimal exertion.  The chest pain lasts about 20 minutes, and then resolves when he sits down.  The pain is similar to when he had his MCI in 2009.  He did not take any Nitroglycerin.  He endorses chronic shortness of breath, but nothing worse than normal.  He denies any nausea, vomiting, dizziness, or lightheadedness.  Of note, he was recently diagnosed with AV block, and has been seeing Dr. Avendaño about getting a pacemaker placed.  His primary Cardiologist is Dr. Teixeira.  He is compliant with his Plavix.  He was on Aspirin until about 6 months ago, which was stopped for ulcer disease.  Given his concerns for unstable angina, he was sent to the ER for further evaluation.    Upon arrival to the ER, vitals were temp 98.7F, HR 80, /64.  EKG was not ischemic.  Troponin was 0.203.  He was evaluated by Cardiology, who suggested NSTEMI treatment with Aspirin, Plavix, and Heparin gtt.  He was admitted to Hospital Medicine for further management.        Past Medical History:   Diagnosis Date    Acute coronary syndrome 9/10/09    STEMI    Anticoagulant long-term use     plavix    Basal cell cancer     BCC (basal cell  carcinoma of skin)     nose    Cancer of bladder January 2013    Cataract     Chronic kidney disease     Colon polyp     Coronary artery disease     CPAP (continuous positive airway pressure) dependence     Diabetes mellitus     Diabetic retinopathy     Encounter for blood transfusion     GERD (gastroesophageal reflux disease)     High cholesterol     Hyperlipidemia     Hypertension     Iron deficiency anemia 5/11/2018    GINGER (obstructive sleep apnea)     CPAP     Renal manifestation of secondary diabetes mellitus     SOB (shortness of breath)        Past Surgical History:   Procedure Laterality Date    BASAL CELL CARCINOMA EXCISION      nose     BLADDER SURGERY      bladder cancer    CARDIAC CATHETERIZATION      CATARACT EXTRACTION      bilateral     COLONOSCOPY  3/26/15    COLONOSCOPY N/A 12/4/2020    Procedure: COLONOSCOPY;  Surgeon: Keshav Rajan MD;  Location: [facility] ([address] [place] [address]);  Service: Endoscopy;  Laterality: N/A;  covid test 12/1-pcw-tb  pt SOB x 1 year-ok to hold Plavix x 5 days per Dr. Sol-see telephone encounter dated 8/25  10/14-instructions emailed to [email or web address]    CORONARY ANGIOPLASTY  9/10/09    CFX    CORONARY ANGIOPLASTY WITH STENT PLACEMENT      CYSTOSCOPY      ESOPHAGOGASTRODUODENOSCOPY N/A 1/27/2021    Procedure: EGD (ESOPHAGOGASTRODUODENOSCOPY);  Surgeon: Matt Acosta MD;  Location: [facility];  Service: Endoscopy;  Laterality: N/A;    EYE SURGERY      hydrocel         Review of patient's allergies indicates:   Allergen Reactions    Penicillins Other (See Comments)     Muscle stiffness    Bactrim [sulfamethoxazole-trimethoprim] Rash       Current Facility-Administered Medications on File Prior to Encounter   Medication    [DISCONTINUED] cyanocobalamin injection 1,000 mcg    [DISCONTINUED] cyanocobalamin injection 100 mcg     Current Outpatient Medications on File Prior to Encounter   Medication Sig    amLODIPine (NORVASC) 5 MG tablet  "TAKE 1 TABLET(5 MG) BY MOUTH EVERY DAY    ascorbic acid, vitamin C, (VITAMIN C) 1000 MG tablet Take by mouth once daily.    atorvastatin (LIPITOR) 20 MG tablet TAKE 1 TABLET(20 MG) BY MOUTH EVERY DAY    BD ULTRA-FINE SHORT PEN NEEDLE 31 gauge x 5/16" Ndle USE UP TO 6 TIMES DAILY    blood sugar diagnostic (TRUE METRIX GLUCOSE TEST STRIP) Strp USE TO CHECK BLOOD SUGAR FOUR TIMES DAILY    blood-glucose meter kit To check BG 4 times daily, to use with insurance preferred meter    calcitRIOL (ROCALTROL) 0.25 MCG Cap TAKE 1 CAPSULE BY MOUTH EVERY DAY    cholestyramine (QUESTRAN) 4 gram packet Take 1 packet (4 g total) by mouth 2 (two) times daily as needed (for loose stool).    clopidogreL (PLAVIX) 75 mg tablet TAKE 1 TABLET(75 MG) BY MOUTH EVERY DAY    finasteride (PROSCAR) 5 mg tablet Take 1 tablet (5 mg total) by mouth once daily.    fish oil-omega-3 fatty acids 300-1,000 mg capsule Take by mouth once daily.    FLUAD QUAD 2021-22,65Y UP,,PF, 60 mcg (15 mcg x 4)/0.5 mL Syrg     fluticasone propionate (FLONASE) 50 mcg/actuation nasal spray 2 sprays (100 mcg total) by Each Nostril route once daily.    folic acid (FOLVITE) 1 MG tablet Take 1 mg by mouth once daily.    hydroCHLOROthiazide (HYDRODIURIL) 12.5 MG Tab TAKE 1 TABLET(12.5 MG) BY MOUTH EVERY DAY    insulin degludec (TRESIBA FLEXTOUCH U-100) 100 unit/mL (3 mL) insulin pen Inject 22 Units into the skin once daily.    irbesartan (AVAPRO) 300 MG tablet TAKE 1 TABLET(300 MG) BY MOUTH EVERY EVENING    nitroGLYCERIN (NITROSTAT) 0.4 MG SL tablet TAKE 1 TABLET UNDER THE TONGUE EVERY 5 MINUTES AS NEEDED    NOVOLOG FLEXPEN U-100 INSULIN 100 unit/mL (3 mL) InPn pen INJECT 15 UNITS UNDER THE SKIN FOUR TIMES DAILY, BEFORE MEALS PLUS CORRECTION SCALE, MAX TOTAL DAILY DOSE OF 75 UNITS    psyllium (METAMUCIL) powder Take 1 packet by mouth.    tamsulosin (FLOMAX) 0.4 mg Cap TAKE 1 CAPSULE(0.4 MG) BY MOUTH EVERY EVENING    torsemide (DEMADEX) 10 MG Tab Take 2 " tablets (20 mg total) by mouth once daily. (Patient taking differently: Take 20 mg by mouth once daily. 1 tablet once a day)    TRUEPLUS LANCETS 30 gauge Misc USE TO CHECK BLOOD SUGAR FOUR TIMES DAILY    vitamin D (VITAMIN D3) 1000 units Tab Take 1,000 Units by mouth once daily.    zinc gluconate 50 mg tablet Take 50 mg by mouth once daily.     Family History       Problem Relation (Age of Onset)    Alcohol abuse Brother    Cancer Maternal Aunt    Cataracts Mother    Diabetes Father, Sister, Paternal Uncle    Goiter Mother    Heart disease Father, Mother    Hypertension Father    No Known Problems Daughter, Son, Son          Tobacco Use    Smoking status: Former Smoker     Packs/day: 1.00     Years: 40.00     Pack years: 40.00     Types: Cigarettes     Quit date: 1970     Years since quittin.8    Smokeless tobacco: Former User   Substance and Sexual Activity    Alcohol use: Yes     Alcohol/week: 3.0 standard drinks     Types: 1 Cans of beer, 1 Shots of liquor, 1 Standard drinks or equivalent per week    Drug use: No    Sexual activity: Not Currently     Review of Systems   Constitutional:  Negative for chills, fatigue and fever.   HENT:  Negative for sore throat and trouble swallowing.    Eyes:  Negative for photophobia and visual disturbance.   Respiratory:  Positive for shortness of breath. Negative for cough.    Cardiovascular:  Positive for chest pain. Negative for palpitations and leg swelling.   Gastrointestinal:  Negative for abdominal pain, constipation, diarrhea, nausea and vomiting.   Endocrine: Negative for cold intolerance and heat intolerance.   Genitourinary:  Negative for dysuria and frequency.   Musculoskeletal:  Negative for arthralgias and myalgias.   Skin:  Negative for rash and wound.   Neurological:  Negative for dizziness, syncope, weakness and light-headedness.   Psychiatric/Behavioral:  Negative for confusion and hallucinations.    All other systems reviewed and are  negative.  Objective:     Vital Signs (Most Recent):  Temp: 98.7 °F (37.1 °C) (04/26/22 1643)  Pulse: 77 (04/26/22 1928)  Resp: 18 (04/26/22 1643)  BP: (!) 170/78 (04/26/22 1928)  SpO2: (!) 89 % (04/26/22 1928)   Vital Signs (24h Range):  Temp:  [98.7 °F (37.1 °C)] 98.7 °F (37.1 °C)  Pulse:  [77-80] 77  Resp:  [18] 18  SpO2:  [89 %-99 %] 89 %  BP: (140-170)/(64-78) 170/78     Weight: 99.8 kg (220 lb)  Body mass index is 31.12 kg/m².    Physical Exam  Vitals reviewed.   Constitutional:       Appearance: Normal appearance. He is well-developed. He is obese.   HENT:      Head: Normocephalic and atraumatic.   Eyes:      General: No scleral icterus.     Pupils: Pupils are equal, round, and reactive to light.   Cardiovascular:      Rate and Rhythm: Normal rate and regular rhythm.      Heart sounds: No murmur heard.  Pulmonary:      Effort: Pulmonary effort is normal. No respiratory distress.      Breath sounds: Normal breath sounds. No wheezing.   Abdominal:      General: Bowel sounds are normal. There is no distension.      Palpations: Abdomen is soft.      Tenderness: There is no abdominal tenderness.   Musculoskeletal:         General: Normal range of motion.      Cervical back: Normal range of motion and neck supple.   Skin:     General: Skin is warm and dry.   Neurological:      General: No focal deficit present.      Mental Status: He is alert and oriented to person, place, and time. Mental status is at baseline.   Psychiatric:         Behavior: Behavior normal.         CRANIAL NERVES     CN III, IV, VI   Pupils are equal, round, and reactive to light.     Significant Labs: All pertinent labs within the past 24 hours have been reviewed.  CBC:   Recent Labs   Lab 04/26/22  1744   WBC 7.89   HGB 12.4*   HCT 38.3*   *     CMP:   Recent Labs   Lab 04/26/22  1744      K 3.9      CO2 15*   *   BUN 69*   CREATININE 2.5*   CALCIUM 9.8   PROT 7.5   ALBUMIN 3.9   BILITOT 1.0   ALKPHOS 46*   AST 19    ALT 19   ANIONGAP 16   EGFRNONAA 23.0*     Troponin:   Recent Labs   Lab 04/26/22  1744 04/26/22  1921   TROPONINI 0.203* 0.223*       Significant Imaging: I have reviewed all pertinent imaging results/findings within the past 24 hours.  EKG: I have reviewed all pertinent results/findings within the past 24 hours and my personal findings are: No STEMI    Assessment/Plan:     * Unstable angina  · NSTEMI/UA Pathway initiated  · EKG with AV block, no STEMI  · Initial troponin 0.203, will trend until it peaks  · Start Heparin gtt  · S/p Aspirin 324mg x 1.  Start Aspirin 81mg PO daily  · S/p Plavix 300mg PO x 1.  Start Plavix 75mg PO daily  · Increase Lipitor as above  · Hold BB given HR 50s  · Prn Nitro for chest pain  · Continue tele  · Check 2D echo  · Cardiology consult  · NPO at midnight in case of further testing    History of MI (myocardial infarction)  · As above      Coronary artery disease involving native coronary artery of native heart with unstable angina pectoris  · Chronic issue  · History of STEMI with PCI in 2009  · Was previously on Aspirin and Plavix, but taken off Aspirin  · Continue Aspirin and Plavix for now with NSTEMI  · Continue Statin as above      AV block  · Recently diagnosed  · Has been seeing Dr. 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,  with long-term current use of insulin  · HbA1c 6.5  · Home DM regimen:  Detemir 22 daily, Novolog 8-10 am, 15 lunch and dinner  · Detemir 10 with Aspart 5 units TIDWM  · SSI with POCT accuchecks to achieve blood glucose of 140-180 while hospitalized  · Diabetic diet        Thrombocytopenia  · Monitor for bleeding      Hypertension associated with diabetes  · Chronic issue  · Stop Norvasc and HCTZ for now  · Hold Torsemide as he looks euvolemic  · Continue ARB - Avapro 300mg at home, Losartan 100mg on formulary  · Start Imdur 30mg daily, titrate up as tolerated  · Can consider Ranexa      Benign prostatic hyperplasia  · Chronic and stable  · Continue Flomax 0.4mg PO daily  · Continue Finasteride 5mg PO daily      Hyperlipidemia associated with type 2 diabetes mellitus  · Chronic issue  · Check lipid panel  · Increase Lipitor from 20mg to 40mg        VTE Risk Mitigation (From admission, onward)         Ordered     heparin 25,000 units in dextrose 5% (100 units/ml) IV bolus from bag - ADDITIONAL PRN BOLUS - 60 units/kg (max bolus 4000 units)  As needed (PRN)        Question:  Heparin Infusion Adjustment (DO NOT MODIFY ANSWER)  Answer:  \[email or web address]\[email or web address]\Images\Pharmacy\HeparinInfusions\heparin LOW INTENSITY nomogram for [facility] [id number].[email or web address]    04/26/22 1849     heparin 25,000 units in dextrose 5% (100 units/ml) IV bolus from bag - ADDITIONAL PRN BOLUS - 30 units/kg (max bolus 4000 units)  As needed (PRN)        Question:  Heparin Infusion Adjustment (DO NOT MODIFY ANSWER)  Answer:  \[email or web address]\[email or web address]\Images\Pharmacy\HeparinInfusions\heparin LOW INTENSITY nomogram for [facility] [id number]    04/26/22 1849     IP VTE HIGH RISK PATIENT  Once         04/26/22 1937     Place sequential compression device  Until discontinued         04/26/22 1935     Place sequential compression device  Until discontinued         04/26/22 1935     heparin 25,000 units in dextrose 5% 250 mL (100 units/mL) infusion LOW INTENSITY nomogram - [facility]   Continuous        Question Answer Comment   Heparin Infusion Adjustment (DO NOT MODIFY ANSWER) [email or web address]\Images\Pharmacy\HeparinInfusions\heparin LOW INTENSITY nomogram for [facility] [id number].[email or web address]    Begin at (in units/kg/hr) 12        04/26/22 1849               Critical Care Time: 35 minutes    Critical Care was time spent personally by me on the following activities: evaluating this patient's organ dysfunction, development of treatment plan, discussing treatment plan with patient or surrogate and bedside caregivers, discussions with consultants, evaluation of patient's response to treatment, examination of patient, ordering and performing treatments and interventions, ordering and review of laboratory studies, ordering and review of radiographic studies, re-evaluation of patient's condition. This critical care time did not overlap with that of any other provider or involve time for any separately billed procedures    Diagnosis requiring critical care: NSTEQUEENIE Preston MD  Department of Hospital Medicine   [facility] - Emergency Dept

## 2022-04-27 NOTE — CONSULTS
PharmD Consult received for:  1.) Admit medication history/reconciliation: complete, see note dated 4/27    2.) Renally adjust all medications:  Estimated Creatinine Clearance: 28.2 mL/min (A) (based on SCr of 2.4 mg/dL (H)).   Medications reviewed, no dose adjustments needed        Thank you for the consult,  Katelyn Lane, PharmD, BCPS  Ext. [phone]      **Note: Consults are reviewed Monday-Friday 7:00am-3:30pm. The above recommendations are only suggested. The recommendations should be considered in conjunction with all patient factors.**

## 2022-04-27 NOTE — PLAN OF CARE
Pt TRACY when CM went to bedside. Will complete d/c planning assessment with patient in AM.    Julie Haase RN  Case Management [phone]

## 2022-04-27 NOTE — CONSULTS
"Admission Medication Reconciliation - Pharmacy Consult Note    The home medication history was taken by Katelyn Lane.  Based on information gathered and subsequent review by the clinical pharmacist, the items below may need attention.     You may go to "Admission" then "Reconcile Home Medications" tabs to review and/or act upon these items.     Potentially problematic discrepancies with current MAR  o Patient IS taking the following which was not ordered upon admit  o Torsemide 10 mg po daily  o Hydrochlorothiazide 12.5 mg po daily  o Amlodipine 5 mg po daily    o Patient IS NOT taking the following which was ordered upon admit  o Insulin aspart 5 units TID > patient only takes SSI with meals at home    o Patient is taking a DIFFERENT DRUG than that ordered upon admit  o Irbesartan 300 mg po daily (ordered as formulary alternative, losartan 100 mg daily - appropriate)    o Patient is taking a drug DIFFERENTLY than how ordered upon admit  o Atorvastatin 20 mg po daily (ordered as 40 mg po daily)  o Triseba 15 u subQ daily (ordered as detemir 10 u subQ daily - Tresiba is not on formulary, glucose ok, continue as ordered for now)    Potential issues to be addressed PRIOR TO DISCHARGE  o Patient would like bedside delivery of medications    Please address this information as you see fit.  Feel free to contact us if you have any questions or require assistance.    Katelyn Lane, PharmD, BCPS  Ext. [phone]  "

## 2022-04-27 NOTE — ASSESSMENT & PLAN NOTE
· NSTEMI/UA Pathway initiated  · EKG with AV block, no STEMI  · Initial troponin 0.203, still uptrending to 0.273 this morning, continuing trend  · Continue Heparin gtt  · S/p Aspirin 324mg x 1.  Continue Aspirin 81mg PO daily  · S/p Plavix 300mg PO x 1.  Continue Plavix 75mg PO daily  · Continue Lipitor   · Hold BB   · Prn Nitro for chest pain  · Continue tele  · Check 2D echo  · Cardiology following: PET stress today to evaluate need for LHC, also tentatively planning for PPM placement

## 2022-04-27 NOTE — ASSESSMENT & PLAN NOTE
· HbA1c 6.5  · Home DM regimen:  Detemir 22 daily, Novolog 8-10 am, 15 lunch and dinner  · Detemir 10 with Aspart 5 units TIDWM  · SSI with POCT accuchecks to achieve blood glucose of 140-180 while hospitalized  · Diabetic diet

## 2022-04-27 NOTE — ASSESSMENT & PLAN NOTE
· NSTEMI/UA Pathway initiated  · EKG with AV block, no STEMI  · Initial troponin 0.203, will trend until it peaks  · Start Heparin gtt  · S/p Aspirin 324mg x 1.  Start Aspirin 81mg PO daily  · S/p Plavix 300mg PO x 1.  Start Plavix 75mg PO daily  · Increase Lipitor as above  · Hold BB given HR 50s  · Prn Nitro for chest pain  · Continue tele  · Check 2D echo  · Cardiology consult  · NPO at midnight in case of further testing

## 2022-04-27 NOTE — PROGRESS NOTES
[facility] - Cardiology [facility] Medicine  Progress Note    Patient Name: Kevon Perez  MRN: [id number]  Patient Class: IP- Inpatient   Admission Date: 4/26/2022  Length of Stay: 1 days  Attending Physician: Keith Miles MD  Primary Care Provider: Cipriano Sol MD        Subjective:     Principal Problem:Unstable angina        HPI:  Mr. Kevon Perez is a 81 y.o. male with CAD, history of STEMI s/p PCI, and 2nd degree AV block who presents to the ER from Cardiology clinic for evaluation of chest pain.  He reports that for the last 3 days, he has been having midsternal chest pain with minimal exertion.  The chest pain lasts about 20 minutes, and then resolves when he sits down.  The pain is similar to when he had his MCI in 2009.  He did not take any Nitroglycerin.  He endorses chronic shortness of breath, but nothing worse than normal.  He denies any nausea, vomiting, dizziness, or lightheadedness.  Of note, he was recently diagnosed with AV block, and has been seeing Dr. Avendaño about getting a pacemaker placed.  His primary Cardiologist is Dr. Teixeira.  He is compliant with his Plavix.  He was on Aspirin until about 6 months ago, which was stopped for ulcer disease.  Given his concerns for unstable angina, he was sent to the ER for further evaluation.    Upon arrival to the ER, vitals were temp 98.7F, HR 80, /64.  EKG was not ischemic.  Troponin was 0.203.  He was evaluated by Cardiology, who suggested NSTEMI treatment with Aspirin, Plavix, and Heparin gtt.  He was admitted to Hospital Medicine for further management.        Overview/Hospital Course:  Pt admitted to  team G and was started on ACS protocol. Cardiology following, concerns for complete heart block. Performing PET stress on 4/27 to evaluate for need of LHC, and also tentatively planning for PPM placement. Pt remained asymptomatic.      Interval History: Pt seen and examined this morning on rounds. ADEEL. Comfortable in bed,  grossly asymptomatic. Awaiting stress testing and possible PPM per cardiology. Care plan reviewed. Otherwise, doing well and with no further complaints at this time.      Objective:     Vital Signs (Most Recent):  Temp: 97.5 °F (36.4 °C) (04/27/22 1131)  Pulse: 86 (04/27/22 1131)  Resp: 20 (04/27/22 1131)  BP: (!) 123/59 (04/27/22 1131)  SpO2: 95 % (04/27/22 1131)   Vital Signs (24h Range):  Temp:  [96.2 °F (35.7 °C)-98.7 °F (37.1 °C)] 97.5 °F (36.4 °C)  Pulse:  [48-93] 86  Resp:  [12-20] 20  SpO2:  [89 %-99 %] 95 %  BP: (118-174)/(59-78) 123/59     Weight: 98.9 kg (218 lb)  Body mass index is 31.28 kg/m².    Intake/Output Summary (Last 24 hours) at 4/27/2022 1151  Last data filed at 4/27/2022 0600  Gross per 24 hour   Intake 476 ml   Output 1300 ml   Net -824 ml        Physical Exam  Gen: in NAD, appears stated age; pt is obese  Neuro: AAOx4, CN2-12 grossly intact BL; motor, sensory, and strength grossly intact BL  HEENT: NTNC, EOMI, PERRLA, MMM; no thyromegaly or lymphadenopathy; no JVD appreciated  CVS: RRR, no m/r/g; S1/S2 auscultated with no S3 or S4; capillary refill < 2 sec  Resp: lungs CTAB, no w/r/r; no belabored breathing or accessory muscle use appreciated   Abd: BS+ in all 4 quadrants; NTND, soft to palpation; no organomegaly appreciated   Extrem: pulses full, equal, and regular over all 4 extremities; trace BL LE pitting edema      Significant Labs: All pertinent labs within the past 24 hours have been reviewed.    Significant Imaging: I have reviewed all pertinent imaging results/findings within the past 24 hours.      Assessment/Plan:      * Unstable angina  · NSTEMI/UA Pathway initiated  · EKG with AV block, no STEMI  · Initial troponin 0.203, still uptrending to 0.273 this morning, continuing trend  · Continue Heparin gtt  · S/p Aspirin 324mg x 1.  Continue Aspirin 81mg PO daily  · S/p Plavix 300mg PO x 1.  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, with long-term current use of insulin  · HbA1c 6.5  · Home DM regimen:  Detemir 22 daily, Novolog 8-10 am, 15 lunch and dinner  · Detemir 10 with Aspart 5 units TIDWM  · SSI with POCT accuchecks to achieve blood glucose of 140-180 while hospitalized  · Diabetic diet        Thrombocytopenia  · Monitor for bleeding      History of MI (myocardial infarction)  · As above      Hypertension associated with diabetes  · Chronic issue  · Stop Norvasc and HCTZ for now  · Hold Torsemide as he looks euvolemic  · Continue ARB - Avapro 300mg at home, Losartan 100mg on formulary  · Start Imdur 30mg daily, titrate up as tolerated  · Can consider Ranexa      Benign prostatic hyperplasia  · Chronic and stable  · Continue Flomax 0.4mg PO daily  · Continue Finasteride 5mg PO daily      Hyperlipidemia associated with type 2 diabetes mellitus  · Chronic issue  · Check lipid panel  · Increase Lipitor from 20mg to 40mg      Coronary artery disease involving native  coronary artery of native heart with unstable angina pectoris  · Chronic issue  · History of STEMI with PCI in 2009  · Was previously on Aspirin and Plavix, but taken off Aspirin  · Continue Aspirin and Plavix for now with NSTEMI  · Continue Statin as above        VTE Risk Mitigation (From admission, onward)         Ordered     heparin 25,000 units in dextrose 5% (100 units/ml) IV bolus from bag - ADDITIONAL PRN BOLUS - 60 units/kg (max bolus 4000 units)  As needed (PRN)        Question:  Heparin Infusion Adjustment (DO NOT MODIFY ANSWER)  Answer:  \\[email or web address]\epic\Images\Pharmacy\HeparinInfusions\heparin LOW INTENSITY nomogram for [facility] [id number].pdf    04/26/22 1849     heparin 25,000 units in dextrose 5% (100 units/ml) IV bolus from bag - ADDITIONAL PRN BOLUS - 30 units/kg (max bolus 4000 units)  As needed (PRN)        Question:  Heparin Infusion Adjustment (DO NOT MODIFY ANSWER)  Answer:  \[email or web address]\epic\Images\Pharmacy\HeparinInfusions\heparin LOW INTENSITY nomogram for [facility] [id number].pdf    04/26/22 1849     IP VTE HIGH RISK PATIENT  Once         04/26/22 1937     Place sequential compression device  Until discontinued         04/26/22 1935     Place sequential compression device  Until discontinued         04/26/22 1935     heparin 25,000 units in dextrose 5% 250 mL (100 units/mL) infusion LOW INTENSITY nomogram - [facility]  Continuous        Question Answer Comment   Heparin Infusion Adjustment (DO NOT MODIFY ANSWER) \\[email or web address]\epic\Images\Pharmacy\HeparinInfusions\heparin LOW INTENSITY nomogram for [facility] [id number].pdf    Begin at (in units/kg/hr) 12        04/26/22 1849                Discharge Planning   HEMANT:      Code Status: Full Code   Is the patient medically ready for discharge?:     Reason for patient still in hospital (select all that apply): Patient trending condition             Keith Miles MD  Attending Physician  Department of Hospital Medicine  Epic secure chat preferred, or ext.  [phone]  4/27/2022

## 2022-04-27 NOTE — PLAN OF CARE
Recommendations  1. Continue current cardiac diet- fluid per MD   2. If PO intake declines <50% add Boost Glucose Control BID  3. RD following    Goals: Meet % EEN, EPN by RD f/u date  Nutrition Goal Status: goal met  Communication of RD Recs: other (POC)    By Ricarda CARLOS

## 2022-04-27 NOTE — PROGRESS NOTES
[facility] - Cardiology Stepdown  Cardiology  Progress Note    Patient Name: Kevon Perez  MRN: [id number]  Admission Date: 4/26/2022  Hospital Length of Stay: 1 days  Code Status: Full Code   Attending Physician: Keith Miles MD   Primary Care Physician: Cipriano Sol MD  Expected Discharge Date:   Principal Problem:NSTEMI (non-ST elevated myocardial infarction)    Subjective:     Hospital Course:   Started on ACS protocol. Interventional Cardiology and Electrophysiology following.      Interval History: Admitted to hospital medicine. Started on ACS protocol. Electrophysiology and Interventional Cardiology following.     Review of Systems   Constitutional: Negative for chills and decreased appetite.   HENT:  Negative for congestion.    Cardiovascular:  Negative for chest pain, irregular heartbeat and leg swelling.   Respiratory:  Negative for cough and shortness of breath.    Skin:  Negative for rash.   Musculoskeletal:  Negative for arthritis and back pain.   Gastrointestinal:  Negative for abdominal pain, constipation and diarrhea.   Genitourinary:  Negative for dysuria and hematuria.   Neurological:  Negative for dizziness and headaches.   Psychiatric/Behavioral:  Negative for altered mental status.    Objective:     Vital Signs (Most Recent):  Temp: 96.8 °F (36 °C) (04/27/22 0712)  Pulse: 71 (04/27/22 0733)  Resp: 20 (04/27/22 0712)  BP: (!) 174/72 (04/27/22 0712)  SpO2: 95 % (04/27/22 0712)   Vital Signs (24h Range):  Temp:  [96.2 °F (35.7 °C)-98.7 °F (37.1 °C)] 96.8 °F (36 °C)  Pulse:  [48-80] 71  Resp:  [12-20] 20  SpO2:  [89 %-99 %] 95 %  BP: (118-174)/(62-78) 174/72     Weight: 99 kg (218 lb 4.1 oz)  Body mass index is 30.87 kg/m².     SpO2: 95 %  O2 Device (Oxygen Therapy): room air      Intake/Output Summary (Last 24 hours) at 4/27/2022 0737  Last data filed at 4/27/2022 0600  Gross per 24 hour   Intake 476 ml   Output 1300 ml   Net -824 ml       Lines/Drains/Airways       Peripheral Intravenous  Line  Duration                  Peripheral IV - Single Lumen 04/26/22 1741 18 G Right Forearm <1 day                    Physical Exam  Vitals reviewed.   Constitutional:       General: He is not in acute distress.  HENT:      Head: Normocephalic and atraumatic.   Eyes:      General: No scleral icterus.        Right eye: No discharge.         Left eye: No discharge.   Neck:      Vascular: No JVD.   Cardiovascular:      Rate and Rhythm: Normal rate and regular rhythm.      Heart sounds: Normal heart sounds.     No friction rub.   Pulmonary:      Effort: Pulmonary effort is normal. No respiratory distress.      Breath sounds: Normal breath sounds. No wheezing.   Abdominal:      General: Abdomen is flat. Bowel sounds are normal. There is no distension.      Palpations: Abdomen is soft.   Skin:     General: Skin is warm and dry.      Coloration: Skin is not jaundiced.   Neurological:      Mental Status: He is alert and oriented to person, place, and time. Mental status is at baseline.       Significant Labs: All pertinent labs within the past 24 hours have been reviewed.  CBC:   Recent Labs   Lab 04/26/22 1744 04/27/22  0238   WBC 7.89 7.82   HGB 12.4* 11.5*   HCT 38.3* 35.1*   * 136*     CMP:   Recent Labs   Lab 04/26/22  1744 04/27/22  0238    140   K 3.9 3.7    110   CO2 15* 20*   * 169*   BUN 69* 62*   CREATININE 2.5* 2.4*   CALCIUM 9.8 9.7   PROT 7.5 6.1   ALBUMIN 3.9 3.2*   BILITOT 1.0 0.9   ALKPHOS 46* 39*   AST 19 14   ALT 19 15   ANIONGAP 16 10   EGFRNONAA 23.0* 24.2*     Magnesium:   Recent Labs   Lab 04/27/22  0238   MG 1.6     Phosphorous:  Recent Labs   Lab 04/27/22  0238   PHOS 3.8     POCT Glucose:   No results for input(s): POCTGLUCOSE in the last 48 hours.    Troponin   Recent Labs   Lab 04/26/22  2107 04/26/22  2250 04/27/22  0238   TROPONINI 0.231* 0.241* 0.266*  0.257*       Significant Imaging:    Results for orders placed or performed during the hospital encounter of  04/26/22   SCHEDULED EKG 12-LEAD (to Muse)    Collection Time: 04/26/22  3:01 PM    Narrative    Test Reason : R07.9,    Vent. Rate : 076 BPM     Atrial Rate : 000 BPM     P-R Int : 000 ms          QRS Dur : 106 ms      QT Int : 352 ms       P-R-T Axes : 000 026 191 degrees     QTc Int : 396 ms    NSR with first degree AVB  Cannot completely exclude IMI (cited on or before 28-DEC-2021)  Abnormal ECG  When compared with ECG of 04-APR-2022 08:12,  No change  Confirmed by Seth CARDOZA MD ([id number]) on 4/26/2022 3:24:29 PM    Referred By: RUSH HURT           Confirmed By:Seth CARDOZA MD       ECHO[01/06/21]   Summary    · The left ventricle is normal in size with normal systolic function. The estimated ejection fraction is 63%  · Normal left ventricular diastolic function.  · Mild left atrial enlargement.  · Normal right ventricular size with normal right ventricular systolic function.  · There is mild-to-moderate aortic valve stenosis.  · Aortic valve area is 1.53 cm2; peak velocity is 2.03 m/s; mean gradient is 9 mmHg.  · Normal central venous pressure (3 mmHg).  · The estimated PA systolic pressure is 30 mmHg.      Inpatient Medications:  Continuous Infusions:   heparin (porcine) in D5W 12 Units/kg/hr (04/26/22 2006)     Scheduled Meds:   aspirin  81 mg Oral Daily    atorvastatin  40 mg Oral Daily    calcitRIOL  0.25 mcg Oral Daily    clopidogreL  75 mg Oral Daily    finasteride  5 mg Oral Daily    insulin aspart U-100  5 Units Subcutaneous TIDWM    insulin detemir U-100  10 Units Subcutaneous Daily    isosorbide mononitrate  30 mg Oral Daily    losartan  100 mg Oral Daily    tamsulosin  0.4 mg Oral Daily     PRN Meds:acetaminophen, acetaminophen, dextrose 10%, dextrose 10%, glucagon (human recombinant), glucose, glucose, heparin (PORCINE), heparin (PORCINE), insulin aspart U-100, melatonin, nitroGLYCERIN, ondansetron, polyethylene glycol, sodium chloride 0.9%, sodium chloride 0.9%      Assessment and Plan:  "    Brief HPI: Kevon Perez is a 81 y.o. y.o. male who presents from clinic to ED with chest pain, Shortness of Breath, Dizziness, and Fatigue.   He is known to have CAD with prior STEMI s/p OM and Lcx PCI 2009, DM, HTN, HLD, CKD stage III. He presents to clinic then to ED with couple of days of having chest tightness that lasted ~ 20 minutes. There was no clear triggering or alleviating factors. He began to notice angina with minimal exertion while working around the house. He states his pain was midsternal and felt similar to his previous MI. He had SL NTG at home, however, he did not take this at home as his pain resolved shortly. Of note, he had a recent diagnosis of high degree AV block who presents for angina. He recently saw EP earlier this month and after wearing holter monitor he was found to have high degree AV block. He was offered a ppm, however, he wanted to trial discontinuing of coreg prior to ppm implantation. He stopped this evangelist. 04/05 and states his HR has been higher since that time.     Coronary artery disease involving native coronary artery of native heart with unstable angina pectoris  NSTEMI  2:1 AV Block  CKD Stage IV  HLD  Kevon Perez is a 81 y.o. y.o. male with known CAD s/p PCI in 2009 who presents from clinic to ED with chest pain, Shortness of Breath, Dizziness, and Fatigue. Patient with frequent episodes of angina that are happening daily over the past 3-4 days admitted with concerns for NSTEMI.    - S/p reloading with Aspirin and Plavix in ED.  - HEART score: 7. "Scores ?7: 50-65% risk of adverse cardiac event. In the HEART Score study, these patients were candidates for early invasive measures. (65.2% retrospective, 50.1% prospective) "  -FAWAD Score:125 points. 9 % Probability of death from admission to 6 months  -Prior sCr 1.7-2.5 and this could be prohibitive to proceed with invasive angiography   -Troponin peaked at 0.273    Recent Labs     04/27/22  1009 " 04/27/22  1234 04/27/22  1513   TROPONINI 0.273* 0.207* 0.224*     ECHO[04/27/22]  Summary  · The left ventricle is normal in size with normal systolic function.The estimated ejection fraction is 60%.  · There are segmental left ventricular wall motion abnormalities.  · Normal left ventricular diastolic function.  · Normal right ventricular size with normal right ventricular systolic function.  · Normal central venous pressure (3 mmHg).      RECOMMENDATIONS  - Will continue with medical management per ACS protocol with ASA/Plavix/Heparin  - Interventional Cardiology following. Given his CKD stage IV, pending decision on early invasive management based on Cardiac PET scan findings  - Electrophysiology team following, planning on eventual permanent pacemaker placement as he meets criteria. Currently hemodynamically stable, so no emergent need. Will decide on implantation after possible revascularization decision by Interventional Cards.  Continue to hold AV sue blocking agents.  - Continue HI statin Lipitor 80 mg daily   - Control blood pressure at home medication     Thank you for your consult. We will continue follow-up with patient. Please contact us if you have any additional questions.    Jimmie Hanks,   Cardiology  [facility] - Cardiology Stepdown

## 2022-04-27 NOTE — SUBJECTIVE & OBJECTIVE
Interval History: Admitted to hospital medicine. Started on ACS protocol. Electrophysiology and Interventional Cardiology following.     Review of Systems   Constitutional: Negative for chills and decreased appetite.   HENT:  Negative for congestion.    Cardiovascular:  Negative for chest pain, irregular heartbeat and leg swelling.   Respiratory:  Negative for cough and shortness of breath.    Skin:  Negative for rash.   Musculoskeletal:  Negative for arthritis and back pain.   Gastrointestinal:  Negative for abdominal pain, constipation and diarrhea.   Genitourinary:  Negative for dysuria and hematuria.   Neurological:  Negative for dizziness and headaches.   Psychiatric/Behavioral:  Negative for altered mental status.    Objective:     Vital Signs (Most Recent):  Temp: 96.8 °F (36 °C) (04/27/22 0712)  Pulse: 71 (04/27/22 0733)  Resp: 20 (04/27/22 0712)  BP: (!) 174/72 (04/27/22 0712)  SpO2: 95 % (04/27/22 0712)   Vital Signs (24h Range):  Temp:  [96.2 °F (35.7 °C)-98.7 °F (37.1 °C)] 96.8 °F (36 °C)  Pulse:  [48-80] 71  Resp:  [12-20] 20  SpO2:  [89 %-99 %] 95 %  BP: (118-174)/(62-78) 174/72     Weight: 99 kg (218 lb 4.1 oz)  Body mass index is 30.87 kg/m².     SpO2: 95 %  O2 Device (Oxygen Therapy): room air      Intake/Output Summary (Last 24 hours) at 4/27/2022 0737  Last data filed at 4/27/2022 0600  Gross per 24 hour   Intake 476 ml   Output 1300 ml   Net -824 ml       Lines/Drains/Airways       Peripheral Intravenous Line  Duration                  Peripheral IV - Single Lumen 04/26/22 1741 18 G Right Forearm <1 day                    Physical Exam  Vitals reviewed.   Constitutional:       General: He is not in acute distress.  HENT:      Head: Normocephalic and atraumatic.   Eyes:      General: No scleral icterus.        Right eye: No discharge.         Left eye: No discharge.   Neck:      Vascular: No JVD.   Cardiovascular:      Rate and Rhythm: Normal rate and regular rhythm.      Heart sounds: Normal  heart sounds.     No friction rub.   Pulmonary:      Effort: Pulmonary effort is normal. No respiratory distress.      Breath sounds: Normal breath sounds. No wheezing.   Abdominal:      General: Abdomen is flat. Bowel sounds are normal. There is no distension.      Palpations: Abdomen is soft.   Skin:     General: Skin is warm and dry.      Coloration: Skin is not jaundiced.   Neurological:      Mental Status: He is alert and oriented to person, place, and time. Mental status is at baseline.       Significant Labs: All pertinent labs within the past 24 hours have been reviewed.  CBC:   Recent Labs   Lab 04/26/22 1744 04/27/22  0238   WBC 7.89 7.82   HGB 12.4* 11.5*   HCT 38.3* 35.1*   * 136*     CMP:   Recent Labs   Lab 04/26/22 1744 04/27/22  0238    140   K 3.9 3.7    110   CO2 15* 20*   * 169*   BUN 69* 62*   CREATININE 2.5* 2.4*   CALCIUM 9.8 9.7   PROT 7.5 6.1   ALBUMIN 3.9 3.2*   BILITOT 1.0 0.9   ALKPHOS 46* 39*   AST 19 14   ALT 19 15   ANIONGAP 16 10   EGFRNONAA 23.0* 24.2*     Magnesium:   Recent Labs   Lab 04/27/22  0238   MG 1.6     Phosphorous:  Recent Labs   Lab 04/27/22  0238   PHOS 3.8     POCT Glucose:   No results for input(s): POCTGLUCOSE in the last 48 hours.    Troponin   Recent Labs   Lab 04/26/22  2107 04/26/22  2250 04/27/22  0238   TROPONINI 0.231* 0.241* 0.266*  0.257*       Significant Imaging:    Results for orders placed or performed during the hospital encounter of 04/26/22   SCHEDULED EKG 12-LEAD (to Muse)    Collection Time: 04/26/22  3:01 PM    Narrative    Test Reason : R07.9,    Vent. Rate : 076 BPM     Atrial Rate : 000 BPM     P-R Int : 000 ms          QRS Dur : 106 ms      QT Int : 352 ms       P-R-T Axes : 000 026 191 degrees     QTc Int : 396 ms    NSR with first degree AVB  Cannot completely exclude IMI (cited on or before 28-DEC-2021)  Abnormal ECG  When compared with ECG of 04-APR-2022 08:12,  No change  Confirmed by Seth CARDOZA MD ([id number]) on  4/26/2022 3:24:29 PM    Referred By: RUSH HURT           Confirmed By:Seth CARDOZA MD       ECHO[01/06/21]   Summary    The left ventricle is normal in size with normal systolic function. The estimated ejection fraction is 63%  Normal left ventricular diastolic function.  Mild left atrial enlargement.  Normal right ventricular size with normal right ventricular systolic function.  There is mild-to-moderate aortic valve stenosis.  Aortic valve area is 1.53 cm2; peak velocity is 2.03 m/s; mean gradient is 9 mmHg.  Normal central venous pressure (3 mmHg).  The estimated PA systolic pressure is 30 mmHg.      Inpatient Medications:  Continuous Infusions:   heparin (porcine) in D5W 12 Units/kg/hr (04/26/22 2006)     Scheduled Meds:   aspirin  81 mg Oral Daily    atorvastatin  40 mg Oral Daily    calcitRIOL  0.25 mcg Oral Daily    clopidogreL  75 mg Oral Daily    finasteride  5 mg Oral Daily    insulin aspart U-100  5 Units Subcutaneous TIDWM    insulin detemir U-100  10 Units Subcutaneous Daily    isosorbide mononitrate  30 mg Oral Daily    losartan  100 mg Oral Daily    tamsulosin  0.4 mg Oral Daily     PRN Meds:acetaminophen, acetaminophen, dextrose 10%, dextrose 10%, glucagon (human recombinant), glucose, glucose, heparin (PORCINE), heparin (PORCINE), insulin aspart U-100, melatonin, nitroGLYCERIN, ondansetron, polyethylene glycol, sodium chloride 0.9%, sodium chloride 0.9%

## 2022-04-27 NOTE — HPI
Mr. Kevon Perez is a 81 y.o. male with CAD, history of STEMI s/p PCI, and 2nd degree AV block who presents to the ER from Cardiology clinic for evaluation of chest pain.  He reports that for the last 3 days, he has been having midsternal chest pain with minimal exertion.  The chest pain lasts about 20 minutes, and then resolves when he sits down.  The pain is similar to when he had his MCI in 2009.  He did not take any Nitroglycerin.  He endorses chronic shortness of breath, but nothing worse than normal.  He denies any nausea, vomiting, dizziness, or lightheadedness.  Of note, he was recently diagnosed with AV block, and has been seeing Dr. Avendaño about getting a pacemaker placed.  His primary Cardiologist is Dr. Teixeira.  He is compliant with his Plavix.  He was on Aspirin until about 6 months ago, which was stopped for ulcer disease.  Given his concerns for unstable angina, he was sent to the ER for further evaluation.    Upon arrival to the ER, vitals were temp 98.7F, HR 80, /64.  EKG was not ischemic.  Troponin was 0.203.  He was evaluated by Cardiology, who suggested NSTEMI treatment with Aspirin, Plavix, and Heparin gtt.  He was admitted to Hospital Medicine for further management.

## 2022-04-27 NOTE — ASSESSMENT & PLAN NOTE
- Interval history and physical exam findings as described above  - Cardiology following: tentative plans for PPM placement  - Monitoring on tele

## 2022-04-27 NOTE — CONSULTS
"Unable to see pt today in hospital. Information packet sent to patient re: Phase 2 cardiac rehab, which includes "Your Guide to Living with Heart Disease".  Letter was also sent to patient.    Tarik Perkins RN  Cardiac Rehab Nurse  "

## 2022-04-27 NOTE — ASSESSMENT & PLAN NOTE
· Chronic issue  · Hold HCTZ, Avapro and Torsemide for now  · Likely not a candidate for angiogram given CKD

## 2022-04-27 NOTE — ASSESSMENT & PLAN NOTE
· Chronic issue  · Stop Norvasc and HCTZ for now  · Hold Torsemide as he looks euvolemic  · Continue ARB - Avapro 300mg at home, Losartan 100mg on formulary  · Start Imdur 30mg daily, titrate up as tolerated  · Can consider Ranexa

## 2022-04-27 NOTE — SUBJECTIVE & OBJECTIVE
Past Medical History:   Diagnosis Date    Acute coronary syndrome 9/10/09    STEMI    Anticoagulant long-term use     plavix    Basal cell cancer     BCC (basal cell carcinoma of skin)     nose    Cancer of bladder January 2013    Cataract     Chronic kidney disease     Colon polyp     Coronary artery disease     CPAP (continuous positive airway pressure) dependence     Diabetes mellitus     Diabetic retinopathy     Encounter for blood transfusion     GERD (gastroesophageal reflux disease)     High cholesterol     Hyperlipidemia     Hypertension     Iron deficiency anemia 5/11/2018    GINGER (obstructive sleep apnea)     CPAP     Renal manifestation of secondary diabetes mellitus     SOB (shortness of breath)        Past Surgical History:   Procedure Laterality Date    BASAL CELL CARCINOMA EXCISION      nose     BLADDER SURGERY      bladder cancer    CARDIAC CATHETERIZATION      CATARACT EXTRACTION      bilateral     COLONOSCOPY  3/26/15    COLONOSCOPY N/A 12/4/2020    Procedure: COLONOSCOPY;  Surgeon: Keshav Rajan MD;  Location: [facility] ([address] [place] [address]);  Service: Endoscopy;  Laterality: N/A;  covid test 12/1-pcw-tb  pt SOB x 1 year-ok to hold Plavix x 5 days per Dr. Sol-see telephone encounter dated 8/25  10/14-instructions emailed to [email or web address]    CORONARY ANGIOPLASTY  9/10/09    CFX    CORONARY ANGIOPLASTY WITH STENT PLACEMENT      CYSTOSCOPY      ESOPHAGOGASTRODUODENOSCOPY N/A 1/27/2021    Procedure: EGD (ESOPHAGOGASTRODUODENOSCOPY);  Surgeon: Matt Acosta MD;  Location: [facility];  Service: Endoscopy;  Laterality: N/A;    EYE SURGERY      hydrocel         Review of patient's allergies indicates:   Allergen Reactions    Penicillins Other (See Comments)     Muscle stiffness    Bactrim [sulfamethoxazole-trimethoprim] Rash       Current Facility-Administered Medications on File Prior to Encounter   Medication    [DISCONTINUED] cyanocobalamin injection 1,000 mcg    [DISCONTINUED] cyanocobalamin  "injection 100 mcg     Current Outpatient Medications on File Prior to Encounter   Medication Sig    amLODIPine (NORVASC) 5 MG tablet TAKE 1 TABLET(5 MG) BY MOUTH EVERY DAY    ascorbic acid, vitamin C, (VITAMIN C) 1000 MG tablet Take by mouth once daily.    atorvastatin (LIPITOR) 20 MG tablet TAKE 1 TABLET(20 MG) BY MOUTH EVERY DAY    BD ULTRA-FINE SHORT PEN NEEDLE 31 gauge x 5/16" Ndle USE UP TO 6 TIMES DAILY    blood sugar diagnostic (TRUE METRIX GLUCOSE TEST STRIP) Strp USE TO CHECK BLOOD SUGAR FOUR TIMES DAILY    blood-glucose meter kit To check BG 4 times daily, to use with insurance preferred meter    calcitRIOL (ROCALTROL) 0.25 MCG Cap TAKE 1 CAPSULE BY MOUTH EVERY DAY    cholestyramine (QUESTRAN) 4 gram packet Take 1 packet (4 g total) by mouth 2 (two) times daily as needed (for loose stool).    clopidogreL (PLAVIX) 75 mg tablet TAKE 1 TABLET(75 MG) BY MOUTH EVERY DAY    finasteride (PROSCAR) 5 mg tablet Take 1 tablet (5 mg total) by mouth once daily.    fish oil-omega-3 fatty acids 300-1,000 mg capsule Take by mouth once daily.    FLUAD QUAD 2021-22,65Y UP,,PF, 60 mcg (15 mcg x 4)/0.5 mL Syrg     fluticasone propionate (FLONASE) 50 mcg/actuation nasal spray 2 sprays (100 mcg total) by Each Nostril route once daily.    folic acid (FOLVITE) 1 MG tablet Take 1 mg by mouth once daily.    hydroCHLOROthiazide (HYDRODIURIL) 12.5 MG Tab TAKE 1 TABLET(12.5 MG) BY MOUTH EVERY DAY    insulin degludec (TRESIBA FLEXTOUCH U-100) 100 unit/mL (3 mL) insulin pen Inject 22 Units into the skin once daily.    irbesartan (AVAPRO) 300 MG tablet TAKE 1 TABLET(300 MG) BY MOUTH EVERY EVENING    nitroGLYCERIN (NITROSTAT) 0.4 MG SL tablet TAKE 1 TABLET UNDER THE TONGUE EVERY 5 MINUTES AS NEEDED    NOVOLOG FLEXPEN U-100 INSULIN 100 unit/mL (3 mL) InPn pen INJECT 15 UNITS UNDER THE SKIN FOUR TIMES DAILY, BEFORE MEALS PLUS CORRECTION SCALE, MAX TOTAL DAILY DOSE OF 75 UNITS    psyllium (METAMUCIL) powder Take 1 packet by mouth.    " tamsulosin (FLOMAX) 0.4 mg Cap TAKE 1 CAPSULE(0.4 MG) BY MOUTH EVERY EVENING    torsemide (DEMADEX) 10 MG Tab Take 2 tablets (20 mg total) by mouth once daily. (Patient taking differently: Take 20 mg by mouth once daily. 1 tablet once a day)    TRUEPLUS LANCETS 30 gauge Misc USE TO CHECK BLOOD SUGAR FOUR TIMES DAILY    vitamin D (VITAMIN D3) 1000 units Tab Take 1,000 Units by mouth once daily.    zinc gluconate 50 mg tablet Take 50 mg by mouth once daily.     Family History       Problem Relation (Age of Onset)    Alcohol abuse Brother    Cancer Maternal Aunt    Cataracts Mother    Diabetes Father, Sister, Paternal Uncle    Goiter Mother    Heart disease Father, Mother    Hypertension Father    No Known Problems Daughter, Son, Son          Tobacco Use    Smoking status: Former Smoker     Packs/day: 1.00     Years: 40.00     Pack years: 40.00     Types: Cigarettes     Quit date: 1970     Years since quittin.8    Smokeless tobacco: Former User   Substance and Sexual Activity    Alcohol use: Yes     Alcohol/week: 3.0 standard drinks     Types: 1 Cans of beer, 1 Shots of liquor, 1 Standard drinks or equivalent per week    Drug use: No    Sexual activity: Not Currently     Review of Systems   Constitutional:  Negative for chills, fatigue and fever.   HENT:  Negative for sore throat and trouble swallowing.    Eyes:  Negative for photophobia and visual disturbance.   Respiratory:  Positive for shortness of breath. Negative for cough.    Cardiovascular:  Positive for chest pain. Negative for palpitations and leg swelling.   Gastrointestinal:  Negative for abdominal pain, constipation, diarrhea, nausea and vomiting.   Endocrine: Negative for cold intolerance and heat intolerance.   Genitourinary:  Negative for dysuria and frequency.   Musculoskeletal:  Negative for arthralgias and myalgias.   Skin:  Negative for rash and wound.   Neurological:  Negative for dizziness, syncope, weakness and light-headedness.    Psychiatric/Behavioral:  Negative for confusion and hallucinations.    All other systems reviewed and are negative.  Objective:     Vital Signs (Most Recent):  Temp: 98.7 °F (37.1 °C) (04/26/22 1643)  Pulse: 77 (04/26/22 1928)  Resp: 18 (04/26/22 1643)  BP: (!) 170/78 (04/26/22 1928)  SpO2: (!) 89 % (04/26/22 1928)   Vital Signs (24h Range):  Temp:  [98.7 °F (37.1 °C)] 98.7 °F (37.1 °C)  Pulse:  [77-80] 77  Resp:  [18] 18  SpO2:  [89 %-99 %] 89 %  BP: (140-170)/(64-78) 170/78     Weight: 99.8 kg (220 lb)  Body mass index is 31.12 kg/m².    Physical Exam  Vitals reviewed.   Constitutional:       Appearance: Normal appearance. He is well-developed. He is obese.   HENT:      Head: Normocephalic and atraumatic.   Eyes:      General: No scleral icterus.     Pupils: Pupils are equal, round, and reactive to light.   Cardiovascular:      Rate and Rhythm: Normal rate and regular rhythm.      Heart sounds: No murmur heard.  Pulmonary:      Effort: Pulmonary effort is normal. No respiratory distress.      Breath sounds: Normal breath sounds. No wheezing.   Abdominal:      General: Bowel sounds are normal. There is no distension.      Palpations: Abdomen is soft.      Tenderness: There is no abdominal tenderness.   Musculoskeletal:         General: Normal range of motion.      Cervical back: Normal range of motion and neck supple.   Skin:     General: Skin is warm and dry.   Neurological:      General: No focal deficit present.      Mental Status: He is alert and oriented to person, place, and time. Mental status is at baseline.   Psychiatric:         Behavior: Behavior normal.         CRANIAL NERVES     CN III, IV, VI   Pupils are equal, round, and reactive to light.     Significant Labs: All pertinent labs within the past 24 hours have been reviewed.  CBC:   Recent Labs   Lab 04/26/22  1744   WBC 7.89   HGB 12.4*   HCT 38.3*   *     CMP:   Recent Labs   Lab 04/26/22  1744      K 3.9      CO2 15*   GLU  171*   BUN 69*   CREATININE 2.5*   CALCIUM 9.8   PROT 7.5   ALBUMIN 3.9   BILITOT 1.0   ALKPHOS 46*   AST 19   ALT 19   ANIONGAP 16   EGFRNONAA 23.0*     Troponin:   Recent Labs   Lab 04/26/22  1744 04/26/22  1921   TROPONINI 0.203* 0.223*       Significant Imaging: I have reviewed all pertinent imaging results/findings within the past 24 hours.  EKG: I have reviewed all pertinent results/findings within the past 24 hours and my personal findings are: No STEMI

## 2022-04-27 NOTE — CONSULTS
[facility] [place]  Electrophysiology Consult      Kevon Perez  YOB: 1940  Medical Record Number:  [id number]  Attending Physician:  Keith Miles MD   Date of Admission: 4/26/2022       Hospital Day:  1  Current Principal Problem:  Unstable angina      History     Cc: chest pain     HPI  Mr Kevon Perez is an 81M with HTN, HLD, CAD status-post STEMI and PCI in 2009, DM2, CKD3, who was recently seen in cardiology clinic, where an ECG was performed showing 2nd degree AVB (Mobitz I). This prompted a 48 hour Holter monitor, which showed sinus rhythm with periods of high-grade AV block and complete heart block. He saw Dr. Avendaño with EP in clinic on 4/1/22 and was offered pacemaker, however he deferred at that time and wanted to attempt a trial off beta blocker therapy. He was seen in general cardiology clinic yesterday with multiple recent episodes of suspected angina and was admitted from clinic to cardiology inpatient unit. Interventional cardiology consulted and recommending PET stress test to inform revascularization strategy.     Telemetry monitoring and 12 lead ECGs have shown intermittent Mobitz I and likely higher grade AV block throughout the night. He reports occasional dizziness/lightheadedness at home but no syncope. EP was consulted for further considerations regarding rhythm management.      An echo in 1/2021 showed an EF of 63% and mild-mod AS.       Medications - Outpatient  Prior to Admission medications    Medication Sig Start Date End Date Taking? Authorizing Provider   amLODIPine (NORVASC) 5 MG tablet TAKE 1 TABLET(5 MG) BY MOUTH EVERY DAY 2/16/22  Yes Cipriano Sol MD   atorvastatin (LIPITOR) 20 MG tablet TAKE 1 TABLET(20 MG) BY MOUTH EVERY DAY 12/28/21  Yes Cipriano Sol MD   calcitRIOL (ROCALTROL) 0.25 MCG Cap TAKE 1 CAPSULE BY MOUTH EVERY DAY 5/28/21  Yes Teodoro Hu MD   clopidogreL (PLAVIX) 75 mg tablet TAKE 1 TABLET(75 MG) BY MOUTH EVERY  "DAY 9/20/21  Yes Cipriano Sol MD   finasteride (PROSCAR) 5 mg tablet Take 1 tablet (5 mg total) by mouth once daily. 7/8/21  Yes Martin Eisenberg MD   hydroCHLOROthiazide (HYDRODIURIL) 12.5 MG Tab TAKE 1 TABLET(12.5 MG) BY MOUTH EVERY DAY 3/18/22  Yes Teodoro Hu MD   insulin degludec (TRESIBA FLEXTOUCH U-100) 100 unit/mL (3 mL) insulin pen Inject 22 Units into the skin once daily.  Patient taking differently: Inject 15 Units into the skin once daily. 4/13/22 7/12/22 Yes Casandra Truong NP   irbesartan (AVAPRO) 300 MG tablet TAKE 1 TABLET(300 MG) BY MOUTH EVERY EVENING 9/20/21  Yes Keith Teixeira MD   tamsulosin (FLOMAX) 0.4 mg Cap TAKE 1 CAPSULE(0.4 MG) BY MOUTH EVERY EVENING 7/8/21  Yes Martin Eisenberg MD   carvediloL (COREG) 12.5 MG tablet Take 12.5 mg by mouth 2 (two) times daily with meals.  4/27/22 Yes Historical Provider   BD ULTRA-FINE SHORT PEN NEEDLE 31 gauge x 5/16" Ndle USE UP TO 6 TIMES DAILY 11/1/21   Casandra Truong NP   blood sugar diagnostic (TRUE METRIX GLUCOSE TEST STRIP) Strp USE TO CHECK BLOOD SUGAR FOUR TIMES DAILY 5/11/21   Casandra Truong NP   blood-glucose meter kit To check BG 4 times daily, to use with insurance preferred meter 7/6/18 7/3/19  Dolores Arnold, APRN,ANP-C   FLUAD QUAD 2021-22,65Y UP,,PF, 60 mcg (15 mcg x 4)/0.5 mL Syrg  12/28/21   Historical Provider   nitroGLYCERIN (NITROSTAT) 0.4 MG SL tablet TAKE 1 TABLET UNDER THE TONGUE EVERY 5 MINUTES AS NEEDED 10/15/21   Elia Olivier MD   NOVOLOG FLEXPEN U-100 INSULIN 100 unit/mL (3 mL) InPn pen INJECT 15 UNITS UNDER THE SKIN FOUR TIMES DAILY, BEFORE MEALS PLUS CORRECTION SCALE, MAX TOTAL DAILY DOSE OF 75 UNITS  Patient taking differently: Takes 8-10 units with breakfast, 15+ units with lunch/supper based on sliding scale 11/19/21   Casandra Truong NP   psyllium (METAMUCIL) powder Take 1 packet by mouth.    Historical Provider   torsemide (DEMADEX) 10 MG Tab Take 2 tablets (20 mg total) by mouth once " daily.  Patient taking differently: Take 10 mg by mouth once daily. 1 tablet once a day 2/9/22 2/9/23  Keith Teixeira MD   TRUEPLUS LANCETS 30 gauge Misc USE TO CHECK BLOOD SUGAR FOUR TIMES DAILY 7/8/18   HERBER Cedillo,ANP-C   ascorbic acid, vitamin C, (VITAMIN C) 1000 MG tablet Take by mouth once daily.  4/27/22  Historical Provider   cholestyramine (QUESTRAN) 4 gram packet Take 1 packet (4 g total) by mouth 2 (two) times daily as needed (for loose stool). 2/1/22 4/27/22  Matt Acosta MD   fish oil-omega-3 fatty acids 300-1,000 mg capsule Take by mouth once daily.  4/27/22  Historical Provider   fluticasone propionate (FLONASE) 50 mcg/actuation nasal spray 2 sprays (100 mcg total) by Each Nostril route once daily. 12/28/21 4/27/22  Cipriano Sol MD   folic acid (FOLVITE) 1 MG tablet Take 1 mg by mouth once daily. 6/4/12 4/27/22  Historical Provider   vitamin D (VITAMIN D3) 1000 units Tab Take 1,000 Units by mouth once daily.  4/27/22  Historical Provider   zinc gluconate 50 mg tablet Take 50 mg by mouth once daily.  4/27/22  Historical Provider         Medications - Current  Scheduled Meds:   aminophylline  75 mg Intravenous Once    aspirin  81 mg Oral Daily    atorvastatin  40 mg Oral Daily    calcitRIOL  0.25 mcg Oral Daily    clopidogreL  75 mg Oral Daily    finasteride  5 mg Oral Daily    insulin aspart U-100  5 Units Subcutaneous TIDWM    insulin detemir U-100  10 Units Subcutaneous Daily    isosorbide mononitrate  30 mg Oral Daily    losartan  100 mg Oral Daily    tamsulosin  0.4 mg Oral Daily     Continuous Infusions:   heparin (porcine) in D5W 12 Units/kg/hr (04/26/22 2006)     PRN Meds:.acetaminophen, acetaminophen, dextrose 10%, dextrose 10%, glucagon (human recombinant), glucose, glucose, heparin (PORCINE), heparin (PORCINE), insulin aspart U-100, melatonin, nitroGLYCERIN, ondansetron, polyethylene glycol, sodium chloride 0.9%, sodium chloride 0.9%      Allergies  Review of  patient's allergies indicates:   Allergen Reactions    Penicillins Other (See Comments)     Muscle stiffness    Bactrim [sulfamethoxazole-trimethoprim] Rash         Past Medical History  Past Medical History:   Diagnosis Date    Acute coronary syndrome 9/10/09    STEMI    Anticoagulant long-term use     plavix    Basal cell cancer     BCC (basal cell carcinoma of skin)     nose    Cancer of bladder January 2013    Cataract     Chronic kidney disease     Colon polyp     Coronary artery disease     CPAP (continuous positive airway pressure) dependence     Diabetes mellitus     Diabetic retinopathy     Encounter for blood transfusion     GERD (gastroesophageal reflux disease)     High cholesterol     Hyperlipidemia     Hypertension     Iron deficiency anemia 5/11/2018    GINGER (obstructive sleep apnea)     CPAP     Renal manifestation of secondary diabetes mellitus     SOB (shortness of breath)          Past Surgical History  Past Surgical History:   Procedure Laterality Date    BASAL CELL CARCINOMA EXCISION      nose     BLADDER SURGERY      bladder cancer    CARDIAC CATHETERIZATION      CATARACT EXTRACTION      bilateral     COLONOSCOPY  3/26/15    COLONOSCOPY N/A 12/4/2020    Procedure: COLONOSCOPY;  Surgeon: Keshav Rajan MD;  Location: [facility] ([address] [place] [address]);  Service: Endoscopy;  Laterality: N/A;  covid test 12/1-pcw-tb  pt SOB x 1 year-ok to hold Plavix x 5 days per Dr. Sol-see telephone encounter dated 8/25  10/14-instructions emailed to [email or web address]    CORONARY ANGIOPLASTY  9/10/09    CFX    CORONARY ANGIOPLASTY WITH STENT PLACEMENT      CYSTOSCOPY      ESOPHAGOGASTRODUODENOSCOPY N/A 1/27/2021    Procedure: EGD (ESOPHAGOGASTRODUODENOSCOPY);  Surgeon: Matt Acosta MD;  Location: [facility];  Service: Endoscopy;  Laterality: N/A;    EYE SURGERY      hydrocel           Social History  Social History     Socioeconomic History    Marital status:     Occupational History    Occupation: retired   Tobacco Use    Smoking status: Former Smoker     Packs/day: 1.00     Years: 40.00     Pack years: 40.00     Types: Cigarettes     Quit date: 1970     Years since quittin.8    Smokeless tobacco: Former User   Substance and Sexual Activity    Alcohol use: Yes     Alcohol/week: 3.0 standard drinks     Types: 1 Cans of beer, 1 Shots of liquor, 1 Standard drinks or equivalent per week    Drug use: No    Sexual activity: Not Currently     Social Determinants of Health     Financial Resource Strain: Low Risk     Difficulty of Paying Living Expenses: Not hard at all   Food Insecurity: No Food Insecurity    Worried About Running Out of Food in the Last Year: Never true    Ran Out of Food in the Last Year: Never true   Transportation Needs: No Transportation Needs    Lack of Transportation (Medical): No    Lack of Transportation (Non-Medical): No   Physical Activity: Insufficiently Active    Days of Exercise per Week: 1 day    Minutes of Exercise per Session: 20 min   Stress: No Stress Concern Present    Feeling of Stress : Only a little   Social Connections: Unknown    Frequency of Communication with Friends and Family: Twice a week    Frequency of Social Gatherings with Friends and Family: Once a week    Active Member of Clubs or Organizations: No    Attends Club or Organization Meetings: More than 4 times per year    Marital Status:    Housing Stability: High Risk    Unable to Pay for Housing in the Last Year: Yes    Number of Places Lived in the Last Year: 1    Unstable Housing in the Last Year: No         ROS  10 point ROS performed and negative except as stated in HPI       Physical Examination         Vital Signs  Vitals  Temp: 96.8 °F (36 °C)  Temp src: Oral  Pulse: 89  Heart Rate Source: Monitor  Resp: 20  SpO2: 95 %  O2 Device (Oxygen Therapy): room air  BP: (!) 159/71  MAP (mmHg): 103  BP Location: Right arm  Patient Position:  Lying          24 Hour VS Range    Temp:  [96.2 °F (35.7 °C)-98.7 °F (37.1 °C)]   Pulse:  [48-89]   Resp:  [12-20]   BP: (118-174)/(62-78)   SpO2:  [89 %-99 %]     Intake/Output Summary (Last 24 hours) at 4/27/2022 1114  Last data filed at 4/27/2022 0600  Gross per 24 hour   Intake 476 ml   Output 1300 ml   Net -824 ml         Physical Exam:   Constitutional: no acute distress  HEENT: NCAT, EOMI, no scleral icterus  Cardiovascular: Irregular rhythm, 2/6 systolic murmur at cardiac base. 2+ carotid, radial pulses bilaterally    Pulmonary: Clear to auscultation bilaterally   Abdomen: nontender, non-distended   Neuro: alert and oriented, no focal deficits  Extremities: warm, no edema   MSK: no deformities  Integument: intact, no rashes     Data       Recent Labs   Lab 04/26/22  1744 04/27/22  0238   WBC 7.89 7.82   HGB 12.4* 11.5*   HCT 38.3* 35.1*   * 136*        Recent Labs   Lab 04/26/22  1921   INR 1.0        Recent Labs   Lab 04/26/22  1744 04/27/22  0238    140   K 3.9 3.7    110   CO2 15* 20*   BUN 69* 62*   CREATININE 2.5* 2.4*   ANIONGAP 16 10   CALCIUM 9.8 9.7        Recent Labs   Lab 04/26/22  1744 04/27/22  0238   PROT 7.5 6.1   ALBUMIN 3.9 3.2*   BILITOT 1.0 0.9   ALKPHOS 46* 39*   AST 19 14   ALT 19 15        Recent Labs   Lab 04/26/22  1921 04/26/22  2107 04/26/22  2250 04/27/22  0238 04/27/22  1009   TROPONINI 0.223* 0.231* 0.241* 0.266*  0.257* 0.273*        BNP (pg/mL)   Date Value   04/26/2022 69   02/09/2022 140 (H)   06/22/2021 166 (H)   06/25/2020 54   08/30/2018 93       No results for input(s): LABBLOO in the last 168 hours.         Assessment & Plan   Mr Kevon Perez is an 81M with HTN, HLD, CAD status-post STEMI and PCI in 2009, DM2, CKD3 and high grade AV block noted on 12 lead ECG and Holter monitor, admitted for angina and NSTEMI. Undergoing stress test with possible left heart cath later today. EP consulted for consideration of PPM prior to discharge.     High grade  AV block:   Some tracings consistent with Wenckebach, however some appear more concerning for high grade/complete AV block. In any case he has already proven his need for a permanent pacemaker. Doesn't need TVP at this time as he has a narrow and reasonably fast escape, not hemodynamically unstable. If stress testing is negative and no plans for left heart cath, will place on schedule for PPM today.   -Follow up PET stress  -Stop heparin drip if negative  -If no plans for LHC, will place PPM today     Thank you for the consult. EP will continue to follow.       Amish Escalante MD  [facility]   Cardiovascular Disease PGY-IV

## 2022-04-27 NOTE — CONSULTS
"[facility]  Interventional Cardiology  Consult Note    HPI: Kevon Perez is a 81 y.o. male with past medical history of:  -STEMI in 2009 s/p PCI to OM and LCx  -CKD Stage IV (Baseline creatinine 1.8-2.2)  -2:1 AV Block, followed by Dr. Avendaño as outpatient.  -Type II DM (last A1c 6.5 on 3/16/22)  -HTN  -HLD    Who presented to [facility] from outpatient clinic due to chest pain that began with minimal exertion this weekend. The patient reported his symptoms to his cardiologist Dr. Vuong and was sent to the ER on 4/26 for further evaluation. Regarding his chest pain, the patient reports that it started on Saturday while he was walking around his house. The pain was described as midsternal and "tight" without radiation. He does not usually get chest pain like this.  He denies palpitations and PND.    In the ER, he was hemodynamically stable. EKG showed 2:1 AV block without evidence of ischemia. Troponin peaked at 0.27. Creatinine was 2.5 on admission and hemoglobin 12.4. He was evaluated by the Cardiology consult team and ACS therapy with ASA/Plavix/heparin was begun with plans to consult Interventional Cardiology in the morning.       ROS: Patient denies angina, MATTHEWS, lower extremity edema, orthopnea, PND, palpitations, presyncope or syncope. All other ROS negative except as stated above.    PMHx:  As above    PSHx:   As above    Family Hx:  Family History   Problem Relation Age of Onset    Hypertension Father     Heart disease Father         CHF    Diabetes Father     Diabetes Sister     Cataracts Mother     Goiter Mother     Heart disease Mother         CHF    Diabetes Paternal Uncle     Alcohol abuse Brother     No Known Problems Daughter     No Known Problems Son     No Known Problems Son     Cancer Maternal Aunt         colon    Kidney disease Neg Hx     Amblyopia Neg Hx     Blindness Neg Hx     Glaucoma Neg Hx     Macular degeneration Neg Hx     Retinal detachment Neg Hx     " "Strabismus Neg Hx     Stroke Neg Hx     Thyroid disease Neg Hx        Social Hx:   Social History     Tobacco Use    Smoking status: Former Smoker     Packs/day: 1.00     Years: 40.00     Pack years: 40.00     Types: Cigarettes     Quit date: 1970     Years since quittin.8    Smokeless tobacco: Former User   Substance Use Topics    Alcohol use: Yes     Alcohol/week: 3.0 standard drinks     Types: 1 Cans of beer, 1 Shots of liquor, 1 Standard drinks or equivalent per week       Allergies:  Review of patient's allergies indicates:   Allergen Reactions    Penicillins Other (See Comments)     Muscle stiffness    Bactrim [sulfamethoxazole-trimethoprim] Rash       Home Meds:  Current Outpatient Medications   Medication Instructions    amLODIPine (NORVASC) 5 MG tablet TAKE 1 TABLET(5 MG) BY MOUTH EVERY DAY    ascorbic acid, vitamin C, (VITAMIN C) 1000 MG tablet Oral, Daily    atorvastatin (LIPITOR) 20 MG tablet TAKE 1 TABLET(20 MG) BY MOUTH EVERY DAY    BD ULTRA-FINE SHORT PEN NEEDLE 31 gauge x 5/16" Ndle USE UP TO 6 TIMES DAILY    blood sugar diagnostic (TRUE METRIX GLUCOSE TEST STRIP) Strp USE TO CHECK BLOOD SUGAR FOUR TIMES DAILY    blood-glucose meter kit To check BG 4 times daily, to use with insurance preferred meter    calcitRIOL (ROCALTROL) 0.25 MCG Cap TAKE 1 CAPSULE BY MOUTH EVERY DAY    cholestyramine (QUESTRAN) 4 gram packet 4 g, Oral, 2 times daily PRN    clopidogreL (PLAVIX) 75 mg tablet TAKE 1 TABLET(75 MG) BY MOUTH EVERY DAY    finasteride (PROSCAR) 5 mg, Oral, Daily    fish oil-omega-3 fatty acids 300-1,000 mg capsule Oral, Daily    FLUAD QUAD -,65Y UP,,PF, 60 mcg (15 mcg x 4)/0.5 mL Syrg No dose, route, or frequency recorded.    fluticasone propionate (FLONASE) 100 mcg, Each Nostril, Daily    folic acid (FOLVITE) 1 mg, Oral, Daily    hydroCHLOROthiazide (HYDRODIURIL) 12.5 MG Tab TAKE 1 TABLET(12.5 MG) BY MOUTH EVERY DAY    irbesartan (AVAPRO) 300 MG tablet TAKE 1 " TABLET(300 MG) BY MOUTH EVERY EVENING    nitroGLYCERIN (NITROSTAT) 0.4 MG SL tablet TAKE 1 TABLET UNDER THE TONGUE EVERY 5 MINUTES AS NEEDED    NOVOLOG FLEXPEN U-100 INSULIN 100 unit/mL (3 mL) InPn pen INJECT 15 UNITS UNDER THE SKIN FOUR TIMES DAILY, BEFORE MEALS PLUS CORRECTION SCALE, MAX TOTAL DAILY DOSE OF 75 UNITS    psyllium (METAMUCIL) powder 1 packet, Oral    tamsulosin (FLOMAX) 0.4 mg Cap TAKE 1 CAPSULE(0.4 MG) BY MOUTH EVERY EVENING    torsemide (DEMADEX) 20 mg, Oral, Daily    TRESIBA FLEXTOUCH U-100 22 Units, Subcutaneous, Daily    TRUEPLUS LANCETS 30 gauge Misc USE TO CHECK BLOOD SUGAR FOUR TIMES DAILY    vitamin D (VITAMIN D3) 1,000 Units, Oral, Daily    zinc gluconate 50 mg, Oral, Daily       Vitals:  Temp:  [96.2 °F (35.7 °C)-98.7 °F (37.1 °C)] 96.8 °F (36 °C)  Pulse:  [48-80] 71  Resp:  [12-20] 20  SpO2:  [89 %-99 %] 95 %  BP: (118-174)/(62-78) 174/72    Physical Exam  Gen: No apparent distress, resting comfortably  HEENT: Pupils equal and reactive to light  Cardio: Regular rate, point of maximal impulse not displaced, no murmur noted, 2+ radial pulses bilaterally, 2+ DP pulses bilaterally  Resp: CTAB, no wheezing  Abd: Soft, non-tender, non-distended  Skin: Warm, dry, 1+ edema to RLE  Neuro: Alert and oriented x3  Psych: Normal mood and affect    Labs:  Recent Labs   Lab 04/26/22 1744 04/27/22 0238   WBC 7.89 7.82   HGB 12.4* 11.5*   HCT 38.3* 35.1*   * 136*     Recent Labs   Lab 04/26/22 1744 04/27/22 0238    140   K 3.9 3.7    110   CO2 15* 20*   BUN 69* 62*   CREATININE 2.5* 2.4*   * 169*   CALCIUM 9.8 9.7   MG  --  1.6   PHOS  --  3.8       Imaging:    PET Stress June 2018:  1. There is a moderate sized moderate intensity fixed defect consistent with non-transmural infarct in the base to apical inferolateral wall in the usual distribution of the Left Circumflex Artery. This defect comprises 15 % of the left ventricular   myocardium.   2. There is abnormal  wall motion at rest showing akinesis of the inferolateral wall of the left ventricle. There is abnormal wall motion at stress showing akinesis of the inferolateral wall of the left ventricle.   3. LV function is normal at rest and stress.  (normal is >= 51%)   4. The ventricular volumes are normal at rest and stress.   5. The extracardiac distribution of radioactivity is normal.   6. There was no previous study available to compare.    Most recent Echo from 1/6/21:  · The left ventricle is normal in size with normal systolic function. The estimated ejection fraction is 63%  · Normal left ventricular diastolic function.  · Mild left atrial enlargement.  · Normal right ventricular size with normal right ventricular systolic function.  · There is mild-to-moderate aortic valve stenosis.  · Aortic valve area is 1.53 cm2; peak velocity is 2.03 m/s; mean gradient is 9 mmHg.  · Normal central venous pressure (3 mmHg).  · The estimated PA systolic pressure is 30 mmHg.    Current Meds:   aspirin  81 mg Oral Daily    atorvastatin  40 mg Oral Daily    calcitRIOL  0.25 mcg Oral Daily    clopidogreL  75 mg Oral Daily    finasteride  5 mg Oral Daily    insulin aspart U-100  5 Units Subcutaneous TIDWM    insulin detemir U-100  10 Units Subcutaneous Daily    isosorbide mononitrate  30 mg Oral Daily    losartan  100 mg Oral Daily    tamsulosin  0.4 mg Oral Daily       Assessment and Plan:    1. NSTEMI  -Will plan to further evaluate with PET stress test today. Previous PET was from June 2018, see results above.   -Continue ACS therapy with ASA/Plavix/heparin  -Will need to closely monitor kidney function and use minimal contrast dye if angiogram is ultimately needed  -Have spoken to nuclear lab, patient to get PET stress today. Please keep NPO and no caffeine    2. CKD Stage IV  -Monitor kidney function  -Will use minimal contrast dye if angiogram deemed appropriate    3. 2:1 AV Block  -Patient followed by   Kateryna  -Previously offered a PPM in April 2022 but patient deferred and elected to discontinue beta blocker at that time  -Will reach out to EP to see if patient needs to be seen inpatient vs outpatient    4. Type II DM  -Management per primary    5. HTN    6. HLD  -Continue high intensity statin    7. Normocytic anemia  -Would recommend obtaining iron studies    Yahir Corrigan MD  [facility] PGY-4    Pager number: [phone]    Staff attestation to follow.    This note was prepared using voice recognition system and may have sound alike errors that may have been overlooked even with proof reading.

## 2022-04-27 NOTE — ASSESSMENT & PLAN NOTE
- Cr baseline at admission  - Renally dosing all medications  - Avoid nephrotoxins  - Will continue to monitor on daily labs     Statement Selected

## 2022-04-27 NOTE — ASSESSMENT & PLAN NOTE
"NSTEMI  Kevon Perez is a 81 y.o. y.o. male with known CAD s/p PCI in 2009 who presents from clinic to ED with chest pain, Shortness of Breath, Dizziness, and Fatigue.   Patient with frequent episodes of angina that are happening daily over the past 3-4 days.     - S/p reloading with Aspirin and Plavix in ED.  - HEART score: 7. "Scores ?7: 50-65% risk of adverse cardiac event. In the HEART Score study, these patients were candidates for early invasive measures. (65.2% retrospective, 50.1% prospective) "  -FAWAD Score:125 points. 9 % Probability of death from admission to 6 months  -Prior sCr 1.7-2.5 and this could be prohibitive to proceed with invasive angiography   -Troponin peaked at 0.273    Recent Labs     04/27/22  1009 04/27/22  1234 04/27/22  1513   TROPONINI 0.273* 0.207* 0.224*     ECHO[04/27/22]  Summary  · The left ventricle is normal in size with normal systolic function.The estimated ejection fraction is 60%.  · There are segmental left ventricular wall motion abnormalities.  · Normal left ventricular diastolic function.  · Normal right ventricular size with normal right ventricular systolic function.  · Normal central venous pressure (3 mmHg).      RECOMMENDATION  - Will continue with medical management of ACS.  - Interventional Cardiology following, pending decision on early invasive management based on Cardiac PET scan findings  - Electrophysiology team following, planning on permanent pacemaker placement as he meets criteria  - Continue HI statin Lipitor 80 mg daily   - Control blood pressure at home medication   "

## 2022-04-27 NOTE — SUBJECTIVE & OBJECTIVE
Interval History: Pt seen and examined this morning on shaye DENIS. Comfortable in bed, grossly asymptomatic. Awaiting stress testing and possible PPM per cardiology. Care plan reviewed. Otherwise, doing well and with no further complaints at this time.      Objective:     Vital Signs (Most Recent):  Temp: 97.5 °F (36.4 °C) (04/27/22 1131)  Pulse: 86 (04/27/22 1131)  Resp: 20 (04/27/22 1131)  BP: (!) 123/59 (04/27/22 1131)  SpO2: 95 % (04/27/22 1131)   Vital Signs (24h Range):  Temp:  [96.2 °F (35.7 °C)-98.7 °F (37.1 °C)] 97.5 °F (36.4 °C)  Pulse:  [48-93] 86  Resp:  [12-20] 20  SpO2:  [89 %-99 %] 95 %  BP: (118-174)/(59-78) 123/59     Weight: 98.9 kg (218 lb)  Body mass index is 31.28 kg/m².    Intake/Output Summary (Last 24 hours) at 4/27/2022 1151  Last data filed at 4/27/2022 0600  Gross per 24 hour   Intake 476 ml   Output 1300 ml   Net -824 ml        Physical Exam  Gen: in NAD, appears stated age; pt is obese  Neuro: AAOx4, CN2-12 grossly intact BL; motor, sensory, and strength grossly intact BL  HEENT: NTNC, EOMI, PERRLA, MMM; no thyromegaly or lymphadenopathy; no JVD appreciated  CVS: RRR, no m/r/g; S1/S2 auscultated with no S3 or S4; capillary refill < 2 sec  Resp: lungs CTAB, no w/r/r; no belabored breathing or accessory muscle use appreciated   Abd: BS+ in all 4 quadrants; NTND, soft to palpation; no organomegaly appreciated   Extrem: pulses full, equal, and regular over all 4 extremities; trace BL LE pitting edema      Significant Labs: All pertinent labs within the past 24 hours have been reviewed.    Significant Imaging: I have reviewed all pertinent imaging results/findings within the past 24 hours.

## 2022-04-27 NOTE — HOSPITAL COURSE
Pt admitted to  team G and was started on ACS protocol (hep, plavix, asa, Lipitor). Cardiology consulted, TTE done reviewed. PET stress on 4/27 positive for ischemia. Interventional cardiology consulted, s/p LHC on 4/29 showed multivessel disease, high risk for CABG as per cards, plan for OP PCI. Referral placed. EKG with concerns for complete heart block. EP consulted, s/p PPM placement 5/2. Tolerated procedure well. He is now stable for dc home with HH, PCP in one week and cardiology OP follow up. Meds as below.

## 2022-04-28 NOTE — PLAN OF CARE
CM MET WITH PT AND HIS DAUGHTER TO DISCUSS DISCHARGE PLANNING PT STATES THAT HE LIVES ALONE IN A 1 STORY HOUSE THAT HE IS NOT ON DIALYSIS AND DOES NOT TAKE COUMADIN HE HAS TRANSPORTATION HOME   PHARMACY [facility]  [address]   PCP CIPRIANO PHILIP LAST APPT 01/15/22  POA/DAUGHTER LUCI FAJARDO /SONSTEVEN ROSSY   [facility] - Cardiology Stepdown  Initial Discharge Assessment       Primary Care Provider: Cipriano Sol MD    Admission Diagnosis: Chest discomfort [R07.89]  Non-ST elevation myocardial infarction (NSTEMI) [I21.4]  NSTEMI (non-ST elevation myocardial infarction) [I21.4]  NSTEMI (non-ST elevated myocardial infarction) [I21.4]  Chest pain [R07.9]    Admission Date: 4/26/2022  Expected Discharge Date: 4/29/2022    Discharge Barriers Identified: None    Payor: HU[employer] MANAGED MEDICARE / Plan: HU[employer] MEDICARE HMO / Product Type: Capitation /     Extended Emergency Contact Information  Primary Emergency Contact: Marin Perez  Address: unknown   [place]  Home Phone: [phone]  Mobile Phone: [phone]  Relation: Son  Secondary Emergency Contact: Elio Perez  Address: unknown   [place]  Mobile Phone: [phone]  Relation: Son    Discharge Plan A: Home, Home with family  Discharge Plan B: Home, Home with family, Home Health      [id number] - [place], [place] - [address] AT [facility] [address]  [address]  [place] [address]  Phone: [phone] Fax: [phone]    [id number] - [place], [place] - [address]  [address]  [place] [address]  Phone: [phone] Fax: [phone]      Initial Assessment (most recent)     Adult Discharge Assessment - 04/28/22 1327        Discharge Assessment    Assessment Type Discharge Planning Assessment     Confirmed/corrected address, phone number and insurance Yes     Confirmed Demographics Correct on Facesheet      Source of Information family;patient     When was your last doctors appointment? 01/15/22     Communicated HEMANT with patient/caregiver Date not available/Unable to determine     Lives With alone     Do you expect to return to your current living situation? Yes     Do you have help at home or someone to help you manage your care at home? No     Prior to hospitilization cognitive status: Alert/Oriented     Current cognitive status: Alert/Oriented     Walking or Climbing Stairs Difficulty none     Dressing/Bathing Difficulty none     Home Layout Able to live on 1st floor     Equipment Currently Used at Home none     Readmission within 30 days? No     Patient currently being followed by outpatient case management? No     Do you currently have service(s) that help you manage your care at home? No     Do you take prescription medications? Yes     Do you have prescription coverage? Yes     Do you have any problems affording any of your prescribed medications? No     Is the patient taking medications as prescribed? yes     Who is going to help you get home at discharge? son eliud [id number] 2029/daughter Yenni Brice      How do you get to doctors appointments? car, drives self     Are you on dialysis? No     Do you take coumadin? No     Discharge Plan A Home;Home with family     Discharge Plan B Home;Home with family;Home Health     DME Needed Upon Discharge  none     Discharge Plan discussed with: Patient;Adult children     Discharge Barriers Identified None

## 2022-04-28 NOTE — PLAN OF CARE
Problem: Adult Inpatient Plan of Care  Goal: Plan of Care Review  Outcome: Ongoing, Progressing  Goal: Patient-Specific Goal (Individualized)  Outcome: Ongoing, Progressing  Goal: Absence of Hospital-Acquired Illness or Injury  Outcome: Ongoing, Progressing  Goal: Optimal Comfort and Wellbeing  Outcome: Ongoing, Progressing  Goal: Readiness for Transition of Care  Outcome: Ongoing, Progressing     Problem: Adjustment to Illness (Acute Coronary Syndrome)  Goal: Optimal Adaptation to Illness  Outcome: Ongoing, Progressing     Problem: Dysrhythmia (Acute Coronary Syndrome)  Goal: Normalized Cardiac Rhythm  Outcome: Ongoing, Progressing     Problem: Cardiac-Related Pain (Acute Coronary Syndrome)  Goal: Absence of Cardiac-Related Pain  Outcome: Ongoing, Progressing     Problem: Hemodynamic Instability (Acute Coronary Syndrome)  Goal: Effective Cardiac Pump Function  Outcome: Ongoing, Progressing     Problem: Tissue Perfusion (Acute Coronary Syndrome)  Goal: Adequate Tissue Perfusion  Outcome: Ongoing, Progressing     Problem: Arrhythmia/Dysrhythmia (Cardiac Catheterization)  Goal: Stable Heart Rate and Rhythm  Outcome: Ongoing, Progressing     Problem: Bleeding (Cardiac Catheterization)  Goal: Absence of Bleeding  Outcome: Ongoing, Progressing     Problem: Contrast-Induced Injury Risk (Cardiac Catheterization)  Goal: Absence of Contrast-Induced Injury  Outcome: Ongoing, Progressing     Problem: Embolism (Cardiac Catheterization)  Goal: Absence of Embolism Signs and Symptoms  Outcome: Ongoing, Progressing     Problem: Ongoing Anesthesia/Sedation Effects (Cardiac Catheterization)  Goal: Anesthesia/Sedation Recovery  Outcome: Ongoing, Progressing     Problem: Pain (Cardiac Catheterization)  Goal: Acceptable Pain Control  Outcome: Ongoing, Progressing     Problem: Vascular Access Protection (Cardiac Catheterization)  Goal: Absence of Vascular Access Complication  Outcome: Ongoing, Progressing     Problem: Diabetes  Comorbidity  Goal: Blood Glucose Level Within Targeted Range  Outcome: Ongoing, Progressing     Problem: Fall Injury Risk  Goal: Absence of Fall and Fall-Related Injury  Outcome: Ongoing, Progressing

## 2022-04-28 NOTE — ASSESSMENT & PLAN NOTE
Patient with history of CKD4   Cr is 2.3 at baseline   Patient is getting Cath with cardiology and we recommend giving IV NS 1cc/KG/hr 12 hours before procedure and 12 hours after for him it will be 100 cc/hr of NS 12 hours before and 12 hours after procedure.   I did explain to patient that there is a risk of worsening kidney function with contrast and that giving NS may reduce the risk but not eliminate the risk. He also understands that worsening kidney function from contrast also includes possible need of dialysis. He expressed understanding.   Phos at goal no need for binders   C/w calcitriol

## 2022-04-28 NOTE — SUBJECTIVE & OBJECTIVE
Past Medical History:   Diagnosis Date    Acute coronary syndrome 9/10/09    STEMI    Anticoagulant long-term use     plavix    Basal cell cancer     BCC (basal cell carcinoma of skin)     nose    Cancer of bladder January 2013    Cataract     Chronic kidney disease     Colon polyp     Coronary artery disease     CPAP (continuous positive airway pressure) dependence     Diabetes mellitus     Diabetic retinopathy     Encounter for blood transfusion     GERD (gastroesophageal reflux disease)     High cholesterol     Hyperlipidemia     Hypertension     Iron deficiency anemia 5/11/2018    GINGER (obstructive sleep apnea)     CPAP     Renal manifestation of secondary diabetes mellitus     SOB (shortness of breath)        Past Surgical History:   Procedure Laterality Date    BASAL CELL CARCINOMA EXCISION      nose     BLADDER SURGERY      bladder cancer    CARDIAC CATHETERIZATION      CATARACT EXTRACTION      bilateral     COLONOSCOPY  3/26/15    COLONOSCOPY N/A 12/4/2020    Procedure: COLONOSCOPY;  Surgeon: Keshav Rajan MD;  Location: [facility] ([address] [place] [address]);  Service: Endoscopy;  Laterality: N/A;  covid test 12/1-pcw-tb  pt SOB x 1 year-ok to hold Plavix x 5 days per Dr. Sol-see telephone encounter dated 8/25  10/14-instructions emailed to [email or web address]    CORONARY ANGIOPLASTY  9/10/09    CFX    CORONARY ANGIOPLASTY WITH STENT PLACEMENT      CYSTOSCOPY      ESOPHAGOGASTRODUODENOSCOPY N/A 1/27/2021    Procedure: EGD (ESOPHAGOGASTRODUODENOSCOPY);  Surgeon: Matt Acosta MD;  Location: [facility];  Service: Endoscopy;  Laterality: N/A;    EYE SURGERY      hydrocel         Review of patient's allergies indicates:   Allergen Reactions    Penicillins Other (See Comments)     Muscle stiffness    Bactrim [sulfamethoxazole-trimethoprim] Rash     Current Facility-Administered Medications   Medication Frequency    acetaminophen tablet 1,000 mg Q8H PRN    acetaminophen tablet 650 mg Q4H PRN    aspirin chewable tablet  81 mg Daily    atorvastatin tablet 40 mg Daily    calcitRIOL capsule 0.25 mcg Daily    clopidogreL tablet 75 mg Daily    dextrose 10% bolus 125 mL PRN    dextrose 10% bolus 250 mL PRN    finasteride tablet 5 mg Daily    glucagon (human recombinant) injection 1 mg PRN    glucose chewable tablet 16 g PRN    glucose chewable tablet 24 g PRN    heparin 25,000 units in dextrose 5% (100 units/ml) IV bolus from bag - ADDITIONAL PRN BOLUS - 30 units/kg (max bolus 4000 units) PRN    heparin 25,000 units in dextrose 5% (100 units/ml) IV bolus from bag - ADDITIONAL PRN BOLUS - 60 units/kg (max bolus 4000 units) PRN    heparin 25,000 units in dextrose 5% 250 mL (100 units/mL) infusion LOW INTENSITY nomogram - [facility] Continuous    insulin aspart U-100 pen 1-10 Units QID (AC + HS) PRN    insulin aspart U-100 pen 5 Units TIDWM    insulin detemir U-100 pen 10 Units Daily    isosorbide mononitrate 24 hr tablet 30 mg Daily    losartan tablet 100 mg Daily    melatonin tablet 6 mg Nightly PRN    nitroGLYCERIN SL tablet 0.4 mg Q5 Min PRN    ondansetron injection 8 mg Q6H PRN    polyethylene glycol packet 17 g Daily PRN    sodium chloride 0.9% flush 10 mL PRN    sodium chloride 0.9% flush 5 mL PRN    tamsulosin 24 hr capsule 0.4 mg Daily     Family History       Problem Relation (Age of Onset)    Alcohol abuse Brother    Cancer Maternal Aunt    Cataracts Mother    Diabetes Father, Sister, Paternal Uncle    Goiter Mother    Heart disease Father, Mother    Hypertension Father    No Known Problems Daughter, Son, Son          Tobacco Use    Smoking status: Former Smoker     Packs/day: 1.00     Years: 40.00     Pack years: 40.00     Types: Cigarettes     Quit date: 1970     Years since quittin.8    Smokeless tobacco: Former User   Substance and Sexual Activity    Alcohol use: Yes     Alcohol/week: 3.0 standard drinks     Types: 1 Cans of beer, 1 Shots of liquor, 1 Standard drinks or equivalent per week    Drug use: No    Sexual  activity: Not Currently     Review of Systems   All other systems reviewed and are negative.  Objective:     Vital Signs (Most Recent):  Temp: 98 °F (36.7 °C) (04/28/22 0756)  Pulse: 67 (04/28/22 0756)  Resp: 18 (04/28/22 0756)  BP: 134/64 (04/28/22 0756)  SpO2: (!) 94 % (04/28/22 0756)  O2 Device (Oxygen Therapy): room air (04/27/22 2047)   Vital Signs (24h Range):  Temp:  [97.5 °F (36.4 °C)-98.2 °F (36.8 °C)] 98 °F (36.7 °C)  Pulse:  [40-93] 67  Resp:  [17-20] 18  SpO2:  [94 %-95 %] 94 %  BP: (123-159)/(59-77) 134/64     Weight: 98.9 kg (218 lb) (04/27/22 1042)  Body mass index is 31.28 kg/m².  Body surface area is 2.21 meters squared.    I/O last 3 completed shifts:  In: 476 [P.O.:476]  Out: 2250 [Urine:2250]    Physical Exam  Vitals reviewed.   Constitutional:       Appearance: Normal appearance.   HENT:      Head: Normocephalic and atraumatic.      Mouth/Throat:      Mouth: Mucous membranes are moist.   Eyes:      Conjunctiva/sclera: Conjunctivae normal.      Pupils: Pupils are equal, round, and reactive to light.   Cardiovascular:      Rate and Rhythm: Normal rate and regular rhythm.      Pulses: Normal pulses.      Heart sounds: Normal heart sounds.   Pulmonary:      Effort: Pulmonary effort is normal.      Breath sounds: Normal breath sounds.   Abdominal:      General: Bowel sounds are normal.      Palpations: Abdomen is soft.   Musculoskeletal:         General: Normal range of motion.      Comments: Mild B/L LE edema    Skin:     General: Skin is warm.      Capillary Refill: Capillary refill takes less than 2 seconds.   Neurological:      General: No focal deficit present.      Mental Status: He is alert and oriented to person, place, and time.   Psychiatric:         Mood and Affect: Mood normal.       Significant Labs:  CBC:   Recent Labs   Lab 04/28/22  0327   WBC 7.62   RBC 3.58*   HGB 10.6*   HCT 32.2*      MCV 90   MCH 29.6   MCHC 32.9     CMP:   Recent Labs   Lab 04/28/22  0327       CALCIUM 9.9   ALBUMIN 3.2*   PROT 6.0      K 3.6   CO2 20*   *   BUN 60*   CREATININE 2.3*   ALKPHOS 38*   ALT 15   AST 17   BILITOT 1.1*       Significant Imaging:  Reviewed

## 2022-04-28 NOTE — CARE UPDATE
"Coronary artery disease involving native coronary artery of native heart with unstable angina pectoris  NSTEMI  2:1 AV Block  CKD Stage IV  HLD  Kevon Perez is a 81 y.o. y.o. male with known CAD s/p PCI in 2009 who presents from clinic to ED with chest pain, Shortness of Breath, Dizziness, and Fatigue. Patient with frequent episodes of angina that are happening daily over the past 3-4 days admitted with concerns for NSTEMI.     - S/p reloading with Aspirin and Plavix in ED.  - HEART score: 7. "Scores ?7: 50-65% risk of adverse cardiac event. In the HEART Score study, these patients were candidates for early invasive measures. (65.2% retrospective, 50.1% prospective) "  -FAWAD Score:125 points. 9 % Probability of death from admission to 6 months  -Prior sCr 1.7-2.5 and this could be prohibitive to proceed with invasive angiography   -Troponin peaked at 0.273            RECOMMENDATIONS  - Will continue with medical management per ACS protocol with ASA/Plavix/Heparin  - Interventional Cardiology following. Given his CKD stage IV, Nephrology consulted for fluid resuscitation prior to procedure to prevent contrast injury. Planning on cath procedure. NPO at midnight. Start  mL/hr x12hr tonight and will continue  mL/hr x12 hr after LHC   - Electrophysiology team following, planning on eventual permanent pacemaker placement as he meets criteria. Currently hemodynamically stable, so no emergent need. Will decide on implantation after possible revascularization by Interventional Cards.  Continue to hold AV sue blocking agents.  - Continue HI statin Lipitor 80 mg daily   - Control blood pressure with at home medication   "

## 2022-04-28 NOTE — ASSESSMENT & PLAN NOTE
Angina resolved. Troponin peak 2.4, and now down-trending. EKG with 2:1 AVB, inferior Q waves, but no other ischemic ST-T changes. Echo with EF 60% and inferior, apical hypokinesis. PET with 41% resting defect in apical anterolateral, inferior, and lateral segments that increases to 51% with stress. Consistent with RCA/LCx territory. Hb 10.6, Cr 2.3 today.    -Continue ACS therapy with ASA/Plavix/heparin/statin  -Nephrology consulted given CKD and need for LHC; appreciate assistance to reduce risk of RAGHU desean-procedure  -Start  mL/hr x12 tonight and will continue  mL/hr x12 hr after LHC tomorrow  -Continue Imdur, Losartan

## 2022-04-28 NOTE — ASSESSMENT & PLAN NOTE
Cr 2.3 today with baseline 1.8-2.5 on chart review.    - Nephrology consulted and appreciate assistance to reduce risk of COLLIN during LHC  - Continue Losartan  - Monitor BMP, Mag, I/Os

## 2022-04-28 NOTE — CONSULTS
PharmD Consult received for:  1.) Admit medication history/reconciliation: complete  2.) Renally adjust all medications: no renal dose adjustments needed. Will continue to monitor daily through departmental protocols          Thank you for the consult, will sign off.  Kassie Anderson.D., BCPS  [id number]      **Note: Consults are reviewed Monday-Friday 7:00am-3:30pm. The above recommendations are only suggested. The recommendations should be considered in conjunction with all patient factors.**

## 2022-04-28 NOTE — PROGRESS NOTES
[facility] - Cardiology Stepdown  Interventional Cardiology  Progress Note    Patient Name: Kevon Perez  MRN: [id number]  Admission Date: 4/26/2022  Hospital Length of Stay: 2 days  Code Status: Full Code   Attending Physician: Darren Baptiste MD   Primary Care Physician: Cipriano Sol MD  Principal Problem:NSTEMI (non-ST elevated myocardial infarction)    Subjective:     Interval History: No acute events overnight. Patient slept well and no recurrence of chest pain/discomfort. No SOB, MATTHEWS, or palps. Ambulating to the bathroom without assistance. No dizziness, lightheadedness, or presyncope.    Objective:     Vital Signs (Most Recent):  Temp: 99 °F (37.2 °C) (04/28/22 1109)  Pulse: 79 (04/28/22 1109)  Resp: 18 (04/28/22 1109)  BP: (!) 115/53 (04/28/22 1109)  SpO2: 95 % (04/28/22 1109)   Vital Signs (24h Range):  Temp:  [97.5 °F (36.4 °C)-99 °F (37.2 °C)] 99 °F (37.2 °C)  Pulse:  [40-93] 79  Resp:  [17-20] 18  SpO2:  [94 %-95 %] 95 %  BP: (115-134)/(53-77) 115/53     Weight: 98.9 kg (218 lb)  Body mass index is 31.28 kg/m².    SpO2: 95 %  O2 Device (Oxygen Therapy): room air      Intake/Output Summary (Last 24 hours) at 4/28/2022 1112  Last data filed at 4/27/2022 1800  Gross per 24 hour   Intake --   Output 650 ml   Net -650 ml       Lines/Drains/Airways       Peripheral Intravenous Line  Duration                  Peripheral IV - Single Lumen 04/26/22 1741 18 G Right Forearm 1 day                    Physical Exam  Constitutional:       General: He is not in acute distress.     Appearance: He is obese. He is not ill-appearing.      Comments: Pleasant elderly male resting in bed   HENT:      Head: Normocephalic.      Mouth/Throat:      Mouth: Mucous membranes are moist.   Eyes:      Extraocular Movements: Extraocular movements intact.   Cardiovascular:      Rate and Rhythm: Normal rate and regular rhythm.      Pulses: Normal pulses.      Heart sounds: No murmur heard.  Pulmonary:      Effort: Pulmonary effort is  normal. No respiratory distress.      Breath sounds: Normal breath sounds.   Abdominal:      General: Bowel sounds are normal. There is no distension.      Palpations: Abdomen is soft.      Tenderness: There is no abdominal tenderness.   Musculoskeletal:      Cervical back: Neck supple.      Right lower leg: Edema present.      Left lower leg: Edema present.   Skin:     General: Skin is warm.   Neurological:      General: No focal deficit present.      Mental Status: He is alert and oriented to person, place, and time.   Psychiatric:         Mood and Affect: Mood normal.         Behavior: Behavior normal.       Significant Labs: BMP:   Recent Labs   Lab 04/26/22 1744 04/27/22 0238 04/28/22 0327   * 169* 101    140 142   K 3.9 3.7 3.6    110 111*   CO2 15* 20* 20*   BUN 69* 62* 60*   CREATININE 2.5* 2.4* 2.3*   CALCIUM 9.8 9.7 9.9   MG  --  1.6 1.8   , CMP   Recent Labs   Lab 04/26/22 1744 04/27/22 0238 04/28/22 0327    140 142   K 3.9 3.7 3.6    110 111*   CO2 15* 20* 20*   * 169* 101   BUN 69* 62* 60*   CREATININE 2.5* 2.4* 2.3*   CALCIUM 9.8 9.7 9.9   PROT 7.5 6.1 6.0   ALBUMIN 3.9 3.2* 3.2*   BILITOT 1.0 0.9 1.1*   ALKPHOS 46* 39* 38*   AST 19 14 17   ALT 19 15 15   ANIONGAP 16 10 11   ESTGFRAFRICA 26.6* 28.0* 29.5*   EGFRNONAA 23.0* 24.2* 25.5*   , CBC   Recent Labs   Lab 04/26/22 1744 04/27/22 0238 04/28/22 0327   WBC 7.89 7.82 7.62   HGB 12.4* 11.5* 10.6*   HCT 38.3* 35.1* 32.2*   * 136* 152   , INR   Recent Labs   Lab 04/26/22  1921   INR 1.0   , Lipid Panel   Recent Labs   Lab 04/27/22 0238   CHOL 77*   HDL 34*   LDLCALC 32.6*   TRIG 52   CHOLHDL 44.2   , and Troponin   Recent Labs   Lab 04/27/22  1009 04/27/22  1234 04/27/22  1513   TROPONINI 0.273* 0.207* 0.224*       Significant Imaging:   TTE 4/27/2022  · The left ventricle is normal in size with normal systolic function.The estimated ejection fraction is 60%.  · There are segmental left ventricular  wall motion abnormalities.  · Normal left ventricular diastolic function.  · Normal right ventricular size with normal right ventricular systolic function.  · Normal central venous pressure (3 mmHg).    Cardiac PET Stress 4/27/2022    There is a large sized, moderate to severe intensity basal to apical anterolateral, inferior and lateral resting perfusion abnormality involving 41% of LV myocardium in the distribution of the LCX and RCA. After pharmacologic stress, this perfusion abnormality is larger and more severe involving 51% of the LV myocardium, indicative of infarct with desean-infarct ischemia.    Within the perfusion abnormality, absolute myocardial perfusion (cc/min/gm) averaged 0.48 cc/min/g at rest, 0.28 cc/min/g at stress and CFR was 0.62 , which equates to severely reduced coronary flow capacity within the LCX and RCA territory.    The whole heart absolute myocardial perfusion values averaged 0.85 cc/min/g at rest, which is normal; 0.58 cc/min/g at stress, which is severely reduced; and CFR is 0.67 , which is severely reduced.    CT attenuation images demonstrate severe diffuse coronary calcifications in the LAD, LCX and RCA territory and severe diffuse aortic calcifications in the descending aorta.    The gated perfusion images showed an ejection fraction of 48% at rest and 42% during stress. A normal ejection fraction is greater than 53%.    There is lateral wall hypokinesis at rest and hypokinesis during stress.    The LV cavity size is normal at rest and stress.    The EKG portion of this study is abnormal but not diagnostic.    The patient reported chest pain during the stress test.    When compared to the previous study from 6/6/2018, there are significant changes. The inferolateral perfusion defect is now larger and more severe. Coronary flow capacity is now severely reduced in the RCA and circumflex territories.    Assessment and Plan:     Patient is a 81 y.o. male presenting with:    *  NSTEMI (non-ST elevated myocardial infarction)  Angina resolved. Troponin peak 2.4, and now down-trending. EKG with 2:1 AVB, inferior Q waves, but no other ischemic ST-T changes. Echo with EF 60% and inferior, apical hypokinesis. PET with 41% resting defect in apical anterolateral, inferior, and lateral segments that increases to 51% with stress. Consistent with RCA/LCx territory. Hb 10.6, Cr 2.3 today.    -Continue ACS therapy with ASA/Plavix/heparin/statin  -Nephrology consulted given CKD and need for LHC; appreciate assistance to reduce risk of RAGHU desean-procedure  -Start  mL/hr x12 tonight and will continue  mL/hr x12 hr after LHC   - NPO at MN for LHC tomorrow  -Continue Imdur, Losartan    AV block  Patient followed by Dr. Avendaño. Previously offered a PPM in April 2022, but patient deferred and elected to discontinue beta blocker at that time.     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    - Nephrology consulted and appreciate assistance to reduce risk of COLLIN during LHC  - Continue Losartan  - Monitor BMP, Mag, I/Os        VTE Risk Mitigation (From admission, onward)         Ordered     heparin 25,000 units in dextrose 5% (100 units/ml) IV bolus from bag - ADDITIONAL PRN BOLUS - 60 units/kg (max bolus 4000 units)  As needed (PRN)        Question:  Heparin Infusion Adjustment (DO NOT MODIFY ANSWER)  Answer:  \\[facility]\Images\Pharmacy\HeparinInfusions\heparin LOW INTENSITY nomogram for [facility] [id number].pdf    04/26/22 1849     heparin 25,000 units in dextrose 5% (100 units/ml) IV bolus from bag - ADDITIONAL PRN BOLUS - 30 units/kg (max bolus 4000 units)  As needed (PRN)        Question:  Heparin Infusion Adjustment (DO NOT MODIFY ANSWER)  Answer:  \\[facility]\Images\Pharmacy\HeparinInfusions\heparin LOW INTENSITY nomogram for [facility] [id number].pdf    04/26/22 1849     IP VTE HIGH RISK  PATIENT  Once         04/26/22 1937     Place sequential compression device  Until discontinued         04/26/22 1935     Place sequential compression device  Until discontinued         04/26/22 1935     heparin 25,000 units in dextrose 5% 250 mL (100 units/mL) infusion LOW INTENSITY nomogram - [facility]  Continuous        Question Answer Comment   Heparin Infusion Adjustment (DO NOT MODIFY ANSWER) \\[email or web address]\Images\Pharmacy\HeparinInfusions\heparin LOW INTENSITY nomogram for [facility] [id number].pdf    Begin at (in units/kg/hr) 12        04/26/22 1849              Case discussed with Dr Katelyn Garza MD, and Dr Sergio Cast MD.     Maged Tesfaye MD  Interventional Cardiology  [facility]

## 2022-04-28 NOTE — PLAN OF CARE
Plan of care discussed with pt. He will be NPO after MN for possible procedure tomorrow. Continue heparin drip as ordered. Pt will remain free from injury.

## 2022-04-28 NOTE — SUBJECTIVE & OBJECTIVE
Interval History: Patient seen and examined. No acute events overnight, afebrile, vital signs stable. Interventional Cardiology performed [facility] today.    Review of Systems   Constitutional: Negative for chills and decreased appetite.   HENT:  Negative for congestion.    Cardiovascular:  Negative for chest pain, irregular heartbeat and leg swelling.   Respiratory:  Negative for cough and shortness of breath.    Skin:  Negative for rash.   Musculoskeletal:  Negative for arthritis and back pain.   Gastrointestinal:  Negative for abdominal pain, constipation and diarrhea.   Genitourinary:  Negative for dysuria and hematuria.   Neurological:  Negative for dizziness and headaches.   Psychiatric/Behavioral:  Negative for altered mental status.    Objective:     Vital Signs (Most Recent):  Temp: 97.8 °F (36.6 °C) (04/29/22 0510)  Pulse: 64 (04/29/22 0510)  Resp: 20 (04/29/22 0510)  BP: (!) 164/68 (04/29/22 0510)  SpO2: 98 % (04/29/22 0510)   Vital Signs (24h Range):  Temp:  [97.8 °F (36.6 °C)-99 °F (37.2 °C)] 97.8 °F (36.6 °C)  Pulse:  [51-88] 64  Resp:  [18-20] 20  SpO2:  [94 %-98 %] 98 %  BP: (115-164)/(53-68) 164/68     Weight: 98.6 kg (217 lb 6 oz)  Body mass index is 31.19 kg/m².     SpO2: 98 %  O2 Device (Oxygen Therapy): room air      Intake/Output Summary (Last 24 hours) at 4/29/2022 0805  Last data filed at 4/29/2022 0510  Gross per 24 hour   Intake 1569.96 ml   Output 1450 ml   Net 119.96 ml       Lines/Drains/Airways       Peripheral Intravenous Line  Duration                  Peripheral IV - Single Lumen 04/28/22 1000 20 G Left;Posterior Hand <1 day         Peripheral IV - Single Lumen 04/29/22 0315 18 G Anterior;Left Forearm <1 day                    Physical Exam  Vitals reviewed.   Constitutional:       General: He is not in acute distress.  HENT:      Head: Normocephalic and atraumatic.   Eyes:      General: No scleral icterus.        Right eye: No discharge.         Left eye: No discharge.   Neck:       Vascular: No JVD.   Cardiovascular:      Rate and Rhythm: Normal rate and regular rhythm.      Heart sounds: Normal heart sounds.     No friction rub.   Pulmonary:      Effort: Pulmonary effort is normal. No respiratory distress.      Breath sounds: Normal breath sounds. No wheezing.   Abdominal:      General: Abdomen is flat. Bowel sounds are normal. There is no distension.      Palpations: Abdomen is soft.   Musculoskeletal:      Right lower leg: Edema present.      Left lower leg: Edema present.   Skin:     General: Skin is warm and dry.      Coloration: Skin is not jaundiced.   Neurological:      Mental Status: He is alert and oriented to person, place, and time. Mental status is at baseline.     Significant Labs: All pertinent labs within the past 24 hours have been reviewed.  CBC:   Recent Labs   Lab 04/28/22 0327 04/29/22  0334   WBC 7.62 6.63   HGB 10.6* 10.4*   HCT 32.2* 31.9*    133*     CMP:   Recent Labs   Lab 04/28/22 0327 04/29/22  0334    142   K 3.6 3.8   * 111*   CO2 20* 19*    121*   BUN 60* 60*   CREATININE 2.3* 2.2*   CALCIUM 9.9 10.0   PROT 6.0 5.7*   ALBUMIN 3.2* 3.1*   BILITOT 1.1* 0.8   ALKPHOS 38* 35*   AST 17 17   ALT 15 13   ANIONGAP 11 12   EGFRNONAA 25.5* 26.9*     Magnesium:   Recent Labs   Lab 04/28/22 0327 04/29/22  0334   MG 1.8 1.7     Phosphorous:  Recent Labs   Lab 04/28/22 0327 04/29/22  0334   PHOS 3.7 3.8     POCT Glucose:   Recent Labs   Lab 04/28/22  0843 04/28/22  1225 04/28/22  2211   POCTGLUCOSE 128* 166* 162*       Troponin   Recent Labs   Lab 04/27/22  1009 04/27/22  1234 04/27/22  1513   TROPONINI 0.273* 0.207* 0.224*       Significant Imaging: Reviewed    Results for orders placed or performed during the hospital encounter of 04/26/22   EKG 12-lead    Collection Time: 04/26/22  4:48 PM    Narrative    Test Reason : R07.89,    Vent. Rate : 087 BPM     Atrial Rate : 000 BPM     P-R Int : 000 ms          QRS Dur : 102 ms      QT Int : 342 ms        P-R-T Axes : 000 010 075 degrees     QTc Int : 411 ms    Atrial fibrillation  Inferior infarct (cited on or before 26-APR-2022)  Possible Anterior infarct (cited on or before 26-APR-2022)  Abnormal ECG  When compared with ECG of 26-APR-2022 16:25,  Serial changes of evolving Anterior infarct Present  Serial changes of Inferior infarct Present  Confirmed by Elia Olivier MD ([id number]) on 4/27/2022 10:08:47 AM    Referred By: [email or web address]   SELF           Confirmed By:Elia Olivier MD       ECHO[01/06/21]   Summary     The left ventricle is normal in size with normal systolic function. The estimated ejection fraction is 63%  Normal left ventricular diastolic function.  Mild left atrial enlargement.  Normal right ventricular size with normal right ventricular systolic function.  There is mild-to-moderate aortic valve stenosis.  Aortic valve area is 1.53 cm2; peak velocity is 2.03 m/s; mean gradient is 9 mmHg.  Normal central venous pressure (3 mmHg).  The estimated PA systolic pressure is 30 mmHg.       Inpatient Medications:  Continuous Infusions:   sodium chloride 0.9% 100 mL/hr at 04/29/22 0722    heparin (porcine) in D5W 14 Units/kg/hr (04/29/22 0717)     Scheduled Meds:   aspirin  81 mg Oral Daily    atorvastatin  40 mg Oral Daily    calcitRIOL  0.25 mcg Oral Daily    clopidogreL  75 mg Oral Daily    finasteride  5 mg Oral Daily    insulin aspart U-100  5 Units Subcutaneous TIDWM    insulin detemir U-100  10 Units Subcutaneous Daily    isosorbide mononitrate  30 mg Oral Daily    sodium bicarbonate  650 mg Oral TID    tamsulosin  0.4 mg Oral Daily     PRN Meds:acetaminophen, acetaminophen, dextrose 10%, dextrose 10%, glucagon (human recombinant), glucose, glucose, heparin (PORCINE), heparin (PORCINE), insulin aspart U-100, melatonin, nitroGLYCERIN, ondansetron, polyethylene glycol, sodium chloride 0.9%, sodium chloride 0.9%

## 2022-04-28 NOTE — ASSESSMENT & PLAN NOTE
Patient followed by Dr. Avendaño. Previously offered a PPM in April 2022, but patient deferred and elected to discontinue beta blocker at that time.     - EP following; PPM timing pending need for angiogram

## 2022-04-28 NOTE — HPI
Mr. Kevon Perez is a 81 y.o. male with CAD, history of STEMI s/p PCI, and 2nd degree AV block and CKD4 who presents to the ER from Cardiology clinic for evaluation of chest pain.  He reports that for the last 3 days, he has been having midsternal chest pain with minimal exertion.  The chest pain lasts about 20 minutes, and then resolves when he sits down.  The pain is similar to when he had his MCI in 2009.  He did not take any Nitroglycerin.  He endorses chronic shortness of breath, but nothing worse than normal.  He denies any nausea, vomiting, dizziness, or lightheadedness.  Of note, he was recently diagnosed with AV block, and has been seeing Dr. Avendaño about getting a pacemaker placed.  His primary Cardiologist is Dr. Teixeira.  He is compliant with his Plavix.  He was on Aspirin until about 6 months ago, which was stopped for ulcer disease.  Given his concerns for unstable angina, he was sent to the ER for further evaluation.     Has positive stress test will be getting cath by cardiology     Nephrology consulted for CKD4

## 2022-04-28 NOTE — CONSULTS
[facility] - [facility] Stepdown  Nephrology  Consult Note    Patient Name: Kevon Perez  MRN: [id number]  Admission Date: 4/26/2022  Hospital Length of Stay: 2 days  Attending Provider: Darren Baptiste MD   Primary Care Physician: Cipriano Sol MD  Principal Problem:NSTEMI (non-ST elevated myocardial infarction)    Inpatient consult to Nephrology  Consult performed by: Inez Howell MD  Consult ordered by: Gil Bourgeois MD        Subjective:     HPI: Mr. Kevon Perez is a 81 y.o. male with CAD, history of STEMI s/p PCI, and 2nd degree AV block and CKD4 who presents to the ER from Cardiology clinic for evaluation of chest pain.  He reports that for the last 3 days, he has been having midsternal chest pain with minimal exertion.  The chest pain lasts about 20 minutes, and then resolves when he sits down.  The pain is similar to when he had his MCI in 2009.  He did not take any Nitroglycerin.  He endorses chronic shortness of breath, but nothing worse than normal.  He denies any nausea, vomiting, dizziness, or lightheadedness.  Of note, he was recently diagnosed with AV block, and has been seeing Dr. Avendaño about getting a pacemaker placed.  His primary Cardiologist is Dr. Teixeira.  He is compliant with his Plavix.  He was on Aspirin until about 6 months ago, which was stopped for ulcer disease.  Given his concerns for unstable angina, he was sent to the ER for further evaluation.     Has positive stress test will be getting cath by cardiology     Nephrology consulted for CKD4           Past Medical History:   Diagnosis Date    Acute coronary syndrome 9/10/09    STEMI    Anticoagulant long-term use     plavix    Basal cell cancer     BCC (basal cell carcinoma of skin)     nose    Cancer of bladder January 2013    Cataract     Chronic kidney disease     Colon polyp     Coronary artery disease     CPAP (continuous positive airway pressure) dependence     Diabetes mellitus     Diabetic retinopathy      Encounter for blood transfusion     GERD (gastroesophageal reflux disease)     High cholesterol     Hyperlipidemia     Hypertension     Iron deficiency anemia 5/11/2018    GINGER (obstructive sleep apnea)     CPAP     Renal manifestation of secondary diabetes mellitus     SOB (shortness of breath)        Past Surgical History:   Procedure Laterality Date    BASAL CELL CARCINOMA EXCISION      nose     BLADDER SURGERY      bladder cancer    CARDIAC CATHETERIZATION      CATARACT EXTRACTION      bilateral     COLONOSCOPY  3/26/15    COLONOSCOPY N/A 12/4/2020    Procedure: COLONOSCOPY;  Surgeon: Keshav Rajan MD;  [facility]  Service: Endoscopy;  Laterality: N/A;  covid test 12/1-pcw-tb  pt SOB x 1 year-ok to hold Plavix x 5 days per Dr. Sol-see telephone encounter dated 8/25  10/14-instructions emailed to [email or web address]    CORONARY ANGIOPLASTY  9/10/09    CFX    CORONARY ANGIOPLASTY WITH STENT PLACEMENT      CYSTOSCOPY      ESOPHAGOGASTRODUODENOSCOPY N/A 1/27/2021    Procedure: EGD (ESOPHAGOGASTRODUODENOSCOPY);  Surgeon: Matt Acosta MD;  Location[facility];  Service: Endoscopy;  Laterality: N/A;    EYE SURGERY      hydrocel         Review of patient's allergies indicates:   Allergen Reactions    Penicillins Other (See Comments)     Muscle stiffness    Bactrim [sulfamethoxazole-trimethoprim] Rash     Current Facility-Administered Medications   Medication Frequency    acetaminophen tablet 1,000 mg Q8H PRN    acetaminophen tablet 650 mg Q4H PRN    aspirin chewable tablet 81 mg Daily    atorvastatin tablet 40 mg Daily    calcitRIOL capsule 0.25 mcg Daily    clopidogreL tablet 75 mg Daily    dextrose 10% bolus 125 mL PRN    dextrose 10% bolus 250 mL PRN    finasteride tablet 5 mg Daily    glucagon (human recombinant) injection 1 mg PRN    glucose chewable tablet 16 g PRN    glucose chewable tablet 24 g PRN    heparin 25,000 units in dextrose 5% (100  units/ml) IV bolus from bag - ADDITIONAL PRN BOLUS - 30 units/kg (max bolus 4000 units) PRN    heparin 25,000 units in dextrose 5% (100 units/ml) IV bolus from bag - ADDITIONAL PRN BOLUS - 60 units/kg (max bolus 4000 units) PRN    heparin 25,000 units in dextrose 5% 250 mL (100 units/mL) infusion LOW INTENSITY nomogram - [facility] Continuous    insulin aspart U-100 pen 1-10 Units QID (AC + HS) PRN    insulin aspart U-100 pen 5 Units TIDWM    insulin detemir U-100 pen 10 Units Daily    isosorbide mononitrate 24 hr tablet 30 mg Daily    losartan tablet 100 mg Daily    melatonin tablet 6 mg Nightly PRN    nitroGLYCERIN SL tablet 0.4 mg Q5 Min PRN    ondansetron injection 8 mg Q6H PRN    polyethylene glycol packet 17 g Daily PRN    sodium chloride 0.9% flush 10 mL PRN    sodium chloride 0.9% flush 5 mL PRN    tamsulosin 24 hr capsule 0.4 mg Daily     Family History       Problem Relation (Age of Onset)    Alcohol abuse Brother    Cancer Maternal Aunt    Cataracts Mother    Diabetes Father, Sister, Paternal Uncle    Goiter Mother    Heart disease Father, Mother    Hypertension Father    No Known Problems Daughter, Son, Son          Tobacco Use    Smoking status: Former Smoker     Packs/day: 1.00     Years: 40.00     Pack years: 40.00     Types: Cigarettes     Quit date: 1970     Years since quittin.8    Smokeless tobacco: Former User   Substance and Sexual Activity    Alcohol use: Yes     Alcohol/week: 3.0 standard drinks     Types: 1 Cans of beer, 1 Shots of liquor, 1 Standard drinks or equivalent per week    Drug use: No    Sexual activity: Not Currently     Review of Systems   All other systems reviewed and are negative.  Objective:     Vital Signs (Most Recent):  Temp: 98 °F (36.7 °C) (22 075)  Pulse: 67 (22)  Resp: 18 (22)  BP: 134/64 (22)  SpO2: (!) 94 % (22)  O2 Device (Oxygen Therapy): room air (22)   Vital Signs (24h  Range):  Temp:  [97.5 °F (36.4 °C)-98.2 °F (36.8 °C)] 98 °F (36.7 °C)  Pulse:  [40-93] 67  Resp:  [17-20] 18  SpO2:  [94 %-95 %] 94 %  BP: (123-159)/(59-77) 134/64     Weight: 98.9 kg (218 lb) (04/27/22 1042)  Body mass index is 31.28 kg/m².  Body surface area is 2.21 meters squared.    I/O last 3 completed shifts:  In: 476 [P.O.:476]  Out: 2250 [Urine:2250]    Physical Exam  Vitals reviewed.   Constitutional:       Appearance: Normal appearance.   HENT:      Head: Normocephalic and atraumatic.      Mouth/Throat:      Mouth: Mucous membranes are moist.   Eyes:      Conjunctiva/sclera: Conjunctivae normal.      Pupils: Pupils are equal, round, and reactive to light.   Cardiovascular:      Rate and Rhythm: Normal rate and regular rhythm.      Pulses: Normal pulses.      Heart sounds: Normal heart sounds.   Pulmonary:      Effort: Pulmonary effort is normal.      Breath sounds: Normal breath sounds.   Abdominal:      General: Bowel sounds are normal.      Palpations: Abdomen is soft.   Musculoskeletal:         General: Normal range of motion.      Comments: Mild B/L LE edema    Skin:     General: Skin is warm.      Capillary Refill: Capillary refill takes less than 2 seconds.   Neurological:      General: No focal deficit present.      Mental Status: He is alert and oriented to person, place, and time.   Psychiatric:         Mood and Affect: Mood normal.       Significant Labs:  CBC:   Recent Labs   Lab 04/28/22 0327   WBC 7.62   RBC 3.58*   HGB 10.6*   HCT 32.2*      MCV 90   MCH 29.6   MCHC 32.9     CMP:   Recent Labs   Lab 04/28/22 0327      CALCIUM 9.9   ALBUMIN 3.2*   PROT 6.0      K 3.6   CO2 20*   *   BUN 60*   CREATININE 2.3*   ALKPHOS 38*   ALT 15   AST 17   BILITOT 1.1*       Significant Imaging:  Reviewed     Assessment/Plan:     AV block  Per cardiology     CKD stage 4 due to type 2 diabetes mellitus  Patient with history of CKD4   Cr is 2.3 at baseline   Patient is getting Cath  with cardiology and we recommend giving IV NS 1cc/KG/hr 12 hours before procedure and 12 hours after for him it will be 100 cc/hr of NS 12 hours before and 12 hours after procedure.   I did explain to patient that there is a risk of worsening kidney function with contrast and that giving NS may reduce the risk but not eliminate the risk. He also understands that worsening kidney function from contrast also includes possible need of dialysis. He expressed understanding.   Phos at goal no need for binders   C/w calcitriol         Hypertension associated with diabetes  Per primary     Coronary artery disease involving native coronary artery of native heart with unstable angina pectoris  Per cardiology             Inez Howell MD  Nephrology  [facility] - Cardiology Stepdown

## 2022-04-28 NOTE — SUBJECTIVE & OBJECTIVE
Interval History: No acute events overnight. Patient slept well and no recurrence of chest pain/discomfort. No SOB, MATTHEWS, or palps. Ambulating to the bathroom without assistance. No dizziness, lightheadedness, or presyncope.    Objective:     Vital Signs (Most Recent):  Temp: 99 °F (37.2 °C) (04/28/22 1109)  Pulse: 79 (04/28/22 1109)  Resp: 18 (04/28/22 1109)  BP: (!) 115/53 (04/28/22 1109)  SpO2: 95 % (04/28/22 1109)   Vital Signs (24h Range):  Temp:  [97.5 °F (36.4 °C)-99 °F (37.2 °C)] 99 °F (37.2 °C)  Pulse:  [40-93] 79  Resp:  [17-20] 18  SpO2:  [94 %-95 %] 95 %  BP: (115-134)/(53-77) 115/53     Weight: 98.9 kg (218 lb)  Body mass index is 31.28 kg/m².    SpO2: 95 %  O2 Device (Oxygen Therapy): room air      Intake/Output Summary (Last 24 hours) at 4/28/2022 1112  Last data filed at 4/27/2022 1800  Gross per 24 hour   Intake --   Output 650 ml   Net -650 ml       Lines/Drains/Airways       Peripheral Intravenous Line  Duration                  Peripheral IV - Single Lumen 04/26/22 1741 18 G Right Forearm 1 day                    Physical Exam  Constitutional:       General: He is not in acute distress.     Appearance: He is obese. He is not ill-appearing.      Comments: Pleasant elderly male resting in bed   HENT:      Head: Normocephalic.      Mouth/Throat:      Mouth: Mucous membranes are moist.   Eyes:      Extraocular Movements: Extraocular movements intact.   Cardiovascular:      Rate and Rhythm: Normal rate and regular rhythm.      Pulses: Normal pulses.      Heart sounds: No murmur heard.  Pulmonary:      Effort: Pulmonary effort is normal. No respiratory distress.      Breath sounds: Normal breath sounds.   Abdominal:      General: Bowel sounds are normal. There is no distension.      Palpations: Abdomen is soft.      Tenderness: There is no abdominal tenderness.   Musculoskeletal:      Cervical back: Neck supple.      Right lower leg: Edema present.      Left lower leg: Edema present.   Skin:      General: Skin is warm.   Neurological:      General: No focal deficit present.      Mental Status: He is alert and oriented to person, place, and time.   Psychiatric:         Mood and Affect: Mood normal.         Behavior: Behavior normal.       Significant Labs: BMP:   Recent Labs   Lab 04/26/22 1744 04/27/22 0238 04/28/22  0327   * 169* 101    140 142   K 3.9 3.7 3.6    110 111*   CO2 15* 20* 20*   BUN 69* 62* 60*   CREATININE 2.5* 2.4* 2.3*   CALCIUM 9.8 9.7 9.9   MG  --  1.6 1.8   , CMP   Recent Labs   Lab 04/26/22 1744 04/27/22 0238 04/28/22  0327    140 142   K 3.9 3.7 3.6    110 111*   CO2 15* 20* 20*   * 169* 101   BUN 69* 62* 60*   CREATININE 2.5* 2.4* 2.3*   CALCIUM 9.8 9.7 9.9   PROT 7.5 6.1 6.0   ALBUMIN 3.9 3.2* 3.2*   BILITOT 1.0 0.9 1.1*   ALKPHOS 46* 39* 38*   AST 19 14 17   ALT 19 15 15   ANIONGAP 16 10 11   ESTGFRAFRICA 26.6* 28.0* 29.5*   EGFRNONAA 23.0* 24.2* 25.5*   , CBC   Recent Labs   Lab 04/26/22 1744 04/27/22 0238 04/28/22  0327   WBC 7.89 7.82 7.62   HGB 12.4* 11.5* 10.6*   HCT 38.3* 35.1* 32.2*   * 136* 152   , INR   Recent Labs   Lab 04/26/22  1921   INR 1.0   , Lipid Panel   Recent Labs   Lab 04/27/22 0238   CHOL 77*   HDL 34*   LDLCALC 32.6*   TRIG 52   CHOLHDL 44.2   , and Troponin   Recent Labs   Lab 04/27/22  1009 04/27/22  1234 04/27/22  1513   TROPONINI 0.273* 0.207* 0.224*       Significant Imaging:   TTE 4/27/2022  The left ventricle is normal in size with normal systolic function.The estimated ejection fraction is 60%.  There are segmental left ventricular wall motion abnormalities.  Normal left ventricular diastolic function.  Normal right ventricular size with normal right ventricular systolic function.  Normal central venous pressure (3 mmHg).    Cardiac PET Stress 4/27/2022    There is a large sized, moderate to severe intensity basal to apical anterolateral, inferior and lateral resting perfusion abnormality involving 41%  of LV myocardium in the distribution of the LCX and RCA. After pharmacologic stress, this perfusion abnormality is larger and more severe involving 51% of the LV myocardium, indicative of infarct with desean-infarct ischemia.    Within the perfusion abnormality, absolute myocardial perfusion (cc/min/gm) averaged 0.48 cc/min/g at rest, 0.28 cc/min/g at stress and CFR was 0.62 , which equates to severely reduced coronary flow capacity within the LCX and RCA territory.    The whole heart absolute myocardial perfusion values averaged 0.85 cc/min/g at rest, which is normal; 0.58 cc/min/g at stress, which is severely reduced; and CFR is 0.67 , which is severely reduced.    CT attenuation images demonstrate severe diffuse coronary calcifications in the LAD, LCX and RCA territory and severe diffuse aortic calcifications in the descending aorta.    The gated perfusion images showed an ejection fraction of 48% at rest and 42% during stress. A normal ejection fraction is greater than 53%.    There is lateral wall hypokinesis at rest and hypokinesis during stress.    The LV cavity size is normal at rest and stress.    The EKG portion of this study is abnormal but not diagnostic.    The patient reported chest pain during the stress test.    When compared to the previous study from 6/6/2018, there are significant changes. The inferolateral perfusion defect is now larger and more severe. Coronary flow capacity is now severely reduced in the RCA and circumflex territories.

## 2022-04-29 NOTE — ASSESSMENT & PLAN NOTE
- NSTEMI/UA Pathway initiated  - EKG with AV block, no STEMI  - Initial troponin 0.203, still uptrending to 0.273 this morning, continuing trend  - Continue Heparin gtt  - S/p Aspirin 324mg x 1.  Continue Aspirin 81mg PO daily  - S/p Plavix 300mg PO x 1.  Continue Plavix 75mg PO daily  - Continue Lipitor   - Hold BB   - Prn Nitro for chest pain  - Continue tele  - TTE done  - Cardiology following: PET stress on 4/27 positive for ischemia  - Interventional cardiology consulted. apprec recs  - Plan for C on 4/29

## 2022-04-29 NOTE — SUBJECTIVE & OBJECTIVE
Interval History: patient scr stable today. No SOB, no CP     Review of patient's allergies indicates:   Allergen Reactions    Penicillins Other (See Comments)     Muscle stiffness    Bactrim [sulfamethoxazole-trimethoprim] Rash     Current Facility-Administered Medications   Medication Frequency    0.9%  NaCl infusion Continuous    acetaminophen tablet 1,000 mg Q8H PRN    acetaminophen tablet 650 mg Q4H PRN    acetaminophen tablet 650 mg Q4H PRN    aspirin chewable tablet 81 mg Daily    atorvastatin tablet 40 mg Daily    calcitRIOL capsule 0.25 mcg Daily    clopidogreL tablet 75 mg Daily    dextrose 10% bolus 125 mL PRN    dextrose 10% bolus 250 mL PRN    fentaNYL 50 mcg/mL injection PRN    finasteride tablet 5 mg Daily    glucagon (human recombinant) injection 1 mg PRN    glucose chewable tablet 16 g PRN    glucose chewable tablet 24 g PRN    heparin (porcine) 750 Units, verapamiL 1.25 mg, nitroGLYCERIN 1.25 mg in sodium chloride 0.9% 250 mL PRN    heparin 25,000 units in dextrose 5% (100 units/ml) IV bolus from bag - ADDITIONAL PRN BOLUS - 30 units/kg (max bolus 4000 units) PRN    heparin 25,000 units in dextrose 5% (100 units/ml) IV bolus from bag - ADDITIONAL PRN BOLUS - 60 units/kg (max bolus 4000 units) PRN    heparin 25,000 units in dextrose 5% 250 mL (100 units/mL) infusion LOW INTENSITY nomogram - [facility] Continuous    heparin infusion 1,000 units/500 ml in 0.9% NaCl (on sterile field) PRN    insulin aspart U-100 pen 1-10 Units QID (AC + HS) PRN    insulin aspart U-100 pen 5 Units TIDWM    insulin detemir U-100 pen 10 Units Daily    iodixanoL (VISIPAQUE 320) injection PRN    isosorbide mononitrate 24 hr tablet 30 mg Daily    LIDOcaine HCL 20 mg/ml (2%) injection PRN    melatonin tablet 6 mg Nightly PRN    midazolam (PF) injection PRN    nitroGLYCERIN SL tablet 0.4 mg Q5 Min PRN    ondansetron disintegrating tablet 8 mg Q8H PRN    ondansetron injection 8 mg Q6H PRN    polyethylene glycol packet 17 g Daily PRN     sodium bicarbonate tablet 650 mg TID    sodium chloride 0.9% bolus 150 mL Continuous    sodium chloride 0.9% flush 10 mL PRN    sodium chloride 0.9% flush 5 mL PRN    tamsulosin 24 hr capsule 0.4 mg Daily       Objective:     Vital Signs (Most Recent):  Temp: 96.2 °F (35.7 °C) (04/29/22 1352)  Pulse: 68 (04/29/22 1352)  Resp: 18 (04/29/22 1352)  BP: 138/63 (04/29/22 1352)  SpO2: 95 % (04/29/22 1352)  O2 Device (Oxygen Therapy): nasal cannula (04/29/22 1241) Vital Signs (24h Range):  Temp:  [96.2 °F (35.7 °C)-98.4 °F (36.9 °C)] 96.2 °F (35.7 °C)  Pulse:  [51-81] 68  Resp:  [18-20] 18  SpO2:  [94 %-98 %] 95 %  BP: (117-182)/(58-76) 138/63     Weight: 98.6 kg (217 lb 6 oz) (04/28/22 0756)  Body mass index is 31.19 kg/m².  Body surface area is 2.21 meters squared.    I/O last 3 completed shifts:  In: 1570 [P.O.:840; I.V.:730]  Out: 1650 [Urine:1650]    Physical Exam  Vitals reviewed.   Constitutional:       Appearance: Normal appearance.   HENT:      Head: Normocephalic and atraumatic.      Mouth/Throat:      Mouth: Mucous membranes are moist.   Eyes:      Conjunctiva/sclera: Conjunctivae normal.      Pupils: Pupils are equal, round, and reactive to light.   Cardiovascular:      Rate and Rhythm: Normal rate and regular rhythm.      Pulses: Normal pulses.      Heart sounds: Normal heart sounds.   Pulmonary:      Effort: Pulmonary effort is normal.      Breath sounds: Normal breath sounds.   Abdominal:      General: Bowel sounds are normal.      Palpations: Abdomen is soft.   Musculoskeletal:         General: Normal range of motion.      Right lower leg: Edema present.      Left lower leg: Edema present.   Skin:     General: Skin is warm.      Capillary Refill: Capillary refill takes less than 2 seconds.   Neurological:      General: No focal deficit present.      Mental Status: He is alert and oriented to person, place, and time.   Psychiatric:         Mood and Affect: Mood normal.       Significant Labs:  CBC:    Recent Labs   Lab 04/29/22 0334   WBC 6.63   RBC 3.63*   HGB 10.4*   HCT 31.9*   *   MCV 88   MCH 28.7   MCHC 32.6     CMP:   Recent Labs   Lab 04/29/22 0334   *   CALCIUM 10.0   ALBUMIN 3.1*   PROT 5.7*      K 3.8   CO2 19*   *   BUN 60*   CREATININE 2.2*   ALKPHOS 35*   ALT 13   AST 17   BILITOT 0.8        Significant Imaging:  Reviewed

## 2022-04-29 NOTE — CARE UPDATE
EP Care Update Note     Pt to undergo LHC today given positive PET stress. Pt will likely need PPM early next week after revascularization in setting of episodic high degree AV block and complete heart block. EP will continue to follow.     Tay Nguyen   Department of Cardiovascular Disease, PGY-4   [facility]

## 2022-04-29 NOTE — PROGRESS NOTES
[facility] - Cardiology Stepdown  Nephrology  Progress Note    Patient Name: Kevon Perez  MRN: [id number]  Admission Date: 4/26/2022  Hospital Length of Stay: 3 days  Attending Provider: Darren Baptiste MD   Primary Care Physician: Cipriano Sol MD  Principal Problem:NSTEMI (non-ST elevated myocardial infarction)    Subjective:     HPI: Mr. Kevon Perez is a 81 y.o. male with CAD, history of STEMI s/p PCI, and 2nd degree AV block and CKD4 who presents to the ER from Cardiology clinic for evaluation of chest pain.  He reports that for the last 3 days, he has been having midsternal chest pain with minimal exertion.  The chest pain lasts about 20 minutes, and then resolves when he sits down.  The pain is similar to when he had his MCI in 2009.  He did not take any Nitroglycerin.  He endorses chronic shortness of breath, but nothing worse than normal.  He denies any nausea, vomiting, dizziness, or lightheadedness.  Of note, he was recently diagnosed with AV block, and has been seeing Dr. Avendaño about getting a pacemaker placed.  His primary Cardiologist is Dr. Teixeira.  He is compliant with his Plavix.  He was on Aspirin until about 6 months ago, which was stopped for ulcer disease.  Given his concerns for unstable angina, he was sent to the ER for further evaluation.     Has positive stress test will be getting cath by cardiology     Nephrology consulted for CKD4           Interval History: patient scr stable today. No SOB, no CP     Review of patient's allergies indicates:   Allergen Reactions    Penicillins Other (See Comments)     Muscle stiffness    Bactrim [sulfamethoxazole-trimethoprim] Rash     Current Facility-Administered Medications   Medication Frequency    0.9%  NaCl infusion Continuous    acetaminophen tablet 1,000 mg Q8H PRN    acetaminophen tablet 650 mg Q4H PRN    acetaminophen tablet 650 mg Q4H PRN    aspirin chewable tablet 81 mg Daily    atorvastatin tablet 40 mg Daily     calcitRIOL capsule 0.25 mcg Daily    clopidogreL tablet 75 mg Daily    dextrose 10% bolus 125 mL PRN    dextrose 10% bolus 250 mL PRN    fentaNYL 50 mcg/mL injection PRN    finasteride tablet 5 mg Daily    glucagon (human recombinant) injection 1 mg PRN    glucose chewable tablet 16 g PRN    glucose chewable tablet 24 g PRN    heparin (porcine) 750 Units, verapamiL 1.25 mg, nitroGLYCERIN 1.25 mg in sodium chloride 0.9% 250 mL PRN    heparin 25,000 units in dextrose 5% (100 units/ml) IV bolus from bag - ADDITIONAL PRN BOLUS - 30 units/kg (max bolus 4000 units) PRN    heparin 25,000 units in dextrose 5% (100 units/ml) IV bolus from bag - ADDITIONAL PRN BOLUS - 60 units/kg (max bolus 4000 units) PRN    heparin 25,000 units in dextrose 5% 250 mL (100 units/mL) infusion LOW INTENSITY nomogram - [facility] Continuous    heparin infusion 1,000 units/500 ml in 0.9% NaCl (on sterile field) PRN    insulin aspart U-100 pen 1-10 Units QID (AC + HS) PRN    insulin aspart U-100 pen 5 Units TIDWM    insulin detemir U-100 pen 10 Units Daily    iodixanoL (VISIPAQUE 320) injection PRN    isosorbide mononitrate 24 hr tablet 30 mg Daily    LIDOcaine HCL 20 mg/ml (2%) injection PRN    melatonin tablet 6 mg Nightly PRN    midazolam (PF) injection PRN    nitroGLYCERIN SL tablet 0.4 mg Q5 Min PRN    ondansetron disintegrating tablet 8 mg Q8H PRN    ondansetron injection 8 mg Q6H PRN    polyethylene glycol packet 17 g Daily PRN    sodium bicarbonate tablet 650 mg TID    sodium chloride 0.9% bolus 150 mL Continuous    sodium chloride 0.9% flush 10 mL PRN    sodium chloride 0.9% flush 5 mL PRN    tamsulosin 24 hr capsule 0.4 mg Daily       Objective:     Vital Signs (Most Recent):  Temp: 96.2 °F (35.7 °C) (04/29/22 1352)  Pulse: 68 (04/29/22 1352)  Resp: 18 (04/29/22 1352)  BP: 138/63 (04/29/22 1352)  SpO2: 95 % (04/29/22 1352)  O2 Device (Oxygen Therapy): nasal cannula (04/29/22 1241) Vital Signs (24h Range):  Temp:   [96.2 °F (35.7 °C)-98.4 °F (36.9 °C)] 96.2 °F (35.7 °C)  Pulse:  [51-81] 68  Resp:  [18-20] 18  SpO2:  [94 %-98 %] 95 %  BP: (117-182)/(58-76) 138/63     Weight: 98.6 kg (217 lb 6 oz) (04/28/22 0756)  Body mass index is 31.19 kg/m².  Body surface area is 2.21 meters squared.    I/O last 3 completed shifts:  In: 1570 [P.O.:840; I.V.:730]  Out: 1650 [Urine:1650]    Physical Exam  Vitals reviewed.   Constitutional:       Appearance: Normal appearance.   HENT:      Head: Normocephalic and atraumatic.      Mouth/Throat:      Mouth: Mucous membranes are moist.   Eyes:      Conjunctiva/sclera: Conjunctivae normal.      Pupils: Pupils are equal, round, and reactive to light.   Cardiovascular:      Rate and Rhythm: Normal rate and regular rhythm.      Pulses: Normal pulses.      Heart sounds: Normal heart sounds.   Pulmonary:      Effort: Pulmonary effort is normal.      Breath sounds: Normal breath sounds.   Abdominal:      General: Bowel sounds are normal.      Palpations: Abdomen is soft.   Musculoskeletal:         General: Normal range of motion.      Right lower leg: Edema present.      Left lower leg: Edema present.   Skin:     General: Skin is warm.      Capillary Refill: Capillary refill takes less than 2 seconds.   Neurological:      General: No focal deficit present.      Mental Status: He is alert and oriented to person, place, and time.   Psychiatric:         Mood and Affect: Mood normal.       Significant Labs:  CBC:   Recent Labs   Lab 04/29/22  0334   WBC 6.63   RBC 3.63*   HGB 10.4*   HCT 31.9*   *   MCV 88   MCH 28.7   MCHC 32.6     CMP:   Recent Labs   Lab 04/29/22  0334   *   CALCIUM 10.0   ALBUMIN 3.1*   PROT 5.7*      K 3.8   CO2 19*   *   BUN 60*   CREATININE 2.2*   ALKPHOS 35*   ALT 13   AST 17   BILITOT 0.8        Significant Imaging:  Reviewed     Assessment/Plan:     AV block  Per cardiology     CKD stage 4 due to type 2 diabetes mellitus  Patient with history of CKD4   Cr  is 2.2 stable at baseline   Patient has been on IVF  cc/hr since last night would continue 12 hours post cath then stop   Monitor RFP in the next 48 hours   I did explain to patient that there is a risk of worsening kidney function with contrast and that giving NS may reduce the risk but not eliminate the risk. He also understands that worsening kidney function from contrast also includes possible need of dialysis. He expressed understanding.   Phos at goal no need for binders   C/w calcitriol         Hypertension associated with diabetes  Per primary     Coronary artery disease involving native coronary artery of native heart with unstable angina pectoris  Per cardiology             Inez Howell MD  Nephrology  [facility] - Cardiology Stepdown

## 2022-04-29 NOTE — PROGRESS NOTES
[facility] - Cardiology [facility] Medicine  Progress Note    Patient Name: Kevon Perez  MRN: [id number]  Patient Class: IP- Inpatient   Admission Date: 4/26/2022  Length of Stay: 2 days  Attending Physician: Darren Baptiste MD  Primary Care Provider: Cipriano Sol MD        Subjective:     Principal Problem:NSTEMI (non-ST elevated myocardial infarction)        HPI:  Mr. Kevon Perez is a 81 y.o. male with CAD, history of STEMI s/p PCI, and 2nd degree AV block who presents to the ER from Cardiology clinic for evaluation of chest pain.  He reports that for the last 3 days, he has been having midsternal chest pain with minimal exertion.  The chest pain lasts about 20 minutes, and then resolves when he sits down.  The pain is similar to when he had his MCI in 2009.  He did not take any Nitroglycerin.  He endorses chronic shortness of breath, but nothing worse than normal.  He denies any nausea, vomiting, dizziness, or lightheadedness.  Of note, he was recently diagnosed with AV block, and has been seeing Dr. Avendaño about getting a pacemaker placed.  His primary Cardiologist is Dr. Teixeira.  He is compliant with his Plavix.  He was on Aspirin until about 6 months ago, which was stopped for ulcer disease.  Given his concerns for unstable angina, he was sent to the ER for further evaluation.    Upon arrival to the ER, vitals were temp 98.7F, HR 80, /64.  EKG was not ischemic.  Troponin was 0.203.  He was evaluated by Cardiology, who suggested NSTEMI treatment with Aspirin, Plavix, and Heparin gtt.  He was admitted to Hospital Medicine for further management.        Overview/Hospital Course:  Pt admitted to  team G and was started on ACS protocol. Cardiology following, concerns for complete heart block. Performing PET stress on 4/27 to evaluate for need of LHC, and also tentatively planning for PPM placement. Pt remained asymptomatic.      Interval History: Patient sitting in chair, no acute  distress. Family at bedside. No acute events overnight. Patient denies chest pain, SOB, N/V or abdominal pain. Does note lower extremity swelling. . Patient also notes good UOP, regular bowel movements and good po intake. PET stress positive for ischemia and plan for [facility] tomorrow.       Review of Systems   Constitutional:  Positive for activity change, fatigue and unexpected weight change. Negative for chills and fever.   HENT:  Negative for congestion.    Eyes:  Negative for visual disturbance.   Respiratory:  Negative for cough and shortness of breath.    Cardiovascular:  Positive for leg swelling. Negative for chest pain.   Gastrointestinal:  Negative for abdominal distention, abdominal pain, nausea and vomiting.   Genitourinary:  Negative for dysuria.   Musculoskeletal:  Negative for back pain.   Skin:  Negative for rash and wound.   Neurological:  Negative for dizziness and weakness.   Psychiatric/Behavioral:  Negative for confusion.      Objective:     Vital Signs (Most Recent):  Temp: 98.4 °F (36.9 °C) (04/28/22 1505)  Pulse: 65 (04/28/22 1505)  Resp: 18 (04/28/22 1505)  BP: (!) 117/58 (04/28/22 1505)  SpO2: 96 % (04/28/22 1505)   Vital Signs (24h Range):  Temp:  [97.9 °F (36.6 °C)-99 °F (37.2 °C)] 98.4 °F (36.9 °C)  Pulse:  [40-88] 65  Resp:  [17-18] 18  SpO2:  [94 %-96 %] 96 %  BP: (115-134)/(53-77) 117/58     Weight: 98.6 kg (217 lb 6 oz)  Body mass index is 31.19 kg/m².    Intake/Output Summary (Last 24 hours) at 4/28/2022 2039  Last data filed at 4/28/2022 1800  Gross per 24 hour   Intake 840 ml   Output 1050 ml   Net -210 ml      Physical Exam  Constitutional:       Appearance: Normal appearance.   HENT:      Head: Normocephalic.      Mouth/Throat:      Mouth: Mucous membranes are moist.   Eyes:      Extraocular Movements: Extraocular movements intact.      Pupils: Pupils are equal, round, and reactive to light.   Neck:      Comments: JVD absent  Cardiovascular:      Rate and Rhythm: Normal rate and  regular rhythm.      Pulses: Normal pulses.      Heart sounds: Normal heart sounds.   Pulmonary:      Effort: No respiratory distress.      Breath sounds: Normal breath sounds.   Abdominal:      General: Bowel sounds are normal. There is no distension.      Palpations: Abdomen is soft.      Tenderness: There is no abdominal tenderness.   Musculoskeletal:         General: Normal range of motion.      Cervical back: Neck supple.      Right lower leg: Edema present.      Left lower leg: Edema present.   Neurological:      General: No focal deficit present.      Mental Status: He is alert and oriented to person, place, and time.   Psychiatric:         Mood and Affect: Mood normal.       Significant Labs: All pertinent labs within the past 24 hours have been reviewed.    Significant Imaging: I have reviewed all pertinent imaging results/findings within the past 24 hours.      Assessment/Plan:      * NSTEMI (non-ST elevated myocardial infarction)  - NSTEMI/UA Pathway initiated  - EKG with AV block, no STEMI  - Initial troponin 0.203, still uptrending to 0.273 this morning, continuing trend  - Continue Heparin gtt  - S/p Aspirin 324mg x 1.  Continue Aspirin 81mg PO daily  - S/p Plavix 300mg PO x 1.  Continue Plavix 75mg PO daily  - Continue Lipitor   - Hold BB   - Prn Nitro for chest pain  - Continue tele  - TTE done  - Cardiology following: PET stress on 4/27 positive for ischemia  - Interventional cardiology consulted. 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, with long-term current use of insulin  · HbA1c 6.5  · Home DM regimen:  Detemir 22 daily, Novolog 8-10 am, 15 lunch and dinner  · Detemir 10 with Aspart 5 units TIDWM  · SSI with POCT accuchecks to achieve blood glucose of 140-180 while hospitalized  · Diabetic diet        Thrombocytopenia  · Monitor for bleeding      History of MI (myocardial infarction)  · As above      Hypertension associated with diabetes  · Chronic issue  · Stop Norvasc and HCTZ for now  · Hold Torsemide as he looks euvolemic  · Continue ARB - Avapro 300mg at home, Losartan 100mg on formulary  · Start Imdur 30mg daily, titrate up as tolerated  · Can consider Ranexa      Benign prostatic hyperplasia  · Chronic and stable  · Continue Flomax 0.4mg PO daily  · Continue Finasteride 5mg PO daily      Hyperlipidemia associated with type 2 diabetes mellitus  · Chronic issue  · Check lipid panel  · Increase Lipitor from 20mg to 40mg      Coronary artery disease involving native coronary artery of native heart with unstable angina pectoris  -Chronic issue  -History of STEMI with PCI in 2009  -Was previously on Aspirin and Plavix, but taken off Aspirin  -Continue Aspirin and Plavix for now with NSTEMI  -Continue Statin as above  - PET stress positive for ischemia  -Interventional cardiology consulted. apprec recs  --Continue ACS therapy with ASA/Plavix/heparin/statin  --Nephrology consulted given CKD and need for LHC; appreciate assistance to reduce risk of RAGHU desean-procedure  --Start  mL/hr x12 tonight and will continue  mL/hr x12 hr after LHC  -- NPO at [place] for LHC  tomorrow  --Continue Imdur, Losartan      VTE Risk Mitigation (From admission, onward)         Ordered     heparin 25,000 units in dextrose 5% (100 units/ml) IV bolus from bag - ADDITIONAL PRN BOLUS - 60 units/kg (max bolus 4000 units)  As needed (PRN)        Question:  Heparin Infusion Adjustment (DO NOT MODIFY ANSWER)  Answer:  [email or web address]\Images\Pharmacy\HeparinInfusions\heparin LOW INTENSITY nomogram for [facility] [id number]    04/26/22 1849     heparin 25,000 units in dextrose 5% (100 units/ml) IV bolus from bag - ADDITIONAL PRN BOLUS - 30 units/kg (max bolus 4000 units)  As needed (PRN)        Question:  Heparin Infusion Adjustment (DO NOT MODIFY ANSWER)  Answer:  [email or web address]\Images\Pharmacy\HeparinInfusions\heparin LOW INTENSITY nomogram for [facility] [id number]    04/26/22 1849     IP VTE HIGH RISK PATIENT  Once         04/26/22 1937     Place sequential compression device  Until discontinued         04/26/22 1935     Place sequential compression device  Until discontinued         04/26/22 1935     heparin 25,000 units in dextrose 5% 250 mL (100 units/mL) infusion LOW INTENSITY nomogram - [facility]  Continuous        Question Answer Comment   Heparin Infusion Adjustment (DO NOT MODIFY ANSWER) [email or web address]\epic\Images\Pharmacy\HeparinInfusions\heparin LOW INTENSITY nomogram for [facility] [id number].pdf    Begin at (in units/kg/hr) 12        04/26/22 1849                Discharge Planning   HEMANT: 4/29/2022     Code Status: Full Code   Is the patient medically ready for discharge?: No    Reason for patient still in hospital (select all that apply): Patient unstable, Patient trending condition, Laboratory test, Treatment and Consult recommendations  Discharge Plan A: Home, Home with family          Time spent in care of patient: > 35 minutes           Darren Baptiste MD  Department of Hospital Medicine   [facility] - Cardiology Stepdown

## 2022-04-29 NOTE — PROGRESS NOTES
[facility] - [facility] Stepdown  Cardiology  Progress Note    Patient Name: Kevon Perez  MRN: [id number]  Admission Date: 4/26/2022  Hospital Length of Stay: 3 days  Code Status: Full Code   Attending Physician: Darren Baptiste MD   Primary Care Physician: Cipriano Sol MD  Expected Discharge Date: 4/29/2022  Principal Problem:NSTEMI (non-ST elevated myocardial infarction)    Subjective:     Hospital Course:   Started on ACS protocol. Interventional Cardiology and Electrophysiology following.      Interval History: Patient seen and examined. No acute events overnight, afebrile, vital signs stable. Interventional Cardiology performed LHC today.    Review of Systems   Constitutional: Negative for chills and decreased appetite.   HENT:  Negative for congestion.    Cardiovascular:  Negative for chest pain, irregular heartbeat and leg swelling.   Respiratory:  Negative for cough and shortness of breath.    Skin:  Negative for rash.   Musculoskeletal:  Negative for arthritis and back pain.   Gastrointestinal:  Negative for abdominal pain, constipation and diarrhea.   Genitourinary:  Negative for dysuria and hematuria.   Neurological:  Negative for dizziness and headaches.   Psychiatric/Behavioral:  Negative for altered mental status.    Objective:     Vital Signs (Most Recent):  Temp: 97.8 °F (36.6 °C) (04/29/22 0510)  Pulse: 64 (04/29/22 0510)  Resp: 20 (04/29/22 0510)  BP: (!) 164/68 (04/29/22 0510)  SpO2: 98 % (04/29/22 0510)   Vital Signs (24h Range):  Temp:  [97.8 °F (36.6 °C)-99 °F (37.2 °C)] 97.8 °F (36.6 °C)  Pulse:  [51-88] 64  Resp:  [18-20] 20  SpO2:  [94 %-98 %] 98 %  BP: (115-164)/(53-68) 164/68     Weight: 98.6 kg (217 lb 6 oz)  Body mass index is 31.19 kg/m².     SpO2: 98 %  O2 Device (Oxygen Therapy): room air      Intake/Output Summary (Last 24 hours) at 4/29/2022 0805  Last data filed at 4/29/2022 0510  Gross per 24 hour   Intake 1569.96 ml   Output 1450 ml   Net 119.96 ml        Lines/Drains/Airways       Peripheral Intravenous Line  Duration                  Peripheral IV - Single Lumen 04/28/22 1000 20 G Left;Posterior Hand <1 day         Peripheral IV - Single Lumen 04/29/22 0315 18 G Anterior;Left Forearm <1 day                    Physical Exam  Vitals reviewed.   Constitutional:       General: He is not in acute distress.  HENT:      Head: Normocephalic and atraumatic.   Eyes:      General: No scleral icterus.        Right eye: No discharge.         Left eye: No discharge.   Neck:      Vascular: No JVD.   Cardiovascular:      Rate and Rhythm: Normal rate and regular rhythm.      Heart sounds: Normal heart sounds.     No friction rub.   Pulmonary:      Effort: Pulmonary effort is normal. No respiratory distress.      Breath sounds: Normal breath sounds. No wheezing.   Abdominal:      General: Abdomen is flat. Bowel sounds are normal. There is no distension.      Palpations: Abdomen is soft.   Musculoskeletal:      Right lower leg: Edema present.      Left lower leg: Edema present.   Skin:     General: Skin is warm and dry.      Coloration: Skin is not jaundiced.   Neurological:      Mental Status: He is alert and oriented to person, place, and time. Mental status is at baseline.     Significant Labs: All pertinent labs within the past 24 hours have been reviewed.  CBC:   Recent Labs   Lab 04/28/22 0327 04/29/22  0334   WBC 7.62 6.63   HGB 10.6* 10.4*   HCT 32.2* 31.9*    133*     CMP:   Recent Labs   Lab 04/28/22 0327 04/29/22  0334    142   K 3.6 3.8   * 111*   CO2 20* 19*    121*   BUN 60* 60*   CREATININE 2.3* 2.2*   CALCIUM 9.9 10.0   PROT 6.0 5.7*   ALBUMIN 3.2* 3.1*   BILITOT 1.1* 0.8   ALKPHOS 38* 35*   AST 17 17   ALT 15 13   ANIONGAP 11 12   EGFRNONAA 25.5* 26.9*     Magnesium:   Recent Labs   Lab 04/28/22 0327 04/29/22  0334   MG 1.8 1.7     Phosphorous:  Recent Labs   Lab 04/28/22 0327 04/29/22  0334   PHOS 3.7 3.8     POCT Glucose:    Recent Labs   Lab 04/28/22  0843 04/28/22  1225 04/28/22  2211   POCTGLUCOSE 128* 166* 162*       Troponin   Recent Labs   Lab 04/27/22  1009 04/27/22  1234 04/27/22  1513   TROPONINI 0.273* 0.207* 0.224*       Significant Imaging: Reviewed    Results for orders placed or performed during the hospital encounter of 04/26/22   EKG 12-lead    Collection Time: 04/26/22  4:48 PM    Narrative    Test Reason : R07.89,    Vent. Rate : 087 BPM     Atrial Rate : 000 BPM     P-R Int : 000 ms          QRS Dur : 102 ms      QT Int : 342 ms       P-R-T Axes : 000 010 075 degrees     QTc Int : 411 ms    Atrial fibrillation  Inferior infarct (cited on or before 26-APR-2022)  Possible Anterior infarct (cited on or before 26-APR-2022)  Abnormal ECG  When compared with ECG of 26-APR-2022 16:25,  Serial changes of evolving Anterior infarct Present  Serial changes of Inferior infarct Present  Confirmed by Elia Olivier MD ([id number]) on 4/27/2022 10:08:47 AM    Referred By: [email or web address]   SELF           Confirmed By:Elia Olivier MD       ECHO[01/06/21]   Summary     · The left ventricle is normal in size with normal systolic function. The estimated ejection fraction is 63%  · Normal left ventricular diastolic function.  · Mild left atrial enlargement.  · Normal right ventricular size with normal right ventricular systolic function.  · There is mild-to-moderate aortic valve stenosis.  · Aortic valve area is 1.53 cm2; peak velocity is 2.03 m/s; mean gradient is 9 mmHg.  · Normal central venous pressure (3 mmHg).  · The estimated PA systolic pressure is 30 mmHg.       Inpatient Medications:  Continuous Infusions:   sodium chloride 0.9% 100 mL/hr at 04/29/22 0722    heparin (porcine) in D5W 14 Units/kg/hr (04/29/22 0717)     Scheduled Meds:   aspirin  81 mg Oral Daily    atorvastatin  40 mg Oral Daily    calcitRIOL  0.25 mcg Oral Daily    clopidogreL  75 mg Oral Daily    finasteride  5 mg Oral Daily    insulin aspart U-100  5 Units  "Subcutaneous TIDWM    insulin detemir U-100  10 Units Subcutaneous Daily    isosorbide mononitrate  30 mg Oral Daily    sodium bicarbonate  650 mg Oral TID    tamsulosin  0.4 mg Oral Daily     PRN Meds:acetaminophen, acetaminophen, dextrose 10%, dextrose 10%, glucagon (human recombinant), glucose, glucose, heparin (PORCINE), heparin (PORCINE), insulin aspart U-100, melatonin, nitroGLYCERIN, ondansetron, polyethylene glycol, sodium chloride 0.9%, sodium chloride 0.9%      Assessment and Plan:     Coronary artery disease involving native coronary artery of native heart with unstable angina pectoris  NSTEMI  2:1 AV Block  CKD Stage IV  HLD  Kevon Perez is a 81 y.o. y.o. male with known CAD s/p PCI in 2009 who presented from Cardiology clinic to ED for evaluation of recent episodes of angina with associated Shortness of Breath, Dizziness, and Fatigue that were happening more frequently over the past 3-4 days, found to have elevated troponin and admitted with concerns for NSTEMI.     - S/p reloading with Aspirin and Plavix in ED.  - HEART score: 7. FAWAD Score:125 points.   -Troponin peaked at 0.273  -PET Stress scan[04/27] concerning for larger inferolateral perfusion defect and severely reduced coronary flow capacity in the the RCA and circumflex territories.  -TTE[04/27] notable for segmental left ventricular wall motion abnormalities.      Interventional Cardiology performed diagnostic LHC with findings concerning for severe coronary disease.  " 60% ostial to proximal LAD stenosis  · 75% mid LAD stneosis  · 90% OM3 stenosis  · 80% proximal RCA stenosis  · 80% mid RCA stenosis  · DIffuse severe PLB branch stenosis"      RECOMMENDATIONS  - S/P cath procedure. CTS consulted. Resume heparin gtt. Continue  mL/hr x12 hr after LHC   - Electrophysiology team following, planning on eventual permanent pacemaker placement as he meets criteria. Currently hemodynamically stable, so no emergent need. Likely " placement early next week.  Continue to hold AV sue blocking agents.  - Continue HI statin Lipitor 80 mg daily. Plavix for at least 1 year and ASA 81 mg indefinitely,  - Control blood pressure with at home medication           Jimmie Hanks DO  Cardiology  [facility] - [facility]

## 2022-04-29 NOTE — SUBJECTIVE & OBJECTIVE
Interval History: Patient sitting in chair, no acute distress. Family at bedside. No acute events overnight. Patient denies chest pain, SOB, N/V or abdominal pain. Does note lower extremity swelling. . Patient also notes good UOP, regular bowel movements and good po intake. PET stress positive for ischemia and plan for LHC tomorrow.       Review of Systems   Constitutional:  Positive for activity change, fatigue and unexpected weight change. Negative for chills and fever.   HENT:  Negative for congestion.    Eyes:  Negative for visual disturbance.   Respiratory:  Negative for cough and shortness of breath.    Cardiovascular:  Positive for leg swelling. Negative for chest pain.   Gastrointestinal:  Negative for abdominal distention, abdominal pain, nausea and vomiting.   Genitourinary:  Negative for dysuria.   Musculoskeletal:  Negative for back pain.   Skin:  Negative for rash and wound.   Neurological:  Negative for dizziness and weakness.   Psychiatric/Behavioral:  Negative for confusion.      Objective:     Vital Signs (Most Recent):  Temp: 98.4 °F (36.9 °C) (04/28/22 1505)  Pulse: 65 (04/28/22 1505)  Resp: 18 (04/28/22 1505)  BP: (!) 117/58 (04/28/22 1505)  SpO2: 96 % (04/28/22 1505)   Vital Signs (24h Range):  Temp:  [97.9 °F (36.6 °C)-99 °F (37.2 °C)] 98.4 °F (36.9 °C)  Pulse:  [40-88] 65  Resp:  [17-18] 18  SpO2:  [94 %-96 %] 96 %  BP: (115-134)/(53-77) 117/58     Weight: 98.6 kg (217 lb 6 oz)  Body mass index is 31.19 kg/m².    Intake/Output Summary (Last 24 hours) at 4/28/2022 2039  Last data filed at 4/28/2022 1800  Gross per 24 hour   Intake 840 ml   Output 1050 ml   Net -210 ml      Physical Exam  Constitutional:       Appearance: Normal appearance.   HENT:      Head: Normocephalic.      Mouth/Throat:      Mouth: Mucous membranes are moist.   Eyes:      Extraocular Movements: Extraocular movements intact.      Pupils: Pupils are equal, round, and reactive to light.   Neck:      Comments: DELON  absent  Cardiovascular:      Rate and Rhythm: Normal rate and regular rhythm.      Pulses: Normal pulses.      Heart sounds: Normal heart sounds.   Pulmonary:      Effort: No respiratory distress.      Breath sounds: Normal breath sounds.   Abdominal:      General: Bowel sounds are normal. There is no distension.      Palpations: Abdomen is soft.      Tenderness: There is no abdominal tenderness.   Musculoskeletal:         General: Normal range of motion.      Cervical back: Neck supple.      Right lower leg: Edema present.      Left lower leg: Edema present.   Neurological:      General: No focal deficit present.      Mental Status: He is alert and oriented to person, place, and time.   Psychiatric:         Mood and Affect: Mood normal.       Significant Labs: All pertinent labs within the past 24 hours have been reviewed.    Significant Imaging: I have reviewed all pertinent imaging results/findings within the past 24 hours.

## 2022-04-29 NOTE — ASSESSMENT & PLAN NOTE
-Chronic issue  -History of STEMI with PCI in 2009  -Was previously on Aspirin and Plavix, but taken off Aspirin  -Continue Aspirin and Plavix for now with NSTEMI  -Continue Statin as above  - PET stress positive for ischemia  -Interventional cardiology consulted. apprec recs  --Continue ACS therapy with ASA/Plavix/heparin/statin  --Nephrology consulted given CKD and need for LHC; appreciate assistance to reduce risk of RAGHU desean-procedure  --Start  mL/hr x12 tonight and will continue  mL/hr x12 hr after LHC  -- NPO at MN for LHC tomorrow  --Continue Imdur, Losartan

## 2022-04-29 NOTE — OP NOTE
Brief Operative Note:    : Joesph Cast MD     Referring Physician: Self,Aaareferral     All Operators: Surgeon(s):  MD Katelyn Anand MD William Harvey, MD     Preoperative Diagnosis: Non-ST elevation myocardial infarction (NSTEMI) [I21.4]     Postop Diagnosis: Multi-vessel CAD    Treatments/Procedures: Procedure(s) (LRB):  ANGIOGRAM, CORONARY ARTERY (Left)    Findings:Severe coronary disease is present. See catheterization report for full details.  · 60% ostial to proximal LAD stenosis  · 75% mid LAD stneosis  · 90% OM3 stenosis  · 80% proximal RCA stenosis  · 80% mid RCA stenosis  · DIffuse severe PLB branch stenosis    Right radial access    Estimated Blood loss: <50 cc    Specimens removed: No    Recommendations:   - Routine post-cath care as per orders  - IVF at 150 cc/hr for 12 hrs  - Continue medical management, Risk factor reduction, Plavix for at least 1 year and ASA 81 mg indefinitely, CTS consult  - Resume heparin gtt after procedure until patient evaluated by CT Surgery    Maged Tesfaye

## 2022-04-29 NOTE — CONSULTS
Cardiothoracic Surgery Consult Note      SUBJECTIVE:     History of Present Illness:  Mr. Kevon Perez is a 81 y.o. male with CAD, history of STEMI s/p PCI, and 2nd degree AV block who presents to the ER from Cardiology clinic for evaluation of chest pain.  He reports that for the last 3 days, he has been having midsternal chest pain with minimal exertion.  The chest pain lasts about 20 minutes, and then resolves when he sits down.  The pain is similar to when he had his MCI in 2009.  He did not take any Nitroglycerin.  He endorses chronic shortness of breath, but nothing worse than normal.  He denies any nausea, vomiting, dizziness, or lightheadedness.  Of note, he was recently diagnosed with AV block, and has been seeing Dr. Avendaño about getting a pacemaker placed.  His primary Cardiologist is Dr. Teixeira.  He is compliant with his Plavix.  He was on Aspirin until about 6 months ago, which was stopped for ulcer disease.  Given his concerns for unstable angina, he was sent to the ER for further evaluation.    Past Medical History:   Diagnosis Date    Acute coronary syndrome 9/10/09    STEMI    Anticoagulant long-term use     plavix    Basal cell cancer     BCC (basal cell carcinoma of skin)     nose    Cancer of bladder January 2013    Cataract     Chronic kidney disease     Colon polyp     Coronary artery disease     CPAP (continuous positive airway pressure) dependence     Diabetes mellitus     Diabetic retinopathy     Encounter for blood transfusion     GERD (gastroesophageal reflux disease)     High cholesterol     Hyperlipidemia     Hypertension     Iron deficiency anemia 5/11/2018    GINGER (obstructive sleep apnea)     CPAP     Renal manifestation of secondary diabetes mellitus     SOB (shortness of breath)      Past Surgical History:   Procedure Laterality Date    BASAL CELL CARCINOMA EXCISION      nose     BLADDER SURGERY      bladder cancer    CARDIAC CATHETERIZATION       CATARACT EXTRACTION      bilateral     COLONOSCOPY  3/26/15    COLONOSCOPY N/A 2020    Procedure: COLONOSCOPY;  Surgeon: Keshav Rajan MD;  Location: [facility] ENDO ([address] [place] [address]);  Service: Endoscopy;  Laterality: N/A;  covid test -pcw-tb  pt SOB x 1 year-ok to hold Plavix x 5 days per Dr. Sol-see telephone encounter dated 8/25  10/14-instructions emailed to [email or web address]    CORONARY ANGIOPLASTY  9/10/09    CFX    CORONARY ANGIOPLASTY WITH STENT PLACEMENT      CYSTOSCOPY      ESOPHAGOGASTRODUODENOSCOPY N/A 2021    Procedure: EGD (ESOPHAGOGASTRODUODENOSCOPY);  Surgeon: Matt Acosta MD;  Location: [facility];  Service: Endoscopy;  Laterality: N/A;    EYE SURGERY      hydrocel       Family History   Problem Relation Age of Onset    Hypertension Father     Heart disease Father         CHF    Diabetes Father     Diabetes Sister     Cataracts Mother     Goiter Mother     Heart disease Mother         CHF    Diabetes Paternal Uncle     Alcohol abuse Brother     No Known Problems Daughter     No Known Problems Son     No Known Problems Son     Cancer Maternal Aunt         colon    Kidney disease Neg Hx     Amblyopia Neg Hx     Blindness Neg Hx     Glaucoma Neg Hx     Macular degeneration Neg Hx     Retinal detachment Neg Hx     Strabismus Neg Hx     Stroke Neg Hx     Thyroid disease Neg Hx      Social History     Tobacco Use    Smoking status: Former Smoker     Packs/day: 1.00     Years: 40.00     Pack years: 40.00     Types: Cigarettes     Quit date: 1970     Years since quittin.8    Smokeless tobacco: Former User   Substance Use Topics    Alcohol use: Yes     Alcohol/week: 3.0 standard drinks     Types: 1 Cans of beer, 1 Shots of liquor, 1 Standard drinks or equivalent per week    Drug use: No      Review of patient's allergies indicates:   Allergen Reactions    Penicillins Other (See Comments)     Muscle stiffness    Bactrim  [sulfamethoxazole-trimethoprim] Rash     Current medications reviewed    Review of Systems:  Constitutional: Negative for fever, chills, distress  Skin: no acute disorders vs admission. Negative for rash, itching  HEENT: unremarkable.  Negative for sore throat, congestion  Cardiovascular: Positive for chest pain .  Negative for orthopnea lower extremity edema .  Respiratory:  Negative for shortness of breath.  GI:  unremarkable.  Negative for abdominal pain, vomiting  :  Negative for hematuria, flank pain  Musculoskeletal:  Negative for falls, myalgias  Neuro:  Negative for dizziness, LOC  Psych:  Negative for substance abuse, memory loss        OBJECTIVE:     Vital Signs (Most Recent)  Temp: 96.2 °F (35.7 °C) (04/29/22 1352)  Pulse: 64 (04/29/22 1457)  Resp: 18 (04/29/22 1352)  BP: (!) 165/73 (04/29/22 1457)  SpO2: (!) 93 % (04/29/22 1457)  Vital signs reviewed    Physical Exam:  General Appearance: no acute distress.  Normal for age  Skin: no acute lesions, minor bruises from phlebotomy  HEENT: no masses/hematoma, Jugular veins: not distended  Resp:  excursion/effort normal; clear to auscultation  CV:  Rate:  regular  Rhythm: regular  Murmur:  no significant murmur  GI: Bowel sounds: present; abdo soft, nondistended, nontender, no masses palpable  Extrem: Edema: minimal  Pulses: normal  Groin: no hematoma  Neuro: Alert and oriented; no focal deficit  Psych: no acute delirium noted  : voiding well  MSK: no acute findings, ranges of motion unchanged vs previous      I have personally reviewed the imaging, electrocardiogram, and pertinent lab findings    ASSESSMENT/PLAN:     I have personally taken the history and examined this patient.     Due to his age, significantly advanced chronic kidney disease, and poor coronary targets for bypass, the risks involved with open heart surgery outweigh the benefits in this 81 year old male.  I recommend medical management versus percutaneous coronary  intervention.        Jorge L Tubbs MD  Cardiothoracic Surgery  [facility]

## 2022-04-30 PROBLEM — I44.2 COMPLETE HEART BLOCK: Chronic | Status: ACTIVE | Noted: 2022-01-01

## 2022-04-30 NOTE — ASSESSMENT & PLAN NOTE
- Interval history and physical exam findings as described above  - Cardiology following: tentative plans for PPM placement on Monday 5/2  - Monitoring on tele

## 2022-04-30 NOTE — PROGRESS NOTES
[facility]  Cardiology  Progress Note    Patient Name: Kevon Perez  MRN: [id number]  Admission Date: 4/26/2022  Hospital Length of Stay: 4 days  Code Status: Full Code   Attending Physician: Darren Baptiste MD   Primary Care Physician: Cipriano Sol MD  Expected Discharge Date: 5/2/2022  Principal Problem:NSTEMI (non-ST elevated myocardial infarction)    Subjective:     Hospital Course:   Started on ACS protocol. Interventional Cardiology and Electrophysiology following.      Interval History: Patient seen and examined. No acute events overnight, afebrile, vital signs stable. Interventional Cardiology performed LHC today.    Review of Systems   Constitutional: Negative for chills and decreased appetite.   HENT:  Negative for congestion.    Cardiovascular:  Negative for chest pain, irregular heartbeat and leg swelling.   Respiratory:  Negative for cough and shortness of breath.    Skin:  Negative for rash.   Musculoskeletal:  Negative for arthritis and back pain.   Gastrointestinal:  Negative for abdominal pain, constipation and diarrhea.   Genitourinary:  Negative for dysuria and hematuria.   Neurological:  Negative for dizziness and headaches.   Psychiatric/Behavioral:  Negative for altered mental status.    Objective:     Vital Signs (Most Recent):  Temp: 98 °F (36.7 °C) (04/30/22 1107)  Pulse: 83 (04/30/22 1107)  Resp: 18 (04/30/22 1107)  BP: 137/68 (04/30/22 1107)  SpO2: 97 % (04/30/22 1107)   Vital Signs (24h Range):  Temp:  [96.2 °F (35.7 °C)-98.3 °F (36.8 °C)] 98 °F (36.7 °C)  Pulse:  [56-83] 83  Resp:  [17-18] 18  SpO2:  [92 %-98 %] 97 %  BP: (111-176)/(56-78) 137/68     Weight: 98.6 kg (217 lb 6 oz)  Body mass index is 31.19 kg/m².     SpO2: 97 %  O2 Device (Oxygen Therapy): room air      Intake/Output Summary (Last 24 hours) at 4/30/2022 1130  Last data filed at 4/29/2022 1938  Gross per 24 hour   Intake --   Output 1 ml   Net -1 ml         Lines/Drains/Airways       Peripheral  Intravenous Line  Duration                  Peripheral IV - Single Lumen 04/28/22 1000 20 G Left;Posterior Hand 2 days         Peripheral IV - Single Lumen 04/29/22 0315 18 G Anterior;Left Forearm 1 day                    Physical Exam  Vitals reviewed.   Constitutional:       General: He is not in acute distress.  HENT:      Head: Normocephalic and atraumatic.   Eyes:      General: No scleral icterus.        Right eye: No discharge.         Left eye: No discharge.   Neck:      Vascular: No JVD.   Cardiovascular:      Rate and Rhythm: Normal rate and regular rhythm.      Heart sounds: Normal heart sounds.     No friction rub.   Pulmonary:      Effort: Pulmonary effort is normal. No respiratory distress.      Breath sounds: Normal breath sounds. No wheezing.   Abdominal:      General: Abdomen is flat. Bowel sounds are normal. There is no distension.      Palpations: Abdomen is soft.   Skin:     General: Skin is warm and dry.      Coloration: Skin is not jaundiced.   Neurological:      Mental Status: He is alert and oriented to person, place, and time. Mental status is at baseline.     Significant Labs: All pertinent labs within the past 24 hours have been reviewed.  CBC:   Recent Labs   Lab 04/29/22  0334   WBC 6.63   HGB 10.4*   HCT 31.9*   *       CMP:   Recent Labs   Lab 04/29/22  0334 04/30/22  0939    140   K 3.8 4.1   * 113*   CO2 19* 17*   * 184*   BUN 60* 43*   CREATININE 2.2* 1.9*   CALCIUM 10.0 9.3   PROT 5.7*  --    ALBUMIN 3.1*  --    BILITOT 0.8  --    ALKPHOS 35*  --    AST 17  --    ALT 13  --    ANIONGAP 12 10   EGFRNONAA 26.9* 32.1*       Magnesium:   Recent Labs   Lab 04/29/22  0334   MG 1.7       Phosphorous:  Recent Labs   Lab 04/29/22  0334   PHOS 3.8       POCT Glucose:   Recent Labs   Lab 04/28/22  2211 04/29/22  0916 04/29/22  1216   POCTGLUCOSE 162* 120* 118*         Troponin   No results for input(s): TROPONINI in the last 48 hours.      Significant Imaging:  Reviewed    Results for orders placed or performed during the hospital encounter of 04/26/22   EKG 12-lead    Collection Time: 04/29/22 11:38 AM    Narrative    Test Reason : I21.4,    Vent. Rate : 071 BPM     Atrial Rate : 093 BPM     P-R Int : 000 ms          QRS Dur : 102 ms      QT Int : 382 ms       P-R-T Axes : 046 -07 097 degrees     QTc Int : 415 ms    Sinus rhythm with 2nd degree A-V block (Mobitz I) possibly  Possible Inferior infarct (cited on or before 29-APR-2022)  Probable  Anterolateral infarct (cited on or before 29-APR-2022)  Abnormal ECG  When compared with ECG of 27-APR-2022 10:37,  AVB now noted  Confirmed by Seth CARDOZA MD ([id number]) on 4/29/2022 1:29:35 PM    Referred By: MOHAN   SELF           Confirmed By:Seth CARDOZA MD       ECHO[01/06/21]   Summary     · The left ventricle is normal in size with normal systolic function. The estimated ejection fraction is 63%  · Normal left ventricular diastolic function.  · Mild left atrial enlargement.  · Normal right ventricular size with normal right ventricular systolic function.  · There is mild-to-moderate aortic valve stenosis.  · Aortic valve area is 1.53 cm2; peak velocity is 2.03 m/s; mean gradient is 9 mmHg.  · Normal central venous pressure (3 mmHg).  · The estimated PA systolic pressure is 30 mmHg.       Inpatient Medications:  Continuous Infusions:      Scheduled Meds:   amLODIPine  5 mg Oral Daily    aspirin  81 mg Oral Daily    atorvastatin  40 mg Oral Daily    calcitRIOL  0.25 mcg Oral Daily    clopidogreL  75 mg Oral Daily    finasteride  5 mg Oral Daily    insulin aspart U-100  5 Units Subcutaneous TIDWM    insulin detemir U-100  10 Units Subcutaneous Daily    isosorbide mononitrate  30 mg Oral Daily    sodium bicarbonate  650 mg Oral TID    tamsulosin  0.4 mg Oral Daily     PRN Meds:acetaminophen, acetaminophen, dextrose 10%, dextrose 10%, fentaNYL, glucagon (human recombinant), glucose, glucose, heparin-verapamil-nitroglycerin  custom mixture, heparin (porcine), insulin aspart U-100, iodixanoL, LIDOcaine HCL 20 mg/ml (2%), melatonin, midazolam (PF), nitroGLYCERIN, ondansetron, polyethylene glycol, sodium chloride 0.9%, sodium chloride 0.9%      Assessment and Plan:     Complete heart block  - EP consulted  - plan for PPM 5/2/22  - Hold heparin products  -holding beta blocker    Obesity (BMI 30.0-34.9)  -encourage diet and exercise    CKD stage 4 due to type 2 diabetes mellitus  Lab Results   Component Value Date    CREATININE 1.9 (H) 04/30/2022   - Creatinine improving  - Holding ARB    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 Continue current therapy    Coronary artery disease involving native coronary artery of native heart with unstable angina pectoris  NSTEMI  Kevon Perez is a 81 y.o. y.o. male with known CAD s/p PCI in 2009 who presents from clinic to ED with chest pain, Shortness of Breath, Dizziness, and Fatigue.   Patient with frequent episodes of angina that are happening daily over the past 3-4 days.     - on asa, plavix   -Troponin peaked at 0.273    ECHO[04/27/22]  Summary  · The left ventricle is normal in size with normal systolic function.The estimated ejection fraction is 60%.  · There are segmental left ventricular wall motion abnormalities.  · Normal left ventricular diastolic function.  · Normal right ventricular size with normal right ventricular systolic function.  · Normal central venous pressure (3  mmHg).      RECOMMENDATION  - Completed heparin therapy. DC Heparin. PPM Monday.   - Interventional Cardiology performed angiogram. Staged revascularization due to CKD.   - continue asa, plavix         VTE Risk Mitigation (From admission, onward)         Ordered     heparin (porcine) 750 Units, verapamiL 1.25 mg, nitroGLYCERIN 1.25 mg in sodium chloride 0.9% 250 mL  As needed (PRN)         04/29/22 1246     heparin infusion 1,000 units/500 ml in 0.9% NaCl (on sterile field)  As needed (PRN)         04/29/22 1246     IP VTE HIGH RISK PATIENT  Once         04/26/22 1937     Place sequential compression device  Until discontinued         04/26/22 1935     Place sequential compression device  Until discontinued         04/26/22 1935                Arsenio Camacho MD  Cardiology  [facility]

## 2022-04-30 NOTE — ASSESSMENT & PLAN NOTE
Lab Results   Component Value Date    CREATININE 1.9 (H) 04/30/2022   - Creatinine improving  - Holding ARB

## 2022-04-30 NOTE — ASSESSMENT & PLAN NOTE
Lab Results   Component Value Date    LDLCALC 32.6 (L) 04/27/2022   - LDL controlled. Continue current therapy

## 2022-04-30 NOTE — ASSESSMENT & PLAN NOTE
- restart Norvasc  - Holding beta blocker due to bradycardia and need for PPM  - Hold ARB due to RAGHU

## 2022-04-30 NOTE — PROGRESS NOTES
[facility] Medicine  Progress Note    Patient Name: Kevon Perez  MRN: [id number]  Patient Class: IP- Inpatient   Admission Date: 4/26/2022  Length of Stay: 4 days  Attending Physician: Darren Baptiste MD  Primary Care Provider: Cipriano Sol MD        Subjective:     Principal Problem:NSTEMI (non-ST elevated myocardial infarction)        HPI:  Mr. Kevon Perez is a 81 y.o. male with CAD, history of STEMI s/p PCI, and 2nd degree AV block who presents to the ER from Cardiology clinic for evaluation of chest pain.  He reports that for the last 3 days, he has been having midsternal chest pain with minimal exertion.  The chest pain lasts about 20 minutes, and then resolves when he sits down.  The pain is similar to when he had his MCI in 2009.  He did not take any Nitroglycerin.  He endorses chronic shortness of breath, but nothing worse than normal.  He denies any nausea, vomiting, dizziness, or lightheadedness.  Of note, he was recently diagnosed with AV block, and has been seeing Dr. Avendaño about getting a pacemaker placed.  His primary Cardiologist is Dr. Teixeira.  He is compliant with his Plavix.  He was on Aspirin until about 6 months ago, which was stopped for ulcer disease.  Given his concerns for unstable angina, he was sent to the ER for further evaluation.    Upon arrival to the ER, vitals were temp 98.7F, HR 80, /64.  EKG was not ischemic.  Troponin was 0.203.  He was evaluated by Cardiology, who suggested NSTEMI treatment with Aspirin, Plavix, and Heparin gtt.  He was admitted to Hospital Medicine for further management.        Overview/Hospital Course:  Pt admitted to  team G and was started on ACS protocol. Cardiology following, concerns for complete heart block. Performing PET stress on 4/27 to evaluate for need of LHC, and also tentatively planning for PPM placement. Pt remained asymptomatic.      Interval History: Patient sitting in chair, no acute  distress. Family at bedside. No acute events overnight. Patient denies chest pain, SOB, N/V or abdominal pain. Angiogram performed today showed severe coronary disease and CTS deemed too high risk for CABG. Plan for inpatient PPM placement on Monday and outpatient cardiology followup to discuss possible PCI.      Review of Systems   Constitutional:  Positive for activity change, fatigue and unexpected weight change. Negative for chills and fever.   HENT:  Negative for congestion.    Eyes:  Negative for visual disturbance.   Respiratory:  Negative for cough and shortness of breath.    Cardiovascular:  Positive for leg swelling. Negative for chest pain.   Gastrointestinal:  Negative for abdominal distention, abdominal pain, nausea and vomiting.   Genitourinary:  Negative for dysuria.   Musculoskeletal:  Negative for back pain.   Skin:  Negative for rash and wound.   Neurological:  Negative for dizziness and weakness.   Psychiatric/Behavioral:  Negative for confusion.      Objective:     Vital Signs (Most Recent):  Temp: 98.3 °F (36.8 °C) (04/30/22 1508)  Pulse: 69 (04/30/22 1616)  Resp: 18 (04/30/22 1508)  BP: 135/60 (04/30/22 1508)  SpO2: 98 % (04/30/22 1508)   Vital Signs (24h Range):  Temp:  [97.9 °F (36.6 °C)-98.3 °F (36.8 °C)] 98.3 °F (36.8 °C)  Pulse:  [56-84] 69  Resp:  [18] 18  SpO2:  [93 %-98 %] 98 %  BP: (135-176)/(56-78) 135/60     Weight: 98.6 kg (217 lb 6 oz)  Body mass index is 31.19 kg/m².    Intake/Output Summary (Last 24 hours) at 4/30/2022 1850  Last data filed at 4/29/2022 1938  Gross per 24 hour   Intake --   Output 1 ml   Net -1 ml        Physical Exam  Constitutional:       Appearance: Normal appearance.   HENT:      Head: Normocephalic.      Mouth/Throat:      Mouth: Mucous membranes are moist.   Eyes:      Extraocular Movements: Extraocular movements intact.      Pupils: Pupils are equal, round, and reactive to light.   Neck:      Comments: JVD absent  Cardiovascular:      Rate and Rhythm:  Normal rate and regular rhythm.      Pulses: Normal pulses.      Heart sounds: Normal heart sounds.   Pulmonary:      Effort: No respiratory distress.      Breath sounds: Normal breath sounds.   Abdominal:      General: Bowel sounds are normal. There is no distension.      Palpations: Abdomen is soft.      Tenderness: There is no abdominal tenderness.   Musculoskeletal:         General: Normal range of motion.      Cervical back: Neck supple.      Right lower leg: Edema present.      Left lower leg: Edema present.   Neurological:      General: No focal deficit present.      Mental Status: He is alert and oriented to person, place, and time.   Psychiatric:         Mood and Affect: Mood normal.       Significant Labs: All pertinent labs within the past 24 hours have been reviewed.    Significant Imaging: I have reviewed all pertinent imaging results/findings within the past 24 hours.      Assessment/Plan:      * NSTEMI (non-ST elevated myocardial infarction)  - NSTEMI/UA Pathway initiated  - EKG with AV block, no STEMI  - Initial troponin 0.203, still uptrending to 0.273 this morning, continuing trend  - Continue Heparin gtt  - S/p Aspirin 324mg x 1.  Continue Aspirin 81mg PO daily  - S/p Plavix 300mg PO x 1.  Continue Plavix 75mg PO daily  - Continue Lipitor   - Hold BB   - Prn Nitro for chest pain  - Continue tele  - TTE done  - Cardiology following: PET stress on 4/27 positive for ischemia  - Interventional cardiology consulted. 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, with long-term current use of insulin  · HbA1c 6.5  · Home DM regimen:  Detemir 22 daily, Novolog 8-10 am, 15 lunch and dinner  · Detemir 10 with Aspart 5 units TIDWM  · SSI with POCT accuchecks to achieve blood glucose of 140-180 while hospitalized  · Diabetic diet        Thrombocytopenia  · Monitor for bleeding      History of MI (myocardial infarction)  · As above      Hypertension associated with diabetes  · Chronic issue  · Stop Norvasc and HCTZ for now  · Hold Torsemide as he looks euvolemic  · Continue ARB - Avapro 300mg at home, Losartan 100mg on formulary  · Start Imdur 30mg daily, titrate up as tolerated  · Can consider Ranexa      Benign prostatic hyperplasia  · Chronic and stable  · Continue Flomax 0.4mg PO daily  · Continue Finasteride 5mg PO daily      Hyperlipidemia associated with type 2 diabetes mellitus  · Chronic issue  · Check lipid panel  · Increase Lipitor from 20mg to 40mg      Coronary artery disease involving native coronary artery of native heart with unstable angina pectoris  -Chronic issue  -History of STEMI with PCI in 2009  -Was previously on Aspirin and Plavix, but taken off Aspirin  -Continue Aspirin and Plavix for now with NSTEMI  -Continue Statin as above  - PET stress positive for ischemia  -Interventional cardiology consulted. apprec recs  --Continue ACS therapy with ASA/Plavix/heparin/statin  --Nephrology consulted given CKD. apprec recs  -- s/p IVF pre and post LHC  -- LHC showed severe coronary disease  --Continue Imdur, Losartan  - CTS consulted apprec  recs  -- not a candidate for CABG due to high risk   - outpatient cardiology followup to discuss PCI        VTE Risk Mitigation (From admission, onward)         Ordered     heparin (porcine) 750 Units, verapamiL 1.25 mg, nitroGLYCERIN 1.25 mg in sodium chloride 0.9% 250 mL  As needed (PRN)         04/29/22 1246     heparin infusion 1,000 units/500 ml in 0.9% NaCl (on sterile field)  As needed (PRN)         04/29/22 1246     IP VTE HIGH RISK PATIENT  Once         04/26/22 1937     Place sequential compression device  Until discontinued         04/26/22 1935     Place sequential compression device  Until discontinued         04/26/22 1935                Discharge Planning   HEMANT: 5/2/2022     Code Status: Full Code   Is the patient medically ready for discharge?: No    Reason for patient still in hospital (select all that apply): Patient unstable, Patient trending condition, Laboratory test, Treatment and Consult recommendations  Discharge Plan A: Home, Home with family          Time spent in care of patient: > 35 minutes           Darren Baptiste MD  Department of Hospital Medicine   [facility] - Cardiology Stepdown

## 2022-04-30 NOTE — PROGRESS NOTES
Harpreet Kramer - Cardiology [facility] Medicine  Progress Note    Patient Name: Kevon Perez  MRN: [id number]  Patient Class: IP- Inpatient   Admission Date: 4/26/2022  Length of Stay: 3 days  Attending Physician: Darren Baptiste MD  Primary Care Provider: Cipriano Sol MD        Subjective:     Principal Problem:NSTEMI (non-ST elevated myocardial infarction)        HPI:  Mr. Kevon Perez is a 81 y.o. male with CAD, history of STEMI s/p PCI, and 2nd degree AV block who presents to the ER from Cardiology clinic for evaluation of chest pain.  He reports that for the last 3 days, he has been having midsternal chest pain with minimal exertion.  The chest pain lasts about 20 minutes, and then resolves when he sits down.  The pain is similar to when he had his MCI in 2009.  He did not take any Nitroglycerin.  He endorses chronic shortness of breath, but nothing worse than normal.  He denies any nausea, vomiting, dizziness, or lightheadedness.  Of note, he was recently diagnosed with AV block, and has been seeing Dr. Avendaño about getting a pacemaker placed.  His primary Cardiologist is Dr. Teixeira.  He is compliant with his Plavix.  He was on Aspirin until about 6 months ago, which was stopped for ulcer disease.  Given his concerns for unstable angina, he was sent to the ER for further evaluation.    Upon arrival to the ER, vitals were temp 98.7F, HR 80, /64.  EKG was not ischemic.  Troponin was 0.203.  He was evaluated by Cardiology, who suggested NSTEMI treatment with Aspirin, Plavix, and Heparin gtt.  He was admitted to Hospital Medicine for further management.        Overview/Hospital Course:  Pt admitted to  team G and was started on ACS protocol. Cardiology following, concerns for complete heart block. Performing PET stress on 4/27 to evaluate for need of LHC, and also tentatively planning for PPM placement. Pt remained asymptomatic.      Interval History: Patient sitting in chair, no acute  distress. Family at bedside. No acute events overnight. Patient denies chest pain, SOB, N/V or abdominal pain. Angiogram performed today showed severe coronary disease. Interventional cardiology evaluated the patient and stated that the risks involved with open heart surgery outweigh the benefits and they recommend medical management versus percutaneous coronary intervention.      Review of Systems   Constitutional:  Positive for activity change, fatigue and unexpected weight change. Negative for chills and fever.   HENT:  Negative for congestion.    Eyes:  Negative for visual disturbance.   Respiratory:  Negative for cough and shortness of breath.    Cardiovascular:  Positive for leg swelling. Negative for chest pain.   Gastrointestinal:  Negative for abdominal distention, abdominal pain, nausea and vomiting.   Genitourinary:  Negative for dysuria.   Musculoskeletal:  Negative for back pain.   Skin:  Negative for rash and wound.   Neurological:  Negative for dizziness and weakness.   Psychiatric/Behavioral:  Negative for confusion.      Objective:     Vital Signs (Most Recent):  Temp: 96.4 °F (35.8 °C) (04/29/22 1613)  Pulse: 69 (04/29/22 1613)  Resp: 17 (04/29/22 1613)  BP: 113/74 (04/29/22 1613)  SpO2: (!) 94 % (04/29/22 1613)   Vital Signs (24h Range):  Temp:  [96.2 °F (35.7 °C)-98 °F (36.7 °C)] 96.4 °F (35.8 °C)  Pulse:  [51-81] 69  Resp:  [17-20] 17  SpO2:  [92 %-98 %] 94 %  BP: (111-182)/(60-76) 113/74     Weight: 98.6 kg (217 lb 6 oz)  Body mass index is 31.19 kg/m².    Intake/Output Summary (Last 24 hours) at 4/29/2022 1927  Last data filed at 4/29/2022 0510  Gross per 24 hour   Intake 729.96 ml   Output 600 ml   Net 129.96 ml        Physical Exam  Constitutional:       Appearance: Normal appearance.   HENT:      Head: Normocephalic.      Mouth/Throat:      Mouth: Mucous membranes are moist.   Eyes:      Extraocular Movements: Extraocular movements intact.      Pupils: Pupils are equal, round, and reactive  to light.   Neck:      Comments: JVD absent  Cardiovascular:      Rate and Rhythm: Normal rate and regular rhythm.      Pulses: Normal pulses.      Heart sounds: Normal heart sounds.   Pulmonary:      Effort: No respiratory distress.      Breath sounds: Normal breath sounds.   Abdominal:      General: Bowel sounds are normal. There is no distension.      Palpations: Abdomen is soft.      Tenderness: There is no abdominal tenderness.   Musculoskeletal:         General: Normal range of motion.      Cervical back: Neck supple.      Right lower leg: Edema present.      Left lower leg: Edema present.   Neurological:      General: No focal deficit present.      Mental Status: He is alert and oriented to person, place, and time.   Psychiatric:         Mood and Affect: Mood normal.       Significant Labs: All pertinent labs within the past 24 hours have been reviewed.    Significant Imaging: I have reviewed all pertinent imaging results/findings within the past 24 hours.      Assessment/Plan:      * NSTEMI (non-ST elevated myocardial infarction)  - NSTEMI/UA Pathway initiated  - EKG with AV block, no STEMI  - Initial troponin 0.203, still uptrending to 0.273 this morning, continuing trend  - Continue Heparin gtt  - S/p Aspirin 324mg x 1.  Continue Aspirin 81mg PO daily  - S/p Plavix 300mg PO x 1.  Continue Plavix 75mg PO daily  - Continue Lipitor   - Hold BB   - Prn Nitro for chest pain  - Continue tele  - TTE done  - Cardiology following: PET stress on 4/27 positive for ischemia  - Interventional cardiology consulted. 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, with long-term current use of insulin  · HbA1c 6.5  · Home DM regimen:  Detemir 22 daily, Novolog 8-10 am, 15 lunch and dinner  · Detemir 10 with Aspart 5 units TIDWM  · SSI with POCT accuchecks to achieve blood glucose of 140-180 while hospitalized  · Diabetic diet        Thrombocytopenia  · Monitor for bleeding      History of MI (myocardial infarction)  · As above      Hypertension associated with diabetes  · Chronic issue  · Stop Norvasc and HCTZ for now  · Hold Torsemide as he looks euvolemic  · Continue ARB - Avapro 300mg at home, Losartan 100mg on formulary  · Start Imdur 30mg daily, titrate up as tolerated  · Can consider Ranexa      Benign prostatic hyperplasia  · Chronic and stable  · Continue Flomax 0.4mg PO daily  · Continue Finasteride 5mg PO daily      Hyperlipidemia associated with type 2 diabetes mellitus  · Chronic issue  · Check lipid panel  · Increase Lipitor from 20mg to 40mg      Coronary artery disease involving native coronary artery of native heart with unstable angina pectoris  -Chronic issue  -History of STEMI with PCI in 2009  -Was previously on Aspirin and Plavix, but taken off Aspirin  -Continue Aspirin and Plavix for now with NSTEMI  -Continue Statin as above  - PET stress positive for ischemia  -Interventional cardiology consulted. apprec recs  --Continue ACS therapy with ASA/Plavix/heparin/statin  --Nephrology consulted given CKD and need for LHC; appreciate assistance to reduce risk of RAGHU desean-procedure  --Start  mL/hr  x12 tonight and will continue  mL/hr x12 hr after LHC  -- LHC showed severe coronary disease  --Continue Imdur, Losartan  - CTS consulted apprec recs  -- not a candidate for CABG due to high risk       VTE Risk Mitigation (From admission, onward)         Ordered     heparin (porcine) 750 Units, verapamiL 1.25 mg, nitroGLYCERIN 1.25 mg in sodium chloride 0.9% 250 mL  As needed (PRN)         04/29/22 1246     heparin infusion 1,000 units/500 ml in 0.9% NaCl (on sterile field)  As needed (PRN)         04/29/22 1246     heparin 25,000 units in dextrose 5% (100 units/ml) IV bolus from bag - ADDITIONAL PRN BOLUS - 60 units/kg (max bolus 4000 units)  As needed (PRN)        Question:  Heparin Infusion Adjustment (DO NOT MODIFY ANSWER)  Answer:  \[email or web address]\epic\Images\Pharmacy\HeparinInfusions\heparin LOW INTENSITY nomogram for [facility] [id number]    04/26/22 1849     heparin 25,000 units in dextrose 5% (100 units/ml) IV bolus from bag - ADDITIONAL PRN BOLUS - 30 units/kg (max bolus 4000 units)  As needed (PRN)        Question:  Heparin Infusion Adjustment (DO NOT MODIFY ANSWER)  Answer:  \[email or web address]\[email or web address]\Images\Pharmacy\HeparinInfusions\heparin LOW INTENSITY nomogram for [facility] [id number]    04/26/22 1849     IP VTE HIGH RISK PATIENT  Once         04/26/22 1937     Place sequential compression device  Until discontinued         04/26/22 1935     Place sequential compression device  Until discontinued         04/26/22 1935     heparin 25,000 units in dextrose 5% 250 mL (100 units/mL) infusion LOW INTENSITY nomogram - [facility]  Continuous        Question Answer Comment   Heparin Infusion Adjustment (DO NOT MODIFY ANSWER) \\[email or web address]\[email or web address]\Images\Pharmacy\HeparinInfusions\heparin LOW INTENSITY nomogram for [facility] [id number]    Begin at (in units/kg/hr) 12        04/26/22 1849                Discharge Planning   HEMANT: 4/29/2022     Code Status: Full Code   Is the patient medically ready for discharge?: No    Reason for patient still  in hospital (select all that apply): Patient unstable, Patient trending condition, Laboratory test, Treatment and Consult recommendations  Discharge Plan A: Home, Home with family          Time spent in care of patient: > 35 minutes           Darren Baptiste MD  Department of Hospital Medicine   [facility] - Cardiology Stepdown

## 2022-04-30 NOTE — CARE UPDATE
Interventional Cardiology Update:    Patient has diagnostic LHC yesterday with 3v CAD. CTS consulted and truned down for CABG. EP wanting to place PPM for 2:1 AVB. Patient has had no angina since admission, and no recurrence after angiogram yesterday. No plans for revascularization of CAD while inpt, and will follow-up with Dr Cast in 2-3 weeks to reassess renal function and plans for staged PCI.     Relayed plan to EP team and tentative plan for PPM Monday.     Case discussed with Dr Sergio Cast MD.    Maged Tesfaye MD PGY4  Cardiovascular Medicine Fellow  [facility]  Pager: [phone]

## 2022-04-30 NOTE — CARE UPDATE
No change in plan from EP standpoint. If interventional cardiology does not have plans for intervention, we can proceed with PPM implantation on Monday.    Likely that patient will be seen as outpatient for high risk pci if revascularization is pursued. Will confirm with IC team, and we will also need to stop heparin before our device implantation.

## 2022-04-30 NOTE — ASSESSMENT & PLAN NOTE
NSTEMI  Kevon Perez is a 81 y.o. y.o. male with known CAD s/p PCI in 2009 who presents from clinic to ED with chest pain, Shortness of Breath, Dizziness, and Fatigue.   Patient with frequent episodes of angina that are happening daily over the past 3-4 days.     - on asa, plavix   -Troponin peaked at 0.273    ECHO[04/27/22]  Summary  · The left ventricle is normal in size with normal systolic function.The estimated ejection fraction is 60%.  · There are segmental left ventricular wall motion abnormalities.  · Normal left ventricular diastolic function.  · Normal right ventricular size with normal right ventricular systolic function.  · Normal central venous pressure (3 mmHg).      RECOMMENDATION  - Completed heparin therapy. DC Heparin. PPM Monday.   - Interventional Cardiology performed angiogram. Staged revascularization due to CKD.   - continue asa, plavix

## 2022-04-30 NOTE — ASSESSMENT & PLAN NOTE
-Chronic issue  -History of STEMI with PCI in 2009  -Was previously on Aspirin and Plavix, but taken off Aspirin  -Continue Aspirin and Plavix for now with NSTEMI  -Continue Statin as above  - PET stress positive for ischemia  -Interventional cardiology consulted. apprec recs  --Continue ACS therapy with ASA/Plavix/heparin/statin  --Nephrology consulted given CKD. apprec recs  -- s/p IVF pre and post LHC  -- LHC showed severe coronary disease  --Continue Imdur, Losartan  - CTS consulted apprec recs  -- not a candidate for CABG due to high risk   - outpatient cardiology followup to discuss PCI

## 2022-04-30 NOTE — ASSESSMENT & PLAN NOTE
-Chronic issue  -History of STEMI with PCI in 2009  -Was previously on Aspirin and Plavix, but taken off Aspirin  -Continue Aspirin and Plavix for now with NSTEMI  -Continue Statin as above  - PET stress positive for ischemia  -Interventional cardiology consulted. apprec recs  --Continue ACS therapy with ASA/Plavix/heparin/statin  --Nephrology consulted given CKD and need for LHC; appreciate assistance to reduce risk of RAGHU desean-procedure  --Start  mL/hr x12 tonight and will continue  mL/hr x12 hr after LHC  -- LHC showed severe coronary disease  --Continue Imdur, Losartan  - CTS consulted apprec recs  -- not a candidate for CABG due to high risk

## 2022-04-30 NOTE — SUBJECTIVE & OBJECTIVE
Interval History: Patient sitting in chair, no acute distress. Family at bedside. No acute events overnight. Patient denies chest pain, SOB, N/V or abdominal pain. Angiogram performed today showed severe coronary disease. Interventional cardiology evaluated the patient and stated that the risks involved with open heart surgery outweigh the benefits and they recommend medical management versus percutaneous coronary intervention.      Review of Systems   Constitutional:  Positive for activity change, fatigue and unexpected weight change. Negative for chills and fever.   HENT:  Negative for congestion.    Eyes:  Negative for visual disturbance.   Respiratory:  Negative for cough and shortness of breath.    Cardiovascular:  Positive for leg swelling. Negative for chest pain.   Gastrointestinal:  Negative for abdominal distention, abdominal pain, nausea and vomiting.   Genitourinary:  Negative for dysuria.   Musculoskeletal:  Negative for back pain.   Skin:  Negative for rash and wound.   Neurological:  Negative for dizziness and weakness.   Psychiatric/Behavioral:  Negative for confusion.      Objective:     Vital Signs (Most Recent):  Temp: 96.4 °F (35.8 °C) (04/29/22 1613)  Pulse: 69 (04/29/22 1613)  Resp: 17 (04/29/22 1613)  BP: 113/74 (04/29/22 1613)  SpO2: (!) 94 % (04/29/22 1613)   Vital Signs (24h Range):  Temp:  [96.2 °F (35.7 °C)-98 °F (36.7 °C)] 96.4 °F (35.8 °C)  Pulse:  [51-81] 69  Resp:  [17-20] 17  SpO2:  [92 %-98 %] 94 %  BP: (111-182)/(60-76) 113/74     Weight: 98.6 kg (217 lb 6 oz)  Body mass index is 31.19 kg/m².    Intake/Output Summary (Last 24 hours) at 4/29/2022 1927  Last data filed at 4/29/2022 0510  Gross per 24 hour   Intake 729.96 ml   Output 600 ml   Net 129.96 ml        Physical Exam  Constitutional:       Appearance: Normal appearance.   HENT:      Head: Normocephalic.      Mouth/Throat:      Mouth: Mucous membranes are moist.   Eyes:      Extraocular Movements: Extraocular movements  intact.      Pupils: Pupils are equal, round, and reactive to light.   Neck:      Comments: JVD absent  Cardiovascular:      Rate and Rhythm: Normal rate and regular rhythm.      Pulses: Normal pulses.      Heart sounds: Normal heart sounds.   Pulmonary:      Effort: No respiratory distress.      Breath sounds: Normal breath sounds.   Abdominal:      General: Bowel sounds are normal. There is no distension.      Palpations: Abdomen is soft.      Tenderness: There is no abdominal tenderness.   Musculoskeletal:         General: Normal range of motion.      Cervical back: Neck supple.      Right lower leg: Edema present.      Left lower leg: Edema present.   Neurological:      General: No focal deficit present.      Mental Status: He is alert and oriented to person, place, and time.   Psychiatric:         Mood and Affect: Mood normal.       Significant Labs: All pertinent labs within the past 24 hours have been reviewed.    Significant Imaging: I have reviewed all pertinent imaging results/findings within the past 24 hours.

## 2022-04-30 NOTE — SUBJECTIVE & OBJECTIVE
Interval History: Patient seen and examined. No acute events overnight, afebrile, vital signs stable. Interventional Cardiology performed [facility] today.    Review of Systems   Constitutional: Negative for chills and decreased appetite.   HENT:  Negative for congestion.    Cardiovascular:  Negative for chest pain, irregular heartbeat and leg swelling.   Respiratory:  Negative for cough and shortness of breath.    Skin:  Negative for rash.   Musculoskeletal:  Negative for arthritis and back pain.   Gastrointestinal:  Negative for abdominal pain, constipation and diarrhea.   Genitourinary:  Negative for dysuria and hematuria.   Neurological:  Negative for dizziness and headaches.   Psychiatric/Behavioral:  Negative for altered mental status.    Objective:     Vital Signs (Most Recent):  Temp: 98 °F (36.7 °C) (04/30/22 1107)  Pulse: 83 (04/30/22 1107)  Resp: 18 (04/30/22 1107)  BP: 137/68 (04/30/22 1107)  SpO2: 97 % (04/30/22 1107)   Vital Signs (24h Range):  Temp:  [96.2 °F (35.7 °C)-98.3 °F (36.8 °C)] 98 °F (36.7 °C)  Pulse:  [56-83] 83  Resp:  [17-18] 18  SpO2:  [92 %-98 %] 97 %  BP: (111-176)/(56-78) 137/68     Weight: 98.6 kg (217 lb 6 oz)  Body mass index is 31.19 kg/m².     SpO2: 97 %  O2 Device (Oxygen Therapy): room air      Intake/Output Summary (Last 24 hours) at 4/30/2022 1130  Last data filed at 4/29/2022 1938  Gross per 24 hour   Intake --   Output 1 ml   Net -1 ml         Lines/Drains/Airways       Peripheral Intravenous Line  Duration                  Peripheral IV - Single Lumen 04/28/22 1000 20 G Left;Posterior Hand 2 days         Peripheral IV - Single Lumen 04/29/22 0315 18 G Anterior;Left Forearm 1 day                    Physical Exam  Vitals reviewed.   Constitutional:       General: He is not in acute distress.  HENT:      Head: Normocephalic and atraumatic.   Eyes:      General: No scleral icterus.        Right eye: No discharge.         Left eye: No discharge.   Neck:      Vascular: No JVD.    Cardiovascular:      Rate and Rhythm: Normal rate and regular rhythm.      Heart sounds: Normal heart sounds.     No friction rub.   Pulmonary:      Effort: Pulmonary effort is normal. No respiratory distress.      Breath sounds: Normal breath sounds. No wheezing.   Abdominal:      General: Abdomen is flat. Bowel sounds are normal. There is no distension.      Palpations: Abdomen is soft.   Skin:     General: Skin is warm and dry.      Coloration: Skin is not jaundiced.   Neurological:      Mental Status: He is alert and oriented to person, place, and time. Mental status is at baseline.     Significant Labs: All pertinent labs within the past 24 hours have been reviewed.  CBC:   Recent Labs   Lab 04/29/22 0334   WBC 6.63   HGB 10.4*   HCT 31.9*   *       CMP:   Recent Labs   Lab 04/29/22  0334 04/30/22  0939    140   K 3.8 4.1   * 113*   CO2 19* 17*   * 184*   BUN 60* 43*   CREATININE 2.2* 1.9*   CALCIUM 10.0 9.3   PROT 5.7*  --    ALBUMIN 3.1*  --    BILITOT 0.8  --    ALKPHOS 35*  --    AST 17  --    ALT 13  --    ANIONGAP 12 10   EGFRNONAA 26.9* 32.1*       Magnesium:   Recent Labs   Lab 04/29/22  0334   MG 1.7       Phosphorous:  Recent Labs   Lab 04/29/22  0334   PHOS 3.8       POCT Glucose:   Recent Labs   Lab 04/28/22  2211 04/29/22  0916 04/29/22  1216   POCTGLUCOSE 162* 120* 118*         Troponin   No results for input(s): TROPONINI in the last 48 hours.      Significant Imaging: Reviewed    Results for orders placed or performed during the hospital encounter of 04/26/22   EKG 12-lead    Collection Time: 04/29/22 11:38 AM    Narrative    Test Reason : I21.4,    Vent. Rate : 071 BPM     Atrial Rate : 093 BPM     P-R Int : 000 ms          QRS Dur : 102 ms      QT Int : 382 ms       P-R-T Axes : 046 -07 097 degrees     QTc Int : 415 ms    Sinus rhythm with 2nd degree A-V block (Mobitz I) possibly  Possible Inferior infarct (cited on or before 29-APR-2022)  Probable  Anterolateral  infarct (cited on or before 29-APR-2022)  Abnormal ECG  When compared with ECG of 27-APR-2022 10:37,  AVB now noted  Confirmed by Seth CARDOZA MD ([id number]) on 4/29/2022 1:29:35 PM    Referred By: [email or web address]   SELF           Confirmed By:Seth CARDOZA MD       ECHO[01/06/21]   Summary     The left ventricle is normal in size with normal systolic function. The estimated ejection fraction is 63%  Normal left ventricular diastolic function.  Mild left atrial enlargement.  Normal right ventricular size with normal right ventricular systolic function.  There is mild-to-moderate aortic valve stenosis.  Aortic valve area is 1.53 cm2; peak velocity is 2.03 m/s; mean gradient is 9 mmHg.  Normal central venous pressure (3 mmHg).  The estimated PA systolic pressure is 30 mmHg.       Inpatient Medications:  Continuous Infusions:      Scheduled Meds:   amLODIPine  5 mg Oral Daily    aspirin  81 mg Oral Daily    atorvastatin  40 mg Oral Daily    calcitRIOL  0.25 mcg Oral Daily    clopidogreL  75 mg Oral Daily    finasteride  5 mg Oral Daily    insulin aspart U-100  5 Units Subcutaneous TIDWM    insulin detemir U-100  10 Units Subcutaneous Daily    isosorbide mononitrate  30 mg Oral Daily    sodium bicarbonate  650 mg Oral TID    tamsulosin  0.4 mg Oral Daily     PRN Meds:acetaminophen, acetaminophen, dextrose 10%, dextrose 10%, fentaNYL, glucagon (human recombinant), glucose, glucose, heparin-verapamil-nitroglycerin custom mixture, heparin (porcine), insulin aspart U-100, iodixanoL, LIDOcaine HCL 20 mg/ml (2%), melatonin, midazolam (PF), nitroGLYCERIN, ondansetron, polyethylene glycol, sodium chloride 0.9%, sodium chloride 0.9%

## 2022-04-30 NOTE — SUBJECTIVE & OBJECTIVE
Interval History: Patient sitting in chair, no acute distress. Family at bedside. No acute events overnight. Patient denies chest pain, SOB, N/V or abdominal pain. Angiogram performed today showed severe coronary disease and CTS deemed too high risk for CABG. Plan for inpatient PPM placement on Monday and outpatient cardiology followup to discuss possible PCI.      Review of Systems   Constitutional:  Positive for activity change, fatigue and unexpected weight change. Negative for chills and fever.   HENT:  Negative for congestion.    Eyes:  Negative for visual disturbance.   Respiratory:  Negative for cough and shortness of breath.    Cardiovascular:  Positive for leg swelling. Negative for chest pain.   Gastrointestinal:  Negative for abdominal distention, abdominal pain, nausea and vomiting.   Genitourinary:  Negative for dysuria.   Musculoskeletal:  Negative for back pain.   Skin:  Negative for rash and wound.   Neurological:  Negative for dizziness and weakness.   Psychiatric/Behavioral:  Negative for confusion.      Objective:     Vital Signs (Most Recent):  Temp: 98.3 °F (36.8 °C) (04/30/22 1508)  Pulse: 69 (04/30/22 1616)  Resp: 18 (04/30/22 1508)  BP: 135/60 (04/30/22 1508)  SpO2: 98 % (04/30/22 1508)   Vital Signs (24h Range):  Temp:  [97.9 °F (36.6 °C)-98.3 °F (36.8 °C)] 98.3 °F (36.8 °C)  Pulse:  [56-84] 69  Resp:  [18] 18  SpO2:  [93 %-98 %] 98 %  BP: (135-176)/(56-78) 135/60     Weight: 98.6 kg (217 lb 6 oz)  Body mass index is 31.19 kg/m².    Intake/Output Summary (Last 24 hours) at 4/30/2022 1850  Last data filed at 4/29/2022 1938  Gross per 24 hour   Intake --   Output 1 ml   Net -1 ml        Physical Exam  Constitutional:       Appearance: Normal appearance.   HENT:      Head: Normocephalic.      Mouth/Throat:      Mouth: Mucous membranes are moist.   Eyes:      Extraocular Movements: Extraocular movements intact.      Pupils: Pupils are equal, round, and reactive to light.   Neck:      Comments:  JVD absent  Cardiovascular:      Rate and Rhythm: Normal rate and regular rhythm.      Pulses: Normal pulses.      Heart sounds: Normal heart sounds.   Pulmonary:      Effort: No respiratory distress.      Breath sounds: Normal breath sounds.   Abdominal:      General: Bowel sounds are normal. There is no distension.      Palpations: Abdomen is soft.      Tenderness: There is no abdominal tenderness.   Musculoskeletal:         General: Normal range of motion.      Cervical back: Neck supple.      Right lower leg: Edema present.      Left lower leg: Edema present.   Neurological:      General: No focal deficit present.      Mental Status: He is alert and oriented to person, place, and time.   Psychiatric:         Mood and Affect: Mood normal.       Significant Labs: All pertinent labs within the past 24 hours have been reviewed.    Significant Imaging: I have reviewed all pertinent imaging results/findings within the past 24 hours.

## 2022-04-30 NOTE — PLAN OF CARE
Plan of care reviewed with patient. Patient ambulating independently, denies any pain. Heparin drip turned off per order this morning. Patient aware he is planned for pacemaker placement on Monday. Patient up in chair, no questions at this time.

## 2022-04-30 NOTE — ASSESSMENT & PLAN NOTE
Lab Results   Component Value Date    HGBA1C 6.5 (H) 03/16/2022   - controlled  - continue current therapy

## 2022-05-01 NOTE — PROGRESS NOTES
[facility]  Cardiology  Progress Note    Patient Name: Kevon Perez  MRN: [id number]  Admission Date: 4/26/2022  Hospital Length of Stay: 5 days  Code Status: Full Code   Attending Physician: Darren Baptiste MD   Primary Care Physician: Cipriano Sol MD  Expected Discharge Date: 5/2/2022  Principal Problem:NSTEMI (non-ST elevated myocardial infarction)    Subjective:     Hospital Course:   Started on ACS protocol. Interventional Cardiology and Electrophysiology following. PET notable for resting perfusion abnormality involving 41% of LV myocardium in the distribution of the LCX and RCA that increases to 51% with stress.  Interventional Cardiology performed diagnostic LHC notable for multi-vessel CAD. CTS consulted and recommended against CABG. EP planning PPM for high grade AV block.        Interval History: Patient seen and examined. No acute events overnight, afebrile, vital signs stable. Planning PPM placement tomorrow per EP      Review of Systems   Constitutional: Negative for chills and decreased appetite.   HENT:  Negative for congestion.    Cardiovascular:  Positive for leg swelling. Negative for chest pain and irregular heartbeat.   Respiratory:  Negative for cough and shortness of breath.    Skin:  Negative for rash.   Musculoskeletal:  Negative for arthritis and back pain.   Gastrointestinal:  Negative for abdominal pain, constipation and diarrhea.   Genitourinary:  Negative for dysuria and hematuria.   Neurological:  Negative for dizziness and headaches.   Psychiatric/Behavioral:  Negative for altered mental status. The patient is not nervous/anxious.    Objective:     Vital Signs (Most Recent):  Temp: 97.5 °F (36.4 °C) (05/01/22 0733)  Pulse: 74 (05/01/22 0733)  Resp: 18 (05/01/22 0733)  BP: (!) 144/70 (05/01/22 0733)  SpO2: 95 % (05/01/22 0733)   Vital Signs (24h Range):  Temp:  [96.5 °F (35.8 °C)-98.3 °F (36.8 °C)] 97.5 °F (36.4 °C)  Pulse:  [57-84] 74  Resp:  [18-20]  18  SpO2:  [95 %-98 %] 95 %  BP: (135-161)/(60-71) 144/70     Weight: 98.6 kg (217 lb 6 oz)  Body mass index is 31.19 kg/m².     SpO2: 95 %  O2 Device (Oxygen Therapy): room air      Intake/Output Summary (Last 24 hours) at 5/1/2022 0813  Last data filed at 5/1/2022 0100  Gross per 24 hour   Intake 220 ml   Output 500 ml   Net -280 ml       Lines/Drains/Airways       Peripheral Intravenous Line  Duration                  Peripheral IV - Single Lumen 20 G Anterior;Distal;Left Antecubital -- days         Peripheral IV - Single Lumen 04/28/22 1000 20 G Anterior;Right Forearm 2 days         Peripheral IV - Single Lumen 04/29/22 0315 18 G Anterior;Left Forearm 2 days                    Physical Exam  Vitals reviewed.   Constitutional:       General: He is not in acute distress.  HENT:      Head: Normocephalic and atraumatic.   Eyes:      General: No scleral icterus.        Right eye: No discharge.         Left eye: No discharge.   Neck:      Vascular: No JVD.   Cardiovascular:      Rate and Rhythm: Normal rate and regular rhythm.      Heart sounds: Normal heart sounds.     No friction rub.   Pulmonary:      Effort: Pulmonary effort is normal. No respiratory distress.      Breath sounds: Normal breath sounds. No wheezing.   Abdominal:      General: Abdomen is flat. Bowel sounds are normal. There is no distension.      Palpations: Abdomen is soft.   Skin:     General: Skin is warm and dry.      Coloration: Skin is not jaundiced.   Neurological:      Mental Status: He is alert and oriented to person, place, and time. Mental status is at baseline.     Significant Labs: All pertinent labs within the past 24 hours have been reviewed.    CMP:   Recent Labs   Lab 04/30/22  0939      K 4.1   *   CO2 17*   *   BUN 43*   CREATININE 1.9*   CALCIUM 9.3   ANIONGAP 10   EGFRNONAA 32.1*       POCT Glucose:   Recent Labs   Lab 04/30/22  0755 04/30/22  1142 04/30/22  1623   POCTGLUCOSE 91 134* 153*     Significant  Imaging: Reviewed    Inpatient Medications:  Continuous Infusions:  Scheduled Meds:   amLODIPine  5 mg Oral Daily    artificial tears  1 drop Both Eyes TID    aspirin  81 mg Oral Daily    atorvastatin  40 mg Oral Daily    calcitRIOL  0.25 mcg Oral Daily    clopidogreL  75 mg Oral Daily    finasteride  5 mg Oral Daily    insulin aspart U-100  5 Units Subcutaneous TIDWM    insulin detemir U-100  10 Units Subcutaneous Daily    isosorbide mononitrate  30 mg Oral Daily    sodium bicarbonate  650 mg Oral TID    tamsulosin  0.4 mg Oral Daily     PRN Meds:acetaminophen, acetaminophen, dextrose 10%, dextrose 10%, fentaNYL, glucagon (human recombinant), glucose, glucose, heparin-verapamil-nitroglycerin custom mixture, heparin (porcine), insulin aspart U-100, iodixanoL, LIDOcaine HCL 20 mg/ml (2%), melatonin, midazolam (PF), nitroGLYCERIN, ondansetron, polyethylene glycol, sodium chloride 0.9%, sodium chloride 0.9%      Assessment and Plan:     Coronary artery disease involving native coronary artery of native heart with unstable angina pectoris  NSTEMI  2:1 AV Block  CKD Stage IV  HLD  Kevon Perez is a 81 y.o. y.o. male with known CAD s/p PCI in 2009 who presented from Cardiology clinic to ED for evaluation of recent episodes of angina with associated Shortness of Breath, Dizziness, and Fatigue that were happening more frequently over the past 3-4 days, found to have elevated troponin and admitted with concerns for NSTEMI.     - S/p reloading with Aspirin and Plavix in ED.  - HEART score: 7. FAWAD Score:125 points.   -Troponin peaked at 0.273  -PET Stress scan[04/27] concerning for larger inferolateral perfusion defect and severely reduced coronary flow capacity in the the RCA and circumflex territories.  -TTE[04/27] notable for segmental left ventricular wall motion abnormalities.        Interventional Cardiology performed diagnostic LHC with findings concerning for severe 3 vessel coronary disease. CTS  surgery did not recommend CABG due to increased risks over benefits.       RECOMMENDATION  - PPM Monday.   - Interventional Cardiology performed angiogram. Staged revascularization due to CKD. Likely outpatient.  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 Continue current therapy      Jimmie Hanks DO  Cardiology  [facility]

## 2022-05-01 NOTE — ASSESSMENT & PLAN NOTE
NSTEMI  2:1 AV Block  CKD Stage IV  HLD  Kevon Perez is a 81 y.o. y.o. male with known CAD s/p PCI in 2009 who presented from Cardiology clinic to ED for evaluation of recent episodes of angina with associated Shortness of Breath, Dizziness, and Fatigue that were happening more frequently over the past 3-4 days, found to have elevated troponin and admitted with concerns for NSTEMI.     - S/p reloading with Aspirin and Plavix in ED.  - HEART score: 7. FAWAD Score:125 points.   -Troponin peaked at 0.273  -PET Stress scan[04/27] concerning for larger inferolateral perfusion defect and severely reduced coronary flow capacity in the the RCA and circumflex territories.  -TTE[04/27] notable for segmental left ventricular wall motion abnormalities.        Interventional Cardiology performed diagnostic LHC with findings concerning for severe 3 vessel coronary disease. CTS surgery did not recommend CABG due to increased risks over benefits.       RECOMMENDATION  - PPM Monday.   - Interventional Cardiology performed angiogram. Staged revascularization due to CKD. Likely outpatient.  - continue asa, plavix

## 2022-05-01 NOTE — PLAN OF CARE
Problem: Adult Inpatient Plan of Care  Goal: Plan of Care Review  Outcome: Ongoing, Progressing  Goal: Patient-Specific Goal (Individualized)  Outcome: Ongoing, Progressing  Goal: Absence of Hospital-Acquired Illness or Injury  Outcome: Ongoing, Progressing  Goal: Optimal Comfort and Wellbeing  Outcome: Ongoing, Progressing  Goal: Readiness for Transition of Care  Outcome: Ongoing, Progressing     Problem: Adjustment to Illness (Acute Coronary Syndrome)  Goal: Optimal Adaptation to Illness  Outcome: Ongoing, Progressing     Problem: Dysrhythmia (Acute Coronary Syndrome)  Goal: Normalized Cardiac Rhythm  Outcome: Ongoing, Progressing     Problem: Cardiac-Related Pain (Acute Coronary Syndrome)  Goal: Absence of Cardiac-Related Pain  Outcome: Ongoing, Progressing     Problem: Hemodynamic Instability (Acute Coronary Syndrome)  Goal: Effective Cardiac Pump Function  Outcome: Ongoing, Progressing     Problem: Tissue Perfusion (Acute Coronary Syndrome)  Goal: Adequate Tissue Perfusion  Outcome: Ongoing, Progressing     Problem: Arrhythmia/Dysrhythmia (Cardiac Catheterization)  Goal: Stable Heart Rate and Rhythm  Outcome: Ongoing, Progressing

## 2022-05-01 NOTE — PLAN OF CARE
Pt s/e this am. Doing well with no complaints. Tele showed HR of 50s. No change in plan from EP standpoint. Tentative plan to proceed with PPM implantation tomorrow pending lab availability.    Please keep npo at midnight. Please avoid any heparin products including prophylactic heparin/lovenox

## 2022-05-01 NOTE — ASSESSMENT & PLAN NOTE
Recent Labs     04/29/22  0334 04/30/22  0939   CREATININE 2.2* 1.9*     - Creatinine improving  - Holding ARB

## 2022-05-01 NOTE — SUBJECTIVE & OBJECTIVE
Interval History: Patient seen and examined. No acute events overnight, afebrile, vital signs stable. Planning PPM placement tomorrow per EP      Review of Systems   Constitutional: Negative for chills and decreased appetite.   HENT:  Negative for congestion.    Cardiovascular:  Positive for leg swelling. Negative for chest pain and irregular heartbeat.   Respiratory:  Negative for cough and shortness of breath.    Skin:  Negative for rash.   Musculoskeletal:  Negative for arthritis and back pain.   Gastrointestinal:  Negative for abdominal pain, constipation and diarrhea.   Genitourinary:  Negative for dysuria and hematuria.   Neurological:  Negative for dizziness and headaches.   Psychiatric/Behavioral:  Negative for altered mental status. The patient is not nervous/anxious.    Objective:     Vital Signs (Most Recent):  Temp: 97.5 °F (36.4 °C) (05/01/22 0733)  Pulse: 74 (05/01/22 0733)  Resp: 18 (05/01/22 0733)  BP: (!) 144/70 (05/01/22 0733)  SpO2: 95 % (05/01/22 0733)   Vital Signs (24h Range):  Temp:  [96.5 °F (35.8 °C)-98.3 °F (36.8 °C)] 97.5 °F (36.4 °C)  Pulse:  [57-84] 74  Resp:  [18-20] 18  SpO2:  [95 %-98 %] 95 %  BP: (135-161)/(60-71) 144/70     Weight: 98.6 kg (217 lb 6 oz)  Body mass index is 31.19 kg/m².     SpO2: 95 %  O2 Device (Oxygen Therapy): room air      Intake/Output Summary (Last 24 hours) at 5/1/2022 0813  Last data filed at 5/1/2022 0100  Gross per 24 hour   Intake 220 ml   Output 500 ml   Net -280 ml       Lines/Drains/Airways       Peripheral Intravenous Line  Duration                  Peripheral IV - Single Lumen 20 G Anterior;Distal;Left Antecubital -- days         Peripheral IV - Single Lumen 04/28/22 1000 20 G Anterior;Right Forearm 2 days         Peripheral IV - Single Lumen 04/29/22 0315 18 G Anterior;Left Forearm 2 days                    Physical Exam  Vitals reviewed.   Constitutional:       General: He is not in acute distress.  HENT:      Head: Normocephalic and atraumatic.    Eyes:      General: No scleral icterus.        Right eye: No discharge.         Left eye: No discharge.   Neck:      Vascular: No JVD.   Cardiovascular:      Rate and Rhythm: Normal rate and regular rhythm.      Heart sounds: Normal heart sounds.     No friction rub.   Pulmonary:      Effort: Pulmonary effort is normal. No respiratory distress.      Breath sounds: Normal breath sounds. No wheezing.   Abdominal:      General: Abdomen is flat. Bowel sounds are normal. There is no distension.      Palpations: Abdomen is soft.   Skin:     General: Skin is warm and dry.      Coloration: Skin is not jaundiced.   Neurological:      Mental Status: He is alert and oriented to person, place, and time. Mental status is at baseline.     Significant Labs: All pertinent labs within the past 24 hours have been reviewed.    CMP:   Recent Labs   Lab 04/30/22  0939      K 4.1   *   CO2 17*   *   BUN 43*   CREATININE 1.9*   CALCIUM 9.3   ANIONGAP 10   EGFRNONAA 32.1*       POCT Glucose:   Recent Labs   Lab 04/30/22  0755 04/30/22  1142 04/30/22  1623   POCTGLUCOSE 91 134* 153*     Significant Imaging: Reviewed    Inpatient Medications:  Continuous Infusions:  Scheduled Meds:   amLODIPine  5 mg Oral Daily    artificial tears  1 drop Both Eyes TID    aspirin  81 mg Oral Daily    atorvastatin  40 mg Oral Daily    calcitRIOL  0.25 mcg Oral Daily    clopidogreL  75 mg Oral Daily    finasteride  5 mg Oral Daily    insulin aspart U-100  5 Units Subcutaneous TIDWM    insulin detemir U-100  10 Units Subcutaneous Daily    isosorbide mononitrate  30 mg Oral Daily    sodium bicarbonate  650 mg Oral TID    tamsulosin  0.4 mg Oral Daily     PRN Meds:acetaminophen, acetaminophen, dextrose 10%, dextrose 10%, fentaNYL, glucagon (human recombinant), glucose, glucose, heparin-verapamil-nitroglycerin custom mixture, heparin (porcine), insulin aspart U-100, iodixanoL, LIDOcaine HCL 20 mg/ml (2%), melatonin, midazolam (PF),  nitroGLYCERIN, ondansetron, polyethylene glycol, sodium chloride 0.9%, sodium chloride 0.9%

## 2022-05-02 PROBLEM — E83.42 HYPOMAGNESEMIA: Status: ACTIVE | Noted: 2022-01-01

## 2022-05-02 NOTE — ASSESSMENT & PLAN NOTE
-Chronic issue  -History of STEMI with PCI in 2009  -Was previously on Aspirin and Plavix, but taken off Aspirin  -Continue Aspirin and Plavix for now with NSTEMI  -Continue Statin as above  - PET stress positive for ischemia  - see above  - s/p ACS therapy with ASA/Plavix/heparin/statin  - Nephrology consulted given CKD. apprec recs  - s/p IVF pre and post LHC  - LHC showed severe coronary disease  - Continue Imdur, Losartan  - CTS consulted apprec recs  - not a candidate for CABG due to high risk   - outpatient cardiology followup to discuss PCI

## 2022-05-02 NOTE — PROGRESS NOTES
[facility] - Cardiology [facility] Medicine  Progress Note    Patient Name: Kevon Perez  MRN: [id number]  Patient Class: IP- Inpatient   Admission Date: 4/26/2022  Length of Stay: 6 days  Attending Physician: Raheem Mahajan MD  Primary Care Provider: Cipriano Sol MD        Subjective:     Principal Problem:NSTEMI (non-ST elevated myocardial infarction)        HPI:  Mr. Kevon Perez is a 81 y.o. male with CAD, history of STEMI s/p PCI, and 2nd degree AV block who presents to the ER from Cardiology clinic for evaluation of chest pain.  He reports that for the last 3 days, he has been having midsternal chest pain with minimal exertion.  The chest pain lasts about 20 minutes, and then resolves when he sits down.  The pain is similar to when he had his MCI in 2009.  He did not take any Nitroglycerin.  He endorses chronic shortness of breath, but nothing worse than normal.  He denies any nausea, vomiting, dizziness, or lightheadedness.  Of note, he was recently diagnosed with AV block, and has been seeing Dr. Avendaño about getting a pacemaker placed.  His primary Cardiologist is Dr. Teixeira.  He is compliant with his Plavix.  He was on Aspirin until about 6 months ago, which was stopped for ulcer disease.  Given his concerns for unstable angina, he was sent to the ER for further evaluation.    Upon arrival to the ER, vitals were temp 98.7F, HR 80, /64.  EKG was not ischemic.  Troponin was 0.203.  He was evaluated by Cardiology, who suggested NSTEMI treatment with Aspirin, Plavix, and Heparin gtt.  He was admitted to Hospital Medicine for further management.        Overview/Hospital Course:  Pt admitted to  team G and was started on ACS protocol. Cardiology following, concerns for complete heart block. Performing PET stress on 4/27 to evaluate for need of LHC, and also tentatively planning for PPM placement 5/2. Pt remained asymptomatic.      Interval History: Patient sitting in chair, no acute  distress. No acute events overnight. Slept well. No CP overnight or this morning. Continues to note regular bowel movements and BLE edema. Denies SOB, N/V or abdominal pain. Plan for inpatient PPM placement this morning and outpatient cardiology followup to discuss possible PCI. NPO at this time.      Review of Systems   Constitutional:  Positive for activity change, fatigue and unexpected weight change. Negative for chills and fever.   HENT:  Negative for congestion.    Eyes:  Negative for visual disturbance.   Respiratory:  Negative for cough and shortness of breath.    Cardiovascular:  Positive for leg swelling. Negative for chest pain.   Gastrointestinal:  Negative for abdominal distention, abdominal pain, nausea and vomiting.   Genitourinary:  Negative for dysuria.   Musculoskeletal:  Negative for back pain.   Skin:  Negative for rash and wound.   Neurological:  Negative for dizziness and weakness.   Psychiatric/Behavioral:  Negative for confusion.      Objective:     Vital Signs (Most Recent):  Temp: 97.8 °F (36.6 °C) (05/02/22 0823)  Pulse: 66 (05/02/22 1122)  Resp: 14 (05/02/22 0823)  BP: (!) 161/67 (05/02/22 0823)  SpO2: (!) 94 % (05/02/22 0823)   Vital Signs (24h Range):  Temp:  [97.1 °F (36.2 °C)-98 °F (36.7 °C)] 97.8 °F (36.6 °C)  Pulse:  [60-88] 66  Resp:  [14-18] 14  SpO2:  [94 %-98 %] 94 %  BP: (153-188)/(65-80) 161/67     Weight: 98.2 kg (216 lb 7.9 oz)  Body mass index is 31.06 kg/m².    Intake/Output Summary (Last 24 hours) at 5/2/2022 1137  Last data filed at 5/1/2022 2238  Gross per 24 hour   Intake 730 ml   Output --   Net 730 ml        Physical Exam  Constitutional:       Appearance: Normal appearance.   HENT:      Head: Normocephalic.      Mouth/Throat:      Mouth: Mucous membranes are moist.   Eyes:      Extraocular Movements: Extraocular movements intact.      Pupils: Pupils are equal, round, and reactive to light.   Neck:      Comments: JVD absent  Cardiovascular:      Rate and Rhythm:  Normal rate and regular rhythm.      Pulses: Normal pulses.      Heart sounds: Normal heart sounds.   Pulmonary:      Effort: No respiratory distress.      Breath sounds: Normal breath sounds.   Abdominal:      General: Bowel sounds are normal. There is no distension.      Palpations: Abdomen is soft.      Tenderness: There is no abdominal tenderness.   Musculoskeletal:         General: Normal range of motion.      Cervical back: Neck supple.      Right lower leg: Edema present.      Left lower leg: Edema present.   Neurological:      General: No focal deficit present.      Mental Status: He is alert and oriented to person, place, and time.   Psychiatric:         Mood and Affect: Mood normal.       Significant Labs: All pertinent labs within the past 24 hours have been reviewed.    Significant Imaging: I have reviewed all pertinent imaging results/findings within the past 24 hours.      Assessment/Plan:      * NSTEMI (non-ST elevated myocardial infarction)  - NSTEMI/UA Pathway initiated  - EKG with AV block, no STEMI  - Initial troponin 0.203, peaked to 0.29   - S/p Heparin gtt  - S/p Aspirin 324mg x 1.  Continue Aspirin 81mg PO daily  - S/p Plavix 300mg PO x 1.  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, with long-term current use of insulin  - HbA1c 6.5  - Home DM regimen:  Detemir 22 daily, Novolog 8-10 am, 15 lunch and dinner  - Detemir 10 with Aspart 5 units TIDWM  - SSI with POCT accuchecks to achieve blood glucose of 140-180 while hospitalized  - Diabetic diet        Thrombocytopenia  - Monitor for bleeding      History of MI (myocardial infarction)  - As above      Hypertension associated with diabetes  - Chronic issue  - Stop Norvasc and HCTZ for now  - Hold Torsemide as he looks euvolemic  - Continue ARB - Avapro 300mg at home, Losartan 100mg on formulary  - Start Imdur 30mg daily, titrate up as tolerated  - Can consider Ranexa      Benign prostatic hyperplasia  - Chronic and stable  - Continue Flomax 0.4mg PO daily  - Continue Finasteride 5mg PO daily      Hyperlipidemia associated with type 2 diabetes mellitus  - Chronic issue  - Increase Lipitor from 20mg to 40mg      Coronary artery disease involving native coronary artery of native heart with unstable angina pectoris  -Chronic issue  -History of STEMI with PCI in 2009  -Was previously on Aspirin and Plavix, but taken off Aspirin  -Continue Aspirin and Plavix for now with NSTEMI  -Continue Statin as above  - PET stress positive for ischemia  - see above  - s/p ACS therapy with ASA/Plavix/heparin/statin  - Nephrology consulted given CKD. apprec recs  - s/p IVF pre and post LHC  - LHC showed  severe coronary disease  - Continue Imdur, Losartan  - CTS consulted apprec recs  - not a candidate for CABG due to high risk   - outpatient cardiology followup to discuss PCI        VTE Risk Mitigation (From admission, onward)         Ordered     heparin (porcine) 750 Units, verapamiL 1.25 mg, nitroGLYCERIN 1.25 mg in sodium chloride 0.9% 250 mL  As needed (PRN)         04/29/22 1246     heparin infusion 1,000 units/500 ml in 0.9% NaCl (on sterile field)  As needed (PRN)         04/29/22 1246     IP VTE HIGH RISK PATIENT  Once         04/26/22 1937     Place sequential compression device  Until discontinued         04/26/22 1935     Place sequential compression device  Until discontinued         04/26/22 1935                Discharge Planning   HEMANT: 5/4/2022     Code Status: Full Code   Is the patient medically ready for discharge?: No    Reason for patient still in hospital (select all that apply): Patient trending condition and Treatment  Discharge Plan A: Home with family, Home            Raheem Mahajan MD  Department of Hospital Medicine   [facility] - Cardiology Stepdown

## 2022-05-02 NOTE — TRANSFER OF CARE
"Anesthesia Transfer of Care Note    Patient: Kevon Perez    Procedure(s) Performed: Procedure(s) (LRB):  INSERTION, CARDIAC PACEMAKER, DUAL CHAMBER (N/A)    Patient location: PACU    Anesthesia Type: general    Transport from OR: Transported from OR on 100% O2 by closed face mask with adequate spontaneous ventilation    Post pain: adequate analgesia    Post assessment: no apparent anesthetic complications and tolerated procedure well    Post vital signs: stable    Level of consciousness: sedated and responds to stimulation    Nausea/Vomiting: no nausea/vomiting    Complications: none    Transfer of care protocol was followed      Last vitals:   Visit Vitals  BP (!) 167/73 (BP Location: Right arm, Patient Position: Sitting)   Pulse 77   Temp 36.7 °C (98.1 °F) (Oral)   Resp 18   Ht 5' 10" (1.778 m)   Wt 98.2 kg (216 lb 7.9 oz)   SpO2 98%   BMI 31.06 kg/m²     "

## 2022-05-02 NOTE — ASSESSMENT & PLAN NOTE
- Cr baseline at admission  - Renally dosing all medications  - Avoid nephrotoxins  - Will continue to monitor on daily labs

## 2022-05-02 NOTE — PLAN OF CARE
[facility] - Cardiology Stepdown  Discharge Reassessment    Primary Care Provider: Cipriano Sol MD    Expected Discharge Date: 5/4/2022    Reassessment (most recent)     Discharge Reassessment - 05/02/22 1014        Discharge Reassessment    Assessment Type Discharge Planning Reassessment     Did the patient's condition or plan change since previous assessment? No     Discharge Plan A Home with family;Home     Discharge Plan B Home Health     Discharge Barriers Identified None     Why the patient remains in the hospital Requires continued medical care             Per Dr Mahajan pt getting a pacemaker today.  Plan to discharge home with no needs.  Will continue to follow.    Sharon Delgadillo LMSW  [facility]  [id number]

## 2022-05-02 NOTE — BRIEF OP NOTE
: Paulie Avendaño MD  Date of procedure: 5/2/2022  Post-operative Diagnosis: Heart block    Procedure Performed: dual chamber permanent pacemaker implant    Description of Procedure: The patient was brought to the EP lab in the fasting state. Prepped and draped in sterile fashion. Safety timeout was performed. Sedation administered by anesthesia staff. Selective venogram of the left axillary and cephalic veins performed via left ac IV. Lidocaine used for local anesthetic. Fluoroscopic guided axillary access utilized. Guide/J Wire advanced and confirmed in IVC. Incision made. Blunt and electrocautery dissection performed. Pocket made. Second fluoroscopic guided axillary access obtained. Guide/J wire advanced and confirmed in IVC. Peel away sheath advanced over J wire. RV lead advanced into RV. R waves and injury pattern adequate. Lead fixed into place and sutured in place. Second peel away sheath advanced over J wire. RA lead advanced into RA via peel away sheath. After adequate p waves and current of injury detected, the RA lead was fixed into place in the RA appendage. Peel away sheath removed and RA lead sutured into place. Pocket washed using antibiotic solution. Leads connected to generator. Generator placed into pocket, sutured in place, and washed with antibiotic solution. Deep layer closed with interrupted 3-0 suture. Intermediate layer closed with running 3-0 suture. Superficial layer closed with running 4-0 suture. Skin closed with Dermabond. Meplex dressing to be placed after dermabond has dried.     EBL: <10 mL    Specimens Removed: None  Complications: no immediate    1. Sling to left arm - wear for 48 hours, then only at night for 6 weeks.  2. NO HEPARIN PRODUCTS  3. Doxycycline 100 mg BID for 5 days at discharge (or after the 2 doses of IV antibiotics if still in the hospital)  4. No lifting left elbow above shoulder height  5. No lifting over 5 pounds  6. No driving for 1 week and for 4  weeks if patient uses left arm to make turns  7. Dressing will be removed in AM by EP  8. Chest Xray (ordered)  9. Follow up in device clinic in 1 week for device and wound checks.     Dr Avendaño, the attending electrophysiologist, was present for the entirety of this procedure.    Kev Dozier MD PGY 7

## 2022-05-02 NOTE — ASSESSMENT & PLAN NOTE
Patient with history of CKD4   Cr down to 2.1 this AM; stable   Phos at goal no need for binders   C/w calcitriol supplementation   Will sign off at this time; pt advised to follow up with Nephrology in the outpatient setting.

## 2022-05-02 NOTE — PHYSICIAN QUERY
PT Name: Kevon Perez  MR #: [id number]    DOCUMENTATION CLARIFICATION      CDS/: Yesy Vidales RN CDIS         Contact information:  [email or web address]      This form is a permanent document in the medical record.     Query Date: May 2, 2022    By submitting this query, we are merely seeking further clarification of documentation. Please utilize your independent clinical judgment when addressing the question(s) below.    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    Hypertension associated with diabetes  - Chronic issue  - Stop Norvasc and HCTZ for now  - Hold Torsemide as he looks euvolemic  - Continue ARB - Avapro 300mg at home, Losartan 100mg on formulary  - Start Imdur 30mg daily, titrate up as tolerated  - Can consider Ranexa     HM note 5/2      HM note 5/2    x Chronic condition(s)   CKD stage 4 due to type 2 diabetes mellitus HM note 5/2     Vital signs      Treatment/Medication      Other     Provider, please clarify the relationship between the Hypertension and Diabetes Mellitus  [   ] Hypertension is a manifestation of Diabetes Mellitus (Secondary Hypertension)   [   ]  Hypertension is not a manifestation of Diabetes Mellitus (Essential Hypertension)   [   ] Other Clarification (please specify): ____________   [ X ] Clinically Undetermined       Please document in your progress notes daily for the duration of treatment, until resolved, and include in your discharge summary.

## 2022-05-02 NOTE — ASSESSMENT & PLAN NOTE
- Chronic issue  - Stop Norvasc and HCTZ for now  - Hold Torsemide as he looks euvolemic  - Continue ARB - Avapro 300mg at home, Losartan 100mg on formulary  - Start Imdur 30mg daily, titrate up as tolerated  - Can consider Ranexa

## 2022-05-02 NOTE — PROGRESS NOTES
[facility] - Cardiology Stepdown  Nephrology  Progress Note    Patient Name: Kevon Perez  MRN: [id number]  Admission Date: 4/26/2022  Hospital Length of Stay: 6 days  Attending Provider: Raheem Mahajan MD   Primary Care Physician: Cipriano Sol MD  Principal Problem:NSTEMI (non-ST elevated myocardial infarction)    Subjective:     HPI: Mr. Kevon Perez is a 81 y.o. male with CAD, history of STEMI s/p PCI, and 2nd degree AV block and CKD4 who presents to the ER from Cardiology clinic for evaluation of chest pain.  He reports that for the last 3 days, he has been having midsternal chest pain with minimal exertion.  The chest pain lasts about 20 minutes, and then resolves when he sits down.  The pain is similar to when he had his MCI in 2009.  He did not take any Nitroglycerin.  He endorses chronic shortness of breath, but nothing worse than normal.  He denies any nausea, vomiting, dizziness, or lightheadedness.  Of note, he was recently diagnosed with AV block, and has been seeing Dr. Avendaño about getting a pacemaker placed.  His primary Cardiologist is Dr. Teixeira.  He is compliant with his Plavix.  He was on Aspirin until about 6 months ago, which was stopped for ulcer disease.  Given his concerns for unstable angina, he was sent to the ER for further evaluation.     Has positive stress test will be getting cath by cardiology     Nephrology consulted for CKD4           Interval History:   Cr improved, down to 2.1 today.       Review of patient's allergies indicates:   Allergen Reactions    Penicillins Other (See Comments)     Muscle stiffness    Bactrim [sulfamethoxazole-trimethoprim] Rash     Current Facility-Administered Medications   Medication Frequency    acetaminophen tablet 1,000 mg Q8H PRN    acetaminophen tablet 650 mg Q4H PRN    amLODIPine tablet 5 mg Daily    artificial tears 0.5 % ophthalmic solution 1 drop TID    aspirin chewable tablet 81 mg Daily    atorvastatin tablet 40 mg Daily     calcitRIOL capsule 0.25 mcg Daily    clopidogreL tablet 75 mg Daily    dextrose 10% bolus 125 mL PRN    dextrose 10% bolus 250 mL PRN    fentaNYL 50 mcg/mL injection PRN    finasteride tablet 5 mg Daily    glucagon (human recombinant) injection 1 mg PRN    glucose chewable tablet 16 g PRN    glucose chewable tablet 24 g PRN    heparin (porcine) 750 Units, verapamiL 1.25 mg, nitroGLYCERIN 1.25 mg in sodium chloride 0.9% 250 mL PRN    heparin infusion 1,000 units/500 ml in 0.9% NaCl (on sterile field) PRN    insulin aspart U-100 pen 0-5 Units QID (AC + HS) PRN    insulin aspart U-100 pen 5 Units TIDWM    insulin detemir U-100 pen 10 Units Daily    iodixanoL (VISIPAQUE 320) injection PRN    isosorbide dinitrate tablet 20 mg TID    LIDOcaine HCL 20 mg/ml (2%) injection PRN    melatonin tablet 6 mg Nightly PRN    midazolam (PF) injection PRN    nitroGLYCERIN SL tablet 0.4 mg Q5 Min PRN    ondansetron disintegrating tablet 8 mg Q8H PRN    polyethylene glycol packet 17 g Daily PRN    sodium bicarbonate tablet 650 mg TID    sodium chloride 0.9% flush 10 mL PRN    sodium chloride 0.9% flush 5 mL PRN    tamsulosin 24 hr capsule 0.4 mg Daily       Objective:     Vital Signs (Most Recent):  Temp: 98.1 °F (36.7 °C) (05/02/22 1144)  Pulse: 77 (05/02/22 1144)  Resp: 18 (05/02/22 1144)  BP: (!) 167/73 (05/02/22 1144)  SpO2: 98 % (05/02/22 1144)  O2 Device (Oxygen Therapy): room air (05/02/22 0716)   Vital Signs (24h Range):  Temp:  [97.1 °F (36.2 °C)-98.1 °F (36.7 °C)] 98.1 °F (36.7 °C)  Pulse:  [60-88] 77  Resp:  [14-18] 18  SpO2:  [94 %-98 %] 98 %  BP: (161-188)/(67-80) 167/73     Weight: 98.2 kg (216 lb 7.9 oz) (05/02/22 0823)  Body mass index is 31.06 kg/m².  Body surface area is 2.2 meters squared.    I/O last 3 completed shifts:  In: 1190 [P.O.:1190]  Out: 500 [Urine:500]    Physical Exam  Vitals reviewed.   Constitutional:       Appearance: Normal appearance.   HENT:      Head: Normocephalic and  atraumatic.      Mouth/Throat:      Mouth: Mucous membranes are moist.   Eyes:      Conjunctiva/sclera: Conjunctivae normal.      Pupils: Pupils are equal, round, and reactive to light.   Cardiovascular:      Rate and Rhythm: Normal rate and regular rhythm.      Pulses: Normal pulses.      Heart sounds: Normal heart sounds.   Pulmonary:      Effort: Pulmonary effort is normal.      Breath sounds: Normal breath sounds.   Abdominal:      General: Bowel sounds are normal.      Palpations: Abdomen is soft.   Musculoskeletal:         General: Normal range of motion.      Right lower leg: Edema present.      Left lower leg: Edema present.   Skin:     General: Skin is warm.      Capillary Refill: Capillary refill takes less than 2 seconds.   Neurological:      General: No focal deficit present.      Mental Status: He is alert and oriented to person, place, and time.   Psychiatric:         Mood and Affect: Mood normal.       Significant Labs:  CBC:   Recent Labs   Lab 05/02/22  0445   WBC 8.01   RBC 3.58*   HGB 10.4*   HCT 31.8*   *   MCV 89   MCH 29.1   MCHC 32.7     CMP:   Recent Labs   Lab 04/29/22  0334 04/30/22  0939 05/02/22  0445   *   < > 113*   CALCIUM 10.0   < > 9.8   ALBUMIN 3.1*  --   --    PROT 5.7*  --   --       < > 140   K 3.8   < > 4.0   CO2 19*   < > 23   *   < > 112*   BUN 60*   < > 39*   CREATININE 2.2*   < > 2.1*   ALKPHOS 35*  --   --    ALT 13  --   --    AST 17  --   --    BILITOT 0.8  --   --     < > = values in this interval not displayed.            Assessment/Plan:     Complete heart block  Per cardiology     CKD stage 4 due to type 2 diabetes mellitus  Patient with history of CKD4   Cr down to 2.1 this AM; stable   Phos at goal no need for binders   C/w calcitriol supplementation   Will sign off at this time; pt advised to follow up with Nephrology in the outpatient setting.         Hypertension associated with diabetes  Per primary         Thank you for your consult. I  will sign off. Please contact us if you have any additional questions.    Parish Mix MD  Nephrology  [facility] - Cardiology Stepdown

## 2022-05-02 NOTE — ASSESSMENT & PLAN NOTE
- NSTEMI/UA Pathway initiated  - EKG with AV block, no STEMI  - Initial troponin 0.203, peaked to 0.29   - S/p Heparin gtt  - S/p Aspirin 324mg x 1.  Continue Aspirin 81mg PO daily  - S/p Plavix 300mg PO x 1.  Continue Plavix 75mg PO daily  - Continue Lipitor   - TTE done  - Cardiology following: PET stress on 4/27 positive for ischemia  - Interventional cardiology consulted, s/p LHC on 4/29 showed multivessel disease, high risk for CABG as per cards, plan for OP PCI  - Entresto added  - Plan to dc home after PPM placement and follow up with OP cards to discuss PCI  - Hold BB   - Prn Nitro for chest pain  - Continue tele

## 2022-05-02 NOTE — SUBJECTIVE & OBJECTIVE
Interval History:   Cr improved, down to 2.1 today.       Review of patient's allergies indicates:   Allergen Reactions    Penicillins Other (See Comments)     Muscle stiffness    Bactrim [sulfamethoxazole-trimethoprim] Rash     Current Facility-Administered Medications   Medication Frequency    acetaminophen tablet 1,000 mg Q8H PRN    acetaminophen tablet 650 mg Q4H PRN    amLODIPine tablet 5 mg Daily    artificial tears 0.5 % ophthalmic solution 1 drop TID    aspirin chewable tablet 81 mg Daily    atorvastatin tablet 40 mg Daily    calcitRIOL capsule 0.25 mcg Daily    clopidogreL tablet 75 mg Daily    dextrose 10% bolus 125 mL PRN    dextrose 10% bolus 250 mL PRN    fentaNYL 50 mcg/mL injection PRN    finasteride tablet 5 mg Daily    glucagon (human recombinant) injection 1 mg PRN    glucose chewable tablet 16 g PRN    glucose chewable tablet 24 g PRN    heparin (porcine) 750 Units, verapamiL 1.25 mg, nitroGLYCERIN 1.25 mg in sodium chloride 0.9% 250 mL PRN    heparin infusion 1,000 units/500 ml in 0.9% NaCl (on sterile field) PRN    insulin aspart U-100 pen 0-5 Units QID (AC + HS) PRN    insulin aspart U-100 pen 5 Units TIDWM    insulin detemir U-100 pen 10 Units Daily    iodixanoL (VISIPAQUE 320) injection PRN    isosorbide dinitrate tablet 20 mg TID    LIDOcaine HCL 20 mg/ml (2%) injection PRN    melatonin tablet 6 mg Nightly PRN    midazolam (PF) injection PRN    nitroGLYCERIN SL tablet 0.4 mg Q5 Min PRN    ondansetron disintegrating tablet 8 mg Q8H PRN    polyethylene glycol packet 17 g Daily PRN    sodium bicarbonate tablet 650 mg TID    sodium chloride 0.9% flush 10 mL PRN    sodium chloride 0.9% flush 5 mL PRN    tamsulosin 24 hr capsule 0.4 mg Daily       Objective:     Vital Signs (Most Recent):  Temp: 98.1 °F (36.7 °C) (05/02/22 1144)  Pulse: 77 (05/02/22 1144)  Resp: 18 (05/02/22 1144)  BP: (!) 167/73 (05/02/22 1144)  SpO2: 98 % (05/02/22 1144)  O2 Device (Oxygen Therapy): room air (05/02/22 0716)    Vital Signs (24h Range):  Temp:  [97.1 °F (36.2 °C)-98.1 °F (36.7 °C)] 98.1 °F (36.7 °C)  Pulse:  [60-88] 77  Resp:  [14-18] 18  SpO2:  [94 %-98 %] 98 %  BP: (161-188)/(67-80) 167/73     Weight: 98.2 kg (216 lb 7.9 oz) (05/02/22 0823)  Body mass index is 31.06 kg/m².  Body surface area is 2.2 meters squared.    I/O last 3 completed shifts:  In: 1190 [P.O.:1190]  Out: 500 [Urine:500]    Physical Exam  Vitals reviewed.   Constitutional:       Appearance: Normal appearance.   HENT:      Head: Normocephalic and atraumatic.      Mouth/Throat:      Mouth: Mucous membranes are moist.   Eyes:      Conjunctiva/sclera: Conjunctivae normal.      Pupils: Pupils are equal, round, and reactive to light.   Cardiovascular:      Rate and Rhythm: Normal rate and regular rhythm.      Pulses: Normal pulses.      Heart sounds: Normal heart sounds.   Pulmonary:      Effort: Pulmonary effort is normal.      Breath sounds: Normal breath sounds.   Abdominal:      General: Bowel sounds are normal.      Palpations: Abdomen is soft.   Musculoskeletal:         General: Normal range of motion.      Right lower leg: Edema present.      Left lower leg: Edema present.   Skin:     General: Skin is warm.      Capillary Refill: Capillary refill takes less than 2 seconds.   Neurological:      General: No focal deficit present.      Mental Status: He is alert and oriented to person, place, and time.   Psychiatric:         Mood and Affect: Mood normal.       Significant Labs:  CBC:   Recent Labs   Lab 05/02/22  0445   WBC 8.01   RBC 3.58*   HGB 10.4*   HCT 31.8*   *   MCV 89   MCH 29.1   MCHC 32.7     CMP:   Recent Labs   Lab 04/29/22  0334 04/30/22  0939 05/02/22  0445   *   < > 113*   CALCIUM 10.0   < > 9.8   ALBUMIN 3.1*  --   --    PROT 5.7*  --   --       < > 140   K 3.8   < > 4.0   CO2 19*   < > 23   *   < > 112*   BUN 60*   < > 39*   CREATININE 2.2*   < > 2.1*   ALKPHOS 35*  --   --    ALT 13  --   --    AST 17  --   --     BILITOT 0.8  --   --     < > = values in this interval not displayed.

## 2022-05-02 NOTE — CARE UPDATE
EP Care Update Note     Tentative plan for PPM today given high degree AV block.    -Please keep pt NPO with plan for PPM today     Tay Nguyen   Department of Cardiovascular Disease, PGY-4   [facility]

## 2022-05-02 NOTE — ANESTHESIA RELEASE NOTE
"Anesthesia Release from PACU Note    Patient: Kevon Perez    Procedure(s) Performed: Procedure(s) (LRB):  INSERTION, CARDIAC PACEMAKER, DUAL CHAMBER (N/A)    Anesthesia type: general    Post pain: Adequate analgesia    Post assessment: no apparent anesthetic complications, tolerated procedure well and no evidence of recall    Last Vitals:   Visit Vitals  BP (!) 111/56 (BP Location: Right arm, Patient Position: Lying)   Pulse 86   Temp 37 °C (98.6 °F) (Temporal)   Resp 20   Ht 5' 10" (1.778 m)   Wt 98.2 kg (216 lb 7.9 oz)   SpO2 (!) 93%   BMI 31.06 kg/m²       Post vital signs: stable    Level of consciousness: awake, alert  and oriented    Nausea/Vomiting: no nausea/no vomiting    Complications: none    Airway Patency: patent    Respiratory: unassisted, nasal cannula    Cardiovascular: stable and blood pressure at baseline    Hydration: euvolemic  "

## 2022-05-02 NOTE — PLAN OF CARE
"Vss. sats 93% 3liter nc. Pt states "I wear cpap at night but did not bring to hospital,  I can send my kids to get it from home". Pt denies sob.  Pt arrived with left chest wall incision with pressure drsg in place. Dermabond, aquacell ag, guaze/pressure drsg, sling on. Pt complaints of "pain below incision". Prn po and iv pain meds given per md order. At this time "tolerable at 4 on pain scale 1-10" pt's daughter updated over text message system. Pt tolerating water in ep pacu 3. Denies n/v or sob.  Chest xray and 12 lead ekg done per md order.  See flowsheet for full assessment. biotronik dual chamber device. V paced on cm. ddd low limit 60. Card/booklet in chart.   Box given to family in csu room by rep. bg 156 on admit to ep pacu 3.   "

## 2022-05-02 NOTE — NURSING TRANSFER
Nursing Transfer Note      5/2/2022     Reason patient is being transferred: ep pacu 3 to 350    Transfer To: ep pacu 3 to 350    Transfer via stretcher    Transfer with cardiac monitoring, tele box on confirmed by tele tech. 3lnc portable oxgyen  Transported by [facility] pacu pct    Medicines sent: none    Any special needs or follow-up needed: bedrest x3hrs. Done at 1841    Chart send with patient: Yes    Notified: daughter    Patient reassessed at: 5/2/22 1650    Upon arrival to floor: cardiac monitor applied, patient oriented to room, call bell in reach and bed in lowest position, 3l nc wall oxygen, booklet in chart and card biotronik. Box given to family in room by rep.

## 2022-05-02 NOTE — PROGRESS NOTES
[facility] - Cardiology [facility] Medicine  Progress Note    Patient Name: Kevon Perez  MRN: [id number]  Patient Class: IP- Inpatient   Admission Date: 4/26/2022  Length of Stay: 5 days  Attending Physician: Darren Baptiste MD  Primary Care Provider: Cipriano Sol MD        Subjective:     Principal Problem:NSTEMI (non-ST elevated myocardial infarction)        HPI:  Mr. Kevon Perez is a 81 y.o. male with CAD, history of STEMI s/p PCI, and 2nd degree AV block who presents to the ER from Cardiology clinic for evaluation of chest pain.  He reports that for the last 3 days, he has been having midsternal chest pain with minimal exertion.  The chest pain lasts about 20 minutes, and then resolves when he sits down.  The pain is similar to when he had his MCI in 2009.  He did not take any Nitroglycerin.  He endorses chronic shortness of breath, but nothing worse than normal.  He denies any nausea, vomiting, dizziness, or lightheadedness.  Of note, he was recently diagnosed with AV block, and has been seeing Dr. Avendaño about getting a pacemaker placed.  His primary Cardiologist is Dr. Teixeira.  He is compliant with his Plavix.  He was on Aspirin until about 6 months ago, which was stopped for ulcer disease.  Given his concerns for unstable angina, he was sent to the ER for further evaluation.    Upon arrival to the ER, vitals were temp 98.7F, HR 80, /64.  EKG was not ischemic.  Troponin was 0.203.  He was evaluated by Cardiology, who suggested NSTEMI treatment with Aspirin, Plavix, and Heparin gtt.  He was admitted to Hospital Medicine for further management.        Overview/Hospital Course:  Pt admitted to  team G and was started on ACS protocol. Cardiology following, concerns for complete heart block. Performing PET stress on 4/27 to evaluate for need of LHC, and also tentatively planning for PPM placement. Pt remained asymptomatic.      Interval History: Patient sitting in chair, no acute  distress. No acute events overnight. Patient reports only slight chest pain for less than 10 seconds. Continues to note regular bowel movements and BLE edema. Denies SOB, N/V or abdominal pain. Plan for inpatient PPM placement on Monday and outpatient cardiology followup to discuss possible PCI.      Review of Systems   Constitutional:  Positive for activity change, fatigue and unexpected weight change. Negative for chills and fever.   HENT:  Negative for congestion.    Eyes:  Negative for visual disturbance.   Respiratory:  Negative for cough and shortness of breath.    Cardiovascular:  Positive for leg swelling. Negative for chest pain.   Gastrointestinal:  Negative for abdominal distention, abdominal pain, nausea and vomiting.   Genitourinary:  Negative for dysuria.   Musculoskeletal:  Negative for back pain.   Skin:  Negative for rash and wound.   Neurological:  Negative for dizziness and weakness.   Psychiatric/Behavioral:  Negative for confusion.      Objective:     Vital Signs (Most Recent):  Temp: 97.8 °F (36.6 °C) (05/01/22 2240)  Pulse: 68 (05/01/22 2240)  Resp: 18 (05/01/22 2240)  BP: (!) 188/70 (05/01/22 2240)  SpO2: 96 % (05/01/22 2240)   Vital Signs (24h Range):  Temp:  [96.5 °F (35.8 °C)-98 °F (36.7 °C)] 97.8 °F (36.6 °C)  Pulse:  [57-88] 68  Resp:  [17-18] 18  SpO2:  [95 %-98 %] 96 %  BP: (138-188)/(64-80) 188/70     Weight: 99 kg (218 lb 4.1 oz)  Body mass index is 31.32 kg/m².    Intake/Output Summary (Last 24 hours) at 5/1/2022 2330  Last data filed at 5/1/2022 2238  Gross per 24 hour   Intake 1070 ml   Output 500 ml   Net 570 ml        Physical Exam  Constitutional:       Appearance: Normal appearance.   HENT:      Head: Normocephalic.      Mouth/Throat:      Mouth: Mucous membranes are moist.   Eyes:      Extraocular Movements: Extraocular movements intact.      Pupils: Pupils are equal, round, and reactive to light.   Neck:      Comments: JVD absent  Cardiovascular:      Rate and Rhythm: Normal  rate and regular rhythm.      Pulses: Normal pulses.      Heart sounds: Normal heart sounds.   Pulmonary:      Effort: No respiratory distress.      Breath sounds: Normal breath sounds.   Abdominal:      General: Bowel sounds are normal. There is no distension.      Palpations: Abdomen is soft.      Tenderness: There is no abdominal tenderness.   Musculoskeletal:         General: Normal range of motion.      Cervical back: Neck supple.      Right lower leg: Edema present.      Left lower leg: Edema present.   Neurological:      General: No focal deficit present.      Mental Status: He is alert and oriented to person, place, and time.   Psychiatric:         Mood and Affect: Mood normal.       Significant Labs: All pertinent labs within the past 24 hours have been reviewed.    Significant Imaging: I have reviewed all pertinent imaging results/findings within the past 24 hours.      Assessment/Plan:      * NSTEMI (non-ST elevated myocardial infarction)  - NSTEMI/UA Pathway initiated  - EKG with AV block, no STEMI  - Initial troponin 0.203, still uptrending to 0.273 this morning, continuing trend  - Continue Heparin gtt  - S/p Aspirin 324mg x 1.  Continue Aspirin 81mg PO daily  - S/p Plavix 300mg PO x 1.  Continue Plavix 75mg PO daily  - Continue Lipitor   - Hold BB   - Prn Nitro for chest pain  - Continue tele  - TTE done  - Cardiology following: PET stress on 4/27 positive for ischemia  - Interventional cardiology consulted. 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, with long-term current use of insulin  · HbA1c 6.5  · Home DM regimen:  Detemir 22 daily, Novolog 8-10 am, 15 lunch and dinner  · Detemir 10 with Aspart 5 units TIDWM  · SSI with POCT accuchecks to achieve blood glucose of 140-180 while hospitalized  · Diabetic diet        Thrombocytopenia  · Monitor for bleeding      History of MI (myocardial infarction)  · As above      Hypertension associated with diabetes  · Chronic issue  · Stop Norvasc and HCTZ for now  · Hold Torsemide as he looks euvolemic  · Continue ARB - Avapro 300mg at home, Losartan 100mg on formulary  · Start Imdur 30mg daily, titrate up as tolerated  · Can consider Ranexa      Benign prostatic hyperplasia  · Chronic and stable  · Continue Flomax 0.4mg PO daily  · Continue Finasteride 5mg PO daily      Hyperlipidemia associated with type 2 diabetes mellitus  · Chronic issue  · Check lipid panel  · Increase Lipitor from 20mg to 40mg      Coronary artery disease involving native coronary artery of native heart with unstable angina pectoris  -Chronic issue  -History of STEMI with PCI in 2009  -Was previously on Aspirin and Plavix, but taken off Aspirin  -Continue Aspirin and Plavix for now with NSTEMI  -Continue Statin as above  - PET stress positive for ischemia  -Interventional cardiology consulted. apprec recs  --Continue ACS therapy with ASA/Plavix/heparin/statin  --Nephrology consulted given CKD. apprec recs  -- s/p IVF pre and post LHC  -- LHC showed severe coronary disease  --Continue Imdur, Losartan  - CTS consulted apprec recs  -- not a candidate for  CABG due to high risk   - outpatient cardiology followup to discuss PCI        VTE Risk Mitigation (From admission, onward)         Ordered     heparin (porcine) 750 Units, verapamiL 1.25 mg, nitroGLYCERIN 1.25 mg in sodium chloride 0.9% 250 mL  As needed (PRN)         04/29/22 1246     heparin infusion 1,000 units/500 ml in 0.9% NaCl (on sterile field)  As needed (PRN)         04/29/22 1246     IP VTE HIGH RISK PATIENT  Once         04/26/22 1937     Place sequential compression device  Until discontinued         04/26/22 1935     Place sequential compression device  Until discontinued         04/26/22 1935                Discharge Planning   HEMANT: 5/2/2022     Code Status: Full Code   Is the patient medically ready for discharge?: No    Reason for patient still in hospital (select all that apply): Patient unstable, Patient trending condition, Laboratory test, Treatment and Consult recommendations  Discharge Plan A: Home, Home with family            Time spent in care of patient: > 35 minutes         Darren Baptiste MD  Department of Hospital Medicine   [facility] - Cardiology Stepdown

## 2022-05-02 NOTE — SUBJECTIVE & OBJECTIVE
Interval History: Patient sitting in chair, no acute distress. No acute events overnight. Patient reports only slight chest pain for less than 10 seconds. Continues to note regular bowel movements and BLE edema. Denies SOB, N/V or abdominal pain. Plan for inpatient PPM placement on Monday and outpatient cardiology followup to discuss possible PCI.      Review of Systems   Constitutional:  Positive for activity change, fatigue and unexpected weight change. Negative for chills and fever.   HENT:  Negative for congestion.    Eyes:  Negative for visual disturbance.   Respiratory:  Negative for cough and shortness of breath.    Cardiovascular:  Positive for leg swelling. Negative for chest pain.   Gastrointestinal:  Negative for abdominal distention, abdominal pain, nausea and vomiting.   Genitourinary:  Negative for dysuria.   Musculoskeletal:  Negative for back pain.   Skin:  Negative for rash and wound.   Neurological:  Negative for dizziness and weakness.   Psychiatric/Behavioral:  Negative for confusion.      Objective:     Vital Signs (Most Recent):  Temp: 97.8 °F (36.6 °C) (05/01/22 2240)  Pulse: 68 (05/01/22 2240)  Resp: 18 (05/01/22 2240)  BP: (!) 188/70 (05/01/22 2240)  SpO2: 96 % (05/01/22 2240)   Vital Signs (24h Range):  Temp:  [96.5 °F (35.8 °C)-98 °F (36.7 °C)] 97.8 °F (36.6 °C)  Pulse:  [57-88] 68  Resp:  [17-18] 18  SpO2:  [95 %-98 %] 96 %  BP: (138-188)/(64-80) 188/70     Weight: 99 kg (218 lb 4.1 oz)  Body mass index is 31.32 kg/m².    Intake/Output Summary (Last 24 hours) at 5/1/2022 2330  Last data filed at 5/1/2022 2238  Gross per 24 hour   Intake 1070 ml   Output 500 ml   Net 570 ml        Physical Exam  Constitutional:       Appearance: Normal appearance.   HENT:      Head: Normocephalic.      Mouth/Throat:      Mouth: Mucous membranes are moist.   Eyes:      Extraocular Movements: Extraocular movements intact.      Pupils: Pupils are equal, round, and reactive to light.   Neck:      Comments: DELON  absent  Cardiovascular:      Rate and Rhythm: Normal rate and regular rhythm.      Pulses: Normal pulses.      Heart sounds: Normal heart sounds.   Pulmonary:      Effort: No respiratory distress.      Breath sounds: Normal breath sounds.   Abdominal:      General: Bowel sounds are normal. There is no distension.      Palpations: Abdomen is soft.      Tenderness: There is no abdominal tenderness.   Musculoskeletal:         General: Normal range of motion.      Cervical back: Neck supple.      Right lower leg: Edema present.      Left lower leg: Edema present.   Neurological:      General: No focal deficit present.      Mental Status: He is alert and oriented to person, place, and time.   Psychiatric:         Mood and Affect: Mood normal.       Significant Labs: All pertinent labs within the past 24 hours have been reviewed.    Significant Imaging: I have reviewed all pertinent imaging results/findings within the past 24 hours.

## 2022-05-02 NOTE — PROGRESS NOTES
Rep for biotronik took device box and bringing it to family in room 350. Daughter updated over text message by ep pacu rn.

## 2022-05-02 NOTE — SUBJECTIVE & OBJECTIVE
Interval History: Patient sitting in chair, no acute distress. No acute events overnight. Slept well. No CP overnight or this morning. Continues to note regular bowel movements and BLE edema. Denies SOB, N/V or abdominal pain. Plan for inpatient PPM placement this morning and outpatient cardiology followup to discuss possible PCI. NPO at this time.      Review of Systems   Constitutional:  Positive for activity change, fatigue and unexpected weight change. Negative for chills and fever.   HENT:  Negative for congestion.    Eyes:  Negative for visual disturbance.   Respiratory:  Negative for cough and shortness of breath.    Cardiovascular:  Positive for leg swelling. Negative for chest pain.   Gastrointestinal:  Negative for abdominal distention, abdominal pain, nausea and vomiting.   Genitourinary:  Negative for dysuria.   Musculoskeletal:  Negative for back pain.   Skin:  Negative for rash and wound.   Neurological:  Negative for dizziness and weakness.   Psychiatric/Behavioral:  Negative for confusion.      Objective:     Vital Signs (Most Recent):  Temp: 97.8 °F (36.6 °C) (05/02/22 0823)  Pulse: 66 (05/02/22 1122)  Resp: 14 (05/02/22 0823)  BP: (!) 161/67 (05/02/22 0823)  SpO2: (!) 94 % (05/02/22 0823)   Vital Signs (24h Range):  Temp:  [97.1 °F (36.2 °C)-98 °F (36.7 °C)] 97.8 °F (36.6 °C)  Pulse:  [60-88] 66  Resp:  [14-18] 14  SpO2:  [94 %-98 %] 94 %  BP: (153-188)/(65-80) 161/67     Weight: 98.2 kg (216 lb 7.9 oz)  Body mass index is 31.06 kg/m².    Intake/Output Summary (Last 24 hours) at 5/2/2022 1137  Last data filed at 5/1/2022 2238  Gross per 24 hour   Intake 730 ml   Output --   Net 730 ml        Physical Exam  Constitutional:       Appearance: Normal appearance.   HENT:      Head: Normocephalic.      Mouth/Throat:      Mouth: Mucous membranes are moist.   Eyes:      Extraocular Movements: Extraocular movements intact.      Pupils: Pupils are equal, round, and reactive to light.   Neck:      Comments:  JVD absent  Cardiovascular:      Rate and Rhythm: Normal rate and regular rhythm.      Pulses: Normal pulses.      Heart sounds: Normal heart sounds.   Pulmonary:      Effort: No respiratory distress.      Breath sounds: Normal breath sounds.   Abdominal:      General: Bowel sounds are normal. There is no distension.      Palpations: Abdomen is soft.      Tenderness: There is no abdominal tenderness.   Musculoskeletal:         General: Normal range of motion.      Cervical back: Neck supple.      Right lower leg: Edema present.      Left lower leg: Edema present.   Neurological:      General: No focal deficit present.      Mental Status: He is alert and oriented to person, place, and time.   Psychiatric:         Mood and Affect: Mood normal.       Significant Labs: All pertinent labs within the past 24 hours have been reviewed.    Significant Imaging: I have reviewed all pertinent imaging results/findings within the past 24 hours.

## 2022-05-02 NOTE — ASSESSMENT & PLAN NOTE
- HbA1c 6.5  - Home DM regimen:  Detemir 22 daily, Novolog 8-10 am, 15 lunch and dinner  - Detemir 10 with Aspart 5 units TIDWM  - SSI with POCT accuchecks to achieve blood glucose of 140-180 while hospitalized  - Diabetic diet

## 2022-05-02 NOTE — ANESTHESIA PREPROCEDURE EVALUATION
05/02/2022  Kevon Perez is a 81 y.o., male.  [facility]  Anesthesia Pre-Operative Evaluation       Patient Name: Kevon Perez  YOB: 1940  MRN: [id number]  CSN: [id number]      Code Status: Full Code   Date of Procedure: 5/2/2022  Anesthesia: Choice Procedure: Procedure(s) (LRB):  INSERTION, CARDIAC PACEMAKER, DUAL CHAMBER (N/A)  Pre-Operative Diagnosis: AV block [I44.30]  Proceduralist: Surgeon(s) and Role:     * Paulie Avendaño MD - Primary        SUBJECTIVE:   Kevon Perez is a 81 y.o. male who  has a past medical history of Acute coronary syndrome (9/10/09), Anticoagulant long-term use, Basal cell cancer, BCC (basal cell carcinoma of skin), Cancer of bladder (January 2013), Cataract, Chronic kidney disease, Colon polyp, Coronary artery disease, CPAP (continuous positive airway pressure) dependence, Diabetes mellitus, Diabetic retinopathy, Encounter for blood transfusion, GERD (gastroesophageal reflux disease), High cholesterol, Hyperlipidemia, Hypertension, Iron deficiency anemia (5/11/2018), GINGER (obstructive sleep apnea), Renal manifestation of secondary diabetes mellitus, and SOB (shortness of breath).     He presented to the ER from Cardiology clinic for evaluation of chest pain.  He reported that for the last 3 days, he has been having midsternal chest pain with minimal exertion.  The chest pain lasts about 20 minutes, and then resolves when he sits down.  The pain is similar to when he had his MCI in 2009.  He did not take any Nitroglycerin.     He was recently diagnosed with AV block, and has been seeing Dr. Avendaño about getting a pacemaker placed.     He is compliant with his Plavix.  He was on Aspirin until about 6 months ago, which was stopped for ulcer disease.      Has positive stress test,       Cardiac Cath on 4/29 shows:   · 60% ostial to  proximal LAD stenosis  · 75% mid LAD stneosis  · 90% OM3 stenosis  · 80% proximal RCA stenosis  · 80% mid RCA stenosis  · DIffuse severe PLB branch stenosis          Diagnostic Heart Cath 4/29/22 shows 3 vessel disease.     · Cardiothoracic surgery consulted and declined patient as candidate for CABG.     · Given his CKD, Cardiology is recommending a staged revascularization.    · No imminent revascularization this admission.     Here for EP to proceed with ppm for 2:1 AVB.          ALLERGIES:     Review of patient's allergies indicates:   Allergen Reactions    Penicillins Other (See Comments)     Muscle stiffness    Bactrim [sulfamethoxazole-trimethoprim] Rash     LDA:      Lines/Drains/Airways     Peripheral Intravenous Line  Duration                Peripheral IV - Single Lumen 20 G Anterior;Distal;Left Antecubital -- days         Peripheral IV - Single Lumen 04/28/22 1000 20 G Anterior;Right Forearm 4 days         Peripheral IV - Single Lumen 04/29/22 0315 18 G Anterior;Left Forearm 3 days                History:     Active Hospital Problems    Diagnosis  POA    *NSTEMI (non-ST elevated myocardial infarction) [I21.4]  Yes    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 [I13.10]  Yes    Metabolic acidosis, normal anion gap (NAG) [E87.2]  Yes    Complete heart block [I44.2]  Yes     Chronic    Obesity (BMI 30.0-34.9) [E66.9]  Yes    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, N18.4]  Yes     Chronic    Obesity, diabetes, and hypertension syndrome [E11.69, E66.9, E11.59, I15.2]  Yes    Iron deficiency anemia [D50.9]  Yes    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, with long-term current use of insulin [E11.3213, Z79.4]  Not Applicable     Chronic    History of MI (myocardial infarction) [I25.2]  Not Applicable    Hypertension associated with diabetes [E11.59, I15.2]  Yes     Chronic    Thrombocytopenia [D69.6]   Yes    Benign prostatic hyperplasia [N40.0]  Yes     Chronic    Coronary artery disease involving native coronary artery of native heart with unstable angina pectoris [I25.110]  Yes    Hyperlipidemia associated with type 2 diabetes mellitus [E11.69, E78.5]  Yes     Chronic      Resolved Hospital Problems   No resolved problems to display.     Surgical History:    has a past surgical history that includes Cardiac catheterization; Coronary angioplasty (9/10/09); Excision basal cell carcinoma; Bladder surgery; Cystoscopy; Coronary angioplasty with stent; Cataract extraction; hydrocel; Colonoscopy (3/26/15); Eye surgery; Colonoscopy (N/A, 12/4/2020); Esophagogastroduodenoscopy (N/A, 1/27/2021); and Coronary angiography (Left, 4/29/2022).   Social History:   .  reports that he quit smoking about 51 years ago. His smoking use included cigarettes. He has a 40.00 pack-year smoking history. He has quit using smokeless tobacco. He reports current alcohol use of about 3.0 standard drinks of alcohol per week. He reports that he does not use drugs.     OBJECTIVE:     Vital Signs (Most Recent):  Temp: 36.6 °C (97.8 °F) (05/02/22 0823)  Pulse: 66 (05/02/22 0823)  Resp: 14 (05/02/22 0823)  BP: (!) 161/67 (05/02/22 0823)  SpO2: (!) 94 % (05/02/22 0823) Vital Signs Range (Last 24H):  Temp:  [36.2 °C (97.1 °F)-36.7 °C (98 °F)]   Pulse:  [60-88]   Resp:  [14-18]   BP: (153-188)/(65-80)   SpO2:  [94 %-98 %]        Body mass index is 31.06 kg/m².   Wt Readings from Last 4 Encounters:   05/02/22 98.2 kg (216 lb 7.9 oz)   04/26/22 100.2 kg (220 lb 14.4 oz)   04/04/22 102.5 kg (225 lb 15.5 oz)   03/07/22 100 kg (220 lb 7.4 oz)     Significant Labs:  Lab Results   Component Value Date    WBC 8.01 05/02/2022    HGB 10.4 (L) 05/02/2022    HCT 31.8 (L) 05/02/2022     (L) 05/02/2022     05/02/2022    K 4.0 05/02/2022     (H) 05/02/2022    CREATININE 2.1 (H) 05/02/2022    BUN 39 (H) 05/02/2022    CO2 23 05/02/2022      (H) 05/02/2022    CALCIUM 9.8 05/02/2022    MG 1.3 (L) 05/02/2022    PHOS 2.7 05/02/2022    ALKPHOS 35 (L) 04/29/2022    ALT 13 04/29/2022    AST 17 04/29/2022    ALBUMIN 3.1 (L) 04/29/2022    INR 1.1 05/02/2022    APTT 28.2 04/30/2022    GLUF 111 (H) 02/01/2010    HGBA1C 6.5 (H) 03/16/2022     (H) 09/12/2009    CPKMB 12.0 (H) 09/12/2009    TROPONINI 0.224 (H) 04/27/2022    MB 3.3 09/12/2009    BNP 69 04/26/2022     No LMP for male patient.    EKG:   Results for orders placed or performed during the hospital encounter of 04/26/22   EKG 12-lead    Collection Time: 05/01/22  8:15 AM    Narrative    Test Reason : I48.91,    Vent. Rate : 065 BPM     Atrial Rate : 097 BPM     P-R Int : 000 ms          QRS Dur : 102 ms      QT Int : 364 ms       P-R-T Axes : 047 -08 093 degrees     QTc Int : 378 ms    Sinus rhythm with 2nd degree A-V block (Mobitz I) with 2:1 A-V conduction   with occasional Premature ventricular complexes vs un conducted PACs  Possible  Inferior infarct (cited on or before 29-APR-2022)  Probable  Anterior infarct (cited on or before 29-APR-2022)  Abnormal ECG  When compared with ECG of 29-APR-2022 11:38,  Premature ventricular complexes are now Present  Confirmed by Seth CARDOZA MD ([id number]) on 5/1/2022 10:22:10 AM    Referred By: [email or web address]   SELF           Confirmed By:Seth CARDOZA MD       TTE:  Results for orders placed or performed during the hospital encounter of 04/26/22   Echo   Result Value Ref Range    EF 58 %    Narrative    · The left ventricle is normal in size with normal systolic function.  The  estimated ejection fraction is 60%.  · There are segmental left ventricular wall motion abnormalities.  · Normal left ventricular diastolic function.  · Normal right ventricular size with normal right ventricular systolic   function.  · Normal central venous pressure (3 mmHg).        EF   Date Value Ref Range Status   04/27/2022 58 % Final     Nuc Stress EF   Date Value Ref Range Status   04/27/2022  42 % Final     Nuc Rest EF   Date Value Ref Range Status   04/27/2022 48  Final      Results for orders placed or performed in visit on 09/10/18   2D echo with color flow doppler   Result Value Ref Range    EF + QEF 55 55 - 65    Diastolic Dysfunction No     Est. PA Systolic Pressure 13.89        ASSESSMENT/PLAN:         Pre-op Assessment    I have reviewed the Patient Summary Reports.     I have reviewed the Nursing Notes.    I have reviewed the Medications.     Review of Systems      Physical Exam  General: Well nourished, Cooperative and Alert    Airway:  Mallampati: III / III  Mouth Opening: Normal  TM Distance: Normal  Tongue: Normal  Neck ROM: Normal ROM    Chest/Lungs:  Normal Respiratory Rate    Heart:  Rate: Normal        Anesthesia Plan  Type of Anesthesia, risks & benefits discussed:    Anesthesia Type: Gen Natural Airway, MAC  Intra-op Monitoring Plan: Standard ASA Monitors  Post Op Pain Control Plan: IV/PO Opioids PRN  Induction:  IV  Informed Consent: Informed consent signed with the Patient and all parties understand the risks and agree with anesthesia plan.  All questions answered.   ASA Score: 4  Day of Surgery Review of History & Physical: H&P Update referred to the surgeon/provider.    Ready For Surgery From Anesthesia Perspective.     .

## 2022-05-02 NOTE — ANESTHESIA POSTPROCEDURE EVALUATION
Anesthesia Post Evaluation    Patient: Kevon Perez    Procedure(s) Performed: Procedure(s) (LRB):  INSERTION, CARDIAC PACEMAKER, DUAL CHAMBER (N/A)    Final Anesthesia Type: general      Patient location during evaluation: PACU  Patient participation: Yes- Able to Participate  Level of consciousness: awake and alert  Post-procedure vital signs: reviewed and stable  Pain management: adequate  Airway patency: patent    PONV status at discharge: No PONV  Anesthetic complications: no      Cardiovascular status: stable  Respiratory status: spontaneous ventilation and nasal cannula  Hydration status: euvolemic  Follow-up not needed.          Vitals Value Taken Time   /56 05/02/22 1631   Temp 37 °C (98.6 °F) 05/02/22 1630   Pulse 87 05/02/22 1637   Resp 14 05/02/22 1633   SpO2 94 % 05/02/22 1637   Vitals shown include unvalidated device data.      No case tracking events are documented in the log.      Pain/Bijal Score: Pain Rating Prior to Med Admin: 6 (5/2/2022  4:22 PM)  Bijal Score: 8 (5/2/2022  4:30 PM)

## 2022-05-03 NOTE — PLAN OF CARE
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 [I13.10]  Yes    Metabolic acidosis, normal anion gap (NAG) [E87.2]  Yes    Complete heart block [I44.2]  Yes     Chronic    Obesity (BMI 30.0-34.9) [E66.9]  Yes    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, N18.4]  Yes     Chronic    Obesity, diabetes, and hypertension syndrome [E11.69, E66.9, E11.59, I15.2]  Yes    Iron deficiency anemia [D50.9]  Yes    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       Follow Up Appointments:  Future Appointments   Date Time Provider Department Center   5/6/2022  9:40 AM LEONARDO Tillman MD [facility]   5/12/2022 11:00 AM SHANELLE Rivera OP [facility]   5/20/2022 10:30 AM [facility]   6/20/2022  2:00 PM  HOLTER, EKG [facility] ARRADAM [facility]   6/22/2022  9:00 AM LAB, [facility] LAB [place]   6/27/2022  2:00 PM Sharon Jean Baptiste NP [facility] KELLY [facility]   6/29/2022  9:30 AM Cipriano Sol MD [facility]       Allergies:  Review of patient's allergies indicates:   Allergen Reactions    Penicillins Other (See Comments)     Muscle stiffness    Bactrim [sulfamethoxazole-trimethoprim] Rash       Medications: Review discharge medications with patient and family and provide education.      Current Discharge Medication List      START taking these medications    Details   aspirin 81 MG Chew Take 1 tablet (81 mg total) by mouth once daily.  Qty: 30 tablet, Refills: 11      doxycycline (VIBRAMYCIN) 100 MG Cap Take 1 capsule (100 mg total) by mouth every 12 (twelve) hours for 5 days  Qty: 10 capsule, Refills: 0      isosorbide dinitrate (ISORDIL) 20 MG tablet Take 1 tablet (20 mg total) by mouth 3 (three) times daily.  Qty: 90 tablet, Refills: 11    Comments: Bedside delivery all meds      sodium bicarbonate 650 MG tablet Take 1 tablet (650 mg total) by mouth 3 (three) times daily.  Qty: 90 tablet, Refills: 11         CONTINUE these medications which have CHANGED    Details   insulin aspart U-100 (NOVOLOG FLEXPEN U-100 INSULIN) 100 unit/mL (3 mL) InPn pen Inject 5 Units into the skin 3 (three) times daily with meals.  Qty: 90 mL, Refills: 8    Associated Diagnoses: Type 2 diabetes mellitus with stage 3 chronic kidney disease, with long-term current use of insulin; Type 2 diabetes mellitus with diabetic polyneuropathy, with long-term current use of insulin      insulin degludec (TRESIBA FLEXTOUCH U-100) 100 unit/mL (3 mL) insulin pen Inject 10 Units into the skin once daily.  Qty: 7 pen, Refills: 3    Associated Diagnoses: Type 2 diabetes mellitus with diabetic chronic kidney disease      torsemide (DEMADEX) 10 MG Tab Take 1 tablet (10 mg total) by mouth once daily. 1 tablet once a day  Qty: 30 tablet, Refills: 11  "   Associated Diagnoses: Dependent edema; MATTHEWS (dyspnea on exertion)         CONTINUE these medications which have NOT CHANGED    Details   atorvastatin (LIPITOR) 20 MG tablet TAKE 1 TABLET(20 MG) BY MOUTH EVERY DAY  Qty: 90 tablet, Refills: 1    Comments: Please inactivate all prior scripts with same name and strength including on holds.      calcitRIOL (ROCALTROL) 0.25 MCG Cap TAKE 1 CAPSULE BY MOUTH EVERY DAY  Qty: 30 capsule, Refills: 11      clopidogreL (PLAVIX) 75 mg tablet TAKE 1 TABLET(75 MG) BY MOUTH EVERY DAY  Qty: 90 tablet, Refills: 1      finasteride (PROSCAR) 5 mg tablet Take 1 tablet (5 mg total) by mouth once daily.  Qty: 90 tablet, Refills: 3    Associated Diagnoses: Benign prostatic hyperplasia, unspecified whether lower urinary tract symptoms present      irbesartan (AVAPRO) 300 MG tablet TAKE 1 TABLET(300 MG) BY MOUTH EVERY EVENING  Qty: 90 tablet, Refills: 3    Associated Diagnoses: Essential hypertension      tamsulosin (FLOMAX) 0.4 mg Cap TAKE 1 CAPSULE(0.4 MG) BY MOUTH EVERY EVENING  Qty: 90 capsule, Refills: 3    Comments: Needs cystoscope  Associated Diagnoses: Benign prostatic hyperplasia, unspecified whether lower urinary tract symptoms present      BD ULTRA-FINE SHORT PEN NEEDLE 31 gauge x 5/16" Ndle USE UP TO 6 TIMES DAILY  Qty: 200 each, Refills: 11      blood sugar diagnostic (TRUE METRIX GLUCOSE TEST STRIP) Strp USE TO CHECK BLOOD SUGAR FOUR TIMES DAILY  Qty: 300 strip, Refills: 11    Associated Diagnoses: Type 2 diabetes mellitus with stage 3 chronic kidney disease, with long-term current use of insulin, unspecified whether stage 3a or 3b CKD      blood-glucose meter kit To check BG 4 times daily, to use with insurance preferred meter  Qty: 1 each, Refills: 0    Associated Diagnoses: Type 2 diabetes mellitus with stage 3 chronic kidney disease, unspecified whether long term insulin use      nitroGLYCERIN (NITROSTAT) 0.4 MG SL tablet TAKE 1 TABLET UNDER THE TONGUE EVERY 5 MINUTES AS " NEEDED  Qty: 25 tablet, Refills: 3    Associated Diagnoses: Coronary artery disease involving native coronary artery without angina pectoris, unspecified whether native or transplanted heart      psyllium (METAMUCIL) powder Take 1 packet by mouth.      TRUEPLUS LANCETS 30 gauge Misc USE TO CHECK BLOOD SUGAR FOUR TIMES DAILY  Qty: 300 each, Refills: 3    Comments: **Patient requests 90 days supply**  Associated Diagnoses: Type 2 diabetes mellitus with stage 3 chronic kidney disease, unspecified whether long term insulin use         STOP taking these medications       amLODIPine (NORVASC) 5 MG tablet Comments:   Reason for Stopping:         hydroCHLOROthiazide (HYDRODIURIL) 12.5 MG Tab Comments:   Reason for Stopping:         FLUAD QUAD 2021-22,65Y UP,,PF, 60 mcg (15 mcg x 4)/0.5 mL Syrg Comments:   Reason for Stopping:                 I have seen and examined this patient within the last 30 days. My clinical findings that support the need for the home health skilled services and home bound status are the following:no   Weakness/numbness causing balance and gait disturbance due to Weakness/Debility making it taxing to leave home.     Diet:   Diabetic 2000 calories carb consistent diet  Cardiac diet with 1500 cc fluid restriction     Labs:  As per PCP - rec CMP in one week    Referrals/ Consults  Physical Therapy to evaluate and treat. Evaluate for home safety and equipment needs; Establish/upgrade home exercise program. Perform / instruct on therapeutic exercises, gait training, transfer training, and Range of Motion.  Occupational Therapy to evaluate and treat. Evaluate home environment for safety and equipment needs. Perform/Instruct on transfers, ADL training, ROM, and therapeutic exercises.    Activities:   activity as tolerated    Nursing:   Agency to admit patient within 24 hours of hospital discharge unless specified on physician order or at patient request    SN to complete comprehensive assessment including  routine vital signs. Instruct on disease process and s/s of complications to report to MD. Review/verify medication list sent home with the patient at time of discharge  and instruct patient/caregiver as needed. Frequency may be adjusted depending on start of care date.     Skilled nurse to perform up to 3 visits PRN for symptoms related to diagnosis    Notify MD if SBP > 160 or < 90; DBP > 90 or < 50; HR > 120 or < 50; Temp > 101; O2 < 88%; Other:       Ok to schedule additional visits based on staff availability and patient request on consecutive days within the home health episode.    When multiple disciplines ordered:    Start of Care occurs on Sunday - Wednesday schedule remaining discipline evaluations as ordered on separate consecutive days following the start of care.    Thursday SOC -schedule subsequent evaluations Friday and Monday the following week.     Friday - Saturday SOC - schedule subsequent discipline evaluations on consecutive days starting Monday of the following week.    For all post-discharge communication and subsequent orders please contact patient's primary care physician. If unable to reach primary care physician or do not receive response within 30 minutes, please contact hospitalist on call for clinical staff order clarification      Home Health Aide:  Physical Therapy Three times weekly and Occupational Therapy Three times weekly    Wound Care Orders  no    I certify that this patient is confined to his home and needs physical therapy and occupational therapy.

## 2022-05-03 NOTE — DISCHARGE SUMMARY
Harpreet Kramer - Cardiology [facility] Medicine  Discharge Summary      Patient Name: Kevon Perez  MRN: [id number]  Patient Class: IP- Inpatient  Admission Date: 4/26/2022  Hospital Length of Stay: 7 days  Discharge Date and Time:  05/03/2022   Attending Physician: Raheem Mahajan MD   Discharging Provider: Raheem Mahajan MD  Primary Care Provider: Cipriano Sol MD  [facility] Medicine Team: [facility] Raheem Mahajan MD    HPI:   Mr. Kevon Perez is a 81 y.o. male with CAD, history of STEMI s/p PCI, and 2nd degree AV block who presents to the ER from Cardiology clinic for evaluation of chest pain.  He reports that for the last 3 days, he has been having midsternal chest pain with minimal exertion.  The chest pain lasts about 20 minutes, and then resolves when he sits down.  The pain is similar to when he had his MCI in 2009.  He did not take any Nitroglycerin.  He endorses chronic shortness of breath, but nothing worse than normal.  He denies any nausea, vomiting, dizziness, or lightheadedness.  Of note, he was recently diagnosed with AV block, and has been seeing Dr. Avendaño about getting a pacemaker placed.  His primary Cardiologist is Dr. Teixeira.  He is compliant with his Plavix.  He was on Aspirin until about 6 months ago, which was stopped for ulcer disease.  Given his concerns for unstable angina, he was sent to the ER for further evaluation.    Upon arrival to the ER, vitals were temp 98.7F, HR 80, /64.  EKG was not ischemic.  Troponin was 0.203.  He was evaluated by Cardiology, who suggested NSTEMI treatment with Aspirin, Plavix, and Heparin gtt.  He was admitted to Hospital Medicine for further management.        Procedure(s) (LRB):  INSERTION, CARDIAC PACEMAKER, DUAL CHAMBER (N/A)      Hospital Course:   Pt admitted to  team G and was started on ACS protocol (hep, plavix, asa, Lipitor). Cardiology consulted, TTE done reviewed. PET stress on 4/27 positive for ischemia. Interventional  cardiology consulted, s/p C on 4/29 showed multivessel disease, high risk for CABG as per cards, plan for OP PCI. Referral placed. EKG with concerns for complete heart block. EP consulted, s/p PPM placement 5/2. Tolerated procedure well. He is now stable for dc home with HH, PCP in one week and cardiology OP follow up. Meds as below.        Goals of Care Treatment Preferences:  Code Status: Full Code      Consults:   Consults (From admission, onward)        Status Ordering Provider     Inpatient consult to Cardiothoracic Surgery  Once        Provider:  (Not yet assigned)    Completed JONE MONROE     Inpatient consult to Nephrology  Once        Provider:  (Not yet assigned)    Completed PAULETTE CHEATHAM     [facility] Medicine PharmD Consult  Once        Provider:  (Not yet assigned)    Completed RIANNA BRAY     Inpatient consult to Cardiac Rehab  Once        Provider:  (Not yet assigned)    Completed RIANNA BRAY     Inpatient consult to Registered Dietitian/Nutritionist  Once        Provider:  (Not yet assigned)    Completed RIANNA BRAY     Inpatient consult to Social Work/Case Management  Once        Provider:  (Not yet assigned)    Acknowledged RIANNA BRAY     Inpatient consult to Interventional Cardiology  Once        Provider:  (Not yet assigned)    Completed RIANNA BRAY          No new Assessment & Plan notes have been filed under this hospital service since the last note was generated.  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 [I13.10] 04/26/2022 Yes    Metabolic acidosis, normal anion gap (NAG) [E87.2] 04/26/2022 Yes    Complete heart block [I44.2] 04/01/2022 Yes     Chronic    Obesity (BMI 30.0-34.9) [E66.9] 08/28/2019 Yes    CKD stage 4 due to  type 2 diabetes mellitus [E11.22, N18.4] 08/28/2019 Yes     Chronic    Obesity, diabetes, and hypertension syndrome [E11.69, E66.9, E11.59, I15.2] 08/26/2019 Yes    Iron deficiency anemia [D50.9] 05/11/2018 Yes    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, with long-term current use of insulin [E11.3213, Z79.4] 09/14/2017 Not Applicable     Chronic    History of MI (myocardial infarction) [I25.2] 04/24/2017 Not Applicable    Hypertension associated with diabetes [E11.59, I15.2] 04/24/2017 Yes     Chronic    Thrombocytopenia [D69.6] 04/24/2017 Yes    Benign prostatic hyperplasia [N40.0] 12/17/2012 Yes     Chronic    Coronary artery disease involving native coronary artery of native heart with unstable angina pectoris [I25.110] 07/02/2012 Yes    Hyperlipidemia associated with type 2 diabetes mellitus [E11.69, E78.5] 07/02/2012 Yes     Chronic      Problems Resolved During this Admission:       Discharged Condition: stable    Disposition: Home or Self Care    Follow Up:   Follow-up Information     Cipriano Sol MD. Schedule an appointment as soon as possible for a visit in 1 week(s).    Specialty: Internal Medicine  Why: Post hospital follow up  Contact information:  [address]  [place] [address]  [phone]                       Patient Instructions:      Ambulatory referral/consult to Cardiology   Standing Status: Future   Referral Priority: Urgent Referral Type: Consultation   Referral Reason: Specialty Services Required   Requested Specialty: Cardiology   Number of Visits Requested: 1     Ambulatory referral/consult to Nephrology   Standing Status: Future   Referral Priority: Urgent Referral Type: Consultation   Referral Reason: Specialty Services Required   Requested Specialty: Nephrology   Number of Visits Requested: 1       Significant Diagnostic Studies: all reviewed     Pending Diagnostic Studies:     None        "  Medications:  Reconciled Home Medications:      Medication List      START taking these medications    aspirin 81 MG Chew  Chew and swallow 1 tablet (81 mg total) by mouth once daily.     doxycycline 100 MG Cap  Commonly known as: VIBRAMYCIN  Take 1 capsule (100 mg total) by mouth every 12 (twelve) hours for 5 days     isosorbide dinitrate 20 MG tablet  Commonly known as: ISORDIL  Take 1 tablet (20 mg total) by mouth 3 (three) times daily.     sodium bicarbonate 650 MG tablet  Take 1 tablet (650 mg total) by mouth 3 (three) times daily.        CHANGE how you take these medications    NovoLOG Flexpen U-100 Insulin 100 unit/mL (3 mL) Inpn pen  Generic drug: insulin aspart U-100  Inject 5 Units into the skin 3 (three) times daily with meals.  What changed: See the new instructions.     torsemide 10 MG Tab  Commonly known as: DEMADEX  Take 1 tablet (10 mg total) by mouth once daily.  What changed: how much to take     TRESIBA FLEXTOUCH U-100 100 unit/mL (3 mL) insulin pen  Generic drug: insulin degludec  Inject 10 Units into the skin once daily.  What changed: how much to take        CONTINUE taking these medications    atorvastatin 20 MG tablet  Commonly known as: LIPITOR  TAKE 1 TABLET(20 MG) BY MOUTH EVERY DAY     BD ULTRA-FINE SHORT PEN NEEDLE 31 gauge x 5/16" Ndle  Generic drug: pen needle, diabetic  USE UP TO 6 TIMES DAILY     blood-glucose meter kit  To check BG 4 times daily, to use with insurance preferred meter     calcitRIOL 0.25 MCG Cap  Commonly known as: ROCALTROL  TAKE 1 CAPSULE BY MOUTH EVERY DAY     clopidogreL 75 mg tablet  Commonly known as: PLAVIX  TAKE 1 TABLET(75 MG) BY MOUTH EVERY DAY     finasteride 5 mg tablet  Commonly known as: PROSCAR  Take 1 tablet (5 mg total) by mouth once daily.     irbesartan 300 MG tablet  Commonly known as: AVAPRO  TAKE 1 TABLET(300 MG) BY MOUTH EVERY EVENING     nitroGLYCERIN 0.4 MG SL tablet  Commonly known as: NITROSTAT  TAKE 1 TABLET UNDER THE TONGUE EVERY 5 " MINUTES AS NEEDED     psyllium powder  Commonly known as: METAMUCIL  Take 1 packet by mouth.     tamsulosin 0.4 mg Cap  Commonly known as: FLOMAX  TAKE 1 CAPSULE(0.4 MG) BY MOUTH EVERY EVENING     TRUE METRIX GLUCOSE TEST STRIP Strp  Generic drug: blood sugar diagnostic  USE TO CHECK BLOOD SUGAR FOUR TIMES DAILY     TRUEPLUS LANCETS 30 gauge Misc  Generic drug: lancets  USE TO CHECK BLOOD SUGAR FOUR TIMES DAILY        STOP taking these medications    amLODIPine 5 MG tablet  Commonly known as: NORVASC     FLUAD QUAD 2021-22(65Y UP)(PF) 60 mcg (15 mcg x 4)/0.5 mL Syrg  Generic drug: flu vac 2021 [id number]-qmiWA02A(PF)     hydroCHLOROthiazide 12.5 MG Tab  Commonly known as: HYDRODIURIL            Indwelling Lines/Drains at time of discharge:   Lines/Drains/Airways     None                 Time spent on the discharge of patient: > 30 minutes         Raheem Mahajan MD  Department of Hospital Medicine  [facility] - Cardiology Stepdown

## 2022-05-03 NOTE — PLAN OF CARE
05/03/22 1012   Post-Acute Status   Post-Acute Authorization Home Health   Home Health Status Referrals Sent      referrals sent to the following [facility] providers via [facility]:  [place], [facility], [facility], [facility], and [facility].    Sharon Delgadillo, SURENDRA  [facility]  [id number]

## 2022-05-03 NOTE — PLAN OF CARE
Patient seen s/p PPM. Pressure dressing removed, doing well no hematoma.  Sling to left arm - wear for 24 hours, then only at night for 6 weeks.  NO HEPARIN PRODUCTS  Doxycycline 100 mg PO BID for 5 days at discharge  No lifting left elbow above shoulder height  No lifting over 5 pounds  No driving for 1 week and for 4 weeks if patient uses left arm to make turns  Patient may shower in 24 hours, do not let beam of shower hit site directly and no scrubbing in area  Follow up in device clinic in 1 week and with Dr. Avendaño in 4 months.    EP will sign off at this time.

## 2022-05-03 NOTE — PLAN OF CARE
"Patient has been scheduled for a [facility] Follow Up with Cipriano Sol MD on Tuesday May 10, 2022 at 9:30 AM.    Please arrive approximately 15 minutes before your scheduled appointment time and ensure that you have a valid government issued ID and your insurance card. ePre-Check is available and completion prior to your arrival will assist with a quicker registration process.     Three Options to Check-In for Your Appointment     With [employer] Mobile Check-In simply complete ePre-Check before your appointment and click "I'm Here" in the evangelist when you park.  Don't see the Mobile Check-In option? In some locations you can call from the parking area to let us know you've arrived. Just look for the banners with the phone number to call.  Or Visit the registration desk to check-in for your appointment.     Masks are required for all patients and visitors.    Please park in surface lot or garage and check in on the 4th floor, [facility]  [address] [id number] [phone]      In addition;    Patient has also been scheduled for a Visit with Matthew Baron MD  on Monday May 9, 2022 at 10:40 AM.     Masks are required for all patients and visitors.    Cardiology Services Clinics - 3rd floor   [facility] - Cardiology - [facility]  [address]   [facility] [id number] [phone]                      Noris Kramer    Case management  Ext. [phone]  "

## 2022-05-03 NOTE — DISCHARGE INSTRUCTIONS
Sling to left arm - wear for 24 hours, then only at night for 6 weeks.  NO HEPARIN PRODUCTS  Doxycycline 100 mg PO BID for 5 days at discharge  No lifting left elbow above shoulder height  No lifting over 5 pounds  No driving for 1 week and for 4 weeks if patient uses left arm to make turns  Patient may shower in 24 hours, do not let beam of shower hit site directly and no scrubbing in area  Follow up in device clinic in 1 week and with Dr. Avendaño in 4 months.

## 2022-05-03 NOTE — PLAN OF CARE
[facility] - Cardiology Stepdown  Discharge Final Note    Primary Care Provider: Cipriano Sol MD    Expected Discharge Date: 5/3/2022    Final Discharge Note (most recent)     Final Note - 05/03/22 1229        Final Note    Assessment Type Final Discharge Note     Anticipated Discharge Disposition Home-Health Care [facility]     Hospital Resources/Appts/Education Provided Appointments scheduled and added to AVS;Post-Acute resouces added to AVS        Post-Acute Status    Post-Acute Authorization Home Health     Home Health Status Set-up Complete/Auth obtained             Pt accepted by [facility].    Sharon Delgadillo LMSW  [facility]  [id number]      Important Message from Medicare  Important Message from Medicare regarding Discharge Appeal Rights: Given to patient/caregiver, Explained to patient/caregiver, Signed/date by patient/caregiver     Date IMM was signed: 05/03/22  Time IMM was signed: 0945    Contact Info     Cipriano Sol MD   Specialty: Internal Medicine   Relationship: PCP - General    [address]  [facility] [address]   Phone: [phone]       Next Steps: Schedule an appointment as soon as possible for a visit in 1 week(s)    Instructions: Post hospital follow up    [facility]   Specialty: Home Health Services    [address]  [address]  [facility] [address]   Phone: [phone]       Next Steps: Follow up    Instructions: They will contact you with date and time to come to the home.

## 2022-05-04 PROBLEM — J96.01 ACUTE RESPIRATORY FAILURE WITH HYPOXIA: Status: ACTIVE | Noted: 2022-01-01

## 2022-05-04 PROBLEM — R79.89 TROPONIN LEVEL ELEVATED: Status: ACTIVE | Noted: 2022-01-01

## 2022-05-04 PROBLEM — N18.4 ACUTE RENAL FAILURE SUPERIMPOSED ON STAGE 4 CHRONIC KIDNEY DISEASE: Status: ACTIVE | Noted: 2022-01-01

## 2022-05-04 PROBLEM — R07.9 CHEST PAIN: Status: ACTIVE | Noted: 2022-01-01

## 2022-05-04 PROBLEM — J96.90 RESPIRATORY FAILURE: Status: ACTIVE | Noted: 2022-01-01

## 2022-05-04 PROBLEM — N17.9 ACUTE RENAL FAILURE SUPERIMPOSED ON STAGE 4 CHRONIC KIDNEY DISEASE: Status: ACTIVE | Noted: 2022-01-01

## 2022-05-04 NOTE — Clinical Note
Diagnosis: Chest pain [737224]   Future Attending Provider: SANTIAGO DE LA GARZA [[id number]]   Admitting Provider:: SANTIAGO DE LA GARZA [[id number]]

## 2022-05-04 NOTE — SUBJECTIVE & OBJECTIVE
Past Medical History:   Diagnosis Date    Acute coronary syndrome 9/10/09    STEMI    Anticoagulant long-term use     plavix    Basal cell cancer     BCC (basal cell carcinoma of skin)     nose    Cancer of bladder January 2013    Cataract     Chronic kidney disease     Colon polyp     Coronary artery disease     CPAP (continuous positive airway pressure) dependence     Diabetes mellitus     Diabetic retinopathy     Encounter for blood transfusion     GERD (gastroesophageal reflux disease)     High cholesterol     Hyperlipidemia     Hypertension     Iron deficiency anemia 5/11/2018    GINGER (obstructive sleep apnea)     CPAP     Renal manifestation of secondary diabetes mellitus     SOB (shortness of breath)        Past Surgical History:   Procedure Laterality Date    A-V CARDIAC PACEMAKER INSERTION N/A 5/2/2022    Procedure: INSERTION, CARDIAC PACEMAKER, DUAL CHAMBER;  Surgeon: Paulie Avendaño MD;  Location: [facility] EP LAB;  Service: Cardiology;  Laterality: N/A;  CHB, DUAL PPM, MDT, ANES, GP,     BASAL CELL CARCINOMA EXCISION      nose     BLADDER SURGERY      bladder cancer    CARDIAC CATHETERIZATION      CATARACT EXTRACTION      bilateral     COLONOSCOPY  3/26/15    COLONOSCOPY N/A 12/4/2020    Procedure: COLONOSCOPY;  Surgeon: Keshav Rajan MD;  Location: [facility] ENDO ([address] [place] [address]);  Service: Endoscopy;  Laterality: N/A;  covid test 12/1-pcw-tb  pt SOB x 1 year-ok to hold Plavix x 5 days per Dr. Sol-see telephone encounter dated 8/25  10/14-instructions emailed to [email or web address]    CORONARY ANGIOGRAPHY Left 4/29/2022    Procedure: ANGIOGRAM, CORONARY ARTERY;  Surgeon: Joesph Cast MD;  Location: [facility] CATH LAB;  Service: Cardiology;  Laterality: Left;    CORONARY ANGIOPLASTY  9/10/09    CFX    CORONARY ANGIOPLASTY WITH STENT PLACEMENT      CYSTOSCOPY      ESOPHAGOGASTRODUODENOSCOPY N/A 1/27/2021    Procedure: EGD (ESOPHAGOGASTRODUODENOSCOPY);  Surgeon: Matt Acosta MD;  Location: [facility]  ENDO;  Service: Endoscopy;  Laterality: N/A;    EYE SURGERY      hydrocel         Review of patient's allergies indicates:   Allergen Reactions    Penicillins Other (See Comments)     Muscle stiffness    Bactrim [sulfamethoxazole-trimethoprim] Rash       Current Facility-Administered Medications on File Prior to Encounter   Medication    [DISCONTINUED] acetaminophen tablet 1,000 mg    [DISCONTINUED] acetaminophen tablet 650 mg    [DISCONTINUED] amLODIPine tablet 5 mg    [DISCONTINUED] artificial tears 0.5 % ophthalmic solution 1 drop    [DISCONTINUED] aspirin chewable tablet 81 mg    [DISCONTINUED] atorvastatin tablet 40 mg    [DISCONTINUED] BUPivacaine (PF) 0.25% (2.5 mg/ml) injection    [DISCONTINUED] calcitRIOL capsule 0.25 mcg    [DISCONTINUED] clopidogreL tablet 75 mg    [DISCONTINUED] dextrose 10% bolus 125 mL    [DISCONTINUED] dextrose 10% bolus 250 mL    [DISCONTINUED] doxycycline tablet 100 mg    [DISCONTINUED] finasteride tablet 5 mg    [DISCONTINUED] glucagon (human recombinant) injection 1 mg    [DISCONTINUED] glucose chewable tablet 16 g    [DISCONTINUED] glucose chewable tablet 24 g    [DISCONTINUED] HYDROmorphone injection 0.2 mg    [DISCONTINUED] insulin aspart U-100 pen 0-5 Units    [DISCONTINUED] insulin aspart U-100 pen 5 Units    [DISCONTINUED] insulin detemir U-100 pen 10 Units    [DISCONTINUED] isosorbide dinitrate tablet 20 mg    [DISCONTINUED] LIDOcaine HCL 20 mg/ml (2%) injection    [DISCONTINUED] melatonin tablet 6 mg    [DISCONTINUED] morphine injection 2 mg    [DISCONTINUED] nitroGLYCERIN SL tablet 0.4 mg    [DISCONTINUED] ondansetron disintegrating tablet 8 mg    [DISCONTINUED] oxyCODONE immediate release tablet 5 mg    [DISCONTINUED] polyethylene glycol packet 17 g    [DISCONTINUED] prochlorperazine injection Soln 5 mg    [DISCONTINUED] sodium bicarbonate tablet 650 mg    [DISCONTINUED] sodium chloride 0.9% flush 10 mL    [DISCONTINUED] sodium chloride 0.9% flush 10 mL    [DISCONTINUED]  "sodium chloride 0.9% flush 5 mL    [DISCONTINUED] sodium chloride 0.9% irrigation    [DISCONTINUED] tamsulosin 24 hr capsule 0.4 mg    [DISCONTINUED] vancomycin injection     Current Outpatient Medications on File Prior to Encounter   Medication Sig    calcitRIOL (ROCALTROL) 0.25 MCG Cap TAKE 1 CAPSULE BY MOUTH EVERY DAY    clopidogreL (PLAVIX) 75 mg tablet TAKE 1 TABLET(75 MG) BY MOUTH EVERY DAY    doxycycline (VIBRAMYCIN) 100 MG Cap Take 1 capsule (100 mg total) by mouth every 12 (twelve) hours for 5 days    finasteride (PROSCAR) 5 mg tablet Take 1 tablet (5 mg total) by mouth once daily.    nitroGLYCERIN (NITROSTAT) 0.4 MG SL tablet TAKE 1 TABLET UNDER THE TONGUE EVERY 5 MINUTES AS NEEDED    aspirin 81 MG Chew Chew and swallow 1 tablet (81 mg total) by mouth once daily.    atorvastatin (LIPITOR) 20 MG tablet TAKE 1 TABLET(20 MG) BY MOUTH EVERY DAY    BD ULTRA-FINE SHORT PEN NEEDLE 31 gauge x 5/16" Ndle USE UP TO 6 TIMES DAILY    blood sugar diagnostic (TRUE METRIX GLUCOSE TEST STRIP) Strp USE TO CHECK BLOOD SUGAR FOUR TIMES DAILY    blood-glucose meter kit To check BG 4 times daily, to use with insurance preferred meter    insulin aspart U-100 (NOVOLOG FLEXPEN U-100 INSULIN) 100 unit/mL (3 mL) InPn pen Inject 5 Units into the skin 3 (three) times daily with meals.    insulin degludec (TRESIBA FLEXTOUCH U-100) 100 unit/mL (3 mL) insulin pen Inject 10 Units into the skin once daily.    irbesartan (AVAPRO) 300 MG tablet TAKE 1 TABLET(300 MG) BY MOUTH EVERY EVENING    isosorbide dinitrate (ISORDIL) 20 MG tablet Take 1 tablet (20 mg total) by mouth 3 (three) times daily.    psyllium (METAMUCIL) powder Take 1 packet by mouth.    sodium bicarbonate 650 MG tablet Take 1 tablet (650 mg total) by mouth 3 (three) times daily.    tamsulosin (FLOMAX) 0.4 mg Cap TAKE 1 CAPSULE(0.4 MG) BY MOUTH EVERY EVENING    torsemide (DEMADEX) 10 MG Tab Take 1 tablet (10 mg total) by mouth once daily.    TRUEPLUS LANCETS 30 gauge Misc USE " TO CHECK BLOOD SUGAR FOUR TIMES DAILY    [DISCONTINUED] amLODIPine (NORVASC) 5 MG tablet TAKE 1 TABLET(5 MG) BY MOUTH EVERY DAY    [DISCONTINUED] hydroCHLOROthiazide (HYDRODIURIL) 12.5 MG Tab TAKE 1 TABLET(12.5 MG) BY MOUTH EVERY DAY     Family History       Problem Relation (Age of Onset)    Alcohol abuse Brother    Cancer Maternal Aunt    Cataracts Mother    Diabetes Father, Sister, Paternal Uncle    Goiter Mother    Heart disease Father, Mother    Hypertension Father    No Known Problems Daughter, Son, Son          Tobacco Use    Smoking status: Former Smoker     Packs/day: 1.00     Years: 40.00     Pack years: 40.00     Types: Cigarettes     Quit date: 1970     Years since quittin.8    Smokeless tobacco: Former User   Substance and Sexual Activity    Alcohol use: Yes     Alcohol/week: 3.0 standard drinks     Types: 1 Cans of beer, 1 Shots of liquor, 1 Standard drinks or equivalent per week    Drug use: No    Sexual activity: Not Currently     Review of Systems   Constitutional:  Positive for activity change, weakness Negative for chills and fever.   HENT:  Negative for congestion.    Eyes:  Negative for visual disturbance.   Respiratory:  Positive for SOB and cough   Cardiovascular:  Positive for chest pain.  Gastrointestinal:  Negative for abdominal distention, abdominal pain, nausea and vomiting.   Genitourinary:  Negative for dysuria.   Musculoskeletal:  Negative for back pain.   Skin:  Negative for rash and wound.   Neurological:  Negative for dizziness and weakness.   Psychiatric/Behavioral:  Negative for confusion.     Objective:     Vital Signs (Most Recent):  Temp: 98.9 °F (37.2 °C) (22 1020)  Pulse: 108 (22 1503)  Resp: 18 (22 1020)  BP: (!) 106/58 (22 1503)  SpO2: 95 % (22 1503)   Vital Signs (24h Range):  Temp:  [98.9 °F (37.2 °C)] 98.9 °F (37.2 °C)  Pulse:  [103-110] 108  Resp:  [18] 18  SpO2:  [95 %] 95 %  BP: ()/(52-61) 106/58     Weight: 99 kg (218 lb  4.1 oz)  Body mass index is 31.32 kg/m².    Physical Exam  Vitals reviewed.   Constitutional:       Appearance: Normal appearance. He is well-developed. In no acute distress. He is obese.   HENT:      Head: Normocephalic and atraumatic. No JVD  Eyes:      General: No scleral icterus.     Pupils: Pupils are equal, round, and reactive to light.   Cardiovascular:      Rate and Rhythm: Normal rate and regular rhythm.      Heart sounds: No murmur heard.      Surgical site clean and dry  Pulmonary:      Effort: Pulmonary effort is normal. No respiratory distress.      Breath sounds: Diminished BS at bilateral bases with scattered rales   Abdominal:      General: Bowel sounds are normal. There is no distension.      Palpations: Abdomen is soft.      Tenderness: There is no abdominal tenderness.   Musculoskeletal:         General: Normal range of motion.      Cervical back: Normal range of motion and neck supple.   Skin:     General: Skin is warm and dry.   Neurological:      General: No focal deficit present.      Mental Status: He is alert and oriented to person, place, and time. Mental status is at baseline.   Psychiatric:         Behavior: Behavior normal.         CRANIAL NERVES      CN III, IV, VI   Pupils are equal, round, and reactive to light.        Significant Labs: All pertinent labs within the past 24 hours have been reviewed.    Significant Imaging: I have reviewed all pertinent imaging results/findings within the past 24 hours.

## 2022-05-04 NOTE — SUBJECTIVE & OBJECTIVE
Past Medical History:   Diagnosis Date    Acute coronary syndrome 9/10/09    STEMI    Anticoagulant long-term use     plavix    Basal cell cancer     BCC (basal cell carcinoma of skin)     nose    Cancer of bladder January 2013    Cataract     Chronic kidney disease     Colon polyp     Coronary artery disease     CPAP (continuous positive airway pressure) dependence     Diabetes mellitus     Diabetic retinopathy     Encounter for blood transfusion     GERD (gastroesophageal reflux disease)     High cholesterol     Hyperlipidemia     Hypertension     Iron deficiency anemia 5/11/2018    GINGER (obstructive sleep apnea)     CPAP     Renal manifestation of secondary diabetes mellitus     SOB (shortness of breath)        Past Surgical History:   Procedure Laterality Date    A-V CARDIAC PACEMAKER INSERTION N/A 5/2/2022    Procedure: INSERTION, CARDIAC PACEMAKER, DUAL CHAMBER;  Surgeon: Paulie Avendaño MD;  Location: [facility] EP LAB;  Service: Cardiology;  Laterality: N/A;  CHB, DUAL PPM, MDT, ANES, GP,     BASAL CELL CARCINOMA EXCISION      nose     BLADDER SURGERY      bladder cancer    CARDIAC CATHETERIZATION      CATARACT EXTRACTION      bilateral     COLONOSCOPY  3/26/15    COLONOSCOPY N/A 12/4/2020    Procedure: COLONOSCOPY;  Surgeon: Keshav Rajan MD;  Location: [facility] ENDO ([address] [place] [address]);  Service: Endoscopy;  Laterality: N/A;  covid test 12/1-pcw-tb  pt SOB x 1 year-ok to hold Plavix x 5 days per Dr. Sol-see telephone encounter dated 8/25  10/14-instructions emailed to [email or web address]    CORONARY ANGIOGRAPHY Left 4/29/2022    Procedure: ANGIOGRAM, CORONARY ARTERY;  Surgeon: Joesph Cast MD;  Location: [facility] CATH LAB;  Service: Cardiology;  Laterality: Left;    CORONARY ANGIOPLASTY  9/10/09    CFX    CORONARY ANGIOPLASTY WITH STENT PLACEMENT      CYSTOSCOPY      ESOPHAGOGASTRODUODENOSCOPY N/A 1/27/2021    Procedure: EGD (ESOPHAGOGASTRODUODENOSCOPY);  Surgeon: Matt Acosta MD;  Location: [facility]  ENDO;  Service: Endoscopy;  Laterality: N/A;    EYE SURGERY      hydrocel         Review of patient's allergies indicates:   Allergen Reactions    Penicillins Other (See Comments)     Muscle stiffness    Bactrim [sulfamethoxazole-trimethoprim] Rash       Current Facility-Administered Medications on File Prior to Encounter   Medication    [DISCONTINUED] acetaminophen tablet 1,000 mg    [DISCONTINUED] acetaminophen tablet 650 mg    [DISCONTINUED] amLODIPine tablet 5 mg    [DISCONTINUED] artificial tears 0.5 % ophthalmic solution 1 drop    [DISCONTINUED] aspirin chewable tablet 81 mg    [DISCONTINUED] atorvastatin tablet 40 mg    [DISCONTINUED] BUPivacaine (PF) 0.25% (2.5 mg/ml) injection    [DISCONTINUED] calcitRIOL capsule 0.25 mcg    [DISCONTINUED] clopidogreL tablet 75 mg    [DISCONTINUED] dextrose 10% bolus 125 mL    [DISCONTINUED] dextrose 10% bolus 250 mL    [DISCONTINUED] doxycycline tablet 100 mg    [DISCONTINUED] finasteride tablet 5 mg    [DISCONTINUED] glucagon (human recombinant) injection 1 mg    [DISCONTINUED] glucose chewable tablet 16 g    [DISCONTINUED] glucose chewable tablet 24 g    [DISCONTINUED] HYDROmorphone injection 0.2 mg    [DISCONTINUED] insulin aspart U-100 pen 0-5 Units    [DISCONTINUED] insulin aspart U-100 pen 5 Units    [DISCONTINUED] insulin detemir U-100 pen 10 Units    [DISCONTINUED] isosorbide dinitrate tablet 20 mg    [DISCONTINUED] LIDOcaine HCL 20 mg/ml (2%) injection    [DISCONTINUED] melatonin tablet 6 mg    [DISCONTINUED] morphine injection 2 mg    [DISCONTINUED] nitroGLYCERIN SL tablet 0.4 mg    [DISCONTINUED] ondansetron disintegrating tablet 8 mg    [DISCONTINUED] oxyCODONE immediate release tablet 5 mg    [DISCONTINUED] polyethylene glycol packet 17 g    [DISCONTINUED] prochlorperazine injection Soln 5 mg    [DISCONTINUED] sodium bicarbonate tablet 650 mg    [DISCONTINUED] sodium chloride 0.9% flush 10 mL    [DISCONTINUED] sodium chloride 0.9% flush 10 mL    [DISCONTINUED]  "sodium chloride 0.9% flush 5 mL    [DISCONTINUED] sodium chloride 0.9% irrigation    [DISCONTINUED] tamsulosin 24 hr capsule 0.4 mg    [DISCONTINUED] vancomycin injection     Current Outpatient Medications on File Prior to Encounter   Medication Sig    calcitRIOL (ROCALTROL) 0.25 MCG Cap TAKE 1 CAPSULE BY MOUTH EVERY DAY    clopidogreL (PLAVIX) 75 mg tablet TAKE 1 TABLET(75 MG) BY MOUTH EVERY DAY    doxycycline (VIBRAMYCIN) 100 MG Cap Take 1 capsule (100 mg total) by mouth every 12 (twelve) hours for 5 days    finasteride (PROSCAR) 5 mg tablet Take 1 tablet (5 mg total) by mouth once daily.    nitroGLYCERIN (NITROSTAT) 0.4 MG SL tablet TAKE 1 TABLET UNDER THE TONGUE EVERY 5 MINUTES AS NEEDED    aspirin 81 MG Chew Chew and swallow 1 tablet (81 mg total) by mouth once daily.    atorvastatin (LIPITOR) 20 MG tablet TAKE 1 TABLET(20 MG) BY MOUTH EVERY DAY    BD ULTRA-FINE SHORT PEN NEEDLE 31 gauge x 5/16" Ndle USE UP TO 6 TIMES DAILY    blood sugar diagnostic (TRUE METRIX GLUCOSE TEST STRIP) Strp USE TO CHECK BLOOD SUGAR FOUR TIMES DAILY    blood-glucose meter kit To check BG 4 times daily, to use with insurance preferred meter    insulin aspart U-100 (NOVOLOG FLEXPEN U-100 INSULIN) 100 unit/mL (3 mL) InPn pen Inject 5 Units into the skin 3 (three) times daily with meals.    insulin degludec (TRESIBA FLEXTOUCH U-100) 100 unit/mL (3 mL) insulin pen Inject 10 Units into the skin once daily.    irbesartan (AVAPRO) 300 MG tablet TAKE 1 TABLET(300 MG) BY MOUTH EVERY EVENING    isosorbide dinitrate (ISORDIL) 20 MG tablet Take 1 tablet (20 mg total) by mouth 3 (three) times daily.    psyllium (METAMUCIL) powder Take 1 packet by mouth.    sodium bicarbonate 650 MG tablet Take 1 tablet (650 mg total) by mouth 3 (three) times daily.    tamsulosin (FLOMAX) 0.4 mg Cap TAKE 1 CAPSULE(0.4 MG) BY MOUTH EVERY EVENING    torsemide (DEMADEX) 10 MG Tab Take 1 tablet (10 mg total) by mouth once daily.    TRUEPLUS LANCETS 30 gauge Misc USE " TO CHECK BLOOD SUGAR FOUR TIMES DAILY    [DISCONTINUED] amLODIPine (NORVASC) 5 MG tablet TAKE 1 TABLET(5 MG) BY MOUTH EVERY DAY    [DISCONTINUED] hydroCHLOROthiazide (HYDRODIURIL) 12.5 MG Tab TAKE 1 TABLET(12.5 MG) BY MOUTH EVERY DAY     Family History       Problem Relation (Age of Onset)    Alcohol abuse Brother    Cancer Maternal Aunt    Cataracts Mother    Diabetes Father, Sister, Paternal Uncle    Goiter Mother    Heart disease Father, Mother    Hypertension Father    No Known Problems Daughter, Son, Son          Tobacco Use    Smoking status: Former Smoker     Packs/day: 1.00     Years: 40.00     Pack years: 40.00     Types: Cigarettes     Quit date: 1970     Years since quittin.8    Smokeless tobacco: Former User   Substance and Sexual Activity    Alcohol use: Yes     Alcohol/week: 3.0 standard drinks     Types: 1 Cans of beer, 1 Shots of liquor, 1 Standard drinks or equivalent per week    Drug use: No    Sexual activity: Not Currently     Review of Systems   Constitutional: Negative for chills and decreased appetite.   HENT:  Negative for congestion.    Cardiovascular:  Positive for chest pain. Negative for irregular heartbeat and leg swelling.   Respiratory:  Negative for cough and shortness of breath.    Endocrine: Negative for cold intolerance and heat intolerance.   Skin:  Negative for rash.   Musculoskeletal:  Negative for arthritis and back pain.   Gastrointestinal:  Negative for abdominal pain, constipation and diarrhea.   Genitourinary:  Negative for dysuria and hematuria.   Neurological:  Negative for dizziness and headaches.   Psychiatric/Behavioral:  Negative for altered mental status.    Objective:     Vital Signs (Most Recent):  Temp: 98.9 °F (37.2 °C) (22 1020)  Pulse: 110 (22 1139)  Resp: 18 (22 1020)  BP: (!) 101/55 (22 1139)  SpO2: 95 % (22 1020)   Vital Signs (24h Range):  Temp:  [98.9 °F (37.2 °C)] 98.9 °F (37.2 °C)  Pulse:  [108-110] 110  Resp:  [18]  18  SpO2:  [95 %] 95 %  BP: ()/(52-55) 101/55     Weight: 99 kg (218 lb 4.1 oz)  Body mass index is 31.32 kg/m².    SpO2: 95 %       No intake or output data in the 24 hours ending 05/04/22 1404    Lines/Drains/Airways       Peripheral Intravenous Line  Duration                  Peripheral IV - Single Lumen 05/04/22 1135 20 G Right Forearm <1 day                    Physical Exam  Vitals reviewed.   Constitutional:       General: He is not in acute distress.  HENT:      Head: Normocephalic.   Eyes:      Pupils: Pupils are equal, round, and reactive to light.   Neck:      Thyroid: No thyromegaly.      Vascular: No JVD.   Cardiovascular:      Rate and Rhythm: Normal rate and regular rhythm.      Heart sounds: No murmur heard.    No friction rub.   Pulmonary:      Effort: Pulmonary effort is normal. No respiratory distress.      Breath sounds: No wheezing or rales.   Abdominal:      General: Abdomen is flat. There is no distension.   Musculoskeletal:      Right lower leg: No edema.      Left lower leg: No edema.   Skin:            Comments: Scar for PPM  No signs of hematoma    Neurological:      Mental Status: He is alert and oriented to person, place, and time.       Significant Labs: CMP   Recent Labs   Lab 05/03/22  0312 05/04/22  1135    136   K 4.4 4.4    107   CO2 18* 20*   * 188*   BUN 37* 45*   CREATININE 1.8* 2.5*   CALCIUM 9.6 9.6   PROT  --  6.2   ALBUMIN  --  3.1*   BILITOT  --  1.4*   ALKPHOS  --  44*   AST  --  117*   ALT  --  43   ANIONGAP 12 9   ESTGFRAFRICA 39.6* 26.6*   EGFRNONAA 34.3* 23.0*   , CBC   Recent Labs   Lab 05/03/22  0312 05/04/22  1135   WBC 9.94 13.77*   HGB 10.8* 11.2*   HCT 33.5* 35.2*   * 145*   , Troponin   Recent Labs   Lab 05/04/22  1135   TROPONINI 33.585*   , and All pertinent lab results from the last 24 hours have been reviewed.    Significant Imaging: Echocardiogram: 2D echo with color flow doppler:   Results for orders placed or performed in  visit on 09/10/18   2D echo with color flow doppler   Result Value Ref Range    EF + QEF 55 55 - 65    Diastolic Dysfunction No     Est. PA Systolic Pressure 13.89     Narrative    Date of Procedure: 09/10/2018        TEST DESCRIPTION   Technical Quality: This is a technically adequate study. This study was performed in conjunction with a 3ml intravenous injection of Optison contrast agent.     Aorta: The aortic root is normal in size, measuring 3.1 cm at sinotubular junction and 3.6 cm at Sinuses of Valsalva. The proximal ascending aorta is normal in size, measuring 3.7 cm across.     Left Atrium: The left atrial volume index is normal, measuring 32.52 cc/m2.     Left Ventricle: The left ventricle is normal in size, with an end-diastolic diameter of 4.3 cm, and an end-systolic diameter of 2.9 cm. LV wall thickness is normal, with the septum measuring 0.8 cm and the posterior wall measuring 0.9 cm across. Relative   wall thickness was normal at 0.42, and the LV mass index was 57.0 g/m2 consistent with normal left ventricular mass. The following segments were akinetic: basal inferoseptum.  The following segments were hypokinetic: basal inferior wall.  Left ventricular systolic function appears normal. Visually estimated ejection fraction is 55-60%. The LV Doppler derived stroke volume equals 91.0 ccs.     Diastolic indices: E wave velocity 0.7 m/s, E/A ratio 0.8,  msec., E/e' ratio(avg) 10. Diastolic function is normal.     Right Atrium: The right atrium is upper limit of normal, measuring 5.4 cm in length and 3.7 cm in width in the apical view.     Right Ventricle: The right ventricle is normal in size measuring 2.7 cm at the base in the apical right ventricle-focused view. Global right ventricular systolic function appears normal. Tricuspid annular plane systolic excursion (TAPSE) is 3.8 cm. The   estimated PA systolic pressure is 14 mmHg.     Aortic Valve:  The aortic valve is mildly sclerotic with normal  leaflet mobility.     Mitral Valve:  Mitral valve is normal in structure with normal leaflet mobility.     Tricuspid Valve:  Tricuspid valve is normal in structure with normal leaflet mobility.     Pulmonary Valve:  The pulmonic valve is not well seen.     IVC: IVC is normal in size and collapses > 50% with a sniff, suggesting normal right atrial pressure of 3 mmHg.     Intracavitary: There is no evidence of pericardial effusion, intracavity mass, thrombi, or vegetation.         CONCLUSIONS     1 - Preserved left ventricular systolic function (EF 55-60%) with basal wall motion abnormalities.     2 - Normal right ventricular systolic function .     3 - Normal left ventricular diastolic function.             This document has been electronically    SIGNED BY: Elia Olivier MD On: 09/10/2018 12:08

## 2022-05-04 NOTE — PLAN OF CARE
Initial Discharge Planning Case Management Assessment:     Patient admitted on 5/4/22 for chest pain.     PCP updated in Epic: Confirmed correct. Spoke with PCP this morning, advised to go to ED  Baseline functioning: Independent  Recent changes from baseline: More difficulty w/ambulation, bathing  HH/community service hx: D/c'd on 5/3 with HH from [facility]  Hx facility placement: None  DME: None owned, but requests shower chair upon d/c  Psychosocial: Lives alone, support of 2 adult children.   Living environment: Accessible home, no noted concerns about home environment    Current dispo: Home w/HH   Barriers to d/c: None  Transportation: Family    Consults following: Case Management, Cardiology  Referrals sent: Contacted [facility]    Actions completed today:  -Chart reviewed. SW consulted to assist with d/c planning. Consult acknowledged.  -Care plan discussed with pt, pt's daughter Yenni, care team  -SW met with pt and daughter, Yenni, at bedside in the ED to complete initial assessment.   -Pt confirms that he was set up with HH following his d/c yesterday and they were supposed to visit today at 2pm. He and his daughter were unable to reach the  agency to inform them of the ED visit. Pt expresses interest in continuing with HH upon this d/c.   -No DME currently used at home but pt states he would benefit from shower chair.   -SW contacted [facility], [phone], to inform them of pt's admission. They did attempt to visit pt today and noted he was not available. He is able to continue services with them, no additional orders needed, just contact them with pt's d/c date and time once available.       SW will continue to monitor and assist with any additional d/c needs as they arise.     Lazara Basilio, [facility]  [place]   Care Management  [facility]  Ext. [phone]    [facility] - Emergency Dept  Initial Discharge Assessment       Primary Care Provider: Cipriano Sol MD    Admission  Diagnosis: Chest pain [R07.9]    Admission Date: 5/4/2022  Expected Discharge Date: 5/6/2022    Discharge Barriers Identified: None    Payor: [employer] MANAGED MEDICARE / Plan: [employer] MEDICARE HMO / Product Type: Capitation /     Extended Emergency Contact Information  Primary Emergency Contact: Juan Perezen  Address: unknown   [facility]  Home Phone: [phone]  Mobile Phone: [phone]  Relation: Son  Secondary Emergency Contact: Elio Perez  Address: unknown   [place]  Mobile Phone: [phone]  Relation: Son    Discharge Plan A: [id number] - [place], [place] - [address] AT [facility] [address]  [address]  [place] [address]  Phone: [phone] Fax: [phone]    [id number] - [place]  [address]  [address]  [place] [address]  Phone: [phone] Fax: [phone]      Initial Assessment (most recent)     Adult Discharge Assessment - 05/04/22 1504        Discharge Assessment    Assessment Type Discharge Planning Assessment     Confirmed/corrected address, phone number and insurance Yes     Confirmed Demographics Correct on Facesheet     Source of Information patient;family     When was your last doctors appointment? 05/04/22     Does patient/caregiver understand observation status Yes     Communicated HEMANT with patient/caregiver Yes     Reason For Admission Chest Pain     Lives With alone     Do you expect to return to your current living situation? Yes     Do you have help at home or someone to help you manage your care at home? Yes     Who are your caregiver(s) and their phone number(s)? Son, daughter     Prior to hospitilization cognitive status: Alert/Oriented;No Deficits     Current cognitive status: Alert/Oriented;No Deficits     Walking or Climbing Stairs Difficulty none     Dressing/Bathing Difficulty bathing difficulty, requires equipment     Dressing/Bathing  Management Needs shower chair     Home Accessibility wheelchair accessible     Home Layout Able to live on 1st floor     Equipment Currently Used at Home none     Readmission within 30 days? Yes     Patient currently being followed by outpatient case management? No     Do you currently have service(s) that help you manage your care at home? Yes     Name and Contact number of agency [facility]     Is the pt/caregiver preference to resume services with current agency Yes     Do you take prescription medications? Yes     Do you have prescription coverage? Yes     Coverage [employer]     Do you have any problems affording any of your prescribed medications? No     Is the patient taking medications as prescribed? yes     Who is going to help you get home at discharge? Family     How do you get to doctors appointments? family or friend will provide     Are you on dialysis? No     Do you take coumadin? No     Discharge Plan A Home Health     DME Needed Upon Discharge  shower chair     Discharge Plan discussed with: Patient;Adult children     Discharge Barriers Identified None

## 2022-05-04 NOTE — PLAN OF CARE
05/04/22 1529   Post-Acute Status   Post-Acute Authorization Home Health   Home Health Status Pending medical clearance/testing   Coverage [employer]   Hospital Resources/Appts/Education Provided Post-Acute resouces added to AVS   Discharge Plan   Discharge Plan A Home Health     Notify [facility], [phone], once pt is ready to d/c.     Lazara Basilio LMSW  ED   Care Management  [facility]  Ext. [phone]

## 2022-05-04 NOTE — AI DETERIORATION ALERT
"RAPID RESPONSE NURSE AI ALERT       AI alert received.    Chart Reviewed: 05/04/2022, 3:14 PM    MRN: [id number]  Bed: ED 05/05    Dx: <principal problem not specified>    Kevon Perez has a past medical history of Acute coronary syndrome, Anticoagulant long-term use, Basal cell cancer, BCC (basal cell carcinoma of skin), Cancer of bladder, Cataract, Chronic kidney disease, Colon polyp, Coronary artery disease, CPAP (continuous positive airway pressure) dependence, Diabetes mellitus, Diabetic retinopathy, Encounter for blood transfusion, GERD (gastroesophageal reflux disease), High cholesterol, Hyperlipidemia, Hypertension, Iron deficiency anemia, GINGER (obstructive sleep apnea), Renal manifestation of secondary diabetes mellitus, and SOB (shortness of breath).    Last VS: BP (!) 106/58   Pulse 108   Temp 98.9 °F (37.2 °C) (Oral)   Resp 18   Ht 5' 10" (1.778 m)   Wt 99 kg (218 lb 4.1 oz)   SpO2 95%   BMI 31.32 kg/m²     24H Vital Sign Range:  Temp:  [98.9 °F (37.2 °C)]   Pulse:  [103-110]   Resp:  [18]   BP: ()/(52-61)   SpO2:  [95 %]     Level of Consciousness (AVPU): alert    Recent Labs     05/02/22 0445 05/03/22 0312 05/04/22  1135   WBC 8.01 9.94 13.77*   HGB 10.4* 10.8* 11.2*   HCT 31.8* 33.5* 35.2*   * 128* 145*       Recent Labs     05/02/22 0445 05/03/22 0312 05/04/22  1135 05/04/22  1137    139 136  --    K 4.0 4.4 4.4  --    * 109 107  --    CO2 23 18* 20*  --    CREATININE 2.1* 1.8* 2.5*  --    * 129* 188*  --    PHOS 2.7 3.4  --   --    MG 1.3* 2.0  --  1.8        No results for input(s): PH, PCO2, PO2, HCO3, POCSATURATED, BE in the last 72 hours.     OXYGEN:             MEWS score:      bedside RNCathy contacted. No concerns verbalized at this time. Instructed to call [phone] for further concerns or assistance.    Jim Campos RN        "

## 2022-05-04 NOTE — CONSULTS
[facility]  Cardiology  Consult Note    Patient Name: Kevon Perez  MRN: [id number]  Admission Date: 5/4/2022  Hospital Length of Stay: 0 days  Code Status: Prior   Attending Provider: Raheem Mahajan MD   Consulting Provider: Brooks Mcmillan MD  Primary Care Physician: Cipriano Sol MD  Principal Problem:<principal problem not specified>    Patient information was obtained from patient and ER records.     Inpatient consult to Cardiology  Consult performed by: Brooks Mcmillan MD  Consult ordered by: Sanjuanita Rivero MD        Subjective:     Chief Complaint:  Chest pain      HPI:   Mr. Kevon Perez, 81 y.o. 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 He presented to ED after he was instructed by the general cardiology MA/RN when he started to complain of chest pain that occurred yesterday. He descried the pain and tenderness in pericardial area that is worsen with deep breath. He took first nitro with no alleviation of pain, however pain improved with third nitro and the pain subsided and did not recur. He slept fine and woke up this morning with no further pain. He called cardiology clinic who asked him to come to ED for work up. On his arrival to ED till my evaluation, no recurrence of chest pain and his vital signs has been normal. No pericardial effusion on his bedside echo and his EF is preserved. Of note, he underwent LHC on 4/29 (last week) which revealed multi-vessel CAD. . CTS consulted and recommended medical versus PCI. The plan from IC is to proceed with multi-stage PCI.       Past Medical History:   Diagnosis Date    Acute coronary syndrome 9/10/09    STEMI    Anticoagulant long-term use     plavix    Basal cell cancer     BCC (basal cell carcinoma of skin)     nose    Cancer of bladder January 2013    Cataract     Chronic kidney disease     Colon polyp     Coronary artery  disease     CPAP (continuous positive airway pressure) dependence     Diabetes mellitus     Diabetic retinopathy     Encounter for blood transfusion     GERD (gastroesophageal reflux disease)     High cholesterol     Hyperlipidemia     Hypertension     Iron deficiency anemia 5/11/2018    GINGER (obstructive sleep apnea)     CPAP     Renal manifestation of secondary diabetes mellitus     SOB (shortness of breath)        Past Surgical History:   Procedure Laterality Date    A-V CARDIAC PACEMAKER INSERTION N/A 5/2/2022    Procedure: INSERTION, CARDIAC PACEMAKER, DUAL CHAMBER;  Surgeon: Paulie Avendaño MD;  Location: [facility] EP LAB;  Service: Cardiology;  Laterality: N/A;  CHB, DUAL PPM, MDT, ANES, GP,     BASAL CELL CARCINOMA EXCISION      nose     BLADDER SURGERY      bladder cancer    CARDIAC CATHETERIZATION      CATARACT EXTRACTION      bilateral     COLONOSCOPY  3/26/15    COLONOSCOPY N/A 12/4/2020    Procedure: COLONOSCOPY;  Surgeon: Keshav Rajan MD;  Location: [facility] ENDO (2ND FLR);  Service: Endoscopy;  Laterality: N/A;  covid test 12/1-pcw-tb  pt SOB x 1 year-ok to hold Plavix x 5 days per Dr. Sol-see telephone encounter dated 8/25  10/14-instructions emailed to [email or web address]    CORONARY ANGIOGRAPHY Left 4/29/2022    Procedure: ANGIOGRAM, CORONARY ARTERY;  Surgeon: Joesph Cast MD;  Location: [facility] CATH LAB;  Service: Cardiology;  Laterality: Left;    CORONARY ANGIOPLASTY  9/10/09    CFX    CORONARY ANGIOPLASTY WITH STENT PLACEMENT      CYSTOSCOPY      ESOPHAGOGASTRODUODENOSCOPY N/A 1/27/2021    Procedure: EGD (ESOPHAGOGASTRODUODENOSCOPY);  Surgeon: Matt Acosta MD;  Location: [facility] ENDO;  Service: Endoscopy;  Laterality: N/A;    EYE SURGERY      hydrocel         Review of patient's allergies indicates:   Allergen Reactions    Penicillins Other (See Comments)     Muscle stiffness    Bactrim [sulfamethoxazole-trimethoprim] Rash       Current  Facility-Administered Medications on File Prior to Encounter   Medication    [DISCONTINUED] acetaminophen tablet 1,000 mg    [DISCONTINUED] acetaminophen tablet 650 mg    [DISCONTINUED] amLODIPine tablet 5 mg    [DISCONTINUED] artificial tears 0.5 % ophthalmic solution 1 drop    [DISCONTINUED] aspirin chewable tablet 81 mg    [DISCONTINUED] atorvastatin tablet 40 mg    [DISCONTINUED] BUPivacaine (PF) 0.25% (2.5 mg/ml) injection    [DISCONTINUED] calcitRIOL capsule 0.25 mcg    [DISCONTINUED] clopidogreL tablet 75 mg    [DISCONTINUED] dextrose 10% bolus 125 mL    [DISCONTINUED] dextrose 10% bolus 250 mL    [DISCONTINUED] doxycycline tablet 100 mg    [DISCONTINUED] finasteride tablet 5 mg    [DISCONTINUED] glucagon (human recombinant) injection 1 mg    [DISCONTINUED] glucose chewable tablet 16 g    [DISCONTINUED] glucose chewable tablet 24 g    [DISCONTINUED] HYDROmorphone injection 0.2 mg    [DISCONTINUED] insulin aspart U-100 pen 0-5 Units    [DISCONTINUED] insulin aspart U-100 pen 5 Units    [DISCONTINUED] insulin detemir U-100 pen 10 Units    [DISCONTINUED] isosorbide dinitrate tablet 20 mg    [DISCONTINUED] LIDOcaine HCL 20 mg/ml (2%) injection    [DISCONTINUED] melatonin tablet 6 mg    [DISCONTINUED] morphine injection 2 mg    [DISCONTINUED] nitroGLYCERIN SL tablet 0.4 mg    [DISCONTINUED] ondansetron disintegrating tablet 8 mg    [DISCONTINUED] oxyCODONE immediate release tablet 5 mg    [DISCONTINUED] polyethylene glycol packet 17 g    [DISCONTINUED] prochlorperazine injection Soln 5 mg    [DISCONTINUED] sodium bicarbonate tablet 650 mg    [DISCONTINUED] sodium chloride 0.9% flush 10 mL    [DISCONTINUED] sodium chloride 0.9% flush 10 mL    [DISCONTINUED] sodium chloride 0.9% flush 5 mL    [DISCONTINUED] sodium chloride 0.9% irrigation    [DISCONTINUED] tamsulosin 24 hr capsule 0.4 mg    [DISCONTINUED] vancomycin injection     Current Outpatient Medications on File Prior to  "Encounter   Medication Sig    calcitRIOL (ROCALTROL) 0.25 MCG Cap TAKE 1 CAPSULE BY MOUTH EVERY DAY    clopidogreL (PLAVIX) 75 mg tablet TAKE 1 TABLET(75 MG) BY MOUTH EVERY DAY    doxycycline (VIBRAMYCIN) 100 MG Cap Take 1 capsule (100 mg total) by mouth every 12 (twelve) hours for 5 days    finasteride (PROSCAR) 5 mg tablet Take 1 tablet (5 mg total) by mouth once daily.    nitroGLYCERIN (NITROSTAT) 0.4 MG SL tablet TAKE 1 TABLET UNDER THE TONGUE EVERY 5 MINUTES AS NEEDED    aspirin 81 MG Chew Chew and swallow 1 tablet (81 mg total) by mouth once daily.    atorvastatin (LIPITOR) 20 MG tablet TAKE 1 TABLET(20 MG) BY MOUTH EVERY DAY    BD ULTRA-FINE SHORT PEN NEEDLE 31 gauge x 5/16" Ndle USE UP TO 6 TIMES DAILY    blood sugar diagnostic (TRUE METRIX GLUCOSE TEST STRIP) Strp USE TO CHECK BLOOD SUGAR FOUR TIMES DAILY    blood-glucose meter kit To check BG 4 times daily, to use with insurance preferred meter    insulin aspart U-100 (NOVOLOG FLEXPEN U-100 INSULIN) 100 unit/mL (3 mL) InPn pen Inject 5 Units into the skin 3 (three) times daily with meals.    insulin degludec (TRESIBA FLEXTOUCH U-100) 100 unit/mL (3 mL) insulin pen Inject 10 Units into the skin once daily.    irbesartan (AVAPRO) 300 MG tablet TAKE 1 TABLET(300 MG) BY MOUTH EVERY EVENING    isosorbide dinitrate (ISORDIL) 20 MG tablet Take 1 tablet (20 mg total) by mouth 3 (three) times daily.    psyllium (METAMUCIL) powder Take 1 packet by mouth.    sodium bicarbonate 650 MG tablet Take 1 tablet (650 mg total) by mouth 3 (three) times daily.    tamsulosin (FLOMAX) 0.4 mg Cap TAKE 1 CAPSULE(0.4 MG) BY MOUTH EVERY EVENING    torsemide (DEMADEX) 10 MG Tab Take 1 tablet (10 mg total) by mouth once daily.    TRUEPLUS LANCETS 30 gauge Misc USE TO CHECK BLOOD SUGAR FOUR TIMES DAILY    [DISCONTINUED] amLODIPine (NORVASC) 5 MG tablet TAKE 1 TABLET(5 MG) BY MOUTH EVERY DAY    [DISCONTINUED] hydroCHLOROthiazide (HYDRODIURIL) 12.5 MG Tab TAKE 1 " TABLET(12.5 MG) BY MOUTH EVERY DAY     Family History       Problem Relation (Age of Onset)    Alcohol abuse Brother    Cancer Maternal Aunt    Cataracts Mother    Diabetes Father, Sister, Paternal Uncle    Goiter Mother    Heart disease Father, Mother    Hypertension Father    No Known Problems Daughter, Son, Son          Tobacco Use    Smoking status: Former Smoker     Packs/day: 1.00     Years: 40.00     Pack years: 40.00     Types: Cigarettes     Quit date: 1970     Years since quittin.8    Smokeless tobacco: Former User   Substance and Sexual Activity    Alcohol use: Yes     Alcohol/week: 3.0 standard drinks     Types: 1 Cans of beer, 1 Shots of liquor, 1 Standard drinks or equivalent per week    Drug use: No    Sexual activity: Not Currently     Review of Systems   Constitutional: Negative for chills and decreased appetite.   HENT:  Negative for congestion.    Cardiovascular:  Positive for chest pain. Negative for irregular heartbeat and leg swelling.   Respiratory:  Negative for cough and shortness of breath.    Endocrine: Negative for cold intolerance and heat intolerance.   Skin:  Negative for rash.   Musculoskeletal:  Negative for arthritis and back pain.   Gastrointestinal:  Negative for abdominal pain, constipation and diarrhea.   Genitourinary:  Negative for dysuria and hematuria.   Neurological:  Negative for dizziness and headaches.   Psychiatric/Behavioral:  Negative for altered mental status.    Objective:     Vital Signs (Most Recent):  Temp: 98.9 °F (37.2 °C) (22 1020)  Pulse: 110 (22 1139)  Resp: 18 (22 1020)  BP: (!) 101/55 (22 1139)  SpO2: 95 % (22 1020)   Vital Signs (24h Range):  Temp:  [98.9 °F (37.2 °C)] 98.9 °F (37.2 °C)  Pulse:  [108-110] 110  Resp:  [18] 18  SpO2:  [95 %] 95 %  BP: ()/(52-55) 101/55     Weight: 99 kg (218 lb 4.1 oz)  Body mass index is 31.32 kg/m².    SpO2: 95 %       No intake or output data in the 24 hours ending  05/04/22 1404    Lines/Drains/Airways       Peripheral Intravenous Line  Duration                  Peripheral IV - Single Lumen 05/04/22 1135 20 G Right Forearm <1 day                    Physical Exam  Vitals reviewed.   Constitutional:       General: He is not in acute distress.  HENT:      Head: Normocephalic.   Eyes:      Pupils: Pupils are equal, round, and reactive to light.   Neck:      Thyroid: No thyromegaly.      Vascular: No JVD.   Cardiovascular:      Rate and Rhythm: Normal rate and regular rhythm.      Heart sounds: No murmur heard.    No friction rub.   Pulmonary:      Effort: Pulmonary effort is normal. No respiratory distress.      Breath sounds: No wheezing or rales.   Abdominal:      General: Abdomen is flat. There is no distension.   Musculoskeletal:      Right lower leg: No edema.      Left lower leg: No edema.   Skin:            Comments: Scar for PPM  No signs of hematoma    Neurological:      Mental Status: He is alert and oriented to person, place, and time.       Significant Labs: CMP   Recent Labs   Lab 05/03/22 0312 05/04/22  1135    136   K 4.4 4.4    107   CO2 18* 20*   * 188*   BUN 37* 45*   CREATININE 1.8* 2.5*   CALCIUM 9.6 9.6   PROT  --  6.2   ALBUMIN  --  3.1*   BILITOT  --  1.4*   ALKPHOS  --  44*   AST  --  117*   ALT  --  43   ANIONGAP 12 9   ESTGFRAFRICA 39.6* 26.6*   EGFRNONAA 34.3* 23.0*   , CBC   Recent Labs   Lab 05/03/22 0312 05/04/22  1135   WBC 9.94 13.77*   HGB 10.8* 11.2*   HCT 33.5* 35.2*   * 145*   , Troponin   Recent Labs   Lab 05/04/22  1135   TROPONINI 33.585*   , and All pertinent lab results from the last 24 hours have been reviewed.    Significant Imaging: Echocardiogram: 2D echo with color flow doppler:   Results for orders placed or performed in visit on 09/10/18   2D echo with color flow doppler   Result Value Ref Range    EF + QEF 55 55 - 65    Diastolic Dysfunction No     Est. PA Systolic Pressure 13.89     Narrative    Date of  Procedure: 09/10/2018        TEST DESCRIPTION   Technical Quality: This is a technically adequate study. This study was performed in conjunction with a 3ml intravenous injection of Optison contrast agent.     Aorta: The aortic root is normal in size, measuring 3.1 cm at sinotubular junction and 3.6 cm at Sinuses of Valsalva. The proximal ascending aorta is normal in size, measuring 3.7 cm across.     Left Atrium: The left atrial volume index is normal, measuring 32.52 cc/m2.     Left Ventricle: The left ventricle is normal in size, with an end-diastolic diameter of 4.3 cm, and an end-systolic diameter of 2.9 cm. LV wall thickness is normal, with the septum measuring 0.8 cm and the posterior wall measuring 0.9 cm across. Relative   wall thickness was normal at 0.42, and the LV mass index was 57.0 g/m2 consistent with normal left ventricular mass. The following segments were akinetic: basal inferoseptum.  The following segments were hypokinetic: basal inferior wall.  Left ventricular systolic function appears normal. Visually estimated ejection fraction is 55-60%. The LV Doppler derived stroke volume equals 91.0 ccs.     Diastolic indices: E wave velocity 0.7 m/s, E/A ratio 0.8,  msec., E/e' ratio(avg) 10. Diastolic function is normal.     Right Atrium: The right atrium is upper limit of normal, measuring 5.4 cm in length and 3.7 cm in width in the apical view.     Right Ventricle: The right ventricle is normal in size measuring 2.7 cm at the base in the apical right ventricle-focused view. Global right ventricular systolic function appears normal. Tricuspid annular plane systolic excursion (TAPSE) is 3.8 cm. The   estimated PA systolic pressure is 14 mmHg.     Aortic Valve:  The aortic valve is mildly sclerotic with normal leaflet mobility.     Mitral Valve:  Mitral valve is normal in structure with normal leaflet mobility.     Tricuspid Valve:  Tricuspid valve is normal in structure with normal leaflet  mobility.     Pulmonary Valve:  The pulmonic valve is not well seen.     IVC: IVC is normal in size and collapses > 50% with a sniff, suggesting normal right atrial pressure of 3 mmHg.     Intracavitary: There is no evidence of pericardial effusion, intracavity mass, thrombi, or vegetation.         CONCLUSIONS     1 - Preserved left ventricular systolic function (EF 55-60%) with basal wall motion abnormalities.     2 - Normal right ventricular systolic function .     3 - Normal left ventricular diastolic function.             This document has been electronically    SIGNED BY: Elia Olivier MD On: 09/10/2018 12:08     Assessment and Plan:       Troponin level elevated  Mr. Kevon Perez, 81 y.o. 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 He presented with chest pain.  - This is difficult situation as he has underwent a DC-PPM which preclude him from receiving any heparin products which might result in hematoma   - His pain is possibly anginal however, with characteristics of changing with deep breath makes it more of post PPM pericardial pain. He is chest pain free since 2000 yesterday (5/3/2022).   - Being chest pain free, with no hemodynamic instability, and preserved EF on bedside echo, will plan to:    - Admitted to inpatient and work up his RAGHU on CKD    - Repeat TTE, Continue to trend Troponin and notify cardiology if it getting worse   - Mr Perez was discussed with EP and IC staff involved in his care.     VTE Risk Mitigation (From admission, onward)    None          Thank you for your consult. I will follow-up with patient. Please contact us if you have any additional questions.    Brooks Mcmillan MD  Cardiology   [facility]

## 2022-05-04 NOTE — PHARMACY MED REC
"Admission Medication History     The home medication history was taken by Adela Sanchez.    You may go to "Admission" then "Reconcile Home Medications" tabs to review and/or act upon these items.      The home medication list has been updated by the Pharmacy department.    Please read ALL comments highlighted in yellow.    Please address this information as you see fit.     Feel free to contact us if you have any questions or require assistance.      Medications listed below were obtained from: Patient/family     Current Outpatient Medications on File Prior to Encounter   Medication Sig    acetaminophen (TYLENOL) 500 MG tablet   Take 500 mg by mouth daily as needed for Pain.    ascorbic acid (VITAMIN C ORAL)   Take 1 tablet by mouth once daily.    atorvastatin (LIPITOR) 20 MG tablet   TAKE 1 TABLET(20 MG) BY MOUTH EVERY DAY    calcitRIOL (ROCALTROL) 0.25 MCG Cap   TAKE 1 CAPSULE BY MOUTH EVERY DAY    clopidogreL (PLAVIX) 75 mg tablet   TAKE 1 TABLET(75 MG) BY MOUTH EVERY DAY    doxycycline (VIBRAMYCIN) 100 MG Cap   Take 1 capsule (100 mg total) by mouth every 12 (twelve) hours for 5 days    ergocalciferol, vitamin D2, (VITAMIN D ORAL)   Take 1 capsule by mouth once daily.    finasteride (PROSCAR) 5 mg tablet   Take 1 tablet (5 mg total) by mouth once daily.    insulin aspart U-100 (NOVOLOG FLEXPEN U-100 INSULIN) 100 unit/mL (3 mL) InPn pen    Inject 8-10 units with breakfast and 15 units with lunch & dinner.    insulin degludec (TRESIBA FLEXTOUCH U-100) 100 unit/mL (3 mL) insulin pen    Inject 22 Units into the skin once daily.    irbesartan (AVAPRO) 300 MG tablet   TAKE 1 TABLET(300 MG) BY MOUTH EVERY EVENING    isosorbide dinitrate (ISORDIL) 20 MG tablet   Take 1 tablet (20 mg total) by mouth 3 (three) times daily.    loperamide (ANTI-DIARRHEAL, LOPERAMIDE,) 2 mg Tab   Take per package directions as needed for diarrhea.    nitroGLYCERIN (NITROSTAT) 0.4 MG SL tablet   TAKE 1 TABLET UNDER THE TONGUE " "EVERY 5 MINUTES AS NEEDED    propylene glycol/peg 400/PF (SYSTANE, PF, OPHT)   Place 1 drop into both eyes 2 (two) times daily as needed (Dry eye).    psyllium (METAMUCIL) powder   Take 1 packet by mouth daily as needed (Constipation).    tamsulosin (FLOMAX) 0.4 mg Cap   TAKE 1 CAPSULE(0.4 MG) BY MOUTH EVERY EVENING    ZINC ORAL   Take 1 tablet by mouth once daily.    aspirin 81 MG Chew   Chew and swallow 1 tablet (81 mg total) by mouth once daily.      BD ULTRA-FINE SHORT PEN NEEDLE 31 gauge x 5/16" Ndle   USE UP TO 6 TIMES DAILY    blood sugar diagnostic (TRUE METRIX GLUCOSE TEST STRIP) Strp   USE TO CHECK BLOOD SUGAR FOUR TIMES DAILY    blood-glucose meter kit To check BG 4 times daily, to use with insurance preferred meter      sodium bicarbonate 650 MG tablet   Take 1 tablet (650 mg total) by mouth 3 (three) times daily.    torsemide (DEMADEX) 10 MG Tab   Take 1 tablet (10 mg total) by mouth once daily.    TRUEPLUS LANCETS 30 gauge Misc   USE TO CHECK BLOOD SUGAR FOUR TIMES DAILY       Adela Sanchez CPhT  EXT [phone]                    .          "

## 2022-05-04 NOTE — HPI
Mr. Kevon Perez is 81 y.o. man CAD with hx of STEMI s/p PCI, recent LHC with multi-vessel diseases (not amenable to CABG, needs staged PCI), high degree AV block s/p DC-PPM on 5/2/20222, presenting from home with chest pain at began around 8 pm at home while at rest. Deep inspiration worsened pain. Non radiating. Alleviated with 3 NTG. Denied any fevers chill, diaphoresis, abdominal pain, nausea, emesis, bleeds, dysuria or diarrhea. States this feels similar to his MI in the past although not as severe. After his CP, he slept fine, and woke up this morning with no further pain. He called cardiology clinic and was asked to come to ED for further work up.     Patient was initially admitted 4/26 for chest pain, and was started on ACS protocol (hep, plavix, asa, Lipitor). At the time cardiology consulted, and PET stress on 4/27 positive for ischemia. Interventional cardiology consulted, s/p LHC on 4/29 showed multivessel disease, high risk for CABG as per cards, plan for OP PCI. EKG with concerns for complete heart block. EP consulted, s/p PPM placement 5/2. Tolerated procedure well. He was discharged home in stable condition on 5/3. His amlodipine and HCTZ was discontinued on admit however he still took his amlodipine this morning.     In the ED, afebrile, hypotensive SBP 90s, and he was hypoxia requiring about 5L NC. Denies any active CP, but was complaining of intermittent SOB. No pericardial effusion on his bedside echo and his EF is preserved. EKG with V paced rhythm at 122 HR. CXR with bibasilar edema and/or atelectasis/infiltrate although this appears better compared to recent admission. Labs notable for elevated trop of 33.5, wbc 13.77, plt baseline 145, Hgb baseline 11, BUN 45, Cr 2.5 (baseline around 2), BNP 1194. Cardiology consulted. 80 IV lasix given in the ED. Admitted to  for further mgmt.    Start antibiotic this is azithromycin 2 pills today then 1 pill for the following 4 days  Start prednisone this is 6 pills on day 1 then decrease dose by 1 pill each day for 6 days total  6-5-4-3-2-1   use cough syrup at night as needed  Do not mix cough syrup with alcohol or any other medications that may make you drowsy  Do not drive while taking cough medication  Call if any worsening of symptoms or no improvement  Monitor BP at home   Call if consistently >140/90

## 2022-05-04 NOTE — HPI
Mr. Kevon Perez, 81 y.o. 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 He presented to ED after he was instructed by the general cardiology MA/RN when he started to complain of chest pain that occurred yesterday. He descried the pain and tenderness in pericardial area that is worsen with deep breath. He took first nitro with no alleviation of pain, however pain improved with third nitro and the pain subsided and did not recur. He slept fine and woke up this morning with no further pain. He called cardiology clinic who asked him to come to ED for work up. On his arrival to ED till my evaluation, no recurrence of chest pain and his vital signs has been normal. No pericardial effusion on his bedside echo and his EF is preserved. Of note, he underwent LHC on 4/29 (last week) which revealed multi-vessel CAD. . CTS consulted and recommended medical versus PCI. The plan from IC is to proceed with multi-stage PCI. Patient developed hypotension. Labs revealed worsening RAGHU (sCr 2.4->2.9). Lactic 1.8. TTE revelaed drop in EF from 65-> 20%. Patient was transferred to CICU for close monitoring.

## 2022-05-04 NOTE — TELEPHONE ENCOUNTER
----- Message from Columba Villalobos RN sent at 5/4/2022  9:05 AM CDT -----  Regarding: CP  Pt had episode of CP last night, sternal area with no other symptom, similar to CPs he had been having prior to hospital admission.  Pain resolved after 3 NTG. No pain today.  He recently had a PM inserted.      Please advise,    ----- Message -----  From: Lisa Solomon MA  Sent: 5/4/2022   8:41 AM CDT  To: Columba Villalobos RN    The patient would like to talk to you about his condition last night he was having angina he had to take Nitro he is schedule to see  on 5-9-22. Please call [phone]. Thank you

## 2022-05-04 NOTE — ED TRIAGE NOTES
Pt reports taking 3 ntg lastnigh for angina w/ relieef after the third. Spoke to PCP and was told to come into ED for further evaluation. Pt denies c/p at the moment only when taking deep breath. Pt reports pacemaker placement on Monday. Denies n/v, abdominal pain.      LOC: The patient is awake, alert and aware of environment with an appropriate affect, the patient is oriented x 3 and speaking appropriately.  APPEARANCE: Patient resting comfortably and in no acute distress, patient is clean and well groomed, patient's clothing is properly fastened.  SKIN: The skin is warm and dry, color consistent with ethnicity, patient has normal skin turgor and moist mucus membranes, skin intact, no breakdown or bruising noted.  MUSCULOSKELETAL: Patient moving all extremities spontaneously, no obvious swelling or deformities noted.  RESPIRATORY: Airway is open and patent, respirations are spontaneous, patient has a normal effort and rate, no accessory muscle use noted,   CARDIAC: Patient has a normal rate and regular rhythm, no periphreal edema noted, capillary refill < 3 seconds.  ABDOMEN: Soft and non tender to palpation, no distention noted, normoactive bowel sounds present in all four quadrants.  NEUROLOGIC:  facial expression is symmetrical, patient moving all extremities spontaneously, normal sensation in all extremities when touched with a finger.  Follows all commands appropriately.

## 2022-05-04 NOTE — ED PROVIDER NOTES
Encounter Date: 5/4/2022       History     Chief Complaint   Patient presents with    Chest Pain     Took 3 ntg, had pacemaker placed on monday     81 y.o. 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 The chest pain lasted about 15-20 minutes, and required 3 NTG pills before improvement. This morning upon awakening, there was no longer any chest pain. He endorses chronic shortness of breath, but nothing worse than normal. He denies any nausea, vomiting, dizziness, or lightheadedness. Of note, he was recently diagnosed with AV block, and has been seeing Dr. Avendaño, now s/p pacemaker placed on 05/02. His primary Cardiologist is Dr. Teixeira. Of note, 04/29 [facility] w/o PCI d/t kidney function, which demonstrated multivessel disease. He is compliant with his Plavix and Aspirin. Given his concerns for unstable angina, he was sent to the ER for further evaluation.        Review of patient's allergies indicates:   Allergen Reactions    Penicillins Other (See Comments)     Muscle stiffness    Bactrim [sulfamethoxazole-trimethoprim] Rash     Past Medical History:   Diagnosis Date    Acute coronary syndrome 9/10/09    STEMI    Anticoagulant long-term use     plavix    Basal cell cancer     BCC (basal cell carcinoma of skin)     nose    Cancer of bladder January 2013    Cataract     Chronic kidney disease     Colon polyp     Coronary artery disease     CPAP (continuous positive airway pressure) dependence     Diabetes mellitus     Diabetic retinopathy     Encounter for blood transfusion     GERD (gastroesophageal reflux disease)     High cholesterol     Hyperlipidemia     Hypertension     Iron deficiency anemia 5/11/2018    GINGER (obstructive sleep apnea)     CPAP     Renal manifestation of secondary diabetes mellitus     SOB (shortness  of breath)      Past Surgical History:   Procedure Laterality Date    A-V CARDIAC PACEMAKER INSERTION N/A 5/2/2022    Procedure: INSERTION, CARDIAC PACEMAKER, DUAL CHAMBER;  Surgeon: Paulie Avendaño MD;  Location: [facility] EP LAB;  Service: Cardiology;  Laterality: N/A;  CHB, DUAL PPM, MDT, ANES, GP,     BASAL CELL CARCINOMA EXCISION      nose     BLADDER SURGERY      bladder cancer    CARDIAC CATHETERIZATION      CATARACT EXTRACTION      bilateral     COLONOSCOPY  3/26/15    COLONOSCOPY N/A 12/4/2020    Procedure: COLONOSCOPY;  Surgeon: Keshav Rajan MD;  Location: [facility] ENDO (2ND FLR);  Service: Endoscopy;  Laterality: N/A;  covid test 12/1-pcw-tb  pt SOB x 1 year-ok to hold Plavix x 5 days per Dr. Sol-see telephone encounter dated 8/25  10/14-instructions emailed to [email or web address]    CORONARY ANGIOGRAPHY Left 4/29/2022    Procedure: ANGIOGRAM, CORONARY ARTERY;  Surgeon: Joesph Cast MD;  Location: [facility] CATH LAB;  Service: Cardiology;  Laterality: Left;    CORONARY ANGIOPLASTY  9/10/09    CFX    CORONARY ANGIOPLASTY WITH STENT PLACEMENT      CYSTOSCOPY      ESOPHAGOGASTRODUODENOSCOPY N/A 1/27/2021    Procedure: EGD (ESOPHAGOGASTRODUODENOSCOPY);  Surgeon: Matt Acosta MD;  Location: [facility];  Service: Endoscopy;  Laterality: N/A;    EYE SURGERY      hydrocel       Family History   Problem Relation Age of Onset    Hypertension Father     Heart disease Father         CHF    Diabetes Father     Diabetes Sister     Cataracts Mother     Goiter Mother     Heart disease Mother         CHF    Diabetes Paternal Uncle     Alcohol abuse Brother     No Known Problems Daughter     No Known Problems Son     No Known Problems Son     Cancer Maternal Aunt         colon    Kidney disease Neg Hx     Amblyopia Neg Hx     Blindness Neg Hx     Glaucoma Neg Hx     Macular degeneration Neg Hx     Retinal detachment Neg Hx     Strabismus Neg Hx     Stroke Neg Hx      Thyroid disease Neg Hx      Social History     Tobacco Use    Smoking status: Former Smoker     Packs/day: 1.00     Years: 40.00     Pack years: 40.00     Types: Cigarettes     Quit date: 1970     Years since quittin.8    Smokeless tobacco: Former User   Substance Use Topics    Alcohol use: Yes     Alcohol/week: 3.0 standard drinks     Types: 1 Cans of beer, 1 Shots of liquor, 1 Standard drinks or equivalent per week    Drug use: No     Review of Systems   Constitutional: Negative for chills, diaphoresis and fever.   HENT: Negative for rhinorrhea, sneezing, sore throat and trouble swallowing.    Respiratory: Negative for cough and shortness of breath.    Cardiovascular: Negative for chest pain and palpitations.   Gastrointestinal: Negative for constipation, diarrhea, nausea and vomiting.   Genitourinary: Negative for dysuria, frequency and urgency.   Musculoskeletal: Negative for arthralgias, back pain and myalgias.   Skin: Negative for rash and wound.   Neurological: Negative for weakness and headaches.   Hematological: Does not bruise/bleed easily.   Psychiatric/Behavioral: Negative for agitation and confusion.       Physical Exam     Initial Vitals [22 1020]   BP Pulse Resp Temp SpO2   (!) 99/52 108 18 98.9 °F (37.2 °C) 95 %      MAP       --         Physical Exam    Constitutional: He appears well-developed and well-nourished. He is not diaphoretic. No distress.   HENT:   Head: Normocephalic and atraumatic.   Eyes: EOM are normal.   Neck: No JVD present.   Normal range of motion.  Cardiovascular: Normal rate, regular rhythm, normal heart sounds and intact distal pulses.   Pulmonary/Chest: Breath sounds normal. No respiratory distress. He has no rales. He exhibits tenderness (at incision site).   Abdominal: Abdomen is soft. Bowel sounds are normal. There is no abdominal tenderness.   Musculoskeletal:         General: No tenderness or edema. Normal range of motion.      Cervical back: Normal  range of motion.     Neurological: He is alert and oriented to person, place, and time.   Skin: Skin is warm and dry. Capillary refill takes less than 2 seconds. No erythema.   Psychiatric: He has a normal mood and affect. Thought content normal.         ED Course   Procedures  Labs Reviewed   CBC W/ AUTO DIFFERENTIAL - Abnormal; Notable for the following components:       Result Value    WBC 13.77 (*)     RBC 3.91 (*)     Hemoglobin 11.2 (*)     Hematocrit 35.2 (*)     MCHC 31.8 (*)     Platelets 145 (*)     Gran # (ANC) 11.1 (*)     Immature Grans (Abs) 0.07 (*)     Lymph # 0.9 (*)     Mono # 1.6 (*)     Gran % 80.8 (*)     Lymph % 6.8 (*)     All other components within normal limits   COMPREHENSIVE METABOLIC PANEL   TROPONIN I   TROPONIN I   B-TYPE NATRIURETIC PEPTIDE   MAGNESIUM          Imaging Results    None          Medications   aspirin tablet 325 mg (has no administration in time range)     Medical Decision Making:   Initial Assessment:   80 y/o M PMHx CAD, h/o remote STEMI s/p PCI, 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 Patient HD stable on encounter without complaints of chest pain and negative associated ROS.  Differential Diagnosis:   DDx: chest wall tenderness/tightness 2/2 recent incision for pacemaker vs ACS vs GERD vs pericarditis vs pleuritis  ED Management:  However, given underlying history and current multivessel disease, must strongly consider ACS as a source of chest pain.Although consideration for chest wall tenderness 2/2 recent instrumentation vs iatrogenic troponin leak under consideration as well. Patient continues to be without chest pain and HDS. Suspect trop leak is from instrumentation less likely ACS. Nonetheless  given. Heparin held s/o recent pacemaker placement. Cardiology consulted, evaluated, and agreed, recommending admit to  for management of RAGHU and trop trend.      given  CBC with leukocytosis, likely reactionary 2/2 recent procedure for pacemaker , CMP remarkable for RAGHU sCr 2.8 over mid 1s baseline from previous measurement. Etiology unclear at this time, though recent contrast exposure.  Troponin elevated 33.58 from previous measurement 0.22. Patient continues to be without chest pain.   CXR demonstrating lower lobe edema.  BNP 1100+, though appears to be euvolemic.  Mg 1.8  EKG largely stable from previous, though paced.                      Clinical Impression:   Final diagnoses:  [R07.9] Chest pain                 David-Cj Rivero MD  Resident  05/04/22 [id number]

## 2022-05-04 NOTE — ASSESSMENT & PLAN NOTE
- baseline around 2, on admit 2.5  - BP soft when he came in, and has been taking discontinued meds at home this AM, amlodipine  - ddx prerenal, CRS, med induced etc  - need vigorous education regarding meds piror to dc  - hold BP meds and other nephrotoxic meds for now  - strict in / outs

## 2022-05-04 NOTE — H&P
H[facility] - Emergency Dept  [facility] Medicine  History & Physical    Patient Name: Kevon Perez  MRN: [id number]  Patient Class: OP- Observation  Admission Date: 5/4/2022  Attending Physician: Raheem Mahajan MD   Primary Care Provider: Cipriano Sol MD      Patient information was obtained from patient, past medical records and ER records.     Subjective:     Principal Problem:Chest pain    Chief Complaint:   Chief Complaint   Patient presents with    Chest Pain     Took 3 ntg, had pacemaker placed on monday        HPI: Mr. Kevon Perez is 81 y.o. man CAD with hx of STEMI s/p PCI, recent LHC with multi-vessel diseases (not amenable to CABG, needs staged PCI), high degree AV block s/p DC-PPM on 5/2/20222, presenting from home with chest pain at began around 8 pm at home while at rest. Deep inspiration worsened pain. Non radiating. Alleviated with 3 NTG. Denied any fevers chill, diaphoresis, abdominal pain, nausea, emesis, bleeds, dysuria or diarrhea. States this feels similar to his MI in the past although not as severe. After his CP, he slept fine, and woke up this morning with no further pain. He called cardiology clinic and was asked to come to ED for further work up.     Patient was initially admitted 4/26 for chest pain, and was started on ACS protocol (hep, plavix, asa, Lipitor). At the time cardiology consulted, and PET stress on 4/27 positive for ischemia. Interventional cardiology consulted, s/p LHC on 4/29 showed multivessel disease, high risk for CABG as per cards, plan for OP PCI. EKG with concerns for complete heart block. EP consulted, s/p PPM placement 5/2. Tolerated procedure well. He was discharged home in stable condition on 5/3. His amlodipine and HCTZ was discontinued on admit however he still took his amlodipine this morning.     In the ED, afebrile, hypotensive SBP 90s, and he was hypoxia requiring about 5L NC. Denies any active CP, but was complaining of intermittent SOB. No  pericardial effusion on his bedside echo and his EF is preserved. EKG with V paced rhythm at 122 HR. CXR with bibasilar edema and/or atelectasis/infiltrate although this appears better compared to recent admission. Labs notable for elevated trop of 33.5, wbc 13.77, plt baseline 145, Hgb baseline 11, BUN 45, Cr 2.5 (baseline around 2), BNP 1194. Cardiology consulted. 80 IV lasix given in the ED. Admitted to  for further mgmt.       Past Medical History:   Diagnosis Date    Acute coronary syndrome 9/10/09    STEMI    Anticoagulant long-term use     plavix    Basal cell cancer     BCC (basal cell carcinoma of skin)     nose    Cancer of bladder January 2013    Cataract     Chronic kidney disease     Colon polyp     Coronary artery disease     CPAP (continuous positive airway pressure) dependence     Diabetes mellitus     Diabetic retinopathy     Encounter for blood transfusion     GERD (gastroesophageal reflux disease)     High cholesterol     Hyperlipidemia     Hypertension     Iron deficiency anemia 5/11/2018    GINGER (obstructive sleep apnea)     CPAP     Renal manifestation of secondary diabetes mellitus     SOB (shortness of breath)        Past Surgical History:   Procedure Laterality Date    A-V CARDIAC PACEMAKER INSERTION N/A 5/2/2022    Procedure: INSERTION, CARDIAC PACEMAKER, DUAL CHAMBER;  Surgeon: Paulie Avendaño MD;  Location: [facility] EP LAB;  Service: Cardiology;  Laterality: N/A;  CHB, DUAL PPM, MDT, ANES, GP,     BASAL CELL CARCINOMA EXCISION      nose     BLADDER SURGERY      bladder cancer    CARDIAC CATHETERIZATION      CATARACT EXTRACTION      bilateral     COLONOSCOPY  3/26/15    COLONOSCOPY N/A 12/4/2020    Procedure: COLONOSCOPY;  Surgeon: Keshav Rajan MD;  Location: [facility] ENDO ([address] [place] [address]);  Service: Endoscopy;  Laterality: N/A;  covid test 12/1-pcw-tb  pt SOB x 1 year-ok to hold Plavix x 5 days per Dr. Sol-see telephone encounter dated  8/25  10/14-instructions emailed to [email or web address]    CORONARY ANGIOGRAPHY Left 4/29/2022    Procedure: ANGIOGRAM, CORONARY ARTERY;  Surgeon: Joesph Cast MD;  Location: [facility] CATH LAB;  Service: Cardiology;  Laterality: Left;    CORONARY ANGIOPLASTY  9/10/09    CFX    CORONARY ANGIOPLASTY WITH STENT PLACEMENT      CYSTOSCOPY      ESOPHAGOGASTRODUODENOSCOPY N/A 1/27/2021    Procedure: EGD (ESOPHAGOGASTRODUODENOSCOPY);  Surgeon: Matt Acosta MD;  Location: [facility] ENDO;  Service: Endoscopy;  Laterality: N/A;    EYE SURGERY      hydrocel         Review of patient's allergies indicates:   Allergen Reactions    Penicillins Other (See Comments)     Muscle stiffness    Bactrim [sulfamethoxazole-trimethoprim] Rash       Current Facility-Administered Medications on File Prior to Encounter   Medication    [DISCONTINUED] acetaminophen tablet 1,000 mg    [DISCONTINUED] acetaminophen tablet 650 mg    [DISCONTINUED] amLODIPine tablet 5 mg    [DISCONTINUED] artificial tears 0.5 % ophthalmic solution 1 drop    [DISCONTINUED] aspirin chewable tablet 81 mg    [DISCONTINUED] atorvastatin tablet 40 mg    [DISCONTINUED] BUPivacaine (PF) 0.25% (2.5 mg/ml) injection    [DISCONTINUED] calcitRIOL capsule 0.25 mcg    [DISCONTINUED] clopidogreL tablet 75 mg    [DISCONTINUED] dextrose 10% bolus 125 mL    [DISCONTINUED] dextrose 10% bolus 250 mL    [DISCONTINUED] doxycycline tablet 100 mg    [DISCONTINUED] finasteride tablet 5 mg    [DISCONTINUED] glucagon (human recombinant) injection 1 mg    [DISCONTINUED] glucose chewable tablet 16 g    [DISCONTINUED] glucose chewable tablet 24 g    [DISCONTINUED] HYDROmorphone injection 0.2 mg    [DISCONTINUED] insulin aspart U-100 pen 0-5 Units    [DISCONTINUED] insulin aspart U-100 pen 5 Units    [DISCONTINUED] insulin detemir U-100 pen 10 Units    [DISCONTINUED] isosorbide dinitrate tablet 20 mg    [DISCONTINUED] LIDOcaine HCL 20 mg/ml (2%) injection     "[DISCONTINUED] melatonin tablet 6 mg    [DISCONTINUED] morphine injection 2 mg    [DISCONTINUED] nitroGLYCERIN SL tablet 0.4 mg    [DISCONTINUED] ondansetron disintegrating tablet 8 mg    [DISCONTINUED] oxyCODONE immediate release tablet 5 mg    [DISCONTINUED] polyethylene glycol packet 17 g    [DISCONTINUED] prochlorperazine injection Soln 5 mg    [DISCONTINUED] sodium bicarbonate tablet 650 mg    [DISCONTINUED] sodium chloride 0.9% flush 10 mL    [DISCONTINUED] sodium chloride 0.9% flush 10 mL    [DISCONTINUED] sodium chloride 0.9% flush 5 mL    [DISCONTINUED] sodium chloride 0.9% irrigation    [DISCONTINUED] tamsulosin 24 hr capsule 0.4 mg    [DISCONTINUED] vancomycin injection     Current Outpatient Medications on File Prior to Encounter   Medication Sig    calcitRIOL (ROCALTROL) 0.25 MCG Cap TAKE 1 CAPSULE BY MOUTH EVERY DAY    clopidogreL (PLAVIX) 75 mg tablet TAKE 1 TABLET(75 MG) BY MOUTH EVERY DAY    doxycycline (VIBRAMYCIN) 100 MG Cap Take 1 capsule (100 mg total) by mouth every 12 (twelve) hours for 5 days    finasteride (PROSCAR) 5 mg tablet Take 1 tablet (5 mg total) by mouth once daily.    nitroGLYCERIN (NITROSTAT) 0.4 MG SL tablet TAKE 1 TABLET UNDER THE TONGUE EVERY 5 MINUTES AS NEEDED    aspirin 81 MG Chew Chew and swallow 1 tablet (81 mg total) by mouth once daily.    atorvastatin (LIPITOR) 20 MG tablet TAKE 1 TABLET(20 MG) BY MOUTH EVERY DAY    BD ULTRA-FINE SHORT PEN NEEDLE 31 gauge x 5/16" Ndle USE UP TO 6 TIMES DAILY    blood sugar diagnostic (TRUE METRIX GLUCOSE TEST STRIP) Strp USE TO CHECK BLOOD SUGAR FOUR TIMES DAILY    blood-glucose meter kit To check BG 4 times daily, to use with insurance preferred meter    insulin aspart U-100 (NOVOLOG FLEXPEN U-100 INSULIN) 100 unit/mL (3 mL) InPn pen Inject 5 Units into the skin 3 (three) times daily with meals.    insulin degludec (TRESIBA FLEXTOUCH U-100) 100 unit/mL (3 mL) insulin pen Inject 10 Units into the skin once " daily.    irbesartan (AVAPRO) 300 MG tablet TAKE 1 TABLET(300 MG) BY MOUTH EVERY EVENING    isosorbide dinitrate (ISORDIL) 20 MG tablet Take 1 tablet (20 mg total) by mouth 3 (three) times daily.    psyllium (METAMUCIL) powder Take 1 packet by mouth.    sodium bicarbonate 650 MG tablet Take 1 tablet (650 mg total) by mouth 3 (three) times daily.    tamsulosin (FLOMAX) 0.4 mg Cap TAKE 1 CAPSULE(0.4 MG) BY MOUTH EVERY EVENING    torsemide (DEMADEX) 10 MG Tab Take 1 tablet (10 mg total) by mouth once daily.    TRUEPLUS LANCETS 30 gauge Misc USE TO CHECK BLOOD SUGAR FOUR TIMES DAILY    [DISCONTINUED] amLODIPine (NORVASC) 5 MG tablet TAKE 1 TABLET(5 MG) BY MOUTH EVERY DAY    [DISCONTINUED] hydroCHLOROthiazide (HYDRODIURIL) 12.5 MG Tab TAKE 1 TABLET(12.5 MG) BY MOUTH EVERY DAY     Family History       Problem Relation (Age of Onset)    Alcohol abuse Brother    Cancer Maternal Aunt    Cataracts Mother    Diabetes Father, Sister, Paternal Uncle    Goiter Mother    Heart disease Father, Mother    Hypertension Father    No Known Problems Daughter, Son, Son          Tobacco Use    Smoking status: Former Smoker     Packs/day: 1.00     Years: 40.00     Pack years: 40.00     Types: Cigarettes     Quit date: 1970     Years since quittin.8    Smokeless tobacco: Former User   Substance and Sexual Activity    Alcohol use: Yes     Alcohol/week: 3.0 standard drinks     Types: 1 Cans of beer, 1 Shots of liquor, 1 Standard drinks or equivalent per week    Drug use: No    Sexual activity: Not Currently     Review of Systems   Constitutional:  Positive for activity change, weakness Negative for chills and fever.   HENT:  Negative for congestion.    Eyes:  Negative for visual disturbance.   Respiratory:  Positive for SOB and cough   Cardiovascular:  Positive for chest pain.  Gastrointestinal:  Negative for abdominal distention, abdominal pain, nausea and vomiting.   Genitourinary:  Negative for dysuria.    Musculoskeletal:  Negative for back pain.   Skin:  Negative for rash and wound.   Neurological:  Negative for dizziness and weakness.   Psychiatric/Behavioral:  Negative for confusion.     Objective:     Vital Signs (Most Recent):  Temp: 98.9 °F (37.2 °C) (05/04/22 1020)  Pulse: 108 (05/04/22 1503)  Resp: 18 (05/04/22 1020)  BP: (!) 106/58 (05/04/22 1503)  SpO2: 95 % (05/04/22 1503)   Vital Signs (24h Range):  Temp:  [98.9 °F (37.2 °C)] 98.9 °F (37.2 °C)  Pulse:  [103-110] 108  Resp:  [18] 18  SpO2:  [95 %] 95 %  BP: ()/(52-61) 106/58     Weight: 99 kg (218 lb 4.1 oz)  Body mass index is 31.32 kg/m².    Physical Exam  Vitals reviewed.   Constitutional:       Appearance: Normal appearance. He is well-developed. In no acute distress. He is obese.   HENT:      Head: Normocephalic and atraumatic. No JVD  Eyes:      General: No scleral icterus.     Pupils: Pupils are equal, round, and reactive to light.   Cardiovascular:      Rate and Rhythm: Normal rate and regular rhythm.      Heart sounds: No murmur heard.      Surgical site clean and dry  Pulmonary:      Effort: Pulmonary effort is normal. No respiratory distress.      Breath sounds: Diminished BS at bilateral bases with scattered rales   Abdominal:      General: Bowel sounds are normal. There is no distension.      Palpations: Abdomen is soft.      Tenderness: There is no abdominal tenderness.   Musculoskeletal:         General: Normal range of motion.      Cervical back: Normal range of motion and neck supple.   Skin:     General: Skin is warm and dry.   Neurological:      General: No focal deficit present.      Mental Status: He is alert and oriented to person, place, and time. Mental status is at baseline.   Psychiatric:         Behavior: Behavior normal.         CRANIAL NERVES      CN III, IV, VI   Pupils are equal, round, and reactive to light.        Significant Labs: All pertinent labs within the past 24 hours have been reviewed.    Significant  Imaging: I have reviewed all pertinent imaging results/findings within the past 24 hours.    Assessment/Plan:     * Chest pain  - patient presenting with CP post discharge 5/3 after PPM placement  - pain at rest, inspiration worsens pain, non radiating, relieved by NTG, states its similar to his prior MI  - cardiology consulted, difficult case with recent procedure and heart instrumentation  - elevated trop of 33 with elevated BNP ~1100  - ddx MI, CHF exacerbation, PE , trop leak from recent instrumentation etc   - cardiology consulted, rec ACS protocol, given asa 325 and started heparin infusion  - cardiology rec lasix 80 IV once, and reassess for further needs  - R/o PE - V/Q scan ordered, LE US ordered  - Holding BP home meds for now with low normal BP, may have contributed to hypotension  - continue plavix and Lipitor   - repeat TTE  - recent PET stress on 4/27 positive for ischemia and s/p LHC on 4/29 showed multivessel disease, high risk for CABG as per cards, plan for OP PCI  - Prn Nitro for chest pain  - tele      Acute respiratory failure with hypoxia  - not on oxygen at home  - requiring 5L NC to maintain oxygen in the ED  - CXR with bibasilar edema vs atelectasis  - DDx- PE , CHF, MI  - rule out PE with V/Q and get LE US, note patient has CKD  - IV lasix given once in the ED, 80 mg, reassess for further needs  - see chest pain  - wean off oxygen as able  - IS and FV  - Duonebs scheduled for now while awake    Acute renal failure superimposed on stage 4 chronic kidney disease  - baseline around 2, on admit 2.5  - BP soft when he came in, and has been taking discontinued meds at home this AM, amlodipine  - ddx prerenal, CRS, med induced etc  - need vigorous education regarding meds piror to dc  - hold BP meds and other nephrotoxic meds for now  - strict in / outs     Troponin level elevated  - see chest pain      Complete heart block  - s/p PPM 5/2      CKD stage 4 due to type 2 diabetes mellitus  - see RAGHU  on CKD      Thrombocytopenia  - chronic, stable      Hypertension associated with diabetes  - hold home BP meds for now  - resume as able      GINGER on CPAP  - CPAP QHS      Benign prostatic hyperplasia  - resume home meds      Type 2 diabetes mellitus with neurologic complication  - LDSSI  - basal bolus insulin, titrate as needed  - hypoglycemia protocol , ACHS accuchecks       Hyperlipidemia associated with type 2 diabetes mellitus  - resume home Lipitor       Coronary artery disease involving native coronary artery of native heart with unstable angina pectoris  - see chest pain      VTE Risk Mitigation (From admission, onward)         Ordered     heparin 25,000 units in dextrose 5% (100 units/ml) IV bolus from bag - ADDITIONAL PRN BOLUS - 60 units/kg (max bolus 4000 units)  As needed (PRN)        Question:  Heparin Infusion Adjustment (DO NOT MODIFY ANSWER)  Answer:  \\[email or web address]\epic\Images\Pharmacy\HeparinInfusions\heparin LOW INTENSITY nomogram for [facility] [id number]    05/04/22 1512     heparin 25,000 units in dextrose 5% (100 units/ml) IV bolus from bag - ADDITIONAL PRN BOLUS - 30 units/kg (max bolus 4000 units)  As needed (PRN)        Question:  Heparin Infusion Adjustment (DO NOT MODIFY ANSWER)  Answer:  \\[email or web address]\epic\Images\Pharmacy\HeparinInfusions\heparin LOW INTENSITY nomogram for [facility] [id number]    05/04/22 1512     heparin 25,000 units in dextrose 5% (100 units/ml) IV bolus from bag INITIAL BOLUS (max bolus 4000 units)  Once        Question:  Heparin Infusion Adjustment (DO NOT MODIFY ANSWER)  Answer:  \\[email or web address]\epic\Images\Pharmacy\HeparinInfusions\heparin LOW INTENSITY nomogram for [facility] [id number]    05/04/22 1512     heparin 25,000 units in dextrose 5% 250 mL (100 units/mL) infusion LOW INTENSITY nomogram - [facility]  Continuous        Question Answer Comment   Heparin Infusion Adjustment (DO NOT MODIFY ANSWER) \\[email or web address]\epic\Images\Pharmacy\HeparinInfusions\heparin LOW INTENSITY nomogram for [facility]  [id number]    Begin at (in units/kg/hr) 12        05/04/22 1512     Place sequential compression device  Until discontinued         05/04/22 1418     Place sequential compression device  Until discontinued         05/04/22 1418                   Raheem Mahajan MD  Department of Hospital Medicine   [facility] - Emergency Dept

## 2022-05-04 NOTE — ED NOTES
Pt care assumed. Pt appears to be resting comfortably in bed, no current chest pain reported. Bed low, side raisl up x2, call light within reach, wife at bedside. Pt updated on plan of care.

## 2022-05-04 NOTE — ASSESSMENT & PLAN NOTE
- not on oxygen at home  - requiring 5L NC to maintain oxygen in the ED  - CXR with bibasilar edema vs atelectasis  - DDx- PE , CHF, MI  - rule out PE with V/Q and get LE US, note patient has CKD  - IV lasix given once in the ED, 80 mg, reassess for further needs  - see chest pain  - wean off oxygen as able  - IS and FV  - Duonebs scheduled for now while awake

## 2022-05-04 NOTE — TELEPHONE ENCOUNTER
Pt was told to go to ER in view of recent hospitalization for ischemia and verbalized understanding.

## 2022-05-04 NOTE — ASSESSMENT & PLAN NOTE
- patient presenting with CP post discharge 5/3 after PPM placement  - pain at rest, inspiration worsens pain, non radiating, relieved by NTG, states its similar to his prior MI  - cardiology consulted, difficult case with recent procedure and heart instrumentation  - elevated trop of 33 with elevated BNP ~1100  - ddx MI, CHF exacerbation, PE , trop leak from recent instrumentation etc   - cardiology consulted, rec ACS protocol, given asa 325 and started heparin infusion  - cardiology rec lasix 80 IV once, and reassess for further needs  - R/o PE - V/Q scan ordered, LE US ordered  - Holding BP home meds for now with low normal BP, may have contributed to hypotension  - continue plavix and Lipitor   - repeat TTE  - recent PET stress on 4/27 positive for ischemia and s/p LHC on 4/29 showed multivessel disease, high risk for CABG as per cards, plan for OP PCI  - Prn Nitro for chest pain  - tele

## 2022-05-04 NOTE — ASSESSMENT & PLAN NOTE
Mr. Kevon Perez, 81 y.o. 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 He presented with chest pain.  - This is difficult situation as he has underwent a DC-PPM which preclude him from receiving any heparin products  - His pain is possible anginal however, with characteristics of changing with deep breath makes it more of post PPM pericardial pain  - He is chest pain free now, with no hemodynamic instability, and preserved EF on bedside echo.  - Plan to be admitted to inpatient and work up his RAGHU on CKD  - Repeat TTE, Continue to trend Troponin and notify cardiology if it getting worse

## 2022-05-05 PROBLEM — R57.0 CARDIOGENIC SHOCK: Status: ACTIVE | Noted: 2022-01-01

## 2022-05-05 NOTE — PROGRESS NOTES
[facility] Step[facility]  Cardiology  Progress Note    Patient Name: Kevon Perez  MRN: [id number]  Admission Date: 5/4/2022  Hospital Length of Stay: 1 days  Code Status: Full Code   Attending Physician: Shira Stratton MD   Primary Care Physician: Cipriano Sol MD  Expected Discharge Date: 5/6/2022  Principal Problem:Cardiogenic shock    Subjective:       Interval History: Patient seen and examined.   He has been having worsening dyspnea, coughing up minimal blood tinged sputum. He also had decreased urine output so Diuril 250 mg IV and  Lasix 120 mg IV  were administered. Urine output remains poor. Increasing leukocytosis, BUN/Cr, hyperkalemia, troponin.    Review of Systems   Constitutional: Negative for chills and decreased appetite.   HENT:  Negative for congestion and sore throat.    Eyes: Negative.    Cardiovascular:  Positive for dyspnea on exertion. Negative for chest pain and leg swelling.   Respiratory:  Positive for cough and shortness of breath.    Endocrine: Negative.    Skin: Negative.  Negative for rash.   Musculoskeletal: Negative.    Gastrointestinal:  Negative for abdominal pain, constipation and diarrhea.   Genitourinary:  Negative for dysuria and hematuria.   Neurological:  Negative for dizziness and headaches.   Psychiatric/Behavioral: Negative.     Objective:     Vital Signs (Most Recent):  Temp: 98.2 °F (36.8 °C) (05/05/22 1210)  Pulse: 105 (05/05/22 1210)  Resp: 18 (05/05/22 1210)  BP: (!) 94/57 (05/05/22 1210)  SpO2: 96 % (05/05/22 1210)   Vital Signs (24h Range):  Temp:  [97.7 °F (36.5 °C)-98.9 °F (37.2 °C)] 98.2 °F (36.8 °C)  Pulse:  [100-124] 105  Resp:  [15-21] 18  SpO2:  [89 %-99 %] 96 %  BP: ()/(51-61) 94/57     Weight: 103.4 kg (228 lb)  Body mass index is 32.71 kg/m².     SpO2: 96 %  O2 Device (Oxygen Therapy): nasal cannula      Intake/Output Summary (Last 24 hours) at 5/5/2022 1258  Last data filed at 5/5/2022 1000  Gross per 24 hour   Intake 240 ml   Output  980 ml   Net -740 ml       Lines/Drains/Airways       Peripheral Intravenous Line  Duration                  Peripheral IV - Single Lumen 22 1135 20 G Right Forearm 1 day                    Physical Exam  Vitals reviewed.   Constitutional:       General: He is not in acute distress.     Appearance: He is obese. He is not toxic-appearing.   HENT:      Head: Normocephalic and atraumatic.   Eyes:      General: No scleral icterus.        Right eye: No discharge.         Left eye: No discharge.   Neck:      Thyroid: No thyromegaly.      Vascular: No JVD.   Cardiovascular:      Rate and Rhythm: Regular rhythm. Tachycardia present.   Pulmonary:      Effort: Pulmonary effort is normal. No respiratory distress.   Abdominal:      General: Abdomen is flat. Bowel sounds are normal. There is no distension.      Palpations: Abdomen is soft.   Musculoskeletal:      Right lower le+ Edema present.      Left lower le+ Edema present.   Skin:     General: Skin is warm and dry.          Neurological:      Mental Status: He is alert and oriented to person, place, and time.       Significant Labs: All pertinent labs within the past 24 hours have been reviewed.  CBC:   Recent Labs   Lab 22  0450   WBC 13.77* 14.01*  14.13*   HGB 11.2* 11.7*  11.9*   HCT 35.2* 37.2*  37.0*   * 151  157     CMP:   Recent Labs   Lab 22  0450    136  137   K 4.4 5.7*  6.0*    103  104   CO2 20* 23  23   * 174*  175*   BUN 45* 51*  52*   CREATININE 2.5* 2.9*  2.9*   CALCIUM 9.6 9.7  9.8   PROT 6.2 6.3  6.3   ALBUMIN 3.1* 2.8*  2.8*   BILITOT 1.4* 1.1*  1.2*   ALKPHOS 44* 46*  44*   * 91*  92*   ALT 43 36  37   ANIONGAP 9 10  10   EGFRNONAA 23.0* 19.3*  19.3*     Magnesium:   Recent Labs   Lab 22  11322  0450   MG 1.8 1.9  1.9     Phosphorous:  Recent Labs   Lab 22  0450   PHOS 3.7  3.8     POCT Glucose:   Recent Labs   Lab  05/04/22  1920 05/05/22  0734   POCTGLUCOSE 186* 179*       Troponin:   Recent Labs   Lab 05/04/22  1135 05/04/22  1426 05/05/22  0450   TROPONINI 33.585* 27.388* 35.947*     Recent Labs     05/04/22  1135   BNP 1,194*         Significant Imaging: Reviewed    Inpatient Medications:  Continuous Infusions:   furosemide (LASIX) 10 mg/mL infusion (non-titrating)      heparin (porcine) in D5W 15 Units/kg/hr (05/05/22 0612)     Scheduled Meds:   albuterol-ipratropium  3 mL Nebulization Q6H WAKE    artificial tears  1 drop Both Eyes TID    aspirin  81 mg Oral Daily    atorvastatin  80 mg Oral Daily    calcitRIOL  0.25 mcg Oral Daily    clopidogreL  75 mg Oral Daily    doxycycline  100 mg Oral Q12H    finasteride  5 mg Oral Daily    furosemide (LASIX) injection  80 mg Intravenous Once    insulin detemir U-100  5 Units Subcutaneous Daily    sodium bicarbonate  650 mg Oral TID    tamsulosin  0.4 mg Oral Daily     PRN Meds:acetaminophen, dextrose 10%, dextrose 10%, glucagon (human recombinant), glucose, glucose, heparin (PORCINE), heparin (PORCINE), insulin aspart U-100, melatonin, nitroGLYCERIN, polyethylene glycol, sodium chloride 0.9%, sodium chloride 0.9%, sodium chloride 0.9%, sodium chloride 0.9%      Assessment and Plan:     * Cardiogenic shock     Troponin level elevated  Mr. Kevon Perez, 81 y.o. 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 He presented with chest pain, SOB, cough.  -Initial workup was notable for BNP 1194, uptrending troponin, worsening BUN/Cr. CXR showed Bibasilar edema.   -Given his tachycardia and dyspnea, there were initial concerns for PE, however it is unlikely as LE U/S, V/Q scan non-concerning, and absence of right heart strain on TTE.     Recent Labs     05/04/22  1135 05/04/22  1426 05/05/22  0450   TROPONINI 33.585* 27.388* 35.947*       Intake/Output Summary (Last 24 hours) at  5/5/2022 0955  Last data filed at 5/5/2022 0646  Gross per 24 hour   Intake 240 ml   Output 780 ml   Net -540 ml       Net IO Since Admission: -540 mL [05/05/22 0955]    -Given his TTE showing significant worsening of Ejection Fraction to less than 20%, signs of end-organ damage(acute renal failure, troponinemia, transaminitis), there are concerns for cardiogenic shock.    RECOMMENDATIONS  - Interventional Cardiology consulted, continue ACS protocol with heparin, aspirin, clopidogrel  - Hold AV sue blocking agents as concerns for cardiogenic shock  - Hemodynamics measurement   - Trend Troponin   - CCU team consulted, Transfer to CCU service     Thanks for this consult. Cardiology consult service will sign off as patient will be transferred to CCU as his primary team.  Please contact us if any additional questions.      Jimmie Hanks,   Cardiology  [facility] - [facility]

## 2022-05-05 NOTE — PROGRESS NOTES
Pharmacokinetic Initial Assessment: IV Vancomycin    Assessment/Plan:    Initiate intravenous vancomycin with loading dose of 1500 mg once with subsequent doses when random concentrations are less than 20 mcg/mL  Desired empiric serum trough concentration is 15 to 20 mcg/mL  Draw vancomycin random level on 5/6/22 at 0400.   Patient received vancomycin 1500 mg on 5/2/22   CKD stage 4 Scr : 1.8>2.5>2.9    Pharmacy will continue to follow and monitor vancomycin.      Please contact pharmacy at extension [phone] with any questions regarding this assessment.     Thank you for the consult,   Mahsa Aguilar       Patient brief summary:  Kevon Perez is a 81 y.o. male initiated on antimicrobial therapy with IV Vancomycin for treatment of suspected bacteremia    Drug Allergies:   Review of patient's allergies indicates:   Allergen Reactions    Penicillins Other (See Comments)     Muscle stiffness    Bactrim [sulfamethoxazole-trimethoprim] Rash       Actual Body Weight:   103.4 kg    Renal Function:   Estimated Creatinine Clearance: 23.7 mL/min (A) (based on SCr of 2.9 mg/dL (H)).,     Dialysis Method (if applicable):  N/A    CBC (last 72 hours):  Recent Labs   Lab Result Units 05/03/22  0312 05/04/22  1135 05/05/22  0450 05/05/22  1438   WBC K/uL 9.94 13.77* 14.01*  14.13* 15.20*   Hemoglobin g/dL 10.8* 11.2* 11.7*  11.9* 11.6*   Hematocrit % 33.5* 35.2* 37.2*  37.0* 35.4*   Platelets K/uL 128* 145* 151  157 152   Gran % %  --  80.8* 84.8* 83.3*   Lymph % %  --  6.8* 5.4* 6.8*   Mono % %  --  11.3 8.8 8.8   Eosinophil % %  --  0.2 0.1 0.1   Basophil % %  --  0.4 0.4 0.4   Differential Method   --  Automated Automated Automated       Metabolic Panel (last 72 hours):  Recent Labs   Lab Result Units 05/03/22  0312 05/04/22  1135 05/04/22  1137 05/05/22  0450 05/05/22  1204 05/05/22  1230 05/05/22  1438   Sodium mmol/L 139 136  --  136  137  --  136 135*   Potassium mmol/L 4.4 4.4  --  5.7*  6.0*  --  4.4 4.6    Chloride mmol/L 109 107  --  103  104  --  103 103   CO2 mmol/L 18* 20*  --  23  23  --  21* 21*   Glucose mg/dL 129* 188*  --  174*  175*  --  173* 179*   Glucose, UA   --   --   --   --  Negative  --   --    BUN mg/dL 37* 45*  --  51*  52*  --  56* 53*   Creatinine mg/dL 1.8* 2.5*  --  2.9*  2.9*  --  2.9* 2.9*   Albumin g/dL  --  3.1*  --  2.8*  2.8*  --   --  2.9*   Total Bilirubin mg/dL  --  1.4*  --  1.1*  1.2*  --   --  1.3*   Alkaline Phosphatase U/L  --  44*  --  46*  44*  --   --  48*   AST U/L  --  117*  --  91*  92*  --   --  88*   ALT U/L  --  43  --  36  37  --   --  41   Magnesium mg/dL 2.0  --  1.8 1.9  1.9  --   --   --    Phosphorus mg/dL 3.4  --   --  3.7  3.8  --   --   --        Drug levels (last 3 results):  No results for input(s): VANCOMYCINRA, VANCOMYCINPE, VANCOMYCINTR in the last 72 hours.    Microbiologic Results:  Microbiology Results (last 7 days)     Procedure Component Value Units Date/Time    Blood culture [[id number]] Collected: 05/05/22 1230    Order Status: Sent Specimen: Blood from Antecubital, Left Arm Updated: 05/05/22 1248    Narrative:      Collection has been rescheduled by CBE at 05/05/2022 09:26 Reason:   Patient in Echo,spoke with CINDY Gudino  Collection has been rescheduled by TH6 at 05/05/2022 11:52 Reason:   Patient in bathroom will try back  Collection has been rescheduled by CBE at 05/05/2022 09:26 Reason:   Patient in Echo,spoke with CINDY Gudino  Collection has been rescheduled by TH6 at 05/05/2022 11:52 Reason:   Patient in bathroom will try back    Blood culture [[id number]] Collected: 05/05/22 1237    Order Status: Sent Specimen: Blood from Peripheral, Right Hand Updated: 05/05/22 1248    Narrative:      Collection has been rescheduled by CBE at 05/05/2022 09:26 Reason:   Patient in Echo,spoke with CINDY Gudino  Collection has been rescheduled by TARUN at 05/05/2022 11:52 Reason:   Patient in bathroom will try back  Collection has been rescheduled  by CBE at 05/05/2022 09:26 Reason:   Patient in Echo,spoke with CINDY Gudino  Collection has been rescheduled by 6 at 05/05/2022 11:52 Reason:   Patient in bathroom will try back

## 2022-05-05 NOTE — EICU
Rounding (Video Assessment):  Yes    Intervention Initiated From:  Bedside    Mikaela Communicated with Bedside Nurse regarding:  Time-Out    Nurse Notified:  Yes    Doctor Notified: Privileges verified on Dr. Derik Olguin  Yes    Comments: Patient is prepped and draped prior to entry  1450 Procedure started. Using live US to locate right IJ. Needle inserted with negative pressure on syringe, blood return, syringe removed. Tubing attached to cannula.  1451 Manometry done, verified venous blood. Cardiac monitor continue to monitor VS   1456  Incision made at entry site, advance dilator. Holding glide wire secure. Dilator removed  1457 Cordis inserted, holding glide wire secure, unable to advance cordis. Removed. Pressure held at site. Glide wire removed. Pressure held  1458 Injected Lidocain 1% no epi  1500 Using live US to locate right IJ  1502 Demian 100 mcg IVP  1505 Levophed drip @ 0.02 mcg/kg.min  1507 Using live US, inserted needle with negative pressure on syringe.  1508 Blood return, syringe removed. Glide wire inserted, Needle removed  1509 advance micro cannula holding glide wire, Removed   1510 Dilator advanced, small incision made at entry site\  1510 Cordis inserted 8,5 French , Wire removed.   1511 Local Lidocaine 1 % no epi give to suture cordis down  1512 Urbanna Kaykay inserted through cordis   1523 Run VF, VT, Urbanna kaykay removed. Respiratory called for BiPaP applied  1525 Biopatch applied, covered site with tegaderm  1529 Cleansed right inner wrist with chlora prep  1531 Sterile towels placed over right wrist  1542 CXR done ok to use cordis  1548 Re prepped right wrist  1549 Using live US to locate right radial artery  1552 Angiocatheter 20 gauze inserted, no blood return, removed. Pressure held  1553 Dr. Stratton @ bedside.   1558 Using live US inserted needle. [facility] as needed.  1625 SG floated in 47 cm   1634 Portable CXR done

## 2022-05-05 NOTE — NURSING
Dr. Kim updated on patient condition, cardiology recommendations/orders, and patient findings post bladder scan/straight cath.  MD to place orders.

## 2022-05-05 NOTE — NURSING
Spoke with Dr. Del Cid in regards to patient findings. Patient arrived to floor via stretcher on 2L of O2 via nasal cannula. Upon assessment of O2 saturation, findings were 80%. Increased O2 gradually to 3L with findings at 84%. Patient was then increased to 4L with O2 sat of 90.  RR were 21. Patient reports shortness of breath. MD review chart and to order additional dose of Lasix.

## 2022-05-05 NOTE — ASSESSMENT & PLAN NOTE
Patient with ACS and newly depressed EF and worsening renal function.    Plan:   -move to ICU  -place SG catheter and calculate hemodynamics before consideration of advanced support  -lasix gtt

## 2022-05-05 NOTE — CARE UPDATE
"RAPID RESPONSE NURSE CHART REVIEW        Chart Reviewed: 05/04/2022, 9:44 PM    MRN: [id number]  Bed: 326/326 A    Dx: Chest pain    Kevon Perez has a past medical history of Acute coronary syndrome, Anticoagulant long-term use, Basal cell cancer, BCC (basal cell carcinoma of skin), Cancer of bladder, Cataract, Chronic kidney disease, Colon polyp, Coronary artery disease, CPAP (continuous positive airway pressure) dependence, Diabetes mellitus, Diabetic retinopathy, Encounter for blood transfusion, GERD (gastroesophageal reflux disease), High cholesterol, Hyperlipidemia, Hypertension, Iron deficiency anemia, GINGER (obstructive sleep apnea), Renal manifestation of secondary diabetes mellitus, and SOB (shortness of breath).    Last VS: BP (!) 104/52 (BP Location: Right arm, Patient Position: Sitting)   Pulse (!) 112   Temp 98.9 °F (37.2 °C) (Tympanic)   Resp (!) 21   Ht 5' 10" (1.778 m)   Wt 99 kg (218 lb 4.1 oz)   SpO2 (!) 90%   BMI 31.32 kg/m²     24H Vital Sign Range:  Temp:  [98.9 °F (37.2 °C)]   Pulse:  [103-124]   Resp:  [15-21]   BP: ()/(52-61)   SpO2:  [90 %-96 %]     Level of Consciousness (AVPU): alert    Recent Labs     05/02/22 0445 05/03/22  0312 05/04/22  1135   WBC 8.01 9.94 13.77*   HGB 10.4* 10.8* 11.2*   HCT 31.8* 33.5* 35.2*   * 128* 145*       Recent Labs     05/02/22 0445 05/03/22 0312 05/04/22  1135 05/04/22  1137    139 136  --    K 4.0 4.4 4.4  --    * 109 107  --    CO2 23 18* 20*  --    CREATININE 2.1* 1.8* 2.5*  --    * 129* 188*  --    PHOS 2.7 3.4  --   --    MG 1.3* 2.0  --  1.8        No results for input(s): PH, PCO2, PO2, HCO3, POCSATURATED, BE in the last 72 hours.     OXYGEN:  Flow (L/min): 4     O2 Device (Oxygen Therapy): nasal cannula    MEWS score: 3    Bedside RNVandana contacted. Patient with increased O2 requirements. MD aware. Requested RN bladder scan patient before administering second dose of IVP lasix. Assess for pleuritic pain " or pain at pacer site as contributing factor for SOB. Monitor intake and output and document response to lasix. Consider repeating chemistry and lytes after diuresis. Humidify O2.  No additional concerns verbalized at this time. Instructed to call [phone] for further concerns or assistance.    Dolores Cantu RN

## 2022-05-05 NOTE — PROGRESS NOTES
Cardiology Update Note    Patient increasingly feeling short of breath, coughing up minimal blood tinged sputum. Last Lasix 80 mg IV was at approximately 2150. UOP has been 100 cc since shift start.  - Start Diuril 250 mg IV x 1  - Follow with Lasix 120 mg IV x 1  - Monitor I&O strictly  - Daily weights   - Monitor electrolytes and replete PRN    Gil Bourgeois MD  Cardiology PGY4   [facility]

## 2022-05-05 NOTE — SUBJECTIVE & OBJECTIVE
Interval History: Patient seen and examined.   He has been having worsening dyspnea, coughing up minimal blood tinged sputum. He also had decreased urine output so Diuril 250 mg IV and  Lasix 120 mg IV  were administered. Urine output remains poor. Increasing leukocytosis, BUN/Cr, hyperkalemia, troponin.    Review of Systems   Constitutional: Negative for chills and decreased appetite.   HENT:  Negative for congestion and sore throat.    Eyes: Negative.    Cardiovascular:  Positive for dyspnea on exertion. Negative for chest pain and leg swelling.   Respiratory:  Positive for cough and shortness of breath.    Endocrine: Negative.    Skin: Negative.  Negative for rash.   Musculoskeletal: Negative.    Gastrointestinal:  Negative for abdominal pain, constipation and diarrhea.   Genitourinary:  Negative for dysuria and hematuria.   Neurological:  Negative for dizziness and headaches.   Psychiatric/Behavioral: Negative.     Objective:     Vital Signs (Most Recent):  Temp: 98.2 °F (36.8 °C) (05/05/22 1210)  Pulse: 105 (05/05/22 1210)  Resp: 18 (05/05/22 1210)  BP: (!) 94/57 (05/05/22 1210)  SpO2: 96 % (05/05/22 1210)   Vital Signs (24h Range):  Temp:  [97.7 °F (36.5 °C)-98.9 °F (37.2 °C)] 98.2 °F (36.8 °C)  Pulse:  [100-124] 105  Resp:  [15-21] 18  SpO2:  [89 %-99 %] 96 %  BP: ()/(51-61) 94/57     Weight: 103.4 kg (228 lb)  Body mass index is 32.71 kg/m².     SpO2: 96 %  O2 Device (Oxygen Therapy): nasal cannula      Intake/Output Summary (Last 24 hours) at 5/5/2022 1258  Last data filed at 5/5/2022 1000  Gross per 24 hour   Intake 240 ml   Output 980 ml   Net -740 ml       Lines/Drains/Airways       Peripheral Intravenous Line  Duration                  Peripheral IV - Single Lumen 05/04/22 1135 20 G Right Forearm 1 day                    Physical Exam  Vitals reviewed.   Constitutional:       General: He is not in acute distress.     Appearance: He is obese. He is not toxic-appearing.   HENT:      Head:  Normocephalic and atraumatic.   Eyes:      General: No scleral icterus.        Right eye: No discharge.         Left eye: No discharge.   Neck:      Thyroid: No thyromegaly.      Vascular: No JVD.   Cardiovascular:      Rate and Rhythm: Regular rhythm. Tachycardia present.   Pulmonary:      Effort: Pulmonary effort is normal. No respiratory distress.   Abdominal:      General: Abdomen is flat. Bowel sounds are normal. There is no distension.      Palpations: Abdomen is soft.   Musculoskeletal:      Right lower le+ Edema present.      Left lower le+ Edema present.   Skin:     General: Skin is warm and dry.          Neurological:      Mental Status: He is alert and oriented to person, place, and time.       Significant Labs: All pertinent labs within the past 24 hours have been reviewed.  CBC:   Recent Labs   Lab 22  0450   WBC 13.77* 14.01*  14.13*   HGB 11.2* 11.7*  11.9*   HCT 35.2* 37.2*  37.0*   * 151  157     CMP:   Recent Labs   Lab 22  0450    136  137   K 4.4 5.7*  6.0*    103  104   CO2 20* 23  23   * 174*  175*   BUN 45* 51*  52*   CREATININE 2.5* 2.9*  2.9*   CALCIUM 9.6 9.7  9.8   PROT 6.2 6.3  6.3   ALBUMIN 3.1* 2.8*  2.8*   BILITOT 1.4* 1.1*  1.2*   ALKPHOS 44* 46*  44*   * 91*  92*   ALT 43 36  37   ANIONGAP 9 10  10   EGFRNONAA 23.0* 19.3*  19.3*     Magnesium:   Recent Labs   Lab 22  11322  0450   MG 1.8 1.9  1.9     Phosphorous:  Recent Labs   Lab 22  0450   PHOS 3.7  3.8     POCT Glucose:   Recent Labs   Lab 22  1920 22  0734   POCTGLUCOSE 186* 179*       Troponin:   Recent Labs   Lab 22  1135 22  1426 22  0450   TROPONINI 33.585* 27.388* 35.947*     Recent Labs     22  1135   BNP 1,194*         Significant Imaging: Reviewed    Inpatient Medications:  Continuous Infusions:   furosemide (LASIX) 10 mg/mL infusion (non-titrating)      heparin  (porcine) in D5W 15 Units/kg/hr (05/05/22 0612)     Scheduled Meds:   albuterol-ipratropium  3 mL Nebulization Q6H WAKE    artificial tears  1 drop Both Eyes TID    aspirin  81 mg Oral Daily    atorvastatin  80 mg Oral Daily    calcitRIOL  0.25 mcg Oral Daily    clopidogreL  75 mg Oral Daily    doxycycline  100 mg Oral Q12H    finasteride  5 mg Oral Daily    furosemide (LASIX) injection  80 mg Intravenous Once    insulin detemir U-100  5 Units Subcutaneous Daily    sodium bicarbonate  650 mg Oral TID    tamsulosin  0.4 mg Oral Daily     PRN Meds:acetaminophen, dextrose 10%, dextrose 10%, glucagon (human recombinant), glucose, glucose, heparin (PORCINE), heparin (PORCINE), insulin aspart U-100, melatonin, nitroGLYCERIN, polyethylene glycol, sodium chloride 0.9%, sodium chloride 0.9%, sodium chloride 0.9%, sodium chloride 0.9%

## 2022-05-05 NOTE — PLAN OF CARE
Problem: Adult Inpatient Plan of Care  Goal: Plan of Care Review  5/5/2022 0416 by Vandana Diaz RN  Outcome: Ongoing, Progressing  5/4/2022 2323 by Vandana Diaz RN  Outcome: Ongoing, Progressing  Goal: Patient-Specific Goal (Individualized)  5/5/2022 0416 by Vandana Diaz RN  Outcome: Ongoing, Progressing  5/4/2022 2323 by Vandana Diaz RN  Outcome: Ongoing, Progressing  Goal: Absence of Hospital-Acquired Illness or Injury  5/5/2022 0416 by Vandana Diaz RN  Outcome: Ongoing, Progressing  5/4/2022 2323 by Vandana Diaz RN  Outcome: Ongoing, Progressing  Goal: Optimal Comfort and Wellbeing  5/5/2022 0416 by Vandana Diaz RN  Outcome: Ongoing, Progressing  5/4/2022 2323 by Vandana Diaz RN  Outcome: Ongoing, Progressing  Goal: Readiness for Transition of Care  5/5/2022 0416 by Vandana Diaz RN  Outcome: Ongoing, Progressing  5/4/2022 2323 by Vandana Diaz RN  Outcome: Ongoing, Progressing     Problem: Diabetes Comorbidity  Goal: Blood Glucose Level Within Targeted Range  5/5/2022 0416 by Vandana Diaz RN  Outcome: Ongoing, Progressing  5/4/2022 2323 by Vanadna Diaz RN  Outcome: Ongoing, Progressing     Problem: Fluid and Electrolyte Imbalance (Acute Kidney Injury/Impairment)  Goal: Fluid and Electrolyte Balance  5/5/2022 0416 by Vandana Diaz RN  Outcome: Ongoing, Progressing  5/4/2022 2323 by Vandana Diaz RN  Outcome: Ongoing, Progressing     Problem: Oral Intake Inadequate (Acute Kidney Injury/Impairment)  Goal: Optimal Nutrition Intake  5/5/2022 0416 by Vandana Diaz RN  Outcome: Ongoing, Progressing  5/4/2022 2323 by Vandana Diaz RN  Outcome: Ongoing, Progressing     Problem: Renal Function Impairment (Acute Kidney Injury/Impairment)  Goal: Effective Renal Function  5/5/2022 0416 by Vandana Diaz RN  Outcome: Ongoing, Progressing  5/4/2022 2323 by Vandana Diaz RN  Outcome: Ongoing, Progressing     Problem: Fall Injury Risk  Goal: Absence of Fall and Fall-Related Injury  5/5/2022 0416 by Vandana Diaz  RN  Outcome: Ongoing, Progressing  5/4/2022 2323 by Vandana Diaz RN  Outcome: Ongoing, Progressing   Cardiology Step Down. See progress note and flowsheet. Plan: Continue to monitor patient and report any abnormalities in labs, vitals signs, and body system functions to physician as indicated. Patient was given education on their disease process and medications.

## 2022-05-05 NOTE — CONSULTS
[facility] - [facility]iology Stepdown  Interventional Cardiology  Consult Note    Patient Name: Kevon Perez  MRN: [id number]  Admission Date: 5/4/2022  Hospital Length of Stay: 1 days  Code Status: Full Code   Attending Provider: Shira Stratton MD  Consulting Provider: Jamie Vuong MD  Primary Care Physician: Cipriano Sol MD  Principal Problem:Cardiogenic shock    Patient information was obtained from patient and past medical records.     Inpatient consult to Interventional Cardiology  Consult performed by: Jamie Vuong MD  Consult ordered by: Yohana Delvalle MD        Subjective:     Chief Complaint:  SOB     HPI:  Mr. Kevon Perez is a 81 y.o. 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 He presented to ED after he was instructed by the general cardiology MA/RN when he started to complain of chest pain that occurred yesterday. He descried the pain and tenderness in pericardial area that is worsen with deep breath. He took first nitro with no alleviation of pain, however pain improved with third nitro and the pain subsided and did not recur. He slept fine and woke up this morning with no further pain. He called cardiology clinic who asked him to come to ED for work up. On his arrival to ED he has had no recurrence of chest pain and his vital signs has been normal. No pericardial effusion on his bedside echo and his EF is preserved. Of note, he underwent LHC on 4/29 (last week) which revealed multi-vessel CAD. . CTS consulted and recommended medical versus PCI. The plan from IC is to proceed with multi-stage PCI when renal function more stable.     Since admission, he was found to have EF <20 (65% one week ago) and worsening creatinine. His troponin has been elevated in the 30s and he was started on acs protocol. BP has been low with SBP in the 80-90s and he is requiring NC O2 for hypoxia. He has had minimal uop  despite lasix and diuril. IC consulted for evaluation +/- IABP.       Past Medical History:   Diagnosis Date    Acute coronary syndrome 9/10/09    STEMI    Anticoagulant long-term use     plavix    Basal cell cancer     BCC (basal cell carcinoma of skin)     nose    Cancer of bladder January 2013    Cataract     Chronic kidney disease     Colon polyp     Coronary artery disease     CPAP (continuous positive airway pressure) dependence     Diabetes mellitus     Diabetic retinopathy     Encounter for blood transfusion     GERD (gastroesophageal reflux disease)     High cholesterol     Hyperlipidemia     Hypertension     Iron deficiency anemia 5/11/2018    GINGER (obstructive sleep apnea)     CPAP     Renal manifestation of secondary diabetes mellitus     SOB (shortness of breath)        Past Surgical History:   Procedure Laterality Date    A-V CARDIAC PACEMAKER INSERTION N/A 5/2/2022    Procedure: INSERTION, CARDIAC PACEMAKER, DUAL CHAMBER;  Surgeon: Paulie Avendaño MD;  Location: [facility] EP LAB;  Service: Cardiology;  Laterality: N/A;  CHB, DUAL PPM, MDT, ANES, GP,     BASAL CELL CARCINOMA EXCISION      nose     BLADDER SURGERY      bladder cancer    CARDIAC CATHETERIZATION      CATARACT EXTRACTION      bilateral     COLONOSCOPY  3/26/15    COLONOSCOPY N/A 12/4/2020    Procedure: COLONOSCOPY;  Surgeon: Keshav Rajan MD;  Location: [facility] ENDO ([address] [place] [address]);  Service: Endoscopy;  Laterality: N/A;  covid test 12/1-pcw-tb  pt SOB x 1 year-ok to hold Plavix x 5 days per Dr. Sol-see telephone encounter dated 8/25  10/14-instructions emailed to [email or web address]    CORONARY ANGIOGRAPHY Left 4/29/2022    Procedure: ANGIOGRAM, CORONARY ARTERY;  Surgeon: Joesph Cast MD;  Location: [facility] CATH LAB;  Service: Cardiology;  Laterality: Left;    CORONARY ANGIOPLASTY  9/10/09    CFX    CORONARY ANGIOPLASTY WITH STENT PLACEMENT      CYSTOSCOPY      ESOPHAGOGASTRODUODENOSCOPY N/A 1/27/2021    Procedure: EGD  (ESOPHAGOGASTRODUODENOSCOPY);  Surgeon: Matt Acosta MD;  Location: [facility];  Service: Endoscopy;  Laterality: N/A;    EYE SURGERY      hydrocel         Review of patient's allergies indicates:   Allergen Reactions    Penicillins Other (See Comments)     Muscle stiffness    Bactrim [sulfamethoxazole-trimethoprim] Rash       PTA Medications   Medication Sig    acetaminophen (TYLENOL) 500 MG tablet Take 500 mg by mouth daily as needed for Pain.    ascorbic acid (VITAMIN C ORAL) Take 1 tablet by mouth once daily.    atorvastatin (LIPITOR) 20 MG tablet TAKE 1 TABLET(20 MG) BY MOUTH EVERY DAY    calcitRIOL (ROCALTROL) 0.25 MCG Cap TAKE 1 CAPSULE BY MOUTH EVERY DAY    clopidogreL (PLAVIX) 75 mg tablet TAKE 1 TABLET(75 MG) BY MOUTH EVERY DAY    doxycycline (VIBRAMYCIN) 100 MG Cap Take 1 capsule (100 mg total) by mouth every 12 (twelve) hours for 5 days    ergocalciferol, vitamin D2, (VITAMIN D ORAL) Take 1 capsule by mouth once daily.    finasteride (PROSCAR) 5 mg tablet Take 1 tablet (5 mg total) by mouth once daily.    insulin aspart U-100 (NOVOLOG FLEXPEN U-100 INSULIN) 100 unit/mL (3 mL) InPn pen Inject 5 Units into the skin 3 (three) times daily with meals. (Patient taking differently: Inject 8-10 units with breakfast and 15 units with lunch & dinner.)    insulin degludec (TRESIBA FLEXTOUCH U-100) 100 unit/mL (3 mL) insulin pen Inject 10 Units into the skin once daily. (Patient taking differently: Inject 22 Units into the skin once daily.)    irbesartan (AVAPRO) 300 MG tablet TAKE 1 TABLET(300 MG) BY MOUTH EVERY EVENING    isosorbide dinitrate (ISORDIL) 20 MG tablet Take 1 tablet (20 mg total) by mouth 3 (three) times daily.    loperamide (ANTI-DIARRHEAL, LOPERAMIDE,) 2 mg Tab Take per package directions as needed for diarrhea.    nitroGLYCERIN (NITROSTAT) 0.4 MG SL tablet TAKE 1 TABLET UNDER THE TONGUE EVERY 5 MINUTES AS NEEDED    propylene glycol/peg 400/PF (SYSTANE, PF, OPHT) Place 1 drop into both eyes 2  "(two) times daily as needed (Dry eye).    psyllium (METAMUCIL) powder Take 1 packet by mouth daily as needed (Constipation).    tamsulosin (FLOMAX) 0.4 mg Cap TAKE 1 CAPSULE(0.4 MG) BY MOUTH EVERY EVENING    ZINC ORAL Take 1 tablet by mouth once daily.    aspirin 81 MG Chew Chew and swallow 1 tablet (81 mg total) by mouth once daily.    BD ULTRA-FINE SHORT PEN NEEDLE 31 gauge x 5/16" Ndle USE UP TO 6 TIMES DAILY    blood sugar diagnostic (TRUE METRIX GLUCOSE TEST STRIP) Strp USE TO CHECK BLOOD SUGAR FOUR TIMES DAILY    blood-glucose meter kit To check BG 4 times daily, to use with insurance preferred meter    sodium bicarbonate 650 MG tablet Take 1 tablet (650 mg total) by mouth 3 (three) times daily.    torsemide (DEMADEX) 10 MG Tab Take 1 tablet (10 mg total) by mouth once daily.    TRUEPLUS LANCETS 30 gauge Misc USE TO CHECK BLOOD SUGAR FOUR TIMES DAILY     Family History       Problem Relation (Age of Onset)    Alcohol abuse Brother    Cancer Maternal Aunt    Cataracts Mother    Diabetes Father, Sister, Paternal Uncle    Goiter Mother    Heart disease Father, Mother    Hypertension Father    No Known Problems Daughter, Son, Son          Tobacco Use    Smoking status: Former Smoker     Packs/day: 1.00     Years: 40.00     Pack years: 40.00     Types: Cigarettes     Quit date: 1970     Years since quittin.8    Smokeless tobacco: Former User   Substance and Sexual Activity    Alcohol use: Yes     Alcohol/week: 3.0 standard drinks     Types: 1 Cans of beer, 1 Shots of liquor, 1 Standard drinks or equivalent per week    Drug use: No    Sexual activity: Not Currently     Review of Systems   Constitutional: Negative for chills and decreased appetite.   HENT:  Negative for congestion and sore throat.    Eyes: Negative.    Cardiovascular:  Positive for dyspnea on exertion. Negative for chest pain and leg swelling.   Respiratory:  Positive for cough and shortness of breath.    Endocrine: Negative.    Skin: " Negative.  Negative for rash.   Musculoskeletal: Negative.    Gastrointestinal:  Negative for abdominal pain, constipation and diarrhea.   Genitourinary:  Negative for dysuria and hematuria.   Neurological:  Negative for dizziness and headaches.   Psychiatric/Behavioral: Negative.     Objective:     Vital Signs (Most Recent):  Temp: 98.2 °F (36.8 °C) (22 1210)  Pulse: 105 (22 1210)  Resp: 18 (22 1210)  BP: (!) 94/57 (22 1210)  SpO2: 96 % (22 1210)   Vital Signs (24h Range):  Temp:  [97.7 °F (36.5 °C)-98.9 °F (37.2 °C)] 98.2 °F (36.8 °C)  Pulse:  [100-124] 105  Resp:  [15-21] 18  SpO2:  [89 %-99 %] 96 %  BP: ()/(51-61) 94/57     Weight: 103.4 kg (228 lb)  Body mass index is 32.71 kg/m².    SpO2: 96 %  O2 Device (Oxygen Therapy): nasal cannula      Intake/Output Summary (Last 24 hours) at 2022 1306  Last data filed at 2022 1000  Gross per 24 hour   Intake 240 ml   Output 980 ml   Net -740 ml       Lines/Drains/Airways       Peripheral Intravenous Line  Duration                  Peripheral IV - Single Lumen 22 1135 20 G Right Forearm 1 day                    Physical Exam  Vitals reviewed.   Constitutional:       General: He is not in acute distress.     Appearance: He is obese. He is not toxic-appearing.   HENT:      Head: Normocephalic and atraumatic.   Eyes:      General: No scleral icterus.        Right eye: No discharge.         Left eye: No discharge.   Neck:      Thyroid: No thyromegaly.      Vascular: No JVD.   Cardiovascular:      Rate and Rhythm: Regular rhythm. Tachycardia present.   Pulmonary:      Effort: Pulmonary effort is normal. No respiratory distress.   Abdominal:      General: Abdomen is flat. Bowel sounds are normal. There is no distension.      Palpations: Abdomen is soft.   Musculoskeletal:      Cervical back: Normal range of motion.      Right lower le+ Edema present.      Left lower le+ Edema present.   Skin:     General: Skin is dry.              Comments: Cool extremities    Neurological:      Mental Status: He is alert and oriented to person, place, and time.       Significant Labs: All pertinent lab results from the last 24 hours have been reviewed.    Significant Imaging:  reviewed    Assessment and Plan:     * Cardiogenic shock  Patient with ACS and newly depressed EF and worsening renal function.    Plan:   -move to ICU  -place SG catheter and calculate hemodynamics before consideration of advanced support  -lasix gtt         VTE Risk Mitigation (From admission, onward)           Ordered     IP VTE HIGH RISK PATIENT  Once         05/05/22 1035     Place sequential compression device  Until discontinued         05/05/22 1035     heparin 25,000 units in dextrose 5% (100 units/ml) IV bolus from bag - ADDITIONAL PRN BOLUS - 60 units/kg (max bolus 4000 units)  As needed (PRN)        Question:  Heparin Infusion Adjustment (DO NOT MODIFY ANSWER)  Answer:  \\[email or web address]\epic\Images\Pharmacy\HeparinInfusions\heparin LOW INTENSITY nomogram for [facility] [id number].pdf    05/04/22 1512     heparin 25,000 units in dextrose 5% (100 units/ml) IV bolus from bag - ADDITIONAL PRN BOLUS - 30 units/kg (max bolus 4000 units)  As needed (PRN)        Question:  Heparin Infusion Adjustment (DO NOT MODIFY ANSWER)  Answer:  \\[email or web address]\epic\Images\Pharmacy\HeparinInfusions\heparin LOW INTENSITY nomogram for [facility] [id number].pdf    05/04/22 1512     heparin 25,000 units in dextrose 5% 250 mL (100 units/mL) infusion LOW INTENSITY nomogram - [facility]  Continuous        Question Answer Comment   Heparin Infusion Adjustment (DO NOT MODIFY ANSWER) \\[email or web address]\epic\Images\Pharmacy\HeparinInfusions\heparin LOW INTENSITY nomogram for [facility] [id number].pdf    Begin at (in units/kg/hr) 12        05/04/22 1512     Place sequential compression device  Until discontinued         05/04/22 1418     Place sequential compression device  Until discontinued         05/04/22 1418                    Thank you  for your consult. I will follow-up with patient. Please contact us if you have any additional questions.    Jamie Vuong MD  Interventional Cardiology   [facility] - [facility] Step[facility]    Staff:  I have personally taken the history and examined this patient and agree with the fellow's note as stated above and amended it accordingly :-)  This nice man has severe CMP and CHF with progressive SOB and worsening renal failure. He cannot lie flat currently and attempt to place a swan jesus alberto catheter was aborted.  He has severe, diffuse three vessel CAD with no good targets for CABG and only one fair target for PCI (prox RCA).    I recommend we attempt to stablilize him with inotropes and intubation if necessary.  Once we have his hemodynamics we can try to tailor his therapy.  An IABP may be used to temporize if drugs alone don't work but is not a long term treatment.  I don't see a good final pathway for him if medical therapy doesn't work since he is definitely not an LVAD or transplant candidate.

## 2022-05-05 NOTE — CARE UPDATE
Pt transferred to CICU. IC consulted. Concern for cardiogenic shock. Was on lasix. UO of 400 ml since am with lasix drip.  CVP-9, Svo2- 45, CI/CO/SVR: 1.9/4.2/ 1320 on Vasopressin 0.04, epi 0.02, levo 0.1.   A line placed: 120/57, MAP of 80.   Became hypotensive with initiation of pressors in ICU.  Had respiratory distress during the procedure, placed on BiPAP.  Labs reviewed with creatinine of 2.9, lactate of 1.8.  D/w Dr. Stratton.

## 2022-05-05 NOTE — NURSING
Per recommendations by rapid response team: Bedside bladder scan performed with result of 28. Humidification added to oxygen which continues at 4L via nasal cannula.  Patient given prn Tylenol 650mg PO see MAR. Will continue to monitor and report any further concerns to MD/RR Team.

## 2022-05-05 NOTE — SUBJECTIVE & OBJECTIVE
Past Medical History:   Diagnosis Date    Acute coronary syndrome 9/10/09    STEMI    Anticoagulant long-term use     plavix    Basal cell cancer     BCC (basal cell carcinoma of skin)     nose    Cancer of bladder January 2013    Cataract     Chronic kidney disease     Colon polyp     Coronary artery disease     CPAP (continuous positive airway pressure) dependence     Diabetes mellitus     Diabetic retinopathy     Encounter for blood transfusion     GERD (gastroesophageal reflux disease)     High cholesterol     Hyperlipidemia     Hypertension     Iron deficiency anemia 5/11/2018    GINGER (obstructive sleep apnea)     CPAP     Renal manifestation of secondary diabetes mellitus     SOB (shortness of breath)        Past Surgical History:   Procedure Laterality Date    A-V CARDIAC PACEMAKER INSERTION N/A 5/2/2022    Procedure: INSERTION, CARDIAC PACEMAKER, DUAL CHAMBER;  Surgeon: Paulie Avendaño MD;  Location: [facility] EP LAB;  Service: Cardiology;  Laterality: N/A;  CHB, DUAL PPM, MDT, ANES, GP,     BASAL CELL CARCINOMA EXCISION      nose     BLADDER SURGERY      bladder cancer    CARDIAC CATHETERIZATION      CATARACT EXTRACTION      bilateral     COLONOSCOPY  3/26/15    COLONOSCOPY N/A 12/4/2020    Procedure: COLONOSCOPY;  Surgeon: Keshav Rajan MD;  Location: [facility] ENDO ([address] [place] [address]);  Service: Endoscopy;  Laterality: N/A;  covid test 12/1-pcw-tb  pt SOB x 1 year-ok to hold Plavix x 5 days per Dr. Sol-see telephone encounter dated 8/25  10/14-instructions emailed to [email or web address]    CORONARY ANGIOGRAPHY Left 4/29/2022    Procedure: ANGIOGRAM, CORONARY ARTERY;  Surgeon: Joesph Cast MD;  Location: [facility] CATH LAB;  Service: Cardiology;  Laterality: Left;    CORONARY ANGIOPLASTY  9/10/09    CFX    CORONARY ANGIOPLASTY WITH STENT PLACEMENT      CYSTOSCOPY      ESOPHAGOGASTRODUODENOSCOPY N/A 1/27/2021    Procedure: EGD (ESOPHAGOGASTRODUODENOSCOPY);  Surgeon: Matt Acosta MD;  Location: [facility]  ENDO;  Service: Endoscopy;  Laterality: N/A;    EYE SURGERY      hydrocel         Review of patient's allergies indicates:   Allergen Reactions    Penicillins Other (See Comments)     Muscle stiffness    Bactrim [sulfamethoxazole-trimethoprim] Rash       PTA Medications   Medication Sig    acetaminophen (TYLENOL) 500 MG tablet Take 500 mg by mouth daily as needed for Pain.    ascorbic acid (VITAMIN C ORAL) Take 1 tablet by mouth once daily.    atorvastatin (LIPITOR) 20 MG tablet TAKE 1 TABLET(20 MG) BY MOUTH EVERY DAY    calcitRIOL (ROCALTROL) 0.25 MCG Cap TAKE 1 CAPSULE BY MOUTH EVERY DAY    clopidogreL (PLAVIX) 75 mg tablet TAKE 1 TABLET(75 MG) BY MOUTH EVERY DAY    doxycycline (VIBRAMYCIN) 100 MG Cap Take 1 capsule (100 mg total) by mouth every 12 (twelve) hours for 5 days    ergocalciferol, vitamin D2, (VITAMIN D ORAL) Take 1 capsule by mouth once daily.    finasteride (PROSCAR) 5 mg tablet Take 1 tablet (5 mg total) by mouth once daily.    insulin aspart U-100 (NOVOLOG FLEXPEN U-100 INSULIN) 100 unit/mL (3 mL) InPn pen Inject 5 Units into the skin 3 (three) times daily with meals. (Patient taking differently: Inject 8-10 units with breakfast and 15 units with lunch & dinner.)    insulin degludec (TRESIBA FLEXTOUCH U-100) 100 unit/mL (3 mL) insulin pen Inject 10 Units into the skin once daily. (Patient taking differently: Inject 22 Units into the skin once daily.)    irbesartan (AVAPRO) 300 MG tablet TAKE 1 TABLET(300 MG) BY MOUTH EVERY EVENING    isosorbide dinitrate (ISORDIL) 20 MG tablet Take 1 tablet (20 mg total) by mouth 3 (three) times daily.    loperamide (ANTI-DIARRHEAL, LOPERAMIDE,) 2 mg Tab Take per package directions as needed for diarrhea.    nitroGLYCERIN (NITROSTAT) 0.4 MG SL tablet TAKE 1 TABLET UNDER THE TONGUE EVERY 5 MINUTES AS NEEDED    propylene glycol/peg 400/PF (SYSTANE, PF, OPHT) Place 1 drop into both eyes 2 (two) times daily as needed (Dry eye).    psyllium (METAMUCIL) powder Take 1  "packet by mouth daily as needed (Constipation).    tamsulosin (FLOMAX) 0.4 mg Cap TAKE 1 CAPSULE(0.4 MG) BY MOUTH EVERY EVENING    ZINC ORAL Take 1 tablet by mouth once daily.    aspirin 81 MG Chew Chew and swallow 1 tablet (81 mg total) by mouth once daily.    BD ULTRA-FINE SHORT PEN NEEDLE 31 gauge x 5/16" Ndle USE UP TO 6 TIMES DAILY    blood sugar diagnostic (TRUE METRIX GLUCOSE TEST STRIP) Strp USE TO CHECK BLOOD SUGAR FOUR TIMES DAILY    blood-glucose meter kit To check BG 4 times daily, to use with insurance preferred meter    sodium bicarbonate 650 MG tablet Take 1 tablet (650 mg total) by mouth 3 (three) times daily.    torsemide (DEMADEX) 10 MG Tab Take 1 tablet (10 mg total) by mouth once daily.    TRUEPLUS LANCETS 30 gauge Misc USE TO CHECK BLOOD SUGAR FOUR TIMES DAILY     Family History       Problem Relation (Age of Onset)    Alcohol abuse Brother    Cancer Maternal Aunt    Cataracts Mother    Diabetes Father, Sister, Paternal Uncle    Goiter Mother    Heart disease Father, Mother    Hypertension Father    No Known Problems Daughter, Son, Son          Tobacco Use    Smoking status: Former Smoker     Packs/day: 1.00     Years: 40.00     Pack years: 40.00     Types: Cigarettes     Quit date: 1970     Years since quittin.8    Smokeless tobacco: Former User   Substance and Sexual Activity    Alcohol use: Yes     Alcohol/week: 3.0 standard drinks     Types: 1 Cans of beer, 1 Shots of liquor, 1 Standard drinks or equivalent per week    Drug use: No    Sexual activity: Not Currently     Review of Systems   Constitutional: Negative for chills and decreased appetite.   HENT:  Negative for congestion and sore throat.    Eyes: Negative.    Cardiovascular:  Positive for dyspnea on exertion. Negative for chest pain and leg swelling.   Respiratory:  Positive for cough and shortness of breath.    Endocrine: Negative.    Skin: Negative.  Negative for rash.   Musculoskeletal: Negative.    Gastrointestinal:  " Negative for abdominal pain, constipation and diarrhea.   Genitourinary:  Negative for dysuria and hematuria.   Neurological:  Negative for dizziness and headaches.   Psychiatric/Behavioral: Negative.     Objective:     Vital Signs (Most Recent):  Temp: 98.2 °F (36.8 °C) (22 1210)  Pulse: 105 (22 1210)  Resp: 18 (22 1210)  BP: (!) 94/57 (22 1210)  SpO2: 96 % (22 1210)   Vital Signs (24h Range):  Temp:  [97.7 °F (36.5 °C)-98.9 °F (37.2 °C)] 98.2 °F (36.8 °C)  Pulse:  [100-124] 105  Resp:  [15-21] 18  SpO2:  [89 %-99 %] 96 %  BP: ()/(51-61) 94/57     Weight: 103.4 kg (228 lb)  Body mass index is 32.71 kg/m².    SpO2: 96 %  O2 Device (Oxygen Therapy): nasal cannula      Intake/Output Summary (Last 24 hours) at 2022 1306  Last data filed at 2022 1000  Gross per 24 hour   Intake 240 ml   Output 980 ml   Net -740 ml       Lines/Drains/Airways       Peripheral Intravenous Line  Duration                  Peripheral IV - Single Lumen 22 1135 20 G Right Forearm 1 day                    Physical Exam  Vitals reviewed.   Constitutional:       General: He is not in acute distress.     Appearance: He is obese. He is not toxic-appearing.   HENT:      Head: Normocephalic and atraumatic.   Eyes:      General: No scleral icterus.        Right eye: No discharge.         Left eye: No discharge.   Neck:      Thyroid: No thyromegaly.      Vascular: No JVD.   Cardiovascular:      Rate and Rhythm: Regular rhythm. Tachycardia present.   Pulmonary:      Effort: Pulmonary effort is normal. No respiratory distress.   Abdominal:      General: Abdomen is flat. Bowel sounds are normal. There is no distension.      Palpations: Abdomen is soft.   Musculoskeletal:      Cervical back: Normal range of motion.      Right lower le+ Edema present.      Left lower le+ Edema present.   Skin:     General: Skin is dry.             Comments: Cool extremities    Neurological:      Mental Status: He is  alert and oriented to person, place, and time.       Significant Labs: All pertinent lab results from the last 24 hours have been reviewed.    Significant Imaging:  reviewed

## 2022-05-05 NOTE — CARE UPDATE
RAPID RESPONSE NURSE ROUND       Rounding completed with charge RN, Elizabeth. No concerns verbalized at this time. Instructed to call [phone] for further concerns or assistance.

## 2022-05-05 NOTE — NURSING
Cardiology called and updated on urine output of 250. MD requested to call in 30 minutes with output. RBV.

## 2022-05-05 NOTE — PLAN OF CARE
Discussed with cardiology prior to pt being seen this morning. They will be accepting him to their service for cardiogenic shock.    Relinquish all further care and management to CCU.      Keith Miles MD  Attending Physician  Department of Hospital Medicine  [facility] secure chat preferred, or ext. [phone]  5/5/2022

## 2022-05-05 NOTE — ASSESSMENT & PLAN NOTE
Mr. Kevon Perez, 81 y.o. 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 He presented with chest pain.  - This is difficult situation as he has underwent a DC-PPM which preclude him from receiving any heparin products  - His pain is possible anginal however, with characteristics of changing with deep breath makes it more of post PPM pericardial pain  - He is chest pain free now, with no hemodynamic instability, and preserved EF on bedside echo.    RECOMMENDATIONS  - Plan to be admitted to inpatient and work up his RAGHU on CKD  - Repeat TTE, Continue to trend Troponin and notify cardiology if it getting worse

## 2022-05-05 NOTE — HPI
Mr. Kevon Perez is a 81 y.o. 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 He presented to ED after he was instructed by the general cardiology MA/RN when he started to complain of chest pain that occurred yesterday. He descried the pain and tenderness in pericardial area that is worsen with deep breath. He took first nitro with no alleviation of pain, however pain improved with third nitro and the pain subsided and did not recur. He slept fine and woke up this morning with no further pain. He called cardiology clinic who asked him to come to ED for work up. On his arrival to ED he has had no recurrence of chest pain and his vital signs has been normal. No pericardial effusion on his bedside echo and his EF is preserved. Of note, he underwent LHC on 4/29 (last week) which revealed multi-vessel CAD. . CTS consulted and recommended medical versus PCI. The plan from IC is to proceed with multi-stage PCI when renal function more stable.     Since admission, he was found to have EF <20 (65% one week ago) and worsening creatinine. His troponin has been elevated in the 30s and he was started on acs protocol. BP has been low with SBP in the 80-90s and he is requiring NC O2 for hypoxia. He has had minimal uop despite lasix and diuril. IC consulted for evaluation +/- IABP.

## 2022-05-05 NOTE — ASSESSMENT & PLAN NOTE
Mr. Kevon Perez, 81 y.o. 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 He presented with chest pain, SOB, cough.  -Initial workup was notable for BNP 1194, uptrending troponin, worsening BUN/Cr. CXR showed Bibasilar edema.   -Given his tachycardia and dyspnea, there were initial concerns for PE, however it is unlikely as LE U/S, V/Q scan non-concerning, and absence of right heart strain on TTE.     Recent Labs     05/04/22  1135 05/04/22  1426 05/05/22  0450   TROPONINI 33.585* 27.388* 35.947*       Intake/Output Summary (Last 24 hours) at 5/5/2022 0955  Last data filed at 5/5/2022 0646  Gross per 24 hour   Intake 240 ml   Output 780 ml   Net -540 ml       Net IO Since Admission: -540 mL [05/05/22 0955]    -Given his TTE showing significant worsening of Ejection Fraction to less than 20%, signs of end-organ damage(acute renal failure, troponinemia, transaminitis), there are concerns for cardiogenic shock.    RECOMMENDATIONS  - Interventional Cardiology consulted, continue ACS protocol with heparin, aspirin, clopidogrel  - Hold AV sue blocking agents as concerns for cardiogenic shock  - Hemodynamics measurement   - Trend Troponin   - CCU team consulted, Transfer to CCU service

## 2022-05-05 NOTE — PLAN OF CARE
Problem: Adult Inpatient Plan of Care  Goal: Plan of Care Review  Outcome: Ongoing, Progressing  Goal: Patient-Specific Goal (Individualized)  Outcome: Ongoing, Progressing  Goal: Absence of Hospital-Acquired Illness or Injury  Outcome: Ongoing, Progressing  Goal: Optimal Comfort and Wellbeing  Outcome: Ongoing, Progressing  Goal: Readiness for Transition of Care  Outcome: Ongoing, Progressing     Problem: Diabetes Comorbidity  Goal: Blood Glucose Level Within Targeted Range  Outcome: Ongoing, Progressing     Problem: Fluid and Electrolyte Imbalance (Acute Kidney Injury/Impairment)  Goal: Fluid and Electrolyte Balance  Outcome: Ongoing, Progressing     Problem: Oral Intake Inadequate (Acute Kidney Injury/Impairment)  Goal: Optimal Nutrition Intake  Outcome: Ongoing, Progressing     Problem: Fall Injury Risk  Goal: Absence of Fall and Fall-Related Injury  Outcome: Ongoing, Progressing     Problem: Renal Function Impairment (Acute Kidney Injury/Impairment)  Goal: Effective Renal Function  Outcome: Ongoing, Progressing     Problem: Fall Injury Risk  Goal: Absence of Fall and Fall-Related Injury  Outcome: Ongoing, Progressing   Cardiology Step Down. See progress note and flowsheet. Plan: Continue to monitor patient and report any abnormalities in labs, vitals signs, and body system functions to physician as indicated. Patient was given education on their disease process and medications.

## 2022-05-05 NOTE — NURSING
Cardiology called in regards to patient voiding 100cc of urine. Cardiology requested additional bladder scan be performed. Result of 42. Cardiology informed. Flow sheet updated.

## 2022-05-05 NOTE — EICU
Rounding (Video Assessment):  Yes    Intervention Initiated From:  Bedside    Mikaela Communicated with Bedside Nurse regarding:  Documentation    Nurse Notified:  Yes    Doctor Notified: Dr. Celina Olguin  Yes    Comments: Called to document. Time out completed earlier  1700 Left wrist prepped with chlora prep. Sterile towels around area  1703 Using live US to locate left radial artery, site injected with Lidocaine 1 % no epinephrine  1705 Needle inserted, Blood return, then removed. Pressure held  1714 Catheter inserted, blood return, advanced glide wire, then floated 20 angio catheter. Connected to pressure cable. Sutured down angio catheter Line calibrated and zero. Good wave form. Biopatch applied then covered with tegaderm. Patient tolerated well

## 2022-05-05 NOTE — NURSING
Patient reporting increased SOB with productive blood tinged sputum. Additional bladder scan was performed due to decrease of urine output post x1 additional dose of IVP lasix 80mg. Bladder scan showed volume of 56. Straight cath was performed with result of appox 80 ml.  Patient moved to sitting position to assist with breathing. SAT at 90%. Spoke with cardiology in regards to findings.  Orders placed.

## 2022-05-05 NOTE — NURSING TRANSFER
Nursing Transfer Note      5/5/2022     Reason patient is being transferred: [place] Placement    Transfer to CICU Room 3090 from     Transfer via Bed    Transfer with Telemetry monitoring    Transported by RN    Medicines sent: Insulin    Any special needs or follow-up needed:Home CPAP at     Chart send with patient: Yes    Notified: CINDY Parker

## 2022-05-05 NOTE — PLAN OF CARE
POC reviewed with patient and clinical team.     Heparin drip @ 15U/kg    Echo completed    Denies chest pain    L chest pacer site with pressure dressing    Telemetry monitoring: paced, HR>100    Voided 200mL urine.     Due to void to obtain UA    Clinical team requesting patient be transferred to ICU.  Charge Nurse aware.    1:00PM  Massey Catheter placed    1:50 PM  APTT 41.1 No rate change    Lasix IVP  Lasix drip initiated      1:51 PM  Report called to CINDY Parker CICU    Patient to be transferred to Room 3090    2:25 PM  Patient transferred via bed to CICU room 3090.  CINDY Parker and clinical team at bedside.

## 2022-05-05 NOTE — PROCEDURES
"Kevon Perez is a 81 y.o. male patient.    Temp: 97.7 °F (36.5 °C) (05/05/22 1600)  Pulse: 110 (05/05/22 1800)  Resp: (!) 29 (05/05/22 1800)  BP: (!) 94/51 (05/05/22 1700)  SpO2: (!) 82 % (05/05/22 1800)  Weight: 103.4 kg (228 lb) (05/05/22 0930)  Height: 5' 10" (177.8 cm) (05/05/22 1530)       Arterial Line    Date/Time: 5/5/2022 6:29 PM  Location procedure was performed: [facility] CARDIOLOGY  Performed by: Celina Olguin MD  Authorized by: Celina Olguin MD   Assisting provider: Shira Stratton MD  Pre-op Diagnosis: CHF  Post-operative diagnosis: Cardiogenic shock  Consent Done: Emergent Situation  Preparation: Patient was prepped and draped in the usual sterile fashion.  Indications: hemodynamic monitoring  Location: left radial  Anesthesia: local infiltration  Number of attempts: 2  Complications: No  Specimens: No  Post-procedure: line sutured  Patient tolerance: Patient tolerated the procedure well with no immediate complications          5/5/2022  "

## 2022-05-06 PROBLEM — I50.43 ACUTE ON CHRONIC COMBINED SYSTOLIC AND DIASTOLIC HEART FAILURE: Status: ACTIVE | Noted: 2022-01-01

## 2022-05-06 PROBLEM — R79.89 TROPONIN LEVEL ELEVATED: Status: RESOLVED | Noted: 2022-01-01 | Resolved: 2022-01-01

## 2022-05-06 NOTE — PLAN OF CARE
Cardiac ICU Care Plan    POC reviewed with Kevon Perez and family. Questions and concerns addressed. Pt VSS. Adjusted gtts and Nickie per orders/emar. Will continue to monitor. See below and flowsheets for full assessment and VS info.       Neuro:  Vaughan Coma Scale  Best Eye Response: 4-->(E4) spontaneous  Best Motor Response: 6-->(M6) obeys commands  Best Verbal Response: 5-->(V5) oriented  Coretta Coma Scale Score: 15  Assessment Qualifiers: patient not sedated/intubated  Pupil PERRLA: yes    24 hr Temp:  [98.5 °F (36.9 °C)-99.4 °F (37.4 °C)]      CV:  Rhythm: paced rhythm, sinus tachycardia   DVT prophylaxis: VTE Required Core Measure: Pharmacological prophylaxis initiated/maintained    CVP (mean): 2 mmHg (05/06/22 1215)    Pulmonary Artery Catheter Assessment  05/05/22 1625 right internal jugular-Current Insertion Depth (cm): 55 cm (05/06/22 1501)  PAP: 27/18 (05/06/22 1700)  SVO2 (%): 61 % (05/05/22 2000)    IABP Mode: Auto  IABP Rate: 1:1  IABP Trigger: ECG  IABP Augmented Diastolic Pressure: 96          Pulses  Right Radial Pulse: 2+ (normal)  Left Radial Pulse: 2+ (normal)  Right Dorsalis Pedis Pulse: Doppler  Left Dorsalis Pedis Pulse: Doppler  Right Posterior Tibial Pulse: Doppler  Left Posterior Tibial Pulse: Doppler    Resp:  O2 Device (Oxygen Therapy): BiPAP  Flow (L/min): 15  Vent Mode: Spont  Oxygen Concentration (%): 40  PEEP/CPAP: 5 cmH20  Pressure Support: 15 cmH20    GI/:  GI prophylaxis: yes  Diet/Nutrition Received: 2 gram sodium  Last Bowel Movement: 04/28/22  Voiding Characteristics: urethral catheter (bladder)       Urethral Catheter 05/05/22 1300 16 Fr.-Reason for Continuing Urinary Catheterization: Critically ill in ICU and requiring hourly monitoring of intake/output   Intake/Output Summary (Last 24 hours) at 5/6/2022 1751  Last data filed at 5/6/2022 1700  Gross per 24 hour   Intake 1853.75 ml   Output 4335 ml   Net -2481.25 ml          Labs/Accuchecks:  Recent Labs   Lab  05/05/22  0450 05/05/22  1438 05/06/22  0340   WBC 14.01*  14.13* 15.20* 12.46   RBC 4.09*  4.08* 4.04* 3.55*   HGB 11.7*  11.9* 11.6* 10.5*   HCT 37.2*  37.0* 35.4* 32.4*     157 152 129*      Recent Labs   Lab 05/02/22  0921 05/04/22  1926 05/05/22  1438 05/05/22  2031 05/06/22  0340 05/06/22  0915   INR 1.1  --  1.1  --   --   --    APTT  --    < >  --  26.2 31.8 41.0*    < > = values in this interval not displayed.      Recent Labs     05/06/22  0915 05/06/22  1208 05/06/22  1710   *   < > 132*   K 4.0   < > 4.3   CO2 24   < > 22*      < > 96   BUN 64*   < > 66*   CREATININE 2.6*   < > 2.8*   ALKPHOS 43*  --   --    ALT 36  --   --    AST 74*  --   --    BILITOT 1.2*  --   --     < > = values in this interval not displayed.       Recent Labs   Lab 05/04/22  1426 05/05/22  0450 05/05/22  1230   TROPONINI 27.388* 35.947* 40.689*  39.987*  39.388*      Recent Labs     05/06/22  0024 05/06/22  0537 05/06/22  0916 05/06/22  1208   PH 7.338* 7.354  --  7.357   PCO2 41.6 43.6  --  44.0   PO2 35* 42 35* 36*   HCO3 22.4* 24.3  --  24.7   POCSATURATED 63* 74* 64* 65*   BE -3 -1  --  -1       Electrolytes: No replacement orders  Accuchecks: ACHS    Gtts/LDAs:   sodium chloride 0.9% 5 mL/hr at 05/06/22 1700    sodium chloride 0.9% 8 mL/hr at 05/06/22 1700    DOBUTamine IV infusion (non-titrating) 2.5 mcg/kg/min (05/06/22 1700)    EPINEPHrine 0.02 mcg/kg/min (05/06/22 1700)    heparin (porcine) in D5W 15 Units/kg/hr (05/06/22 1700)    nitric oxide gas      vasopressin 0.04 Units/min (05/06/22 1711)       Lines/Drains/Airways       Central Venous Catheter Line  Duration             Introducer 05/05/22 1510 right internal jugular 1 day    Pulmonary Artery Catheter Assessment  05/05/22 1625 right internal jugular 1 day              Drain  Duration                  Urethral Catheter 05/05/22 1300 16 Fr. 1 day              Arterial Line  Duration             Arterial Line 05/05/22 1714 Left Radial 1 day               Line  Duration                  IABP 05/05/22 1905 7.5 Fr. 40 mL <1 day              Peripheral Intravenous Line  Duration                  Peripheral IV - Single Lumen 05/04/22 1135 20 G Right Forearm 2 days         Peripheral IV - Single Lumen 05/05/22 1538 20 G Left Forearm 1 day                    Skin/Wounds  Bathing/Skin Care: electrode patches/site rotation (05/04/22 2200)  Wounds: No  Wound care consulted: No

## 2022-05-06 NOTE — H&P
[facility] - Cardiac Intensive Care  Cardiology  History and Physical     Patient Name: Kevon Perez  MRN: [id number]  Admission Date: 5/4/2022  Code Status: Full Code   Attending Provider:  MIREILLE LARKIN MD  Primary Care Physician: Cipriano Sol MD  Principal Problem:Cardiogenic shock    Patient information was obtained from patient, relative(s), past medical records and ER records.     Subjective:     Chief Complaint:  Chest pain     HPI:  Mr. Kevon Perez, 81 y.o. 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 He presented to ED after he was instructed by the general cardiology MA/RN when he started to complain of chest pain that occurred yesterday. He descried the pain and tenderness in pericardial area that is worsen with deep breath. He took first nitro with no alleviation of pain, however pain improved with third nitro and the pain subsided and did not recur. He slept fine and woke up this morning with no further pain. He called cardiology clinic who asked him to come to ED for work up. On his arrival to ED till my evaluation, no recurrence of chest pain and his vital signs has been normal. No pericardial effusion on his bedside echo and his EF is preserved. Of note, he underwent LHC on 4/29 (last week) which revealed multi-vessel CAD. . CTS consulted and recommended medical versus PCI. The plan from IC is to proceed with multi-stage PCI. Patient developed hypotension. Labs revealed worsening RAGHU (sCr 2.4->2.9). Lactic 1.8. TTE revelaed drop in EF from 65-> 20%. Patient was transferred to CICU for close monitoring.       Past Medical History:   Diagnosis Date    Acute coronary syndrome 9/10/09    STEMI    Anticoagulant long-term use     plavix    Basal cell cancer     BCC (basal cell carcinoma of skin)     nose    Cancer of bladder January 2013    Cataract     Chronic kidney disease     Colon polyp      Coronary artery disease     CPAP (continuous positive airway pressure) dependence     Diabetes mellitus     Diabetic retinopathy     Encounter for blood transfusion     GERD (gastroesophageal reflux disease)     High cholesterol     Hyperlipidemia     Hypertension     Iron deficiency anemia 5/11/2018    GINGER (obstructive sleep apnea)     CPAP     Renal manifestation of secondary diabetes mellitus     SOB (shortness of breath)        Past Surgical History:   Procedure Laterality Date    A-V CARDIAC PACEMAKER INSERTION N/A 5/2/2022    Procedure: INSERTION, CARDIAC PACEMAKER, DUAL CHAMBER;  Surgeon: Paulie Avendaño MD;  Location: [facility] EP LAB;  Service: Cardiology;  Laterality: N/A;  CHB, DUAL PPM, MDT, ANES, GP,     BASAL CELL CARCINOMA EXCISION      nose     BLADDER SURGERY      bladder cancer    CARDIAC CATHETERIZATION      CATARACT EXTRACTION      bilateral     COLONOSCOPY  3/26/15    COLONOSCOPY N/A 12/4/2020    Procedure: COLONOSCOPY;  Surgeon: Keshav Rajan MD;  [facility]  Service: Endoscopy;  Laterality: N/A;  covid test 12/1-pcw-tb  pt SOB x 1 year-ok to hold Plavix x 5 days per Dr. Sol-see telephone encounter dated 8/25  10/14-instructions emailed to [email or web address]    CORONARY ANGIOGRAPHY Left 4/29/2022    Procedure: ANGIOGRAM, CORONARY ARTERY;  Surgeon: Joesph Cast MD;  Location: [facility] CATH LAB;  Service: Cardiology;  Laterality: Left;    CORONARY ANGIOPLASTY  9/10/09    CFX    CORONARY ANGIOPLASTY WITH STENT PLACEMENT      CYSTOSCOPY      ESOPHAGOGASTRODUODENOSCOPY N/A 1/27/2021    Procedure: EGD (ESOPHAGOGASTRODUODENOSCOPY);  Surgeon: Matt Acosta MD;  Location: [facility] ENDO;  Service: Endoscopy;  Laterality: N/A;    EYE SURGERY      hydrocel         Review of patient's allergies indicates:   Allergen Reactions    Penicillins Other (See Comments)     Muscle stiffness    Bactrim [sulfamethoxazole-trimethoprim] Rash       No  current facility-administered medications on file prior to encounter.     Current Outpatient Medications on File Prior to Encounter   Medication Sig    acetaminophen (TYLENOL) 500 MG tablet Take 500 mg by mouth daily as needed for Pain.    ascorbic acid (VITAMIN C ORAL) Take 1 tablet by mouth once daily.    atorvastatin (LIPITOR) 20 MG tablet TAKE 1 TABLET(20 MG) BY MOUTH EVERY DAY    calcitRIOL (ROCALTROL) 0.25 MCG Cap TAKE 1 CAPSULE BY MOUTH EVERY DAY    clopidogreL (PLAVIX) 75 mg tablet TAKE 1 TABLET(75 MG) BY MOUTH EVERY DAY    doxycycline (VIBRAMYCIN) 100 MG Cap Take 1 capsule (100 mg total) by mouth every 12 (twelve) hours for 5 days    ergocalciferol, vitamin D2, (VITAMIN D ORAL) Take 1 capsule by mouth once daily.    finasteride (PROSCAR) 5 mg tablet Take 1 tablet (5 mg total) by mouth once daily.    insulin aspart U-100 (NOVOLOG FLEXPEN U-100 INSULIN) 100 unit/mL (3 mL) InPn pen Inject 5 Units into the skin 3 (three) times daily with meals. (Patient taking differently: Inject 8-10 units with breakfast and 15 units with lunch & dinner.)    insulin degludec (TRESIBA FLEXTOUCH U-100) 100 unit/mL (3 mL) insulin pen Inject 10 Units into the skin once daily. (Patient taking differently: Inject 22 Units into the skin once daily.)    irbesartan (AVAPRO) 300 MG tablet TAKE 1 TABLET(300 MG) BY MOUTH EVERY EVENING    isosorbide dinitrate (ISORDIL) 20 MG tablet Take 1 tablet (20 mg total) by mouth 3 (three) times daily.    loperamide (ANTI-DIARRHEAL, LOPERAMIDE,) 2 mg Tab Take per package directions as needed for diarrhea.    nitroGLYCERIN (NITROSTAT) 0.4 MG SL tablet TAKE 1 TABLET UNDER THE TONGUE EVERY 5 MINUTES AS NEEDED    propylene glycol/peg 400/PF (SYSTANE, PF, OPHT) Place 1 drop into both eyes 2 (two) times daily as needed (Dry eye).    psyllium (METAMUCIL) powder Take 1 packet by mouth daily as needed (Constipation).    tamsulosin (FLOMAX) 0.4 mg Cap TAKE 1 CAPSULE(0.4 MG) BY MOUTH EVERY  "EVENING    ZINC ORAL Take 1 tablet by mouth once daily.    aspirin 81 MG Chew Chew and swallow 1 tablet (81 mg total) by mouth once daily.    BD ULTRA-FINE SHORT PEN NEEDLE 31 gauge x 5/16" Ndle USE UP TO 6 TIMES DAILY    blood sugar diagnostic (TRUE METRIX GLUCOSE TEST STRIP) Strp USE TO CHECK BLOOD SUGAR FOUR TIMES DAILY    blood-glucose meter kit To check BG 4 times daily, to use with insurance preferred meter    sodium bicarbonate 650 MG tablet Take 1 tablet (650 mg total) by mouth 3 (three) times daily.    torsemide (DEMADEX) 10 MG Tab Take 1 tablet (10 mg total) by mouth once daily.    TRUEPLUS LANCETS 30 gauge Misc USE TO CHECK BLOOD SUGAR FOUR TIMES DAILY    [DISCONTINUED] amLODIPine (NORVASC) 5 MG tablet TAKE 1 TABLET(5 MG) BY MOUTH EVERY DAY    [DISCONTINUED] hydroCHLOROthiazide (HYDRODIURIL) 12.5 MG Tab TAKE 1 TABLET(12.5 MG) BY MOUTH EVERY DAY     Family History       Problem Relation (Age of Onset)    Alcohol abuse Brother    Cancer Maternal Aunt    Cataracts Mother    Diabetes Father, Sister, Paternal Uncle    Goiter Mother    Heart disease Father, Mother    Hypertension Father    No Known Problems Daughter, Son, Son          Tobacco Use    Smoking status: Former Smoker     Packs/day: 1.00     Years: 40.00     Pack years: 40.00     Types: Cigarettes     Quit date: 1970     Years since quittin.8    Smokeless tobacco: Former User   Substance and Sexual Activity    Alcohol use: Yes     Alcohol/week: 3.0 standard drinks     Types: 1 Cans of beer, 1 Shots of liquor, 1 Standard drinks or equivalent per week    Drug use: No    Sexual activity: Not Currently     Review of Systems   Constitutional: Negative for chills and fever.   Cardiovascular:  Positive for dyspnea on exertion. Negative for chest pain and palpitations.   Respiratory:  Negative for shortness of breath.    Endocrine: Negative for polyuria.   Genitourinary:  Negative for bladder incontinence.   Psychiatric/Behavioral:  " Negative for altered mental status.    Objective:     Vital Signs (Most Recent):  Temp: 98.5 °F (36.9 °C) (05/06/22 1101)  Pulse: 105 (05/06/22 1700)  Resp: (!) 23 (05/06/22 1700)  BP: (!) 105/57 (05/05/22 1715)  SpO2: 97 % (05/06/22 1700)   Vital Signs (24h Range):  Temp:  [98.5 °F (36.9 °C)-99.4 °F (37.4 °C)] 98.5 °F (36.9 °C)  Pulse:  [] 105  Resp:  [15-38] 23  SpO2:  [90 %-100 %] 97 %  Arterial Line BP: (105-133)/(35-60) 107/46     Weight: 103.4 kg (228 lb)  Body mass index is 32.71 kg/m².    SpO2: 97 %  O2 Device (Oxygen Therapy): BiPAP      Intake/Output Summary (Last 24 hours) at 5/6/2022 1756  Last data filed at 5/6/2022 1700  Gross per 24 hour   Intake 1853.75 ml   Output 4335 ml   Net -2481.25 ml       Lines/Drains/Airways       Central Venous Catheter Line  Duration             Introducer 05/05/22 1510 right internal jugular 1 day    Pulmonary Artery Catheter Assessment  05/05/22 1625 right internal jugular 1 day              Drain  Duration                  Urethral Catheter 05/05/22 1300 16 Fr. 1 day              Arterial Line  Duration             Arterial Line 05/05/22 1714 Left Radial 1 day              Line  Duration                  IABP 05/05/22 1905 7.5 Fr. 40 mL <1 day              Peripheral Intravenous Line  Duration                  Peripheral IV - Single Lumen 05/04/22 1135 20 G Right Forearm 2 days         Peripheral IV - Single Lumen 05/05/22 1538 20 G Left Forearm 1 day                    Physical Exam  Vitals and nursing note reviewed.   Constitutional:       General: He is not in acute distress.     Appearance: Normal appearance. He is obese. He is not ill-appearing, toxic-appearing or diaphoretic.   HENT:      Head: Normocephalic.      Nose: Nose normal.      Mouth/Throat:      Mouth: Mucous membranes are moist.   Eyes:      Extraocular Movements: Extraocular movements intact.   Cardiovascular:      Rate and Rhythm: Normal rate and regular rhythm.      Pulses: Normal pulses.       Heart sounds: Normal heart sounds.   Pulmonary:      Effort: Pulmonary effort is normal. No respiratory distress.      Breath sounds: Normal breath sounds.   Abdominal:      General: Abdomen is flat. Bowel sounds are normal. There is no distension.      Tenderness: There is no abdominal tenderness.   Musculoskeletal:         General: No swelling or tenderness. Normal range of motion.      Cervical back: Normal range of motion.   Skin:     General: Skin is warm.   Neurological:      Mental Status: He is alert and oriented to person, place, and time. Mental status is at baseline.   Psychiatric:         Mood and Affect: Mood normal.         Behavior: Behavior normal.         Thought Content: Thought content normal.       Significant Labs: Reviewed    Significant Imaging: Reviewed    Assessment and Plan:     * Cardiogenic shock  Mr. Kevon Perez, 81 y.o. 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 He presented with chest pain, SOB, cough.  -Initial workup was notable for BNP 1194, uptrending troponin, worsening BUN/Cr. CXR showed Bibasilar edema.   -Given his tachycardia and dyspnea, there were initial concerns for PE, however it is unlikely as LE U/S, V/Q scan non-concerning, and absence of right heart strain on TTE.     Recent Labs     05/04/22  1426 05/05/22  0450 05/05/22  1230   TROPONINI 27.388* 35.947* 40.689*  39.987*  39.388*       Intake/Output Summary (Last 24 hours) at 5/6/2022 1759  Last data filed at 5/6/2022 1700  Gross per 24 hour   Intake 1853.75 ml   Output 4335 ml   Net -2481.25 ml       Net IO Since Admission: -2,951.85 mL [05/06/22 1759]    -Given his TTE showing significant worsening of Ejection Fraction to less than 20%, signs of end-organ damage(acute renal failure, troponinemia, transaminitis), there were concerns for cardiogenic shock for which patient was transferred to the CCU .    Plan  -  Transferred to CCU  - Interventional Cardiology consulted, continue ACS protocol with heparin, aspirin, clopidogrel  - s/p Aurora Kaykay catheter for hemodynamics montioring and IABP balloon pump MCS on 05/05.   - Initiated on  gtt, vasopressin gtt. Wean pressors and inotropic support as tolerated  - Hold AV sue blocking agents as concerns for cardiogenic shock  - Given leukocytosis, initiatied antibiotics an de-escalate if no infectious foci.      Acute on chronic combined systolic and diastolic heart failure  TTE with new EF of 20% with mod systolic RV function.     - Continue aggressive diuresis with lasix gtt at 20mg/hr.  - Strict I/Os  - Replete electrolytes as needed    Cardiac pacemaker  Status-post successful dual chamber pacemaker implantation.         Acute renal failure superimposed on stage 4 chronic kidney disease  - baseline around 2, on admit 2.5  - Likely 2/2 to cardiorenal syndrome  - continue diuresis.   - avoid nephrotoxins; renally dose meds    Acute respiratory failure with hypoxia  Patient with Hypoxic Respiratory failure which is Acute.  he is not on home oxygen. Supplemental oxygen was provided and noted- Vent Mode: Spont  Oxygen Concentration (%):  [12-60] 40  Resp Rate Total:  [13 br/min-28 br/min] 28 br/min  PEEP/CPAP:  [5 cmH20] 5 cmH20  Pressure Support:  [15 cmH20] 15 cmH20  Mean Airway Pressure:  [8.7 syC09-93 cmH20] 11 cmH20.   Signs/symptoms of respiratory failure include- tachypnea. Contributing diagnoses includes - CHF Labs and images were reviewed. Patient Has not had a recent ABG. Will treat underlying causes and adjust management of respiratory failure as follows- IV diuresis    Hypertension associated with diabetes  Holding home meds int he setting of cardiogenic shock    Type 2 diabetes mellitus with neurologic complication  - LDSSI  - basal bolus insulin and prandial insulin, titrate as needed  - hypoglycemia protocol , ACHS accuchecks       VTE Risk Mitigation (From  admission, onward)         Ordered     IP VTE HIGH RISK PATIENT  Once         05/05/22 1035     Place sequential compression device  Until discontinued         05/05/22 1035     heparin 25,000 units in dextrose 5% 250 mL (100 units/mL) infusion LOW INTENSITY nomogram - [facility]  Continuous        Question Answer Comment   Heparin Infusion Adjustment (DO NOT MODIFY ANSWER) [email or web address]\[email or web address] LOW INTENSITY nomogram for [facility] [id number]    Begin at (in units/kg/hr) 12        05/04/22 1512     Place sequential compression device  Until discontinued         05/04/22 1418     Place sequential compression device  Until discontinued         05/04/22 1418                Yohana Delvalle MD  Cardiology   [facility] - Cardiac Intensive Care

## 2022-05-06 NOTE — PLAN OF CARE
[facility] - Cardiac Int[facility]  Discharge Reassessment    Primary Care Provider: Cipriano Sol MD    Expected Discharge Date: 5/13/2022    Reassessment (most recent)     Discharge Reassessment - 05/06/22 1520        Discharge Reassessment    Assessment Type Discharge Planning Reassessment     Did the patient's condition or plan change since previous assessment? Yes     Communicated HEMANT with patient/caregiver Yes     Discharge Plan A Long-term acute care facility (LTAC)     Discharge Plan B Rehab     DME Needed Upon Discharge  none     Discharge Barriers Identified None     Why the patient remains in the hospital Requires continued medical care               Pt final d/c dispo TBD once medically stable.    Julie Haase RN  Case Management [phone]

## 2022-05-06 NOTE — ASSESSMENT & PLAN NOTE
TTE with new EF of 20% with mod systolic RV function.     -  Responded well to diuresis voernight. UOP 200cc/hr. Will hold diuretics given CVP 4.   - Strict I/Os  - Replete electrolytes as needed

## 2022-05-06 NOTE — CARE UPDATE
Cardiology Update Note    IABP placed by IC team at bedside, appreciate assistance. Epi weaned off after IABP palcement. Obtain hemodynamics at 1 hr interval (2015). Restart heparin gtt at 2115. Caution for the recently placed Biotronik PPM to the left chest wall.   Patient device interrogated as requested by day team for concerns of arrhythmia. Frequent ectopy noted with many PVCs but no events recorded. Per house staff and primary, this correlated with timing of [place] placement.   Will monitor for events overnight. If patient requires  based on hemodynamics, will monitor for ectopy/ventricular arrhythmias.      Please call with questions or concerns.     Gil Bourgeois MD  Cardiology PGY4  [facility]

## 2022-05-06 NOTE — PROGRESS NOTES
Cardiology Update Note     Hemodynamics at 630    CVP 4  SVO2 74  CO 10.25  CI 5.26    UOP about 200 cc/hr    Currently on  2.5, Nickie 5, vasopressin 0.04, Lasix 20 mg/hr, heparin gtt, IABP 1:1.    Discontinue vasopressin and repeat hemodynamics at 9 am. Will inform day team shortly as shift change is moments away.    Gil Bourgeois MD  Cardiology PGY4   [facility]

## 2022-05-06 NOTE — CARE UPDATE
Discussed with patient;s daughter Yenni [phone] and son Partha (POA) [phone] at bedside about care plan. All questions answered. If any major updates, please call family at numbers listed above.     Please call with questions or concerns.     Gil Bourgeois MD  Cardiology PGY4  [facility]

## 2022-05-06 NOTE — SUBJECTIVE & OBJECTIVE
Interval History: NAEON. Patient's diuresis markedly improved with lasix gtt. On BiPAP overnight. This AM, he was Currently on  2.5, Nickie 5, vasopressin 0.04, Lasix 20 mg/hr, heparin gtt, IABP 1:1.  CO 7.3, CI 3.3     Review of Systems   Constitutional: Negative for chills and fever.   Cardiovascular:  Negative for chest pain and palpitations.   Respiratory:  Positive for shortness of breath.    Endocrine: Negative for polyuria.   Genitourinary:  Negative for bladder incontinence.   Psychiatric/Behavioral:  Negative for altered mental status.    Objective:     Vital Signs (Most Recent):  Temp: 98.5 °F (36.9 °C) (05/06/22 1101)  Pulse: 105 (05/06/22 1700)  Resp: (!) 23 (05/06/22 1700)  BP: (!) 105/57 (05/05/22 1715)  SpO2: 97 % (05/06/22 1700)   Vital Signs (24h Range):  Temp:  [98.5 °F (36.9 °C)-99.4 °F (37.4 °C)] 98.5 °F (36.9 °C)  Pulse:  [] 105  Resp:  [15-38] 23  SpO2:  [90 %-100 %] 97 %  Arterial Line BP: (105-133)/(35-60) 107/46     Weight: 103.4 kg (228 lb)  Body mass index is 32.71 kg/m².     SpO2: 97 %  O2 Device (Oxygen Therapy): BiPAP      Intake/Output Summary (Last 24 hours) at 5/6/2022 1809  Last data filed at 5/6/2022 1700  Gross per 24 hour   Intake 1765.98 ml   Output 4305 ml   Net -2539.02 ml       Lines/Drains/Airways       Central Venous Catheter Line  Duration             Introducer 05/05/22 1510 right internal jugular 1 day    Pulmonary Artery Catheter Assessment  05/05/22 1625 right internal jugular 1 day              Drain  Duration                  Urethral Catheter 05/05/22 1300 16 Fr. 1 day              Arterial Line  Duration             Arterial Line 05/05/22 1714 Left Radial 1 day              Line  Duration                  IABP 05/05/22 1905 7.5 Fr. 40 mL <1 day              Peripheral Intravenous Line  Duration                  Peripheral IV - Single Lumen 05/04/22 1135 20 G Right Forearm 2 days         Peripheral IV - Single Lumen 05/05/22 1538 20 G Left Forearm 1 day                     Physical Exam  Vitals and nursing note reviewed.   Constitutional:       General: He is not in acute distress.     Appearance: Normal appearance. He is obese. He is not ill-appearing, toxic-appearing or diaphoretic.   HENT:      Head: Normocephalic.      Nose: Nose normal.      Mouth/Throat:      Mouth: Mucous membranes are moist.   Eyes:      Extraocular Movements: Extraocular movements intact.   Cardiovascular:      Rate and Rhythm: Normal rate and regular rhythm.      Pulses: Normal pulses.      Heart sounds: Normal heart sounds.   Pulmonary:      Effort: Pulmonary effort is normal. No respiratory distress.      Breath sounds: Normal breath sounds.   Abdominal:      General: Abdomen is flat. Bowel sounds are normal. There is no distension.      Tenderness: There is no abdominal tenderness.   Musculoskeletal:         General: No swelling or tenderness. Normal range of motion.      Cervical back: Normal range of motion.   Skin:     General: Skin is warm.   Neurological:      Mental Status: He is alert and oriented to person, place, and time. Mental status is at baseline.   Psychiatric:         Mood and Affect: Mood normal.         Behavior: Behavior normal.         Thought Content: Thought content normal.       Significant Labs: BMP:   Recent Labs   Lab 05/05/22  0450 05/05/22  1230 05/06/22  0340 05/06/22  0915 05/06/22  1208 05/06/22  1710   *  175*   < > 257* 271* 288* 400*     137   < > 133* 135* 131* 132*   K 5.7*  6.0*   < > 4.2 4.0 4.0 4.3     104   < > 100 100 96 96   CO2 23  23   < > 20* 24 25 22*   BUN 51*  52*   < > 61* 64* 65* 66*   CREATININE 2.9*  2.9*   < > 2.9* 2.6* 2.6* 2.8*   CALCIUM 9.7  9.8   < > 8.6* 8.8 8.9 8.8   MG 1.9  1.9  --  1.7  --   --   --     < > = values in this interval not displayed.   , CMP   Recent Labs   Lab 05/05/22  1438 05/06/22  0340 05/06/22  0915 05/06/22  1208 05/06/22  1710   * 133* 135* 131* 132*   K 4.6 4.2 4.0 4.0  4.3    100 100 96 96   CO2 21* 20* 24 25 22*   * 257* 271* 288* 400*   BUN 53* 61* 64* 65* 66*   CREATININE 2.9* 2.9* 2.6* 2.6* 2.8*   CALCIUM 9.4 8.6* 8.8 8.9 8.8   PROT 6.4 5.2* 5.4*  --   --    ALBUMIN 2.9* 2.5* 2.6*  --   --    BILITOT 1.3* 1.1* 1.2*  --   --    ALKPHOS 48* 44* 43*  --   --    AST 88* 73* 74*  --   --    ALT 41 34 36  --   --    ANIONGAP 11 13 11 10 14   ESTGFRAFRICA 22.3* 22.3* 25.4* 25.4* 23.2*   EGFRNONAA 19.3* 19.3* 22.0* 22.0* 20.1*   , CBC   Recent Labs   Lab 05/05/22  0450 05/05/22  1438 05/06/22  0340   WBC 14.01*  14.13* 15.20* 12.46   HGB 11.7*  11.9* 11.6* 10.5*   HCT 37.2*  37.0* 35.4* 32.4*     157 152 129*   , and INR   Recent Labs   Lab 05/05/22  1438   INR 1.1       Significant Imaging: Reviewed

## 2022-05-06 NOTE — PROGRESS NOTES
Pharmacokinetic Assessment Follow Up: IV Vancomycin    Vancomycin serum concentration assessment(s):    · The random level was an approximately a 5.5 hour post dose level.  · The measurement is above the desired definitive target range of 15 to 20 mcg/mL.  · SCr is decreasing 2.9->2.6mg/dL, patient is diuresing with 3L of recorded urine output yesterday and 925mL recorded thus far today. Estimated Creatinine Clearance: 26.4 mL/min (A) (based on SCr of 2.6 mg/dL (H)).    Vancomycin Regimen Plan:    · Obtain a 23 hour post dose level today at 21:00h and redose per level and renal function.   · Continue pulse dosing until renal function stabilizes.     Drug levels (last 3 results):  Recent Labs   Lab Result Units 05/06/22  0340   Vancomycin, Random ug/mL 24.7       Pharmacy will continue to follow and monitor vancomycin.    Please contact pharmacy at extension [phone] for questions regarding this assessment.    Thank you for the consult,   Katelyn Tay, PharmD, BCPS, BCCP Cardiology Clinical Pharmacy Specialist  EXT [phone]       Patient brief summary:  Kevon Perez is a 81 y.o. male initiated on antimicrobial therapy with IV Vancomycin for treatment of bacteremia    The patient's current regimen is pulse dosing     Drug Allergies:   Review of patient's allergies indicates:   Allergen Reactions    Penicillins Other (See Comments)     Muscle stiffness    Bactrim [sulfamethoxazole-trimethoprim] Rash       Actual Body Weight:   103.4kg    Renal Function:   Estimated Creatinine Clearance: 26.4 mL/min (A) (based on SCr of 2.6 mg/dL (H)).,     Dialysis Method (if applicable):  N/A    CBC (last 72 hours):  Recent Labs   Lab Result Units 05/04/22  1135 05/05/22  0450 05/05/22  1438 05/06/22  0340   WBC K/uL 13.77* 14.01*  14.13* 15.20* 12.46   Hemoglobin g/dL 11.2* 11.7*  11.9* 11.6* 10.5*   Hematocrit % 35.2* 37.2*  37.0* 35.4* 32.4*   Platelets K/uL 145* 151  157 152 129*   Gran % % 80.8* 84.8* 83.3*  84.6*   Lymph % % 6.8* 5.4* 6.8* 4.9*   Mono % % 11.3 8.8 8.8 9.6   Eosinophil % % 0.2 0.1 0.1 0.0   Basophil % % 0.4 0.4 0.4 0.4   Differential Method  Automated Automated Automated Automated       Metabolic Panel (last 72 hours):  Recent Labs   Lab Result Units 05/04/22  1135 05/04/22  1137 05/05/22  0450 05/05/22  1204 05/05/22  1230 05/05/22  1438 05/06/22  0340 05/06/22  0915   Sodium mmol/L 136  --  136  137  --  136 135* 133* 135*   Potassium mmol/L 4.4  --  5.7*  6.0*  --  4.4 4.6 4.2 4.0   Chloride mmol/L 107  --  103  104  --  103 103 100 100   CO2 mmol/L 20*  --  23  23  --  21* 21* 20* 24   Glucose mg/dL 188*  --  174*  175*  --  173* 179* 257* 271*   Glucose, UA   --   --   --  Negative  --   --   --   --    BUN mg/dL 45*  --  51*  52*  --  56* 53* 61* 64*   Creatinine mg/dL 2.5*  --  2.9*  2.9*  --  2.9* 2.9* 2.9* 2.6*   Albumin g/dL 3.1*  --  2.8*  2.8*  --   --  2.9* 2.5* 2.6*   Total Bilirubin mg/dL 1.4*  --  1.1*  1.2*  --   --  1.3* 1.1* 1.2*   Alkaline Phosphatase U/L 44*  --  46*  44*  --   --  48* 44* 43*   AST U/L 117*  --  91*  92*  --   --  88* 73* 74*   ALT U/L 43  --  36  37  --   --  41 34 36   Magnesium mg/dL  --  1.8 1.9  1.9  --   --   --  1.7  --    Phosphorus mg/dL  --   --  3.7  3.8  --   --   --  4.0  --        Vancomycin Administrations:  vancomycin given in the last 96 hours                   vancomycin 1.5 g in dextrose 5 % 250 mL IVPB (ready to mix) (mg) 1,500 mg New Bag 05/05/22 2211    vancomycin injection (mg) 1,000 mg Given 05/02/22 1350    vancomycin (VANCOCIN) 1,500 mg in sodium chloride 0.9% 250 mL IVPB (mg) 1,500 mg New Bag 05/02/22 1350                Microbiologic Results:  Microbiology Results (last 7 days)     Procedure Component Value Units Date/Time    Blood culture [[id number]] Collected: 05/05/22 1230    Order Status: Completed Specimen: Blood from Antecubital, Left Arm Updated: 05/06/22 0115     Blood Culture, Routine No Growth to date     Narrative:      Collection has been rescheduled by CBE at 05/05/2022 09:26 Reason:   Patient in Echo,spoke with Terese,RN  Collection has been rescheduled by TH6 at 05/05/2022 11:52 Reason:   Patient in bathroom will try back  Collection has been rescheduled by CBE at 05/05/2022 09:26 Reason:   Patient in Echo,spoke with Terese,RN  Collection has been rescheduled by TH6 at 05/05/2022 11:52 Reason:   Patient in bathroom will try back    Blood culture [[id number]] Collected: 05/05/22 1237    Order Status: Completed Specimen: Blood from Peripheral, Right Hand Updated: 05/06/22 0115     Blood Culture, Routine No Growth to date    Narrative:      Collection has been rescheduled by CBE at 05/05/2022 09:26 Reason:   Patient in Echo,spoke with Terese,RN  Collection has been rescheduled by TH6 at 05/05/2022 11:52 Reason:   Patient in bathroom will try back  Collection has been rescheduled by CBE at 05/05/2022 09:26 Reason:   Patient in Echo,spoke with Terese,RN  Collection has been rescheduled by TH6 at 05/05/2022 11:52 Reason:   Patient in bathroom will try back

## 2022-05-06 NOTE — NURSING
Spoke with Sharif SCHAFFER about Epi order. Holding until team rounds. Last SVO2 74 and off all pressors.

## 2022-05-06 NOTE — ASSESSMENT & PLAN NOTE
- baseline around 2, on admit 2.5. sCr stable at 2.6  - Likely 2/2 to cardiorenal syndrome  - continue diuresis.   - avoid nephrotoxins; renally dose meds

## 2022-05-06 NOTE — ASSESSMENT & PLAN NOTE
Patient with Hypoxic Respiratory failure which is Acute.  he is not on home oxygen. Supplemental oxygen was provided and noted- Vent Mode: Spont  Oxygen Concentration (%):  [12-60] 40  Resp Rate Total:  [13 br/min-28 br/min] 28 br/min  PEEP/CPAP:  [5 cmH20] 5 cmH20  Pressure Support:  [15 cmH20] 15 cmH20  Mean Airway Pressure:  [8.7 pzY03-07 cmH20] 11 cmH20.   Signs/symptoms of respiratory failure include- tachypnea. Contributing diagnoses includes - CHF Labs and images were reviewed. Patient Has not had a recent ABG. Will treat underlying causes and adjust management of respiratory failure as follows- IV diuresis

## 2022-05-06 NOTE — EICU
Rounding (Video Assessment):  Yes    Intervention Initiated From:  Bedside    Mikaela Communicated with Bedside Nurse regarding:  Time-Out    Nurse Notified:  Yes    Doctor Notified:Dr. Yaakov Calix    Comments: Patient is prepped. Procedure started. Time out complete  1901 7.5 Nepali shealth followed by IABP 40cc balloon  1903 CXR done to verify placement  1905 IABP setting 75/51 (74)  Augmentation 110 ratio 1:1. Patient tolerated well

## 2022-05-06 NOTE — PROGRESS NOTES
Cardiology Update Note     Repeat Hemodynamics at 1230 am    CVP 5  SVO2 63  CO 9.27  CI 4.76    Good UOP    Continue  and Nickie and IABP 1:1 and Lasix gtt at 20 mg/hr  Levophed titrated off  Will consider weaning vasopressin    Gil Bourgeois MD  Cardiology PGY4   [facility]

## 2022-05-06 NOTE — ASSESSMENT & PLAN NOTE
Patient with Hypoxic Respiratory failure which is Acute.  he is not on home oxygen. Supplemental oxygen was provided and noted- Vent Mode: Spont  Oxygen Concentration (%):  [12-60] 40  Resp Rate Total:  [13 br/min-28 br/min] 28 br/min  PEEP/CPAP:  [5 cmH20] 5 cmH20  Pressure Support:  [15 cmH20] 15 cmH20  Mean Airway Pressure:  [8.7 wiL10-30 cmH20] 11 cmH20.   Signs/symptoms of respiratory failure include- tachypnea. Contributing diagnoses includes - CHF Labs and images were reviewed. Patient Has not had a recent ABG. Will treat underlying causes and adjust management of respiratory failure as follows- IV diuresis as needed

## 2022-05-06 NOTE — ASSESSMENT & PLAN NOTE
- baseline around 2, on admit 2.5  - Likely 2/2 to cardiorenal syndrome  - continue diuresis.   - avoid nephrotoxins; renally dose meds

## 2022-05-06 NOTE — ASSESSMENT & PLAN NOTE
- LDSSI  - basal bolus insulin and prandial insulin, titrate as needed  - hypoglycemia protocol , [facility]

## 2022-05-06 NOTE — ASSESSMENT & PLAN NOTE
- LDSSI  - basal bolus insulin and prandial insulin, titrate as needed  - [facility]

## 2022-05-06 NOTE — SUBJECTIVE & OBJECTIVE
Past Medical History:   Diagnosis Date    Acute coronary syndrome 9/10/09    STEMI    Anticoagulant long-term use     plavix    Basal cell cancer     BCC (basal cell carcinoma of skin)     nose    Cancer of bladder January 2013    Cataract     Chronic kidney disease     Colon polyp     Coronary artery disease     CPAP (continuous positive airway pressure) dependence     Diabetes mellitus     Diabetic retinopathy     Encounter for blood transfusion     GERD (gastroesophageal reflux disease)     High cholesterol     Hyperlipidemia     Hypertension     Iron deficiency anemia 5/11/2018    GINGER (obstructive sleep apnea)     CPAP     Renal manifestation of secondary diabetes mellitus     SOB (shortness of breath)        Past Surgical History:   Procedure Laterality Date    A-V CARDIAC PACEMAKER INSERTION N/A 5/2/2022    Procedure: INSERTION, CARDIAC PACEMAKER, DUAL CHAMBER;  Surgeon: Paulie Avendaño MD;  Location: [facility] EP LAB;  Service: Cardiology;  Laterality: N/A;  CHB, DUAL PPM, MDT, ANES, GP,     BASAL CELL CARCINOMA EXCISION      nose     BLADDER SURGERY      bladder cancer    CARDIAC CATHETERIZATION      CATARACT EXTRACTION      bilateral     COLONOSCOPY  3/26/15    COLONOSCOPY N/A 12/4/2020    Procedure: COLONOSCOPY;  Surgeon: Keshav Rajan MD;  Location: [facility] ENDO ([address] [place] [address]);  Service: Endoscopy;  Laterality: N/A;  covid test 12/1-pcw-tb  pt SOB x 1 year-ok to hold Plavix x 5 days per Dr. Sol-see telephone encounter dated 8/25  10/14-instructions emailed to [email or web address]    CORONARY ANGIOGRAPHY Left 4/29/2022    Procedure: ANGIOGRAM, CORONARY ARTERY;  Surgeon: Joesph Cast MD;  Location: [facility] CATH LAB;  Service: Cardiology;  Laterality: Left;    CORONARY ANGIOPLASTY  9/10/09    CFX    CORONARY ANGIOPLASTY WITH STENT PLACEMENT      CYSTOSCOPY      ESOPHAGOGASTRODUODENOSCOPY N/A 1/27/2021    Procedure: EGD (ESOPHAGOGASTRODUODENOSCOPY);  Surgeon: Matt Acosta MD;  Location: [facility]  ENDO;  Service: Endoscopy;  Laterality: N/A;    EYE SURGERY      hydrocel         Review of patient's allergies indicates:   Allergen Reactions    Penicillins Other (See Comments)     Muscle stiffness    Bactrim [sulfamethoxazole-trimethoprim] Rash       No current facility-administered medications on file prior to encounter.     Current Outpatient Medications on File Prior to Encounter   Medication Sig    acetaminophen (TYLENOL) 500 MG tablet Take 500 mg by mouth daily as needed for Pain.    ascorbic acid (VITAMIN C ORAL) Take 1 tablet by mouth once daily.    atorvastatin (LIPITOR) 20 MG tablet TAKE 1 TABLET(20 MG) BY MOUTH EVERY DAY    calcitRIOL (ROCALTROL) 0.25 MCG Cap TAKE 1 CAPSULE BY MOUTH EVERY DAY    clopidogreL (PLAVIX) 75 mg tablet TAKE 1 TABLET(75 MG) BY MOUTH EVERY DAY    doxycycline (VIBRAMYCIN) 100 MG Cap Take 1 capsule (100 mg total) by mouth every 12 (twelve) hours for 5 days    ergocalciferol, vitamin D2, (VITAMIN D ORAL) Take 1 capsule by mouth once daily.    finasteride (PROSCAR) 5 mg tablet Take 1 tablet (5 mg total) by mouth once daily.    insulin aspart U-100 (NOVOLOG FLEXPEN U-100 INSULIN) 100 unit/mL (3 mL) InPn pen Inject 5 Units into the skin 3 (three) times daily with meals. (Patient taking differently: Inject 8-10 units with breakfast and 15 units with lunch & dinner.)    insulin degludec (TRESIBA FLEXTOUCH U-100) 100 unit/mL (3 mL) insulin pen Inject 10 Units into the skin once daily. (Patient taking differently: Inject 22 Units into the skin once daily.)    irbesartan (AVAPRO) 300 MG tablet TAKE 1 TABLET(300 MG) BY MOUTH EVERY EVENING    isosorbide dinitrate (ISORDIL) 20 MG tablet Take 1 tablet (20 mg total) by mouth 3 (three) times daily.    loperamide (ANTI-DIARRHEAL, LOPERAMIDE,) 2 mg Tab Take per package directions as needed for diarrhea.    nitroGLYCERIN (NITROSTAT) 0.4 MG SL tablet TAKE 1 TABLET UNDER THE TONGUE EVERY 5 MINUTES AS NEEDED    propylene glycol/peg 400/PF (SYSTANE,  "PF, OPHT) Place 1 drop into both eyes 2 (two) times daily as needed (Dry eye).    psyllium (METAMUCIL) powder Take 1 packet by mouth daily as needed (Constipation).    tamsulosin (FLOMAX) 0.4 mg Cap TAKE 1 CAPSULE(0.4 MG) BY MOUTH EVERY EVENING    ZINC ORAL Take 1 tablet by mouth once daily.    aspirin 81 MG Chew Chew and swallow 1 tablet (81 mg total) by mouth once daily.    BD ULTRA-FINE SHORT PEN NEEDLE 31 gauge x 5/16" Ndle USE UP TO 6 TIMES DAILY    blood sugar diagnostic (TRUE METRIX GLUCOSE TEST STRIP) Strp USE TO CHECK BLOOD SUGAR FOUR TIMES DAILY    blood-glucose meter kit To check BG 4 times daily, to use with insurance preferred meter    sodium bicarbonate 650 MG tablet Take 1 tablet (650 mg total) by mouth 3 (three) times daily.    torsemide (DEMADEX) 10 MG Tab Take 1 tablet (10 mg total) by mouth once daily.    TRUEPLUS LANCETS 30 gauge Misc USE TO CHECK BLOOD SUGAR FOUR TIMES DAILY    [DISCONTINUED] amLODIPine (NORVASC) 5 MG tablet TAKE 1 TABLET(5 MG) BY MOUTH EVERY DAY    [DISCONTINUED] hydroCHLOROthiazide (HYDRODIURIL) 12.5 MG Tab TAKE 1 TABLET(12.5 MG) BY MOUTH EVERY DAY     Family History       Problem Relation (Age of Onset)    Alcohol abuse Brother    Cancer Maternal Aunt    Cataracts Mother    Diabetes Father, Sister, Paternal Uncle    Goiter Mother    Heart disease Father, Mother    Hypertension Father    No Known Problems Daughter, Son, Son          Tobacco Use    Smoking status: Former Smoker     Packs/day: 1.00     Years: 40.00     Pack years: 40.00     Types: Cigarettes     Quit date: 1970     Years since quittin.8    Smokeless tobacco: Former User   Substance and Sexual Activity    Alcohol use: Yes     Alcohol/week: 3.0 standard drinks     Types: 1 Cans of beer, 1 Shots of liquor, 1 Standard drinks or equivalent per week    Drug use: No    Sexual activity: Not Currently     Review of Systems   Constitutional: Negative for chills and fever.   Cardiovascular:  Positive for dyspnea " on exertion. Negative for chest pain and palpitations.   Respiratory:  Negative for shortness of breath.    Endocrine: Negative for polyuria.   Genitourinary:  Negative for bladder incontinence.   Psychiatric/Behavioral:  Negative for altered mental status.    Objective:     Vital Signs (Most Recent):  Temp: 98.5 °F (36.9 °C) (05/06/22 1101)  Pulse: 105 (05/06/22 1700)  Resp: (!) 23 (05/06/22 1700)  BP: (!) 105/57 (05/05/22 1715)  SpO2: 97 % (05/06/22 1700)   Vital Signs (24h Range):  Temp:  [98.5 °F (36.9 °C)-99.4 °F (37.4 °C)] 98.5 °F (36.9 °C)  Pulse:  [] 105  Resp:  [15-38] 23  SpO2:  [90 %-100 %] 97 %  Arterial Line BP: (105-133)/(35-60) 107/46     Weight: 103.4 kg (228 lb)  Body mass index is 32.71 kg/m².    SpO2: 97 %  O2 Device (Oxygen Therapy): BiPAP      Intake/Output Summary (Last 24 hours) at 5/6/2022 1756  Last data filed at 5/6/2022 1700  Gross per 24 hour   Intake 1853.75 ml   Output 4335 ml   Net -2481.25 ml       Lines/Drains/Airways       Central Venous Catheter Line  Duration             Introducer 05/05/22 1510 right internal jugular 1 day    Pulmonary Artery Catheter Assessment  05/05/22 1625 right internal jugular 1 day              Drain  Duration                  Urethral Catheter 05/05/22 1300 16 Fr. 1 day              Arterial Line  Duration             Arterial Line 05/05/22 1714 Left Radial 1 day              Line  Duration                  IABP 05/05/22 1905 7.5 Fr. 40 mL <1 day              Peripheral Intravenous Line  Duration                  Peripheral IV - Single Lumen 05/04/22 1135 20 G Right Forearm 2 days         Peripheral IV - Single Lumen 05/05/22 1538 20 G Left Forearm 1 day                    Physical Exam  Vitals and nursing note reviewed.   Constitutional:       General: He is not in acute distress.     Appearance: Normal appearance. He is obese. He is not ill-appearing, toxic-appearing or diaphoretic.   HENT:      Head: Normocephalic.      Nose: Nose normal.       Mouth/Throat:      Mouth: Mucous membranes are moist.   Eyes:      Extraocular Movements: Extraocular movements intact.   Cardiovascular:      Rate and Rhythm: Normal rate and regular rhythm.      Pulses: Normal pulses.      Heart sounds: Normal heart sounds.   Pulmonary:      Effort: Pulmonary effort is normal. No respiratory distress.      Breath sounds: Normal breath sounds.   Abdominal:      General: Abdomen is flat. Bowel sounds are normal. There is no distension.      Tenderness: There is no abdominal tenderness.   Musculoskeletal:         General: No swelling or tenderness. Normal range of motion.      Cervical back: Normal range of motion.   Skin:     General: Skin is warm.   Neurological:      Mental Status: He is alert and oriented to person, place, and time. Mental status is at baseline.   Psychiatric:         Mood and Affect: Mood normal.         Behavior: Behavior normal.         Thought Content: Thought content normal.       Significant Labs: Reviewed    Significant Imaging: Reviewed

## 2022-05-06 NOTE — PLAN OF CARE
CICU DAILY GOALS       A: Awake    RASS: Goal - RASS Goal: 0-->alert and calm  Actual - RASS (Hess Agitation-Sedation Scale): 0-->alert and calm   Restraint necessity:    B: Breath   SBT: Not intubated   C: Coordinate A & B, analgesics/sedatives   Pain: managed    SAT: Not intubated  D: Delirium   CAM-ICU: Overall CAM-ICU: Negative  E: Early(intubated/ Progressive (non-intubated) Mobility   MOVE Screen: Pass   Activity: Activity Management: Ankle pumps - L1, Arm raise - L1  FAS: Feeding/Nutrition   Diet order: Diet/Nutrition Received: NPO,   Fluid restriction:    T: Thrombus   DVT prophylaxis: VTE Required Core Measure: Pharmacological prophylaxis initiated/maintained  H: HOB Elevation   Head of Bed (HOB) Positioning: HOB at 15 degrees  U: Ulcer Prophylaxis   GI: yes  G: Glucose control   managed Glycemic Management: blood glucose monitored  S: Skin   Bundle compliance: yes   Bathing/Skin Care: electrode patches/site rotation Date: PM Bath  B: Bowel Function   constipation   I: Indwelling Catheters   Massey necessity:      Urethral Catheter 05/05/22 1300 16 Fr.-Reason for Continuing Urinary Catheterization: Critically ill in ICU and requiring hourly monitoring of intake/output   CVC necessity: Yes   IPAD offered: No  D: De-escalation Antibx   Yes  Plan for the day   Monitor VS and UOP. CVP 5, 4, 5, & 4 during shift. SV02 74 this am. No issues with IABP. D/C levo and vaso during shift.  Family/Goals of care/Code Status   Code Status: Full Code     No acute events throughout day, VS and assessment per flow sheet, patient progressing towards goals as tolerated, plan of care reviewed with Kevon Perez and family, all concerns addressed, will continue to monitor

## 2022-05-06 NOTE — BRIEF OP NOTE
Procedure:  IABP Placement  Indication:  Cardiogenic shock    Risks, benefits, and indications for the procedure were reviewed with patient and family. Consent was signed and placed in chart. Pt prepped and draped in sterile fashion. The following sterile precautions were followed: cap and mask, hand hygiene, sterile gown and sterile gloves, large sterile sheet and sterile field and 2% Chlorhexidine for cutaneous antisepsis. Time out was performed with nursing staff. Lidocaine 1% injected at site. Ultrasound guidance utilized to visualize anatomy. . Access obtained without difficulty and blood flow was pulsatile. Wire threaded smoothly and US confirmed appropriate arterial placement of wire. 8Fr sheath  Was  Inserted and then the IABP was placed   Through the sheath to the level of the irina. CXR was done which reveals appropriate position Catheter advanced without resistance. Pt tolerated procedure well. No evidence of hematoma at vascular access site.     Janett Franco MD  Interventional Cardiology  Pager [phone]

## 2022-05-06 NOTE — PROGRESS NOTES
Cardiology Update Note    Hemodynamics at 9 pm    CVP 4  SVO2 61  CO 8.59  CI 4.40     cc in last 2 hours    -  started at 2.5 and Nickie at 5 ppm   - Hold Diuril for now   - Repeat hemodynamics at midnight  - follow UOP overnight    Gil Bourgeois MD  Cardiology PGY4   [facility]

## 2022-05-06 NOTE — PROGRESS NOTES
[facility] - Cardiac Intensive Care  Cardiology  Progress Note    Patient Name: Kevon Perez  MRN: [id number]  Admission Date: 5/4/2022  Hospital Length of Stay: 2 days  Code Status: Full Code   Attending Physician: Matthew Garcia MD   Primary Care Physician: Cipriano Sol MD  Expected Discharge Date: 5/13/2022  Principal Problem:Cardiogenic shock    Subjective:     Hospital Course:   No notes on file    Interval History: NAEON. Patient's diuresis markedly improved with lasix gtt. On BiPAP overnight. This AM, he was Currently on  2.5, Nickie 5, vasopressin 0.04, Lasix 20 mg/hr, heparin gtt, IABP 1:1.  CO 7.3, CI 3.3 , CVP 4    Review of Systems   Constitutional: Negative for chills and fever.   Cardiovascular:  Negative for chest pain and palpitations.   Respiratory:  Positive for shortness of breath.    Endocrine: Negative for polyuria.   Genitourinary:  Negative for bladder incontinence.   Psychiatric/Behavioral:  Negative for altered mental status.    Objective:     Vital Signs (Most Recent):  Temp: 98.5 °F (36.9 °C) (05/06/22 1101)  Pulse: 105 (05/06/22 1700)  Resp: (!) 23 (05/06/22 1700)  BP: (!) 105/57 (05/05/22 1715)  SpO2: 97 % (05/06/22 1700)   Vital Signs (24h Range):  Temp:  [98.5 °F (36.9 °C)-99.4 °F (37.4 °C)] 98.5 °F (36.9 °C)  Pulse:  [] 105  Resp:  [15-38] 23  SpO2:  [90 %-100 %] 97 %  Arterial Line BP: (105-133)/(35-60) 107/46     Weight: 103.4 kg (228 lb)  Body mass index is 32.71 kg/m².     SpO2: 97 %  O2 Device (Oxygen Therapy): BiPAP      Intake/Output Summary (Last 24 hours) at 5/6/2022 1809  Last data filed at 5/6/2022 1700  Gross per 24 hour   Intake 1765.98 ml   Output 4305 ml   Net -2539.02 ml       Lines/Drains/Airways       Central Venous Catheter Line  Duration             Introducer 05/05/22 1510 right internal jugular 1 day    Pulmonary Artery Catheter Assessment  05/05/22 1625 right internal jugular 1 day              Drain  Duration                  Urethral  Catheter 05/05/22 1300 16 Fr. 1 day              Arterial Line  Duration             Arterial Line 05/05/22 1714 Left Radial 1 day              Line  Duration                  IABP 05/05/22 1905 7.5 Fr. 40 mL <1 day              Peripheral Intravenous Line  Duration                  Peripheral IV - Single Lumen 05/04/22 1135 20 G Right Forearm 2 days         Peripheral IV - Single Lumen 05/05/22 1538 20 G Left Forearm 1 day                    Physical Exam  Vitals and nursing note reviewed.   Constitutional:       General: He is not in acute distress.     Appearance: Normal appearance. He is obese. He is not ill-appearing, toxic-appearing or diaphoretic.   HENT:      Head: Normocephalic.      Nose: Nose normal.      Mouth/Throat:      Mouth: Mucous membranes are moist.   Eyes:      Extraocular Movements: Extraocular movements intact.   Cardiovascular:      Rate and Rhythm: Normal rate and regular rhythm.      Pulses: Normal pulses.      Heart sounds: Normal heart sounds.   Pulmonary:      Effort: Pulmonary effort is normal. No respiratory distress.      Breath sounds: Normal breath sounds.   Abdominal:      General: Abdomen is flat. Bowel sounds are normal. There is no distension.      Tenderness: There is no abdominal tenderness.   Musculoskeletal:         General: No swelling or tenderness. Normal range of motion.      Cervical back: Normal range of motion.   Skin:     General: Skin is warm.   Neurological:      Mental Status: He is alert and oriented to person, place, and time. Mental status is at baseline.   Psychiatric:         Mood and Affect: Mood normal.         Behavior: Behavior normal.         Thought Content: Thought content normal.       Significant Labs: BMP:   Recent Labs   Lab 05/05/22  0450 05/05/22  1230 05/06/22  0340 05/06/22  0915 05/06/22  1208 05/06/22  1710   *  175*   < > 257* 271* 288* 400*     137   < > 133* 135* 131* 132*   K 5.7*  6.0*   < > 4.2 4.0 4.0 4.3     104    < > 100 100 96 96   CO2 23  23   < > 20* 24 25 22*   BUN 51*  52*   < > 61* 64* 65* 66*   CREATININE 2.9*  2.9*   < > 2.9* 2.6* 2.6* 2.8*   CALCIUM 9.7  9.8   < > 8.6* 8.8 8.9 8.8   MG 1.9  1.9  --  1.7  --   --   --     < > = values in this interval not displayed.   , CMP   Recent Labs   Lab 05/05/22  1438 05/06/22  0340 05/06/22  0915 05/06/22  1208 05/06/22  1710   * 133* 135* 131* 132*   K 4.6 4.2 4.0 4.0 4.3    100 100 96 96   CO2 21* 20* 24 25 22*   * 257* 271* 288* 400*   BUN 53* 61* 64* 65* 66*   CREATININE 2.9* 2.9* 2.6* 2.6* 2.8*   CALCIUM 9.4 8.6* 8.8 8.9 8.8   PROT 6.4 5.2* 5.4*  --   --    ALBUMIN 2.9* 2.5* 2.6*  --   --    BILITOT 1.3* 1.1* 1.2*  --   --    ALKPHOS 48* 44* 43*  --   --    AST 88* 73* 74*  --   --    ALT 41 34 36  --   --    ANIONGAP 11 13 11 10 14   ESTGFRAFRICA 22.3* 22.3* 25.4* 25.4* 23.2*   EGFRNONAA 19.3* 19.3* 22.0* 22.0* 20.1*   , CBC   Recent Labs   Lab 05/05/22  0450 05/05/22  1438 05/06/22  0340   WBC 14.01*  14.13* 15.20* 12.46   HGB 11.7*  11.9* 11.6* 10.5*   HCT 37.2*  37.0* 35.4* 32.4*     157 152 129*   , and INR   Recent Labs   Lab 05/05/22  1438   INR 1.1       Significant Imaging: Reviewed    Assessment and Plan:       * Cardiogenic shock  Mr. Kevon Perez, 81 y.o. 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 He presented with chest pain, SOB, cough.  -Initial workup was notable for BNP 1194, uptrending troponin, worsening BUN/Cr. CXR showed Bibasilar edema.   -Given his tachycardia and dyspnea, there were initial concerns for PE, however it is unlikely as LE U/S, V/Q scan non-concerning, and absence of right heart strain on TTE.     Recent Labs     05/04/22  1426 05/05/22  0450 05/05/22  1230   TROPONINI 27.388* 35.947* 40.689*  39.987*  39.388*       Intake/Output Summary (Last 24 hours) at 5/6/2022 1816  Last data filed at 5/6/2022  1700  Gross per 24 hour   Intake 1765.98 ml   Output 4305 ml   Net -2539.02 ml       Net IO Since Admission: -2,951.85 mL [05/06/22 1816]    -Given his TTE showing significant worsening of Ejection Fraction to less than 20%, signs of end-organ damage(acute renal failure, troponinemia, transaminitis), there were concerns for cardiogenic shock for which patient was transferred to the CCU .    Plan   - Continue ACS protocol with heparin, aspirin, clopidogrel  - Hemodynamics improving. Will discuss with IC regarding further revascularization options once patient is more stable.  - s/p [place] Kaykay catheter for hemodynamics montioring and IABP balloon pump MCS on 05/05.   - Currently on  gtt @2.5, epi gtt 0.02, vasopressin gtt and Nickie at 10ppm.   Wean pressors and inotropic support as tolerated  - Hold AV sue blocking agents as concerns for cardiogenic shock  - Leukocytosis improving, will continue antibiotics an de-escalate if blood cultures are negative in 48 hours    Acute on chronic combined systolic and diastolic heart failure  TTE with new EF of 20% with mod systolic RV function.     -  Responded well to diuresis voernight. UOP 200cc/hr. Will hold diuretics given CVP 4.   - Strict I/Os  - Replete electrolytes as needed    Cardiac pacemaker  Status-post successful dual chamber pacemaker implantation.         Acute renal failure superimposed on stage 4 chronic kidney disease  - baseline around 2, on admit 2.5. sCr stable at 2.6  - Likely 2/2 to cardiorenal syndrome  - continue diuresis.   - avoid nephrotoxins; renally dose meds    Acute respiratory failure with hypoxia  Patient with Hypoxic Respiratory failure which is Acute.  he is not on home oxygen. Supplemental oxygen was provided and noted- Vent Mode: Spont  Oxygen Concentration (%):  [12-60] 40  Resp Rate Total:  [13 br/min-28 br/min] 28 br/min  PEEP/CPAP:  [5 cmH20] 5 cmH20  Pressure Support:  [15 cmH20] 15 cmH20  Mean Airway Pressure:  [8.7 bgO17-29 cmH20]  11 cmH20.   Signs/symptoms of respiratory failure include- tachypnea. Contributing diagnoses includes - CHF Labs and images were reviewed. Patient Has not had a recent ABG. Will treat underlying causes and adjust management of respiratory failure as follows- IV diuresis as needed    Hypertension associated with diabetes  Holding home meds in the setting of cardiogenic shock    GINGER on CPAP  BiPAP qhS    Type 2 diabetes mellitus with neurologic complication  - LDSSI  - basal bolus insulin and prandial insulin, titrate as needed  - hypoglycemia protocol , ACHS accuchecks     Coronary artery disease involving native coronary artery of native heart with unstable angina pectoris  Contineu ACS protocol - ASA/ Plavix, statin and Heparin gtt      VTE Risk Mitigation (From admission, onward)         Ordered     IP VTE HIGH RISK PATIENT  Once         05/05/22 1035     Place sequential compression device  Until discontinued         05/05/22 1035     heparin 25,000 units in dextrose 5% 250 mL (100 units/mL) infusion LOW INTENSITY nomogram - [facility]  Continuous        Question Answer Comment   Heparin Infusion Adjustment (DO NOT MODIFY ANSWER) [email or web address]\[email or web address] LOW INTENSITY nomogram for [facility] [id number]    Begin at (in units/kg/hr) 12        05/04/22 1512     Place sequential compression device  Until discontinued         05/04/22 1418     Place sequential compression device  Until discontinued         05/04/22 1418                Yohana Delvalle MD  Cardiology  [facility] - [facility]

## 2022-05-06 NOTE — ASSESSMENT & PLAN NOTE
Mr. Kevon Perez, 81 y.o. 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 He presented with chest pain, SOB, cough.  -Initial workup was notable for BNP 1194, uptrending troponin, worsening BUN/Cr. CXR showed Bibasilar edema.   -Given his tachycardia and dyspnea, there were initial concerns for PE, however it is unlikely as LE U/S, V/Q scan non-concerning, and absence of right heart strain on TTE.     Recent Labs     05/04/22  1426 05/05/22  0450 05/05/22  1230   TROPONINI 27.388* 35.947* 40.689*  39.987*  39.388*       Intake/Output Summary (Last 24 hours) at 5/6/2022 1816  Last data filed at 5/6/2022 1700  Gross per 24 hour   Intake 1765.98 ml   Output 4305 ml   Net -2539.02 ml       Net IO Since Admission: -2,951.85 mL [05/06/22 1816]    -Given his TTE showing significant worsening of Ejection Fraction to less than 20%, signs of end-organ damage(acute renal failure, troponinemia, transaminitis), there were concerns for cardiogenic shock for which patient was transferred to the CCU .    Plan   - Continue ACS protocol with heparin, aspirin, clopidogrel  - Hemodynamics improving. Will discuss with IC regarding further revascularization options once patient is more stable.  - s/p [place] Kaykay catheter for hemodynamics montioring and IABP balloon pump MCS on 05/05.   - Currently on  gtt @2.5, epi gtt 0.02, vasopressin gtt and Nickie at 10ppm.   Wean pressors and inotropic support as tolerated  - Hold AV sue blocking agents as concerns for cardiogenic shock  - Leukocytosis improving, will continue antibiotics an de-escalate if blood cultures are negative in 48 hours

## 2022-05-06 NOTE — NURSING
Pt received as an upgrade from CSU today. Upon arrival to room, placed on ICU monitoring, pt SpO2 in mid 80% on 3L NC, complaining of SOB. Switched to 15L non-rebreather, pt sats maintained >90%, resolved SOB.     During R IJ Cordis/[place] placement while patient laying flat, pt sats dropped to low 80%, developed SOB, frequent ectopy noted during advancement of swan, worsening hypotension. Levo, vaso & epi gtts added per MAR. [place] aborted, pt placed on BiPap. Sats >90%, resolved SOB.    Reattempted [place] placement with pt on continous BiPap, pt tolerated well, sats maintained above goal. Frequent ectopy noted on Tele during advancement. CXR done to verify placement. CVP 9, SvO2 45.    IC to bedside to place R fem IABP, family at bedside with pt while obtaining informed consent. IABP placed, set 1:1, Auto, EKG triggered, verified by CXR. Able to wean off epi, wean down levo gtts after IABP insertion.

## 2022-05-07 NOTE — PLAN OF CARE
Cardiac ICU Care Plan    POC reviewed with Kevon Perez and family. Questions and concerns addressed. Pt VSS. IABP set at Auto, 1:1, ECG trigger. Will continue to monitor. See below and flowsheets for full assessment and VS info.       Neuro:  Villard Coma Scale  Best Eye Response: 4-->(E4) spontaneous  Best Motor Response: 6-->(M6) obeys commands  Best Verbal Response: 5-->(V5) oriented  Villard Coma Scale Score: 15  Assessment Qualifiers: patient not sedated/intubated  Pupil PERRLA: yes    24 hr Temp:  [98.1 °F (36.7 °C)-98.5 °F (36.9 °C)]      CV:  Rhythm: paced rhythm, sinus tachycardia   DVT prophylaxis: VTE Required Core Measure: Pharmacological prophylaxis initiated/maintained    CVP (mean): 5 mmHg (05/07/22 1600)    Pulmonary Artery Catheter Assessment  05/05/22 1625 right internal jugular-Current Insertion Depth (cm): 55 cm (05/07/22 1501)  PAP: 46/19 (05/07/22 1600)  SVO2 (%): 61 % (05/05/22 2000)    IABP Mode: Auto  IABP Rate: 1:1  IABP Trigger: ECG  IABP Augmented Diastolic Pressure: 102          Pulses  Right Radial Pulse: 2+ (normal)  Left Radial Pulse: 2+ (normal)  Right Dorsalis Pedis Pulse: 1+ (weak)  Left Dorsalis Pedis Pulse: 1+ (weak)  Right Posterior Tibial Pulse: 1+ (weak)  Left Posterior Tibial Pulse: 2+ (normal)    Resp:  O2 Device (Oxygen Therapy): High Flow nasal Cannula  Flow (L/min): 15  Vent Mode: Spont  Oxygen Concentration (%): 40  PEEP/CPAP: 5 cmH20  Pressure Support: 15 cmH20    GI/:  GI prophylaxis: yes  Diet/Nutrition Received: 2 gram sodium  Last Bowel Movement: 04/28/22  Voiding Characteristics: urethral catheter (bladder)       Urethral Catheter 05/05/22 1300 16 Fr.-Reason for Continuing Urinary Catheterization: Critically ill in ICU and requiring hourly monitoring of intake/output   Intake/Output Summary (Last 24 hours) at 5/7/2022 1629  Last data filed at 5/7/2022 1600  Gross per 24 hour   Intake 2615.26 ml   Output 2685 ml   Net -69.74 ml           Labs/Accuchecks:  Recent Labs   Lab 05/05/22  1438 05/06/22  0340 05/06/22 2109 05/07/22  0432   WBC 15.20* 12.46  --  12.16   RBC 4.04* 3.55*  --  3.59*   HGB 11.6* 10.5*  --  10.4*   HCT 35.4* 32.4* 32* 31.3*    129*  --  115*      Recent Labs   Lab 05/02/22  0921 05/04/22  1926 05/05/22  1438 05/05/22 2031 05/06/22 2057 05/07/22 0432 05/07/22  1059   INR 1.1  --  1.1  --   --   --   --    APTT  --    < >  --    < > 37.0* 50.9* 50.9*    < > = values in this interval not displayed.      Recent Labs     05/07/22 0432   *   K 3.9   CO2 25   CL 94*   BUN 67*   CREATININE 2.4*   ALKPHOS 48*   ALT 31   AST 45*   BILITOT 0.9       Recent Labs   Lab 05/04/22  1426 05/05/22  0450 05/05/22  1230   TROPONINI 27.388* 35.947* 40.689*  39.987*  39.388*      Recent Labs     05/06/22 2109 05/07/22  0442 05/07/22  1414   PH 7.371 7.338* 7.341*   PCO2 45.3* 52.6* 50.3*   PO2 41 36* 32*   HCO3 26.2 28.2* 27.2   POCSATURATED 74* 64* 56*   BE 1 2 1       Electrolytes: Electrolytes replaced  Accuchecks: ACHS    Gtts/LDAs:   sodium chloride 0.9% 5 mL/hr at 05/07/22 1600    sodium chloride 0.9% 8 mL/hr at 05/07/22 1600    DOBUTamine IV infusion (non-titrating) 2.5 mcg/kg/min (05/07/22 1600)    EPINEPHrine 0.02 mcg/kg/min (05/07/22 1600)    furosemide (LASIX) 2 mg/mL continuous infusion (non-titrating) 10 mg/hr (05/07/22 1600)    heparin (porcine) in D5W 17 Units/kg/hr (05/07/22 1605)    nitric oxide gas      vasopressin 0.04 Units/min (05/07/22 1600)       Lines/Drains/Airways       Central Venous Catheter Line  Duration             Introducer 05/05/22 1510 right internal jugular 2 days    Pulmonary Artery Catheter Assessment  05/05/22 1625 right internal jugular 2 days              Drain  Duration                  Urethral Catheter 05/05/22 1300 16 Fr. 2 days              Arterial Line  Duration             Arterial Line 05/05/22 1714 Left Radial 1 day              Line  Duration                  IABP 05/05/22  1905 7.5 Fr. 40 mL 1 day              Peripheral Intravenous Line  Duration                  Peripheral IV - Single Lumen 05/04/22 1135 20 G Right Forearm 3 days         Peripheral IV - Single Lumen 05/05/22 1538 20 G Left Forearm 2 days                    Skin/Wounds  Bathing/Skin Care: electrode patches/site rotation (05/04/22 2200)  Wounds: No  Wound care consulted: No

## 2022-05-07 NOTE — PROCEDURES
"Kevon Perez is a 81 y.o. male patient.    Temp: 98.4 °F (36.9 °C) (05/07/22 0701)  Pulse: (!) 115 (05/07/22 1414)  Resp: (!) 24 (05/07/22 1414)  BP: (!) 105/57 (05/05/22 1715)  SpO2: (!) 94 % (05/07/22 1414)  Weight: 103.4 kg (228 lb) (05/05/22 0930)  Height: 5' 10" (177.8 cm) (05/05/22 1530)       Central Line with swan     Date/Time: 5/7/2022 2:37 PM  Performed by: Celina Olguin MD  Authorized by: Shira Stratton MD     Supervising provider: Dr. Stratton.  Location procedure was performed:  [facility]ARDIAC MED[facility] ICU ([facility])  Assisting Provider:  Jamie Vuong MD  Consent Done ?:  Yes  Time out complete?: Verified correct patient, procedure, equipment, staff, and site/side    Indications:  Hemodynamic monitoring  Preparation:  Skin prepped with ChloraPrep  Location:  Right internal jugular  Catheter size:  8.5 Fr  Manometry: Yes    Number of attempts:  2  Securement:  Blood return through all ports, line sutured, chlorhexidine patch and sterile dressing applied  Complications: No    Specimens: No    XRay:  Placement verified by x-ray  Adverse Events:  None        5/7/2022  "

## 2022-05-07 NOTE — PLAN OF CARE
CICU DAILY GOALS       A: Awake    RASS: Goal - RASS Goal: 0-->alert and calm  Actual - RASS (Hess Agitation-Sedation Scale): 0-->alert and calm   Restraint necessity:    B: Breath   SBT: Not intubated   C: Coordinate A & B, analgesics/sedatives   Pain: uncontrolled    SAT: Not intubated  D: Delirium   CAM-ICU: Overall CAM-ICU: Negative  E: Early(intubated/ Progressive (non-intubated) Mobility   MOVE Screen: Pass   Activity: Activity Management: Ankle pumps - L1, Arm raise - L1  FAS: Feeding/Nutrition   Diet order: Diet/Nutrition Received: 2 gram sodium,   Fluid restriction:    T: Thrombus   DVT prophylaxis: VTE Required Core Measure: Pharmacological prophylaxis initiated/maintained  H: HOB Elevation   Head of Bed (HOB) Positioning: HOB at 15 degrees  U: Ulcer Prophylaxis   GI: yes  G: Glucose control   managed Glycemic Management: blood glucose monitored  S: Skin   Bundle compliance: yes   Bathing/Skin Care: electrode patches/site rotation Date: PM Bath  B: Bowel Function   constipation   I: Indwelling Catheters   Massey necessity:      Urethral Catheter 05/05/22 1300 16 Fr.-Reason for Continuing Urinary Catheterization: Critically ill in ICU and requiring hourly monitoring of intake/output   CVC necessity: Yes   IPAD offered: No  D: De-escalation Antibx   Yes  Plan for the day   Monitor VS and UOP. CVP 2, 3 & 2. SV02 64 this am. No IABP alarms. Setting are semi-auto, 1:1, ekg trigger.     Family/Goals of care/Code Status   Code Status: Full Code     No acute events throughout day, VS and assessment per flow sheet, patient progressing towards goals as tolerated, plan of care reviewed with Kevon Perez and family, all concerns addressed, will continue to monitor

## 2022-05-07 NOTE — ASSESSMENT & PLAN NOTE
- baseline around 2, on admit 2.5. sCr stable at 2.6  - Likely 2/2 to cardiorenal syndrome  - Hold diuresis as low CVP  - avoid nephrotoxins; renally dose meds

## 2022-05-07 NOTE — NURSING
Patient blood sugar was 345 at 2100. Gave insulin per orders. Checked blood sugar at 0000 and was 346. Notified MD Escalante. ANGUS

## 2022-05-07 NOTE — PROGRESS NOTES
[facility] - [facility]  Cardiology  Progress Note    Patient Name: Kevon Perez  MRN: [id number]  Admission Date: 2022  Hospital Length of Stay: 3 days  Code Status: Full Code   Attending Physician: Matthew Garcia MD   Primary Care Physician: Cipriano Sol MD  Expected Discharge Date: 2022  Principal Problem:Cardiogenic shock    Subjective:     Hospital Course:   No notes on file    Interval History: Pt seen and examined today at bedside. Plan for IABP 1:2. HD stable   On  utamine 5, Vaso 0.04, epi 0.02, Nickie 10.   Antidiuresis well. Intravascular pressures low , will continue to monitor.    Review of Systems   Constitutional: Negative for decreased appetite, fever, malaise/fatigue and weight loss.   HENT:  Negative for congestion.    Cardiovascular:  Negative for chest pain, dyspnea on exertion, orthopnea and palpitations.   Respiratory:  Negative for cough, shortness of breath and wheezing.    Gastrointestinal:  Negative for bloating, abdominal pain and constipation.   Objective:     Vital Signs (Most Recent):  Temp: 98.4 °F (36.9 °C) (22 0701)  Pulse: (!) 115 (22 1414)  Resp: (!) 24 (22 1414)  BP: (!) 105/57 (22 1715)  SpO2: (!) 94 % (22 1414)   Vital Signs (24h Range):  Temp:  [98.1 °F (36.7 °C)-98.5 °F (36.9 °C)] 98.4 °F (36.9 °C)  Pulse:  [] 115  Resp:  [14-38] 24  SpO2:  [92 %-100 %] 94 %  Arterial Line BP: (105-128)/(37-58) 113/52     Weight: 103.4 kg (228 lb)  Body mass index is 32.71 kg/m².     SpO2: (!) 94 %  O2 Device (Oxygen Therapy): High Flow nasal Cannula      Intake/Output Summary (Last 24 hours) at 2022 1428  Last data filed at 2022 1400  Gross per 24 hour   Intake 1887.26 ml   Output 2990 ml   Net -1102.74 ml       Lines/Drains/Airways       Central Venous Catheter Line  Duration             Introducer 22 1510 right internal jugular 1 day    Pulmonary Artery Catheter Assessment  22 1625 right internal jugular  1 day              Drain  Duration                  Urethral Catheter 05/05/22 1300 16 Fr. 2 days              Arterial Line  Duration             Arterial Line 05/05/22 1714 Left Radial 1 day              Line  Duration                  IABP 05/05/22 1905 7.5 Fr. 40 mL 1 day              Peripheral Intravenous Line  Duration                  Peripheral IV - Single Lumen 05/04/22 1135 20 G Right Forearm 3 days         Peripheral IV - Single Lumen 05/05/22 1538 20 G Left Forearm 1 day                    Physical Exam  Constitutional:       Appearance: Normal appearance.   HENT:      Head: Normocephalic.      Mouth/Throat:      Mouth: Mucous membranes are moist.   Eyes:      Pupils: Pupils are equal, round, and reactive to light.   Cardiovascular:      Rate and Rhythm: Normal rate and regular rhythm.      Pulses: Normal pulses.      Heart sounds: Normal heart sounds. No murmur heard.  Pulmonary:      Effort: Pulmonary effort is normal.      Breath sounds: Normal breath sounds. No rales.   Abdominal:      General: Abdomen is flat. Bowel sounds are normal. There is no distension.      Tenderness: There is no abdominal tenderness. There is no guarding.   Musculoskeletal:      Right lower leg: No edema.      Left lower leg: No edema.   Skin:     General: Skin is warm.      Comments: RT fem IABP in pulse.   Neurological:      General: No focal deficit present.      Mental Status: He is alert.   Psychiatric:         Mood and Affect: Mood normal.       Significant Labs: BMP:   Recent Labs   Lab 05/06/22  0340 05/06/22  0915 05/06/22  1208 05/06/22  1710 05/06/22  2057 05/07/22  0432   *   < > 288* 400*  --  309*   *   < > 131* 132*  --  129*   K 4.2   < > 4.0 4.3  --  3.9      < > 96 96  --  94*   CO2 20*   < > 25 22*  --  25   BUN 61*   < > 65* 66*  --  67*   CREATININE 2.9*   < > 2.6* 2.8*  --  2.4*   CALCIUM 8.6*   < > 8.9 8.8  --  8.9   MG 1.7  --   --   --  1.8 2.3    < > = values in this interval not  displayed.   , CMP   Recent Labs   Lab 05/06/22  0340 05/06/22  0915 05/06/22  1208 05/06/22  1710 05/07/22  0432   * 135* 131* 132* 129*   K 4.2 4.0 4.0 4.3 3.9    100 96 96 94*   CO2 20* 24 25 22* 25   * 271* 288* 400* 309*   BUN 61* 64* 65* 66* 67*   CREATININE 2.9* 2.6* 2.6* 2.8* 2.4*   CALCIUM 8.6* 8.8 8.9 8.8 8.9   PROT 5.2* 5.4*  --   --  5.3*   ALBUMIN 2.5* 2.6*  --   --  2.5*   BILITOT 1.1* 1.2*  --   --  0.9   ALKPHOS 44* 43*  --   --  48*   AST 73* 74*  --   --  45*   ALT 34 36  --   --  31   ANIONGAP 13 11 10 14 10   ESTGFRAFRICA 22.3* 25.4* 25.4* 23.2* 28.0*   EGFRNONAA 19.3* 22.0* 22.0* 20.1* 24.2*   , CBC   Recent Labs   Lab 05/05/22  1438 05/06/22  0340 05/06/22  2109 05/07/22  0432   WBC 15.20* 12.46  --  12.16   HGB 11.6* 10.5*  --  10.4*   HCT 35.4* 32.4*   < > 31.3*    129*  --  115*    < > = values in this interval not displayed.   , and INR   Recent Labs   Lab 05/05/22  1438   INR 1.1       Significant Imaging: Echocardiogram: Transthoracic echo (TTE) complete (Cupid Only):   Results for orders placed or performed during the hospital encounter of 05/04/22   Echo   Result Value Ref Range    Ascending aorta 3.53 cm    STJ 3.05 cm    AV mean gradient 3 mmHg    Ao peak skyler 1.18 m/s    Ao VTI 17.78 cm    IVS 0.95 0.6 - 1.1 cm    LA size 4.54 cm    Left Atrium Major Axis 5.61 cm    Left Atrium Minor Axis 5.57 cm    LVIDd 5.60 3.5 - 6.0 cm    LVIDs 4.61 (A) 2.1 - 4.0 cm    LVOT diameter 2.22 cm    LVOT peak VTI 9.50 cm    Posterior Wall 0.89 0.6 - 1.1 cm    MV Peak E Skyler 0.69 m/s    RA Major Axis 4.63 cm    RA Width 4.49 cm    RVDD 4.69 cm    Sinus 3.46 cm    TAPSE 2.02 cm    TR Max Skyler 3.83 m/s    TDI LATERAL 0.02 m/s    TDI SEPTAL 0.04 m/s    LA WIDTH 4.86 cm    LV Diastolic Volume 153.67 mL    LV Systolic Volume 97.63 mL    RV S' 13.91 cm/s    LVOT peak skyler 0.55 m/s    LA volume (mod) 88.29 cm3    LV LATERAL E/E' RATIO 34.50 m/s    LV SEPTAL E/E' RATIO 17.25 m/s    FS 18 %     LA volume 104.84 cm3    LV mass 197.13 g    Left Ventricle Relative Wall Thickness 0.32 cm    AV valve area 2.07 cm2    AV Velocity Ratio 0.47     AV index (prosthetic) 0.53     Mean e' 0.03 m/s    LVOT area 3.9 cm2    LVOT stroke volume 36.75 cm3    AV peak gradient 6 mmHg    E/E' ratio 23.00 m/s    LV Systolic Volume Index 44.2 mL/m2    LV Diastolic Volume Index 69.53 mL/m2    LA Volume Index 47.4 mL/m2    LV Mass Index 89 g/m2    Triscuspid Valve Regurgitation Peak Gradient 59 mmHg    LA Volume Index (Mod) 40.0 mL/m2    BSA 2.26 m2    EF 20 %    Narrative    · The left ventricle is normal in size with severely decreased systolic   function. The estimated ejection fraction is <20%. Stroke volume 41cc per   beat.  · There is left ventricular global hypokinesis with apical akinesis.  · Left ventricular diastolic dysfunction.  · Mild right ventricular enlargement with mildly to moderately reduced   right ventricular systolic function.  · Biatrial enlargement.  · Mild mitral regurgitation.  · Mild tricuspid regurgitation.  · Indeterminate central venous pressure. Estimated PA systolic pressure is   at least 59 mmHg.        Assessment and Plan:     Brief HPI:     * Cardiogenic shock  Mr. Kevon Perez, 81 y.o. 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 He presented with chest pain, SOB, cough.  -Initial workup was notable for BNP 1194, uptrending troponin, worsening BUN/Cr. CXR showed Bibasilar edema.   -Given his tachycardia and dyspnea, there were initial concerns for PE, however it is unlikely as LE U/S, V/Q scan non-concerning, and absence of right heart strain on TTE.     Recent Labs     05/05/22  0450 05/05/22  1230   TROPONINI 35.947* 40.689*  39.987*  39.388*       Intake/Output Summary (Last 24 hours) at 5/7/2022 1431  Last data filed at 5/7/2022 1400  Gross per 24 hour   Intake 1887.26 ml   Output 2990 ml    Net -1102.74 ml       Net IO Since Admission: -3,518.79 mL [05/07/22 1431]    -Given his TTE showing significant worsening of Ejection Fraction to less than 20%, signs of end-organ damage(acute renal failure, troponinemia, transaminitis), there were concerns for cardiogenic shock for which patient was transferred to the CCU .    Plan   - Continue ACS protocol with heparin, aspirin, clopidogrel  - Hemodynamics improving. Will discuss with IC regarding further revascularization options once patient is more stable.  - s/p [place] Kaykay catheter for hemodynamics montioring and IABP balloon pump MCS on 05/05.   - Plan for IBAP 1:2 today. Augmenting well with MAP >70.  - Currently on  gtt @2.5, epi gtt 0.02, vasopressin gtt and Nickie at 10ppm.   Wean pressors and inotropic support as tolerated  - Hold AV sue blocking agents as concerns for cardiogenic shock  - Leukocytosis improving, will continue antibiotics an de-escalate if blood cultures are negative in 48 hours    Acute on chronic combined systolic and diastolic heart failure  TTE with new EF of 20% with mod systolic RV function.     -  Responded well to diuresis voernight. UOP 200cc/hr. Will hold diuretics given CVP 4.   - Strict I/Os  - Replete electrolytes as needed    Cardiac pacemaker  Status-post successful dual chamber pacemaker implantation.         Acute renal failure superimposed on stage 4 chronic kidney disease  - baseline around 2, on admit 2.5. sCr stable at 2.6  - Likely 2/2 to cardiorenal syndrome  - Hold diuresis as low CVP  - avoid nephrotoxins; renally dose meds    Acute respiratory failure with hypoxia  Patient with Hypoxic Respiratory failure which is Acute.  he is not on home oxygen. Supplemental oxygen was provided and noted- Oxygen Concentration (%):  [40-50] 40  Resp Rate Total:  [24 br/min-28 br/min] 27 br/min.   Signs/symptoms of respiratory failure include- tachypnea. Contributing diagnoses includes - CHF Labs and images were reviewed.  Patient . Will treat underlying causes and adjust management of respiratory failure as follows- IV diuresis/ Bipapap as needed    Hypertension associated with diabetes  Holding home meds in the setting of cardiogenic shock    GINGER on CPAP  BiPAP qhS    Type 2 diabetes mellitus with neurologic complication  - LDSSI  - basal bolus insulin and prandial insulin, titrate as needed  - hypoglycemia protocol , ACHS accuchecks     Coronary artery disease involving native coronary artery of native heart with unstable angina pectoris  Contineu ACS protocol - ASA/ Plavix, statin and Heparin gtt        VTE Risk Mitigation (From admission, onward)         Ordered     heparin 25,000 units in dextrose 5% (100 units/ml) IV bolus from bag - ADDITIONAL PRN BOLUS - 60 units/kg (max bolus 4000 units)  As needed (PRN)        Question:  Heparin Infusion Adjustment (DO NOT MODIFY ANSWER)  Answer:  \\[email or web address]\epic\Images\Pharmacy\HeparinInfusions\heparin LOW INTENSITY nomogram for [facility] [id number]    05/06/22 2157     heparin 25,000 units in dextrose 5% (100 units/ml) IV bolus from bag - ADDITIONAL PRN BOLUS - 30 units/kg (max bolus 4000 units)  As needed (PRN)        Question:  Heparin Infusion Adjustment (DO NOT MODIFY ANSWER)  Answer:  \\[email or web address]\epic\Images\Pharmacy\HeparinInfusions\heparin LOW INTENSITY nomogram for [facility] [id number].pdf    05/06/22 2157     heparin 25,000 units in dextrose 5% 250 mL (100 units/mL) infusion LOW INTENSITY nomogram - [facility]  Continuous        Question Answer Comment   Heparin Infusion Adjustment (DO NOT MODIFY ANSWER) \\[email or web address]\epic\Images\Pharmacy\HeparinInfusions\heparin LOW INTENSITY nomogram for [facility] [email or web address]    Begin at (in units/kg/hr) 12        05/06/22 2157     IP VTE HIGH RISK PATIENT  Once         05/05/22 1035     Place sequential compression device  Until discontinued         05/05/22 1035     Place sequential compression device  Until discontinued         05/04/22 1418     Place sequential  compression device  Until discontinued         05/04/22 1418              Seen and d/w Dr. Jose Olguin MD  Cardiology  [facility] - [facility]

## 2022-05-07 NOTE — ASSESSMENT & PLAN NOTE
Mr. Kevon Perez, 81 y.o. 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 He presented with chest pain, SOB, cough.  -Initial workup was notable for BNP 1194, uptrending troponin, worsening BUN/Cr. CXR showed Bibasilar edema.   -Given his tachycardia and dyspnea, there were initial concerns for PE, however it is unlikely as LE U/S, V/Q scan non-concerning, and absence of right heart strain on TTE.     Recent Labs     05/05/22  0450 05/05/22  1230   TROPONINI 35.947* 40.689*  39.987*  39.388*       Intake/Output Summary (Last 24 hours) at 5/7/2022 1431  Last data filed at 5/7/2022 1400  Gross per 24 hour   Intake 1887.26 ml   Output 2990 ml   Net -1102.74 ml       Net IO Since Admission: -3,518.79 mL [05/07/22 1431]    -Given his TTE showing significant worsening of Ejection Fraction to less than 20%, signs of end-organ damage(acute renal failure, troponinemia, transaminitis), there were concerns for cardiogenic shock for which patient was transferred to the CCU .    Plan   - Continue ACS protocol with heparin, aspirin, clopidogrel  - Hemodynamics improving. Will discuss with IC regarding further revascularization options once patient is more stable.  - s/p [place] Kaykay catheter for hemodynamics montioring and IABP balloon pump MCS on 05/05.   - Plan for IBAP 1:2 today. Augmenting well with MAP >70.  - Currently on  gtt @2.5, epi gtt 0.02, vasopressin gtt and Nickie at 10ppm.   Wean pressors and inotropic support as tolerated  - Hold AV sue blocking agents as concerns for cardiogenic shock  - Leukocytosis improving, will continue antibiotics an de-escalate if blood cultures are negative in 48 hours

## 2022-05-07 NOTE — PROGRESS NOTES
Pharmacokinetic Assessment Follow Up: IV Vancomycin    Vancomycin serum concentration assessment & plan:  · Vancomycin random level resulted at 17.7 mcg/ml which is within target level of 15 - 20 mcg/ml  · Level is ~24 hours post dose of 1500mg  · Scr remains elevated, continue pulse dosing  · Redose Vancomycin 1000mg x1  · Obtain 24 hour post dose random level tomorrow @ 2200      Drug levels (last 3 results):  Recent Labs   Lab Result Units 05/06/22  0340 05/06/22  2057   Vancomycin, Random ug/mL 24.7 17.7       Pharmacy will continue to follow and monitor vancomycin.    Please contact pharmacy at extension [phone] for questions regarding this assessment.    Thank you for the consult,   Montserrat Gaytan       Patient brief summary:  Kevon Perez is a 81 y.o. male initiated on antimicrobial therapy with IV Vancomycin for treatment of bacteremia    The patient's current regimen is pulse dosing    Drug Allergies:   Review of patient's allergies indicates:   Allergen Reactions    Penicillins Other (See Comments)     Muscle stiffness    Bactrim [sulfamethoxazole-trimethoprim] Rash       Actual Body Weight:   103.4 kg    Renal Function:   Estimated Creatinine Clearance: 24.5 mL/min (A) (based on SCr of 2.8 mg/dL (H)).,     Dialysis Method (if applicable):  N/A    CBC (last 72 hours):  Recent Labs   Lab Result Units 05/04/22  1135 05/05/22  0450 05/05/22  1438 05/06/22  0340   WBC K/uL 13.77* 14.01*  14.13* 15.20* 12.46   Hemoglobin g/dL 11.2* 11.7*  11.9* 11.6* 10.5*   Hematocrit % 35.2* 37.2*  37.0* 35.4* 32.4*   Platelets K/uL 145* 151  157 152 129*   Gran % % 80.8* 84.8* 83.3* 84.6*   Lymph % % 6.8* 5.4* 6.8* 4.9*   Mono % % 11.3 8.8 8.8 9.6   Eosinophil % % 0.2 0.1 0.1 0.0   Basophil % % 0.4 0.4 0.4 0.4   Differential Method  Automated Automated Automated Automated       Metabolic Panel (last 72 hours):  Recent Labs   Lab Result Units 05/04/22  1135 05/04/22  1137 05/05/22  0450 05/05/22  1204 05/05/22  1235  05/05/22  1438 05/06/22  0340 05/06/22  0915 05/06/22  1208 05/06/22  1710 05/06/22 2057   Sodium mmol/L 136  --  136  137  --  136 135* 133* 135* 131* 132*  --    Potassium mmol/L 4.4  --  5.7*  6.0*  --  4.4 4.6 4.2 4.0 4.0 4.3  --    Chloride mmol/L 107  --  103  104  --  103 103 100 100 96 96  --    CO2 mmol/L 20*  --  23  23  --  21* 21* 20* 24 25 22*  --    Glucose mg/dL 188*  --  174*  175*  --  173* 179* 257* 271* 288* 400*  --    Glucose, UA   --   --   --  Negative  --   --   --   --   --   --   --    BUN mg/dL 45*  --  51*  52*  --  56* 53* 61* 64* 65* 66*  --    Creatinine mg/dL 2.5*  --  2.9*  2.9*  --  2.9* 2.9* 2.9* 2.6* 2.6* 2.8*  --    Albumin g/dL 3.1*  --  2.8*  2.8*  --   --  2.9* 2.5* 2.6*  --   --   --    Total Bilirubin mg/dL 1.4*  --  1.1*  1.2*  --   --  1.3* 1.1* 1.2*  --   --   --    Alkaline Phosphatase U/L 44*  --  46*  44*  --   --  48* 44* 43*  --   --   --    AST U/L 117*  --  91*  92*  --   --  88* 73* 74*  --   --   --    ALT U/L 43  --  36  37  --   --  41 34 36  --   --   --    Magnesium mg/dL  --  1.8 1.9  1.9  --   --   --  1.7  --   --   --  1.8   Phosphorus mg/dL  --   --  3.7  3.8  --   --   --  4.0  --   --   --   --        Vancomycin Administrations:  vancomycin given in the last 96 hours                   vancomycin in dextrose 5 % 1 gram/250 mL IVPB 1,000 mg (mg) 1,000 mg New Bag 05/06/22 2341    vancomycin 1.5 g in dextrose 5 % 250 mL IVPB (ready to mix) (mg) 1,500 mg New Bag 05/05/22 2211                Microbiologic Results:  Microbiology Results (last 7 days)     Procedure Component Value Units Date/Time    Blood culture [[id number]] Collected: 05/05/22 1230    Order Status: Completed Specimen: Blood from Antecubital, Left Arm Updated: 05/06/22 1612     Blood Culture, Routine No Growth to date      No Growth to date    Narrative:      Collection has been rescheduled by [facility] at 05/05/2022 09:26 Reason:   Patient in Echo,spoke with CINDY Gudino  Collection  has been rescheduled by 6 at 05/05/2022 11:52 Reason:   Patient in bathroom will try back  Collection has been rescheduled by [facility] at 05/05/2022 09:26 Reason:   Patient in Echo,spoke with CINDY Gudino  Collection has been rescheduled by [facility] at 05/05/2022 11:52 Reason:   Patient in bathroom will try back    Blood culture [[id number]] Collected: 05/05/22 1237    Order Status: Completed Specimen: Blood from Peripheral, Right Hand Updated: 05/06/22 1612     Blood Culture, Routine No Growth to date      No Growth to date    Narrative:      Collection has been rescheduled by [facility] at 05/05/2022 09:26 Reason:   Patient in Echo,spoke with CINDY Gudino  Collection has been rescheduled by [facility] at 05/05/2022 11:52 Reason:   Patient in bathroom will try back  Collection has been rescheduled by [facility] at 05/05/2022 09:26 Reason:   Patient in Echo,spoke with CINDY Gudino  Collection has been rescheduled by [facility] at 05/05/2022 11:52 Reason:   Patient in bathroom will try back

## 2022-05-07 NOTE — ASSESSMENT & PLAN NOTE
Patient with Hypoxic Respiratory failure which is Acute.  he is not on home oxygen. Supplemental oxygen was provided and noted- Oxygen Concentration (%):  [40-50] 40  Resp Rate Total:  [24 br/min-28 br/min] 27 br/min.   Signs/symptoms of respiratory failure include- tachypnea. Contributing diagnoses includes - CHF Labs and images were reviewed. Patient . Will treat underlying causes and adjust management of respiratory failure as follows- IV diuresis/ Bipapap as needed

## 2022-05-07 NOTE — SUBJECTIVE & OBJECTIVE
Interval History: Pt seen and examined today at bedside. Plan for IABP 1:2. HD stable   On  utamine 5, Vaso 0.04, epi 0.02, Nickie 10.   Antidiuresis well. Intravascular pressures low , will continue to monitor.    Review of Systems   Constitutional: Negative for decreased appetite, fever, malaise/fatigue and weight loss.   HENT:  Negative for congestion.    Cardiovascular:  Negative for chest pain, dyspnea on exertion, orthopnea and palpitations.   Respiratory:  Negative for cough, shortness of breath and wheezing.    Gastrointestinal:  Negative for bloating, abdominal pain and constipation.   Objective:     Vital Signs (Most Recent):  Temp: 98.4 °F (36.9 °C) (22 0701)  Pulse: (!) 115 (22 141)  Resp: (!) 24 (22)  BP: (!) 105/57 (22)  SpO2: (!) 94 % (22)   Vital Signs (24h Range):  Temp:  [98.1 °F (36.7 °C)-98.5 °F (36.9 °C)] 98.4 °F (36.9 °C)  Pulse:  [] 115  Resp:  [14-38] 24  SpO2:  [92 %-100 %] 94 %  Arterial Line BP: (105-128)/(37-58) 113/52     Weight: 103.4 kg (228 lb)  Body mass index is 32.71 kg/m².     SpO2: (!) 94 %  O2 Device (Oxygen Therapy): High Flow nasal Cannula      Intake/Output Summary (Last 24 hours) at 2022 1428  Last data filed at 2022 1400  Gross per 24 hour   Intake 1887.26 ml   Output 2990 ml   Net -1102.74 ml       Lines/Drains/Airways       Central Venous Catheter Line  Duration             Introducer 22 1510 right internal jugular 1 day    Pulmonary Artery Catheter Assessment  22 1625 right internal jugular 1 day              Drain  Duration                  Urethral Catheter 22 1300 16 Fr. 2 days              Arterial Line  Duration             Arterial Line 22 1714 Left Radial 1 day              Line  Duration                  IABP 22 1905 7.5 Fr. 40 mL 1 day              Peripheral Intravenous Line  Duration                  Peripheral IV - Single Lumen 22 1135 20 G Right Forearm 3 days          Peripheral IV - Single Lumen 05/05/22 1538 20 G Left Forearm 1 day                    Physical Exam  Constitutional:       Appearance: Normal appearance.   HENT:      Head: Normocephalic.      Mouth/Throat:      Mouth: Mucous membranes are moist.   Eyes:      Pupils: Pupils are equal, round, and reactive to light.   Cardiovascular:      Rate and Rhythm: Normal rate and regular rhythm.      Pulses: Normal pulses.      Heart sounds: Normal heart sounds. No murmur heard.  Pulmonary:      Effort: Pulmonary effort is normal.      Breath sounds: Normal breath sounds. No rales.   Abdominal:      General: Abdomen is flat. Bowel sounds are normal. There is no distension.      Tenderness: There is no abdominal tenderness. There is no guarding.   Musculoskeletal:      Right lower leg: No edema.      Left lower leg: No edema.   Skin:     General: Skin is warm.      Comments: RT fem IABP in pulse.   Neurological:      General: No focal deficit present.      Mental Status: He is alert.   Psychiatric:         Mood and Affect: Mood normal.       Significant Labs: BMP:   Recent Labs   Lab 05/06/22  0340 05/06/22  0915 05/06/22  1208 05/06/22 1710 05/06/22 2057 05/07/22  0432   *   < > 288* 400*  --  309*   *   < > 131* 132*  --  129*   K 4.2   < > 4.0 4.3  --  3.9      < > 96 96  --  94*   CO2 20*   < > 25 22*  --  25   BUN 61*   < > 65* 66*  --  67*   CREATININE 2.9*   < > 2.6* 2.8*  --  2.4*   CALCIUM 8.6*   < > 8.9 8.8  --  8.9   MG 1.7  --   --   --  1.8 2.3    < > = values in this interval not displayed.   , CMP   Recent Labs   Lab 05/06/22  0340 05/06/22  0915 05/06/22  1208 05/06/22 1710 05/07/22  0432   * 135* 131* 132* 129*   K 4.2 4.0 4.0 4.3 3.9    100 96 96 94*   CO2 20* 24 25 22* 25   * 271* 288* 400* 309*   BUN 61* 64* 65* 66* 67*   CREATININE 2.9* 2.6* 2.6* 2.8* 2.4*   CALCIUM 8.6* 8.8 8.9 8.8 8.9   PROT 5.2* 5.4*  --   --  5.3*   ALBUMIN 2.5* 2.6*  --   --  2.5*   BILITOT  1.1* 1.2*  --   --  0.9   ALKPHOS 44* 43*  --   --  48*   AST 73* 74*  --   --  45*   ALT 34 36  --   --  31   ANIONGAP 13 11 10 14 10   ESTGFRAFRICA 22.3* 25.4* 25.4* 23.2* 28.0*   EGFRNONAA 19.3* 22.0* 22.0* 20.1* 24.2*   , CBC   Recent Labs   Lab 05/05/22  1438 05/06/22  0340 05/06/22  2109 05/07/22  0432   WBC 15.20* 12.46  --  12.16   HGB 11.6* 10.5*  --  10.4*   HCT 35.4* 32.4*   < > 31.3*    129*  --  115*    < > = values in this interval not displayed.   , and INR   Recent Labs   Lab 05/05/22  1438   INR 1.1       Significant Imaging: Echocardiogram: Transthoracic echo (TTE) complete (Cupid Only):   Results for orders placed or performed during the hospital encounter of 05/04/22   Echo   Result Value Ref Range    Ascending aorta 3.53 cm    STJ 3.05 cm    AV mean gradient 3 mmHg    Ao peak skyler 1.18 m/s    Ao VTI 17.78 cm    IVS 0.95 0.6 - 1.1 cm    LA size 4.54 cm    Left Atrium Major Axis 5.61 cm    Left Atrium Minor Axis 5.57 cm    LVIDd 5.60 3.5 - 6.0 cm    LVIDs 4.61 (A) 2.1 - 4.0 cm    LVOT diameter 2.22 cm    LVOT peak VTI 9.50 cm    Posterior Wall 0.89 0.6 - 1.1 cm    MV Peak E Skyler 0.69 m/s    RA Major Axis 4.63 cm    RA Width 4.49 cm    RVDD 4.69 cm    Sinus 3.46 cm    TAPSE 2.02 cm    TR Max Skyler 3.83 m/s    TDI LATERAL 0.02 m/s    TDI SEPTAL 0.04 m/s    LA WIDTH 4.86 cm    LV Diastolic Volume 153.67 mL    LV Systolic Volume 97.63 mL    RV S' 13.91 cm/s    LVOT peak skyler 0.55 m/s    LA volume (mod) 88.29 cm3    LV LATERAL E/E' RATIO 34.50 m/s    LV SEPTAL E/E' RATIO 17.25 m/s    FS 18 %    LA volume 104.84 cm3    LV mass 197.13 g    Left Ventricle Relative Wall Thickness 0.32 cm    AV valve area 2.07 cm2    AV Velocity Ratio 0.47     AV index (prosthetic) 0.53     Mean e' 0.03 m/s    LVOT area 3.9 cm2    LVOT stroke volume 36.75 cm3    AV peak gradient 6 mmHg    E/E' ratio 23.00 m/s    LV Systolic Volume Index 44.2 mL/m2    LV Diastolic Volume Index 69.53 mL/m2    LA Volume Index 47.4 mL/m2     LV Mass Index 89 g/m2    Triscuspid Valve Regurgitation Peak Gradient 59 mmHg    LA Volume Index (Mod) 40.0 mL/m2    BSA 2.26 m2    EF 20 %    Narrative    · The left ventricle is normal in size with severely decreased systolic   function. The estimated ejection fraction is <20%. Stroke volume 41cc per   beat.  · There is left ventricular global hypokinesis with apical akinesis.  · Left ventricular diastolic dysfunction.  · Mild right ventricular enlargement with mildly to moderately reduced   right ventricular systolic function.  · Biatrial enlargement.  · Mild mitral regurgitation.  · Mild tricuspid regurgitation.  · Indeterminate central venous pressure. Estimated PA systolic pressure is   at least 59 mmHg.

## 2022-05-08 NOTE — ASSESSMENT & PLAN NOTE
- baseline around 2, on admit 2.5. sCr improving with diuresis  - Likely 2/2 to cardiorenal syndrome  - Continue diuresis  - avoid nephrotoxins; renally dose meds

## 2022-05-08 NOTE — ASSESSMENT & PLAN NOTE
Patient with Hypoxic Respiratory failure which is Acute.  he is not on home oxygen. Supplemental oxygen was provided and noted- Vent Mode: Spont  Oxygen Concentration (%):  [40] 40  Resp Rate Total:  [13 br/min-40 br/min] 19 br/min  PEEP/CPAP:  [5 cmH20] 5 cmH20  Pressure Support:  [12 cmH20] 12 cmH20  Mean Airway Pressure:  [8.1 cmH20-9.6 cmH20] 8.8 cmH20.   Signs/symptoms of respiratory failure include- tachypnea. Contributing diagnoses includes - CHF Labs and images were reviewed. Patient . Will treat underlying causes and adjust management of respiratory failure as follows- IV diuresis/ Bipapap as needed

## 2022-05-08 NOTE — SUBJECTIVE & OBJECTIVE
Interval History: NAEON. Patient remains on BiPAP nightly and during naps. This AM, while on  2.5, vasopressin 0.04, epi gtt 0.02, Nickie at 10ppm and IABP 1:1, patient's hemodynamics were as calculated: CVP 5, SVO2 62, CO 6.5, CI 2.9, , PAD 14. -150cc/hr w/ lasix at 10mg/hr    IABP placed on 1:2 around 10am.: Hemodynamics: CVP 5, SVO2 61, CO 6.95, CI 3.15, , UOP approx 175 cc/hr with lasix at 10mg/hr    Review of Systems   Constitutional: Negative for chills and fever.   Cardiovascular:  Negative for chest pain and palpitations.   Respiratory:  Positive for shortness of breath.    Psychiatric/Behavioral:  Negative for altered mental status.    Objective:     Vital Signs (Most Recent):  Temp: 99 °F (37.2 °C) (05/08/22 1101)  Pulse: 99 (05/08/22 1331)  Resp: 16 (05/08/22 1331)  BP: (!) 105/57 (05/05/22 1715)  SpO2: 100 % (05/08/22 1331)   Vital Signs (24h Range):  Temp:  [98.5 °F (36.9 °C)-99.2 °F (37.3 °C)] 99 °F (37.2 °C)  Pulse:  [] 99  Resp:  [15-30] 16  SpO2:  [93 %-100 %] 100 %  Arterial Line BP: (113-138)/(28-59) 129/48     Weight: 91 kg (200 lb 9.9 oz)  Body mass index is 28.79 kg/m².     SpO2: 100 %  O2 Device (Oxygen Therapy): BiPAP      Intake/Output Summary (Last 24 hours) at 5/8/2022 1337  Last data filed at 5/8/2022 1200  Gross per 24 hour   Intake 2321.41 ml   Output 2555 ml   Net -233.59 ml       Lines/Drains/Airways       Central Venous Catheter Line  Duration             Introducer 05/05/22 1510 right internal jugular 2 days    Pulmonary Artery Catheter Assessment  05/05/22 1625 right internal jugular 2 days              Drain  Duration                  Urethral Catheter 05/05/22 1300 16 Fr. 3 days              Arterial Line  Duration             Arterial Line 05/05/22 1714 Left Radial 2 days              Line  Duration                  IABP 05/05/22 1905 7.5 Fr. 40 mL 2 days              Peripheral Intravenous Line  Duration                  Peripheral IV - Single Lumen  05/05/22 1538 20 G Left Forearm 2 days                    Physical Exam  Vitals and nursing note reviewed.   Constitutional:       General: He is not in acute distress.     Appearance: Normal appearance. He is obese. He is not ill-appearing, toxic-appearing or diaphoretic.   HENT:      Head: Normocephalic.      Nose: Nose normal.      Mouth/Throat:      Mouth: Mucous membranes are moist.   Eyes:      Extraocular Movements: Extraocular movements intact.   Cardiovascular:      Rate and Rhythm: Normal rate and regular rhythm.      Pulses: Normal pulses.      Heart sounds: Normal heart sounds.      Comments: Right IJ swan Kaykay catheter  Pulmonary:      Effort: Pulmonary effort is normal. No respiratory distress.      Breath sounds: Normal breath sounds.   Abdominal:      General: Abdomen is flat. Bowel sounds are normal. There is no distension.      Tenderness: There is no abdominal tenderness.   Genitourinary:     Comments: Massey draining clear light yellow  urine  Musculoskeletal:         General: No swelling or tenderness. Normal range of motion.      Cervical back: Normal range of motion.   Skin:     General: Skin is warm.   Neurological:      Mental Status: He is alert and oriented to person, place, and time. Mental status is at baseline.   Psychiatric:         Mood and Affect: Mood normal.         Behavior: Behavior normal.         Thought Content: Thought content normal.       Significant Labs: Blood Culture: No results for input(s): LABBLOO in the last 48 hours., BMP:   Recent Labs   Lab 05/06/22  1710 05/06/22  2057 05/07/22  0432 05/07/22  2224 05/08/22  0546   *  --  309*  --  198*   *  --  129*  --  130*   K 4.3  --  3.9 3.8 4.4   CL 96  --  94*  --  95   CO2 22*  --  25  --  23   BUN 66*  --  67*  --  68*   CREATININE 2.8*  --  2.4*  --  2.1*   CALCIUM 8.8  --  8.9  --  8.9   MG  --    < > 2.3 2.1 2.2  2.2    < > = values in this interval not displayed.   , CMP   Recent Labs   Lab  05/06/22  1710 05/07/22  0432 05/07/22  2224 05/08/22  0546   * 129*  --  130*   K 4.3 3.9 3.8 4.4   CL 96 94*  --  95   CO2 22* 25  --  23   * 309*  --  198*   BUN 66* 67*  --  68*   CREATININE 2.8* 2.4*  --  2.1*   CALCIUM 8.8 8.9  --  8.9   PROT  --  5.3*  --  5.4*   ALBUMIN  --  2.5*  --  2.4*   BILITOT  --  0.9  --  1.0   ALKPHOS  --  48*  --  51*   AST  --  45*  --  36   ALT  --  31  --  28   ANIONGAP 14 10  --  12   ESTGFRAFRICA 23.2* 28.0*  --  32.9*   EGFRNONAA 20.1* 24.2*  --  28.5*   , and CBC   Recent Labs   Lab 05/07/22  0432 05/08/22  0546   WBC 12.16 12.49   HGB 10.4* 10.2*   HCT 31.3* 31.3*   * 100*       Significant Imaging: Reviewed

## 2022-05-08 NOTE — PROGRESS NOTES
Pharmacokinetic Sign-off: IV Vancomycin    Therapy with Vancomycin complete and/or consult discontinued by provider.    Pharmacy will sign off, please re-consult as needed.    Romain Truong, PharmD  Ext: [phone]

## 2022-05-08 NOTE — HOSPITAL COURSE
Mr. Kevon Perez, 81 y.o. 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 Of note, he underwent LHC on 4/29 (last week) which revealed multi-vessel CAD. CTS consulted and recommended medical versus PCI. The plan from IC was to proceed with multi-stage PCI in the outpatient setting. Patient was admitted this hospital course for acute decompensated HF and was placed on an adequate diuretic regimen that he didn't respond to. Patient developed hypotension 05/05. Labs revealed worsening RAGHU (sCr 2.4->2.9). Lactic 1.8. TTE revealed drop in EF from 65-> 20%. ACS protocol initiated given concern for possible new infarct. Patient was transferred to CICU for close monitoring. Upon arrival to the CCU, a swan jesus alberto catheter was placed via right IJ for hemodynamic monitoring. He was initiated on  2.5, Epi gtt 0.02, vasopressin 0.04, Nickie initially at 5ppm. He was also placed on an IABP by interventional cardiology. His iNP was further uptitrated to 10ppm. On 05/07 hsi hemodynamics worsened after IABP settings was weaned to 1:2, so this was further upgraded to 1:1. Patient improved on IABP and this was removed on 5/9. Patient Cr worsening with removal of IABP, poor prognostic sign. Patient also having new WCC on 5/11, septic work up and broad spectrum antibiotics started. GOC discussed and patient made DNR on 5/11. [facility] consulted for assistance in HF care. Patient received lasix drip transitioned to lasix 80 IV PRN for volume. Patient hemodynamics improved 5/12, Weaned nitric, epi, and vaso by 5/13, only on dobutamine drip. Patient started to require the BiPAP continuously for shortness of breath on 5/12. Palliative Care consulted on 5/13 to discuss transitioning to home hospice but was not  discussed as Patient went into VT followed by VF on  and .

## 2022-05-08 NOTE — PLAN OF CARE
CICU DAILY GOALS       A: Awake    RASS: Goal - RASS Goal: 0-->alert and calm  Actual - RASS (Hess Agitation-Sedation Scale): 0-->alert and calm   Restraint necessity:    B: Breath   SBT: Not intubated   C: Coordinate A & B, analgesics/sedatives   Pain: managed    SAT: Not intubated  D: Delirium   CAM-ICU: Overall CAM-ICU: Negative  E: Early(intubated/ Progressive (non-intubated) Mobility   MOVE Screen: Pass   Activity: Activity Management: Arm raise - L1, Ankle pumps - L1  FAS: Feeding/Nutrition   Diet order: Diet/Nutrition Received: 2 gram sodium,   Fluid restriction:    T: Thrombus   DVT prophylaxis: VTE Required Core Measure: Pharmacological prophylaxis initiated/maintained  H: HOB Elevation   Head of Bed (HOB) Positioning: HOB at 15 degrees  U: Ulcer Prophylaxis   GI: yes  G: Glucose control   managed Glycemic Management: blood glucose monitored  S: Skin   Bundle compliance: yes   Bathing/Skin Care: back care, bath, complete, dressed/undressed, incontinence care, linen changed Date: PM Bath  B: Bowel Function   constipation   I: Indwelling Catheters   Massey necessity:      Urethral Catheter 05/05/22 1300 16 Fr.-Reason for Continuing Urinary Catheterization: Critically ill in ICU and requiring hourly monitoring of intake/output, Urinary retention   CVC necessity: Yes   IPAD offered: No  D: De-escalation Antibx   Yes  Plan for the day   Monitor VS and UOP. CVP 4, 5, & 6. SV02 62. No issues with IABP. Settings 1:1, auto, ekg trigger.    Family/Goals of care/Code Status   Code Status: Full Code     No acute events throughout day, VS and assessment per flow sheet, patient progressing towards goals as tolerated, plan of care reviewed with Kevon Perez and family, all concerns addressed, will continue to monitor

## 2022-05-08 NOTE — PLAN OF CARE
Cardiac ICU Care Plan    POC reviewed with Kevon Perez and family. Questions and concerns addressed. Pt VSS. IABP at 1:1, ECG trigger. Will continue to monitor. See below and flowsheets for full assessment and VS info.       Neuro:  Preston Coma Scale  Best Eye Response: 4-->(E4) spontaneous  Best Motor Response: 6-->(M6) obeys commands  Best Verbal Response: 5-->(V5) oriented  Preston Coma Scale Score: 15  Assessment Qualifiers: patient not sedated/intubated  Pupil PERRLA: yes    24 hr Temp:  [98.5 °F (36.9 °C)-99.2 °F (37.3 °C)]      CV:  Rhythm: paced rhythm, sinus tachycardia   DVT prophylaxis: VTE Required Core Measure: Pharmacological prophylaxis initiated/maintained    CVP (mean): 3 mmHg (05/08/22 1700)    Pulmonary Artery Catheter Assessment  05/05/22 1625 right internal jugular-Current Insertion Depth (cm): 55 cm (05/08/22 1501)  PAP: 33/11 (05/08/22 1700)  SVO2 (%): 61 % (05/05/22 2000)    IABP Mode: Auto  IABP Rate: 1:2  IABP Trigger: ECG  IABP Augmented Diastolic Pressure: 114          Pulses  Right Radial Pulse: 2+ (normal)  Left Radial Pulse: 2+ (normal)  Right Dorsalis Pedis Pulse: Doppler  Left Dorsalis Pedis Pulse: 2+ (normal)  Right Posterior Tibial Pulse: Doppler  Left Posterior Tibial Pulse: Doppler    Resp:  O2 Device (Oxygen Therapy): BiPAP  Flow (L/min): 15  Vent Mode: Spont  Oxygen Concentration (%): 40  PEEP/CPAP: 5 cmH20  Pressure Support: 12 cmH20    GI/:  GI prophylaxis: yes  Diet/Nutrition Received: 2 gram sodium  Last Bowel Movement: 04/28/22  Voiding Characteristics: urethral catheter (bladder)       Urethral Catheter 05/05/22 1300 16 Fr.-Reason for Continuing Urinary Catheterization: Critically ill in ICU and requiring hourly monitoring of intake/output   Intake/Output Summary (Last 24 hours) at 5/8/2022 1730  Last data filed at 5/8/2022 1700  Gross per 24 hour   Intake 1859.76 ml   Output 2780 ml   Net -920.24 ml          Labs/Accuchecks:  Recent Labs   Lab 05/06/22  0340  05/06/22  2109 05/07/22  0432 05/08/22  0546   WBC 12.46  --  12.16 12.49   RBC 3.55*  --  3.59* 3.57*   HGB 10.5*  --  10.4* 10.2*   HCT 32.4* 32* 31.3* 31.3*   *  --  115* 100*      Recent Labs   Lab 05/02/22  0921 05/04/22  1926 05/05/22  1438 05/05/22  2031 05/07/22  0432 05/07/22  1059 05/08/22  0546   INR 1.1  --  1.1  --   --   --   --    APTT  --    < >  --    < > 50.9* 50.9* 62.4*    < > = values in this interval not displayed.      Recent Labs     05/08/22  0546 05/08/22  1349   * 130*   K 4.4 3.7   CO2 23 24   CL 95 93*   BUN 68* 67*   CREATININE 2.1* 2.1*   ALKPHOS 51*  --    ALT 28  --    AST 36  --    BILITOT 1.0  --        Recent Labs   Lab 05/04/22  1426 05/05/22  0450 05/05/22  1230   TROPONINI 27.388* 35.947* 40.689*  39.987*  39.388*      Recent Labs     05/08/22  0533 05/08/22  1252 05/08/22  1704   PH 7.379 7.384 7.389   PCO2 47.1* 46.7* 47.5*   PO2 33* 33* 32*   HCO3 27.8 27.9 28.7*   POCSATURATED 62* 61* 60*   BE 3 3 4       Electrolytes: Electrolytes replaced  Accuchecks: ACHS    Gtts/LDAs:   sodium chloride 0.9% Stopped (05/08/22 1651)    sodium chloride 0.9% 8 mL/hr at 05/08/22 1700    DOBUTamine IV infusion (non-titrating) 2.5 mcg/kg/min (05/08/22 1700)    EPINEPHrine 0.02 mcg/kg/min (05/08/22 1700)    furosemide (LASIX) 2 mg/mL continuous infusion (non-titrating) 10 mg/hr (05/08/22 1700)    heparin (porcine) in D5W 17 Units/kg/hr (05/08/22 1700)    nitric oxide gas      vasopressin 0.04 Units/min (05/08/22 1700)       Lines/Drains/Airways       Central Venous Catheter Line  Duration             Introducer 05/05/22 1510 right internal jugular 3 days    Pulmonary Artery Catheter Assessment  05/05/22 1625 right internal jugular 3 days              Drain  Duration                  Urethral Catheter 05/05/22 1300 16 Fr. 3 days              Arterial Line  Duration             Arterial Line 05/05/22 1714 Left Radial 3 days              Line  Duration                  IABP 05/05/22  1905 7.5 Fr. 40 mL 2 days              Peripheral Intravenous Line  Duration                  Peripheral IV - Single Lumen 05/05/22 1538 20 G Left Forearm 3 days                    Skin/Wounds  Bathing/Skin Care: back care;bath, complete;dressed/undressed;incontinence care;linen changed (05/08/22 0300)  Wounds: No  Wound care consulted: No

## 2022-05-08 NOTE — PROGRESS NOTES
Pharmacokinetic Assessment Follow Up: IV Vancomycin    Vancomycin serum concentration assessment & plan:  · Vancomycin random level resulted at 21.1 mcg/ml which is slightly above target level of 15 - 20 mcg/ml  · Level is ~24 hours post dose of 1000mg  · Scr still elevated but has improved  · Redose vancomycin 750mg x1 tomorrow morning @ 0500  · Obtain random level with AM labs on Monday 5/9      Drug levels (last 3 results):  Recent Labs   Lab Result Units 05/06/22  0340 05/06/22  2057 05/07/22  2224   Vancomycin, Random ug/mL 24.7 17.7 21.1       Pharmacy will continue to follow and monitor vancomycin.    Please contact pharmacy at extension [phone] for questions regarding this assessment.    Thank you for the consult,   Montserrat Lucila       Patient brief summary:  Kevon Perez is a 81 y.o. male initiated on antimicrobial therapy with IV Vancomycin for treatment of bacteremia    The patient's current regimen is pulse dosing    Drug Allergies:   Review of patient's allergies indicates:   Allergen Reactions    Penicillins Other (See Comments)     Muscle stiffness    Bactrim [sulfamethoxazole-trimethoprim] Rash       Actual Body Weight:   103.4 kg    Renal Function:   Estimated Creatinine Clearance: 28.6 mL/min (A) (based on SCr of 2.4 mg/dL (H)).,     Dialysis Method (if applicable):  N/A    CBC (last 72 hours):  Recent Labs   Lab Result Units 05/05/22  0450 05/05/22  1438 05/06/22  0340 05/07/22  0432   WBC K/uL 14.01*  14.13* 15.20* 12.46 12.16   Hemoglobin g/dL 11.7*  11.9* 11.6* 10.5* 10.4*   Hematocrit % 37.2*  37.0* 35.4* 32.4* 31.3*   Platelets K/uL 151  157 152 129* 115*   Gran % % 84.8* 83.3* 84.6* 82.7*   Lymph % % 5.4* 6.8* 4.9* 5.2*   Mono % % 8.8 8.8 9.6 10.4   Eosinophil % % 0.1 0.1 0.0 0.6   Basophil % % 0.4 0.4 0.4 0.4   Differential Method  Automated Automated Automated Automated       Metabolic Panel (last 72 hours):  Recent Labs   Lab Result Units 05/05/22  0450 05/05/22  3297  05/05/22  1230 05/05/22  1438 05/06/22  0340 05/06/22  0915 05/06/22  1208 05/06/22  1710 05/06/22  2057 05/07/22  0432 05/07/22  2224   Sodium mmol/L 136  137  --  136 135* 133* 135* 131* 132*  --  129*  --    Potassium mmol/L 5.7*  6.0*  --  4.4 4.6 4.2 4.0 4.0 4.3  --  3.9 3.8   Chloride mmol/L 103  104  --  103 103 100 100 96 96  --  94*  --    CO2 mmol/L 23  23  --  21* 21* 20* 24 25 22*  --  25  --    Glucose mg/dL 174*  175*  --  173* 179* 257* 271* 288* 400*  --  309*  --    Glucose, UA   --  Negative  --   --   --   --   --   --   --   --   --    BUN mg/dL 51*  52*  --  56* 53* 61* 64* 65* 66*  --  67*  --    Creatinine mg/dL 2.9*  2.9*  --  2.9* 2.9* 2.9* 2.6* 2.6* 2.8*  --  2.4*  --    Albumin g/dL 2.8*  2.8*  --   --  2.9* 2.5* 2.6*  --   --   --  2.5*  --    Total Bilirubin mg/dL 1.1*  1.2*  --   --  1.3* 1.1* 1.2*  --   --   --  0.9  --    Alkaline Phosphatase U/L 46*  44*  --   --  48* 44* 43*  --   --   --  48*  --    AST U/L 91*  92*  --   --  88* 73* 74*  --   --   --  45*  --    ALT U/L 36  37  --   --  41 34 36  --   --   --  31  --    Magnesium mg/dL 1.9  1.9  --   --   --  1.7  --   --   --  1.8 2.3 2.1   Phosphorus mg/dL 3.7  3.8  --   --   --  4.0  --   --   --   --  4.0  --        Vancomycin Administrations:  vancomycin given in the last 96 hours                   vancomycin in dextrose 5 % 1 gram/250 mL IVPB 1,000 mg ()  Restarted 05/07/22 0008     1,000 mg New Bag 05/06/22 2341    vancomycin 1.5 g in dextrose 5 % 250 mL IVPB (ready to mix) (mg) 1,500 mg New Bag 05/05/22 2211                Microbiologic Results:  Microbiology Results (last 7 days)     Procedure Component Value Units Date/Time    Blood culture [[id number]] Collected: 05/05/22 1230    Order Status: Completed Specimen: Blood from Antecubital, Left Arm Updated: 05/07/22 1612     Blood Culture, Routine No Growth to date      No Growth to date      No Growth to date    Narrative:      Collection has been rescheduled  by CBE at 05/05/2022 09:26 Reason:   Patient in Echo,spoke with TereseRN  Collection has been rescheduled by TH6 at 05/05/2022 11:52 Reason:   Patient in bathroom will try back  Collection has been rescheduled by CBE at 05/05/2022 09:26 Reason:   Patient in Echo,spoke with Terese,RN  Collection has been rescheduled by TH6 at 05/05/2022 11:52 Reason:   Patient in bathroom will try back    Blood culture [[id number]] Collected: 05/05/22 1237    Order Status: Completed Specimen: Blood from Peripheral, Right Hand Updated: 05/07/22 1612     Blood Culture, Routine No Growth to date      No Growth to date      No Growth to date    Narrative:      Collection has been rescheduled by CBE at 05/05/2022 09:26 Reason:   Patient in Echo,spoke with Terese,RN  Collection has been rescheduled by TH6 at 05/05/2022 11:52 Reason:   Patient in bathroom will try back  Collection has been rescheduled by CBE at 05/05/2022 09:26 Reason:   Patient in Echo,spoke with TereseRN  Collection has been rescheduled by TH6 at 05/05/2022 11:52 Reason:   Patient in bathroom will try back

## 2022-05-08 NOTE — ASSESSMENT & PLAN NOTE
Mr. Kevon Perez, 81 y.o. 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 He presented with chest pain, SOB, cough.  -Initial workup was notable for BNP 1194, uptrending troponin, worsening BUN/Cr. CXR showed Bibasilar edema.   -Given his tachycardia and dyspnea, there were initial concerns for PE, however it is unlikely as LE U/S, V/Q scan non-concerning, and absence of right heart strain on TTE.     No results for input(s): TROPONINI in the last 72 hours.    Intake/Output Summary (Last 24 hours) at 5/8/2022 1409  Last data filed at 5/8/2022 1400  Gross per 24 hour   Intake 2093.35 ml   Output 2705 ml   Net -611.65 ml       Net IO Since Admission: -3,650.44 mL [05/08/22 1409]    -Given his TTE showing significant worsening of Ejection Fraction to less than 20%, signs of end-organ damage(acute renal failure, troponinemia, transaminitis), there were concerns for cardiogenic shock for which patient was transferred to the CCU .    Plan   - Continue ACS protocol with heparin, aspirin, clopidogrel  - Hemodynamics improving. Will discuss with IC regarding further revascularization options once patient is more stable.  - s/p [place] Kaykay catheter for hemodynamics montioring and IABP balloon pump MCS on 05/05.   - Plan for IBAP 1:2 today. Refer to HDS above in interval hx. Augmenting well with MAP >70.  - Currently on  gtt @2.5, epi gtt 0.02, vasopressin gtt and Nickie at 10ppm.   Wean pressors and inotropic support as tolerated  - Hold AV sue blocking agents as concerns for cardiogenic shock  - De-escalated abx

## 2022-05-08 NOTE — ASSESSMENT & PLAN NOTE
TTE with new EF of 20% with mod systolic RV function.     - Responding well to lasix 10mg/hr. Continue diuresis  - Strict I/Os  - Replete electrolytes as needed

## 2022-05-08 NOTE — ASSESSMENT & PLAN NOTE
- LDSSI  - basal bolus insulin and prandial insulin, titrate as needed  - hypoglycemia protocol , [facility]

## 2022-05-08 NOTE — PROGRESS NOTES
[facility] - Cardiac Intensive Care  Cardiology  Progress Note    Patient Name: Kevon Perez  MRN: [id number]  Admission Date: 5/4/2022  Hospital Length of Stay: 4 days  Code Status: Full Code   Attending Physician: Matthew Garcia MD   Primary Care Physician: Cipriano Sol MD  Expected Discharge Date: 5/16/2022  Principal Problem:Cardiogenic shock    Subjective:     Hospital Course:   Mr. Kevon Perez, 81 y.o. 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 Of note, he underwent LHC on 4/29 (last week) which revealed multi-vessel CAD. CTS consulted and recommended medical versus PCI. The plan from IC was to proceed with multi-stage PCI in the outpatient setting. Patient was admitted this hospital course for acute decompensated HF and was placed on an adequate diuretic regimen that he didn't respond to. Patient developed hypotension 05/05. Labs revealed worsening RAGHU (sCr 2.4->2.9). Lactic 1.8. TTE revealed drop in EF from 65-> 20%. ACS protocol initiated given concern for possible new infarct. Patient was transferred to CICU for close monitoring. Upon arrival to the CCU, a swan jesus alberto catheter was placed via right IJ for hemodynamic monitoring. He was initiated on  2.5, Epi gtt 0.02, vasopressin 0.04, Nickie initially at 5ppm. He was also placed on an IABP by interventional cardiology. His iNP was further uptitrated to 10ppm. On 05/07 hsi hemodynamics worsened after IABP settings was weaned to 1:2, so this was further upgraded to 1:1.       Interval History: NAEON. Patient remains on BiPAP nightly and during naps. This AM, while on  2.5, vasopressin 0.04, epi gtt 0.02, Nickie at 10ppm and IABP 1:1, patient's hemodynamics were as calculated: CVP 5, SVO2 62, CO 6.5, CI 2.9,  , PAD 14. -150cc/hr w/ lasix at 10mg/hr    IABP placed on 1:2 around 10am.: Hemodynamics: CVP 5, SVO2 61, CO 6.95, CI 3.15, , UOP approx 175 cc/hr with lasix at 10mg/hr    Review of Systems   Constitutional: Negative for chills and fever.   Cardiovascular:  Negative for chest pain and palpitations.   Respiratory:  Positive for shortness of breath.    Psychiatric/Behavioral:  Negative for altered mental status.    Objective:     Vital Signs (Most Recent):  Temp: 99 °F (37.2 °C) (05/08/22 1101)  Pulse: 99 (05/08/22 1331)  Resp: 16 (05/08/22 1331)  BP: (!) 105/57 (05/05/22 1715)  SpO2: 100 % (05/08/22 1331)   Vital Signs (24h Range):  Temp:  [98.5 °F (36.9 °C)-99.2 °F (37.3 °C)] 99 °F (37.2 °C)  Pulse:  [] 99  Resp:  [15-30] 16  SpO2:  [93 %-100 %] 100 %  Arterial Line BP: (113-138)/(28-59) 129/48     Weight: 91 kg (200 lb 9.9 oz)  Body mass index is 28.79 kg/m².     SpO2: 100 %  O2 Device (Oxygen Therapy): BiPAP      Intake/Output Summary (Last 24 hours) at 5/8/2022 1337  Last data filed at 5/8/2022 1200  Gross per 24 hour   Intake 2321.41 ml   Output 2555 ml   Net -233.59 ml       Lines/Drains/Airways       Central Venous Catheter Line  Duration             Introducer 05/05/22 1510 right internal jugular 2 days    Pulmonary Artery Catheter Assessment  05/05/22 1625 right internal jugular 2 days              Drain  Duration                  Urethral Catheter 05/05/22 1300 16 Fr. 3 days              Arterial Line  Duration             Arterial Line 05/05/22 1714 Left Radial 2 days              Line  Duration                  IABP 05/05/22 1905 7.5 Fr. 40 mL 2 days              Peripheral Intravenous Line  Duration                  Peripheral IV - Single Lumen 05/05/22 1538 20 G Left Forearm 2 days                    Physical Exam  Vitals and nursing note reviewed.   Constitutional:       General: He is not in acute distress.     Appearance: Normal appearance. He is obese. He is not  ill-appearing, toxic-appearing or diaphoretic.   HENT:      Head: Normocephalic.      Nose: Nose normal.      Mouth/Throat:      Mouth: Mucous membranes are moist.   Eyes:      Extraocular Movements: Extraocular movements intact.   Cardiovascular:      Rate and Rhythm: Normal rate and regular rhythm.      Pulses: Normal pulses.      Heart sounds: Normal heart sounds.      Comments: Right IJ swan Kaykay catheter  Pulmonary:      Effort: Pulmonary effort is normal. No respiratory distress.      Breath sounds: Normal breath sounds.   Abdominal:      General: Abdomen is flat. Bowel sounds are normal. There is no distension.      Tenderness: There is no abdominal tenderness.   Genitourinary:     Comments: Massey draining clear light yellow  urine  Musculoskeletal:         General: No swelling or tenderness. Normal range of motion.      Cervical back: Normal range of motion.   Skin:     General: Skin is warm.   Neurological:      Mental Status: He is alert and oriented to person, place, and time. Mental status is at baseline.   Psychiatric:         Mood and Affect: Mood normal.         Behavior: Behavior normal.         Thought Content: Thought content normal.       Significant Labs: Blood Culture: No results for input(s): LABBLOO in the last 48 hours., BMP:   Recent Labs   Lab 05/06/22 1710 05/06/22 2057 05/07/22 0432 05/07/22 2224 05/08/22  0546   *  --  309*  --  198*   *  --  129*  --  130*   K 4.3  --  3.9 3.8 4.4   CL 96  --  94*  --  95   CO2 22*  --  25  --  23   BUN 66*  --  67*  --  68*   CREATININE 2.8*  --  2.4*  --  2.1*   CALCIUM 8.8  --  8.9  --  8.9   MG  --    < > 2.3 2.1 2.2  2.2    < > = values in this interval not displayed.   , CMP   Recent Labs   Lab 05/06/22 1710 05/07/22 0432 05/07/22 2224 05/08/22  0546   * 129*  --  130*   K 4.3 3.9 3.8 4.4   CL 96 94*  --  95   CO2 22* 25  --  23   * 309*  --  198*   BUN 66* 67*  --  68*   CREATININE 2.8* 2.4*  --  2.1*   CALCIUM  8.8 8.9  --  8.9   PROT  --  5.3*  --  5.4*   ALBUMIN  --  2.5*  --  2.4*   BILITOT  --  0.9  --  1.0   ALKPHOS  --  48*  --  51*   AST  --  45*  --  36   ALT  --  31  --  28   ANIONGAP 14 10  --  12   ESTGFRAFRICA 23.2* 28.0*  --  32.9*   EGFRNONAA 20.1* 24.2*  --  28.5*   , and CBC   Recent Labs   Lab 05/07/22  0432 05/08/22  0546   WBC 12.16 12.49   HGB 10.4* 10.2*   HCT 31.3* 31.3*   * 100*       Significant Imaging: Reviewed    Assessment and Plan:       * Cardiogenic shock  Mr. Kevon Perez, 81 y.o. 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 He presented with chest pain, SOB, cough.  -Initial workup was notable for BNP 1194, uptrending troponin, worsening BUN/Cr. CXR showed Bibasilar edema.   -Given his tachycardia and dyspnea, there were initial concerns for PE, however it is unlikely as LE U/S, V/Q scan non-concerning, and absence of right heart strain on TTE.     No results for input(s): TROPONINI in the last 72 hours.    Intake/Output Summary (Last 24 hours) at 5/8/2022 1409  Last data filed at 5/8/2022 1400  Gross per 24 hour   Intake 2093.35 ml   Output 2705 ml   Net -611.65 ml       Net IO Since Admission: -3,650.44 mL [05/08/22 1409]    -Given his TTE showing significant worsening of Ejection Fraction to less than 20%, signs of end-organ damage(acute renal failure, troponinemia, transaminitis), there were concerns for cardiogenic shock for which patient was transferred to the CCU .    Plan   - Continue ACS protocol with heparin, aspirin, clopidogrel  - Hemodynamics improving. Will discuss with IC regarding further revascularization options once patient is more stable.  - s/p [place] Kaykay catheter for hemodynamics montioring and IABP balloon pump MCS on 05/05.   - Plan for IBAP 1:2 today. Refer to HDS above in interval hx. Augmenting well with MAP >70.  - Currently on  gtt @2.5, epi gtt 0.02,  vasopressin gtt and Nickie at 10ppm.   Wean pressors and inotropic support as tolerated  - Hold AV sue blocking agents as concerns for cardiogenic shock  - De-escalated abx    Acute on chronic combined systolic and diastolic heart failure  TTE with new EF of 20% with mod systolic RV function.     - Responding well to lasix 10mg/hr. Continue diuresis  - Strict I/Os  - Replete electrolytes as needed    Cardiac pacemaker  Status-post successful dual chamber pacemaker implantation.         Acute renal failure superimposed on stage 4 chronic kidney disease  - baseline around 2, on admit 2.5. sCr improving with diuresis  - Likely 2/2 to cardiorenal syndrome  - Continue diuresis  - avoid nephrotoxins; renally dose meds    Acute respiratory failure with hypoxia  Patient with Hypoxic Respiratory failure which is Acute.  he is not on home oxygen. Supplemental oxygen was provided and noted- Vent Mode: Spont  Oxygen Concentration (%):  [40] 40  Resp Rate Total:  [13 br/min-40 br/min] 19 br/min  PEEP/CPAP:  [5 cmH20] 5 cmH20  Pressure Support:  [12 cmH20] 12 cmH20  Mean Airway Pressure:  [8.1 cmH20-9.6 cmH20] 8.8 cmH20.   Signs/symptoms of respiratory failure include- tachypnea. Contributing diagnoses includes - CHF Labs and images were reviewed. Patient . Will treat underlying causes and adjust management of respiratory failure as follows- IV diuresis/ Bipapap as needed    Hypertension associated with diabetes  Holding home meds in the setting of cardiogenic shock    GINGER on CPAP  BiPAP qhS    Type 2 diabetes mellitus with neurologic complication  - LDSSI  - basal bolus insulin and prandial insulin, titrate as needed  - hypoglycemia protocol , ACHS accuchecks     Coronary artery disease involving native coronary artery of native heart with unstable angina pectoris  Continue ACS protocol - ASA/ Plavix, statin and Heparin gtt        VTE Risk Mitigation (From admission, onward)         Ordered     heparin 25,000 units in dextrose 5%  (100 units/ml) IV bolus from bag - ADDITIONAL PRN BOLUS - 60 units/kg (max bolus 4000 units)  As needed (PRN)        Question:  Heparin Infusion Adjustment (DO NOT MODIFY ANSWER)  Answer:  [email or web address]\epic\Images\Pharmacy\HeparinInfusions\heparin LOW INTENSITY nomogram for [facility] [id number]    05/06/22 2157     heparin 25,000 units in dextrose 5% (100 units/ml) IV bolus from bag - ADDITIONAL PRN BOLUS - 30 units/kg (max bolus 4000 units)  As needed (PRN)        Question:  Heparin Infusion Adjustment (DO NOT MODIFY ANSWER)  Answer:  [email or web address]\epic\Images\Pharmacy\HeparinInfusions\heparin LOW INTENSITY nomogram for [facility] [id number].pdf    05/06/22 2157     heparin 25,000 units in dextrose 5% 250 mL (100 units/mL) infusion LOW INTENSITY nomogram - [facility]  Continuous        Question Answer Comment   Heparin Infusion Adjustment (DO NOT MODIFY ANSWER) [email or web address]\epic\Images\Pharmacy\HeparinInfusions\heparin LOW INTENSITY nomogram for [facility] [id number]    Begin at (in units/kg/hr) 12        05/06/22 2157     IP VTE HIGH RISK PATIENT  Once         05/05/22 1035     Place sequential compression device  Until discontinued         05/05/22 1035     Place sequential compression device  Until discontinued         05/04/22 1418     Place sequential compression device  Until discontinued         05/04/22 1418                Yohana Delvalle MD  Cardiology  [facility] - Cardiac Intensive Care

## 2022-05-09 NOTE — ASSESSMENT & PLAN NOTE
Plt of 91 on 5/9.  - patient was on heparin drip, discontinued on 5/10    Plan  - VTE ppx only  - CBC daily  - hold should plt < 50

## 2022-05-09 NOTE — ASSESSMENT & PLAN NOTE
TTE with new EF of 20% with mod systolic RV function.     - Responding well to lasix 10mg/hr, decreased to 5mg/hr, still diuresing well, continue.  - Strict I/Os  - Replete electrolytes as needed

## 2022-05-09 NOTE — SUBJECTIVE & OBJECTIVE
Interval History: Symptoms:  Same.  Diet:  Adequate intake.   Activity level:  Impaired due to weakness.    Pain:  No pain.   CI/CO/SVR - 2.9/6.37/937, CVP 4, SvO2 - 68      Review of Systems   Constitutional: Negative for decreased appetite and fever.   Cardiovascular:  Positive for dyspnea on exertion. Negative for chest pain and leg swelling.   Respiratory:  Positive for shortness of breath. Negative for cough and sputum production.    Gastrointestinal:  Negative for abdominal pain and constipation.   Neurological:  Negative for difficulty with concentration and dizziness.   Objective:     Vital Signs (Most Recent):  Temp: 97.9 °F (36.6 °C) (05/09/22 0701)  Pulse: 95 (05/09/22 0800)  Resp: 18 (05/09/22 0800)  BP: (!) 121/56 (05/09/22 0800)  SpO2: 100 % (05/09/22 0800)   Vital Signs (24h Range):  Temp:  [97.8 °F (36.6 °C)-99 °F (37.2 °C)] 97.9 °F (36.6 °C)  Pulse:  [] 95  Resp:  [15-28] 18  SpO2:  [98 %-100 %] 100 %  BP: (109-124)/(55-57) 121/56  Arterial Line BP: (122-154)/(35-51) 139/46     Weight: 91.5 kg (201 lb 11.5 oz)  Body mass index is 28.94 kg/m².     SpO2: 100 %  O2 Device (Oxygen Therapy): High Flow nasal Cannula      Intake/Output Summary (Last 24 hours) at 5/9/2022 0902  Last data filed at 5/9/2022 0800  Gross per 24 hour   Intake 1209.16 ml   Output 3565 ml   Net -2355.84 ml       Lines/Drains/Airways       Central Venous Catheter Line  Duration             Introducer 05/05/22 1510 right internal jugular 3 days    Pulmonary Artery Catheter Assessment  05/05/22 1625 right internal jugular 3 days              Drain  Duration                  Urethral Catheter 05/05/22 1300 16 Fr. 3 days              Arterial Line  Duration             Arterial Line 05/05/22 1714 Left Radial 3 days              Line  Duration                  IABP 05/05/22 1905 7.5 Fr. 40 mL 3 days              Peripheral Intravenous Line  Duration                  Peripheral IV - Single Lumen 05/05/22 1538 20 G Left Forearm 3  days                    Physical Exam  Vitals and nursing note reviewed.   Constitutional:       General: He is not in acute distress.     Appearance: He is obese. He is ill-appearing.   HENT:      Mouth/Throat:      Mouth: Mucous membranes are moist.      Pharynx: Oropharynx is clear.   Eyes:      Extraocular Movements: Extraocular movements intact.      Conjunctiva/sclera: Conjunctivae normal.      Pupils: Pupils are equal, round, and reactive to light.   Cardiovascular:      Rate and Rhythm: Normal rate and regular rhythm.   Pulmonary:      Effort: Respiratory distress present.      Breath sounds: Normal breath sounds. No rales.   Abdominal:      General: Abdomen is flat. There is no distension.      Palpations: Abdomen is soft.      Tenderness: There is no abdominal tenderness.   Musculoskeletal:      Cervical back: Normal range of motion and neck supple.      Right lower leg: No edema.      Left lower leg: No edema.   Skin:     General: Skin is warm and dry.   Neurological:      General: No focal deficit present.      Mental Status: He is alert. Mental status is at baseline.      Cranial Nerves: No cranial nerve deficit.      Motor: No weakness.       Significant Labs: ABG:   Recent Labs   Lab 05/08/22  1704 05/09/22  0502 05/09/22  0835   PH 7.389 7.402 7.409   PCO2 47.5* 44.4 46.9*   HCO3 28.7* 27.6 29.7*   POCSATURATED 60* 68* 68*   BE 4 3 5   , CMP   Recent Labs   Lab 05/08/22  0546 05/08/22  1349 05/09/22  0318   * 130* 131*   K 4.4 3.7 4.0   CL 95 93* 93*   CO2 23 24 26   * 162* 195*   BUN 68* 67* 68*   CREATININE 2.1* 2.1* 2.1*   CALCIUM 8.9 8.8 9.0   PROT 5.4*  --  5.3*   ALBUMIN 2.4*  --  2.3*   BILITOT 1.0  --  1.2*   ALKPHOS 51*  --  60   AST 36  --  32   ALT 28  --  28   ANIONGAP 12 13 12   ESTGFRAFRICA 32.9* 32.9* 32.9*   EGFRNONAA 28.5* 28.5* 28.5*   , CBC   Recent Labs   Lab 05/08/22  0546 05/09/22  0318 05/09/22  0728   WBC 12.49 13.55* 12.56   HGB 10.2* 10.2* 10.0*   HCT 31.3*  30.4* 30.4*   * 93* 91*   , and Troponin No results for input(s): TROPONINI in the last 48 hours.    Significant Imagin/8 CXR:  Impression:     Pulmonary edema and bilateral pleural effusions.  No detrimental interval change when compared to radiograph 2022.     CXR:  Impression:     No significant interval changes.

## 2022-05-09 NOTE — ASSESSMENT & PLAN NOTE
Mr. Kevon Perez, 81 y.o. 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 He presented with chest pain, SOB, cough.  -Initial workup was notable for BNP 1194, uptrending troponin, worsening BUN/Cr. CXR showed Bibasilar edema.   -Given his tachycardia and dyspnea, there were initial concerns for PE, however it is unlikely as LE U/S, V/Q scan non-concerning, and absence of right heart strain on TTE.     No results for input(s): TROPONINI in the last 72 hours.    Intake/Output Summary (Last 24 hours) at 5/9/2022 0907  Last data filed at 5/9/2022 0800  Gross per 24 hour   Intake 1209.16 ml   Output 3565 ml   Net -2355.84 ml       Net IO Since Admission: -5,623.78 mL [05/09/22 0907]    -Given his TTE showing significant worsening of Ejection Fraction to less than 20%, signs of end-organ damage(acute renal failure, troponinemia, transaminitis), there were concerns for cardiogenic shock for which patient was transferred to the CCU .    Plan   - Continue ACS protocol with heparin, aspirin, clopidogrel  - Hemodynamics improving. Will discuss with IC regarding further revascularization options once patient is more stable.  - s/p [place] Kay[place] catheter for hemodynamics montioring and IABP balloon pump MCS on 05/05.   - Plan for IBAP 1:2 today. Refer to HDS above in interval hx. Augmenting well with MAP >70.  - Currently on  gtt @2.5, epi gtt 0.02, vasopressin gtt 0.02 and Nickie at 10ppm.   Wean pressors and inotropic support as tolerated  - Hold AV sue blocking agents as concerns for cardiogenic shock  - De-escalated abx

## 2022-05-09 NOTE — PLAN OF CARE
Cardiac ICU Care Plan    POC reviewed with Kevon Perez and family. Questions and concerns addressed. Pt VSS. Will continue to monitor. See below and flowsheets for full assessment and VS info.       Neuro:  [place] Coma Scale  Best Eye Response: 4-->(E4) spontaneous  Best Motor Response: 6-->(M6) obeys commands  Best Verbal Response: 5-->(V5) oriented  [place] Coma Scale Score: 15  Assessment Qualifiers: patient not sedated/intubated  Pupil PERRLA: yes    24 hr Temp:  [97.8 °F (36.6 °C)-98.8 °F (37.1 °C)]      CV:  Rhythm: paced rhythm   DVT prophylaxis: VTE Required Core Measure: Pharmacological prophylaxis initiated/maintained    CVP (mean): 3 mmHg (05/09/22 1501)    Pulmonary Artery Catheter Assessment  05/05/22 1625 right internal jugular-Current Insertion Depth (cm): 55 cm (05/09/22 1501)  PAP: 27/13 (05/09/22 1700)  SVO2 (%): 68 % (05/09/22 0400)    IABP Mode: Auto  IABP Rate: 1:1  IABP Trigger: ECG  IABP Augmented Diastolic Pressure: 110          Pulses  Right Radial Pulse: 2+ (normal)  Left Radial Pulse: 2+ (normal)  Right Dorsalis Pedis Pulse: 1+ (weak)  Left Dorsalis Pedis Pulse: 2+ (normal)  Right Posterior Tibial Pulse: 1+ (weak)  Left Posterior Tibial Pulse: 1+ (weak)    Resp:  O2 Device (Oxygen Therapy): High Flow nasal Cannula  Flow (L/min): 8  Vent Mode: Spont  Oxygen Concentration (%): 40  PEEP/CPAP: 5 cmH20  Pressure Support: 12 cmH20    GI/:  GI prophylaxis: no  Diet/Nutrition Received: NPO  Last Bowel Movement: 04/28/22  Voiding Characteristics: urethral catheter (bladder)       Urethral Catheter 05/05/22 1300 16 Fr.-Reason for Continuing Urinary Catheterization: Critically ill in ICU and requiring hourly monitoring of intake/output   Intake/Output Summary (Last 24 hours) at 5/9/2022 1745  Last data filed at 5/9/2022 1700  Gross per 24 hour   Intake 1124.02 ml   Output 3690 ml   Net -2565.98 ml          Labs/Accuchecks:  Recent Labs   Lab 05/08/22  0546 05/09/22  0318 05/09/22  0728    WBC 12.49 13.55* 12.56   RBC 3.57* 3.52* 3.46*   HGB 10.2* 10.2* 10.0*   HCT 31.3* 30.4* 30.4*   * 93* 91*      Recent Labs   Lab 05/05/22  1438 05/05/22  2031 05/09/22  0442 05/09/22  0642 05/09/22  1322   INR 1.1  --   --   --   --    APTT  --    < > 134.1* 45.1* 54.6*    < > = values in this interval not displayed.      Recent Labs     05/09/22  0318   *   K 4.0   CO2 26   CL 93*   BUN 68*   CREATININE 2.1*   ALKPHOS 60   ALT 28   AST 32   BILITOT 1.2*       Recent Labs   Lab 05/04/22  1426 05/05/22  0450 05/05/22  1230   TROPONINI 27.388* 35.947* 40.689*  39.987*  39.388*      Recent Labs     05/09/22  0835 05/09/22  1251 05/09/22  1706   PH 7.409 7.412 7.425   PCO2 46.9* 47.5* 46.7*   PO2 35* 35* 30*   HCO3 29.7* 30.2* 30.6*   POCSATURATED 68* 68* 58*   BE 5 6 6       Electrolytes: Electrolytes replaced  Accuchecks: ACHS    Gtts/LDAs:   sodium chloride 0.9% 5 mL/hr at 05/09/22 1700    sodium chloride 0.9% 8 mL/hr at 05/09/22 1700    DOBUTamine IV infusion (non-titrating) 2.5 mcg/kg/min (05/09/22 1700)    EPINEPHrine 0.02 mcg/kg/min (05/09/22 1700)    furosemide (LASIX) 2 mg/mL continuous infusion (non-titrating) 5 mg/hr (05/09/22 1700)    nitric oxide gas      vasopressin 0.02 Units/min (05/09/22 1700)       Lines/Drains/Airways       Central Venous Catheter Line  Duration             Introducer 05/05/22 1510 right internal jugular 4 days    Pulmonary Artery Catheter Assessment  05/05/22 1625 right internal jugular 4 days              Drain  Duration                  Urethral Catheter 05/05/22 1300 16 Fr. 4 days              Arterial Line  Duration             Arterial Line 05/05/22 1714 Left Radial 4 days              Peripheral Intravenous Line  Duration                  Peripheral IV - Single Lumen 05/05/22 1538 20 G Left Forearm 4 days                    Skin/Wounds  Bathing/Skin Care: bath, complete;dressed/undressed;linen changed (05/08/22 2300)  Wounds: No  Wound care consulted: No

## 2022-05-09 NOTE — ASSESSMENT & PLAN NOTE
Mr. Kevon Perez, 81 y.o. 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 He presented with chest pain, SOB, cough.  -Initial workup was notable for BNP 1194, uptrending troponin, worsening BUN/Cr. CXR showed Bibasilar edema.   -Given his tachycardia and dyspnea, there were initial concerns for PE, however it is unlikely as LE U/S, V/Q scan non-concerning, and absence of right heart strain on TTE.     No results for input(s): TROPONINI in the last 72 hours.    Intake/Output Summary (Last 24 hours) at 5/9/2022 1006  Last data filed at 5/9/2022 0900  Gross per 24 hour   Intake 1194.66 ml   Output 3520 ml   Net -2325.34 ml       Net IO Since Admission: -5,713.78 mL [05/09/22 1006]    -Given his TTE showing significant worsening of Ejection Fraction to less than 20%, signs of end-organ damage(acute renal failure, troponinemia, transaminitis), there were concerns for cardiogenic shock for which patient was transferred to the CCU .    Plan   - Continue ACS protocol with heparin, aspirin, clopidogrel  - Hemodynamics improving. Will discuss with IC regarding further revascularization options once patient is more stable.  - s/p [place] Kaykay catheter for hemodynamics montioring and IABP balloon pump MCS on 05/05.   - IABP removed on 5/9    Refer to HDS above in interval hx. Augmenting well with MAP >70.  - Currently on  gtt @2.5, epi gtt 0.02, and Nickie at 5ppm. Discontinued vaso and weaning nitric   Wean pressors and inotropic support as tolerated  - Hold AV sue blocking agents as concerns for cardiogenic shock  - De-escalated abx

## 2022-05-09 NOTE — PROGRESS NOTES
05/09/2022  Prasanth Sher    Current provider:  Matthew Garcia MD    Device interrogation:  No flowsheet data found.       Rounded on Kevon Perez to ensure all mechanical assist device settings (IABP or VAD) were appropriate and all parameters were within limits.  I was able to ensure all back up equipment was present, the staff had no issues, and the Perfusion Department daily rounding was complete.      For implantable VADs: Interrogation of Ventricular assist device was performed with analysis of device parameters and review of device function. I have personally reviewed the interrogation findings and agree with findings as stated.     In emergency, the nursing units have been notified to contact the perfusion department either by:  Calling [phone] from 630am to 4pm Mon thru Fri, utilizing the On-Call Finder functionality of Epic and searching for Perfusion, or by contacting the hospital  from 4pm to 630am and on weekends and asking to speak with the perfusionist on call.    8:06 AM

## 2022-05-09 NOTE — PLAN OF CARE
Recommendations  1.When medically feasible, recommend cardiac/renal/diabetic diet- texture per SLP-fluid per MD  2. Add Boost glucose control BID- chocolate flavor specified   3. Intensify BM regiment- LBM noted 4/28  3. RD following     Goals: Meet % EEN, EPN by RD f/u date  Nutrition Goal Status: goal not met  Communication of RD Recs: other (POC)      By Ricarda KING-KATHY

## 2022-05-09 NOTE — CONSULTS
"  [facility] - Cardiac Intensive Care  Adult Nutrition  Consult Note    SUMMARY     Recommendations  1.When medically feasible, recommend cardiac/renal/diabetic diet- texture per SLP-fluid per MD  2. Add Boost glucose control BID- chocolate flavor specified   3. Intensify BM regiment- LBM noted 4/28  3. RD following    Goals: Meet % EEN, EPN by RD f/u date  Nutrition Goal Status: goal not met  Communication of RD Recs: other (POC)    Assessment and Plan    Nutrition Problem  Inadequate energy intake    Related to (etiology):   Decreased ability to consume sufficient needs    Signs and Symptoms (as evidenced by):   NPO status     Interventions/Recommendations (treatment strategy):  Collaboration of nutrition care w/ other providers     Nutrition Diagnosis Status:   New         Reason for Assessment    Reason For Assessment: consult  Diagnosis: cardiac disease  Relevant Medical History: HLD, HTN, CAD, DM, diabetic retinopathy  Interdisciplinary Rounds: did not attend  General Information Comments: Spoke w/ pt and pt's caregivers at bedside. RD consulted for ICU admit/Bipap. Pt is currently NPO status. No issues chewing/swallowing at this time. No n/v/d/c. RD provided diabetic/renal diet education at  bedside during time of visit. Provided Cardiac diet education and tips for pt 5/9/22. Pt v/u understanding. Handouts provided. Pt voiced wanting a nutrition supplment during time of visit. RD recommended ONS~Boost Glucose Control for pt to get some calories at this time. Weight flucutations noted per chart review, likely d/t fluid. NFPE completed 5/9 no s/s of malnutrition at this time. LBM- noted 4/28- intesify BM regiment  Nutrition Discharge Planning: cardiac/diabetic diet    Nutrition Risk Screen    Nutrition Risk Screen: no indicators present    Anthropometrics    Temp: 98 °F (36.7 °C)  Height Method: Stated  Height: 5' 10" (177.8 cm)  Height (inches): 70 in  Weight Method: Bed Scale  Weight: 91.5 kg (201 lb 11.5 " oz)  Weight (lb): 201.72 lb  Ideal Body Weight (IBW), Male: 166 lb  % Ideal Body Weight, Male (lb): 137.35 %  BMI (Calculated): 28.9       Lab/Procedures/Meds    Pertinent Labs Reviewed: reviewed  Pertinent Labs Comments: Sodium 131, Glucose 195, GFR 28  Pertinent Medications Reviewed: reviewed  Pertinent Medications Comments: insulin      Estimated/Assessed Needs    Weight Used For Calorie Calculations: 91.5 kg (201 lb 11.5 oz)  Energy Calorie Requirements (kcal): 2026 (PAL 1.25)     Protein Requirements: 73-91 g (.8-1.0 g/kg)  Weight Used For Protein Calculations: 91.5 kg (201 lb 11.5 oz)        RDA Method (mL): 2026  CHO Requirement: 253      Nutrition Prescription Ordered    Current Diet Order: When medically feasible, recommend cardiac/renal/diabetic diet- texture per SLP-fluid per MD 2. Add Boost glucose control-chocolate flavor specified 3. RD following    Evaluation of Received Nutrient/Fluid Intake    I/O: -6. 01 L since admit  Energy Calories Required: not meeting needs  Protein Required: not meeting needs  Fluid Required: not meeting needs  Total Fluid Intake (mL/kg): 1 ml or fluid per MD  Tolerance: not tolerating  % Intake of Estimated Energy Needs: 0%  % Meal Intake: 0%    Nutrition Risk    Level of Risk/Frequency of Follow-up: low     Monitor and Evaluation    Food and Nutrient Intake: energy intake, food and beverage intake  Food and Nutrient Adminstration: diet order  Knowledge/Beliefs/Attitudes: food and nutrition knowledge/skill, beliefs and attitudes  Physical Activity and Function: nutrition-related ADLs and IADLs, factors affecting access to physical activity  Anthropometric Measurements: height/length, weight change, weight, body mass index, growth pattern indices/percentile ranks  Biochemical Data, Medical Tests and Procedures: electrolyte and renal panel, gastrointestinal profile, glucose/endocrine profile, inflammatory profile, lipid profile  Nutrition-Focused Physical Findings: overall  appearance, extremities, muscles and bones, head and eyes, skin       Nutrition Follow-Up    RD Follow-up?: Yes   By Ricarda CARLOS

## 2022-05-09 NOTE — PLAN OF CARE
[facility] - Cardiac Intensive Care  Discharge Reassessment    Primary Care Provider: Cipriano Sol MD    Expected Discharge Date: 5/16/2022    Reassessment (most recent)     Discharge Reassessment - 05/09/22 1553        Discharge Reassessment    Assessment Type Discharge Planning Reassessment     Communicated HEMANT with patient/caregiver Date not available/Unable to determine     Discharge Plan A Long-term acute care facility (LTAC)     Discharge Plan B Skilled Nursing Facility     Discharge Barriers Identified None     Why the patient remains in the hospital Requires continued medical care             Discharge plan pending medical clearance.  Will continue to follow.    Sharon Delgadillo LMSW  [facility]  [id number]

## 2022-05-09 NOTE — PROGRESS NOTES
[facility] - Cardiac Intensive Care  Cardiology  Progress Note    Patient Name: Kevon Perez  MRN: [id number]  Admission Date: 5/4/2022  Hospital Length of Stay: 5 days  Code Status: Full Code   Attending Physician: Matthew Garcia MD   Primary Care Physician: Cipriano Sol MD  Expected Discharge Date: 5/16/2022  Principal Problem:Cardiogenic shock    Subjective:     Hospital Course:   Mr. Kevon Perez, 81 y.o. 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 Of note, he underwent LHC on 4/29 (last week) which revealed multi-vessel CAD. CTS consulted and recommended medical versus PCI. The plan from IC was to proceed with multi-stage PCI in the outpatient setting. Patient was admitted this hospital course for acute decompensated HF and was placed on an adequate diuretic regimen that he didn't respond to. Patient developed hypotension 05/05. Labs revealed worsening RAGHU (sCr 2.4->2.9). Lactic 1.8. TTE revealed drop in EF from 65-> 20%. ACS protocol initiated given concern for possible new infarct. Patient was transferred to CICU for close monitoring. Upon arrival to the CCU, a swan jesus alberto catheter was placed via right IJ for hemodynamic monitoring. He was initiated on  2.5, Epi gtt 0.02, vasopressin 0.04, Nickie initially at 5ppm. He was also placed on an IABP by interventional cardiology. His iNP was further uptitrated to 10ppm. On 05/07 hsi hemodynamics worsened after IABP settings was weaned to 1:2, so this was further upgraded to 1:1.       Interval History: Symptoms:  Same.  Diet:  Adequate intake.   Activity level:  Impaired due to weakness.    Pain:  No pain.   CI/CO/SVR - 2.9/6.37/937, CVP 4, SvO2 - 68      Review of Systems   Constitutional: Negative for decreased  appetite and fever.   Cardiovascular:  Positive for dyspnea on exertion. Negative for chest pain and leg swelling.   Respiratory:  Positive for shortness of breath. Negative for cough and sputum production.    Gastrointestinal:  Negative for abdominal pain and constipation.   Neurological:  Negative for difficulty with concentration and dizziness.   Objective:     Vital Signs (Most Recent):  Temp: 97.9 °F (36.6 °C) (05/09/22 0701)  Pulse: 95 (05/09/22 0800)  Resp: 18 (05/09/22 0800)  BP: (!) 121/56 (05/09/22 0800)  SpO2: 100 % (05/09/22 0800)   Vital Signs (24h Range):  Temp:  [97.8 °F (36.6 °C)-99 °F (37.2 °C)] 97.9 °F (36.6 °C)  Pulse:  [] 95  Resp:  [15-28] 18  SpO2:  [98 %-100 %] 100 %  BP: (109-124)/(55-57) 121/56  Arterial Line BP: (122-154)/(35-51) 139/46     Weight: 91.5 kg (201 lb 11.5 oz)  Body mass index is 28.94 kg/m².     SpO2: 100 %  O2 Device (Oxygen Therapy): High Flow nasal Cannula      Intake/Output Summary (Last 24 hours) at 5/9/2022 0902  Last data filed at 5/9/2022 0800  Gross per 24 hour   Intake 1209.16 ml   Output 3565 ml   Net -2355.84 ml       Lines/Drains/Airways       Central Venous Catheter Line  Duration             Introducer 05/05/22 1510 right internal jugular 3 days    Pulmonary Artery Catheter Assessment  05/05/22 1625 right internal jugular 3 days              Drain  Duration                  Urethral Catheter 05/05/22 1300 16 Fr. 3 days              Arterial Line  Duration             Arterial Line 05/05/22 1714 Left Radial 3 days              Line  Duration                  IABP 05/05/22 1905 7.5 Fr. 40 mL 3 days              Peripheral Intravenous Line  Duration                  Peripheral IV - Single Lumen 05/05/22 1538 20 G Left Forearm 3 days                    Physical Exam  Vitals and nursing note reviewed.   Constitutional:       General: He is not in acute distress.     Appearance: He is obese. He is ill-appearing.   HENT:      Mouth/Throat:      Mouth: Mucous  membranes are moist.      Pharynx: Oropharynx is clear.   Eyes:      Extraocular Movements: Extraocular movements intact.      Conjunctiva/sclera: Conjunctivae normal.      Pupils: Pupils are equal, round, and reactive to light.   Cardiovascular:      Rate and Rhythm: Normal rate and regular rhythm.   Pulmonary:      Effort: Respiratory distress present.      Breath sounds: Normal breath sounds. No rales.   Abdominal:      General: Abdomen is flat. There is no distension.      Palpations: Abdomen is soft.      Tenderness: There is no abdominal tenderness.   Musculoskeletal:      Cervical back: Normal range of motion and neck supple.      Right lower leg: No edema.      Left lower leg: No edema.   Skin:     General: Skin is warm and dry.   Neurological:      General: No focal deficit present.      Mental Status: He is alert. Mental status is at baseline.      Cranial Nerves: No cranial nerve deficit.      Motor: No weakness.       Significant Labs: ABG:   Recent Labs   Lab 22  1704 22  0502 22  0835   PH 7.389 7.402 7.409   PCO2 47.5* 44.4 46.9*   HCO3 28.7* 27.6 29.7*   POCSATURATED 60* 68* 68*   BE 4 3 5   , CMP   Recent Labs   Lab 22  0546 22  1349 22  0318   * 130* 131*   K 4.4 3.7 4.0   CL 95 93* 93*   CO2 23 24 26   * 162* 195*   BUN 68* 67* 68*   CREATININE 2.1* 2.1* 2.1*   CALCIUM 8.9 8.8 9.0   PROT 5.4*  --  5.3*   ALBUMIN 2.4*  --  2.3*   BILITOT 1.0  --  1.2*   ALKPHOS 51*  --  60   AST 36  --  32   ALT 28  --  28   ANIONGAP 12 13 12   ESTGFRAFRICA 32.9* 32.9* 32.9*   EGFRNONAA 28.5* 28.5* 28.5*   , CBC   Recent Labs   Lab 22  0546 22  0318 22  0728   WBC 12.49 13.55* 12.56   HGB 10.2* 10.2* 10.0*   HCT 31.3* 30.4* 30.4*   * 93* 91*   , and Troponin No results for input(s): TROPONINI in the last 48 hours.    Significant Imagin/8 CXR:  Impression:     Pulmonary edema and bilateral pleural effusions.  No detrimental interval  change when compared to radiograph 05/07/2022.    5/9 CXR:  Impression:     No significant interval changes.       Assessment and Plan:       * Cardiogenic shock  Mr. Kevon Perez, 81 y.o. 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 He presented with chest pain, SOB, cough.  -Initial workup was notable for BNP 1194, uptrending troponin, worsening BUN/Cr. CXR showed Bibasilar edema.   -Given his tachycardia and dyspnea, there were initial concerns for PE, however it is unlikely as LE U/S, V/Q scan non-concerning, and absence of right heart strain on TTE.     No results for input(s): TROPONINI in the last 72 hours.    Intake/Output Summary (Last 24 hours) at 5/9/2022 1006  Last data filed at 5/9/2022 0900  Gross per 24 hour   Intake 1194.66 ml   Output 3520 ml   Net -2325.34 ml       Net IO Since Admission: -5,713.78 mL [05/09/22 1006]    -Given his TTE showing significant worsening of Ejection Fraction to less than 20%, signs of end-organ damage(acute renal failure, troponinemia, transaminitis), there were concerns for cardiogenic shock for which patient was transferred to the CCU .    Plan   - Continue ACS protocol with heparin, aspirin, clopidogrel  - Hemodynamics improving. Will discuss with IC regarding further revascularization options once patient is more stable.  - s/p [place] catheter for hemodynamics montioring and IABP balloon pump MCS on 05/05.   - Plan for IBAP 1:2 today. Should patient tolerate this well, will consider removing the IABP. Will discuss with IC prior to this as ultimate goal is to get patient to cath lab. Refer to HDS above in interval hx. Augmenting well with MAP >70.  - Currently on  gtt @2.5, epi gtt 0.02, vasopressin gtt 0.02 and Nickie at 10ppm.   Wean pressors and inotropic support as tolerated  - Hold AV sue blocking agents as concerns for cardiogenic shock  - De-escalated  abx    Acute on chronic combined systolic and diastolic heart failure  TTE with new EF of 20% with mod systolic RV function.     - Responding well to lasix 10mg/hr, decreased to 5mg/hr, still diuresing well, continue.  - Strict I/Os  - Replete electrolytes as needed    Cardiac pacemaker  Status-post successful dual chamber pacemaker implantation.         Acute renal failure superimposed on stage 4 chronic kidney disease  - baseline around 2, on admit 2.5. sCr improving with diuresis  - Likely 2/2 to cardiorenal syndrome  - Continue diuresis  - avoid nephrotoxins; renally dose meds    Acute respiratory failure with hypoxia  Patient with Hypoxic Respiratory failure which is Acute.  he is not on home oxygen. Supplemental oxygen was provided and noted- Vent Mode: Spont  Oxygen Concentration (%):  [40] 40  Resp Rate Total:  [13 br/min-40 br/min] 19 br/min  PEEP/CPAP:  [5 cmH20] 5 cmH20  Pressure Support:  [12 cmH20] 12 cmH20  Mean Airway Pressure:  [8.1 cmH20-9.6 cmH20] 8.8 cmH20.   Signs/symptoms of respiratory failure include- tachypnea. Contributing diagnoses includes - CHF Labs and images were reviewed. Patient . Will treat underlying causes and adjust management of respiratory failure as follows- IV diuresis/ Bipapap as needed    Thrombocytopenia  Plt of 91 on 5/9.  - patient reports nosebleed overnight with heparin drip but otherwise doing well.    Plan  - continue heparin drip per nomogram with PTT  - should patient have worsening bleeding, consider holding  - hold should plt < 50  - CBC daily    Hypertension associated with diabetes  Holding home meds in the setting of cardiogenic shock    GINGER on CPAP  BiPAP qhS    Type 2 diabetes mellitus with neurologic complication  - LDSSI  - basal bolus insulin and prandial insulin, titrate as needed  - hypoglycemia protocol , ACHS accuchecks     Coronary artery disease involving native coronary artery of native heart with unstable angina pectoris  Continue ACS protocol -  ASA/ Plavix, statin and Heparin gtt      VTE Risk Mitigation (From admission, onward)         Ordered     heparin 25,000 units in dextrose 5% (100 units/ml) IV bolus from bag - ADDITIONAL PRN BOLUS - 60 units/kg (max bolus 4000 units)  As needed (PRN)        Question:  Heparin Infusion Adjustment (DO NOT MODIFY ANSWER)  Answer:  [email or web address]\epic\Images\Pharmacy\HeparinInfusions\heparin LOW INTENSITY nomogram for [facility] [id number]    05/06/22 2157     heparin 25,000 units in dextrose 5% (100 units/ml) IV bolus from bag - ADDITIONAL PRN BOLUS - 30 units/kg (max bolus 4000 units)  As needed (PRN)        Question:  Heparin Infusion Adjustment (DO NOT MODIFY ANSWER)  Answer:  [email or web address]\epic\Images\Pharmacy\HeparinInfusions\heparin LOW INTENSITY nomogram for [facility] [id number]    05/06/22 2157     heparin 25,000 units in dextrose 5% 250 mL (100 units/mL) infusion LOW INTENSITY nomogram - [facility]  Continuous        Question Answer Comment   Heparin Infusion Adjustment (DO NOT MODIFY ANSWER) [email or web address]\epic\Images\Pharmacy\HeparinInfusions\heparin LOW INTENSITY nomogram for [facility] [id number]    Begin at (in units/kg/hr) 12        05/06/22 2157     IP VTE HIGH RISK PATIENT  Once         05/05/22 1035     Place sequential compression device  Until discontinued         05/05/22 1035     Place sequential compression device  Until discontinued         05/04/22 1418     Place sequential compression device  Until discontinued         05/04/22 1418                Brielle Rendon MD  Cardiology  [facility] - Cardiac Intensive Care

## 2022-05-09 NOTE — NURSING
aptt lab collected value not in therapeutic range 110.5, collect stat lab recollect and placed heparin on hold per nomogram. Recollect aptt Value resulted 134, notified Dr. Escalante. Recollect scheduled for 0645. Will continue to monitor.

## 2022-05-09 NOTE — PHYSICIAN QUERY
PT Name: Kevon Perez  MR #: [id number]    DOCUMENTATION CLARIFICATION      CDS/: Vandana Hatch               Contact information: [email or web address]    This form is a permanent document in the medical record.     Query Date: May 9, 2022    By submitting this query, we are merely seeking further clarification of documentation. Please utilize your independent clinical judgment when addressing the question(s) below.    The Medical Record contains the following:   Indicators   Supporting Clinical Findings Location in Medical Record   x Hypertension associated with diabetes documented Hypertension associated with diabetes   H&P   x Chronic condition(s) CAD,CKD,CHF Cardiology note 5-4    Vital signs     x Treatment/Medication - hold home BP meds for now  - resume as able H&P    Other     Provider, please clarify the relationship between the Hypertension and Diabetes Mellitus  [   ] Hypertension is a manifestation of Diabetes Mellitus (Secondary Hypertension)   [ XXX  ]  Hypertension is not a manifestation of Diabetes Mellitus (Essential Hypertension)   [   ] Other Clarification (please specify): ____________   [  ] Clinically Undetermined       Please document in your progress notes daily for the duration of treatment, until resolved, and include in your discharge summary.

## 2022-05-09 NOTE — PROGRESS NOTES
05/08/2022  Isael Chin    Current provider:  Matthew Garcia MD    Device interrogation:  No flowsheet data found.       Rounded on Kevon Perez to ensure all mechanical assist device settings (IABP or VAD) were appropriate and all parameters were within limits.  I was able to ensure all back up equipment was present, the staff had no issues, and the Perfusion Department daily rounding was complete.      For implantable VADs: Interrogation of Ventricular assist device was performed with analysis of device parameters and review of device function. I have personally reviewed the interrogation findings and agree with findings as stated.     In emergency, the nursing units have been notified to contact the perfusion department either by:  Calling [phone] from 630am to 4pm Mon thru Fri, utilizing the On-Call Finder functionality of Epic and searching for Perfusion, or by contacting the hospital  from 4pm to 630am and on weekends and asking to speak with the perfusionist on call.    9:23 PM

## 2022-05-09 NOTE — PROGRESS NOTES
IN-PATIENT device interrogation and testing performed   S/P  Dual Chamber PPM implant. Incision is healing well, no s/s of infection noted at this time.   Device fxn WNL   Presenting egram demonstrates As/Bp  Underlying rhythm c/w  2:1 AVB  RA pacing  0%, RV pacing 100%   Atrial arrhythmias: None  Anticoagulation Status: None  Ventricular arrhythmias: None  Battery Status/Longevity: 100%/SATHISH  Reprogramming at this visit: None  Discussed wound care, arm restrictions and home monitoring.  RTC in 3 mos, F/U remote interrogation in 3 months    See link below for cardiac device report or media tab    Report prepared by  [place]

## 2022-05-09 NOTE — Clinical Note
Wt Readings from Last 10 Encounters:   05/09/22 91.5 kg (201 lb 11.5 oz)   05/03/22 98.6 kg (217 lb 6 oz)   04/26/22 100.2 kg (220 lb 14.4 oz)   04/04/22 102.5 kg (225 lb 15.5 oz)   03/07/22 100 kg (220 lb 7.4 oz)   02/18/22 100 kg (220 lb 7.4 oz)   02/17/22 100 kg (220 lb 5.6 oz)   02/09/22 106.1 kg (233 lb 14.5 oz)   02/01/22 104.8 kg (231 lb 0.7 oz)   12/28/21 104.8 kg (231 lb)

## 2022-05-09 NOTE — PLAN OF CARE
PM Hemodynamics:     MAP 76  CVP 2  SvO2 60  SaO2 100      CO 5.8  CI 2.7  SVR 1016    Calculated while IABP on 1:2. Placed back on 1:1 per CCU attending orders. Will cut lasix drip to 5 mg/hr given low CVP. Titrating down vasopressin.      Amish Escalante MD  [facility]  Cardiovascular Disease, PGY-IV

## 2022-05-10 NOTE — PROGRESS NOTES
[facility] - Cardiac Intensive Care  Cardiology  Progress Note    Patient Name: Kevon Perez  MRN: [id number]  Admission Date: 5/4/2022  Hospital Length of Stay: 6 days  Code Status: Full Code   Attending Physician: Matthew Garcia MD   Primary Care Physician: Cipriano Sol MD  Expected Discharge Date: 5/16/2022  Principal Problem:Cardiogenic shock    Subjective:     Hospital Course:   Mr. Kevon Perez, 81 y.o. 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 Of note, he underwent LHC on 4/29 (last week) which revealed multi-vessel CAD. CTS consulted and recommended medical versus PCI. The plan from IC was to proceed with multi-stage PCI in the outpatient setting. Patient was admitted this hospital course for acute decompensated HF and was placed on an adequate diuretic regimen that he didn't respond to. Patient developed hypotension 05/05. Labs revealed worsening RAGHU (sCr 2.4->2.9). Lactic 1.8. TTE revealed drop in EF from 65-> 20%. ACS protocol initiated given concern for possible new infarct. Patient was transferred to CICU for close monitoring. Upon arrival to the CCU, a swan jesus alberto catheter was placed via right IJ for hemodynamic monitoring. He was initiated on  2.5, Epi gtt 0.02, vasopressin 0.04, Nickie initially at 5ppm. He was also placed on an IABP by interventional cardiology. His iNP was further uptitrated to 10ppm. On 05/07 hsi hemodynamics worsened after IABP settings was weaned to 1:2, so this was further upgraded to 1:1. Patient improved on IABP and this was removed on 5/9. Weaning nitric, vaso, and other pressor support as tolerated.      Interval History: Symptoms:  Improved.    Diet:  Adequate intake.   Activity level:  Impaired due to weakness.  I  Pain:   No pain.     Review of Systems   Constitutional: Positive for malaise/fatigue. Negative for decreased appetite.   Cardiovascular:  Negative for chest pain and leg swelling.   Respiratory:  Negative for cough and sputum production.    Gastrointestinal:  Negative for abdominal pain.   Neurological:  Positive for light-headedness and weakness.   Objective:     Vital Signs (Most Recent):  Temp: 99.4 °F (37.4 °C) (05/10/22 0805)  Pulse: 105 (05/10/22 1236)  Resp: (!) 26 (05/10/22 1236)  BP: (!) 102/56 (05/10/22 1236)  SpO2: 100 % (05/10/22 1236)   Vital Signs (24h Range):  Temp:  [98 °F (36.7 °C)-99.4 °F (37.4 °C)] 99.4 °F (37.4 °C)  Pulse:  [103-128] 105  Resp:  [16-39] 26  SpO2:  [93 %-100 %] 100 %  BP: ()/(43-65) 102/56  Arterial Line BP: ()/(31-50) 100/36     Weight: 91.5 kg (201 lb 11.5 oz)  Body mass index is 28.94 kg/m².     SpO2: 100 %  O2 Device (Oxygen Therapy): High Flow nasal Cannula      Intake/Output Summary (Last 24 hours) at 5/10/2022 1418  Last data filed at 5/10/2022 0905  Gross per 24 hour   Intake 847.71 ml   Output 1080 ml   Net -232.29 ml       Lines/Drains/Airways       Central Venous Catheter Line  Duration             Introducer 05/05/22 1510 right internal jugular 4 days    Pulmonary Artery Catheter Assessment  05/05/22 1625 right internal jugular 4 days              Arterial Line  Duration             Arterial Line 05/05/22 1714 Left Radial 4 days              Peripheral Intravenous Line  Duration                  Peripheral IV - Single Lumen 05/10/22 0427 20 G Anterior;Left;Proximal Forearm <1 day                    Physical Exam  Vitals and nursing note reviewed.   Constitutional:       General: He is not in acute distress.     Appearance: He is obese. He is ill-appearing.   HENT:      Mouth/Throat:      Mouth: Mucous membranes are moist.      Pharynx: Oropharynx is clear.   Eyes:      Extraocular Movements: Extraocular movements intact.      Conjunctiva/sclera: Conjunctivae  normal.      Pupils: Pupils are equal, round, and reactive to light.   Cardiovascular:      Rate and Rhythm: Normal rate and regular rhythm.   Pulmonary:      Effort: Respiratory distress (improving) present.      Breath sounds: Normal breath sounds. No rales.   Abdominal:      General: Abdomen is flat. There is no distension.      Palpations: Abdomen is soft.      Tenderness: There is no abdominal tenderness.   Musculoskeletal:         General: Normal range of motion.      Cervical back: Normal range of motion and neck supple.      Right lower leg: No edema.      Left lower leg: No edema.   Skin:     General: Skin is warm and dry.   Neurological:      General: No focal deficit present.      Mental Status: He is alert. Mental status is at baseline.      Cranial Nerves: No cranial nerve deficit.      Motor: No weakness.       Significant Labs: ABG:   Recent Labs   Lab 05/10/22  0340 05/10/22  0856 05/10/22  1236   PH 7.430 7.405 7.411   PCO2 42.3 48.7* 46.3*   HCO3 28.1* 30.5* 29.4*   POCSATURATED 63* 58* 63*   BE 4 6 5   , CMP   Recent Labs   Lab 05/09/22  0318 05/10/22  0052   * 130*   K 4.0 4.2   CL 93* 93*   CO2 26 26   * 188*   BUN 68* 81*   CREATININE 2.1* 2.2*   CALCIUM 9.0 8.4*   PROT 5.3* 5.1*   ALBUMIN 2.3* 2.2*   BILITOT 1.2* 1.2*   ALKPHOS 60 51*   AST 32 28   ALT 28 24   ANIONGAP 12 11   ESTGFRAFRICA 32.9* 31.1*   EGFRNONAA 28.5* 26.9*   , CBC   Recent Labs   Lab 22  0728 05/10/22  0052   WBC 13.55* 12.56 13.76*   HGB 10.2* 10.0* 9.6*   HCT 30.4* 30.4* 28.9*   PLT 93* 91* 88*   , and All pertinent lab results from the last 24 hours have been reviewed.    Significant Imagin/9 CXR:  FINDINGS:  With respect to previously noted support hardware, stable findings of the right jugular sheath and associated [place] catheter, marker for intra-aortic balloon pump, and left sided dual lead pacing device.  Stable upper normal heart size.  Reconfirmed pulmonary vascular  congestion and bilateral, left greater than right mid and lower lung zone opacities that most likely relate to pulmonary edema unless concern for infection.  Persistent bilateral effusions.  No right or left pneumothorax.     Impression:     No significant interval changes.    Assessment and Plan:       * Cardiogenic shock  Mr. Kevon Perez, 81 y.o. 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 He presented with chest pain, SOB, cough.  -Initial workup was notable for BNP 1194, uptrending troponin, worsening BUN/Cr. CXR showed Bibasilar edema.   -Given his tachycardia and dyspnea, there were initial concerns for PE, however it is unlikely as LE U/S, V/Q scan non-concerning, and absence of right heart strain on TTE.     No results for input(s): TROPONINI in the last 72 hours.    Intake/Output Summary (Last 24 hours) at 5/9/2022 1006  Last data filed at 5/9/2022 0900  Gross per 24 hour   Intake 1194.66 ml   Output 3520 ml   Net -2325.34 ml       Net IO Since Admission: -5,713.78 mL [05/09/22 1006]    -Given his TTE showing significant worsening of Ejection Fraction to less than 20%, signs of end-organ damage(acute renal failure, troponinemia, transaminitis), there were concerns for cardiogenic shock for which patient was transferred to the CCU .    Plan   - Continue ACS protocol with heparin, aspirin, clopidogrel  - Hemodynamics improving. Will discuss with IC regarding further revascularization options once patient is more stable.  - s/p [place] catheter for hemodynamics montioring and IABP balloon pump MCS on 05/05.   - IABP removed on 5/9    Refer to HDS above in interval hx. Augmenting well with MAP >70.  - Currently on  gtt @2.5, epi gtt 0.02, and Nickie at 5ppm. Discontinued vaso and weaning nitric   Wean pressors and inotropic support as tolerated  - Hold AV sue blocking agents as concerns for cardiogenic  shock  - De-escalated abx    Acute on chronic combined systolic and diastolic heart failure  TTE with new EF of 20% with mod systolic RV function.     - Responding well to lasix 10mg/hr, decreased to 5mg/hr, now on lasix 80 IV TID on 5/10. Holding in setting of dehydration  - Strict I/Os  - Replete electrolytes as needed    Cardiac pacemaker  Status-post successful dual chamber pacemaker implantation.         Acute renal failure superimposed on stage 4 chronic kidney disease  - baseline around 2, on admit 2.5. sCr improving with diuresis  - Likely 2/2 to cardiorenal syndrome  - stable  - holding diuresis on 5/10 in setting of possible dehydration  - avoid nephrotoxins; renally dose meds    Acute respiratory failure with hypoxia  Patient with Hypoxic Respiratory failure which is Acute.  he is not on home oxygen. Supplemental oxygen was provided and noted- Vent Mode: Spont  Oxygen Concentration (%):  [6-44] 40  Resp Rate Total:  [15 br/min-32 br/min] 15 br/min  PEEP/CPAP:  [5 cmH20] 5 cmH20  Pressure Support:  [12 cmH20] 12 cmH20  Mean Airway Pressure:  [7.7 cmH20] 7.7 cmH20.   Signs/symptoms of respiratory failure include- tachypnea. Contributing diagnoses includes - CHF Labs and images were reviewed. Patient . Will treat underlying causes and adjust management of respiratory failure as follows- IV diuresis of lasix 80 IV TID/ Bipapap as needed  - holding lasix on 5/10 suspecting dehydration    Thrombocytopenia  Plt of 91 on 5/9.  - patient was on heparin drip, discontinued on 5/10    Plan  - VTE ppx only  - CBC daily  - hold should plt < 50    Hypertension associated with diabetes  Holding home meds in the setting of cardiogenic shock    GINGER on CPAP  BiPAP qhS    Type 2 diabetes mellitus with neurologic complication  - LDSSI  - basal bolus insulin and prandial insulin, titrate as needed  - hypoglycemia protocol , ACHS accuchecks     Coronary artery disease involving native coronary artery of native heart with  unstable angina pectoris  Continue ACS protocol - ASA/ Plavix, statin        VTE Risk Mitigation (From admission, onward)         Ordered     heparin (porcine) injection 5,000 Units  Every 8 hours         05/10/22 1029     IP VTE HIGH RISK PATIENT  Once         05/10/22 1029     Place sequential compression device  Until discontinued         05/05/22 1035     Place sequential compression device  Until discontinued         05/04/22 1418     Place sequential compression device  Until discontinued         05/04/22 1418                Brielle Rendon MD  Cardiology  [facility] - Cardiac Intensive Care

## 2022-05-10 NOTE — ASSESSMENT & PLAN NOTE
Patient with Hypoxic Respiratory failure which is Acute.  he is not on home oxygen. Supplemental oxygen was provided and noted- Vent Mode: Spont  Oxygen Concentration (%):  [6-44] 40  Resp Rate Total:  [15 br/min-32 br/min] 15 br/min  PEEP/CPAP:  [5 cmH20] 5 cmH20  Pressure Support:  [12 cmH20] 12 cmH20  Mean Airway Pressure:  [7.7 cmH20] 7.7 cmH20.   Signs/symptoms of respiratory failure include- tachypnea. Contributing diagnoses includes - CHF Labs and images were reviewed. Patient . Will treat underlying causes and adjust management of respiratory failure as follows- IV diuresis of lasix 80 IV TID/ Bipapap as needed  - holding lasix on 5/10 suspecting dehydration

## 2022-05-10 NOTE — ASSESSMENT & PLAN NOTE
- baseline around 2, on admit 2.5. sCr improving with diuresis  - Likely 2/2 to cardiorenal syndrome  - stable  - holding diuresis on 5/10 in setting of possible dehydration  - avoid nephrotoxins; renally dose meds

## 2022-05-10 NOTE — SUBJECTIVE & OBJECTIVE
Interval History: Symptoms:  Improved.    Diet:  Adequate intake.   Activity level:  Impaired due to weakness.  I  Pain:  No pain.     Review of Systems   Constitutional: Positive for malaise/fatigue. Negative for decreased appetite.   Cardiovascular:  Negative for chest pain and leg swelling.   Respiratory:  Negative for cough and sputum production.    Gastrointestinal:  Negative for abdominal pain.   Neurological:  Positive for light-headedness and weakness.   Objective:     Vital Signs (Most Recent):  Temp: 99.4 °F (37.4 °C) (05/10/22 0805)  Pulse: 105 (05/10/22 1236)  Resp: (!) 26 (05/10/22 1236)  BP: (!) 102/56 (05/10/22 1236)  SpO2: 100 % (05/10/22 1236)   Vital Signs (24h Range):  Temp:  [98 °F (36.7 °C)-99.4 °F (37.4 °C)] 99.4 °F (37.4 °C)  Pulse:  [103-128] 105  Resp:  [16-39] 26  SpO2:  [93 %-100 %] 100 %  BP: ()/(43-65) 102/56  Arterial Line BP: ()/(31-50) 100/36     Weight: 91.5 kg (201 lb 11.5 oz)  Body mass index is 28.94 kg/m².     SpO2: 100 %  O2 Device (Oxygen Therapy): High Flow nasal Cannula      Intake/Output Summary (Last 24 hours) at 5/10/2022 1418  Last data filed at 5/10/2022 0905  Gross per 24 hour   Intake 847.71 ml   Output 1080 ml   Net -232.29 ml       Lines/Drains/Airways       Central Venous Catheter Line  Duration             Introducer 05/05/22 1510 right internal jugular 4 days    Pulmonary Artery Catheter Assessment  05/05/22 1625 right internal jugular 4 days              Arterial Line  Duration             Arterial Line 05/05/22 1714 Left Radial 4 days              Peripheral Intravenous Line  Duration                  Peripheral IV - Single Lumen 05/10/22 0427 20 G Anterior;Left;Proximal Forearm <1 day                    Physical Exam  Vitals and nursing note reviewed.   Constitutional:       General: He is not in acute distress.     Appearance: He is obese. He is ill-appearing.   HENT:      Mouth/Throat:      Mouth: Mucous membranes are moist.      Pharynx:  Oropharynx is clear.   Eyes:      Extraocular Movements: Extraocular movements intact.      Conjunctiva/sclera: Conjunctivae normal.      Pupils: Pupils are equal, round, and reactive to light.   Cardiovascular:      Rate and Rhythm: Normal rate and regular rhythm.   Pulmonary:      Effort: Respiratory distress (improving) present.      Breath sounds: Normal breath sounds. No rales.   Abdominal:      General: Abdomen is flat. There is no distension.      Palpations: Abdomen is soft.      Tenderness: There is no abdominal tenderness.   Musculoskeletal:         General: Normal range of motion.      Cervical back: Normal range of motion and neck supple.      Right lower leg: No edema.      Left lower leg: No edema.   Skin:     General: Skin is warm and dry.   Neurological:      General: No focal deficit present.      Mental Status: He is alert. Mental status is at baseline.      Cranial Nerves: No cranial nerve deficit.      Motor: No weakness.       Significant Labs: ABG:   Recent Labs   Lab 05/10/22  0340 05/10/22  0856 05/10/22  1236   PH 7.430 7.405 7.411   PCO2 42.3 48.7* 46.3*   HCO3 28.1* 30.5* 29.4*   POCSATURATED 63* 58* 63*   BE 4 6 5   , CMP   Recent Labs   Lab 228 05/10/22  0052   * 130*   K 4.0 4.2   CL 93* 93*   CO2 26 26   * 188*   BUN 68* 81*   CREATININE 2.1* 2.2*   CALCIUM 9.0 8.4*   PROT 5.3* 5.1*   ALBUMIN 2.3* 2.2*   BILITOT 1.2* 1.2*   ALKPHOS 60 51*   AST 32 28   ALT 28 24   ANIONGAP 12 11   ESTGFRAFRICA 32.9* 31.1*   EGFRNONAA 28.5* 26.9*   , CBC   Recent Labs   Lab 22  0728 05/10/22  0052   WBC 13.55* 12.56 13.76*   HGB 10.2* 10.0* 9.6*   HCT 30.4* 30.4* 28.9*   PLT 93* 91* 88*   , and All pertinent lab results from the last 24 hours have been reviewed.    Significant Imagin/9 CXR:  FINDINGS:  With respect to previously noted support hardware, stable findings of the right jugular sheath and associated [place]-Kaykay catheter, marker for  intra-aortic balloon pump, and left sided dual lead pacing device.  Stable upper normal heart size.  Reconfirmed pulmonary vascular congestion and bilateral, left greater than right mid and lower lung zone opacities that most likely relate to pulmonary edema unless concern for infection.  Persistent bilateral effusions.  No right or left pneumothorax.     Impression:     No significant interval changes.

## 2022-05-10 NOTE — ASSESSMENT & PLAN NOTE
TTE with new EF of 20% with mod systolic RV function.     - Responding well to lasix 10mg/hr, decreased to 5mg/hr, now on lasix 80 IV TID on 5/10. Holding in setting of dehydration  - Strict I/Os  - Replete electrolytes as needed

## 2022-05-10 NOTE — PLAN OF CARE
CICU DAILY GOALS       A: Awake    RASS: Goal - RASS Goal: 0-->alert and calm  Actual - RASS ([place]ess Agitation-Sedation Scale): 0-->alert and calm   Restraint necessity:    B: Breath   SBT: Not intubated   C: Coordinate A & B, analgesics/sedatives   Pain: managed    SAT: Not intubated  D: Delirium   CAM-ICU: Overall CAM-ICU: Negative  E: Early(intubated/ Progressive (non-intubated) Mobility   MOVE Screen: Pass   Activity: Activity Management: Rolling - L1  FAS: Feeding/Nutrition   Diet order: Diet/Nutrition Received: 2 gram sodium, low saturated fat/low cholesterol,   Fluid restriction: Fluid Requirement: 1500 ml FR  T: Thrombus   DVT prophylaxis: VTE Required Core Measure: Pharmacological prophylaxis initiated/maintained  H: HOB Elevation   Head of Bed (HOB) Positioning: HOB at 15 degrees  U: Ulcer Prophylaxis   GI: yes  G: Glucose control   managed Glycemic Management: blood glucose monitored  S: Skin   Bundle compliance: yes   Bathing/Skin Care: bath, complete, dressed/undressed, incontinence care, linen changed Date: PM Bath  B: Bowel Function   constipation   I: Indwelling Catheters   [place] necessity:      Urethral Catheter 05/05/22 1300 16 Fr.-Reason for Continuing Urinary Catheterization: Critically ill in ICU and requiring hourly monitoring of intake/output   CVC necessity: Yes   IPAD offered: No  D: De-escalation Antibx   No  Plan for the day   Monitor VS & UOP. CVP 2, 2, & 3. SV02 63 this shift.     Family/Goals of care/Code Status   Code Status: Full Code     No acute events throughout day, VS and assessment per flow sheet, patient progressing towards goals as tolerated, plan of care reviewed with Kevon Perez and family, all concerns addressed, will continue to monitor

## 2022-05-10 NOTE — NURSING
Pt complaining of tenderness in lower RUE. Edema, localized warmth and spreading red/maroon discoloration noted. MD Brien noted, came to bedside and assessed patient. Outlined the area with surgical skin marker. Neurovasc status intact

## 2022-05-10 NOTE — PLAN OF CARE
Cardiac ICU Care Plan    POC reviewed with Kevon Perez and family. Questions and concerns addressed. See below and flowsheets for full assessment and VS info.       Neuro:  [place] Coma Scale  Best Eye Response: 4-->(E4) spontaneous  Best Motor Response: 6-->(M6) obeys commands  Best Verbal Response: 5-->(V5) oriented  [place] Coma Scale Score: 15  Assessment Qualifiers: patient not sedated/intubated  Pupil PERRLA: yes    24 hr Temp:  [98 °F (36.7 °C)-99.4 °F (37.4 °C)]      CV:  Rhythm: paced rhythm   DVT prophylaxis: VTE Required Core Measure: Pharmacological prophylaxis initiated/maintained    CVP (mean): 3 mmHg (05/10/22 0400)    Pulmonary Artery Catheter Assessment  05/05/22 1625 right internal jugular-Current Insertion Depth (cm): 55 cm (05/10/22 0705)  PAP: 22/11 (05/10/22 1135)  SVO2 (%): 68 % (05/09/22 0400)    IABP Mode: Auto  IABP Rate: 1:1  IABP Trigger: ECG  IABP Augmented Diastolic Pressure: 110          Pulses  Right Radial Pulse: 2+ (normal)  Left Radial Pulse: 2+ (normal)  Right Dorsalis Pedis Pulse: 1+ (weak)  Left Dorsalis Pedis Pulse: 1+ (weak)  Right Posterior Tibial Pulse: 1+ (weak)  Left Posterior Tibial Pulse: 1+ (weak)    Resp:  O2 Device (Oxygen Therapy): High Flow nasal Cannula  Flow (L/min): 4 (O2 decreased to 4 liters from 6 liters.)  Vent Mode: Spont  Oxygen Concentration (%): 40  PEEP/CPAP: 5 cmH20  Pressure Support: 12 cmH20    GI/:  GI prophylaxis: yes  Diet/Nutrition Received: 2 gram sodium, low saturated fat/low cholesterol  Last Bowel Movement: 04/28/22  Voiding Characteristics: urethral catheter (bladder)  [REMOVED]      Urethral Catheter 05/05/22 1300 16 Fr.-Reason for Continuing Urinary Catheterization: Critically ill in ICU and requiring hourly monitoring of intake/output   Intake/Output Summary (Last 24 hours) at 5/10/2022 1845  Last data filed at 5/10/2022 0905  Gross per 24 hour   Intake 716.39 ml   Output 830 ml   Net -113.61 ml          Labs/Accuchecks:  Recent  Labs   Lab 05/09/22  0318 05/09/22  0728 05/10/22  0052   WBC 13.55* 12.56 13.76*   RBC 3.52* 3.46* 3.35*   HGB 10.2* 10.0* 9.6*   HCT 30.4* 30.4* 28.9*   PLT 93* 91* 88*      Recent Labs   Lab 05/05/22  1438 05/05/22  2031 05/09/22  0442 05/09/22  0642 05/09/22  1322   INR 1.1  --   --   --   --    APTT  --    < > 134.1* 45.1* 54.6*    < > = values in this interval not displayed.      Recent Labs     05/10/22  0052   *   K 4.2   CO2 26   CL 93*   BUN 81*   CREATININE 2.2*   ALKPHOS 51*   ALT 24   AST 28   BILITOT 1.2*       Recent Labs   Lab 05/04/22  1426 05/05/22  0450 05/05/22  1230   TROPONINI 27.388* 35.947* 40.689*  39.987*  39.388*      Recent Labs     05/10/22  0340 05/10/22  0856 05/10/22  1236   PH 7.430 7.405 7.411   PCO2 42.3 48.7* 46.3*   PO2 32* 31* 33*   HCO3 28.1* 30.5* 29.4*   POCSATURATED 63* 58* 63*   BE 4 6 5       Electrolytes: No replacement orders  Accuchecks: ACHS    Gtts/LDAs:   sodium chloride 0.9% 5 mL/hr at 05/10/22 0905    sodium chloride 0.9% 8 mL/hr at 05/10/22 0905    DOBUTamine IV infusion (non-titrating) 2.5 mcg/kg/min (05/10/22 0905)    EPINEPHrine      nitric oxide gas      vasopressin 0.04 Units/min (05/10/22 1816)       Lines/Drains/Airways       Central Venous Catheter Line  Duration             Introducer 05/05/22 1510 right internal jugular 5 days    Pulmonary Artery Catheter Assessment  05/05/22 1625 right internal jugular 5 days              Arterial Line  Duration             Arterial Line 05/05/22 1714 Left Radial 5 days              Peripheral Intravenous Line  Duration                  Peripheral IV - Single Lumen 05/10/22 0427 20 G Anterior;Left;Proximal Forearm <1 day                    Skin/Wounds  Bathing/Skin Care: bath, complete;dressed/undressed;incontinence care;linen changed (05/10/22 0300)  Wounds: No  Wound care consulted: No

## 2022-05-10 NOTE — NURSING
"Pt sitting on side of bed, systolics dropped to 69. Pt placed back in bed, states that he "feels weak", however no c/o dizziness, nausea, lightheadedness. MD Justice notfied, ordered to increase Epi gtt to 0.04mcg/kg/min, may restart Vaso gtt to 0.04units/min if pt remains under systolic of 90.  "

## 2022-05-11 PROBLEM — R57.9 SHOCK: Status: ACTIVE | Noted: 2022-01-01

## 2022-05-11 NOTE — NURSING
1037: nitric weaned off, CVP 8 and SvO2 55%    1425: pt SOB when lying flat on nasal cannula. pt placed back on remi, Dr. Olguin at bedside.    1741: per Dr. Nguyen, d/c lasix, repeat SvO2 52 and CVP 9  ..  CICU DAILY GOALS     5/11/2022  A: Awake    RASS: Goal - RASS Goal: 0-->alert and calm  Actual - RASS (Hess Agitation-Sedation Scale): -1-->drowsy   Restraint necessity:  n/a  B: Breath   SBT: Not intubated   C: Coordinate A & B, analgesics/sedatives   Pain: managed    SAT: Not intubated  D: Delirium   CAM-ICU: Overall CAM-ICU: Negative  E: Early(intubated/ Progressive (non-intubated) Mobility   MOVE Screen: Fail   Activity: Activity Management: Rolling - L1  FAS: Feeding/Nutrition   Diet order: Diet/Nutrition Received: low saturated fat/low cholesterol, 2 gram sodium,   Fluid restriction: Fluid Requirement: 1500mL FR  T: Thrombus   DVT prophylaxis: VTE Required Core Measure: Pharmacological prophylaxis initiated/maintained  H: HOB Elevation   Head of Bed (HOB) Positioning: HOB at 30-45 degrees  U: Ulcer Prophylaxis   GI: no  G: Glucose control   managed Glycemic Management: blood glucose monitored  S: Skin   Bundle compliance: yes   B: Bowel Function   constipation   I: Indwelling Catheters   Massey necessity:      Urethral Catheter 05/11/22 1500 Straight-tip-Reason for Continuing Urinary Catheterization: Critically ill in ICU and requiring hourly monitoring of intake/output  [REMOVED]      Urethral Catheter 05/05/22 1300 16 Fr.-Reason for Continuing Urinary Catheterization: Critically ill in ICU and requiring hourly monitoring of intake/output   CVC necessity: Yes   IPAD offered: No  D: De-escalation Antibx   Yes  Plan for the day   Draw blood cultures, draw svo2, CVP, start abx, draw electrolytes  Family/Goals of care/Code Status   Code Status: DNR     CVP 7,8,7,9, SvO2 56,55, 53, 52%. Blood cultures drawn and abx started. Massey catheter placed. Around 500mL during shift. PO intake dec during shift. Nitric  weaned during shift. Code status changed during shift. bipap placed on patient during shift. VS and assessment per flow sheet, patient progressing towards goals as tolerated, plan of care reviewed with Kevon Perez and family, all concerns addressed, will continue to monitor.    Drips:  Dobutamine 2.5mcg/kg/hr  Vasopressin 0.04units/hr  Epi 0.04mcg/hr

## 2022-05-11 NOTE — SUBJECTIVE & OBJECTIVE
Interval History: Symptoms:  Same.  Diet:  Adequate intake.    Activity level:  Impaired due to weakness.    Pain:  No pain.       Review of Systems   Constitutional: Positive for malaise/fatigue. Negative for decreased appetite.   Cardiovascular:  Positive for dyspnea on exertion and leg swelling. Negative for chest pain.   Respiratory:  Positive for shortness of breath. Negative for snoring.    Gastrointestinal:  Negative for abdominal pain and diarrhea.   Neurological:  Positive for light-headedness and weakness. Negative for vertigo.   Objective:     Vital Signs (Most Recent):  Temp: 98.6 °F (37 °C) (05/11/22 1100)  Pulse: 83 (05/11/22 1230)  Resp: 18 (05/11/22 1230)  BP: (!) 108/56 (05/11/22 1200)  SpO2: 96 % (05/11/22 1230)   Vital Signs (24h Range):  Temp:  [97.9 °F (36.6 °C)-99.2 °F (37.3 °C)] 98.6 °F (37 °C)  Pulse:  [] 83  Resp:  [16-31] 18  SpO2:  [85 %-100 %] 96 %  BP: ()/(50-68) 108/56  Arterial Line BP: (101-137)/(36-60) 137/44     Weight: 91.5 kg (201 lb 11.5 oz)  Body mass index is 28.94 kg/m².     SpO2: 96 %  O2 Device (Oxygen Therapy): nasal cannula w/ humidification      Intake/Output Summary (Last 24 hours) at 5/11/2022 1236  Last data filed at 5/11/2022 1200  Gross per 24 hour   Intake 1873.47 ml   Output 650 ml   Net 1223.47 ml       Lines/Drains/Airways       Central Venous Catheter Line  Duration             Introducer 05/05/22 1510 right internal jugular 5 days    Pulmonary Artery Catheter Assessment  05/05/22 1625 right internal jugular 5 days              Arterial Line  Duration             Arterial Line 05/05/22 1714 Left Radial 5 days              Peripheral Intravenous Line  Duration                  Peripheral IV - Single Lumen 05/10/22 0427 20 G Anterior;Left;Proximal Forearm 1 day                    Physical Exam  Vitals and nursing note reviewed.   Constitutional:       General: He is not in acute distress.     Appearance: He is obese. He is ill-appearing.   HENT:       Mouth/Throat:      Mouth: Mucous membranes are moist.      Pharynx: Oropharynx is clear.   Eyes:      Extraocular Movements: Extraocular movements intact.      Conjunctiva/sclera: Conjunctivae normal.      Pupils: Pupils are equal, round, and reactive to light.   Cardiovascular:      Rate and Rhythm: Normal rate and regular rhythm.   Pulmonary:      Effort: Respiratory distress present.      Breath sounds: Rales present.   Abdominal:      General: Abdomen is flat. There is no distension.      Palpations: Abdomen is soft.      Tenderness: There is no abdominal tenderness.   Musculoskeletal:         General: Normal range of motion.      Cervical back: Normal range of motion and neck supple.      Right lower leg: No edema.      Left lower leg: No edema.   Skin:     General: Skin is warm and dry.   Neurological:      General: No focal deficit present.      Mental Status: He is alert. Mental status is at baseline.      Cranial Nerves: No cranial nerve deficit.      Motor: No weakness.   Psychiatric:         Mood and Affect: Mood normal.         Behavior: Behavior normal.         Thought Content: Thought content normal.       Significant Labs: ABG:   Recent Labs   Lab 22  0506 22  0827 22  1230   PH 7.403 7.399 7.409   PCO2 43.6 43.9 42.9   HCO3 27.2 27.1 27.1   POCSATURATED 59* 56* 55*   BE 2 2 2   , Blood Culture: No results for input(s): LABBLOO in the last 48 hours., CMP   Recent Labs   Lab 05/10/22  0052 22  0504   * 131*   K 4.2 4.3   CL 93* 92*   CO2 26 24   * 225*   BUN 81* 98*   CREATININE 2.2* 2.6*   CALCIUM 8.4* 8.4*   PROT 5.1* 5.1*   ALBUMIN 2.2* 2.2*   BILITOT 1.2* 1.0   ALKPHOS 51* 50*   AST 28 34   ALT 24 21   ANIONGAP 11 15   ESTGFRAFRICA 31.1* 25.4*   EGFRNONAA 26.9* 22.0*   , CBC   Recent Labs   Lab 05/10/22  0052 22  0504   WBC 13.76* 16.37*   HGB 9.6* 9.3*   HCT 28.9* 28.4*   PLT 88* 109*       Significant Imagin/11 CXR:  FINDINGS:  Lowry-Kaykay catheter  is present with its tip in the pulmonary artery. Pacemaker is present.  Heart size is not enlarged. Lung fields are a little clearer than yesterday although there is still patchy airspace consolidation bilaterally with some pleural fluid particularly on the left     Impression:     See above     5/11 UE U/S:  Impression:     No thrombus in central veins of the right upper extremity, noting limited evaluation of the right internal jugular vein secondary to central venous catheter.

## 2022-05-11 NOTE — CONSULTS
[facility]  Cardiology  Consult Note     Patient Name: Kevon Perze  Admission Date:   Hospital Length of Stay: 7  Code Status:   Attending Provider:   Consulting Provider: Matthew Corrigan MD  Reason for Consult:    HPI: Kevon Perez is a 81 y.o. male with past medical history of   3V CAD, CKD, HLD, GINGER, and HTN who presented with acute systolic heart failure. He initially presented on 4/26 from clinic. He was undergoing eval for AVB when he presented with anginal CP on 4/26 and admitted for work-up. Angiogram performed 4/26 with 3V CAD. CT sugery recommended PCI. He received a PPM and was discharged with plan for outpatient staged PCI. The vening of discharge, he had CP that resolved with nitro x3. The next morning, he presented with acute systolic heart failure. Patient did not respond to diuretics, and he was escalated to the CCU. Ef was 20%. IABP placed, removed on IABP on 5/9. Weaned off of nitric. Patient with worsening creatinine. 5/11 with leukocytosis, started on abx. [facility] consulted for HF recommendations.     Currently, patient on bipap. Denies any CP. No abdominal pain.    CVP 7, PA 45/20, CO 4.7, CI 2.2,     ROS: Patient denies angina, MATTHEWS, lower extremity edema, orthopnea, PND, palpitations, presyncope or syncope. All other ROS negative except as stated above.    PMHx:    PSHx:       Family Hx:  Family History   Problem Relation Age of Onset    Hypertension Father     Heart disease Father         CHF    Diabetes Father     Diabetes Sister     Cataracts Mother     Goiter Mother     Heart disease Mother         CHF    Diabetes Paternal Uncle     Alcohol abuse Brother     No Known Problems Daughter     No Known Problems Son     No Known Problems Son     Cancer Maternal Aunt         colon    Kidney disease Neg Hx     Amblyopia Neg Hx     Blindness Neg Hx     Glaucoma Neg Hx     Macular degeneration Neg Hx     Retinal detachment Neg Hx      "Strabismus Neg Hx     Stroke Neg Hx     Thyroid disease Neg Hx        Social Hx:   Social History     Tobacco Use    Smoking status: Former Smoker     Packs/day: 1.00     Years: 40.00     Pack years: 40.00     Types: Cigarettes     Quit date: 1970     Years since quittin.8    Smokeless tobacco: Former User   Substance Use Topics    Alcohol use: Yes     Alcohol/week: 3.0 standard drinks     Types: 1 Cans of beer, 1 Shots of liquor, 1 Standard drinks or equivalent per week       Allergies:  Review of patient's allergies indicates:   Allergen Reactions    Penicillins Other (See Comments)     Muscle stiffness    Bactrim [sulfamethoxazole-trimethoprim] Rash       Home Meds:  Current Outpatient Medications   Medication Instructions    acetaminophen (TYLENOL) 500 mg, Oral, Daily PRN    ascorbic acid (VITAMIN C ORAL) 1 tablet, Oral, Daily    aspirin 81 MG Chew Chew and swallow 1 tablet (81 mg total) by mouth once daily.    atorvastatin (LIPITOR) 20 MG tablet TAKE 1 TABLET(20 MG) BY MOUTH EVERY DAY    BD ULTRA-FINE SHORT PEN NEEDLE 31 gauge x 5/16" Ndle USE UP TO 6 TIMES DAILY    blood sugar diagnostic (TRUE METRIX GLUCOSE TEST STRIP) Strp USE TO CHECK BLOOD SUGAR FOUR TIMES DAILY    blood-glucose meter kit To check BG 4 times daily, to use with insurance preferred meter    calcitRIOL (ROCALTROL) 0.25 MCG Cap TAKE 1 CAPSULE BY MOUTH EVERY DAY    clopidogreL (PLAVIX) 75 mg tablet TAKE 1 TABLET(75 MG) BY MOUTH EVERY DAY    ergocalciferol, vitamin D2, (VITAMIN D ORAL) 1 capsule, Oral, Daily    finasteride (PROSCAR) 5 mg, Oral, Daily    irbesartan (AVAPRO) 300 MG tablet TAKE 1 TABLET(300 MG) BY MOUTH EVERY EVENING    isosorbide dinitrate (ISORDIL) 20 mg, Oral, 3 times daily    loperamide (ANTI-DIARRHEAL, LOPERAMIDE,) 2 mg Tab Take per package directions as needed for diarrhea.    nitroGLYCERIN (NITROSTAT) 0.4 MG SL tablet TAKE 1 TABLET UNDER THE TONGUE EVERY 5 MINUTES AS NEEDED    NovoLOG Flexpen " U-100 Insulin 5 Units, Subcutaneous, 3 times daily with meals    propylene glycol/peg 400/PF (SYSTANE, PF, OPHT) 1 drop, Both Eyes, 2 times daily PRN    psyllium (METAMUCIL) powder 1 packet, Oral, Daily PRN    sodium bicarbonate 650 mg, Oral, 3 times daily    tamsulosin (FLOMAX) 0.4 mg Cap TAKE 1 CAPSULE(0.4 MG) BY MOUTH EVERY EVENING    torsemide (DEMADEX) 10 mg, Oral, Daily    TRESIBA FLEXTOUCH U-100 10 Units, Subcutaneous, Daily    TRUEPLUS LANCETS 30 gauge Misc USE TO CHECK BLOOD SUGAR FOUR TIMES DAILY    ZINC ORAL 1 tablet, Oral, Daily       Vitals:  Temp:  [97.9 °F (36.6 °C)-98.6 °F (37 °C)] 98.3 °F (36.8 °C)  Pulse:  [] 80  Resp:  [16-31] 20  SpO2:  [85 %-100 %] 97 %  BP: ()/(50-68) 109/59  Arterial Line BP: (101-144)/(36-60) 136/42    Physical Exam:    Gen: ill appearing, on bipap  HEENT: SWAN-ganzz in place, AT, NC Pupils equal and reactive to light  Cardio: no m/r/g, normal rate  Resp: on bipap, coarse breath sounds  Abd: Soft, non-tender, non-distended  Skin: Warm, dry  Neuro: Alert and oriented x3  Psych: Normal mood and affect    Labs:  Recent Labs   Lab 05/09/22  0728 05/10/22  0052 05/11/22  0504   WBC 12.56 13.76* 16.37*   HGB 10.0* 9.6* 9.3*   HCT 30.4* 28.9* 28.4*   PLT 91* 88* 109*     Recent Labs   Lab 05/08/22  0546 05/08/22  1349 05/09/22  0318 05/10/22  0052 05/11/22  0504 05/11/22  1451   *   < > 131* 130* 131* 128*   K 4.4   < > 4.0 4.2 4.3 3.8   CL 95   < > 93* 93* 92* 89*   CO2 23   < > 26 26 24 26   BUN 68*   < > 68* 81* 98* 102*   CREATININE 2.1*   < > 2.1* 2.2* 2.6* 2.6*   *   < > 195* 188* 225* 265*   CALCIUM 8.9   < > 9.0 8.4* 8.4* 8.3*   MG 2.2  2.2  --   --  2.0 2.2  --    PHOS 3.8  --  4.0 4.8* 5.1*  --     < > = values in this interval not displayed.         Current Meds:   albuterol-ipratropium  3 mL Nebulization Q6H WAKE    artificial tears  1 drop Both Eyes TID    aspirin  81 mg Oral Daily    atorvastatin  80 mg Oral Daily    azithromycin   500 mg Oral Daily    calcitRIOL  0.25 mcg Oral Daily    ceFEPime (MAXIPIME) IVPB  2 g Intravenous Q24H    clopidogreL  75 mg Oral Daily    finasteride  5 mg Oral Daily    heparin (porcine)  5,000 Units Subcutaneous Q8H    insulin aspart U-100  13 Units Subcutaneous TIDWM    insulin detemir U-100  18 Units Subcutaneous BID    potassium chloride in water  40 mEq Intravenous Once    senna-docusate 8.6-50 mg  1 tablet Oral QHS    sodium bicarbonate  650 mg Oral TID    tamsulosin  0.4 mg Oral Daily       Assessment and Recommendations    81 year old man with a history of HTN, HLD, GINGER, AVB s/p PPM, and 3V CAD who presented with acute systolic heart failure, likely ischemic. Required mechanical support initially before weaning off of IABP. Remains in shock on multiple pressors. Given leukocytosis and low SVR, suspect he is developing sepsis in the setting of low cardiac output. Due to advanced age and comorbid conditions, he is not a candidate for advanced therapy.     Recommendations  - agree with broad spectrum antibiotics and sepsis work-up  - consider replacing IJ given concern for sepsis   - continue vasopressor support. Given low SVR per recent hemodynamics, recommend stopping dobutamine with repeat hemodynamics in 1-2 hours to assess response. Can continue epi/vaso and wean as tolerated  - repeat TTE  - hold lasix given low CVP.   - as above not a candidate for LVAD/transplant  - Prognosis is guarded. Recommend initiating palliative care discussions.     Discussed with staff, Dr. Guerita Frey  [facility]

## 2022-05-11 NOTE — CARE UPDATE
Discussed with patient, family at length multiple times. Updated Dr. Wiggins ( Brother) over phone. Son Marin Perez makes decisions in case of emergency.   Patient want to be DNR/DNI/ no external shocks. Aware of his condition, prognosis.

## 2022-05-11 NOTE — ASSESSMENT & PLAN NOTE
Patient with Hypoxic Respiratory failure which is Acute.  he is not on home oxygen. Supplemental oxygen was provided and noted- Vent Mode: Spont  Oxygen Concentration (%):  [40] 40  Resp Rate Total:  [12 br/min-30 br/min] 25 br/min  PEEP/CPAP:  [5 cmH20] 5 cmH20  Pressure Support:  [12 cmH20] 12 cmH20  Mean Airway Pressure:  [7.6 cmH20-9.1 cmH20] 9.1 cmH20.   Signs/symptoms of respiratory failure include- tachypnea. Contributing diagnoses includes - CHF Labs and images were reviewed. Patient . Will treat underlying causes and adjust management of respiratory failure as follows- IV diuresis of lasix 80 IV TID/ Bipapap as needed

## 2022-05-11 NOTE — CARE UPDATE
CVP_ 10, Svo2- 55, CI/CO/SVR: 2.8/6.15/1110 on  2.5, epi 0.04, Vaso 0.04.  PA: 54/22 ( Up from 16).  Lasix IV 80 mg TID resumed. Placed on Bipap.   Repeat BMP/ lactate/ Svo2.  Massey catheter.    Follow UO response/ BMP and consider lasix drip.  F/U HTS rec.

## 2022-05-11 NOTE — CARE UPDATE
IABP removed at bedside. BP stable. Pulses distal intact. Pressure applied for 30 minutes. No bleeding or hematoma noted. HD stable.

## 2022-05-11 NOTE — PROGRESS NOTES
[facility] - Cardiac Intensive Care  Cardiology  Progress Note    Patient Name: Kevon Perez  MRN: [id number]  Admission Date: 5/4/2022  Hospital Length of Stay: 7 days  Code Status: Full Code   Attending Physician: Matthew Garcia MD   Primary Care Physician: Cipirano Sol MD  Expected Discharge Date: 5/16/2022  Principal Problem:Shock    Subjective:     Hospital Course:   Mr. Kevon Perez, 81 y.o. 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 Of note, he underwent LHC on 4/29 (last week) which revealed multi-vessel CAD. CTS consulted and recommended medical versus PCI. The plan from IC was to proceed with multi-stage PCI in the outpatient setting. Patient was admitted this hospital course for acute decompensated HF and was placed on an adequate diuretic regimen that he didn't respond to. Patient developed hypotension 05/05. Labs revealed worsening RAGHU (sCr 2.4->2.9). Lactic 1.8. TTE revealed drop in EF from 65-> 20%. ACS protocol initiated given concern for possible new infarct. Patient was transferred to CICU for close monitoring. Upon arrival to the CCU, a swan jesus alberto catheter was placed via right IJ for hemodynamic monitoring. He was initiated on  2.5, Epi gtt 0.02, vasopressin 0.04, Nickie initially at 5ppm. He was also placed on an IABP by interventional cardiology. His iNP was further uptitrated to 10ppm. On 05/07 hsi hemodynamics worsened after IABP settings was weaned to 1:2, so this was further upgraded to 1:1. Patient improved on IABP and this was removed on 5/9. Weaned nitric. Continuing pressors as needed. Patient Cr worsening with removal of IABP, poor prognostic sign. Patient also having new WCC on 5/11, septic work up and broad spectrum antibiotics  started. Hospitals in [place] consulted for assistance in HF care.      Interval History: Symptoms:  Same.  Diet:  Adequate intake.    Activity level:  Impaired due to weakness.    Pain:  No pain.       Review of Systems   Constitutional: Positive for malaise/fatigue. Negative for decreased appetite.   Cardiovascular:  Positive for dyspnea on exertion and leg swelling. Negative for chest pain.   Respiratory:  Positive for shortness of breath. Negative for snoring.    Gastrointestinal:  Negative for abdominal pain and diarrhea.   Neurological:  Positive for light-headedness and weakness. Negative for vertigo.   Objective:     Vital Signs (Most Recent):  Temp: 98.6 °F (37 °C) (05/11/22 1100)  Pulse: 83 (05/11/22 1230)  Resp: 18 (05/11/22 1230)  BP: (!) 108/56 (05/11/22 1200)  SpO2: 96 % (05/11/22 1230)   Vital Signs (24h Range):  Temp:  [97.9 °F (36.6 °C)-99.2 °F (37.3 °C)] 98.6 °F (37 °C)  Pulse:  [] 83  Resp:  [16-31] 18  SpO2:  [85 %-100 %] 96 %  BP: ()/(50-68) 108/56  Arterial Line BP: (101-137)/(36-60) 137/44     Weight: 91.5 kg (201 lb 11.5 oz)  Body mass index is 28.94 kg/m².     SpO2: 96 %  O2 Device (Oxygen Therapy): nasal cannula w/ humidification      Intake/Output Summary (Last 24 hours) at 5/11/2022 1236  Last data filed at 5/11/2022 1200  Gross per 24 hour   Intake 1873.47 ml   Output 650 ml   Net 1223.47 ml       Lines/Drains/Airways       Central Venous Catheter Line  Duration             Introducer 05/05/22 1510 right internal jugular 5 days    Pulmonary Artery Catheter Assessment  05/05/22 1625 right internal jugular 5 days              Arterial Line  Duration             Arterial Line 05/05/22 1714 Left Radial 5 days              Peripheral Intravenous Line  Duration                  Peripheral IV - Single Lumen 05/10/22 0427 20 G Anterior;Left;Proximal Forearm 1 day                    Physical Exam  Vitals and nursing note reviewed.   Constitutional:       General: He is not in acute distress.      Appearance: He is obese. He is ill-appearing.   HENT:      Mouth/Throat:      Mouth: Mucous membranes are moist.      Pharynx: Oropharynx is clear.   Eyes:      Extraocular Movements: Extraocular movements intact.      Conjunctiva/sclera: Conjunctivae normal.      Pupils: Pupils are equal, round, and reactive to light.   Cardiovascular:      Rate and Rhythm: Normal rate and regular rhythm.   Pulmonary:      Effort: Respiratory distress present.      Breath sounds: Rales present.   Abdominal:      General: Abdomen is flat. There is no distension.      Palpations: Abdomen is soft.      Tenderness: There is no abdominal tenderness.   Musculoskeletal:         General: Normal range of motion.      Cervical back: Normal range of motion and neck supple.      Right lower leg: No edema.      Left lower leg: No edema.   Skin:     General: Skin is warm and dry.   Neurological:      General: No focal deficit present.      Mental Status: He is alert. Mental status is at baseline.      Cranial Nerves: No cranial nerve deficit.      Motor: No weakness.   Psychiatric:         Mood and Affect: Mood normal.         Behavior: Behavior normal.         Thought Content: Thought content normal.       Significant Labs: ABG:   Recent Labs   Lab 05/11/22  0506 05/11/22  0827 05/11/22  1230   PH 7.403 7.399 7.409   PCO2 43.6 43.9 42.9   HCO3 27.2 27.1 27.1   POCSATURATED 59* 56* 55*   BE 2 2 2   , Blood Culture: No results for input(s): LABBLOO in the last 48 hours., CMP   Recent Labs   Lab 05/10/22  0052 05/11/22  0504   * 131*   K 4.2 4.3   CL 93* 92*   CO2 26 24   * 225*   BUN 81* 98*   CREATININE 2.2* 2.6*   CALCIUM 8.4* 8.4*   PROT 5.1* 5.1*   ALBUMIN 2.2* 2.2*   BILITOT 1.2* 1.0   ALKPHOS 51* 50*   AST 28 34   ALT 24 21   ANIONGAP 11 15   ESTGFRAFRICA 31.1* 25.4*   EGFRNONAA 26.9* 22.0*   , CBC   Recent Labs   Lab 05/10/22  0052 05/11/22  0504   WBC 13.76* 16.37*   HGB 9.6* 9.3*   HCT 28.9* 28.4*   PLT 88* 109*        Significant Imagin/11 CXR:  FINDINGS:  Ponce-Kaykay catheter is present with its tip in the pulmonary artery. Pacemaker is present.  Heart size is not enlarged. Lung fields are a little clearer than yesterday although there is still patchy airspace consolidation bilaterally with some pleural fluid particularly on the left     Impression:     See above      UE U/S:  Impression:     No thrombus in central veins of the right upper extremity, noting limited evaluation of the right internal jugular vein secondary to central venous catheter.    Assessment and Plan:       * Shock  Mr. Kevon Perez, 81 y.o. man with multi-vessel diseases not amenable for CABG with plan to proceed with staged PCI, high degree AV block s/p DC-PPM on . He presented with chest pain, SOB, cough.  -Initial workup was notable for BNP 1194, uptrending troponin, worsening BUN/Cr. CXR showed Bibasilar edema.   -Given his tachycardia and dyspnea, there were initial concerns for PE, however it is unlikely as LE U/S, V/Q scan non-concerning, and absence of right heart strain on TTE.     No results for input(s): TROPONINI in the last 72 hours.    Intake/Output Summary (Last 24 hours) at 2022 1244  Last data filed at 2022 1200  Gross per 24 hour   Intake 1873.47 ml   Output 650 ml   Net 1223.47 ml       Net IO Since Admission: -5,259.36 mL [22 1244]    -Given his TTE showing significant worsening of Ejection Fraction to less than 20%, signs of end-organ damage(acute renal failure, troponinemia, transaminitis), there were concerns for cardiogenic shock for which patient was transferred to the CCU .  - patient not improving from a shock stand point, concerning was for sepsis vs worsening cardiogenic    Plan   - Continue ACS protocol with heparin, aspirin, clopidogrel  - s/p Ponce Kaykay catheter for hemodynamics montioring and IABP balloon pump MCS on .   - IABP removed on    - Currently on  gtt @2.5,  epi gtt 0.04, and vaso 0.04. Nitric discontinued  - blood cx x2, resp cx, urinalysis with culture, CXR done. Started on broad spectrum abx with vanc/cefepime/azithro  - HTS consulted to assist in cardiogenic shock   Wean pressors and inotropic support as tolerated    Acute on chronic combined systolic and diastolic heart failure  TTE with new EF of 20% with mod systolic RV function.     - Responding well to lasix 10mg/hr, decreased to 5mg/hr, now on lasix 80 IV TID on 5/10.  - Strict I/Os  - Replete electrolytes as needed    Cardiac pacemaker  Status-post successful dual chamber pacemaker implantation.         Acute renal failure superimposed on stage 4 chronic kidney disease  - baseline around 2, on admit 2.5. sCr improving with diuresis  - Likely 2/2 to cardiorenal syndrome  - Cr 2.6 from 2.2 on 5/11, concerning for worsening cardiorenal given IABP removal on 5/9  - avoid nephrotoxins; renally dose meds    Acute respiratory failure with hypoxia  Patient with Hypoxic Respiratory failure which is Acute.  he is not on home oxygen. Supplemental oxygen was provided and noted- Vent Mode: Spont  Oxygen Concentration (%):  [40] 40  Resp Rate Total:  [12 br/min-30 br/min] 25 br/min  PEEP/CPAP:  [5 cmH20] 5 cmH20  Pressure Support:  [12 cmH20] 12 cmH20  Mean Airway Pressure:  [7.6 cmH20-9.1 cmH20] 9.1 cmH20.   Signs/symptoms of respiratory failure include- tachypnea. Contributing diagnoses includes - CHF Labs and images were reviewed. Patient . Will treat underlying causes and adjust management of respiratory failure as follows- IV diuresis of lasix 80 IV TID/ Bipapap as needed    Thrombocytopenia  Plt of 91 on 5/9.  - patient was on heparin drip, discontinued on 5/10    Plan  - VTE ppx only  - CBC daily  - hold should plt < 50    Hypertension associated with diabetes  Holding home meds in the setting of cardiogenic shock    GINGER on CPAP  BiPAP qhS    Type 2 diabetes mellitus with neurologic complication  - LDSSI  - basal  bolus insulin and prandial insulin, titrate as needed  - hypoglycemia protocol , ACHS accuchecks     Coronary artery disease involving native coronary artery of native heart with unstable angina pectoris  Continue ACS protocol - ASA/ Plavix, statin        VTE Risk Mitigation (From admission, onward)         Ordered     heparin (porcine) injection 5,000 Units  Every 8 hours         05/10/22 1029     IP VTE HIGH RISK PATIENT  Once         05/10/22 1029     Place sequential compression device  Until discontinued         05/05/22 1035     Place sequential compression device  Until discontinued         05/04/22 1418     Place sequential compression device  Until discontinued         05/04/22 1418                Brielle Rendon MD  Cardiology  [facility] - Cardiac Intensive Care

## 2022-05-11 NOTE — PT/OT/SLP PROGRESS
Physical Therapy      Patient Name:  Kevon Perez   MRN:  [id number]    Patient not seen today secondary to Other (Comment) (RN hold 2/2 MAP dropping to 40's). Will follow-up as appropriate.

## 2022-05-11 NOTE — ASSESSMENT & PLAN NOTE
TTE with new EF of 20% with mod systolic RV function.     - Responding well to lasix 10mg/hr, decreased to 5mg/hr, now on lasix 80 IV TID on 5/10.  - Strict I/Os  - Replete electrolytes as needed

## 2022-05-11 NOTE — PT/OT/SLP PROGRESS
Occupational Therapy      Patient Name:  Kevon Perez   MRN:  [id number]    Patient not seen today secondary to nursing hold due to pt hypotension and MAP in the 40s. Will follow-up 5/12/2022.    5/11/2022

## 2022-05-11 NOTE — CARE UPDATE
Hemodynamics Care Update Note    On  2.5, Vaso 0.04, Epi 0.04, Nickie 5.  SVO2: 62  CVP: 7  CO: 6.7  CI: 3.2  SVR: 775     MAP 72  UOP 425mL on day shift  (calculated using oxygen consumption constant of 3.5)    Had some respiratory distress with hypoxia when laying flat, so increased O2 from 4L NC to 8L BiPAP and doing better.      Plan:  - Will give Lasix IV 40 mg x1 as fluid balance net positive today  - wean supplemental O2 as able  - No other changes to regimen    Maged Tesfaye MD PGY4  Cardiovascular Medicine Fellow  [facility]  Pager: [phone]

## 2022-05-11 NOTE — ASSESSMENT & PLAN NOTE
- baseline around 2, on admit 2.5. sCr improving with diuresis  - Likely 2/2 to cardiorenal syndrome  - Cr 2.6 from 2.2 on 5/11, concerning for worsening cardiorenal given IABP removal on 5/9  - avoid nephrotoxins; renally dose meds

## 2022-05-11 NOTE — ASSESSMENT & PLAN NOTE
Mr. Kevon Perez, 81 y.o. 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 He presented with chest pain, SOB, cough.  -Initial workup was notable for BNP 1194, uptrending troponin, worsening BUN/Cr. CXR showed Bibasilar edema.   -Given his tachycardia and dyspnea, there were initial concerns for PE, however it is unlikely as LE U/S, V/Q scan non-concerning, and absence of right heart strain on TTE.     No results for input(s): TROPONINI in the last 72 hours.    Intake/Output Summary (Last 24 hours) at 5/11/2022 1244  Last data filed at 5/11/2022 1200  Gross per 24 hour   Intake 1873.47 ml   Output 650 ml   Net 1223.47 ml       Net IO Since Admission: -5,259.36 mL [05/11/22 1244]    -Given his TTE showing significant worsening of Ejection Fraction to less than 20%, signs of end-organ damage(acute renal failure, troponinemia, transaminitis), there were concerns for cardiogenic shock for which patient was transferred to the CCU .  - patient not improving from a shock stand point, concerning was for sepsis vs worsening cardiogenic    Plan   - Continue ACS protocol with heparin, aspirin, clopidogrel  - s/p Ruthven Kaykay catheter for hemodynamics montioring and IABP balloon pump MCS on 05/05.   - IABP removed on 5/9   - Currently on  gtt @2.5, epi gtt 0.04, and vaso 0.04. Nitric discontinued  - blood cx x2, resp cx, urinalysis with culture, CXR done. Started on broad spectrum abx with vanc/cefepime/azithro  - HTS consulted to assist in cardiogenic shock   Wean pressors and inotropic support as tolerated

## 2022-05-11 NOTE — PROGRESS NOTES
Pharmacist Renal Dose Adjustment Note    Kevon Perez is a 81 y.o. male being treated with the medication cefepime 1 gm IV every 8 hours    Patient Data:    Vital Signs (Most Recent):  Temp: 98.4 °F (36.9 °C) (05/11/22 0700)  Pulse: 84 (05/11/22 1000)  Resp: (!) 27 (05/11/22 1000)  BP: (!) 112/54 (05/11/22 1000)  SpO2: 96 % (05/11/22 1000)   Vital Signs (72h Range):  Temp:  [97.8 °F (36.6 °C)-99.4 °F (37.4 °C)]   Pulse:  []   Resp:  [13-39]   BP: ()/(43-68)   SpO2:  [85 %-100 %]   Arterial Line BP: ()/(31-60)      Recent Labs   Lab 05/09/22  0318 05/10/22  0052 05/11/22  0504   CREATININE 2.1* 2.2* 2.6*     Serum creatinine: 2.6 mg/dL (H) 05/11/22 0504  Estimated creatinine clearance: 24.9 mL/min (A)    Medication: cefepime 1 gm IV every 8 hours will be changed to cefepime 1 gm IV every 12 hours    Pharmacist's Name: Sharon Murphy  Pharmacist's Extension: [phone]

## 2022-05-11 NOTE — PROGRESS NOTES
Pharmacokinetic Initial Assessment: IV Vancomycin    Assessment/Plan:    Initiate intravenous vancomycin with loading dose of 1750 mg once with subsequent doses when random concentrations are less than 20 mcg/mL  Desired empiric serum trough concentration is 10 to 20 mcg/mL  Draw vancomycin random level on 05/12/22 at 0300 with morning labs..  Pharmacy will continue to follow and monitor vancomycin.      Please contact pharmacy at extension [phone] with any questions regarding this assessment.     Thank you for the consult,   Sharon Murphy       Patient brief summary:  Kevon Perez is a 81 y.o. male initiated on antimicrobial therapy with IV Vancomycin for treatment of suspected bacteremia    Drug Allergies:   Review of patient's allergies indicates:   Allergen Reactions    Penicillins Other (See Comments)     Muscle stiffness    Bactrim [sulfamethoxazole-trimethoprim] Rash       Actual Body Weight:   91.5 kg    Renal Function:   Estimated Creatinine Clearance: 24.9 mL/min (A) (based on SCr of 2.6 mg/dL (H)).,     Dialysis Method (if applicable):  N/A    CBC (last 72 hours):  Recent Labs   Lab Result Units 05/09/22 0318 05/09/22  0728 05/10/22  0052 05/11/22  0504   WBC K/uL 13.55* 12.56 13.76* 16.37*   Hemoglobin g/dL 10.2* 10.0* 9.6* 9.3*   Hematocrit % 30.4* 30.4* 28.9* 28.4*   Platelets K/uL 93* 91* 88* 109*   Gran % % 77.3* 76.1* 77.1* 80.5*   Lymph % % 6.8* 7.2* 6.7* 6.3*   Mono % % 12.4 12.7 12.9 9.8   Eosinophil % % 1.5 1.8 1.5 0.8   Basophil % % 0.7 0.9 0.6 0.7   Differential Method  Automated Automated Automated Automated       Metabolic Panel (last 72 hours):  Recent Labs   Lab Result Units 05/08/22  1349 05/09/22  0318 05/10/22  0052 05/11/22  0504   Sodium mmol/L 130* 131* 130* 131*   Potassium mmol/L 3.7 4.0 4.2 4.3   Chloride mmol/L 93* 93* 93* 92*   CO2 mmol/L 24 26 26 24   Glucose mg/dL 162* 195* 188* 225*   BUN mg/dL 67* 68* 81* 98*   Creatinine mg/dL 2.1* 2.1* 2.2* 2.6*   Albumin  g/dL  --  2.3* 2.2* 2.2*   Total Bilirubin mg/dL  --  1.2* 1.2* 1.0   Alkaline Phosphatase U/L  --  60 51* 50*   AST U/L  --  32 28 34   ALT U/L  --  28 24 21   Magnesium mg/dL  --   --  2.0 2.2   Phosphorus mg/dL  --  4.0 4.8* 5.1*       Drug levels (last 3 results):  No results for input(s): VANCOMYCINRA, VANCOMYCINPE, VANCOMYCINTR in the last 72 hours.    Microbiologic Results:  Microbiology Results (last 7 days)     Procedure Component Value Units Date/Time    Blood culture [[id number]]     Order Status: Sent Specimen: Blood     Blood culture [[id number]]     Order Status: Sent Specimen: Blood     Culture, Respiratory with Gram Stain [[id number]]     Order Status: No result Specimen: Respiratory     Blood culture [[id number]] Collected: 05/05/22 1230    Order Status: Completed Specimen: Blood from Antecubital, Left Arm Updated: 05/10/22 1612     Blood Culture, Routine No growth after 5 days.    Narrative:      Collection has been rescheduled by CBE at 05/05/2022 09:26 Reason:   Patient in [place],spoke with TereseRN  Collection has been rescheduled by 6 at 05/05/2022 11:52 Reason:   Patient in bathroom will try back  Collection has been rescheduled by CBE at 05/05/2022 09:26 Reason:   Patient in [place],spoke with TereseRN  Collection has been rescheduled by 6 at 05/05/2022 11:52 Reason:   Patient in bathroom will try back    Blood culture [[id number]] Collected: 05/05/22 1237    Order Status: Completed Specimen: Blood from Peripheral, Right Hand Updated: 05/10/22 1612     Blood Culture, Routine No growth after 5 days.    Narrative:      Collection has been rescheduled by CBE at 05/05/2022 09:26 Reason:   Patient in [place],spoke with TereseRN  Collection has been rescheduled by 6 at 05/05/2022 11:52 Reason:   Patient in bathroom will try back  Collection has been rescheduled by CBE at 05/05/2022 09:26 Reason:   Patient in [place],spoke with TereseRN  Collection has been rescheduled by [facility] at 05/05/2022  11:52 Reason:   Patient in bathroom will try back

## 2022-05-12 NOTE — ASSESSMENT & PLAN NOTE
TTE with new EF of 20% with mod systolic RV function.     - Responding well to lasix 10mg/hr, decreased to 5mg/hr, now on lasix 80 IV PRN.  - weaning pressor support as tolerated  - Strict I/Os  - Replete electrolytes as needed

## 2022-05-12 NOTE — ASSESSMENT & PLAN NOTE
Patient with Hypoxic Respiratory failure which is Acute.  he is not on home oxygen. Supplemental oxygen was provided and noted- Vent Mode: Spont  Oxygen Concentration (%):  [30-40] 30  Resp Rate Total:  [16 br/min-32 br/min] 18 br/min  PEEP/CPAP:  [5 cmH20] 5 cmH20  Pressure Support:  [12 cmH20] 12 cmH20  Mean Airway Pressure:  [7.8 cmH20-9.7 cmH20] 7.8 cmH20.   Signs/symptoms of respiratory failure include- tachypnea. Contributing diagnoses includes - CHF Labs and images were reviewed. Patient . Will treat underlying causes and adjust management of respiratory failure as follows- IV diuresis of lasix 80 IV PRN/ Bipapap as needed  - better bp control with weaning pressor support on 5/12

## 2022-05-12 NOTE — ASSESSMENT & PLAN NOTE
- baseline around 2, on admit 2.5. sCr improving with diuresis  - Likely 2/2 to cardiorenal syndrome  - Cr 2.6 from 2.2 on 5/11, improved to 2.2 on 5/12 concerning for worsening cardiorenal given IABP removal on 5/9  - avoid nephrotoxins; renally dose meds

## 2022-05-12 NOTE — PT/OT/SLP PROGRESS
Physical Therapy      Patient Name:  Kevon Perez   MRN:  [id number]    Patient not seen today secondary to hold per nurse due to hypotension and pressor support. Sharon Mathias PT 5/12/22

## 2022-05-12 NOTE — PLAN OF CARE
[facility] - Cardiac Int[facility] Care  Discharge Reassessment    Primary Care Provider: Cipirano Sol MD    Expected Discharge Date: 5/18/2022    Reassessment (most recent)     Discharge Reassessment - 05/12/22 1442        Discharge Reassessment    Assessment Type Discharge Planning Reassessment     Did the patient's condition or plan change since previous assessment? Yes     Communicated HEMANT with patient/caregiver Date not available/Unable to determine     Discharge Plan A Hospice/home     Discharge Plan B Comfort care/withdrawal     DME Needed Upon Discharge  none     Discharge Barriers Identified None     Why the patient remains in the hospital Requires continued medical care               Julie Haase RN  Case Management [phone]

## 2022-05-12 NOTE — SUBJECTIVE & OBJECTIVE
Interval History: Symptoms:  Improved.    Diet:  Poor intake.    Activity level:  Impaired due to weakness.    Pain:  No pain.       Review of Systems   Constitutional: Positive for malaise/fatigue. Negative for decreased appetite.   Cardiovascular:  Positive for dyspnea on exertion and leg swelling. Negative for chest pain.   Respiratory:  Positive for shortness of breath. Negative for snoring.    Gastrointestinal:  Negative for abdominal pain and diarrhea.   Neurological:  Positive for light-headedness and weakness. Negative for vertigo.   Objective:     Vital Signs (Most Recent):  Temp: 98.8 °F (37.1 °C) (05/12/22 0800)  Pulse: 84 (05/12/22 0900)  Resp: 20 (05/12/22 0900)  BP: (!) 114/55 (05/12/22 0900)  SpO2: 96 % (05/12/22 0900)   Vital Signs (24h Range):  Temp:  [98.3 °F (36.8 °C)-98.8 °F (37.1 °C)] 98.8 °F (37.1 °C)  Pulse:  [70-88] 84  Resp:  [16-32] 20  SpO2:  [95 %-100 %] 96 %  BP: (102-124)/(51-66) 114/55  Arterial Line BP: (101-146)/(36-54) 146/54     Weight: 91.5 kg (201 lb 11.5 oz)  Body mass index is 28.94 kg/m².     SpO2: 96 %  O2 Device (Oxygen Therapy): BiPAP      Intake/Output Summary (Last 24 hours) at 5/12/2022 0952  Last data filed at 5/12/2022 0900  Gross per 24 hour   Intake 2344.22 ml   Output 1070 ml   Net 1274.22 ml       Lines/Drains/Airways       Central Venous Catheter Line  Duration             Introducer 05/05/22 1510 right internal jugular 6 days    Pulmonary Artery Catheter Assessment  05/05/22 1625 right internal jugular 6 days              Drain  Duration                  Urethral Catheter 05/11/22 1500 Straight-tip <1 day              Arterial Line  Duration             Arterial Line 05/05/22 1714 Left Radial 6 days              Peripheral Intravenous Line  Duration                  Peripheral IV - Single Lumen 05/10/22 0427 20 G Anterior;Left;Proximal Forearm 2 days                    Physical Exam  Vitals and nursing note reviewed.   Constitutional:       General: He is not in  acute distress.     Appearance: He is obese. He is ill-appearing.   HENT:      Mouth/Throat:      Mouth: Mucous membranes are moist.      Pharynx: Oropharynx is clear.   Eyes:      Extraocular Movements: Extraocular movements intact.      Conjunctiva/sclera: Conjunctivae normal.      Pupils: Pupils are equal, round, and reactive to light.   Cardiovascular:      Rate and Rhythm: Normal rate and regular rhythm.   Pulmonary:      Effort: Respiratory distress present.      Breath sounds: Rales present.   Abdominal:      General: Abdomen is flat. There is no distension.      Palpations: Abdomen is soft.      Tenderness: There is no abdominal tenderness.   Musculoskeletal:         General: Normal range of motion.      Cervical back: Normal range of motion and neck supple.      Right lower leg: No edema.      Left lower leg: No edema.   Skin:     General: Skin is warm and dry.   Neurological:      General: No focal deficit present.      Mental Status: He is alert. Mental status is at baseline.      Cranial Nerves: No cranial nerve deficit.      Motor: No weakness.   Psychiatric:         Mood and Affect: Mood normal.         Behavior: Behavior normal.         Thought Content: Thought content normal.       Significant Labs: ABG:   Recent Labs   Lab 05/11/22  1815 05/11/22  2000 05/12/22  0513   PH 7.406 7.414 7.410   PCO2 45.3* 42.6 42.9   HCO3 28.5* 27.2 27.1   POCSATURATED 52* 56* 60*   BE 4 3 2   , Blood Culture:   Recent Labs   Lab 05/11/22  1123 05/11/22  1124   LABBLOO No Growth to date No Growth to date   , CMP   Recent Labs   Lab 05/11/22  0504 05/11/22  1451 05/12/22  0236   * 128* 128*   K 4.3 3.8 4.1   CL 92* 89* 91*   CO2 24 26 24   * 265* 163*   BUN 98* 102* 107*   CREATININE 2.6* 2.6* 2.4*   CALCIUM 8.4* 8.3* 8.3*   PROT 5.1*  --  5.0*   ALBUMIN 2.2*  --  2.2*   BILITOT 1.0  --  1.0   ALKPHOS 50*  --  45*   AST 34  --  30   ALT 21  --  22   ANIONGAP 15 13 13   ESTGFRAFRICA 25.4* 25.4* 28.0*    EGFRNONAA 22.0* 22.0* 24.2*   , CBC   Recent Labs   Lab 22  0504 22  0236   WBC 16.37* 18.16*   HGB 9.3* 9.0*   HCT 28.4* 26.7*   * 130*   , and All pertinent lab results from the last 24 hours have been reviewed.    Significant Imagin/11 RUE U/S:  Impression:     No thrombus in central veins of the right upper extremity, noting limited evaluation of the right internal jugular vein secondary to central venous catheter.     CXR:    FINDINGS:  [place]-Kay[place] catheter is present with its tip in the pulmonary artery. Pacemaker is present.  Heart size is not enlarged. Lung fields are a little clearer than yesterday although there is still patchy airspace consolidation bilaterally with some pleural fluid particularly on the left     Impression:     See above

## 2022-05-12 NOTE — PROGRESS NOTES
[facility]  Cardiology  Progress Note    Patient Name: Kevon Perez  MRN: [id number]  Admission Date: 5/4/2022  Hospital Length of Stay: 8 days  Code Status: DNR   Attending Physician: Matthew Garcia MD   Primary Care Physician: Cipriano Sol MD  Expected Discharge Date: 5/18/2022  Principal Problem:Shock    Subjective:     Hospital Course:   Mr. Kevon Perez, 81 y.o. 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 Of note, he underwent LHC on 4/29 (last week) which revealed multi-vessel CAD. CTS consulted and recommended medical versus PCI. The plan from IC was to proceed with multi-stage PCI in the outpatient setting. Patient was admitted this hospital course for acute decompensated HF and was placed on an adequate diuretic regimen that he didn't respond to. Patient developed hypotension 05/05. Labs revealed worsening RAGHU (sCr 2.4->2.9). Lactic 1.8. TTE revealed drop in EF from 65-> 20%. ACS protocol initiated given concern for possible new infarct. Patient was transferred to CICU for close monitoring. Upon arrival to the CCU, a swan jesus alberto catheter was placed via right IJ for hemodynamic monitoring. He was initiated on  2.5, Epi gtt 0.02, vasopressin 0.04, Nickie initially at 5ppm. He was also placed on an IABP by interventional cardiology. His iNP was further uptitrated to 10ppm. On 05/07 hsi hemodynamics worsened after IABP settings was weaned to 1:2, so this was further upgraded to 1:1. Patient improved on IABP and this was removed on 5/9. Weaned nitric. Continuing pressors as needed. Patient Cr worsening with removal of IABP, poor prognostic sign. Patient also having new WCC on 5/11, septic work up and broad spectrum antibiotics started. HTS  consulted for assistance in HF care. Patient hemodynamics improved 5/12, weaning pressors as tolerated, continuing antibiotics      Interval History: Symptoms:  Improved.    Diet:  Poor intake.    Activity level:  Impaired due to weakness.    Pain:  No pain.       Review of Systems   Constitutional: Positive for malaise/fatigue. Negative for decreased appetite.   Cardiovascular:  Positive for dyspnea on exertion and leg swelling. Negative for chest pain.   Respiratory:  Positive for shortness of breath. Negative for snoring.    Gastrointestinal:  Negative for abdominal pain and diarrhea.   Neurological:  Positive for light-headedness and weakness. Negative for vertigo.   Objective:     Vital Signs (Most Recent):  Temp: 98.8 °F (37.1 °C) (05/12/22 0800)  Pulse: 84 (05/12/22 0900)  Resp: 20 (05/12/22 0900)  BP: (!) 114/55 (05/12/22 0900)  SpO2: 96 % (05/12/22 0900)   Vital Signs (24h Range):  Temp:  [98.3 °F (36.8 °C)-98.8 °F (37.1 °C)] 98.8 °F (37.1 °C)  Pulse:  [70-88] 84  Resp:  [16-32] 20  SpO2:  [95 %-100 %] 96 %  BP: (102-124)/(51-66) 114/55  Arterial Line BP: (101-146)/(36-54) 146/54     Weight: 91.5 kg (201 lb 11.5 oz)  Body mass index is 28.94 kg/m².     SpO2: 96 %  O2 Device (Oxygen Therapy): BiPAP      Intake/Output Summary (Last 24 hours) at 5/12/2022 0952  Last data filed at 5/12/2022 0900  Gross per 24 hour   Intake 2344.22 ml   Output 1070 ml   Net 1274.22 ml       Lines/Drains/Airways       Central Venous Catheter Line  Duration             Introducer 05/05/22 1510 right internal jugular 6 days    Pulmonary Artery Catheter Assessment  05/05/22 1625 right internal jugular 6 days              Drain  Duration                  Urethral Catheter 05/11/22 1500 Straight-tip <1 day              Arterial Line  Duration             Arterial Line 05/05/22 1714 Left Radial 6 days              Peripheral Intravenous Line  Duration                  Peripheral IV - Single Lumen 05/10/22 0427 20 G  Anterior;Left;Proximal Forearm 2 days                    Physical Exam  Vitals and nursing note reviewed.   Constitutional:       General: He is not in acute distress.     Appearance: He is obese. He is ill-appearing.   HENT:      Mouth/Throat:      Mouth: Mucous membranes are moist.      Pharynx: Oropharynx is clear.   Eyes:      Extraocular Movements: Extraocular movements intact.      Conjunctiva/sclera: Conjunctivae normal.      Pupils: Pupils are equal, round, and reactive to light.   Cardiovascular:      Rate and Rhythm: Normal rate and regular rhythm.   Pulmonary:      Effort: Respiratory distress present.      Breath sounds: Rales present.   Abdominal:      General: Abdomen is flat. There is no distension.      Palpations: Abdomen is soft.      Tenderness: There is no abdominal tenderness.   Musculoskeletal:         General: Normal range of motion.      Cervical back: Normal range of motion and neck supple.      Right lower leg: No edema.      Left lower leg: No edema.   Skin:     General: Skin is warm and dry.   Neurological:      General: No focal deficit present.      Mental Status: He is alert. Mental status is at baseline.      Cranial Nerves: No cranial nerve deficit.      Motor: No weakness.   Psychiatric:         Mood and Affect: Mood normal.         Behavior: Behavior normal.         Thought Content: Thought content normal.       Significant Labs: ABG:   Recent Labs   Lab 05/11/22  1815 05/11/22  2000 05/12/22  0513   PH 7.406 7.414 7.410   PCO2 45.3* 42.6 42.9   HCO3 28.5* 27.2 27.1   POCSATURATED 52* 56* 60*   BE 4 3 2   , Blood Culture:   Recent Labs   Lab 05/11/22  1123 05/11/22  1124   LABBLOO No Growth to date No Growth to date   , CMP   Recent Labs   Lab 05/11/22  0504 05/11/22  1451 05/12/22  0236   * 128* 128*   K 4.3 3.8 4.1   CL 92* 89* 91*   CO2 24 26 24   * 265* 163*   BUN 98* 102* 107*   CREATININE 2.6* 2.6* 2.4*   CALCIUM 8.4* 8.3* 8.3*   PROT 5.1*  --  5.0*   ALBUMIN  2.2*  --  2.2*   BILITOT 1.0  --  1.0   ALKPHOS 50*  --  45*   AST 34  --  30   ALT 21  --  22   ANIONGAP 15 13 13   ESTGFRAFRICA 25.4* 25.4* 28.0*   EGFRNONAA 22.0* 22.0* 24.2*   , CBC   Recent Labs   Lab 22  0504 22  0236   WBC 16.37* 18.16*   HGB 9.3* 9.0*   HCT 28.4* 26.7*   * 130*   , and All pertinent lab results from the last 24 hours have been reviewed.    Significant Imagin/11 RUE U/S:  Impression:     No thrombus in central veins of the right upper extremity, noting limited evaluation of the right internal jugular vein secondary to central venous catheter.     CXR:    FINDINGS:  [place]-Kaykay catheter is present with its tip in the pulmonary artery. Pacemaker is present.  Heart size is not enlarged. Lung fields are a little clearer than yesterday although there is still patchy airspace consolidation bilaterally with some pleural fluid particularly on the left     Impression:     See above      Assessment and Plan:       * Shock  Mr. Kevon Perez, 81 y.o. man with multi-vessel diseases not amenable for CABG with plan to proceed with staged PCI, high degree AV block s/p DC-PPM on . He presented with chest pain, SOB, cough.  -Initial workup was notable for BNP 1194, uptrending troponin, worsening BUN/Cr. CXR showed Bibasilar edema.   -Given his tachycardia and dyspnea, there were initial concerns for PE, however it is unlikely as LE U/S, V/Q scan non-concerning, and absence of right heart strain on TTE.     No results for input(s): TROPONINI in the last 72 hours.    Intake/Output Summary (Last 24 hours) at 2022 1118  Last data filed at 2022 1000  Gross per 24 hour   Intake 2048.44 ml   Output 1100 ml   Net 948.44 ml       Net IO Since Admission: -4,401.6 mL [22 1118]    -Given his TTE showing significant worsening of Ejection Fraction to less than 20%, signs of end-organ damage(acute renal failure, troponinemia, transaminitis), there were concerns for  cardiogenic shock for which patient was transferred to the CCU .  - On 5/11patient not improving from a shock stand point, concerning was for sepsis vs worsening cardiogenic, improving 5/12    Plan   - Continue ACS protocol with heparin, aspirin, clopidogrel  - s/p [place] Kaykay catheter for hemodynamics montioring and IABP balloon pump MCS on 05/05.   - IABP removed on 5/9   - Currently on  gtt @2.5, epi gtt 0.02, and vaso 0.02. Nitric discontinued  - blood cx x2, resp cx, urinalysis with culture, CXR done. Started on broad spectrum abx with vanc/cefepime/azithro. Work up negative on 5/12  - HTS consulted to assist in cardiogenic shock, agree with weaning pressors as tolerated and continuing antibiotics    Acute on chronic combined systolic and diastolic heart failure  TTE with new EF of 20% with mod systolic RV function.     - Responding well to lasix 10mg/hr, decreased to 5mg/hr, now on lasix 80 IV PRN.  - weaning pressor support as tolerated  - Strict I/Os  - Replete electrolytes as needed    Cardiac pacemaker  Status-post successful dual chamber pacemaker implantation.         Acute renal failure superimposed on stage 4 chronic kidney disease  - baseline around 2, on admit 2.5. sCr improving with diuresis  - Likely 2/2 to cardiorenal syndrome  - Cr 2.6 from 2.2 on 5/11, improved to 2.2 on 5/12 concerning for worsening cardiorenal given IABP removal on 5/9  - avoid nephrotoxins; renally dose meds    Acute respiratory failure with hypoxia  Patient with Hypoxic Respiratory failure which is Acute.  he is not on home oxygen. Supplemental oxygen was provided and noted- Vent Mode: Spont  Oxygen Concentration (%):  [30-40] 30  Resp Rate Total:  [16 br/min-32 br/min] 18 br/min  PEEP/CPAP:  [5 cmH20] 5 cmH20  Pressure Support:  [12 cmH20] 12 cmH20  Mean Airway Pressure:  [7.8 cmH20-9.7 cmH20] 7.8 cmH20.   Signs/symptoms of respiratory failure include- tachypnea. Contributing diagnoses includes - CHF Labs and images were  reviewed. Patient . Will treat underlying causes and adjust management of respiratory failure as follows- IV diuresis of lasix 80 IV PRN/ Bipapap as needed  - better bp control with weaning pressor support on 5/12    Thrombocytopenia  Plt of 91 on 5/9.  - patient was on heparin drip, discontinued on 5/10    Plan  - VTE ppx only  - CBC daily  - hold should plt < 50    Hypertension associated with diabetes  Holding home meds in the setting of cardiogenic shock    GINGER on CPAP  BiPAP qhS    Type 2 diabetes mellitus with neurologic complication  - LDSSI  - basal bolus insulin and prandial insulin, titrate as needed  - hypoglycemia protocol , ACHS accuchecks     Coronary artery disease involving native coronary artery of native heart with unstable angina pectoris  Continue ACS protocol - ASA/ Plavix, statin        VTE Risk Mitigation (From admission, onward)         Ordered     heparin (porcine) injection 5,000 Units  Every 8 hours         05/10/22 1029     IP VTE HIGH RISK PATIENT  Once         05/10/22 1029     Place sequential compression device  Until discontinued         05/05/22 1035     Place sequential compression device  Until discontinued         05/04/22 1418     Place sequential compression device  Until discontinued         05/04/22 1418                Brielle Rendon MD  Cardiology  [facility]

## 2022-05-12 NOTE — PROGRESS NOTES
Pharmacokinetic Assessment Follow Up: IV Vancomycin    Vancomycin serum concentration assessment(s):    · The random level was drawn correctly and can be used to guide therapy at this time. The measurement is above the desired definitive target range of 15 to 20 mcg/mL.  · SCr improving  2.6->2.4mg/dL. Patient had a urine output of 905mL yesterday and 275 recorded thus far today.     Vancomycin Regimen Plan:    · Do not redose vancomycin today. Redraw vancomycin random level tomorrow with AM and redose per level and renal function.      Drug levels (last 3 results):  Recent Labs   Lab Result Units 05/12/22  0236   Vancomycin, Random ug/mL 30.0       Pharmacy will continue to follow and monitor vancomycin.    Please contact pharmacy at extension [phone] for questions regarding this assessment.    Thank you for the consult,   Katelyn Tay, PharmD, BCPS, [facility] Clinical Pharmacy Specialist  EXT [phone]       Patient brief summary:  Kevon Perez is a 81 y.o. male initiated on antimicrobial therapy with IV Vancomycin for treatment of bacteremia/sepsis     The patient's current regimen is pulse dosing.     Drug Allergies:   Review of patient's allergies indicates:   Allergen Reactions    Penicillins Other (See Comments)     Muscle stiffness    Bactrim [sulfamethoxazole-trimethoprim] Rash       Actual Body Weight:   91.2kg    Renal Function:   Estimated Creatinine Clearance: 27 mL/min (A) (based on SCr of 2.4 mg/dL (H)).,     Dialysis Method (if applicable):  NA    CBC (last 72 hours):  Recent Labs   Lab Result Units 05/10/22  0052 05/11/22  0504 05/12/22  0236   WBC K/uL 13.76* 16.37* 18.16*   Hemoglobin g/dL 9.6* 9.3* 9.0*   Hematocrit % 28.9* 28.4* 26.7*   Platelets K/uL 88* 109* 130*   Gran % % 77.1* 80.5* 76.5*   Lymph % % 6.7* 6.3* 7.3*   Mono % % 12.9 9.8 11.7   Eosinophil % % 1.5 0.8 2.1   Basophil % % 0.6 0.7 0.7   Differential Method  Automated Automated Automated       Metabolic  Panel (last 72 hours):  Recent Labs   Lab Result Units 05/10/22  0052 05/11/22  0504 05/11/22  1451 05/12/22  0236   Sodium mmol/L 130* 131* 128* 128*   Potassium mmol/L 4.2 4.3 3.8 4.1   Chloride mmol/L 93* 92* 89* 91*   CO2 mmol/L 26 24 26 24   Glucose mg/dL 188* 225* 265* 163*   Glucose, UA   --   --  Negative  --    BUN mg/dL 81* 98* 102* 107*   Creatinine mg/dL 2.2* 2.6* 2.6* 2.4*   Albumin g/dL 2.2* 2.2*  --  2.2*   Total Bilirubin mg/dL 1.2* 1.0  --  1.0   Alkaline Phosphatase U/L 51* 50*  --  45*   AST U/L 28 34  --  30   ALT U/L 24 21  --  22   Magnesium mg/dL 2.0 2.2  --  2.2   Phosphorus mg/dL 4.8* 5.1*  --  4.8*       Vancomycin Administrations:  vancomycin given in the last 96 hours                   vancomycin 1.75 g in 5 % dextrose 500 mL IVPB (mg) 1,750 mg New Bag 05/11/22 1204                Microbiologic Results:  Microbiology Results (last 7 days)     Procedure Component Value Units Date/Time    Blood culture [[id number]] Collected: 05/11/22 1123    Order Status: Completed Specimen: Blood from Peripheral, Hand, Left Updated: 05/11/22 1915     Blood Culture, Routine No Growth to date    Blood culture [[id number]] Collected: 05/11/22 1124    Order Status: Completed Specimen: Blood from Peripheral, Wrist, Left Updated: 05/11/22 1915     Blood Culture, Routine No Growth to date    Culture, Respiratory with Gram Stain [[id number]]     Order Status: No result Specimen: Respiratory     Blood culture [[id number]] Collected: 05/05/22 1230    Order Status: Completed Specimen: Blood from Antecubital, Left Arm Updated: 05/10/22 1612     Blood Culture, Routine No growth after 5 days.    Narrative:      Collection has been rescheduled by CBE at 05/05/2022 09:26 Reason:   Patient in Echo,spoke with Terese,RN  Collection has been rescheduled by TH6 at 05/05/2022 11:52 Reason:   Patient in bathroom will try back  Collection has been rescheduled by CBE at 05/05/2022 09:26 Reason:   Patient in Echo,spoke with  CINDY Gudino  Collection has been rescheduled by [facility] at 05/05/2022 11:52 Reason:   Patient in bathroom will try back    Blood culture [[id number]] Collected: 05/05/22 1237    Order Status: Completed Specimen: Blood from Peripheral, Right Hand Updated: 05/10/22 1612     Blood Culture, Routine No growth after 5 days.    Narrative:      Collection has been rescheduled by CBE at 05/05/2022 09:26 Reason:   Patient in Echo,spoke with CINDY Gudino  Collection has been rescheduled by [facility] at 05/05/2022 11:52 Reason:   Patient in bathroom will try back  Collection has been rescheduled by CBE at 05/05/2022 09:26 Reason:   Patient in Echo,spoke with CINDY Gudino  Collection has been rescheduled by [facility] at 05/05/2022 11:52 Reason:   Patient in bathroom will try back

## 2022-05-12 NOTE — PT/OT/SLP PROGRESS
Occupational Therapy      Patient Name:  Kevon Perez   MRN:  [id number]    Patient not seen today secondary to RN hold: medical instability (requiring pressor support and impaired respiratory status). Will follow-up when medically stable to participate.    5/12/2022

## 2022-05-12 NOTE — CARE UPDATE
Hemodynamics Care Update Note  On  2.5, Epi .04, Vaso .04    SVO2: 56  CVP: 7  CO: 5.9  CI: 2.7  SVR: 996  I: 1.9 O: 530    (calculated using oxygen consumption constant of 3.5)    Plan:  No acute changes at this time     Tay Nguyen, PGY-4  Cardiovascular Disease  [facility]

## 2022-05-12 NOTE — PROGRESS NOTES
[facility]  Cardiology  Consult Note     Patient Name: Kevon Perez  Admission Date:   Hospital Length of Stay: 8  Code Status:   Attending Provider:   Consulting Provider: Matthew Corrigan MD  Reason for Consult:    Subjective/Overnight Events:   Remains on bipap. No acute events overnight. Cultures remain negative.   Echo pending    CVP 7, SVO2 60, , CO 5.1, CI 2.4 PA 50/20    ROS: Patient denies angina, MATTHEWS, lower extremity edema, orthopnea, PND, palpitations, presyncope or syncope. All other ROS negative except as stated above.    Meds:  Scheduled Meds:   albuterol-ipratropium  3 mL Nebulization Q6H WAKE    artificial tears  1 drop Both Eyes TID    aspirin  81 mg Oral Daily    atorvastatin  80 mg Oral Daily    azithromycin  500 mg Oral Daily    calcitRIOL  0.25 mcg Oral Daily    ceFEPime (MAXIPIME) IVPB  2 g Intravenous Q24H    clopidogreL  75 mg Oral Daily    finasteride  5 mg Oral Daily    heparin (porcine)  5,000 Units Subcutaneous Q8H    insulin aspart U-100  13 Units Subcutaneous TIDWM    insulin detemir U-100  18 Units Subcutaneous BID    lactulose  10 g Oral TID    senna-docusate 8.6-50 mg  1 tablet Oral QHS    sodium bicarbonate  650 mg Oral TID    tamsulosin  0.4 mg Oral Daily     Continuous Infusions:   sodium chloride 0.9% 5 mL/hr at 05/12/22 0600    sodium chloride 0.9% 10 mL/hr at 05/12/22 0600    DOBUTamine IV infusion (non-titrating) 2.5 mcg/kg/min (05/12/22 0600)    EPINEPHrine 0.04 mcg/kg/min (05/12/22 0600)    vasopressin 0.04 Units/min (05/12/22 0632)     PRN Meds:.acetaminophen, dextrose 10%, dextrose 10%, dextrose 10%, dextrose 10%, glucagon (human recombinant), glucose, glucose, insulin aspart U-100, melatonin, nitroGLYCERIN, oxymetazoline, polyethylene glycol, sodium chloride 0.9%, sodium chloride 0.9%, Pharmacy to dose Vancomycin consult **AND** vancomycin - pharmacy to dose    Vitals:  Temp:  [98.3 °F (36.8 °C)-98.6 °F (37 °C)]  98.3 °F (36.8 °C)  Pulse:  [70-88] 88  Resp:  [16-32] 24  SpO2:  [95 %-100 %] 99 %  BP: (102-124)/(51-66) 115/58  Arterial Line BP: (101-146)/(36-50) 138/44    Physical Exam:    Gen: ill appearing, on bipap  HEENT: SWAN-ganzz in place, AT, NC Pupils equal and reactive to light  Cardio: no m/r/g, normal rate  Resp: on bipap, coarse breath sounds  Abd: Soft, non-tender, non-distended  Skin: Warm, dry  Neuro: Alert and oriented x3  Psych: Normal mood and affect    Labs:  Recent Labs   Lab 05/10/22  0052 05/11/22  0504 05/12/22  0236   WBC 13.76* 16.37* 18.16*   HGB 9.6* 9.3* 9.0*   HCT 28.9* 28.4* 26.7*   PLT 88* 109* 130*     Recent Labs   Lab 05/10/22  0052 05/11/22  0504 05/11/22  1451 05/12/22  0236   * 131* 128* 128*   K 4.2 4.3 3.8 4.1   CL 93* 92* 89* 91*   CO2 26 24 26 24   BUN 81* 98* 102* 107*   CREATININE 2.2* 2.6* 2.6* 2.4*   * 225* 265* 163*   CALCIUM 8.4* 8.4* 8.3* 8.3*   MG 2.0 2.2  --  2.2   PHOS 4.8* 5.1*  --  4.8*         Current Meds:   albuterol-ipratropium  3 mL Nebulization Q6H WAKE    artificial tears  1 drop Both Eyes TID    aspirin  81 mg Oral Daily    atorvastatin  80 mg Oral Daily    azithromycin  500 mg Oral Daily    calcitRIOL  0.25 mcg Oral Daily    ceFEPime (MAXIPIME) IVPB  2 g Intravenous Q24H    clopidogreL  75 mg Oral Daily    finasteride  5 mg Oral Daily    heparin (porcine)  5,000 Units Subcutaneous Q8H    insulin aspart U-100  13 Units Subcutaneous TIDWM    insulin detemir U-100  18 Units Subcutaneous BID    lactulose  10 g Oral TID    senna-docusate 8.6-50 mg  1 tablet Oral QHS    sodium bicarbonate  650 mg Oral TID    tamsulosin  0.4 mg Oral Daily       Assessment and Recommendations    81 year old man with a history of HTN, HLD, GINGER, AVB s/p PPM, and 3V CAD who presented with acute systolic heart failure, likely ischemic. Required mechanical support initially before weaning off of IABP. Remains in shock on multiple pressors. Due to advanced age and  comorbid conditions, he is not a candidate for advanced therapy. Given hemodynamics, can wean drips.     Recommendations  - agree with broad spectrum antibiotics and sepsis work-up  - consider replacing IJ given concern for sepsis   - recommend Weaning epinephrine to 0.02. Can monitor and repeat hemodynamics. If tolerates, would wean vaso to .02. If BP tolerates, can wean off epinephrine completely.   - repeat TTE pending, will follow-up  - CVP suggests he is well diuresed, continues to have significant dyspnea.   - as above not a candidate for LVAD/transplant at this time  - Prognosis is guarded. Recommend initiating palliative care discussions.     Discussed with staff, Dr. Guerita Frey  [facility]

## 2022-05-12 NOTE — ASSESSMENT & PLAN NOTE
Mr. Kevon Perez, 81 y.o. 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 He presented with chest pain, SOB, cough.  -Initial workup was notable for BNP 1194, uptrending troponin, worsening BUN/Cr. CXR showed Bibasilar edema.   -Given his tachycardia and dyspnea, there were initial concerns for PE, however it is unlikely as LE U/S, V/Q scan non-concerning, and absence of right heart strain on TTE.     No results for input(s): TROPONINI in the last 72 hours.    Intake/Output Summary (Last 24 hours) at 5/12/2022 1118  Last data filed at 5/12/2022 1000  Gross per 24 hour   Intake 2048.44 ml   Output 1100 ml   Net 948.44 ml       Net IO Since Admission: -4,401.6 mL [05/12/22 1118]    -Given his TTE showing significant worsening of Ejection Fraction to less than 20%, signs of end-organ damage(acute renal failure, troponinemia, transaminitis), there were concerns for cardiogenic shock for which patient was transferred to the CCU .  - On 5/11patient not improving from a shock stand point, concerning was for sepsis vs worsening cardiogenic, improving 5/12    Plan   - Continue ACS protocol with heparin, aspirin, clopidogrel  - s/p [place] Kaykay catheter for hemodynamics montioring and IABP balloon pump MCS on 05/05.   - IABP removed on 5/9   - Currently on  gtt @2.5, epi gtt 0.02, and vaso 0.02. Nitric discontinued  - blood cx x2, resp cx, urinalysis with culture, CXR done. Started on broad spectrum abx with vanc/cefepime/azithro. Work up negative on 5/12  - HTS consulted to assist in cardiogenic shock, agree with weaning pressors as tolerated and continuing antibiotics

## 2022-05-13 PROBLEM — Z71.89 GOALS OF CARE, COUNSELING/DISCUSSION: Status: ACTIVE | Noted: 2021-01-27

## 2022-05-13 NOTE — DISCHARGE SUMMARY
[facility] - [facility]  Cardiology  Discharge Summary      Patient Name: Kevon Perez  MRN: [id number]  Admission Date: 5/4/2022  Hospital Length of Stay: 9 days  Discharge Date and Time:  05/13/2022 11:59 AM  Attending Physician: Isael Self MD    Discharging Provider: Brielle Rendon MD  Primary Care Physician: Cipriano Sol MD    HPI:   Mr. Kevon Perez, 81 y.o. 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 He presented to ED after he was instructed by the general cardiology MA/RN when he started to complain of chest pain that occurred yesterday. He descried the pain and tenderness in pericardial area that is worsen with deep breath. He took first nitro with no alleviation of pain, however pain improved with third nitro and the pain subsided and did not recur. He slept fine and woke up this morning with no further pain. He called cardiology clinic who asked him to come to ED for work up. On his arrival to ED till my evaluation, no recurrence of chest pain and his vital signs has been normal. No pericardial effusion on his bedside echo and his EF is preserved. Of note, he underwent LHC on 4/29 (last week) which revealed multi-vessel CAD. . CTS consulted and recommended medical versus PCI. The plan from IC is to proceed with multi-stage PCI. Patient developed hypotension. Labs revealed worsening RAGHU (sCr 2.4->2.9). Lactic 1.8. TTE revelaed drop in EF from 65-> 20%. Patient was transferred to CICU for close monitoring.       * No surgery found *     Indwelling Lines/Drains at time of discharge:  Lines/Drains/Airways     Central Venous Catheter Line  Duration           Introducer 05/05/22 1510 right internal jugular 7 days    Pulmonary Artery Catheter Assessment  05/05/22 1625 right internal jugular 7 days          Drain  Duration                Urethral Catheter 05/11/22 1500 Straight-tip 1 day           Arterial Line  Duration           Arterial Line 05/05/22 1714 Left Radial 7 days                Hospital Course:  Mr. Kevon Perez, 81 y.o. 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 Of note, he underwent LHC on 4/29 (last week) which revealed multi-vessel CAD. CTS consulted and recommended medical versus PCI. The plan from IC was to proceed with multi-stage PCI in the outpatient setting. Patient was admitted this hospital course for acute decompensated HF and was placed on an adequate diuretic regimen that he didn't respond to. Patient developed hypotension 05/05. Labs revealed worsening RAGHU (sCr 2.4->2.9). Lactic 1.8. TTE revealed drop in EF from 65-> 20%. ACS protocol initiated given concern for possible new infarct. Patient was transferred to CICU for close monitoring. Upon arrival to the CCU, a swan jesus alberto catheter was placed via right IJ for hemodynamic monitoring. He was initiated on  2.5, Epi gtt 0.02, vasopressin 0.04, Nickie initially at 5ppm. He was also placed on an IABP by interventional cardiology. His iNP was further uptitrated to 10ppm. On 05/07 hsi hemodynamics worsened after IABP settings was weaned to 1:2, so this was further upgraded to 1:1. Patient improved on IABP and this was removed on 5/9. Patient Cr worsening with removal of IABP, poor prognostic sign. Patient also having new WCC on 5/11, septic work up and broad spectrum antibiotics started. GOC discussed and patient made DNR on 5/11. [facility] consulted for assistance in HF care. Patient received lasix drip transitioned to lasix 80 IV PRN for volume. Patient hemodynamics improved 5/12, Weaned nitric, epi, and vaso by 5/13, only on dobutamine drip. Patient started to require the BiPAP continuously for  shortness of breath on . Palliative Care consulted on  to discuss transitioning to home hospice but was not discussed as Patient went into VT followed by VF on  and .      Goals of Care Treatment Preferences:  Code Status: DNR      Consults:   Consults (From admission, onward)        Status Ordering Provider     Inpatient consult to Palliative Care  Once        Provider:  (Not yet assigned)    Acknowledged TAMIKA MAJOR     Inpatient consult to PICC team (NIAS)  Once        Provider:  (Not yet assigned)    Acknowledged TAMIKA MAJOR     Inpatient consult to Heart Transplant  Once        Provider:  (Not yet assigned)    Completed TAMIKA MAJOR     Inpatient consult to Registered Dietitian/Nutritionist  Once        Provider:  (Not yet assigned)    Completed WYATT CARNES     Inpatient consult to Interventional Cardiology  Once        Provider:  (Not yet assigned)    Completed MARGIE SOTELO     Inpatient consult to Social Work/Case Management  Once        Provider:  (Not yet assigned)    Completed SANTIAGO DE LA GARZA     Inpatient consult to Cardiology  Once        Provider:  (Not yet assigned)    Completed LISA WOLF-ON          Significant Diagnostic Studies: Labs:   CMP   Recent Labs   Lab 22  1634 22   * 125* 130*   K 4.1 4.3 4.0   CL 91* 91* 93*   CO2 24 24 25   * 253* 143*   * 106* 109*   CREATININE 2.4* 2.4* 2.2*   CALCIUM 8.3* 8.6* 8.4*   PROT 5.0*  --  4.9*   ALBUMIN 2.2*  --  2.1*   BILITOT 1.0  --  1.2*   ALKPHOS 45*  --  47*   AST 30  --  33   ALT 22  --  21   ANIONGAP 13 10 12   ESTGFRAFRICA 28.0* 28.0* 31.1*   EGFRNONAA 24.2* 24.2* 26.9*   , CBC   Recent Labs   Lab 226 22   WBC 18.16* 12.08   HGB 9.0* 8.6*   HCT 26.7* 26.4*   * 113*   , INR   Lab Results   Component Value Date    INR 1.1 2022    INR 1.1 2022    INR 1.0 2022   , Lipid Panel   Lab Results   Component Value Date    CHOL  77 (L) 04/27/2022    HDL 34 (L) 04/27/2022    LDLCALC 32.6 (L) 04/27/2022    TRIG 52 04/27/2022    CHOLHDL 44.2 04/27/2022   , Troponin No results for input(s): TROPONINI in the last 168 hours. and A1C:   Recent Labs   Lab 12/28/21  0946 03/16/22  1421   HGBA1C 6.4* 6.5*     Cardiac Graphics: Echocardiogram:   Transthoracic echo (TTE) complete (Cupid Only):   Results for orders placed or performed during the hospital encounter of 05/04/22   Echo   Result Value Ref Range    Ascending aorta 3.32 cm    STJ 3.19 cm    AV mean gradient 6 mmHg    Ao peak skyler 1.42 m/s    Ao VTI 24.86 cm    IVS 1.01 0.6 - 1.1 cm    LA size 4.45 cm    Left Atrium Major Axis 5.58 cm    Left Atrium Minor Axis 5.63 cm    LVIDd 4.91 3.5 - 6.0 cm    LVIDs 4.46 (A) 2.1 - 4.0 cm    LVOT diameter 2.02 cm    LVOT peak VTI 13.05 cm    Posterior Wall 0.81 0.6 - 1.1 cm    RA Major Axis 5.09 cm    RA Width 3.47 cm    RVDD 3.10 cm    Sinus 3.31 cm    TAPSE 1.80 cm    TR Max Skyler 3.03 m/s    TDI LATERAL 0.10 m/s    TDI SEPTAL 0.06 m/s    LA WIDTH 3.84 cm    LV Diastolic Volume 113.39 mL    LV Systolic Volume 90.48 mL    LVOT peak skyler 0.75 m/s    FS 9 %    LA volume 81.41 cm3    LV mass 155.73 g    Left Ventricle Relative Wall Thickness 0.33 cm    AV valve area 1.68 cm2    AV Velocity Ratio 0.53     AV index (prosthetic) 0.52     Mean e' 0.08 m/s    LVOT area 3.2 cm2    LVOT stroke volume 41.80 cm3    AV peak gradient 8 mmHg    LV Systolic Volume Index 43.3 mL/m2    LV Diastolic Volume Index 54.25 mL/m2    LA Volume Index 39.0 mL/m2    LV Mass Index 75 g/m2    Triscuspid Valve Regurgitation Peak Gradient 37 mmHg    BSA 2.12 m2    Right Atrial Pressure (from IVC) 8 mmHg    EF 15 %    TV rest pulmonary artery pressure 45 mmHg    Narrative    · The left ventricle is normal in size with severely decreased systolic   function. The estimated ejection fraction is 15-20%.  · There is severe left ventricular global hypokinesis.  · Normal right ventricular size with  normal right ventricular systolic   function.  · Left ventricular diastolic dysfunction.  · Mild left atrial enlargement.  · Mild mitral regurgitation.  · The estimated PA systolic pressure is 45 mmHg.  · Intermediate central venous pressure (8 mmHg).          Pending Diagnostic Studies:     Procedure Component Value Units Date/Time    Kaveh Guadarrama [[id number]] Collected: 05/13/22 0421    Order Status: Sent Lab Status: In process Updated: 05/13/22 0422    Specimen: Blood           Final Active Diagnoses:    Diagnosis Date Noted POA    PRINCIPAL PROBLEM:  Shock [R57.9] 05/04/2022 Yes    Acute on chronic combined systolic and diastolic heart failure [I50.43] 05/06/2022 Yes    Cardiac pacemaker [Z95.0]  Yes    Acute respiratory failure with hypoxia [J96.01] 05/04/2022 Yes    Acute renal failure superimposed on stage 4 chronic kidney disease [N17.9, N18.4] 05/04/2022 Yes    Non-ST elevation myocardial infarction (NSTEMI) [I21.4] 04/26/2022 Unknown    Complete heart block [I44.2] 04/01/2022 Yes     Chronic    Goals of care, counseling/discussion [Z71.89] 01/27/2021 Not Applicable    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, 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] 08/28/2019 Yes     Chronic    Hypertension associated with diabetes [E11.59, I15.2] 04/24/2017 Yes     Chronic    Thrombocytopenia [D69.6] 04/24/2017 Yes    GINGER on CPAP [G47.33, Z99.89] 05/11/2015 Not Applicable    Benign prostatic hyperplasia [N40.0] 12/17/2012 Yes     Chronic    Type 2 diabetes mellitus with neurologic complication [E11.49] 11/27/2012 Yes    Hyperlipidemia associated with type 2 diabetes mellitus [E11.69, E78.5] 07/02/2012 Yes     Chronic    Coronary artery disease involving native coronary artery of native heart with unstable angina pectoris [I25.110] 07/02/2012 Yes      Problems Resolved During this Admission:    Diagnosis Date Noted Date Resolved POA    Troponin level elevated [R77.8] 05/04/2022 05/06/2022 Yes     Goals of care,  counseling/discussion  Pall Care consulted to assist in GOC discussion and plans for home hospice    Hypertension associated with diabetes  Holding home meds in the setting of cardiogenic shock  - weaning pressors  - starting PO meds as tolerated with high pulse pressures, hydralazine started         Discharged Condition:     Disposition:     Follow Up:   Follow-up Information     [facility]. Call in 1 day(s).    Specialty: Home Health Services  Why: Contact following d/c to continue home health services.  Contact information:  [address]  [address]  [place] [facility] [address]  [phone]             [facility] - Emergency Dept. Go to.    Specialty: Emergency Medicine  Why: If symptoms worsen  Contact information:  [address]  [facility] [address] [phone]                     Patient Instructions:   No discharge procedures on file.  Medications:  None    Time spent on the discharge of patient: 35 minutes    Brielle Rendon MD  Cardiology  [facility] - [facility]

## 2022-05-13 NOTE — SIGNIFICANT EVENT
Critical Care Medicine                                                              Death Note      Called to bedside by patient's nurse. Nursing supervisor notified. Family at bedside. Patient is not responding to verbal or tactile stimuli. Patient does not have a papillary or corneal reflex. His pupils are fixed and dilated. No heart or breath sounds on auscultation. No respirations. No palpable pulses.     Time of death 11:52 AM.      Cause of Death ventricular fibrillation               Brielle Rendon MD MS  Internal Medicine  [facility]  Pager: [phone]  [email or web address]

## 2022-05-13 NOTE — CHAPLAIN
Responded to pt's death. Encountered 2 family members at bedside. Family appearing shocked and still processing death.  provided grief packet and no needs stated.  provided DCP to primary nurse. Awaiting completed DCP.  remains available.     Chaplain Duckworth, Spiritual Care  Spectra *[phone]

## 2022-05-13 NOTE — PROGRESS NOTES
Pt. Noted to go into pulseless VT, then progressed to VF. Pt. DNR/DNI.  Primary cardiology team called and arrived at bedside.   called.  Family remains at bedside.

## 2022-05-13 NOTE — ASSESSMENT & PLAN NOTE
Mr. Kevon Perez, 81 y.o. 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 He presented with chest pain, SOB, cough.  -Initial workup was notable for BNP 1194, uptrending troponin, worsening BUN/Cr. CXR showed Bibasilar edema.   -Given his tachycardia and dyspnea, there were initial concerns for PE, however it is unlikely as LE U/S, V/Q scan non-concerning, and absence of right heart strain on TTE.     No results for input(s): TROPONINI in the last 72 hours.    Intake/Output Summary (Last 24 hours) at 5/13/2022 0903  Last data filed at 5/13/2022 0600  Gross per 24 hour   Intake 1159.22 ml   Output 1125 ml   Net 34.22 ml       Net IO Since Admission: -4,378.13 mL [05/13/22 0903]    -Given his TTE showing significant worsening of Ejection Fraction to less than 20%, signs of end-organ damage(acute renal failure, troponinemia, transaminitis), there were concerns for cardiogenic shock for which patient was transferred to the CCU .  - On 5/11patient not improving from a shock stand point, concerning was for sepsis vs worsening cardiogenic, improving 5/12    Plan   - Continue ACS protocol with heparin, aspirin, clopidogrel  - s/p [place] Kaykay catheter for hemodynamics montioring and IABP balloon pump MCS on 05/05.   - IABP removed on 5/9   - Currently on  gtt @2.5. Nitric, epi, and vaso discontinued due to improved blood pressures  - blood cx x2, resp cx, urinalysis with culture, CXR done. Started on broad spectrum abx with vanc/cefepime/azithro. Work up negative on 5/12, will continue for 5 days  - HTS consulted to assist in cardiogenic shock, agree with weaning pressors as tolerated and continuing antibiotics

## 2022-05-13 NOTE — PROGRESS NOTES
[facility] - [facility]  Cardiology  Progress Note    Patient Name: Kevon Perez  MRN: [id number]  Admission Date: 5/4/2022  Hospital Length of Stay: 9 days  Code Status: DNR   Attending Physician: Isael Self MD   Primary Care Physician: Cipriano Sol MD  Expected Discharge Date: 5/20/2022  Principal Problem:Shock    Subjective:     Hospital Course:   Mr. Kevon Perez, 81 y.o. 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 Of note, he underwent LHC on 4/29 (last week) which revealed multi-vessel CAD. CTS consulted and recommended medical versus PCI. The plan from IC was to proceed with multi-stage PCI in the outpatient setting. Patient was admitted this hospital course for acute decompensated HF and was placed on an adequate diuretic regimen that he didn't respond to. Patient developed hypotension 05/05. Labs revealed worsening RAGHU (sCr 2.4->2.9). Lactic 1.8. TTE revealed drop in EF from 65-> 20%. ACS protocol initiated given concern for possible new infarct. Patient was transferred to CICU for close monitoring. Upon arrival to the CCU, a swan jesus alberto catheter was placed via right IJ for hemodynamic monitoring. He was initiated on  2.5, Epi gtt 0.02, vasopressin 0.04, Nickie initially at 5ppm. He was also placed on an IABP by interventional cardiology. His iNP was further uptitrated to 10ppm. On 05/07 hsi hemodynamics worsened after IABP settings was weaned to 1:2, so this was further upgraded to 1:1. Patient improved on IABP and this was removed on 5/9. Weaned nitric. Continuing pressors as needed. Patient Cr worsening with removal of IABP, poor prognostic sign. Patient also having new WCC on 5/11, septic work up and broad spectrum antibiotics started. HTS  consulted for assistance in HF care. Patient hemodynamics improved 5/12, weaning pressors as tolerated, continuing antibiotics      Interval History: Symptoms:  Same.  Diet:  Adequate intake.   Activity level:  Impaired due to weakness.    Pain:  No pain.     Review of Systems   Constitutional: Positive for malaise/fatigue. Negative for decreased appetite.   Cardiovascular:  Negative for chest pain, dyspnea on exertion and leg swelling.   Respiratory:  Positive for shortness of breath. Negative for snoring.    Gastrointestinal:  Positive for constipation. Negative for abdominal pain and diarrhea.   Neurological:  Positive for light-headedness and weakness. Negative for vertigo.   Objective:     Vital Signs (Most Recent):  Temp: 98 °F (36.7 °C) (05/12/22 1915)  Pulse: 86 (05/13/22 0710)  Resp: 20 (05/13/22 0710)  BP: (!) 115/56 (05/13/22 0600)  SpO2: 97 % (05/13/22 0710)   Vital Signs (24h Range):  Temp:  [98 °F (36.7 °C)-98.8 °F (37.1 °C)] 98 °F (36.7 °C)  Pulse:  [70-89] 86  Resp:  [15-38] 20  SpO2:  [91 %-100 %] 97 %  BP: (102-117)/(50-68) 115/56  Arterial Line BP: (139-178)/(39-54) 150/39     Weight: 91.2 kg (201 lb)  Body mass index is 28.84 kg/m².     SpO2: 97 %  O2 Device (Oxygen Therapy): nasal cannula w/ humidification      Intake/Output Summary (Last 24 hours) at 5/13/2022 0852  Last data filed at 5/13/2022 0600  Gross per 24 hour   Intake 1202.51 ml   Output 1180 ml   Net 22.51 ml       Lines/Drains/Airways       Central Venous Catheter Line  Duration             Introducer 05/05/22 1510 right internal jugular 7 days    Pulmonary Artery Catheter Assessment  05/05/22 1625 right internal jugular 7 days              Drain  Duration                  Urethral Catheter 05/11/22 1500 Straight-tip 1 day              Arterial Line  Duration             Arterial Line 05/05/22 1714 Left Radial 7 days              Peripheral Intravenous Line  Duration                  Peripheral IV - Single Lumen 05/10/22 0427 20 G  Anterior;Left;Proximal Forearm 3 days                    Physical Exam  Vitals and nursing note reviewed.   Constitutional:       General: He is not in acute distress.     Appearance: He is obese. He is ill-appearing.   HENT:      Mouth/Throat:      Mouth: Mucous membranes are moist.      Pharynx: Oropharynx is clear.   Eyes:      Extraocular Movements: Extraocular movements intact.      Conjunctiva/sclera: Conjunctivae normal.      Pupils: Pupils are equal, round, and reactive to light.   Cardiovascular:      Rate and Rhythm: Normal rate and regular rhythm.   Pulmonary:      Effort: Respiratory distress present.      Breath sounds: Rales present.   Abdominal:      General: Abdomen is flat. There is no distension.      Palpations: Abdomen is soft.      Tenderness: There is no abdominal tenderness.   Musculoskeletal:         General: Normal range of motion.      Cervical back: Normal range of motion and neck supple.      Right lower leg: No edema.      Left lower leg: No edema.   Skin:     General: Skin is warm and dry.   Neurological:      General: No focal deficit present.      Mental Status: He is alert. Mental status is at baseline.      Cranial Nerves: No cranial nerve deficit.      Motor: No weakness.   Psychiatric:         Mood and Affect: Mood normal.         Behavior: Behavior normal.         Thought Content: Thought content normal.       Significant Labs: ABG:   Recent Labs   Lab 05/12/22  1428 05/12/22  1950 05/13/22  0425   PH 7.463* 7.397 7.408   PCO2 34.4* 43.2 43.4   HCO3 24.7 26.6 27.4   POCSATURATED 95 64* 64*   BE 1 2 3   , Blood Culture:   Recent Labs   Lab 05/11/22  1123 05/11/22  1124   LABBLOO No Growth to date  No Growth to date No Growth to date  No Growth to date   , CMP   Recent Labs   Lab 05/12/22  0236 05/12/22  1634 05/13/22  0421   * 125* 130*   K 4.1 4.3 4.0   CL 91* 91* 93*   CO2 24 24 25   * 253* 143*   * 106* 109*   CREATININE 2.4* 2.4* 2.2*   CALCIUM 8.3* 8.6*  8.4*   PROT 5.0*  --  4.9*   ALBUMIN 2.2*  --  2.1*   BILITOT 1.0  --  1.2*   ALKPHOS 45*  --  47*   AST 30  --  33   ALT 22  --  21   ANIONGAP 13 10 12   ESTGFRAFRICA 28.0* 28.0* 31.1*   EGFRNONAA 24.2* 24.2* 26.9*   , CBC   Recent Labs   Lab 05/12/22  0236 05/13/22  0421   WBC 18.16* 12.08   HGB 9.0* 8.6*   HCT 26.7* 26.4*   * 113*   , and   Recent Lab Results  (Last 5 results in the past 24 hours)        05/13/22  0425   05/13/22  0421   05/12/22  2105   05/12/22  1950   05/12/22  1634        Albumin   2.1             Alkaline Phosphatase   47             Allens Test N/A       N/A         ALT   21             Anion Gap   12       10       AST   33             Baso #   0.09             Basophil %   0.7             BILIRUBIN TOTAL   1.2  Comment: For infants and newborns, interpretation of results should be based  on gestational age, weight and in agreement with clinical  observations.    Premature Infant recommended reference ranges:  Up to 24 hours.............<8.0 mg/dL  Up to 48 hours............<12.0 mg/dL  3-5 days..................<15.0 mg/dL  6-29 days.................<15.0 mg/dL               Site Other       Other         BUN   109       106       Calcium   8.4       8.6       Chloride   93       91       CO2   25       24       Creatinine   2.2       2.4       DelSys CPAP/BiPAP               Differential Method   Automated             eGFR if    31.1       28.0       eGFR if non    26.9  Comment: Calculation used to obtain the estimated glomerular filtration  rate (eGFR) is the CKD-EPI equation.          24.2  Comment: Calculation used to obtain the estimated glomerular filtration  rate (eGFR) is the CKD-EPI equation.          Eos #   0.3             Eosinophil %   2.1             Glucose   143       253       Gran # (ANC)   9.5             Gran %   78.8             Hematocrit   26.4             Hemoglobin   8.6             Immature Grans (Abs)   0.19  Comment: Mild  elevation in immature granulocytes is non specific and   can be seen in a variety of conditions including stress response,   acute inflammation, trauma and pregnancy. Correlation with other   laboratory and clinical findings is essential.               Immature Granulocytes   1.6             Lymph #   0.8             Lymph %   6.7             Magnesium   2.3             MCH   29.1             MCHC   32.6             MCV   89             Mode BiPAP               Mono #   1.2             Mono %   10.1             MPV   10.5             nRBC   0             Phosphorus   4.4             Platelets   113             POC BE 3       2         POC HCO3 27.4       26.6         POC PCO2 43.4       43.2         POC PH 7.408       7.397         POC PO2 33       34         POC SATURATED O2 64       64         POC TCO2 29       28         POCT Glucose     214           Potassium   4.0       4.3       PROTEIN TOTAL   4.9             RBC   2.96             RDW   12.5             Sample VENOUS       VENOUS         Sodium   130       125       WBC   12.08                                    Significant Imagin/12 CXR:  FINDINGS:  Lines and tubes:  Tip of the pulmonary artery catheter is difficult to visualize due to overlying wires though I suspect resides along the right pulmonary artery.  Left chest wall pacing device with leads stable position.     Lung volumes are stable.  Hazy perihilar and bibasilar opacities unchanged with persistent small left pleural effusion.  Cardiac silhouette is stable in size with continued perihilar pulmonary vascular congestion.     Impression:     Stable pulmonary edema.        Assessment and Plan:       * Shock  Mr. Kevon Perez, 81 y.o. man with multi-vessel diseases not amenable for CABG with plan to proceed with staged PCI, high degree AV block s/p DC-PPM on . He presented with chest pain, SOB, cough.  -Initial workup was notable for BNP 1194, uptrending troponin, worsening  BUN/Cr. CXR showed Bibasilar edema.   -Given his tachycardia and dyspnea, there were initial concerns for PE, however it is unlikely as LE U/S, V/Q scan non-concerning, and absence of right heart strain on TTE.     No results for input(s): TROPONINI in the last 72 hours.    Intake/Output Summary (Last 24 hours) at 5/13/2022 0903  Last data filed at 5/13/2022 0600  Gross per 24 hour   Intake 1159.22 ml   Output 1125 ml   Net 34.22 ml       Net IO Since Admission: -4,378.13 mL [05/13/22 0903]    -Given his TTE showing significant worsening of Ejection Fraction to less than 20%, signs of end-organ damage(acute renal failure, troponinemia, transaminitis), there were concerns for cardiogenic shock for which patient was transferred to the CCU .  - On 5/11patient not improving from a shock stand point, concerning was for sepsis vs worsening cardiogenic, improving 5/12    Plan   - Continue ACS protocol with heparin, aspirin, clopidogrel  - s/p [place] catheter for hemodynamics montioring and IABP balloon pump MCS on 05/05.   - IABP removed on 5/9   - Currently on  gtt @2.5. Nitric, epi, and vaso discontinued due to improved blood pressures  - blood cx x2, resp cx, urinalysis with culture, CXR done. Started on broad spectrum abx with vanc/cefepime/azithro. Work up negative on 5/12, will continue for 5 days  - HTS consulted to assist in cardiogenic shock, agree with weaning pressors as tolerated and continuing antibiotics    Acute on chronic combined systolic and diastolic heart failure  TTE with new EF of 20% with mod systolic RV function.     - Responding well to lasix 10mg/hr, decreased to 5mg/hr, now on lasix 80 IV PRN.  - weaning pressor support as tolerated  - Strict I/Os  - Replete electrolytes as needed    Cardiac pacemaker  Status-post successful dual chamber pacemaker implantation.         Acute renal failure superimposed on stage 4 chronic kidney disease  - baseline around 2, on admit 2.5. sCr improving with  diuresis  - Likely 2/2 to cardiorenal syndrome  - Cr 2.6 from 2.2 on 5/11, improved to 2.2 on 5/12, stable   - avoid nephrotoxins; renally dose meds    Acute respiratory failure with hypoxia  Patient with Hypoxic Respiratory failure which is Acute.  he is not on home oxygen. Supplemental oxygen was provided and noted- Vent Mode: Spont  Oxygen Concentration (%):  [30-40] 30  Resp Rate Total:  [16 br/min-32 br/min] 18 br/min  PEEP/CPAP:  [5 cmH20] 5 cmH20  Pressure Support:  [12 cmH20] 12 cmH20  Mean Airway Pressure:  [7.8 cmH20-9.7 cmH20] 7.8 cmH20.   Signs/symptoms of respiratory failure include- tachypnea. Contributing diagnoses includes - CHF Labs and images were reviewed. Patient . Will treat underlying causes and adjust management of respiratory failure as follows- IV diuresis of lasix 80 IV PRN/ Bipapap as needed  - better bp control with weaning pressor support on 5/12    Goals of care, counseling/discussion  [facility] consulted to assist in GOC discussion and plans for home hospice    Thrombocytopenia  Plt of 91 on 5/9.  - patient was on heparin drip, discontinued on 5/10    Plan  - VTE ppx only  - CBC daily  - hold should plt < 50    Hypertension associated with diabetes  Holding home meds in the setting of cardiogenic shock  - weaning pressors  - starting PO meds as tolerated with high pulse pressures, hydralazine started 5/13    GINGER on CPAP  BiPAP qhS    Type 2 diabetes mellitus with neurologic complication  - LDSSI  - basal bolus insulin and prandial insulin, titrate as needed  - hypoglycemia protocol , ACHS accuchecks     Coronary artery disease involving native coronary artery of native heart with unstable angina pectoris  Continue ACS protocol - ASA/ Plavix, statin      VTE Risk Mitigation (From admission, onward)         Ordered     heparin (porcine) injection 5,000 Units  Every 8 hours         05/10/22 1029     IP VTE HIGH RISK PATIENT  Once         05/10/22 1029     Place sequential compression  device  Until discontinued         05/05/22 1035     Place sequential compression device  Until discontinued         05/04/22 1418     Place sequential compression device  Until discontinued         05/04/22 1418                Brielle Rendon MD  Cardiology  [facility] - Cardiac Intensive Care

## 2022-05-13 NOTE — ASSESSMENT & PLAN NOTE
Holding home meds in the setting of cardiogenic shock  - weaning pressors  - starting PO meds as tolerated with high pulse pressures, hydralazine started 5/13

## 2022-05-13 NOTE — ASSESSMENT & PLAN NOTE
- baseline around 2, on admit 2.5. sCr improving with diuresis  - Likely 2/2 to cardiorenal syndrome  - Cr 2.6 from 2.2 on 5/11, improved to 2.2 on 5/12, stable   - avoid nephrotoxins; renally dose meds

## 2022-05-13 NOTE — PLAN OF CARE
[facility] - [facility]  Discharge Final Note    Primary Care Provider: Cipriano Sol MD    Expected Discharge Date: 2022    Final Discharge Note (most recent)     Final Note - 22 1500        Final Note    Assessment Type Final Discharge Note     Anticipated Discharge Disposition                Sharon Delgadillo LMSW  [facility]  [id number]

## 2022-05-13 NOTE — SUBJECTIVE & OBJECTIVE
Interval History: Symptoms:  Same.  Diet:  Adequate intake.   Activity level:  Impaired due to weakness.    Pain:  No pain.     Review of Systems   Constitutional: Positive for malaise/fatigue. Negative for decreased appetite.   Cardiovascular:  Negative for chest pain, dyspnea on exertion and leg swelling.   Respiratory:  Positive for shortness of breath. Negative for snoring.    Gastrointestinal:  Positive for constipation. Negative for abdominal pain and diarrhea.   Neurological:  Positive for light-headedness and weakness. Negative for vertigo.   Objective:     Vital Signs (Most Recent):  Temp: 98 °F (36.7 °C) (05/12/22 1915)  Pulse: 86 (05/13/22 0710)  Resp: 20 (05/13/22 0710)  BP: (!) 115/56 (05/13/22 0600)  SpO2: 97 % (05/13/22 0710)   Vital Signs (24h Range):  Temp:  [98 °F (36.7 °C)-98.8 °F (37.1 °C)] 98 °F (36.7 °C)  Pulse:  [70-89] 86  Resp:  [15-38] 20  SpO2:  [91 %-100 %] 97 %  BP: (102-117)/(50-68) 115/56  Arterial Line BP: (139-178)/(39-54) 150/39     Weight: 91.2 kg (201 lb)  Body mass index is 28.84 kg/m².     SpO2: 97 %  O2 Device (Oxygen Therapy): nasal cannula w/ humidification      Intake/Output Summary (Last 24 hours) at 5/13/2022 0852  Last data filed at 5/13/2022 0600  Gross per 24 hour   Intake 1202.51 ml   Output 1180 ml   Net 22.51 ml       Lines/Drains/Airways       Central Venous Catheter Line  Duration             Introducer 05/05/22 1510 right internal jugular 7 days    Pulmonary Artery Catheter Assessment  05/05/22 1625 right internal jugular 7 days              Drain  Duration                  Urethral Catheter 05/11/22 1500 Straight-tip 1 day              Arterial Line  Duration             Arterial Line 05/05/22 1714 Left Radial 7 days              Peripheral Intravenous Line  Duration                  Peripheral IV - Single Lumen 05/10/22 0427 20 G Anterior;Left;Proximal Forearm 3 days                    Physical Exam  Vitals and nursing note reviewed.   Constitutional:        General: He is not in acute distress.     Appearance: He is obese. He is ill-appearing.   HENT:      Mouth/Throat:      Mouth: Mucous membranes are moist.      Pharynx: Oropharynx is clear.   Eyes:      Extraocular Movements: Extraocular movements intact.      Conjunctiva/sclera: Conjunctivae normal.      Pupils: Pupils are equal, round, and reactive to light.   Cardiovascular:      Rate and Rhythm: Normal rate and regular rhythm.   Pulmonary:      Effort: Respiratory distress present.      Breath sounds: Rales present.   Abdominal:      General: Abdomen is flat. There is no distension.      Palpations: Abdomen is soft.      Tenderness: There is no abdominal tenderness.   Musculoskeletal:         General: Normal range of motion.      Cervical back: Normal range of motion and neck supple.      Right lower leg: No edema.      Left lower leg: No edema.   Skin:     General: Skin is warm and dry.   Neurological:      General: No focal deficit present.      Mental Status: He is alert. Mental status is at baseline.      Cranial Nerves: No cranial nerve deficit.      Motor: No weakness.   Psychiatric:         Mood and Affect: Mood normal.         Behavior: Behavior normal.         Thought Content: Thought content normal.       Significant Labs: ABG:   Recent Labs   Lab 05/12/22  1428 05/12/22  1950 05/13/22  0425   PH 7.463* 7.397 7.408   PCO2 34.4* 43.2 43.4   HCO3 24.7 26.6 27.4   POCSATURATED 95 64* 64*   BE 1 2 3   , Blood Culture:   Recent Labs   Lab 05/11/22  1123 05/11/22  1124   LABBLOO No Growth to date  No Growth to date No Growth to date  No Growth to date   , CMP   Recent Labs   Lab 05/12/22  0236 05/12/22  1634 05/13/22  0421   * 125* 130*   K 4.1 4.3 4.0   CL 91* 91* 93*   CO2 24 24 25   * 253* 143*   * 106* 109*   CREATININE 2.4* 2.4* 2.2*   CALCIUM 8.3* 8.6* 8.4*   PROT 5.0*  --  4.9*   ALBUMIN 2.2*  --  2.1*   BILITOT 1.0  --  1.2*   ALKPHOS 45*  --  47*   AST 30  --  33   ALT 22  --   21   ANIONGAP 13 10 12   ESTGFRAFRICA 28.0* 28.0* 31.1*   EGFRNONAA 24.2* 24.2* 26.9*   , CBC   Recent Labs   Lab 05/12/22  0236 05/13/22  0421   WBC 18.16* 12.08   HGB 9.0* 8.6*   HCT 26.7* 26.4*   * 113*   , and   Recent Lab Results  (Last 5 results in the past 24 hours)        05/13/22  0425   05/13/22  0421   05/12/22  2105   05/12/22  1950   05/12/22  1634        Albumin   2.1             Alkaline Phosphatase   47             Allens Test N/A       N/A         ALT   21             Anion Gap   12       10       AST   33             Baso #   0.09             Basophil %   0.7             BILIRUBIN TOTAL   1.2  Comment: For infants and newborns, interpretation of results should be based  on gestational age, weight and in agreement with clinical  observations.    Premature Infant recommended reference ranges:  Up to 24 hours.............<8.0 mg/dL  Up to 48 hours............<12.0 mg/dL  3-5 days..................<15.0 mg/dL  6-29 days.................<15.0 mg/dL               Site Other       Other         BUN   109       106       Calcium   8.4       8.6       Chloride   93       91       CO2   25       24       Creatinine   2.2       2.4       DelSys CPAP/BiPAP               Differential Method   Automated             eGFR if    31.1       28.0       eGFR if non    26.9  Comment: Calculation used to obtain the estimated glomerular filtration  rate (eGFR) is the CKD-EPI equation.          24.2  Comment: Calculation used to obtain the estimated glomerular filtration  rate (eGFR) is the CKD-EPI equation.          Eos #   0.3             Eosinophil %   2.1             Glucose   143       253       Gran # (ANC)   9.5             Gran %   78.8             Hematocrit   26.4             Hemoglobin   8.6             Immature Grans (Abs)   0.19  Comment: Mild elevation in immature granulocytes is non specific and   can be seen in a variety of conditions including stress response,    acute inflammation, trauma and pregnancy. Correlation with other   laboratory and clinical findings is essential.               Immature Granulocytes   1.6             Lymph #   0.8             Lymph %   6.7             Magnesium   2.3             MCH   29.1             MCHC   32.6             MCV   89             Mode BiPAP               Mono #   1.2             Mono %   10.1             MPV   10.5             nRBC   0             Phosphorus   4.4             Platelets   113             POC BE 3       2         POC HCO3 27.4       26.6         POC PCO2 43.4       43.2         POC PH 7.408       7.397         POC PO2 33       34         POC SATURATED O2 64       64         POC TCO2 29       28         POCT Glucose     214           Potassium   4.0       4.3       PROTEIN TOTAL   4.9             RBC   2.96             RDW   12.5             Sample VENOUS       VENOUS         Sodium   130       125       WBC   12.08                                    Significant Imagin/12 CXR:  FINDINGS:  Lines and tubes:  Tip of the pulmonary artery catheter is difficult to visualize due to overlying wires though I suspect resides along the right pulmonary artery.  Left chest wall pacing device with leads stable position.     Lung volumes are stable.  Hazy perihilar and bibasilar opacities unchanged with persistent small left pleural effusion.  Cardiac silhouette is stable in size with continued perihilar pulmonary vascular congestion.     Impression:     Stable pulmonary edema.

## 2022-05-13 NOTE — ASSESSMENT & PLAN NOTE
Holding home meds in the setting of cardiogenic shock  - weaning pressors  - starting PO meds as tolerated with high pulse pressures

## 2022-05-16 LAB
BACTERIA BLD CULT: NORMAL
BACTERIA BLD CULT: NORMAL

## 2023-08-28 NOTE — NURSING
APTT result 95.8 noted. Heparin stopped and on call team notified. Spoke with Dr. Judy Pandya who ordered to start Heparin at 10 units/kg/hr.  
CHG bath done. Bilateral chest shaven. Patient has 3 well functioning peripheral IV's for pacemaker placement scheduled at 1430. Daughter at bedside. Consent signed by patient and in chart. Patient/daughter have no further questions at this time and are ready for procedure.  
Discharge paperwork reviewed with patient, son, and daughter. Follow up care reviewed with patient and educated on importance of seeing PCP, cardiologist, and nephrologist. Discharge medications reviewed with patient and delivered by [facility]. Patient discharged home accompanied by son and daughter, no questions at this time. All belongings sent home.  
Patient arrived to CSU room 350. VSS. Cardiac monitoring applied, VISI applied, patient A/Ox4.Arrived with heparin GTT. Patient oriented to unit, transition of care,medications. Patient denies chest pain.  
Patient arrived to unit at 1700. Alert and oriented x4, /55, O2 95% on 2L NC, heart rate 87 paced on the monitor, blood sugar check 138. Sling in place, patient educated on bedrest status until 1840 and not to move left arm. Patient instructed to use call button for mobility. Patient complaining of 4/10 chest pain. Dilaudid/Tylenol already given by PACU RN. Son and daughter both at bedside.   
Per EP fellow, OK to give aspirin and plavix this AM before pacemaker placement. Notified MD of systolic -180's. Per MD, give scheduled amlodipine and continue to monitor.  
Pt aaox4 denies pain.  +3 edema bilateral lower extremities. Pt telemetry afib; however, pt did experience bradycardia several times towards the end of shift with PVC and couplets. Pt assessed and asymptomatic, denies any chest pain or discomfort. Pt does express using CPAP hs. Will continue to monitor.   
Pt aox4, restless night d/t iv and pump malfunctioning. Two new iv started. Patent and infusing. Pt is currently on second bag of continuous NS @ 100ml/hr. 20G LAC unknown placement; 18G LFA 4/29; 20G RFA 4/29. All pt needs are met, in no apparent distress.   
Pt had dinner. Daughter is at the bedside. Heparin drip infusing at 12 units/kg/hr, last PTT 43.9. next PTT due at 2300.  Safety measures maintained. Pt instructed to call for assistance.  
Pt in no distress at this time. He denies chest discomfort. Heparin drip infusing at 14 units/kg/hr, bleeding precautions maintained. Daughter at bedside. Safety measures maintained.  
Pt in no distress at this time. Pt denies having chest discomfort or palpitations. Pt reported that the last time he had chest pain was over the weekend. Heparin drip infusing as ordered, last PTT 47.2, no bleeding noted.Pt has stress test done earlier today, and is having echo done now.  Pt kept npo for possible procedure today. Pt is aware of plans. Safety measures maintained.  
Pt in no distress at this time. S/P LHC- denies chest discomfort. Right radial access site assessed, no bleeding or hematoma noted, pulses palpable.  Pt was seen by Dr. Tubbs. Family members at bedside, safety measures maintained.  
Pt left unit for LHC. Heparin stopped as ordered. Family at bedside  
Pt ptt 35.6, bolus 30 units and increased continuous dose by 2 units per nomogram with day nurse. Order for ptt 6hrs post increase added.  MD at bedside with pt and daughter in discussion with future POC. All pt needs are met, in no apparent distress.   
Home

## 2024-04-18 NOTE — ASSESSMENT & PLAN NOTE
-- above labs today  -- Continue current insulin doses at this time. May adjust after lab results today.   -- Reviewed goals of therapy are to get the best control we can without hypoglycemia  -- Reviewed patient's current insulin regimen. Clarified proper insulin dose and timing in relation to meals, etc. Insulin injection sites and proper rotation instructed.    -- Advised frequent self blood glucose monitoring.  Patient encouraged to document glucose results and bring them to every clinic visit    -- Hypoglycemia precautions discussed. Instructed on precautions before driving.    -- Call for Bg repeatedly < 90 or > 180.   -- Close adherence to lifestyle changes recommended.   -- Periodic follow ups for eye evaluations, foot care and dental care suggested. UTD   see above

## 2024-06-13 NOTE — ANESTHESIA PREPROCEDURE EVALUATION
12/04/2020  Kevon Perez is a 80 y.o., male   Pre-operative evaluation for Procedure(s) (LRB):  COLONOSCOPY (N/A)    Kevon Perez is a 80 y.o. male     Patient Active Problem List   Diagnosis    Coronary artery disease involving native coronary artery of native heart without angina pectoris    Hyperlipidemia associated with type 2 diabetes mellitus    Type 2 diabetes mellitus with neurologic complication    Benign prostatic hyperplasia    Post PTCA    Type 2 diabetes mellitus with diabetic chronic kidney disease    Nephritis and nephropathy, 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    Hypertensive retinopathy of both eyes    Lumbar radiculopathy    Personal history of bladder cancer    GINGER on CPAP    Hypertension associated with diabetes    Aortic atherosclerosis    History of MI (myocardial infarction)    Hyperparathyroidism, secondary renal    Diabetic polyneuropathy associated with type 2 diabetes mellitus    Vitamin D deficiency disease    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, with long-term current use of insulin    Iron deficiency anemia    Obesity, diabetes, and hypertension syndrome    CKD stage 4 due to type 2 diabetes mellitus    Obesity (BMI 30.0-34.9)    History of colon polyps       Review of patient's allergies indicates:   Allergen Reactions    Penicillins Other (See Comments)     Muscle stiffness    Bactrim [sulfamethoxazole-trimethoprim] Rash       No current facility-administered medications on file prior to encounter.      Current Outpatient Medications on File Prior to Encounter   Medication Sig Dispense Refill    amLODIPine (NORVASC) 5 MG tablet Take 1 tablet (5 mg total) by mouth once daily. 90 tablet 1    aspirin (ECOTRIN) 81 MG EC tablet Take 81 mg by mouth every evening.     "   atorvastatin (LIPITOR) 20 MG tablet Take 1 tablet (20 mg total) by mouth once daily. 90 tablet 1    azelastine (ASTELIN) 137 mcg (0.1 %) nasal spray 2 sprays (274 mcg total) by Nasal route 2 (two) times daily. 30 mL 0    BD ULTRA-FINE SHORT PEN NEEDLE 31 gauge x 5/16" Ndle USE UP TO 6 TIMES DAILY 200 each 11    blood-glucose meter kit To check BG 4 times daily, to use with insurance preferred meter 1 each 0    calcitRIOL (ROCALTROL) 0.25 MCG Cap TAKE 1 CAPSULE BY MOUTH EVERY DAY 30 capsule 11    clopidogreL (PLAVIX) 75 mg tablet TAKE 1 TABLET BY MOUTH EVERY DAY 90 tablet 1    finasteride (PROSCAR) 5 mg tablet TAKE 1 TABLET(5 MG) BY MOUTH EVERY DAY 90 tablet 3    fish oil-omega-3 fatty acids 300-1,000 mg capsule Take by mouth once daily.      folic acid (FOLVITE) 1 MG tablet Take 1 mg by mouth once daily.       hydroCHLOROthiazide (HYDRODIURIL) 12.5 MG Tab TAKE 1 TABLET(12.5 MG) BY MOUTH EVERY DAY 30 tablet 11    insulin aspart U-100 (NOVOLOG FLEXPEN U-100 INSULIN) 100 unit/mL (3 mL) InPn pen Inject 15 Units into the skin 4 (four) times daily. Before meals plus correction scale. Max TDD 75 6 Box 3    irbesartan (AVAPRO) 300 MG tablet Take 1 tablet (300 mg total) by mouth every evening. 90 tablet 3    nitroGLYCERIN (NITROSTAT) 0.4 MG SL tablet Place 1 tablet (0.4 mg total) under the tongue every 5 (five) minutes as needed. 25 tablet 3    TRESIBA FLEXTOUCH U-200 200 unit/mL (3 mL) InPn INJECT 70 UNITS UNDER THE SKIN ONCE DAILY. (Patient taking differently: 30 Units. ) 15 Syringe 11    TRUE METRIX GLUCOSE TEST STRIP Strp USE TO CHECK BLOOD SUGAR FOUR TIMES DAILY 300 strip 11    TRUEPLUS LANCETS 30 gauge Misc USE TO CHECK BLOOD SUGAR FOUR TIMES DAILY 300 each 3    vitamin D (VITAMIN D3) 1000 units Tab Take 1,000 Units by mouth once daily.         Past Surgical History:   Procedure Laterality Date    BASAL CELL CARCINOMA EXCISION      nose     BLADDER SURGERY      bladder cancer    CARDIAC " CATHETERIZATION      CATARACT EXTRACTION      bilateral     COLONOSCOPY  3/26/15    CORONARY ANGIOPLASTY  9/10/09    CFX    CORONARY ANGIOPLASTY WITH STENT PLACEMENT      CYSTOSCOPY      EYE SURGERY      hydrocel         2D Echo:  Results for orders placed or performed in visit on 09/10/18   2D echo with color flow doppler   Result Value Ref Range    QEF 55 55 - 65    Diastolic Dysfunction No     Est. PA Systolic Pressure 13.89          Anesthesia Evaluation    I have reviewed the Patient Summary Reports.   I have reviewed the NPO Status.   I have reviewed the Medications.     Review of Systems  Anesthesia Hx:  No problems with previous Anesthesia Denies Hx of Anesthetic complications  History of prior surgery of interest to airway management or planning: Denies Family Hx of Anesthesia complications.   Denies Personal Hx of Anesthesia complications.   Social:  Alcohol Use, Former Smoker    Hematology/Oncology:  Hematology Normal   Oncology Normal     EENT/Dental:EENT/Dental Normal   Cardiovascular:   Exercise tolerance: good Hypertension CAD  CABG/stent  hyperlipidemia    Pulmonary:   Sleep Apnea    Renal/:   Chronic Renal Disease, CRI BPH    Hepatic/GI:   GERD    Musculoskeletal:  Spine Disorders: lumbar    Neurological:  Neurology Normal    Endocrine:   Diabetes, type 2    Psych:  Psychiatric Normal       There is no height or weight on file to calculate BMI.      Physical Exam  General:  Well nourished, Obesity    Airway/Jaw/Neck:  Airway Findings: Mouth Opening: Normal Tongue: Normal  General Airway Assessment: Adult, Average  Mallampati: III  TM Distance: Normal, at least 6 cm  Jaw/Neck Findings:  Neck ROM: Normal ROM      Dental:  Dental Findings: In tact   Chest/Lungs:  Chest/Lungs Findings: Normal Respiratory Rate, Clear to auscultation     Heart/Vascular:  Heart Findings: Rate: Normal  Rhythm: Regular Rhythm        Mental Status:  Mental Status Findings:  Alert and Oriented, Cooperative          Anesthesia Plan  Type of Anesthesia, risks & benefits discussed:  Anesthesia Type:  general  Patient's Preference:   Intra-op Monitoring Plan: standard ASA monitors  Intra-op Monitoring Plan Comments:   Post Op Pain Control Plan: multimodal analgesia  Post Op Pain Control Plan Comments:   Induction:   IV  Beta Blocker:  Patient is not currently on a Beta-Blocker (No further documentation required).       Informed Consent: Patient understands risks and agrees with Anesthesia plan.  Questions answered. Anesthesia consent signed with patient.  ASA Score: 3     Day of Surgery Review of History & Physical:    H&P update referred to the surgeon.         Ready For Surgery From Anesthesia Perspective.        pt states she is here for a

## (undated) DEVICE — PAD RADI FEMORAL

## (undated) DEVICE — WIRE GUIDE SAFE-T-J .035 260CM

## (undated) DEVICE — SPIKE CONTRAST CONTROLLER

## (undated) DEVICE — PROTECTION STATION PLUS

## (undated) DEVICE — VISE RADIFOCUS MULTI TORQUE

## (undated) DEVICE — OMNIPAQUE 350 200ML

## (undated) DEVICE — SEE MEDLINE ITEM 156894

## (undated) DEVICE — KIT GLIDESHEATH SLEND 6FR 10CM

## (undated) DEVICE — SHEATH SAFESHEATH II ULTRA 6FR

## (undated) DEVICE — SLING SWATHE UNIVERSAL FOAM

## (undated) DEVICE — ELECTRODE REM PLYHSV RETURN 9

## (undated) DEVICE — SEE MEDLINE ITEM 157187

## (undated) DEVICE — HEMOSTAT VASC BAND REG 24CM

## (undated) DEVICE — ADHESIVE DERMABOND ADVANCED

## (undated) DEVICE — KIT CUSTOM MANIFOLD

## (undated) DEVICE — DRAPE INCISE IOBAN 2 23X17IN

## (undated) DEVICE — GUIDEWIRE STF .035X180CM ANG

## (undated) DEVICE — CATH JACKY RADIAL 5FR 100CM

## (undated) DEVICE — PACK PACER PERMANENT

## (undated) DEVICE — PAD DEFIB CADENCE ADULT R2